# Patient Record
Sex: FEMALE | Race: WHITE | NOT HISPANIC OR LATINO | Employment: OTHER | URBAN - NONMETROPOLITAN AREA
[De-identification: names, ages, dates, MRNs, and addresses within clinical notes are randomized per-mention and may not be internally consistent; named-entity substitution may affect disease eponyms.]

---

## 2017-01-16 ENCOUNTER — HOSPITAL ENCOUNTER (EMERGENCY)
Facility: HOSPITAL | Age: 62
Discharge: HOME | End: 2017-01-16
Attending: EMERGENCY MEDICINE
Payer: COMMERCIAL

## 2017-01-16 ENCOUNTER — APPOINTMENT (EMERGENCY)
Dept: RADIOLOGY | Facility: HOSPITAL | Age: 62
End: 2017-01-16
Payer: COMMERCIAL

## 2017-01-16 VITALS
HEART RATE: 70 BPM | WEIGHT: 140 LBS | OXYGEN SATURATION: 96 % | BODY MASS INDEX: 24.03 KG/M2 | RESPIRATION RATE: 19 BRPM | DIASTOLIC BLOOD PRESSURE: 74 MMHG | SYSTOLIC BLOOD PRESSURE: 118 MMHG | TEMPERATURE: 97.6 F

## 2017-01-16 DIAGNOSIS — J20.9 ACUTE BRONCHITIS: ICD-10-CM

## 2017-01-16 DIAGNOSIS — J06.9 ACUTE URI: Primary | ICD-10-CM

## 2017-01-16 DIAGNOSIS — Z85.528 H/O RENAL CELL CANCER: ICD-10-CM

## 2017-01-16 DIAGNOSIS — E86.0 MILD DEHYDRATION: ICD-10-CM

## 2017-01-16 DIAGNOSIS — J44.1 COPD EXACERBATION (HCC): ICD-10-CM

## 2017-01-16 LAB
ALBUMIN SERPL BCP-MCNC: 2.8 G/DL (ref 3.5–5)
ALP SERPL-CCNC: 119 U/L (ref 46–116)
ALT SERPL W P-5'-P-CCNC: 17 U/L (ref 12–78)
ANION GAP SERPL CALCULATED.3IONS-SCNC: 9 MMOL/L (ref 4–13)
ARTERIAL PATENCY WRIST A: YES
AST SERPL W P-5'-P-CCNC: 28 U/L (ref 5–45)
BACTERIA UR QL AUTO: ABNORMAL /HPF
BASE EXCESS BLDA CALC-SCNC: -5.7 MMOL/L
BASOPHILS # BLD AUTO: 0.01 THOUSANDS/ΜL (ref 0–0.1)
BASOPHILS NFR BLD AUTO: 0 % (ref 0–1)
BILIRUB SERPL-MCNC: 0.2 MG/DL (ref 0.2–1)
BILIRUB UR QL STRIP: NEGATIVE
BUN SERPL-MCNC: 11 MG/DL (ref 5–25)
CALCIUM SERPL-MCNC: 8.3 MG/DL (ref 8.3–10.1)
CHLORIDE SERPL-SCNC: 100 MMOL/L (ref 100–108)
CLARITY UR: CLEAR
CO2 SERPL-SCNC: 24 MMOL/L (ref 21–32)
COLOR UR: YELLOW
CREAT SERPL-MCNC: 0.81 MG/DL (ref 0.6–1.3)
EOSINOPHIL # BLD AUTO: 0.04 THOUSAND/ΜL (ref 0–0.61)
EOSINOPHIL NFR BLD AUTO: 1 % (ref 0–6)
ERYTHROCYTE [DISTWIDTH] IN BLOOD BY AUTOMATED COUNT: 13.2 % (ref 11.6–15.1)
FLUAV AG SPEC QL IA: NEGATIVE
FLUAV AG SPEC QL: ABNORMAL
FLUBV AG SPEC QL IA: NEGATIVE
FLUBV AG SPEC QL: ABNORMAL
GAS + CO PNL BLDA: 2.3 % (ref 0–1.5)
GFR SERPL CREATININE-BSD FRML MDRD: >60 ML/MIN/1.73SQ M
GLUCOSE SERPL-MCNC: 91 MG/DL (ref 65–140)
GLUCOSE UR STRIP-MCNC: NEGATIVE MG/DL
HCO3 BLDA-SCNC: 20.1 MMOL/L (ref 22–28)
HCT VFR BLD AUTO: 36 % (ref 34.8–46.1)
HGB BLD-MCNC: 12 G/DL (ref 11.5–15.4)
HGB UR QL STRIP.AUTO: ABNORMAL
HOLD SPECIMEN: NORMAL
KETONES UR STRIP-MCNC: NEGATIVE MG/DL
LEUKOCYTE ESTERASE UR QL STRIP: NEGATIVE
LYMPHOCYTES # BLD AUTO: 0.94 THOUSANDS/ΜL (ref 0.6–4.47)
LYMPHOCYTES NFR BLD AUTO: 31 % (ref 14–44)
MCH RBC QN AUTO: 35.4 PG (ref 26.8–34.3)
MCHC RBC AUTO-ENTMCNC: 33.3 G/DL (ref 31.4–37.4)
MCV RBC AUTO: 106 FL (ref 82–98)
MONOCYTES # BLD AUTO: 0.34 THOUSAND/ΜL (ref 0.17–1.22)
MONOCYTES NFR BLD AUTO: 11 % (ref 4–12)
NASAL CANNULA: ABNORMAL
NEUTROPHILS # BLD AUTO: 1.75 THOUSANDS/ΜL (ref 1.85–7.62)
NEUTS SEG NFR BLD AUTO: 57 % (ref 43–75)
NITRITE UR QL STRIP: NEGATIVE
NON-SQ EPI CELLS URNS QL MICRO: ABNORMAL /HPF
O2 CT BLDA-SCNC: 16.9 ML/DL (ref 16–23)
OXYHGB MFR BLDA: 93.4 % (ref 94–97)
PCO2 BLDA: 40.6 MM HG (ref 36–44)
PH BLDA: 7.31 [PH] (ref 7.35–7.45)
PH UR STRIP.AUTO: 7 [PH] (ref 4.5–8)
PLATELET # BLD AUTO: 200 THOUSANDS/UL (ref 149–390)
PMV BLD AUTO: 8.3 FL (ref 8.9–12.7)
PO2 BLDA: 81.5 MM HG (ref 75–129)
POTASSIUM SERPL-SCNC: 4.4 MMOL/L (ref 3.5–5.3)
PROT SERPL-MCNC: 6.7 G/DL (ref 6.4–8.2)
PROT UR STRIP-MCNC: NEGATIVE MG/DL
RBC # BLD AUTO: 3.39 MILLION/UL (ref 3.81–5.12)
RBC #/AREA URNS AUTO: ABNORMAL /HPF
RSV B RNA SPEC QL NAA+PROBE: DETECTED
SODIUM SERPL-SCNC: 133 MMOL/L (ref 136–145)
SP GR UR STRIP.AUTO: 1.02 (ref 1–1.03)
SPECIMEN SOURCE: ABNORMAL
UROBILINOGEN UR QL STRIP.AUTO: 0.2 E.U./DL
WBC # BLD AUTO: 3.08 THOUSAND/UL (ref 4.31–10.16)
WBC #/AREA URNS AUTO: ABNORMAL /HPF

## 2017-01-16 PROCEDURE — 87798 DETECT AGENT NOS DNA AMP: CPT | Performed by: EMERGENCY MEDICINE

## 2017-01-16 PROCEDURE — 87400 INFLUENZA A/B EACH AG IA: CPT | Performed by: EMERGENCY MEDICINE

## 2017-01-16 PROCEDURE — 96361 HYDRATE IV INFUSION ADD-ON: CPT

## 2017-01-16 PROCEDURE — 36415 COLL VENOUS BLD VENIPUNCTURE: CPT | Performed by: EMERGENCY MEDICINE

## 2017-01-16 PROCEDURE — 85025 COMPLETE CBC W/AUTO DIFF WBC: CPT | Performed by: EMERGENCY MEDICINE

## 2017-01-16 PROCEDURE — 87086 URINE CULTURE/COLONY COUNT: CPT | Performed by: EMERGENCY MEDICINE

## 2017-01-16 PROCEDURE — 81001 URINALYSIS AUTO W/SCOPE: CPT | Performed by: EMERGENCY MEDICINE

## 2017-01-16 PROCEDURE — 80053 COMPREHEN METABOLIC PANEL: CPT | Performed by: EMERGENCY MEDICINE

## 2017-01-16 PROCEDURE — 87040 BLOOD CULTURE FOR BACTERIA: CPT | Performed by: EMERGENCY MEDICINE

## 2017-01-16 PROCEDURE — 82805 BLOOD GASES W/O2 SATURATION: CPT | Performed by: EMERGENCY MEDICINE

## 2017-01-16 PROCEDURE — 71020 HB CHEST X-RAY 2VW FRONTAL&LATL: CPT

## 2017-01-16 PROCEDURE — 36600 WITHDRAWAL OF ARTERIAL BLOOD: CPT

## 2017-01-16 PROCEDURE — 82375 ASSAY CARBOXYHB QUANT: CPT | Performed by: EMERGENCY MEDICINE

## 2017-01-16 PROCEDURE — 99283 EMERGENCY DEPT VISIT LOW MDM: CPT

## 2017-01-16 PROCEDURE — 94640 AIRWAY INHALATION TREATMENT: CPT

## 2017-01-16 PROCEDURE — 96374 THER/PROPH/DIAG INJ IV PUSH: CPT

## 2017-01-16 RX ORDER — GUAIFENESIN, PSEUDOEPHEDRINE HYDROCHLORIDE 600; 60 MG/1; MG/1
1 TABLET, EXTENDED RELEASE ORAL EVERY 12 HOURS
Qty: 20 TABLET | Refills: 0 | Status: ON HOLD | OUTPATIENT
Start: 2017-01-16 | End: 2018-08-07 | Stop reason: ALTCHOICE

## 2017-01-16 RX ORDER — PREDNISONE 20 MG/1
TABLET ORAL
Qty: 20 TABLET | Refills: 0 | Status: ON HOLD | OUTPATIENT
Start: 2017-01-16 | End: 2018-08-07 | Stop reason: ALTCHOICE

## 2017-01-16 RX ORDER — AZITHROMYCIN 250 MG/1
TABLET, FILM COATED ORAL
Qty: 10 TABLET | Refills: 0 | Status: ON HOLD | OUTPATIENT
Start: 2017-01-16 | End: 2018-08-07 | Stop reason: ALTCHOICE

## 2017-01-16 RX ORDER — SODIUM CHLORIDE 9 MG/ML
125 INJECTION, SOLUTION INTRAVENOUS CONTINUOUS
Status: DISCONTINUED | OUTPATIENT
Start: 2017-01-16 | End: 2017-01-16 | Stop reason: HOSPADM

## 2017-01-16 RX ORDER — ALBUTEROL SULFATE 90 UG/1
2 AEROSOL, METERED RESPIRATORY (INHALATION) EVERY 4 HOURS PRN
Qty: 1 INHALER | Refills: 0 | Status: SHIPPED | OUTPATIENT
Start: 2017-01-16 | End: 2017-02-15

## 2017-01-16 RX ORDER — AZITHROMYCIN 250 MG/1
500 TABLET, FILM COATED ORAL ONCE
Status: COMPLETED | OUTPATIENT
Start: 2017-01-16 | End: 2017-01-16

## 2017-01-16 RX ORDER — METHYLPREDNISOLONE SODIUM SUCCINATE 125 MG/2ML
125 INJECTION, POWDER, LYOPHILIZED, FOR SOLUTION INTRAMUSCULAR; INTRAVENOUS ONCE
Status: COMPLETED | OUTPATIENT
Start: 2017-01-16 | End: 2017-01-16

## 2017-01-16 RX ORDER — ALBUTEROL SULFATE 2.5 MG/3ML
2.5 SOLUTION RESPIRATORY (INHALATION) ONCE
Status: COMPLETED | OUTPATIENT
Start: 2017-01-16 | End: 2017-01-16

## 2017-01-16 RX ORDER — IPRATROPIUM BROMIDE AND ALBUTEROL SULFATE 2.5; .5 MG/3ML; MG/3ML
3 SOLUTION RESPIRATORY (INHALATION)
Status: DISCONTINUED | OUTPATIENT
Start: 2017-01-16 | End: 2017-01-16 | Stop reason: HOSPADM

## 2017-01-16 RX ADMIN — ALBUTEROL SULFATE 2.5 MG: 2.5 SOLUTION RESPIRATORY (INHALATION) at 12:06

## 2017-01-16 RX ADMIN — AZITHROMYCIN 500 MG: 250 TABLET, FILM COATED ORAL at 12:49

## 2017-01-16 RX ADMIN — METHYLPREDNISOLONE SODIUM SUCCINATE 125 MG: 125 INJECTION, POWDER, FOR SOLUTION INTRAMUSCULAR; INTRAVENOUS at 12:50

## 2017-01-16 RX ADMIN — SODIUM CHLORIDE 1000 ML: 0.9 INJECTION, SOLUTION INTRAVENOUS at 10:26

## 2017-01-16 RX ADMIN — IPRATROPIUM BROMIDE AND ALBUTEROL SULFATE 3 ML: .5; 3 SOLUTION RESPIRATORY (INHALATION) at 10:08

## 2017-01-16 RX ADMIN — SODIUM CHLORIDE 125 ML/HR: 0.9 INJECTION, SOLUTION INTRAVENOUS at 12:09

## 2017-01-17 LAB — BACTERIA UR CULT: NORMAL

## 2017-01-18 ENCOUNTER — ALLSCRIPTS OFFICE VISIT (OUTPATIENT)
Dept: FAMILY MEDICINE CLINIC | Facility: CLINIC | Age: 62
End: 2017-01-18
Payer: COMMERCIAL

## 2017-01-18 PROCEDURE — T1015 CLINIC SERVICE: HCPCS | Performed by: PHYSICIAN ASSISTANT

## 2017-01-21 LAB
BACTERIA BLD CULT: NORMAL
BACTERIA BLD CULT: NORMAL

## 2017-03-08 ENCOUNTER — GENERIC CONVERSION - ENCOUNTER (OUTPATIENT)
Dept: OTHER | Facility: OTHER | Age: 62
End: 2017-03-08

## 2017-04-20 ENCOUNTER — ALLSCRIPTS OFFICE VISIT (OUTPATIENT)
Dept: FAMILY MEDICINE CLINIC | Facility: CLINIC | Age: 62
End: 2017-04-20
Payer: COMMERCIAL

## 2017-04-20 ENCOUNTER — TRANSCRIBE ORDERS (OUTPATIENT)
Dept: ADMINISTRATIVE | Facility: HOSPITAL | Age: 62
End: 2017-04-20

## 2017-04-20 DIAGNOSIS — J44.9 OBSTRUCTIVE CHRONIC BRONCHITIS WITHOUT EXACERBATION (HCC): Primary | ICD-10-CM

## 2017-04-20 PROCEDURE — T1015 CLINIC SERVICE: HCPCS | Performed by: FAMILY MEDICINE

## 2017-06-05 DIAGNOSIS — R79.89 OTHER SPECIFIED ABNORMAL FINDINGS OF BLOOD CHEMISTRY: ICD-10-CM

## 2017-06-05 DIAGNOSIS — I10 ESSENTIAL (PRIMARY) HYPERTENSION: ICD-10-CM

## 2017-08-02 ENCOUNTER — ALLSCRIPTS OFFICE VISIT (OUTPATIENT)
Dept: FAMILY MEDICINE CLINIC | Facility: CLINIC | Age: 62
End: 2017-08-02
Payer: COMMERCIAL

## 2017-08-02 PROCEDURE — T1015 CLINIC SERVICE: HCPCS | Performed by: FAMILY MEDICINE

## 2017-08-29 ENCOUNTER — TRANSCRIBE ORDERS (OUTPATIENT)
Dept: LAB | Facility: MEDICAL CENTER | Age: 62
End: 2017-08-29

## 2017-08-29 ENCOUNTER — APPOINTMENT (OUTPATIENT)
Dept: LAB | Facility: MEDICAL CENTER | Age: 62
End: 2017-08-29
Payer: COMMERCIAL

## 2017-08-29 DIAGNOSIS — R79.89 OTHER SPECIFIED ABNORMAL FINDINGS OF BLOOD CHEMISTRY: ICD-10-CM

## 2017-08-29 DIAGNOSIS — F11.20 OPIOID TYPE DEPENDENCE, CONTINUOUS (HCC): Primary | ICD-10-CM

## 2017-08-29 DIAGNOSIS — I10 ESSENTIAL (PRIMARY) HYPERTENSION: ICD-10-CM

## 2017-08-29 LAB
ALBUMIN SERPL BCP-MCNC: 3.5 G/DL (ref 3.5–5)
ALP SERPL-CCNC: 117 U/L (ref 46–116)
ALT SERPL W P-5'-P-CCNC: 14 U/L (ref 12–78)
ANION GAP SERPL CALCULATED.3IONS-SCNC: 5 MMOL/L (ref 4–13)
AST SERPL W P-5'-P-CCNC: 17 U/L (ref 5–45)
BASOPHILS # BLD AUTO: 0.04 THOUSANDS/ΜL (ref 0–0.1)
BASOPHILS NFR BLD AUTO: 1 % (ref 0–1)
BILIRUB SERPL-MCNC: 0.27 MG/DL (ref 0.2–1)
BILIRUB UR QL STRIP: NEGATIVE
BUN SERPL-MCNC: 17 MG/DL (ref 5–25)
CALCIUM SERPL-MCNC: 9 MG/DL (ref 8.3–10.1)
CHLORIDE SERPL-SCNC: 110 MMOL/L (ref 100–108)
CHOLEST SERPL-MCNC: 213 MG/DL (ref 50–200)
CLARITY UR: CLEAR
CO2 SERPL-SCNC: 25 MMOL/L (ref 21–32)
COLOR UR: YELLOW
CREAT SERPL-MCNC: 0.9 MG/DL (ref 0.6–1.3)
EOSINOPHIL # BLD AUTO: 0.19 THOUSAND/ΜL (ref 0–0.61)
EOSINOPHIL NFR BLD AUTO: 3 % (ref 0–6)
ERYTHROCYTE [DISTWIDTH] IN BLOOD BY AUTOMATED COUNT: 14.5 % (ref 11.6–15.1)
EST. AVERAGE GLUCOSE BLD GHB EST-MCNC: 105 MG/DL
GFR SERPL CREATININE-BSD FRML MDRD: 69 ML/MIN/1.73SQ M
GLUCOSE P FAST SERPL-MCNC: 89 MG/DL (ref 65–99)
GLUCOSE UR STRIP-MCNC: NEGATIVE MG/DL
HBA1C MFR BLD: 5.3 % (ref 4.2–6.3)
HCT VFR BLD AUTO: 38.9 % (ref 34.8–46.1)
HDLC SERPL-MCNC: 54 MG/DL (ref 40–60)
HGB BLD-MCNC: 12.7 G/DL (ref 11.5–15.4)
HGB UR QL STRIP.AUTO: NEGATIVE
KETONES UR STRIP-MCNC: NEGATIVE MG/DL
LDLC SERPL CALC-MCNC: 135 MG/DL (ref 0–100)
LEUKOCYTE ESTERASE UR QL STRIP: NEGATIVE
LYMPHOCYTES # BLD AUTO: 1.76 THOUSANDS/ΜL (ref 0.6–4.47)
LYMPHOCYTES NFR BLD AUTO: 30 % (ref 14–44)
MCH RBC QN AUTO: 35.1 PG (ref 26.8–34.3)
MCHC RBC AUTO-ENTMCNC: 32.6 G/DL (ref 31.4–37.4)
MCV RBC AUTO: 108 FL (ref 82–98)
MONOCYTES # BLD AUTO: 0.42 THOUSAND/ΜL (ref 0.17–1.22)
MONOCYTES NFR BLD AUTO: 7 % (ref 4–12)
NEUTROPHILS # BLD AUTO: 3.35 THOUSANDS/ΜL (ref 1.85–7.62)
NEUTS SEG NFR BLD AUTO: 59 % (ref 43–75)
NITRITE UR QL STRIP: NEGATIVE
NRBC BLD AUTO-RTO: 0 /100 WBCS
PH UR STRIP.AUTO: 7 [PH] (ref 4.5–8)
PLATELET # BLD AUTO: 263 THOUSANDS/UL (ref 149–390)
PMV BLD AUTO: 9.7 FL (ref 8.9–12.7)
POTASSIUM SERPL-SCNC: 4.8 MMOL/L (ref 3.5–5.3)
PROT SERPL-MCNC: 7.2 G/DL (ref 6.4–8.2)
PROT UR STRIP-MCNC: NEGATIVE MG/DL
RBC # BLD AUTO: 3.62 MILLION/UL (ref 3.81–5.12)
SODIUM SERPL-SCNC: 140 MMOL/L (ref 136–145)
SP GR UR STRIP.AUTO: 1.01 (ref 1–1.03)
T3 SERPL-MCNC: 0.6 NG/ML (ref 0.6–1.8)
T4 FREE SERPL-MCNC: 0.76 NG/DL (ref 0.76–1.46)
TRIGL SERPL-MCNC: 118 MG/DL
TSH SERPL DL<=0.05 MIU/L-ACNC: 0.36 UIU/ML (ref 0.36–3.74)
UROBILINOGEN UR QL STRIP.AUTO: 0.2 E.U./DL
WBC # BLD AUTO: 5.78 THOUSAND/UL (ref 4.31–10.16)

## 2017-08-29 PROCEDURE — 81003 URINALYSIS AUTO W/O SCOPE: CPT

## 2017-08-29 PROCEDURE — 84439 ASSAY OF FREE THYROXINE: CPT

## 2017-08-29 PROCEDURE — 83036 HEMOGLOBIN GLYCOSYLATED A1C: CPT

## 2017-08-29 PROCEDURE — 80053 COMPREHEN METABOLIC PANEL: CPT

## 2017-08-29 PROCEDURE — 36415 COLL VENOUS BLD VENIPUNCTURE: CPT

## 2017-08-29 PROCEDURE — 85025 COMPLETE CBC W/AUTO DIFF WBC: CPT

## 2017-08-29 PROCEDURE — 84443 ASSAY THYROID STIM HORMONE: CPT

## 2017-08-29 PROCEDURE — 80061 LIPID PANEL: CPT

## 2017-08-29 PROCEDURE — 84480 ASSAY TRIIODOTHYRONINE (T3): CPT

## 2017-08-29 PROCEDURE — 80307 DRUG TEST PRSMV CHEM ANLYZR: CPT

## 2017-08-31 ENCOUNTER — APPOINTMENT (OUTPATIENT)
Dept: LAB | Facility: MEDICAL CENTER | Age: 62
End: 2017-08-31
Payer: COMMERCIAL

## 2017-08-31 ENCOUNTER — ALLSCRIPTS OFFICE VISIT (OUTPATIENT)
Dept: FAMILY MEDICINE CLINIC | Facility: CLINIC | Age: 62
End: 2017-08-31
Payer: COMMERCIAL

## 2017-08-31 ENCOUNTER — TRANSCRIBE ORDERS (OUTPATIENT)
Dept: LAB | Facility: MEDICAL CENTER | Age: 62
End: 2017-08-31

## 2017-08-31 DIAGNOSIS — R71.8 OTHER ABNORMALITY OF RED BLOOD CELLS: ICD-10-CM

## 2017-08-31 DIAGNOSIS — R26.9 ABNORMALITY OF GAIT AND MOBILITY: ICD-10-CM

## 2017-08-31 LAB
FOLATE SERPL-MCNC: 3.9 NG/ML (ref 3.1–17.5)
VIT B12 SERPL-MCNC: 239 PG/ML (ref 100–900)

## 2017-08-31 PROCEDURE — 36415 COLL VENOUS BLD VENIPUNCTURE: CPT

## 2017-08-31 PROCEDURE — 82607 VITAMIN B-12: CPT

## 2017-08-31 PROCEDURE — T1015 CLINIC SERVICE: HCPCS | Performed by: FAMILY MEDICINE

## 2017-08-31 PROCEDURE — 82746 ASSAY OF FOLIC ACID SERUM: CPT

## 2017-09-04 LAB
AMPHETAMINES UR QL SCN: NEGATIVE NG/ML
BARBITURATES UR QL SCN: NEGATIVE NG/ML
BENZODIAZ UR QL SCN: NEGATIVE
BZE UR QL SCN: NEGATIVE NG/ML
CANNABINOIDS UR QL SCN: NEGATIVE NG/ML
METHADONE UR QL SCN: NEGATIVE NG/ML
OPIATES UR QL: NEGATIVE
PCP UR QL: NEGATIVE NG/ML
PROPOXYPH UR QL: NEGATIVE NG/ML

## 2017-09-25 ENCOUNTER — GENERIC CONVERSION - ENCOUNTER (OUTPATIENT)
Dept: OTHER | Facility: OTHER | Age: 62
End: 2017-09-25

## 2017-09-25 ENCOUNTER — APPOINTMENT (OUTPATIENT)
Dept: FAMILY MEDICINE CLINIC | Facility: CLINIC | Age: 62
End: 2017-09-25
Payer: COMMERCIAL

## 2017-09-25 PROCEDURE — T1015 CLINIC SERVICE: HCPCS | Performed by: FAMILY MEDICINE

## 2017-09-28 ENCOUNTER — APPOINTMENT (OUTPATIENT)
Dept: LAB | Facility: MEDICAL CENTER | Age: 62
End: 2017-09-28
Payer: COMMERCIAL

## 2017-09-28 ENCOUNTER — TRANSCRIBE ORDERS (OUTPATIENT)
Dept: ADMINISTRATIVE | Facility: HOSPITAL | Age: 62
End: 2017-09-28

## 2017-09-28 DIAGNOSIS — F11.20 OPIOID TYPE DEPENDENCE, CONTINUOUS (HCC): Primary | ICD-10-CM

## 2017-09-28 LAB
ANION GAP SERPL CALCULATED.3IONS-SCNC: 11 MMOL/L (ref 4–13)
BUN SERPL-MCNC: 9 MG/DL (ref 5–25)
CALCIUM SERPL-MCNC: 8.4 MG/DL (ref 8.3–10.1)
CHLORIDE SERPL-SCNC: 106 MMOL/L (ref 100–108)
CO2 SERPL-SCNC: 19 MMOL/L (ref 21–32)
CREAT SERPL-MCNC: 0.97 MG/DL (ref 0.6–1.3)
GFR SERPL CREATININE-BSD FRML MDRD: 63 ML/MIN/1.73SQ M
GLUCOSE SERPL-MCNC: 88 MG/DL (ref 65–140)
POTASSIUM SERPL-SCNC: 4.3 MMOL/L (ref 3.5–5.3)
SODIUM SERPL-SCNC: 136 MMOL/L (ref 136–145)

## 2017-09-28 PROCEDURE — 36415 COLL VENOUS BLD VENIPUNCTURE: CPT

## 2017-09-28 PROCEDURE — 80048 BASIC METABOLIC PNL TOTAL CA: CPT

## 2017-09-28 PROCEDURE — 80307 DRUG TEST PRSMV CHEM ANLYZR: CPT

## 2017-10-03 LAB
AMPHETAMINES UR QL SCN: NEGATIVE NG/ML
BARBITURATES UR QL SCN: NEGATIVE NG/ML
BENZODIAZ UR QL SCN: NEGATIVE
BZE UR QL SCN: NEGATIVE NG/ML
CANNABINOIDS UR QL SCN: NEGATIVE NG/ML
METHADONE UR QL SCN: NEGATIVE NG/ML
OPIATES UR QL: NEGATIVE NG/ML
PCP UR QL: NEGATIVE NG/ML
PROPOXYPH UR QL: NEGATIVE NG/ML

## 2017-10-23 DIAGNOSIS — Z12.31 ENCOUNTER FOR SCREENING MAMMOGRAM FOR MALIGNANT NEOPLASM OF BREAST: ICD-10-CM

## 2017-11-03 ENCOUNTER — TRANSCRIBE ORDERS (OUTPATIENT)
Dept: ADMINISTRATIVE | Facility: HOSPITAL | Age: 62
End: 2017-11-03

## 2017-11-03 DIAGNOSIS — Z12.31 ENCOUNTER FOR SCREENING MAMMOGRAM FOR MALIGNANT NEOPLASM OF BREAST: Primary | ICD-10-CM

## 2017-11-04 NOTE — PROGRESS NOTES
Assessment  1  Dental disorder (525 9) (K08 9)    Plan   Dental disorder    · Follow Up if Not Better Evaluation and Treatment  Follow-up  Status: Complete  Done:  18TCQ0520    Cephalexin 500 MG Oral Capsule; TAKE 1 CAPSULE 4 TIMES DAILY UNTIL GONE;   Therapy: 02Aug2017 to (Evaluate:09Aug2017)  Requested for: 02Aug2017; Last Rx:02Aug2017; Status: ACTIVE Ordered  Rx By: Yady Vee; Dispense: 7 Days ; #:28 Capsule; Refill: 0;   For: Dental disorder; ARTURO = N; Verified Transmission to [address] [facility]; Last Updated By: [employer]; 8/31/2017 9:04:09 AM     Discussion/Summary    Foolow up as previously requested  Krflex QID until gone  Keep appt with dentist 8/10/17  The treatment plan was reviewed with the patient/guardian  The patient/guardian understands and agrees with the treatment plan      Chief Complaint  Pt here with right side tooth pain and face swelling  History of Present Illness  HPI: 65 yo female presents for above  She reports she broke tooth off 2 weeks ago and now with facial and jaw pain and swelling  No fever or chills  Review of Systems    Constitutional: No fever, no chills, feels well, no tiredness, no recent weight gain or loss  ENT: as noted in HPI  Cardiovascular: no complaints of slow or fast heart rate, no chest pain, no palpitations, no leg claudication or lower extremity edema  Respiratory: no complaints of shortness of breath, no wheezing, no dyspnea on exertion, no orthopnea or PND  Gastrointestinal: no complaints of abdominal pain, no constipation, no nausea or diarrhea, no vomiting, no bloody stools  Genitourinary: no complaints of dysuria, no incontinence, no pelvic pain, no dysmenorrhea, no vaginal discharge or abnormal vaginal bleeding  Active Problems   1  Abnormal gait (781 2) (R26 9)   2  Abnormal liver function test (790 6) (R79 89)   3  Allergic rhinitis (477 9) (J30 9)   4  Anxiety (300 00) (F41 9)   5   Asthmatic bronchitis, mild persistent, with status asthmaticus   6  Back pain (724 5) (M54 9)   7  Benign hypertensive heart disease (402 10) (I11 9)   8  Chronic low back pain (724 2,338 29) (M54 5,G89 29)   9  Chronic obstructive pulmonary disease (496) (J44 9)   10  Dental disorder (525 9) (K08 9)   11  Difficulty sleeping (780 50) (G47 9)   12  Encounter for colorectal cancer screening (V76 51) (Z12 11,Z12 12)   13  Encounter for drug screening (V72 85) (Z02 83)   14  Esophageal reflux (530 81) (K21 9)   15  Facial injury (959 09) (S09 93XA)   16  Fainting spell (780 2) (R55)   17  Flu vaccine need (V04 81) (Z23)   18  Headache (784 0) (R51)   19  Herpes zoster (053 9) (B02 9)   20  Hypertension (401 9) (I10)   21  Nail fungal infection (110 1) (B35 1)   22  Nausea with vomiting (787 01) (R11 2)   23  Neck pain (723 1) (M54 2)   24  Palpitations (785 1) (R00 2)   25  Posterior dislocation of hip, closed (835 01) (S73 016A)   26  Right hip pain (719 45) (M25 551)   27  Shoulder pain, right (719 41) (M25 511)   28  Striking against other object with subsequent fall (E888 1) (I20 81HR)   29  Throat pain (784 1) (R07 0)   30  Viral gastroenteritis (008 8) (A08 4)   31  Visit for screening mammogram (V76 12) (Z12 31)    Bipolar 1 disorder, depressed (296 50) (F31 9)       Insomnia (780 52) (G47 00)          Past Medical History  1  History of Ankle pain, unspecified laterality   2  History of Chronic sinusitis (473 9) (J32 9)   3  History of Conjunctivitis (372 30) (H10 9)   4  History of Cough (786 2) (R05)   5  History of Denial (799 29) (R45 89)   6  History of pharyngitis (V12 69) (Z87 09)   7  History of urinary frequency (V13 09) (Z87 898)   8  History of Tachycardia (785 0) (R00 0)   9  History of Vaginitis (616 10) (N76 0)   10  History of Viral infection (079 99) (B34 9)  Active Problems And Past Medical History Reviewed: The active problems and past medical history were reviewed and updated today  Family History  Mother    1  Family history of Hypertension (V17 49)  Father    2  Family history of Colon Cancer (V16 0)  Family History    3  Family history of alcoholism (V17 0) (Z81 1)   4  Family history of anemia (V18 2) (Z83 2)   5  Family history of cerebrovascular accident (V17 1) (Z82 3)   6  Family history of depression (V17 0) (Z81 8)   7  Family history of diabetes mellitus (V18 0) (Z83 3)   8  Family history of hypertension (V17 49) (Z82 49)   9  Family history of malignant neoplasm (V16 9) (Z80 9)   10  Family history of High cholesterol  Family History Reviewed: The family history was reviewed and updated today  Social History   · Denied: History of Alcohol   · Caffeine Use   · Current every day smoker (305 1) (F17 200)   · Denied: History of Drug Use   · Nondependent tobacco use disorder (305 1) (F17 200)   · 1 pack per day  The social history was reviewed and updated today  The social history was reviewed and is unchanged  Surgical History  1  History of Cholecystectomy   2  History of Kidney Surgery Laparoscopic Radical Nephrectomy   3  History of Proctopexy For Prolapse, Abdominal Approach   4  History of Repair Of Bladder Reconstruction  Surgical History Reviewed: The surgical history was reviewed and updated today  Current Meds   1  Fluticasone Propionate 50 MCG/ACT Nasal Suspension; use 1 to 2 sprays in each nostril   daily; Therapy: 00Rlp5950 to (Last Rx:20Apr2017)  Requested for: 20Apr2017 Ordered   2  Ketotifen Fumarate 0 025 % Ophthalmic Solution; INSTILL 1 DROP IN THE AFFECTED   EYE(S) EVERY 12 HOURS AS NEEDED; Therapy: 80LZB0166 to (Last Rx:46Zns9940)  Requested for: 63DAB2461 Ordered   3  Latuda 120 MG Oral Tablet; Take 1 tablet daily  Requested for: 53AWF4227; Last   Rx:58Orw3060 Ordered   4  Metoprolol Succinate  MG Oral Tablet Extended Release 24 Hour; take 2 tablets   by mouth daily;    Therapy: 57LME2037 to (Evaluate:94Ziy8545)  Requested for: 30BOM0594; Last   Rx:23Cwe7524 Ordered   5  Mirtazapine 30 MG Oral Tablet Disintegrating; DISSOLVE ONE TABLET ON TONGUE   AND SWALLOW WITH OR WITHOUT WATER ONCE DAILY AT BEDTIME; Therapy: 13Knq8546 to (Evaluate:46Vst2948)  Requested for: 90Rht2897; Last   Rx:01Chc0226 Ordered   6  Pantoprazole Sodium 40 MG Oral Tablet Delayed Release; take 1 tablet twice daily 30   minutes before breakfast and dinner; Therapy: 41Ytm3709 to (Last Rx:48Yis2823)  Requested for: 12Tcb9891 Ordered   7  QUEtiapine Fumarate 300 MG Oral Tablet; TAKE (1) TABLET BY MOUTH DAILY; Therapy: 64XDA5950 to (Last Rx:87Jmr6696)  Requested for: 14JKR6928 Ordered   8  Topiramate 100 MG Oral Tablet; TAKE 1 TABLET DAILY; Therapy: 78LCW5335 to (Evaluate:16Bfj0814)  Requested for: 20Apr2017; Last   Rx:22Hpn4910 Ordered   9  Ventolin  (90 Base) MCG/ACT Inhalation Aerosol Solution; INHALE 1 TO 2   PUFFS EVERY 4 TO 6 HOURS AS NEEDED; Therapy: 19WXR3787 to (Last Rx:09Atd0056)  Requested for: 46Ibt1552 Ordered    The medication list was reviewed and updated today  Allergies  1  Cardura TABS   2  CeleBREX CAPS   3  Skelaxin TABS    Vitals   Recorded: 02Aug2017 01:23PM   Temperature 98 F   Heart Rate 75   Respiration 17   Systolic 248   Diastolic 74   Height 5 ft 1 in   Weight 149 lb 2 oz   BMI Calculated 28 18   BSA Calculated 1 67   O2 Saturation 94     Physical Exam    Constitutional   General appearance: No acute distress, well appearing and well nourished  Eyes   Conjunctiva and lids: No swelling, erythema or discharge  Pupils and irises: Equal, round and reactive to light  Ears, Nose, Mouth, and Throat   External inspection of ears and nose: Normal     Oropharynx: Abnormal  -- redness and swelling noted over R upper gum line  Pulmonary   Respiratory effort: No increased work of breathing or signs of respiratory distress  Auscultation of lungs: Clear to auscultation  Cardiovascular   Palpation of heart: Normal PMI, no thrills      Auscultation of heart: Normal rate and rhythm, normal S1 and S2, without murmurs  Examination of extremities for edema and/or varicosities: Normal     Lymphatic   Palpation of lymph nodes in neck: No lymphadenopathy  Musculoskeletal   Inspection/palpation of joints, bones, and muscles: Abnormal  -- facial swelling noted  Skin   Skin and subcutaneous tissue: Normal without rashes or lesions  Psychiatric   Orientation to person, place, and time: Normal     Mood and affect: Normal          Future Appointments    Date/Time Provider Specialty Site   11/14/2017 07:30 AM William Fenton, [address] [place] [address]     Signatures   Electronically signed by : RIDGE Betancourt;  Aug  2 2017  1:33PM EST                       (Author)    Electronically signed by : Fiorella Armando MD; Nov  3 2017  7:25PM EST                       (Author)

## 2017-11-22 ENCOUNTER — HOSPITAL ENCOUNTER (OUTPATIENT)
Dept: MAMMOGRAPHY | Facility: HOSPITAL | Age: 62
Discharge: HOME | End: 2017-11-22
Payer: COMMERCIAL

## 2017-11-22 DIAGNOSIS — Z12.31 ENCOUNTER FOR SCREENING MAMMOGRAM FOR MALIGNANT NEOPLASM OF BREAST: ICD-10-CM

## 2017-11-22 PROCEDURE — G0202 SCR MAMMO BI INCL CAD: HCPCS

## 2017-11-22 PROCEDURE — 77063 BREAST TOMOSYNTHESIS BI: CPT

## 2017-12-11 ENCOUNTER — ALLSCRIPTS OFFICE VISIT (OUTPATIENT)
Dept: FAMILY MEDICINE CLINIC | Facility: CLINIC | Age: 62
End: 2017-12-11
Payer: COMMERCIAL

## 2017-12-11 PROCEDURE — T1015 CLINIC SERVICE: HCPCS | Performed by: FAMILY MEDICINE

## 2017-12-20 NOTE — PROGRESS NOTES
Assessment  1  Bipolar 1 disorder, depressed (296 50) (F31 9)    Plan  Bipolar 1 disorder, depressed    · DULoxetine HCl - 30 MG Oral Capsule Delayed Release Particles (Cymbalta);take 1 capsule by mouth once daily   · Latuda 120 MG Oral Tablet; Take 1 tablet daily   · Topiramate 100 MG Oral Tablet (Topamax); TAKE 1 TABLET DAILY  PMH: Repair Of Bladder Reconstruction    · Pantoprazole Sodium 40 MG Oral Tablet Delayed Release; TAKE ONE TABLETEVERY DAY    Discussion/Summary    -I have urged her to see psychiatry-She should discuss the Topamax with neurology-I will have her return monthly until she sees psychiatry  The patient was counseled regarding  Chief Complaint  Pt is here today for a med refill  Pt has no complaints today  History of Present Illness  The patient presents for follow-up of Bipolar disorder, GERD, Tobacco dependence  She continues to see neurology who is prescribing Xanax, Soma, Vicodin, and Neurontin  She wants us to continue to prescribe her Latuda, Topamax, and Cymbalta which she is taking for Bipolar disorder, however I told her last visit she needs to see physiatry as her medications interact and I do not feel comfortable continuing to prescribe these medications  She states she has an intake, but has yet to see the psychiatrist       Review of Systems   Constitutional: No fever, no chills, feels well, no tiredness, no recent weight gain or weight loss  Eyes: No complaints of eye pain, no red eyes, no eyesight problems, no discharge, no dry eyes, no itching of eyes  ENT: no complaints of earache, no loss of hearing, no nose bleeds, no nasal discharge, no sore throat, no hoarseness  Cardiovascular: No complaints of slow heart rate, no fast heart rate, no chest pain, no palpitations, no leg claudication, no lower extremity edema  Respiratory: No complaints of shortness of breath, no wheezing, no cough, no SOB on exertion, no orthopnea, no PND    Gastrointestinal: No complaints of abdominal pain, no constipation, no nausea or vomiting, no diarrhea, no bloody stools  Genitourinary: No complaints of dysuria, no incontinence, no pelvic pain, no dysmenorrhea, no vaginal discharge or bleeding  Musculoskeletal: No complaints of arthralgias, no myalgias, no joint swelling or stiffness, no limb pain or swelling  Integumentary: No complaints of skin rash or lesions, no itching, no skin wounds, no breast pain or lump  Neurological: No complaints of headache, no confusion, no convulsions, no numbness, no dizziness or fainting, no tingling, no limb weakness, no difficulty walking  Psychiatric: Not suicidal, no sleep disturbance, no anxiety or depression, no change in personality, no emotional problems  Endocrine: No complaints of proptosis, no hot flashes, no muscle weakness, no deepening of the voice, no feelings of weakness  Hematologic/Lymphatic: No complaints of swollen glands, no swollen glands in the neck, does not bleed easily, does not bruise easily  ROS reviewed  Active Problems  1  Abnormal gait (781 2) (R26 9)   2  Abnormal liver function test (790 6) (R79 89)   3  Allergic rhinitis (477 9) (J30 9)   4  Anxiety (300 00) (F41 9)   5  Asthmatic bronchitis, mild persistent, with status asthmaticus   6  Back pain (724 5) (M54 9)   7  Benign hypertensive heart disease (402 10) (I11 9)   8  Bipolar 1 disorder, depressed (296 50) (F31 9)   9  Chronic low back pain (724 2,338 29) (M54 5,G89 29)   10  Chronic obstructive pulmonary disease (496) (J44 9)   11  Dental disorder (525 9) (K08 9)   12  Depression, unspecified depression type (311) (F32 9)   13  Difficulty sleeping (780 50) (G47 9)   14  Elevated MCV (790 09) (R71 8)   15  Encounter for colorectal cancer screening (V76 51) (Z12 11,Z12 12)   16  Encounter for drug screening (V72 85) (Z02 83)   17  Esophageal reflux (530 81) (K21 9)   18  Facial injury (959 09) (S09 93XA)   19  Fainting spell (780 2) (R55)   20   Flu vaccine need (V04 81) (Z23)   21  Headache (784 0) (R51)   22  Herpes zoster (053 9) (B02 9)   23  Hypertension (401 9) (I10)   24  Insomnia (780 52) (G47 00)   25  Nail fungal infection (110 1) (B35 1)   26  Nausea with vomiting (787 01) (R11 2)   27  Neck pain (723 1) (M54 2)   28  Palpitations (785 1) (R00 2)   29  Posterior dislocation of hip, closed (835 01) (S73 016A)   30  Right hip pain (719 45) (M25 551)   31  Shoulder pain, right (719 41) (M25 511)   32  Striking against other object with subsequent fall (E888 1) (W18 09XA)   33  Throat pain (784 1) (R07 0)   34  Viral gastroenteritis (008 8) (A08 4)   35  Visit for screening mammogram (V76 12) (Z12 31)    Past Medical History  1  History of Ankle pain, unspecified laterality   2  History of Chronic sinusitis (473 9) (J32 9)   3  History of Conjunctivitis (372 30) (H10 9)   4  History of Cough (786 2) (R05)   5  History of Denial (799 29) (R45 89)   6  History of pharyngitis (V12 69) (Z87 09)   7  History of urinary frequency (V13 09) (Z87 898)   8  History of Tachycardia (785 0) (R00 0)   9  History of Vaginitis (616 10) (N76 0)   10  History of Viral infection (079 99) (B34 9)    The active problems and past medical history were reviewed and updated today  Surgical History  1  History of Cholecystectomy   2  History of Kidney Surgery Laparoscopic Radical Nephrectomy   3  History of Proctopexy For Prolapse, Abdominal Approach   4  History of Repair Of Bladder Reconstruction    The surgical history was reviewed and updated today  Family History  Mother    1  Family history of Hypertension (V17 49)  Father    2  Family history of Colon Cancer (V16 0)  Family History    3  Family history of alcoholism (V17 0) (Z81 1)   4  Family history of anemia (V18 2) (Z83 2)   5  Family history of cerebrovascular accident (V17 1) (Z82 3)   6  Family history of depression (V17 0) (Z81 8)   7  Family history of diabetes mellitus (V18 0) (Z83 3)   8   Family history of hypertension (V17 49) (Z82 49)   9  Family history of malignant neoplasm (V16 9) (Z80 9)   10  Family history of High cholesterol    The family history was reviewed and updated today  Social History   · Denied: History of Alcohol   · Caffeine Use   · Current every day smoker (305 1) (F17 200)   · Denied: History of Drug Use   · Nondependent tobacco use disorder (305 1) (F17 200)  The social history was reviewed and updated today  Current Meds   1  DULoxetine HCl - 30 MG Oral Capsule Delayed Release Particles; take 1 capsule by mouth once daily; Therapy: 75BJD0698 to (Evaluate:19Jan2018)  Requested for: 20Nov2017; Last Rx:20Nov2017; Status: ACTIVE - Renewal Denied Ordered   2  Ketotifen Fumarate 0 025 % Ophthalmic Solution; USE 1 DROP IN AFFECTED EYE(S) EVERY 12 HOURS AS NEEDED; Therapy: 81BZO7560 to (Last Rx:28Wso7294)  Requested for: 46Pzc1189 Ordered   3  Latuda 120 MG Oral Tablet; Take 1 tablet daily  Requested for: 26WPK7053; Last Rx:35Ijs8131 Ordered   4  Melatonin 5 MG Oral Capsule; TAKE AS DIRECTED; Therapy: 64His4737 to (Evaluate:30Oct2017)  Requested for: 60Tld5336; Last Rx:03Yzi9684 Ordered   5  Metoprolol Succinate  MG Oral Tablet Extended Release 24 Hour; take 2 tablets by mouth daily; Therapy: 75BVU3614 to (Evaluate:91Sjh2056)  Requested for: 57PUD1045; Last Rx:62Tdt5986 Ordered   6  Neurontin 800 MG Oral Tablet; TAKE 1 TABLET 4 TIMES DAILY; Therapy: 74NWQ6393 to Recorded   7  Pantoprazole Sodium 40 MG Oral Tablet Delayed Release; take 1 tablet twice daily 30 minutes before breakfast and dinner; Therapy: 86Fwh3069 to (Last Rx:33Nex5981)  Requested for: 46Oyv6724 Ordered   8  Soma 350 MG Oral Tablet; TAKE 1 TABLET 4 TIMES DAILY AS NEEDED; Therapy: 93WKH0185 to Recorded   9  Ventolin  (90 Base) MCG/ACT Inhalation Aerosol Solution; INHALE 1 TO 2 PUFFS EVERY 4 TO 6 HOURS AS NEEDED; Therapy: 02MMR7731 to (Last Rx:15Mnc1743)  Requested for: 66RYK0657 Ordered   10   Vicodin 5-300 MG Oral Tablet; Therapy: 92GSI0970 to Recorded   11  Xanax 0 25 MG Oral Tablet; Take 1 tablet daily; Therapy: 24CIV2993 to (Evaluate:09Oct2017) Recorded    The medication list was reviewed and updated today  Allergies  1  Cardura TABS   2  CeleBREX CAPS   3  Skelaxin TABS    Vitals  Vital Signs    Recorded: 89Eyt8638 08:59AM   Temperature 97 4 F   Heart Rate 69   Respiration 20   Systolic 356   Diastolic 70   Height 5 ft 1 in   Weight 149 lb    BMI Calculated 28 15   BSA Calculated 1 67   O2 Saturation 97     Physical Exam   Constitutional  General appearance: Abnormal   appears older than stated age  Eyes  Conjunctiva and lids: No swelling, erythema or discharge  Ears, Nose, Mouth, and Throat  External inspection of ears and nose: Normal    Pulmonary  Auscultation of lungs: Clear to auscultation  Abdomen  Abdomen: Non-tender, no masses  Neurologic  Cranial nerves: Cranial nerves 2-12 intact  Psychiatric  Orientation to person, place, and time: Normal    Diabetic Foot Screen: Normal        Health Management  Encounter for colorectal cancer screening   COLONOSCOPY; every 5 years; Last 61ZDK8918; Next Due: 89WNV3594;  Active    Future Appointments    Date/Time Provider Specialty Site   01/12/2018 09:30 AM [facility] [address] [place] [address]     Signatures   Electronically signed by : Christina Nuñ[facility]; Dec 11 2017  9:19AM EST                       (Author)    Electronically signed by : Niesha Clark DO; Dec 19 2017 12:46PM EST                       (Review)

## 2018-01-12 ENCOUNTER — ALLSCRIPTS OFFICE VISIT (OUTPATIENT)
Dept: FAMILY MEDICINE CLINIC | Facility: CLINIC | Age: 63
End: 2018-01-12
Payer: COMMERCIAL

## 2018-01-12 ENCOUNTER — GENERIC CONVERSION - ENCOUNTER (OUTPATIENT)
Dept: OTHER | Facility: OTHER | Age: 63
End: 2018-01-12

## 2018-01-12 PROCEDURE — T1015 CLINIC SERVICE: HCPCS | Performed by: FAMILY MEDICINE

## 2018-01-13 VITALS
HEIGHT: 61 IN | WEIGHT: 155 LBS | OXYGEN SATURATION: 97 % | HEART RATE: 82 BPM | SYSTOLIC BLOOD PRESSURE: 130 MMHG | TEMPERATURE: 97.6 F | BODY MASS INDEX: 29.27 KG/M2 | DIASTOLIC BLOOD PRESSURE: 80 MMHG

## 2018-01-13 VITALS
HEART RATE: 76 BPM | WEIGHT: 144 LBS | OXYGEN SATURATION: 96 % | TEMPERATURE: 98.6 F | HEIGHT: 61 IN | DIASTOLIC BLOOD PRESSURE: 70 MMHG | SYSTOLIC BLOOD PRESSURE: 124 MMHG | BODY MASS INDEX: 27.19 KG/M2 | RESPIRATION RATE: 17 BRPM

## 2018-01-14 VITALS
BODY MASS INDEX: 28.15 KG/M2 | HEART RATE: 75 BPM | TEMPERATURE: 98 F | SYSTOLIC BLOOD PRESSURE: 132 MMHG | OXYGEN SATURATION: 94 % | HEIGHT: 61 IN | WEIGHT: 149.13 LBS | RESPIRATION RATE: 17 BRPM | DIASTOLIC BLOOD PRESSURE: 74 MMHG

## 2018-01-14 VITALS
SYSTOLIC BLOOD PRESSURE: 118 MMHG | OXYGEN SATURATION: 93 % | BODY MASS INDEX: 28.13 KG/M2 | HEIGHT: 61 IN | TEMPERATURE: 97.6 F | DIASTOLIC BLOOD PRESSURE: 74 MMHG | RESPIRATION RATE: 17 BRPM | WEIGHT: 149 LBS | HEART RATE: 80 BPM

## 2018-01-15 NOTE — RESULT NOTES
Verified Results  (1) DRUG ABUSE SCREEN, URINE ROUTINE 61Fwp8927 06:21PM Cedric Olsen     Test Name Result Flag Reference   AMPHETAMINE SCREEN URINE Negative ng/mL  Owwadl=0127   Amphetamine test includes Amphetamine and Methamphetamine  BARBITURATE SCREEN URINE Negative ng/mL  Hlucso=038   BENZODIAZEPINE SCREEN, URINE Positive A Ihmdbb=911   Nordiazepam               Negative                                  01    Oxazepam                  Positive        A                         01    Oxazepam GC/MS Conf       >1000              ng/mL Aincyr=276       01    OH-Alprazolam             Positive        A                         01    OH-Alprazolam GC/MS Conf  478                ng/mL Tiuahx=341       01   CANNABINOID SCREEN URINE Negative ng/mL  Cutoff=50   COCAINE(METAB  )SCREEN, URINE Negative ng/mL  Frrmie=905   METHADONE SCREEN, URINE Negative ng/mL  Gzkgzt=754   OPIATE SCREEN URINE Negative  Qklwfx=173   Opiate test includes Codeine and Morphine only     PHENCYCLIDINE (PCP), QUAL, UR Negative ng/mL  Cutoff=25   PROPOXYPHENE, SCREEN Negative ng/mL  Xxlqam=654   Performed at:  [address] [facility] [address]  [id number]  : Chano Lee MD, Phone:  [phone]

## 2018-01-18 NOTE — RESULT NOTES
Verified Results  (1) SPECIAL TEST 51Szy0560 06:21PM Rosie Mccullough     Test Name Result Flag Reference   TEST RESULT      SEE WRITTEN REPORT FROM [facility]

## 2018-01-22 VITALS
WEIGHT: 154 LBS | DIASTOLIC BLOOD PRESSURE: 82 MMHG | HEIGHT: 61 IN | TEMPERATURE: 98.6 F | BODY MASS INDEX: 29.07 KG/M2 | HEART RATE: 68 BPM | RESPIRATION RATE: 20 BRPM | OXYGEN SATURATION: 98 % | SYSTOLIC BLOOD PRESSURE: 130 MMHG

## 2018-01-22 VITALS
RESPIRATION RATE: 20 BRPM | HEIGHT: 61 IN | TEMPERATURE: 97.4 F | OXYGEN SATURATION: 97 % | SYSTOLIC BLOOD PRESSURE: 128 MMHG | WEIGHT: 149 LBS | DIASTOLIC BLOOD PRESSURE: 70 MMHG | BODY MASS INDEX: 28.13 KG/M2 | HEART RATE: 69 BPM

## 2018-01-24 VITALS
OXYGEN SATURATION: 97 % | SYSTOLIC BLOOD PRESSURE: 120 MMHG | HEART RATE: 67 BPM | RESPIRATION RATE: 20 BRPM | TEMPERATURE: 97.4 F | DIASTOLIC BLOOD PRESSURE: 90 MMHG | WEIGHT: 160 LBS | HEIGHT: 61 IN | BODY MASS INDEX: 30.21 KG/M2

## 2018-01-31 ENCOUNTER — TELEPHONE (OUTPATIENT)
Dept: FAMILY MEDICINE CLINIC | Facility: CLINIC | Age: 63
End: 2018-01-31

## 2018-02-07 DIAGNOSIS — G43.711 INTRACTABLE CHRONIC MIGRAINE WITHOUT AURA AND WITH STATUS MIGRAINOSUS: Primary | ICD-10-CM

## 2018-02-07 RX ORDER — DULOXETIN HYDROCHLORIDE 60 MG/1
1 CAPSULE, DELAYED RELEASE ORAL
Status: ON HOLD | COMMUNITY
Start: 2017-09-25 | End: 2019-05-14 | Stop reason: ALTCHOICE

## 2018-02-07 RX ORDER — RIFAMPIN 150 MG/1
CAPSULE ORAL
Status: ON HOLD | COMMUNITY
Start: 2013-09-13 | End: 2019-05-14 | Stop reason: ALTCHOICE

## 2018-02-07 RX ORDER — ERGOCALCIFEROL 1.25 MG/1
CAPSULE ORAL WEEKLY
COMMUNITY
Start: 2014-09-11

## 2018-02-07 RX ORDER — DOCUSATE SODIUM 100 MG/1
CAPSULE, LIQUID FILLED ORAL 2 TIMES DAILY PRN
COMMUNITY
Start: 2014-06-12

## 2018-02-07 RX ORDER — TOPIRAMATE 100 MG/1
1 TABLET, FILM COATED ORAL DAILY
COMMUNITY
Start: 2015-12-03 | End: 2018-02-07 | Stop reason: SDUPTHER

## 2018-02-07 RX ORDER — KETOTIFEN FUMARATE 0.35 MG/ML
SOLUTION/ DROPS OPHTHALMIC
COMMUNITY
Start: 2016-12-19

## 2018-02-07 RX ORDER — TOPIRAMATE 100 MG/1
100 TABLET, FILM COATED ORAL DAILY
Qty: 30 TABLET | Refills: 3 | Status: ON HOLD | OUTPATIENT
Start: 2018-02-07 | End: 2019-05-14 | Stop reason: ALTCHOICE

## 2018-02-12 DIAGNOSIS — F31.62 BIPOLAR DISORDER, CURRENT EPISODE MIXED, MODERATE (HCC): Primary | ICD-10-CM

## 2018-02-13 DIAGNOSIS — F31.62 BIPOLAR DISORDER, CURRENT EPISODE MIXED, MODERATE (HCC): ICD-10-CM

## 2018-02-16 DIAGNOSIS — F31.62 BIPOLAR DISORDER, CURRENT EPISODE MIXED, MODERATE (HCC): ICD-10-CM

## 2018-03-06 ENCOUNTER — APPOINTMENT (OUTPATIENT)
Dept: LAB | Facility: MEDICAL CENTER | Age: 63
End: 2018-03-06
Payer: COMMERCIAL

## 2018-03-06 ENCOUNTER — TRANSCRIBE ORDERS (OUTPATIENT)
Dept: LAB | Facility: MEDICAL CENTER | Age: 63
End: 2018-03-06

## 2018-03-06 DIAGNOSIS — Z79.899 ENCOUNTER FOR LONG-TERM (CURRENT) USE OF MEDICATIONS: ICD-10-CM

## 2018-03-06 DIAGNOSIS — F11.20 OPIOID TYPE DEPENDENCE, CONTINUOUS (HCC): Primary | ICD-10-CM

## 2018-03-06 LAB
ALBUMIN SERPL BCP-MCNC: 3.4 G/DL (ref 3.5–5)
ALP SERPL-CCNC: 92 U/L (ref 46–116)
ALT SERPL W P-5'-P-CCNC: 13 U/L (ref 12–78)
ANION GAP SERPL CALCULATED.3IONS-SCNC: 6 MMOL/L (ref 4–13)
AST SERPL W P-5'-P-CCNC: 21 U/L (ref 5–45)
BASOPHILS # BLD AUTO: 0.03 THOUSANDS/ΜL (ref 0–0.1)
BASOPHILS NFR BLD AUTO: 1 % (ref 0–1)
BILIRUB SERPL-MCNC: 0.31 MG/DL (ref 0.2–1)
BUN SERPL-MCNC: 15 MG/DL (ref 5–25)
CALCIUM SERPL-MCNC: 8.2 MG/DL (ref 8.3–10.1)
CHLORIDE SERPL-SCNC: 107 MMOL/L (ref 100–108)
CO2 SERPL-SCNC: 26 MMOL/L (ref 21–32)
CREAT SERPL-MCNC: 1.01 MG/DL (ref 0.6–1.3)
EOSINOPHIL # BLD AUTO: 0.15 THOUSAND/ΜL (ref 0–0.61)
EOSINOPHIL NFR BLD AUTO: 3 % (ref 0–6)
ERYTHROCYTE [DISTWIDTH] IN BLOOD BY AUTOMATED COUNT: 13.5 % (ref 11.6–15.1)
GFR SERPL CREATININE-BSD FRML MDRD: 60 ML/MIN/1.73SQ M
GLUCOSE P FAST SERPL-MCNC: 86 MG/DL (ref 65–99)
HCT VFR BLD AUTO: 35.4 % (ref 34.8–46.1)
HGB BLD-MCNC: 11.9 G/DL (ref 11.5–15.4)
LYMPHOCYTES # BLD AUTO: 1.76 THOUSANDS/ΜL (ref 0.6–4.47)
LYMPHOCYTES NFR BLD AUTO: 31 % (ref 14–44)
MCH RBC QN AUTO: 36.4 PG (ref 26.8–34.3)
MCHC RBC AUTO-ENTMCNC: 33.6 G/DL (ref 31.4–37.4)
MCV RBC AUTO: 108 FL (ref 82–98)
MONOCYTES # BLD AUTO: 0.46 THOUSAND/ΜL (ref 0.17–1.22)
MONOCYTES NFR BLD AUTO: 8 % (ref 4–12)
NEUTROPHILS # BLD AUTO: 3.31 THOUSANDS/ΜL (ref 1.85–7.62)
NEUTS SEG NFR BLD AUTO: 57 % (ref 43–75)
NRBC BLD AUTO-RTO: 0 /100 WBCS
PLATELET # BLD AUTO: 222 THOUSANDS/UL (ref 149–390)
PMV BLD AUTO: 9.5 FL (ref 8.9–12.7)
POTASSIUM SERPL-SCNC: 4 MMOL/L (ref 3.5–5.3)
PROT SERPL-MCNC: 7.1 G/DL (ref 6.4–8.2)
RBC # BLD AUTO: 3.27 MILLION/UL (ref 3.81–5.12)
SODIUM SERPL-SCNC: 139 MMOL/L (ref 136–145)
T4 FREE SERPL-MCNC: 0.62 NG/DL (ref 0.76–1.46)
TSH SERPL DL<=0.05 MIU/L-ACNC: 0.37 UIU/ML (ref 0.36–3.74)
WBC # BLD AUTO: 5.72 THOUSAND/UL (ref 4.31–10.16)

## 2018-03-06 PROCEDURE — 85025 COMPLETE CBC W/AUTO DIFF WBC: CPT

## 2018-03-06 PROCEDURE — 84443 ASSAY THYROID STIM HORMONE: CPT

## 2018-03-06 PROCEDURE — 80307 DRUG TEST PRSMV CHEM ANLYZR: CPT | Performed by: NURSE PRACTITIONER

## 2018-03-06 PROCEDURE — 36415 COLL VENOUS BLD VENIPUNCTURE: CPT

## 2018-03-06 PROCEDURE — 82652 VIT D 1 25-DIHYDROXY: CPT

## 2018-03-06 PROCEDURE — 84439 ASSAY OF FREE THYROXINE: CPT

## 2018-03-06 PROCEDURE — 80053 COMPREHEN METABOLIC PANEL: CPT

## 2018-03-08 LAB — 1,25(OH)2D3 SERPL-MCNC: 19 PG/ML (ref 19.9–79.3)

## 2018-03-09 LAB
AMPHETAMINES UR QL SCN: NEGATIVE NG/ML
BARBITURATES UR QL SCN: NEGATIVE NG/ML
BENZODIAZ UR QL SCN: POSITIVE
BZE UR QL SCN: NEGATIVE NG/ML
CANNABINOIDS UR QL SCN: NEGATIVE NG/ML
METHADONE UR QL SCN: NEGATIVE NG/ML
OPIATES UR QL: NEGATIVE
PCP UR QL: NEGATIVE NG/ML
PROPOXYPH UR QL: NEGATIVE NG/ML

## 2018-03-13 ENCOUNTER — TRANSCRIBE ORDERS (OUTPATIENT)
Dept: LAB | Facility: MEDICAL CENTER | Age: 63
End: 2018-03-13

## 2018-03-13 ENCOUNTER — APPOINTMENT (OUTPATIENT)
Dept: LAB | Facility: MEDICAL CENTER | Age: 63
End: 2018-03-13
Payer: COMMERCIAL

## 2018-03-13 DIAGNOSIS — M25.551 RIGHT HIP PAIN: ICD-10-CM

## 2018-03-13 DIAGNOSIS — E55.9 VITAMIN D DEFICIENCY: ICD-10-CM

## 2018-03-13 DIAGNOSIS — M25.551 RIGHT HIP PAIN: Primary | ICD-10-CM

## 2018-03-13 LAB
25(OH)D3 SERPL-MCNC: <4.2 NG/ML (ref 30–100)
CRP SERPL QL: <3 MG/L
ERYTHROCYTE [SEDIMENTATION RATE] IN BLOOD: 17 MM/HOUR (ref 0–20)

## 2018-03-13 PROCEDURE — 82306 VITAMIN D 25 HYDROXY: CPT

## 2018-03-13 PROCEDURE — 36415 COLL VENOUS BLD VENIPUNCTURE: CPT

## 2018-03-13 PROCEDURE — 85652 RBC SED RATE AUTOMATED: CPT

## 2018-03-13 PROCEDURE — 86140 C-REACTIVE PROTEIN: CPT

## 2018-04-23 DIAGNOSIS — J45.909 ASTHMA, UNSPECIFIED ASTHMA SEVERITY, UNSPECIFIED WHETHER COMPLICATED, UNSPECIFIED WHETHER PERSISTENT: Primary | ICD-10-CM

## 2018-04-23 RX ORDER — ALBUTEROL SULFATE 90 UG/1
2 AEROSOL, METERED RESPIRATORY (INHALATION) EVERY 6 HOURS PRN
Qty: 1 INHALER | Refills: 1 | Status: SHIPPED | OUTPATIENT
Start: 2018-04-23 | End: 2018-12-10 | Stop reason: SDUPTHER

## 2018-06-20 RX ORDER — KETOTIFEN FUMARATE 0.35 MG/ML
SOLUTION/ DROPS OPHTHALMIC
Refills: 0 | OUTPATIENT
Start: 2018-06-20

## 2018-07-18 ENCOUNTER — OFFICE VISIT (OUTPATIENT)
Dept: GASTROENTEROLOGY | Facility: HOSPITAL | Age: 63
End: 2018-07-18
Payer: COMMERCIAL

## 2018-07-18 VITALS
DIASTOLIC BLOOD PRESSURE: 78 MMHG | TEMPERATURE: 96.2 F | BODY MASS INDEX: 28.31 KG/M2 | HEART RATE: 69 BPM | WEIGHT: 165.8 LBS | HEIGHT: 64 IN | SYSTOLIC BLOOD PRESSURE: 117 MMHG

## 2018-07-18 DIAGNOSIS — Z12.11 COLON CANCER SCREENING: Primary | ICD-10-CM

## 2018-07-18 DIAGNOSIS — K21.9 GASTROESOPHAGEAL REFLUX DISEASE WITHOUT ESOPHAGITIS: ICD-10-CM

## 2018-07-18 PROCEDURE — 99244 OFF/OP CNSLTJ NEW/EST MOD 40: CPT | Performed by: INTERNAL MEDICINE

## 2018-07-18 NOTE — PROGRESS NOTES
[facility] - Outpatient Consultation  Chhaya Cole 58 y o  female MRN: [id number]  Encounter: [id number]          ASSESSMENT AND PLAN:      1  Colon cancer screening  -  Her last colonoscopy was more than 10 years ago  Per patient it was normal   She is due for surveillance colonoscopy  We reviewed risks and benefits of colonoscopy  Risks include but not limited to infection, bleeding, perforation, missed lesion  Bowel prep instructions given  2  Gastroesophageal reflux disease without esophagitis    Her reflux symptoms are well controlled with pantoprazole 40 mg daily  She had EGD more than 10 years ago  Since her symptoms are well controlled and she had normal EGD in the past no indication for repeat EGD at this time  Continue reflux precaution  ______________________________________________________________________    HPI:    58-year-old female here for colonoscopy consult  She had colonoscopy more than 10 years ago  Per patient she had normal colonoscopy  She reports mild abdominal discomfort otherwise no diarrhea, constipation, nausea or vomiting  Patient tells me that she had enlarged descending colon seen on x-ray done for her hip  I reviewed the x-ray report from [facility] through Care everywhere but there was no mention of in large descending colon  She had history of renal cell carcinoma  She has gastroesophageal reflux disease  Her symptoms are well controlled with pantoprazole 40 mg daily  She had EGD more than 10 years ago  REVIEW OF SYSTEMS:    CONSTITUTIONAL: Denies any fever, chills, rigors, and weight loss  HEENT: No earache or tinnitus  Denies hearing loss or visual disturbances  CARDIOVASCULAR: No chest pain or palpitations  RESPIRATORY: Denies any cough, hemoptysis, shortness of breath or dyspnea on exertion  GASTROINTESTINAL: As noted in the History of Present Illness  GENITOURINARY: No problems with urination  Denies any hematuria or dysuria  NEUROLOGIC: No dizziness or vertigo, denies headaches  MUSCULOSKELETAL: Denies any muscle or joint pain  SKIN: Denies skin rashes or itching  ENDOCRINE: Denies excessive thirst  Denies intolerance to heat or cold  PSYCHOSOCIAL: Denies depression or anxiety  Denies any recent memory loss  Historical Information   Past Medical History:   Diagnosis Date    Bipolar 1 disorder ([facility] )     Cancer ([facility] )     kidney    Cardiac disease     fast heart beat    Chronic pain     GERD (gastroesophageal reflux disease)     Hard of hearing     Hypertension     Renal cell carcinoma (HCC)     TIA (transient ischemic attack)      Past Surgical History:   Procedure Laterality Date    APPENDECTOMY      CHOLECYSTECTOMY      JOINT REPLACEMENT      hip    NEPHRECTOMY Right      Social History   History   Alcohol Use No     History   Drug Use No     History   Smoking Status    Former Smoker   Smokeless Tobacco    Never Used     History reviewed  No pertinent family history      Meds/Allergies       Current Outpatient Prescriptions:     albuterol (PROVENTIL HFA,VENTOLIN HFA) 90 mcg/act inhaler    ALPRAZolam (XANAX) 1 mg tablet    azithromycin (ZITHROMAX) 250 mg tablet    carisoprodol (SOMA) 350 mg tablet    docusate sodium (COLACE) 100 mg capsule    DULoxetine (CYMBALTA) 60 mg delayed release capsule    ergocalciferol (VITAMIN D2) 50,000 units    gabapentin (NEURONTIN) 800 mg tablet    HYDROcodone-acetaminophen (NORCO) 5-325 mg per tablet    ketotifen (ZADITOR) 0 025 % ophthalmic solution    Melatonin 5 MG CAPS    metoprolol succinate (TOPROL-XL) 100 mg 24 hr tablet    pantoprazole (PROTONIX) 40 mg tablet    predniSONE 20 mg tablet    pseudoephedrine-guaifenesin (MUCINEX D)  MG per tablet    QUEtiapine (SEROquel) 100 mg tablet    rifampin (RIFADIN) 150 mg capsule    topiramate (TOPAMAX) 100 mg tablet    Lurasidone HCl (LATUDA) 60 MG TABS    Na Sulfate-K Sulfate-Mg Sulf (SUPREP BOWEL PREP KIT) 17 5-3 13-1 6 GM/180ML SOLN    Allergies   Allergen Reactions    Celecoxib Rash and Other (See Comments)     CELEBREX    Doxazosin Rash and Hives    Metaxalone Rash and Hives           Objective     Blood pressure 117/78, pulse 69, temperature (!) 96 2 °F (35 7 °C), temperature source Tympanic, height 5' 4" (1 626 m), weight 75 2 kg (165 lb 12 8 oz)  Body mass index is 28 46 kg/m²  PHYSICAL EXAM:      General Appearance:   Alert, cooperative, no distress   HEENT:   Normocephalic, atraumatic, anicteric      Neck:  Supple, symmetrical, trachea midline   Lungs:   Clear to auscultation bilaterally; no rales, rhonchi or wheezing; respirations unlabored    Heart[de-identified]   Regular rate and rhythm; no murmur, rub, or gallop  Abdomen:   Soft, non-tender, non-distended; normal bowel sounds; no masses, no organomegaly    Genitalia:   Deferred    Rectal:   Deferred    Extremities:  No cyanosis, clubbing or edema    Pulses:  2+ and symmetric    Skin:  No jaundice, rashes, or lesions    Lymph nodes:  No palpable cervical lymphadenopathy        Lab Results:   No visits with results within 1 Day(s) from this visit  Latest known visit with results is:   Appointment on 03/13/2018   Component Date Value    Vit D, 25-Hydroxy 03/13/2018 <4 2*    CRP 03/13/2018 <3 0     Sed Rate 03/13/2018 17          Radiology Results:   No results found

## 2018-07-18 NOTE — LETTER
July 18, 2018     Sandra Kerr PA-C  [address] [place] [address]    Patient: Ade Paredes   YOB: 1955   Date of Visit: 7/18/2018       Dear Dr Kelsey Goodrich: Thank you for referring Trevin Hale to me for evaluation  Below are my notes for this consultation  If you have questions, please do not hesitate to call me  I look forward to following your patient along with you  Sincerely,        Miller Espinal MD        CC: No Recipients  Miller Espinal MD  7/18/2018  4:04 PM  Sign at close encounter  [facility] - Outpatient Consultation  Ade Paredes 58 y o  female MRN: [id number]  Encounter: [id number]          ASSESSMENT AND PLAN:      1  Colon cancer screening  -  Her last colonoscopy was more than 10 years ago  Per patient it was normal   She is due for surveillance colonoscopy  We reviewed risks and benefits of colonoscopy  Risks include but not limited to infection, bleeding, perforation, missed lesion  Bowel prep instructions given  2  Gastroesophageal reflux disease without esophagitis    Her reflux symptoms are well controlled with pantoprazole 40 mg daily  She had EGD more than 10 years ago  Since her symptoms are well controlled and she had normal EGD in the past no indication for repeat EGD at this time  Continue reflux precaution  ______________________________________________________________________    HPI:    17-year-old female here for colonoscopy consult  She had colonoscopy more than 10 years ago  Per patient she had normal colonoscopy  She reports mild abdominal discomfort otherwise no diarrhea, constipation, nausea or vomiting  Patient tells me that she had enlarged descending colon seen on x-ray done for her hip  I reviewed the x-ray report from [facility] through Care everywhere but there was no mention of in large descending colon  She had history of renal cell carcinoma        She has gastroesophageal reflux disease  Her symptoms are well controlled with pantoprazole 40 mg daily  She had EGD more than 10 years ago  REVIEW OF SYSTEMS:    CONSTITUTIONAL: Denies any fever, chills, rigors, and weight loss  HEENT: No earache or tinnitus  Denies hearing loss or visual disturbances  CARDIOVASCULAR: No chest pain or palpitations  RESPIRATORY: Denies any cough, hemoptysis, shortness of breath or dyspnea on exertion  GASTROINTESTINAL: As noted in the History of Present Illness  GENITOURINARY: No problems with urination  Denies any hematuria or dysuria  NEUROLOGIC: No dizziness or vertigo, denies headaches  MUSCULOSKELETAL: Denies any muscle or joint pain  SKIN: Denies skin rashes or itching  ENDOCRINE: Denies excessive thirst  Denies intolerance to heat or cold  PSYCHOSOCIAL: Denies depression or anxiety  Denies any recent memory loss  Historical Information   Past Medical History:   Diagnosis Date    Bipolar 1 disorder ([facility] )     Cancer ([facility] )     kidney    Cardiac disease     fast heart beat    Chronic pain     GERD (gastroesophageal reflux disease)     Hard of hearing     Hypertension     Renal cell carcinoma (HCC)     TIA (transient ischemic attack)      Past Surgical History:   Procedure Laterality Date    APPENDECTOMY      CHOLECYSTECTOMY      JOINT REPLACEMENT      hip    NEPHRECTOMY Right      Social History   History   Alcohol Use No     History   Drug Use No     History   Smoking Status    Former Smoker   Smokeless Tobacco    Never Used     History reviewed  No pertinent family history      Meds/Allergies       Current Outpatient Prescriptions:     albuterol (PROVENTIL HFA,VENTOLIN HFA) 90 mcg/act inhaler    ALPRAZolam (XANAX) 1 mg tablet    azithromycin (ZITHROMAX) 250 mg tablet    carisoprodol (SOMA) 350 mg tablet    docusate sodium (COLACE) 100 mg capsule    DULoxetine (CYMBALTA) 60 mg delayed release capsule    ergocalciferol (VITAMIN D2) 50,000 units   gabapentin (NEURONTIN) 800 mg tablet    HYDROcodone-acetaminophen (NORCO) 5-325 mg per tablet    ketotifen (ZADITOR) 0 025 % ophthalmic solution    Melatonin 5 MG CAPS    metoprolol succinate (TOPROL-XL) 100 mg 24 hr tablet    pantoprazole (PROTONIX) 40 mg tablet    predniSONE 20 mg tablet    pseudoephedrine-guaifenesin (MUCINEX D)  MG per tablet    QUEtiapine (SEROquel) 100 mg tablet    rifampin (RIFADIN) 150 mg capsule    topiramate (TOPAMAX) 100 mg tablet    Lurasidone HCl (LATUDA) 60 MG TABS    Na Sulfate-K Sulfate-Mg Sulf (SUPREP BOWEL PREP KIT) 17 5-3 13-1 6 GM/180ML SOLN    Allergies   Allergen Reactions    Celecoxib Rash and Other (See Comments)     CELEBREX    Doxazosin Rash and Hives    Metaxalone Rash and Hives           Objective     Blood pressure 117/78, pulse 69, temperature (!) 96 2 °F (35 7 °C), temperature source Tympanic, height 5' 4" (1 626 m), weight 75 2 kg (165 lb 12 8 oz)  Body mass index is 28 46 kg/m²  PHYSICAL EXAM:      General Appearance:   Alert, cooperative, no distress   HEENT:   Normocephalic, atraumatic, anicteric      Neck:  Supple, symmetrical, trachea midline   Lungs:   Clear to auscultation bilaterally; no rales, rhonchi or wheezing; respirations unlabored    Heart[de-identified]   Regular rate and rhythm; no murmur, rub, or gallop  Abdomen:   Soft, non-tender, non-distended; normal bowel sounds; no masses, no organomegaly    Genitalia:   Deferred    Rectal:   Deferred    Extremities:  No cyanosis, clubbing or edema    Pulses:  2+ and symmetric    Skin:  No jaundice, rashes, or lesions    Lymph nodes:  No palpable cervical lymphadenopathy        Lab Results:   No visits with results within 1 Day(s) from this visit  Latest known visit with results is:   Appointment on 03/13/2018   Component Date Value    Vit D, 25-Hydroxy 03/13/2018 <4 2*    CRP 03/13/2018 <3 0     Sed Rate 03/13/2018 17          Radiology Results:   No results found

## 2018-07-25 ENCOUNTER — TELEPHONE (OUTPATIENT)
Dept: GASTROENTEROLOGY | Facility: MEDICAL CENTER | Age: 63
End: 2018-07-25

## 2018-07-25 NOTE — TELEPHONE ENCOUNTER
Called and left message for pt to schedule her colon with Dr Roberta Boas at the [facility]  The pt was given Suprep instructions

## 2018-07-26 PROBLEM — Z12.11 COLON CANCER SCREENING: Status: ACTIVE | Noted: 2018-07-26

## 2018-07-26 NOTE — TELEPHONE ENCOUNTER
COLON scheduled with Dr Guillermo at [facility] on 8/7/2018  I gave pt verbal instructions/mailed   [employer]

## 2018-08-06 ENCOUNTER — ANESTHESIA EVENT (OUTPATIENT)
Dept: PERIOP | Facility: HOSPITAL | Age: 63
End: 2018-08-06
Payer: COMMERCIAL

## 2018-08-07 ENCOUNTER — ANESTHESIA (OUTPATIENT)
Dept: PERIOP | Facility: HOSPITAL | Age: 63
End: 2018-08-07
Payer: COMMERCIAL

## 2018-08-07 ENCOUNTER — HOSPITAL ENCOUNTER (OUTPATIENT)
Facility: HOSPITAL | Age: 63
Setting detail: OUTPATIENT SURGERY
Discharge: HOME | End: 2018-08-07
Attending: INTERNAL MEDICINE | Admitting: INTERNAL MEDICINE
Payer: COMMERCIAL

## 2018-08-07 VITALS
DIASTOLIC BLOOD PRESSURE: 70 MMHG | SYSTOLIC BLOOD PRESSURE: 123 MMHG | TEMPERATURE: 98.6 F | HEART RATE: 60 BPM | OXYGEN SATURATION: 96 % | HEIGHT: 64 IN | RESPIRATION RATE: 18 BRPM | BODY MASS INDEX: 28.17 KG/M2 | WEIGHT: 165 LBS

## 2018-08-07 PROCEDURE — 45378 DIAGNOSTIC COLONOSCOPY: CPT | Performed by: INTERNAL MEDICINE

## 2018-08-07 RX ORDER — PROPOFOL 10 MG/ML
INJECTION, EMULSION INTRAVENOUS AS NEEDED
Status: DISCONTINUED | OUTPATIENT
Start: 2018-08-07 | End: 2018-08-07 | Stop reason: SURG

## 2018-08-07 RX ORDER — LIDOCAINE HYDROCHLORIDE 10 MG/ML
INJECTION, SOLUTION INFILTRATION; PERINEURAL AS NEEDED
Status: DISCONTINUED | OUTPATIENT
Start: 2018-08-07 | End: 2018-08-07 | Stop reason: SURG

## 2018-08-07 RX ORDER — SODIUM CHLORIDE, SODIUM LACTATE, POTASSIUM CHLORIDE, CALCIUM CHLORIDE 600; 310; 30; 20 MG/100ML; MG/100ML; MG/100ML; MG/100ML
125 INJECTION, SOLUTION INTRAVENOUS CONTINUOUS
Status: DISCONTINUED | OUTPATIENT
Start: 2018-08-07 | End: 2018-08-07

## 2018-08-07 RX ADMIN — SODIUM CHLORIDE, SODIUM LACTATE, POTASSIUM CHLORIDE, AND CALCIUM CHLORIDE 125 ML/HR: .6; .31; .03; .02 INJECTION, SOLUTION INTRAVENOUS at 09:42

## 2018-08-07 RX ADMIN — PROPOFOL 50 MG: 10 INJECTION, EMULSION INTRAVENOUS at 11:41

## 2018-08-07 RX ADMIN — PROPOFOL 50 MG: 10 INJECTION, EMULSION INTRAVENOUS at 11:39

## 2018-08-07 RX ADMIN — PROPOFOL 50 MG: 10 INJECTION, EMULSION INTRAVENOUS at 11:44

## 2018-08-07 RX ADMIN — PROPOFOL 50 MG: 10 INJECTION, EMULSION INTRAVENOUS at 11:36

## 2018-08-07 RX ADMIN — PROPOFOL 50 MG: 10 INJECTION, EMULSION INTRAVENOUS at 11:34

## 2018-08-07 RX ADMIN — LIDOCAINE HYDROCHLORIDE 20 MG: 10 INJECTION, SOLUTION INFILTRATION; PERINEURAL at 11:32

## 2018-08-07 RX ADMIN — PROPOFOL 50 MG: 10 INJECTION, EMULSION INTRAVENOUS at 11:32

## 2018-08-07 NOTE — PROGRESS NOTES
Please note that patient is here for colonoscopy  No indication for EGD at this time  Consent was obtained for EGD and colonoscopy but patient is actually having just colonoscopy  This is an addendum to her consent

## 2018-08-07 NOTE — OP NOTE
OPERATIVE REPORT  PATIENT NAME: Lynsey Chappell    :  1955  MRN: [id number]  Pt Location: [facility] OR ROOM 03    SURGERY DATE: 2018    Surgeon(s) and Role:     * Shamika Toure MD - Primary    Colonoscopy Procedure Note    Procedure: Colonoscopy    Sedation: Monitored anesthesia care, check anesthesia records      ASA Class: 2    INDICATIONS:  Colon cancer screening    POST-OP DIAGNOSIS: See the impression below    Procedure Details     Prior colonoscopy: More than 10 years ago  Informed consent was obtained for the procedure, including sedation  Risks of perforation, hemorrhage, adverse drug reaction and aspiration were discussed  The patient was placed in the left lateral decubitus position  Based on the pre-procedure assessment, including review of the patient's medical history, medications, allergies, and review of systems, she had been deemed to be an appropriate candidate for conscious sedation; she was therefore sedated with the medications listed below  The patient was monitored continuously with telemetry, pulse oximetry, blood pressure monitoring, and direct observations  A rectal examination was performed  The colonoscope was inserted into the rectum and advanced under direct vision to the cecum, which was identified by the ileocecal valve and appendiceal orifice  The quality of the colonic preparation was good  A careful inspection was made as the colonoscope was withdrawn, including a retroflexed view of the rectum; findings and interventions are described below  Findings:  Mild diverticulosis in the sigmoid colon otherwise normal colonoscopy  Small internal hemorrhoids noted  Complications: None; patient tolerated the procedure well  Impression:    Mild diverticulosis in the sigmoid colon otherwise normal colonoscopy  Small internal hemorrhoids noted  Recommendations:  Repeat colonoscopy in 10 years or sooner if clinically indicated    High-fiber diet   If you have abdominal pain, bleeding or fever call my office at [phone]      SIGNATURE: Nicholas Zavala MD  DATE: August 7, 2018  TIME: 11:48 AM

## 2018-08-07 NOTE — ANESTHESIA POSTPROCEDURE EVALUATION
Post-Op Assessment Note      CV Status:  Stable    Mental Status:  Somnolent    Hydration Status:  Stable    PONV Controlled:  None    Airway Patency:  Patent    Post Op Vitals Reviewed: Yes          Staff: CRNA           BP      Temp   98 9   Pulse  78   Resp   16   SpO2   95%

## 2018-08-07 NOTE — ANESTHESIA PREPROCEDURE EVALUATION
Review of Systems/Medical History  Patient summary reviewed  Chart reviewed  No history of anesthetic complications     Cardiovascular  Hypertension ,    Pulmonary  Asthma ,        GI/Hepatic    GERD , Bowel prep       Single kidney, Genitourinary malignancy renal cancer,        Endo/Other  Negative endo/other ROS      GYN  Negative gynecology ROS          Hematology  Negative hematology ROS      Musculoskeletal  Negative musculoskeletal ROS Back pain ,        Neurology    TIA,    Psychology   Psychiatric history, Anxiety, Depression , bipolar disorder,   Chronic pain,            Physical Exam    Airway    Mallampati score: I  TM Distance: >3 FB  Neck ROM: full     Dental   No notable dental hx     Cardiovascular      Pulmonary      Other Findings        Anesthesia Plan  ASA Score- 2     Anesthesia Type- IV sedation with anesthesia with ASA Monitors  Additional Monitors:   Airway Plan:         Plan Factors-  Patient did not smoke on day of surgery  Induction-     Postoperative Plan-     Informed Consent- Anesthetic plan and risks discussed with patient  I personally reviewed this patient with the CRNA  Discussed and agreed on the Anesthesia Plan with the CRNA  Arlin Barker

## 2018-08-07 NOTE — H&P (VIEW-ONLY)
[facility] - Outpatient Consultation  Ravindra Cesar 58 y o  female MRN: [id number]  Encounter: [id number]          ASSESSMENT AND PLAN:      1  Colon cancer screening  -  Her last colonoscopy was more than 10 years ago  Per patient it was normal   She is due for surveillance colonoscopy  We reviewed risks and benefits of colonoscopy  Risks include but not limited to infection, bleeding, perforation, missed lesion  Bowel prep instructions given  2  Gastroesophageal reflux disease without esophagitis    Her reflux symptoms are well controlled with pantoprazole 40 mg daily  She had EGD more than 10 years ago  Since her symptoms are well controlled and she had normal EGD in the past no indication for repeat EGD at this time  Continue reflux precaution  ______________________________________________________________________    HPI:    58-year-old female here for colonoscopy consult  She had colonoscopy more than 10 years ago  Per patient she had normal colonoscopy  She reports mild abdominal discomfort otherwise no diarrhea, constipation, nausea or vomiting  Patient tells me that she had enlarged descending colon seen on x-ray done for her hip  I reviewed the x-ray report from [facility] through Care everywhere but there was no mention of in large descending colon  She had history of renal cell carcinoma  She has gastroesophageal reflux disease  Her symptoms are well controlled with pantoprazole 40 mg daily  She had EGD more than 10 years ago  REVIEW OF SYSTEMS:    CONSTITUTIONAL: Denies any fever, chills, rigors, and weight loss  HEENT: No earache or tinnitus  Denies hearing loss or visual disturbances  CARDIOVASCULAR: No chest pain or palpitations  RESPIRATORY: Denies any cough, hemoptysis, shortness of breath or dyspnea on exertion  GASTROINTESTINAL: As noted in the History of Present Illness  GENITOURINARY: No problems with urination  Denies any hematuria or dysuria  NEUROLOGIC: No dizziness or vertigo, denies headaches  MUSCULOSKELETAL: Denies any muscle or joint pain  SKIN: Denies skin rashes or itching  ENDOCRINE: Denies excessive thirst  Denies intolerance to heat or cold  PSYCHOSOCIAL: Denies depression or anxiety  Denies any recent memory loss  Historical Information   Past Medical History:   Diagnosis Date    [facility] disorder ([facility] )     Cancer ([facility] )     kidney    Cardiac disease     fast heart beat    Chronic pain     GERD (gastroesophageal reflux disease)     Hard of hearing     Hypertension     Renal cell carcinoma (HCC)     TIA (transient ischemic attack)      Past Surgical History:   Procedure Laterality Date    APPENDECTOMY      CHOLECYSTECTOMY      JOINT REPLACEMENT      hip    NEPHRECTOMY Right      Social History   History   Alcohol Use No     History   Drug Use No     History   Smoking Status    Former Smoker   Smokeless Tobacco    Never Used     History reviewed  No pertinent family history      Meds/Allergies       Current Outpatient Prescriptions:     albuterol (PROVENTIL HFA,VENTOLIN HFA) 90 mcg/act inhaler    ALPRAZolam (XANAX) 1 mg tablet    azithromycin (ZITHROMAX) 250 mg tablet    carisoprodol (SOMA) 350 mg tablet    docusate sodium (COLACE) 100 mg capsule    DULoxetine (CYMBALTA) 60 mg delayed release capsule    ergocalciferol (VITAMIN D2) 50,000 units    gabapentin (NEURONTIN) 800 mg tablet    HYDROcodone-acetaminophen (NORCO) 5-325 mg per tablet    ketotifen (ZADITOR) 0 025 % ophthalmic solution    Melatonin 5 MG CAPS    metoprolol succinate (TOPROL-XL) 100 mg 24 hr tablet    pantoprazole (PROTONIX) 40 mg tablet    predniSONE 20 mg tablet    pseudoephedrine-guaifenesin (MUCINEX D)  MG per tablet    QUEtiapine (SEROquel) 100 mg tablet    rifampin (RIFADIN) 150 mg capsule    topiramate (TOPAMAX) 100 mg tablet    Lurasidone HCl (LATUDA) 60 MG TABS    Na Sulfate-K Sulfate-Mg Sulf (SUPREP BOWEL PREP KIT) 17 5-3 13-1 6 GM/180ML SOLN    Allergies   Allergen Reactions    Celecoxib Rash and Other (See Comments)     CELEBREX    Doxazosin Rash and Hives    Metaxalone Rash and Hives           Objective     Blood pressure 117/78, pulse 69, temperature (!) 96 2 °F (35 7 °C), temperature source Tympanic, height 5' 4" (1 626 m), weight 75 2 kg (165 lb 12 8 oz)  Body mass index is 28 46 kg/m²  PHYSICAL EXAM:      General Appearance:   Alert, cooperative, no distress   HEENT:   Normocephalic, atraumatic, anicteric      Neck:  Supple, symmetrical, trachea midline   Lungs:   Clear to auscultation bilaterally; no rales, rhonchi or wheezing; respirations unlabored    Heart[de-identified]   Regular rate and rhythm; no murmur, rub, or gallop  Abdomen:   Soft, non-tender, non-distended; normal bowel sounds; no masses, no organomegaly    Genitalia:   Deferred    Rectal:   Deferred    Extremities:  No cyanosis, clubbing or edema    Pulses:  2+ and symmetric    Skin:  No jaundice, rashes, or lesions    Lymph nodes:  No palpable cervical lymphadenopathy        Lab Results:   No visits with results within 1 Day(s) from this visit  Latest known visit with results is:   Appointment on 03/13/2018   Component Date Value    Vit D, 25-Hydroxy 03/13/2018 <4 2*    CRP 03/13/2018 <3 0     Sed Rate 03/13/2018 17          Radiology Results:   No results found

## 2018-08-09 ENCOUNTER — OFFICE VISIT (OUTPATIENT)
Dept: FAMILY MEDICINE CLINIC | Facility: CLINIC | Age: 63
End: 2018-08-09
Payer: COMMERCIAL

## 2018-08-09 VITALS
TEMPERATURE: 99 F | BODY MASS INDEX: 28.51 KG/M2 | RESPIRATION RATE: 16 BRPM | DIASTOLIC BLOOD PRESSURE: 90 MMHG | OXYGEN SATURATION: 97 % | HEIGHT: 64 IN | WEIGHT: 167 LBS | SYSTOLIC BLOOD PRESSURE: 132 MMHG | HEART RATE: 67 BPM

## 2018-08-09 DIAGNOSIS — K04.7 DENTAL INFECTION: Primary | ICD-10-CM

## 2018-08-09 PROCEDURE — T1015 CLINIC SERVICE: HCPCS | Performed by: FAMILY MEDICINE

## 2018-08-09 RX ORDER — AMOXICILLIN AND CLAVULANATE POTASSIUM 875; 125 MG/1; MG/1
1 TABLET, FILM COATED ORAL EVERY 12 HOURS SCHEDULED
Qty: 20 TABLET | Refills: 0 | Status: SHIPPED | OUTPATIENT
Start: 2018-08-09 | End: 2018-08-19

## 2018-08-09 NOTE — PROGRESS NOTES
[address]       NAME: Chhaya Cole is a 58 y o  female  : 1955    MRN: [id number]  DATE: 2018  TIME: 2:17 PM    Assessment and Plan   Diagnoses and all orders for this visit:    Dental infection  -     amoxicillin-clavulanate (AUGMENTIN) 875-125 mg per tablet; Take 1 tablet by mouth every 12 (twelve) hours for 10 days        No problem-specific Assessment & Plan notes found for this encounter  Patient Instructions     Patient will be started on antibiotic to treat dental infection and has follow up at her dentist on 2018      Chief Complaint     Chief Complaint   Patient presents with    Dental Injury     Tooth has been chipped since last week  Pt has an upcoming dentist appt for next Wed  History of Present Illness       58year old female at office with chief complaint of left lower dental pain states she broke a cap last week  She had called her dentist at the time and they were not able to get her in until the , pain swelling started yesterday to area  Patient states she is prone to dental infections and has had them in the past        Dental Injury    This is a new problem  The current episode started in the past 7 days  The problem occurs constantly  The problem has been gradually worsening  The pain is at a severity of 7/10  The pain is moderate  Pertinent negatives include no difficulty swallowing, facial pain, fever, oral bleeding, sinus pressure or thermal sensitivity  She has tried NSAIDs for the symptoms  The treatment provided no relief  Review of Systems   Review of Systems   Constitutional: Negative  Negative for fever  HENT: Positive for dental problem  Negative for sinus pressure  Eyes: Negative  Respiratory: Negative  Cardiovascular: Negative  Gastrointestinal: Negative  Endocrine: Negative  Genitourinary: Negative  Musculoskeletal: Negative  Skin: Negative  Allergic/Immunologic: Negative  Neurological: Negative  Hematological: Negative  Psychiatric/Behavioral: Negative  All other systems reviewed and are negative  Current Medications       Current Outpatient Prescriptions:     albuterol (PROVENTIL HFA,VENTOLIN HFA) 90 mcg/act inhaler, Inhale 2 puffs every 6 (six) hours as needed for wheezing, Disp: 1 Inhaler, Rfl: 1    ALPRAZolam (XANAX) 1 mg tablet, Take 1 mg by mouth 4 (four) times a day , Disp: , Rfl:     carisoprodol (SOMA) 350 mg tablet, Take 350 mg by mouth as needed for muscle spasms  Pt takes 2-3 daily as needed, Disp: , Rfl:     docusate sodium (COLACE) 100 mg capsule, Take by mouth, Disp: , Rfl:     DULoxetine (CYMBALTA) 60 mg delayed release capsule, Take 1 capsule by mouth, Disp: , Rfl:     ergocalciferol (VITAMIN D2) 50,000 units, Take by mouth, Disp: , Rfl:     gabapentin (NEURONTIN) 800 mg tablet, Take 800 mg by mouth 4 (four) times a day as needed  , Disp: , Rfl:     HYDROcodone-acetaminophen (NORCO) 5-325 mg per tablet, Take 1 tablet by mouth as needed for moderate pain , Disp: , Rfl:     ketotifen (ZADITOR) 0 025 % ophthalmic solution, Apply to eye, Disp: , Rfl:     Lurasidone HCl (LATUDA) 60 MG TABS, Take 1 tablet (60 mg total) by mouth daily for 30 days, Disp: 30 tablet, Rfl: 0    Melatonin 5 MG CAPS, Take by mouth, Disp: , Rfl:     metoprolol succinate (TOPROL-XL) 100 mg 24 hr tablet, Take 100 mg by mouth , Disp: , Rfl:     pantoprazole (PROTONIX) 40 mg tablet, Take 40 mg by mouth 2 (two) times a day , Disp: , Rfl:     QUEtiapine (SEROquel) 100 mg tablet, Take 100 mg by mouth daily at bedtime  , Disp: , Rfl:     rifampin (RIFADIN) 150 mg capsule, Take by mouth, Disp: , Rfl:     topiramate (TOPAMAX) 100 mg tablet, Take 1 tablet (100 mg total) by mouth daily, Disp: 30 tablet, Rfl: 3    amoxicillin-clavulanate (AUGMENTIN) 875-125 mg per tablet, Take 1 tablet by mouth every 12 (twelve) hours for 10 days, Disp: 20 tablet, Rfl: 0    Current Allergies Allergies as of 08/09/2018 - Reviewed 08/09/2018   Allergen Reaction Noted    Celecoxib Rash and Other (See Comments) 07/26/2013    Doxazosin Rash and Hives 06/08/2013    Metaxalone Rash and Hives 06/08/2013            The following portions of the patient's history were reviewed and updated as appropriate: allergies, current medications, past family history, past medical history, past social history, past surgical history and problem list      Past Medical History:   Diagnosis Date    Bipolar 1 disorder ([facility] )     Cancer ([place] [address] )     kidney    Cardiac disease     fast heart beat    Chronic pain     GERD (gastroesophageal reflux disease)     Hard of hearing     Hypertension     Renal cell carcinoma ([facility] )     TIA (transient ischemic attack)        Past Surgical History:   Procedure Laterality Date    APPENDECTOMY      CHOLECYSTECTOMY      JOINT REPLACEMENT      hip    NEPHRECTOMY Right     RI COLONOSCOPY FLX DX W/COLLJ SPEC WHEN PFRMD N/A 8/7/2018    Procedure: COLONOSCOPY;  Surgeon: Jovita Mukherjee MD;  [facility];  Service: Gastroenterology       No family history on file  Medications have been verified  Objective   /90 (BP Location: Right arm, Patient Position: Sitting, Cuff Size: Standard)   Pulse 67   Temp 99 °F (37 2 °C) (Tympanic)   Resp 16   Ht 5' 4" (1 626 m)   Wt 75 8 kg (167 lb)   SpO2 97%   BMI 28 67 kg/m²        Physical Exam     Physical Exam   Constitutional: She is oriented to person, place, and time  Vital signs are normal  She appears well-developed  HENT:   Head: Normocephalic and atraumatic  Right Ear: Hearing and external ear normal    Left Ear: Hearing and external ear normal    Nose: Nose normal    Mouth/Throat: Oropharynx is clear and moist        Eyes: Conjunctivae and EOM are normal  Pupils are equal, round, and reactive to light  Lids are everted and swept, no foreign bodies found  Neck: Normal range of motion  Neck supple  Cardiovascular: Normal rate, regular rhythm, normal heart sounds and intact distal pulses  Pulmonary/Chest: Effort normal and breath sounds normal    Abdominal: Normal appearance  Musculoskeletal: Normal range of motion  Neurological: She is alert and oriented to person, place, and time  She has normal reflexes  Skin: Skin is warm, dry and intact  Psychiatric: She has a normal mood and affect  Her speech is normal and behavior is normal  Judgment and thought content normal    Nursing note and vitals reviewed

## 2018-08-20 DIAGNOSIS — K04.7 DENTAL INFECTION: Primary | ICD-10-CM

## 2018-08-20 RX ORDER — CLINDAMYCIN HYDROCHLORIDE 300 MG/1
300 CAPSULE ORAL 4 TIMES DAILY
Qty: 28 CAPSULE | Refills: 0 | Status: SHIPPED | OUTPATIENT
Start: 2018-08-20 | End: 2018-08-27

## 2018-08-20 NOTE — PROGRESS NOTES
Patient called stating dental infection has not improved since taking augmentin bid for 10 days will start her on clindamycin at this time QID and keep follow up with her dentist on 08/24/2018

## 2018-11-25 ENCOUNTER — HOSPITAL ENCOUNTER (EMERGENCY)
Facility: HOSPITAL | Age: 63
Discharge: HOME | End: 2018-11-25
Attending: EMERGENCY MEDICINE | Admitting: EMERGENCY MEDICINE
Payer: COMMERCIAL

## 2018-11-25 ENCOUNTER — APPOINTMENT (EMERGENCY)
Dept: RADIOLOGY | Facility: HOSPITAL | Age: 63
End: 2018-11-25
Payer: COMMERCIAL

## 2018-11-25 ENCOUNTER — APPOINTMENT (EMERGENCY)
Dept: CT IMAGING | Facility: HOSPITAL | Age: 63
End: 2018-11-25
Payer: COMMERCIAL

## 2018-11-25 VITALS
HEIGHT: 64 IN | DIASTOLIC BLOOD PRESSURE: 74 MMHG | BODY MASS INDEX: 30.64 KG/M2 | HEART RATE: 63 BPM | OXYGEN SATURATION: 98 % | SYSTOLIC BLOOD PRESSURE: 169 MMHG | TEMPERATURE: 97.9 F | RESPIRATION RATE: 18 BRPM | WEIGHT: 179.45 LBS

## 2018-11-25 DIAGNOSIS — M54.50 LOW BACK PAIN: ICD-10-CM
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
## 2018-11-25 PROCEDURE — 72131 CT LUMBAR SPINE W/O DYE: CPT

## 2018-11-25 PROCEDURE — 99284 EMERGENCY DEPT VISIT MOD MDM: CPT

## 2018-11-25 PROCEDURE — 73501 X-RAY EXAM HIP UNI 1 VIEW: CPT

## 2018-11-25 RX ORDER — HYDROCODONE BITARTRATE AND ACETAMINOPHEN 5; 325 MG/1; MG/1
1 TABLET ORAL EVERY 6 HOURS PRN
Qty: 12 TABLET | Refills: 0 | Status: SHIPPED | OUTPATIENT
Start: 2018-11-25 | End: 2018-12-05

## 2018-11-25 RX ORDER — HYDROCODONE BITARTRATE AND ACETAMINOPHEN 5; 325 MG/1; MG/1
1 TABLET ORAL ONCE
Status: COMPLETED | OUTPATIENT
Start: 2018-11-25 | End: 2018-11-25

## 2018-11-25 RX ADMIN — HYDROCODONE BITARTRATE AND ACETAMINOPHEN 1 TABLET: 5; 325 TABLET ORAL at 11:58

## 2018-11-25 NOTE — DISCHARGE INSTRUCTIONS
Acute Low Back Pain, Ambulatory Care   GENERAL INFORMATION:   Acute low back pain  is discomfort in your lower back area that lasts for less than 12 weeks  The word acute is used to describe pain that starts suddenly, worsens quickly, and lasts for a short time  Common symptoms include the following:   · Back stiffness or spasms    · Pain down the back or side of one leg    · Holding yourself in an unusual position or posture to decrease your back pain    · Not being able to find a sitting position that is comfortable    · Slow increase in your pain for 24 to 48 hours after you stress your back    · Tenderness on your lower back or severe pain when you move your back  Seek immediate care for the following symptoms:   · Severe pain    · Sudden stiffness and heaviness in both buttocks down to both legs    · Numbness or weakness in one leg, or pain in both legs    · Numbness in your genital area or across your lower back    · Unable to control your urine or bowel movements  Treatment for acute low back pain  may include any of the following:  · Medicines:      ¨ NSAIDs  help decrease swelling and pain or fever  This medicine is available with or without a doctor's order  NSAIDs can cause stomach bleeding or kidney problems in certain people  If you take blood thinner medicine, always ask your healthcare provider if NSAIDs are safe for you  Always read the medicine label and follow directions  ¨ Muscle relaxers  help decrease muscle spasms pain  ¨ Prescription pain medicine  may be given  Ask how to take this medicine safely  · Surgery  may be needed if your pain is severe and other treatments do not work  Surgery may be needed for conditions of the lumbar spine, such as herniated disc or spinal stenosis  Manage your symptoms:   · Sleep on a firm mattress  If you do not have a firm mattress, have someone move your mattress to the floor for a few days   A piece of plywood under your mattress can also help make it firmer  · Apply ice  on your lower back for 15 to 20 minutes every hour or as directed  Use an ice pack, or put crushed ice in a plastic bag  Cover it with a towel  Ice helps prevent tissue damage and decreases swelling and pain  You can alternate ice and heat  · Apply heat  on your lower back for 20 to 30 minutes every 2 hours for as many days as directed  Heat helps decrease pain and muscle spasms  · Go to physical therapy  A physical therapist teaches you exercises to help improve movement and strength, and to decrease pain  Prevent acute low back pain:   · Use proper body mechanics  ¨ Bend at the hips and knees when you  objects  Do not bend from the waist  Use your leg muscles as you lift the load  Do not use your back  Keep the object close to your chest as you lift it  Try not to twist or lift anything above your waist     ¨ Change your position often when you stand for long periods of time  Rest one foot on a small box or footrest, and then switch to the other foot often  ¨ Try not to sit for long periods of time  When you do, sit in a straight-backed chair with your feet flat on the floor  Never reach, pull, or push while you are sitting  · Exercise regularly  Warm up before you exercise  Do exercises that strengthen your back muscles  Ask about the best exercise plan for you  · Maintain a healthy weight  Ask your healthcare provider how much you should weigh  Ask him to help you create a weight loss plan if you are overweight  Follow up with your healthcare provider as directed:  Return for a follow-up visit if you still have pain after 1 to 3 weeks of treatment  You may need to visit an orthopedist if your back pain lasts more than 6 to 12 weeks  Write down your questions so you remember to ask them during your visits  CARE AGREEMENT:   You have the right to help plan your care  Learn about your health condition and how it may be treated   Discuss treatment options with your caregivers to decide what care you want to receive  You always have the right to refuse treatment  The above information is an  only  It is not intended as medical advice for individual conditions or treatments  Talk to your doctor, nurse or pharmacist before following any medical regimen to see if it is safe and effective for you  © 2014 [address] is for End User's use only and may not be sold, redistributed or otherwise used for commercial purposes  All illustrations and images included in CareNotes® are the copyrighted property of [employer] M [employer]  or [employer]

## 2018-11-25 NOTE — ED PROVIDER NOTES
History  Chief Complaint   Patient presents with    Fall     Patient was walking down the stairs, her leg gave out and she fell down 6 steps, complains of back pain, no thinners, no LOC     Patient presents to the emergency department today for evaluation of low back pain  She presents via private vehicle and provides her own history  She states around 2200 hours last evening her right hip gave out which is a chronic condition for her causing her to fall down a few stairs  She states she essentially went down stair by stare on her rear end  There is no history of tumbling  She denies striking her head or neck  She only complains of low right-sided back pain into the right hip  She denies any sensation loss the lower extremities  She denies head or neck pain  She denies chest pain or abdominal pain  No other extremity pain  She is alert oriented x4  She has self-treated with tramadol x2 as well as topical lidocaine patch  Prior to Admission Medications   Prescriptions Last Dose Informant Patient Reported? Taking? ALPRAZolam (XANAX) 1 mg tablet  Self Yes Yes   Sig: Take 1 mg by mouth 4 (four) times a day  DULoxetine (CYMBALTA) 60 mg delayed release capsule Not Taking at Unknown time Self Yes No   Sig: Take 1 capsule by mouth   HYDROcodone-acetaminophen (NORCO) 5-325 mg per tablet  Self Yes Yes   Sig: Take 1 tablet by mouth as needed for moderate pain  Lurasidone HCl (LATUDA) 60 MG TABS   No Yes   Sig: Take 1 tablet (60 mg total) by mouth daily for 30 days   Melatonin 5 MG CAPS Not Taking at Unknown time Self Yes No   Sig: Take by mouth   QUEtiapine (SEROquel) 100 mg tablet  Self Yes Yes   Sig: Take 100 mg by mouth daily at bedtime     albuterol (PROVENTIL HFA,VENTOLIN HFA) 90 mcg/act inhaler Not Taking at Unknown time Self No No   Sig: Inhale 2 puffs every 6 (six) hours as needed for wheezing   Patient not taking: Reported on 11/25/2018    carisoprodol (SOMA) 350 mg tablet  Self Yes Yes Sig: Take 350 mg by mouth as needed for muscle spasms  Pt takes 2-3 daily as needed   docusate sodium (COLACE) 100 mg capsule Not Taking at Unknown time Self Yes No   Sig: Take by mouth   ergocalciferol (VITAMIN D2) 50,000 units  Self Yes Yes   Sig: Take by mouth   gabapentin (NEURONTIN) 800 mg tablet  Self Yes Yes   Sig: Take 800 mg by mouth 4 (four) times a day as needed  ketotifen (ZADITOR) 0 025 % ophthalmic solution  Self Yes Yes   Sig: Apply to eye   metoprolol succinate (TOPROL-XL) 100 mg 24 hr tablet  Self Yes Yes   Sig: Take 100 mg by mouth    pantoprazole (PROTONIX) 40 mg tablet  Self Yes Yes   Sig: Take 40 mg by mouth 2 (two) times a day  rifampin (RIFADIN) 150 mg capsule Not Taking at Unknown time Self Yes No   Sig: Take by mouth   topiramate (TOPAMAX) 100 mg tablet Not Taking at Unknown time Self No No   Sig: Take 1 tablet (100 mg total) by mouth daily   Patient not taking: Reported on 11/25/2018       Facility-Administered Medications: None       Past Medical History:   Diagnosis Date    [facility] disorder [facility] )     Cancer ([facility] )     kidney    Cardiac disease     fast heart beat    Chronic pain     GERD (gastroesophageal reflux disease)     Hard of hearing     Hypertension     Renal cell carcinoma ([facility])     TIA (transient ischemic attack)        Past Surgical History:   Procedure Laterality Date    APPENDECTOMY      CHOLECYSTECTOMY      JOINT REPLACEMENT      hip    NEPHRECTOMY Right     DC COLONOSCOPY FLX DX W/COLLJ SPEC WHEN PFRMD N/A 8/7/2018    Procedure: COLONOSCOPY;  Surgeon: Sheela Dumont MD;  Location: [facility];  Service: Gastroenterology       History reviewed  No pertinent family history  I have reviewed and agree with the history as documented  Social History   Substance Use Topics    Smoking status: Former Smoker    Smokeless tobacco: Never Used    Alcohol use No        Review of Systems   Constitutional: Negative  HENT: Negative  Eyes: Negative  Respiratory: Negative  Cardiovascular: Negative  Gastrointestinal: Negative  Endocrine: Negative  Genitourinary: Negative  Musculoskeletal: Positive for back pain  Skin: Negative  Allergic/Immunologic: Negative  Neurological: Negative  Hematological: Negative  Psychiatric/Behavioral: Negative  All other systems reviewed and are negative  Physical Exam  Physical Exam   Constitutional: She is oriented to person, place, and time  She appears well-developed and well-nourished  No distress  HENT:   Head: Normocephalic  Eyes: Pupils are equal, round, and reactive to light  Cardiovascular: Normal rate  Pulmonary/Chest: Effort normal    Abdominal: Soft  Musculoskeletal: She exhibits tenderness  She exhibits no edema or deformity  Arms:  Neurological: She is oriented to person, place, and time  Skin: Skin is warm  Capillary refill takes less than 2 seconds  She is not diaphoretic  Psychiatric: She has a normal mood and affect  Vitals reviewed  Vital Signs  ED Triage Vitals [11/25/18 1132]   Temperature Pulse Respirations Blood Pressure SpO2   97 9 °F (36 6 °C) 74 18 (!) 189/88 99 %      Temp Source Heart Rate Source Patient Position - Orthostatic VS BP Location FiO2 (%)   Temporal Monitor Lying Left arm --      Pain Score       8           Vitals:    11/25/18 1132 11/25/18 1200   BP: (!) 189/88 169/74   Pulse: 74 68   Patient Position - Orthostatic VS: Lying Lying       Visual Acuity      ED Medications  Medications   HYDROcodone-acetaminophen (NORCO) 5-325 mg per tablet 1 tablet (1 tablet Oral Given 11/25/18 1158)       Diagnostic Studies  Results Reviewed     None                 CT spine lumbar without contrast   Final Result by Damián Cody MD (11/25 1217)      No acute disease  Progression of multilevel spondylosis and osteoarthritis since prior study 3 years earlier  No compressive disease is evident           Workstation performed: [id number] XR hip/pelv 1 vw RIGHT if performed   ED Interpretation by Raven Hartman PA-C (11/25 1200)   Prosthesis present, appears intact, no evidence of acute osseous abnormalities or dislocation                 Procedures  Procedures       Phone Contacts  ED Phone Contact    ED Course  ED Course as of Nov 25 1227   Sun Nov 25, 2018   1222 Impression       No acute disease   Progression of multilevel spondylosis and osteoarthritis since prior study 3 years earlier   No compressive disease is evident  MDM  CritCare Time    Disposition  Final diagnoses:   Fall, initial encounter   Low back pain     Time reflects when diagnosis was documented in both MDM as applicable and the Disposition within this note     Time User Action Codes Description Comment    11/25/2018 12:24 PM Lary Vasquez Add [O69  DRKC] Fall, initial encounter     11/25/2018 12:24 PM Davida COMBS Add [M54 5] Low back pain       ED Disposition     ED Disposition Condition Comment    Discharge  Jonel Crawley discharge to home/self care  Condition at discharge: Good        Follow-up Information     Follow up With Specialties Details Why [address], [address] [facility] Nurse Practitioner Schedule an appointment as soon as possible for a visit  [address] [place] [address] [address] [facility]  [phone]            Patient's Medications   Discharge Prescriptions    HYDROCODONE-ACETAMINOPHEN (NORCO) 5-325 MG PER TABLET    Take 1 tablet by mouth every 6 (six) hours as needed for pain for up to 10 days Max Daily Amount: 4 tablets       Start Date: 11/25/2018End Date: 12/5/2018       Order Dose: 1 tablet       Quantity: 12 tablet    Refills: 0     No discharge procedures on file      ED Provider  Electronically Signed by           Raven Hartman PA-C  11/25/18 7344

## 2018-12-07 DIAGNOSIS — J45.909 ASTHMA, UNSPECIFIED ASTHMA SEVERITY, UNSPECIFIED WHETHER COMPLICATED, UNSPECIFIED WHETHER PERSISTENT: ICD-10-CM

## 2018-12-10 DIAGNOSIS — J45.909 ASTHMA, UNSPECIFIED ASTHMA SEVERITY, UNSPECIFIED WHETHER COMPLICATED, UNSPECIFIED WHETHER PERSISTENT: ICD-10-CM

## 2018-12-10 RX ORDER — ALBUTEROL SULFATE 90 UG/1
2 AEROSOL, METERED RESPIRATORY (INHALATION) EVERY 6 HOURS PRN
Qty: 1 INHALER | Refills: 0 | Status: SHIPPED | OUTPATIENT
Start: 2018-12-10 | End: 2018-12-10 | Stop reason: SDUPTHER

## 2018-12-10 RX ORDER — ALBUTEROL SULFATE 90 UG/1
2 AEROSOL, METERED RESPIRATORY (INHALATION) EVERY 6 HOURS PRN
Qty: 1 INHALER | Refills: 0 | Status: SHIPPED | OUTPATIENT
Start: 2018-12-10 | End: 2020-10-15

## 2018-12-24 ENCOUNTER — APPOINTMENT (EMERGENCY)
Dept: RADIOLOGY | Facility: HOSPITAL | Age: 63
End: 2018-12-24
Payer: COMMERCIAL

## 2018-12-24 ENCOUNTER — HOSPITAL ENCOUNTER (EMERGENCY)
Facility: HOSPITAL | Age: 63
Discharge: HOME | End: 2018-12-24
Attending: FAMILY MEDICINE
Payer: COMMERCIAL

## 2018-12-24 VITALS
DIASTOLIC BLOOD PRESSURE: 122 MMHG | SYSTOLIC BLOOD PRESSURE: 171 MMHG | RESPIRATION RATE: 18 BRPM | HEART RATE: 70 BPM | HEIGHT: 64 IN | BODY MASS INDEX: 30.3 KG/M2 | WEIGHT: 177.47 LBS | TEMPERATURE: 97.4 F | OXYGEN SATURATION: 96 %

## 2018-12-24 DIAGNOSIS — M25.551 HIP PAIN, ACUTE, RIGHT: ICD-10-CM
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
## 2018-12-24 PROCEDURE — 99283 EMERGENCY DEPT VISIT LOW MDM: CPT

## 2018-12-24 PROCEDURE — 73521 X-RAY EXAM HIPS BI 2 VIEWS: CPT

## 2018-12-24 PROCEDURE — 96372 THER/PROPH/DIAG INJ SC/IM: CPT

## 2018-12-24 PROCEDURE — 72100 X-RAY EXAM L-S SPINE 2/3 VWS: CPT

## 2018-12-24 RX ORDER — KETOROLAC TROMETHAMINE 30 MG/ML
60 INJECTION, SOLUTION INTRAMUSCULAR; INTRAVENOUS ONCE
Status: COMPLETED | OUTPATIENT
Start: 2018-12-24 | End: 2018-12-24

## 2018-12-24 RX ORDER — DEXAMETHASONE SODIUM PHOSPHATE 10 MG/ML
10 INJECTION, SOLUTION INTRAMUSCULAR; INTRAVENOUS ONCE
Status: COMPLETED | OUTPATIENT
Start: 2018-12-24 | End: 2018-12-24

## 2018-12-24 RX ORDER — ACETAMINOPHEN 325 MG/1
650 TABLET ORAL ONCE
Status: COMPLETED | OUTPATIENT
Start: 2018-12-24 | End: 2018-12-24

## 2018-12-24 RX ORDER — ACETAMINOPHEN AND CODEINE PHOSPHATE 300; 30 MG/1; MG/1
1-2 TABLET ORAL EVERY 6 HOURS PRN
Qty: 7 TABLET | Refills: 0 | Status: SHIPPED | OUTPATIENT
Start: 2018-12-24 | End: 2019-01-03

## 2018-12-24 RX ADMIN — ACETAMINOPHEN 650 MG: 325 TABLET, FILM COATED ORAL at 07:51

## 2018-12-24 RX ADMIN — KETOROLAC TROMETHAMINE 60 MG: 30 INJECTION, SOLUTION INTRAMUSCULAR at 09:00

## 2018-12-24 RX ADMIN — DEXAMETHASONE SODIUM PHOSPHATE 10 MG: 10 INJECTION, SOLUTION INTRAMUSCULAR; INTRAVENOUS at 09:00

## 2018-12-24 NOTE — DISCHARGE INSTRUCTIONS
Hip Pain   WHAT YOU NEED TO KNOW:   Hip pain can be caused by a number of conditions, such as bursitis, arthritis, or muscle or tendon strain  X-rays do not show broken bones  You may have swelling in the fluid-filled sacs that protect your muscles and tendons  Hip pain can also be caused by a lower back problem  Hip pain may be caused by trauma, playing sports, or running  Your pain may start in your hip and go to your thigh, buttock, or groin  DISCHARGE INSTRUCTIONS:   Medicines:   · NSAIDs , such as ibuprofen, help decrease swelling, pain, and fever  This medicine is available with or without a doctor's order  NSAIDs can cause stomach bleeding or kidney problems in certain people  If you take blood thinner medicine, always ask your healthcare provider if NSAIDs are safe for you  Always read the medicine label and follow directions  · Take your medicine as directed  Contact your healthcare provider if you think your medicine is not helping or if you have side effects  Tell him of her if you are allergic to any medicine  Keep a list of the medicines, vitamins, and herbs you take  Include the amounts, and when and why you take them  Bring the list or the pill bottles to follow-up visits  Carry your medicine list with you in case of an emergency  Return to the emergency department if:   · Your pain gets worse  · You have numbness in your leg or toes  · You cannot put any weight on or move your hip  Contact your healthcare provider if:   · You have a fever  · Your pain does not decrease, even after treatment  · You have questions or concerns about your condition or care  Follow up with your healthcare provider as directed: You may need physical therapy, an injection, or more testing  You may need to see an orthopedic specialist  Write down your questions so you remember to ask them during your visits    Manage your hip pain:   · Rest  your injured hip so that it can heal  You may need to avoid putting any weight on your hip for at least 48 hours  Return to normal activities as directed  · Ice  the injury for 20 minutes every 4 hours, or as directed  Use an ice pack, or put crushed ice in a plastic bag  Cover it with a towel to protect your skin  Ice helps prevent tissue damage and decreases swelling and pain  · Elevate  your injured hip above the level of your heart as often as you can  This will help decrease swelling and pain  If possible, prop your hip and leg on pillows or blankets to keep the area elevated comfortably  · Maintain a healthy weight  Extra body weight can cause pressure and pain in your hip, knee, and ankle joints  Ask your healthcare provider how much you should weigh  Ask him to help you create a weight loss plan if you are overweight  · Use assistive devices as directed  You may need to use a cane or crutches  Assistive devices help decrease pain and pressure on your hip when you walk  Ask your healthcare provider for more information about assistive devices and how to use them correctly  © 2017 [address] [facility] Information is for End User's use only and may not be sold, redistributed or otherwise used for commercial purposes  All illustrations and images included in CareNotes® are the copyrighted property of [employer]  or [employer]  The above information is an  only  It is not intended as medical advice for individual conditions or treatments  Talk to your doctor, nurse or pharmacist before following any medical regimen to see if it is safe and effective for you

## 2019-05-09 ENCOUNTER — OFFICE VISIT (OUTPATIENT)
Dept: OBGYN CLINIC | Facility: CLINIC | Age: 64
End: 2019-05-09
Payer: COMMERCIAL

## 2019-05-09 VITALS
SYSTOLIC BLOOD PRESSURE: 108 MMHG | WEIGHT: 177 LBS | HEART RATE: 88 BPM | BODY MASS INDEX: 30.22 KG/M2 | HEIGHT: 64 IN | DIASTOLIC BLOOD PRESSURE: 74 MMHG

## 2019-05-09 DIAGNOSIS — S82.841A BIMALLEOLAR FRACTURE OF RIGHT ANKLE, CLOSED, INITIAL ENCOUNTER: ICD-10-CM

## 2019-05-09 DIAGNOSIS — M25.571 ACUTE RIGHT ANKLE PAIN: Primary | ICD-10-CM

## 2019-05-09 PROCEDURE — 99204 OFFICE O/P NEW MOD 45 MIN: CPT | Performed by: ORTHOPAEDIC SURGERY

## 2019-05-09 RX ORDER — CEFAZOLIN SODIUM 2 G/50ML
2000 SOLUTION INTRAVENOUS ONCE
Status: CANCELLED | OUTPATIENT
Start: 2019-05-14

## 2019-05-09 RX ORDER — HYDROCODONE BITARTRATE AND ACETAMINOPHEN 5; 325 MG/1; MG/1
TABLET ORAL
Qty: 40 TABLET | Refills: 0 | Status: SHIPPED | OUTPATIENT
Start: 2019-05-09 | End: 2020-09-14

## 2019-05-14 ENCOUNTER — HOSPITAL ENCOUNTER (OUTPATIENT)
Dept: RADIOLOGY | Facility: HOSPITAL | Age: 64
Setting detail: OUTPATIENT SURGERY
Discharge: HOME | End: 2019-05-14
Payer: COMMERCIAL

## 2019-05-14 ENCOUNTER — ANESTHESIA EVENT (OUTPATIENT)
Dept: PERIOP | Facility: HOSPITAL | Age: 64
End: 2019-05-14
Payer: COMMERCIAL

## 2019-05-14 ENCOUNTER — HOSPITAL ENCOUNTER (OUTPATIENT)
Facility: HOSPITAL | Age: 64
Setting detail: OUTPATIENT SURGERY
Discharge: HOME | End: 2019-05-15
Attending: ORTHOPAEDIC SURGERY | Admitting: ORTHOPAEDIC SURGERY
Payer: COMMERCIAL

## 2019-05-14 ENCOUNTER — ANESTHESIA (OUTPATIENT)
Dept: PERIOP | Facility: HOSPITAL | Age: 64
End: 2019-05-14
Payer: COMMERCIAL

## 2019-05-14 DIAGNOSIS — T14.8XXA FRACTURE OF BONE: ICD-10-CM

## 2019-05-14 DIAGNOSIS — S82.841A BIMALLEOLAR FRACTURE OF RIGHT ANKLE, CLOSED, INITIAL ENCOUNTER: Primary | ICD-10-CM

## 2019-05-14 DIAGNOSIS — T14.8XXA FRACTURE: ICD-10-CM

## 2019-05-14 PROBLEM — Z87.898 HISTORY OF TACHYCARDIA: Status: ACTIVE | Noted: 2019-05-14

## 2019-05-14 PROBLEM — I10 ESSENTIAL HYPERTENSION: Status: ACTIVE | Noted: 2019-05-14

## 2019-05-14 PROBLEM — R00.0 TACHYCARDIA: Status: ACTIVE | Noted: 2019-05-14

## 2019-05-14 PROBLEM — R00.0 TACHYCARDIA: Status: RESOLVED | Noted: 2019-05-14 | Resolved: 2019-05-14

## 2019-05-14 PROCEDURE — C1713 ANCHOR/SCREW BN/BN,TIS/BN: HCPCS | Performed by: ORTHOPAEDIC SURGERY

## 2019-05-14 PROCEDURE — 27814 TREATMENT OF ANKLE FRACTURE: CPT | Performed by: ORTHOPAEDIC SURGERY

## 2019-05-14 PROCEDURE — 99242 OFF/OP CONSLTJ NEW/EST SF 20: CPT | Performed by: NURSE PRACTITIONER

## 2019-05-14 PROCEDURE — 73610 X-RAY EXAM OF ANKLE: CPT

## 2019-05-14 PROCEDURE — 27814 TREATMENT OF ANKLE FRACTURE: CPT | Performed by: PHYSICIAN ASSISTANT

## 2019-05-14 DEVICE — 4.0MM CANNULATED SCREW SHORT THREAD/48MM: Type: IMPLANTABLE DEVICE | Site: ANKLE | Status: FUNCTIONAL

## 2019-05-14 DEVICE — 3.5MM CORTEX SCREW SELF-TAPPING 28MM: Type: IMPLANTABLE DEVICE | Site: ANKLE | Status: FUNCTIONAL

## 2019-05-14 DEVICE — 3.5MM CORTEX SCREW SELF-TAPPING 20MM: Type: IMPLANTABLE DEVICE | Site: ANKLE | Status: FUNCTIONAL

## 2019-05-14 DEVICE — 2.7MM LOCKING SCREW SLF-TPNG WITH T8 STARDRIVE RECESS 16MM: Type: IMPLANTABLE DEVICE | Site: ANKLE | Status: FUNCTIONAL

## 2019-05-14 DEVICE — 3.5MM CORTEX SCREW SELF-TAPPING 14MM: Type: IMPLANTABLE DEVICE | Site: ANKLE | Status: FUNCTIONAL

## 2019-05-14 DEVICE — 2.7MM LOCKING SCREW SLF-TPNG WITH T8 STARDRIVE RECESS 14MM: Type: IMPLANTABLE DEVICE | Site: ANKLE | Status: FUNCTIONAL

## 2019-05-14 DEVICE — 2.7MM/3.5MM LCP POSTLAT DISTAL FIBULA PLATE 5H/RIGHT/103MM
Type: IMPLANTABLE DEVICE | Site: ANKLE | Status: FUNCTIONAL
Brand: LCP

## 2019-05-14 RX ORDER — HYDROCODONE BITARTRATE AND ACETAMINOPHEN 5; 325 MG/1; MG/1
1 TABLET ORAL EVERY 4 HOURS PRN
Status: DISCONTINUED | OUTPATIENT
Start: 2019-05-14 | End: 2019-05-15 | Stop reason: HOSPADM

## 2019-05-14 RX ORDER — PROPOFOL 10 MG/ML
INJECTION, EMULSION INTRAVENOUS AS NEEDED
Status: DISCONTINUED | OUTPATIENT
Start: 2019-05-14 | End: 2019-05-14 | Stop reason: SURG

## 2019-05-14 RX ORDER — ALBUTEROL SULFATE 90 UG/1
2 AEROSOL, METERED RESPIRATORY (INHALATION) EVERY 6 HOURS PRN
Status: DISCONTINUED | OUTPATIENT
Start: 2019-05-14 | End: 2019-05-15 | Stop reason: HOSPADM

## 2019-05-14 RX ORDER — SODIUM CHLORIDE, SODIUM LACTATE, POTASSIUM CHLORIDE, CALCIUM CHLORIDE 600; 310; 30; 20 MG/100ML; MG/100ML; MG/100ML; MG/100ML
125 INJECTION, SOLUTION INTRAVENOUS CONTINUOUS
Status: DISCONTINUED | OUTPATIENT
Start: 2019-05-14 | End: 2019-05-14

## 2019-05-14 RX ORDER — FENTANYL CITRATE/PF 50 MCG/ML
25 SYRINGE (ML) INJECTION
Status: DISCONTINUED | OUTPATIENT
Start: 2019-05-14 | End: 2019-05-14 | Stop reason: HOSPADM

## 2019-05-14 RX ORDER — MIDAZOLAM HYDROCHLORIDE 1 MG/ML
INJECTION INTRAMUSCULAR; INTRAVENOUS AS NEEDED
Status: DISCONTINUED | OUTPATIENT
Start: 2019-05-14 | End: 2019-05-14 | Stop reason: SURG

## 2019-05-14 RX ORDER — ASPIRIN 325 MG
325 TABLET ORAL 2 TIMES DAILY
Status: DISCONTINUED | OUTPATIENT
Start: 2019-05-15 | End: 2019-05-15 | Stop reason: HOSPADM

## 2019-05-14 RX ORDER — CEFAZOLIN SODIUM 2 G/50ML
2000 SOLUTION INTRAVENOUS ONCE
Status: COMPLETED | OUTPATIENT
Start: 2019-05-14 | End: 2019-05-14

## 2019-05-14 RX ORDER — METOPROLOL SUCCINATE 50 MG/1
100 TABLET, EXTENDED RELEASE ORAL DAILY
Status: DISCONTINUED | OUTPATIENT
Start: 2019-05-15 | End: 2019-05-15 | Stop reason: HOSPADM

## 2019-05-14 RX ORDER — GINSENG 100 MG
CAPSULE ORAL AS NEEDED
Status: DISCONTINUED | OUTPATIENT
Start: 2019-05-14 | End: 2019-05-14 | Stop reason: HOSPADM

## 2019-05-14 RX ORDER — PANTOPRAZOLE SODIUM 40 MG/1
40 TABLET, DELAYED RELEASE ORAL 2 TIMES DAILY
Status: DISCONTINUED | OUTPATIENT
Start: 2019-05-14 | End: 2019-05-15 | Stop reason: HOSPADM

## 2019-05-14 RX ORDER — MAGNESIUM HYDROXIDE 1200 MG/15ML
LIQUID ORAL AS NEEDED
Status: DISCONTINUED | OUTPATIENT
Start: 2019-05-14 | End: 2019-05-14 | Stop reason: HOSPADM

## 2019-05-14 RX ORDER — LIDOCAINE HYDROCHLORIDE 10 MG/ML
INJECTION, SOLUTION INFILTRATION; PERINEURAL AS NEEDED
Status: DISCONTINUED | OUTPATIENT
Start: 2019-05-14 | End: 2019-05-14 | Stop reason: SURG

## 2019-05-14 RX ORDER — SODIUM CHLORIDE 9 MG/ML
125 INJECTION, SOLUTION INTRAVENOUS CONTINUOUS
Status: DISCONTINUED | OUTPATIENT
Start: 2019-05-14 | End: 2019-05-15 | Stop reason: HOSPADM

## 2019-05-14 RX ORDER — DEXAMETHASONE SODIUM PHOSPHATE 4 MG/ML
INJECTION, SOLUTION INTRA-ARTICULAR; INTRALESIONAL; INTRAMUSCULAR; INTRAVENOUS; SOFT TISSUE AS NEEDED
Status: DISCONTINUED | OUTPATIENT
Start: 2019-05-14 | End: 2019-05-14 | Stop reason: SURG

## 2019-05-14 RX ORDER — QUETIAPINE FUMARATE 100 MG/1
100 TABLET, FILM COATED ORAL
Status: DISCONTINUED | OUTPATIENT
Start: 2019-05-14 | End: 2019-05-15 | Stop reason: HOSPADM

## 2019-05-14 RX ORDER — DOCUSATE SODIUM 100 MG/1
100 CAPSULE, LIQUID FILLED ORAL DAILY PRN
Status: DISCONTINUED | OUTPATIENT
Start: 2019-05-14 | End: 2019-05-15 | Stop reason: HOSPADM

## 2019-05-14 RX ORDER — ONDANSETRON 2 MG/ML
INJECTION INTRAMUSCULAR; INTRAVENOUS AS NEEDED
Status: DISCONTINUED | OUTPATIENT
Start: 2019-05-14 | End: 2019-05-14 | Stop reason: SURG

## 2019-05-14 RX ORDER — FENTANYL CITRATE 50 UG/ML
INJECTION, SOLUTION INTRAMUSCULAR; INTRAVENOUS AS NEEDED
Status: DISCONTINUED | OUTPATIENT
Start: 2019-05-14 | End: 2019-05-14 | Stop reason: SURG

## 2019-05-14 RX ORDER — DIPHENHYDRAMINE HCL 25 MG
25 TABLET ORAL EVERY 6 HOURS PRN
Status: DISCONTINUED | OUTPATIENT
Start: 2019-05-14 | End: 2019-05-15 | Stop reason: HOSPADM

## 2019-05-14 RX ORDER — ACETAMINOPHEN 325 MG/1
650 TABLET ORAL EVERY 6 HOURS PRN
Status: DISCONTINUED | OUTPATIENT
Start: 2019-05-14 | End: 2019-05-15 | Stop reason: HOSPADM

## 2019-05-14 RX ORDER — ONDANSETRON 2 MG/ML
4 INJECTION INTRAMUSCULAR; INTRAVENOUS ONCE AS NEEDED
Status: DISCONTINUED | OUTPATIENT
Start: 2019-05-14 | End: 2019-05-14 | Stop reason: HOSPADM

## 2019-05-14 RX ORDER — EPHEDRINE SULFATE 50 MG/ML
INJECTION INTRAVENOUS AS NEEDED
Status: DISCONTINUED | OUTPATIENT
Start: 2019-05-14 | End: 2019-05-14 | Stop reason: SURG

## 2019-05-14 RX ORDER — CEFAZOLIN SODIUM 2 G/50ML
2000 SOLUTION INTRAVENOUS EVERY 8 HOURS
Status: DISCONTINUED | OUTPATIENT
Start: 2019-05-14 | End: 2019-05-15 | Stop reason: HOSPADM

## 2019-05-14 RX ADMIN — FENTANYL CITRATE 25 MCG: 50 INJECTION INTRAMUSCULAR; INTRAVENOUS at 15:17

## 2019-05-14 RX ADMIN — GABAPENTIN 800 MG: 100 CAPSULE ORAL at 21:11

## 2019-05-14 RX ADMIN — CEFAZOLIN SODIUM 2000 MG: 2 SOLUTION INTRAVENOUS at 15:15

## 2019-05-14 RX ADMIN — ONDANSETRON HYDROCHLORIDE 4 MG: 2 INJECTION, SOLUTION INTRAVENOUS at 16:10

## 2019-05-14 RX ADMIN — SODIUM CHLORIDE 125 ML/HR: 9 INJECTION, SOLUTION INTRAVENOUS at 17:56

## 2019-05-14 RX ADMIN — EPHEDRINE SULFATE 5 MG: 50 INJECTION, SOLUTION INTRAVENOUS at 15:34

## 2019-05-14 RX ADMIN — EPHEDRINE SULFATE 5 MG: 50 INJECTION, SOLUTION INTRAVENOUS at 15:31

## 2019-05-14 RX ADMIN — CEFAZOLIN SODIUM 2000 MG: 2 SOLUTION INTRAVENOUS at 23:09

## 2019-05-14 RX ADMIN — PROPOFOL 110 MG: 10 INJECTION, EMULSION INTRAVENOUS at 15:20

## 2019-05-14 RX ADMIN — DEXAMETHASONE SODIUM PHOSPHATE 4 MG: 4 INJECTION, SOLUTION INTRA-ARTICULAR; INTRALESIONAL; INTRAMUSCULAR; INTRAVENOUS; SOFT TISSUE at 15:30

## 2019-05-14 RX ADMIN — FENTANYL CITRATE 25 MCG: 50 INJECTION INTRAMUSCULAR; INTRAVENOUS at 15:02

## 2019-05-14 RX ADMIN — FENTANYL CITRATE 25 MCG: 50 INJECTION INTRAMUSCULAR; INTRAVENOUS at 16:54

## 2019-05-14 RX ADMIN — QUETIAPINE FUMARATE 100 MG: 100 TABLET ORAL at 21:11

## 2019-05-14 RX ADMIN — FENTANYL CITRATE 25 MCG: 50 INJECTION INTRAMUSCULAR; INTRAVENOUS at 17:06

## 2019-05-14 RX ADMIN — PANTOPRAZOLE SODIUM 40 MG: 40 TABLET, DELAYED RELEASE ORAL at 17:56

## 2019-05-14 RX ADMIN — MIDAZOLAM HYDROCHLORIDE 2 MG: 1 INJECTION, SOLUTION INTRAMUSCULAR; INTRAVENOUS at 15:02

## 2019-05-14 RX ADMIN — FENTANYL CITRATE 25 MCG: 50 INJECTION INTRAMUSCULAR; INTRAVENOUS at 17:15

## 2019-05-14 RX ADMIN — SODIUM CHLORIDE, POTASSIUM CHLORIDE, SODIUM LACTATE AND CALCIUM CHLORIDE 125 ML/HR: 600; 310; 30; 20 INJECTION, SOLUTION INTRAVENOUS at 12:59

## 2019-05-14 RX ADMIN — FENTANYL CITRATE 25 MCG: 50 INJECTION INTRAMUSCULAR; INTRAVENOUS at 15:37

## 2019-05-14 RX ADMIN — LIDOCAINE HYDROCHLORIDE 100 MG: 10 INJECTION, SOLUTION INFILTRATION; PERINEURAL at 15:20

## 2019-05-14 RX ADMIN — EPHEDRINE SULFATE 5 MG: 50 INJECTION, SOLUTION INTRAVENOUS at 16:23

## 2019-05-14 RX ADMIN — FENTANYL CITRATE 25 MCG: 50 INJECTION INTRAMUSCULAR; INTRAVENOUS at 15:32

## 2019-05-14 RX ADMIN — HYDROCODONE BITARTRATE AND ACETAMINOPHEN 1 TABLET: 5; 325 TABLET ORAL at 18:00

## 2019-05-15 ENCOUNTER — TELEPHONE (OUTPATIENT)
Dept: OBGYN CLINIC | Facility: HOSPITAL | Age: 64
End: 2019-05-15

## 2019-05-15 VITALS
RESPIRATION RATE: 18 BRPM | DIASTOLIC BLOOD PRESSURE: 60 MMHG | SYSTOLIC BLOOD PRESSURE: 122 MMHG | OXYGEN SATURATION: 92 % | HEART RATE: 102 BPM | HEIGHT: 64 IN | WEIGHT: 177 LBS | BODY MASS INDEX: 30.22 KG/M2 | TEMPERATURE: 98.4 F

## 2019-05-15 LAB
ANION GAP SERPL CALCULATED.3IONS-SCNC: 6 MMOL/L (ref 4–13)
BASOPHILS # BLD AUTO: 0 THOUSANDS/ΜL (ref 0–0.1)
BASOPHILS NFR BLD AUTO: 0 % (ref 0–2)
BUN SERPL-MCNC: 9 MG/DL (ref 7–25)
CALCIUM SERPL-MCNC: 8.3 MG/DL (ref 8.6–10.5)
CHLORIDE SERPL-SCNC: 110 MMOL/L (ref 98–107)
CO2 SERPL-SCNC: 23 MMOL/L (ref 21–31)
CREAT SERPL-MCNC: 0.65 MG/DL (ref 0.6–1.2)
EOSINOPHIL # BLD AUTO: 0 THOUSAND/ΜL (ref 0–0.61)
EOSINOPHIL NFR BLD AUTO: 1 % (ref 0–5)
ERYTHROCYTE [DISTWIDTH] IN BLOOD BY AUTOMATED COUNT: 14.1 % (ref 11.5–14.5)
GFR SERPL CREATININE-BSD FRML MDRD: 95 ML/MIN/1.73SQ M
GLUCOSE SERPL-MCNC: 88 MG/DL (ref 65–99)
HCT VFR BLD AUTO: 29.2 % (ref 42–47)
HGB BLD-MCNC: 10.1 G/DL (ref 12–16)
LYMPHOCYTES # BLD AUTO: 1.1 THOUSANDS/ΜL (ref 0.6–4.47)
LYMPHOCYTES NFR BLD AUTO: 18 % (ref 21–51)
MACROCYTES BLD QL AUTO: PRESENT
MCH RBC QN AUTO: 36.8 PG (ref 26–34)
MCHC RBC AUTO-ENTMCNC: 34.5 G/DL (ref 31–37)
MCV RBC AUTO: 107 FL (ref 81–99)
MONOCYTES # BLD AUTO: 0.6 THOUSAND/ΜL (ref 0.17–1.22)
MONOCYTES NFR BLD AUTO: 9 % (ref 2–12)
NEUTROPHILS # BLD AUTO: 4.4 THOUSANDS/ΜL (ref 1.4–6.5)
NEUTS SEG NFR BLD AUTO: 72 % (ref 42–75)
PLATELET # BLD AUTO: 307 THOUSANDS/UL (ref 149–390)
PLATELET BLD QL SMEAR: ADEQUATE
PMV BLD AUTO: 7.2 FL (ref 8.6–11.7)
POLYCHROMASIA BLD QL SMEAR: PRESENT
POTASSIUM SERPL-SCNC: 3.6 MMOL/L (ref 3.5–5.5)
RBC # BLD AUTO: 2.74 MILLION/UL (ref 3.9–5.2)
RBC MORPH BLD: PRESENT
SODIUM SERPL-SCNC: 139 MMOL/L (ref 134–143)
WBC # BLD AUTO: 6.2 THOUSAND/UL (ref 4.8–10.8)

## 2019-05-15 PROCEDURE — 97163 PT EVAL HIGH COMPLEX 45 MIN: CPT

## 2019-05-15 PROCEDURE — 97530 THERAPEUTIC ACTIVITIES: CPT

## 2019-05-15 PROCEDURE — 99024 POSTOP FOLLOW-UP VISIT: CPT | Performed by: ORTHOPAEDIC SURGERY

## 2019-05-15 PROCEDURE — G8978 MOBILITY CURRENT STATUS: HCPCS

## 2019-05-15 PROCEDURE — 99224 PR SBSQ OBSERVATION CARE/DAY 15 MINUTES: CPT | Performed by: HOSPITALIST

## 2019-05-15 PROCEDURE — 85025 COMPLETE CBC W/AUTO DIFF WBC: CPT | Performed by: PHYSICIAN ASSISTANT

## 2019-05-15 PROCEDURE — 80048 BASIC METABOLIC PNL TOTAL CA: CPT | Performed by: PHYSICIAN ASSISTANT

## 2019-05-15 PROCEDURE — G8979 MOBILITY GOAL STATUS: HCPCS

## 2019-05-15 RX ORDER — ALPRAZOLAM 0.5 MG/1
1 TABLET ORAL 3 TIMES DAILY PRN
Status: DISCONTINUED | OUTPATIENT
Start: 2019-05-15 | End: 2019-05-15 | Stop reason: HOSPADM

## 2019-05-15 RX ORDER — HYDROCODONE BITARTRATE AND ACETAMINOPHEN 5; 325 MG/1; MG/1
TABLET ORAL
Qty: 30 TABLET | Refills: 0 | Status: SHIPPED | OUTPATIENT
Start: 2019-05-15 | End: 2019-06-26 | Stop reason: SDUPTHER

## 2019-05-15 RX ADMIN — METOPROLOL SUCCINATE 100 MG: 50 TABLET, EXTENDED RELEASE ORAL at 08:14

## 2019-05-15 RX ADMIN — SODIUM CHLORIDE 125 ML/HR: 9 INJECTION, SOLUTION INTRAVENOUS at 02:27

## 2019-05-15 RX ADMIN — HYDROCODONE BITARTRATE AND ACETAMINOPHEN 1 TABLET: 5; 325 TABLET ORAL at 02:43

## 2019-05-15 RX ADMIN — ALPRAZOLAM 1 MG: 0.5 TABLET ORAL at 08:17

## 2019-05-15 RX ADMIN — LURASIDONE HYDROCHLORIDE 60 MG: 40 TABLET, FILM COATED ORAL at 10:09

## 2019-05-15 RX ADMIN — ASPIRIN 325 MG: 325 TABLET ORAL at 08:14

## 2019-05-15 RX ADMIN — HYDROCODONE BITARTRATE AND ACETAMINOPHEN 1 TABLET: 5; 325 TABLET ORAL at 10:36

## 2019-05-15 RX ADMIN — GABAPENTIN 800 MG: 100 CAPSULE ORAL at 08:14

## 2019-05-15 RX ADMIN — PANTOPRAZOLE SODIUM 40 MG: 40 TABLET, DELAYED RELEASE ORAL at 08:14

## 2019-05-15 RX ADMIN — CEFAZOLIN SODIUM 2000 MG: 2 SOLUTION INTRAVENOUS at 08:14

## 2019-05-15 RX ADMIN — HYDROCODONE BITARTRATE AND ACETAMINOPHEN 1 TABLET: 5; 325 TABLET ORAL at 06:19

## 2019-05-22 ENCOUNTER — APPOINTMENT (OUTPATIENT)
Dept: RADIOLOGY | Facility: CLINIC | Age: 64
End: 2019-05-22
Payer: COMMERCIAL

## 2019-05-22 ENCOUNTER — OFFICE VISIT (OUTPATIENT)
Dept: OBGYN CLINIC | Facility: CLINIC | Age: 64
End: 2019-05-22
Payer: COMMERCIAL

## 2019-05-22 VITALS
HEART RATE: 91 BPM | BODY MASS INDEX: 30.05 KG/M2 | HEIGHT: 64 IN | SYSTOLIC BLOOD PRESSURE: 121 MMHG | WEIGHT: 176 LBS | DIASTOLIC BLOOD PRESSURE: 80 MMHG

## 2019-05-22 DIAGNOSIS — S82.841D CLOSED BIMALLEOLAR FRACTURE OF RIGHT ANKLE WITH ROUTINE HEALING, SUBSEQUENT ENCOUNTER: Primary | ICD-10-CM

## 2019-05-22 DIAGNOSIS — S82.841D CLOSED BIMALLEOLAR FRACTURE OF RIGHT ANKLE WITH ROUTINE HEALING, SUBSEQUENT ENCOUNTER: ICD-10-CM

## 2019-05-22 PROCEDURE — 73610 X-RAY EXAM OF ANKLE: CPT

## 2019-05-22 PROCEDURE — 29405 APPL SHORT LEG CAST: CPT | Performed by: ORTHOPAEDIC SURGERY

## 2019-05-22 PROCEDURE — 99024 POSTOP FOLLOW-UP VISIT: CPT | Performed by: ORTHOPAEDIC SURGERY

## 2019-06-05 ENCOUNTER — OFFICE VISIT (OUTPATIENT)
Dept: OBGYN CLINIC | Facility: CLINIC | Age: 64
End: 2019-06-05

## 2019-06-05 ENCOUNTER — APPOINTMENT (OUTPATIENT)
Dept: RADIOLOGY | Facility: CLINIC | Age: 64
End: 2019-06-05
Payer: COMMERCIAL

## 2019-06-05 VITALS
DIASTOLIC BLOOD PRESSURE: 70 MMHG | WEIGHT: 161 LBS | SYSTOLIC BLOOD PRESSURE: 108 MMHG | HEART RATE: 70 BPM | BODY MASS INDEX: 27.49 KG/M2 | HEIGHT: 64 IN

## 2019-06-05 DIAGNOSIS — S82.841D CLOSED BIMALLEOLAR FRACTURE OF RIGHT ANKLE WITH ROUTINE HEALING, SUBSEQUENT ENCOUNTER: ICD-10-CM

## 2019-06-05 DIAGNOSIS — S82.841D CLOSED BIMALLEOLAR FRACTURE OF RIGHT ANKLE WITH ROUTINE HEALING, SUBSEQUENT ENCOUNTER: Primary | ICD-10-CM

## 2019-06-05 PROCEDURE — 73610 X-RAY EXAM OF ANKLE: CPT

## 2019-06-05 PROCEDURE — 99024 POSTOP FOLLOW-UP VISIT: CPT | Performed by: ORTHOPAEDIC SURGERY

## 2019-06-26 ENCOUNTER — APPOINTMENT (OUTPATIENT)
Dept: RADIOLOGY | Facility: CLINIC | Age: 64
End: 2019-06-26
Payer: COMMERCIAL

## 2019-06-26 ENCOUNTER — OFFICE VISIT (OUTPATIENT)
Dept: OBGYN CLINIC | Facility: CLINIC | Age: 64
End: 2019-06-26

## 2019-06-26 VITALS
HEART RATE: 85 BPM | BODY MASS INDEX: 25.95 KG/M2 | WEIGHT: 152 LBS | HEIGHT: 64 IN | DIASTOLIC BLOOD PRESSURE: 82 MMHG | SYSTOLIC BLOOD PRESSURE: 119 MMHG

## 2019-06-26 DIAGNOSIS — S82.841D CLOSED BIMALLEOLAR FRACTURE OF RIGHT ANKLE WITH ROUTINE HEALING, SUBSEQUENT ENCOUNTER: ICD-10-CM

## 2019-06-26 DIAGNOSIS — Z87.81 S/P ORIF (OPEN REDUCTION INTERNAL FIXATION) FRACTURE: ICD-10-CM

## 2019-06-26 DIAGNOSIS — Z98.890 S/P ORIF (OPEN REDUCTION INTERNAL FIXATION) FRACTURE: ICD-10-CM

## 2019-06-26 DIAGNOSIS — S82.841D CLOSED BIMALLEOLAR FRACTURE OF RIGHT ANKLE WITH ROUTINE HEALING, SUBSEQUENT ENCOUNTER: Primary | ICD-10-CM

## 2019-06-26 PROCEDURE — 99024 POSTOP FOLLOW-UP VISIT: CPT | Performed by: ORTHOPAEDIC SURGERY

## 2019-06-26 PROCEDURE — 73610 X-RAY EXAM OF ANKLE: CPT

## 2019-06-27 ENCOUNTER — TELEPHONE (OUTPATIENT)
Dept: OBGYN CLINIC | Facility: HOSPITAL | Age: 64
End: 2019-06-27

## 2019-06-28 ENCOUNTER — TELEPHONE (OUTPATIENT)
Dept: OBGYN CLINIC | Facility: HOSPITAL | Age: 64
End: 2019-06-28

## 2019-07-23 DIAGNOSIS — S82.841D CLOSED BIMALLEOLAR FRACTURE OF RIGHT ANKLE WITH ROUTINE HEALING, SUBSEQUENT ENCOUNTER: Primary | ICD-10-CM

## 2019-07-24 ENCOUNTER — OFFICE VISIT (OUTPATIENT)
Dept: OBGYN CLINIC | Facility: CLINIC | Age: 64
End: 2019-07-24

## 2019-07-24 ENCOUNTER — APPOINTMENT (OUTPATIENT)
Dept: RADIOLOGY | Facility: MEDICAL CENTER | Age: 64
End: 2019-07-24
Payer: COMMERCIAL

## 2019-07-24 VITALS
DIASTOLIC BLOOD PRESSURE: 78 MMHG | SYSTOLIC BLOOD PRESSURE: 112 MMHG | WEIGHT: 163 LBS | BODY MASS INDEX: 27.98 KG/M2 | HEART RATE: 78 BPM

## 2019-07-24 DIAGNOSIS — S82.841D CLOSED BIMALLEOLAR FRACTURE OF RIGHT ANKLE WITH ROUTINE HEALING, SUBSEQUENT ENCOUNTER: Primary | ICD-10-CM

## 2019-07-24 DIAGNOSIS — Z87.81 S/P ORIF (OPEN REDUCTION INTERNAL FIXATION) FRACTURE: ICD-10-CM

## 2019-07-24 DIAGNOSIS — S82.841D CLOSED BIMALLEOLAR FRACTURE OF RIGHT ANKLE WITH ROUTINE HEALING, SUBSEQUENT ENCOUNTER: ICD-10-CM

## 2019-07-24 DIAGNOSIS — Z98.890 S/P ORIF (OPEN REDUCTION INTERNAL FIXATION) FRACTURE: ICD-10-CM

## 2019-07-24 PROCEDURE — 73610 X-RAY EXAM OF ANKLE: CPT

## 2019-07-24 PROCEDURE — 99024 POSTOP FOLLOW-UP VISIT: CPT | Performed by: ORTHOPAEDIC SURGERY

## 2019-07-24 NOTE — PROGRESS NOTES
Patient Name:  Isaias Julian  MRN:  [id number]    Assessment     1  Closed bimalleolar fracture of right ankle with routine healing, subsequent encounter  Ambulatory referral to Physical Therapy    Ankle Air Cast    Ambulatory referral to Physical Therapy   2  S/P ORIF (open reduction internal fixation) fracture  Ambulatory referral to Physical Therapy    Ankle Air Cast    Ambulatory referral to Physical Therapy       Plan     X-rays performed today in clinic are reviewed with the patient  The patient may discontinue uses the walker when she feels stable  She was again stressed the importance of presenting to physical therapy for range of motion exercises  A new order for physical therapy was provided to the patient  She was instructed to contact the office if physical therapy does not contact her within the next week  The patient was informed that home physical therapy is not indicated and that she should attend outpatient physical therapy  The patient was provided a ankle air cast may transition out of the walker cast  Boot  The patient was strongly advised that she should be wearing this brace at all times to protect fracture fixation  Patient acknowledged understanding  We will see the patient back in 4 weeks for repeat x-rays  She and her  were Encouragedto contact the office should any questions or concern arise  Return in about 1 month (around 8/21/2019)  Will need repeat x-rays of the right ankle  Subjective   Isaias Julian returns for follow-up of   Right ankle fracture  The patient is 9 week(s) post op and returns for routine follow-up  Patient has been complaint with walker cast boot use  Denies fevers, chills, numbness/tingling  She states that she has not presented to physical therapy, stating that they called her within the week after her last appointment and told her they would call her back but they never did    Per the patient, she requested from PT that she have home physical therapy as she does not drive  Patient would like to transition out of the boot and begin ambulating without a walker if she is allowed  Objective     /78   Pulse 78   Wt 73 9 kg (163 lb)   BMI 27 98 kg/m²     Right ankle exam:    upon arrival room, walker cast was in place and in good position  This was removed without difficulty  There is no significant swelling of the ankle  Incisions are clean and dry and benign  No signs of infection including drainage of erythema  Active ankle dorsiflexion and plantar flexion is intact, however, internal and external rotational movements of the ankle are significantly limited  Motor and sensory exams are grossly intact, 2+ DP and PT pulses  Data Review     I have personally reviewed pertinent films in PACS, and my interpretation follows  XRs of the right ankle performed 7/24/19  Demonstrate bimalleolar fracture status post ORIF with routine healing  No interval changes in hardware alignment

## 2019-07-29 ENCOUNTER — TELEPHONE (OUTPATIENT)
Dept: OBGYN CLINIC | Facility: CLINIC | Age: 64
End: 2019-07-29

## 2019-07-29 NOTE — TELEPHONE ENCOUNTER
No Answer/Busy - Left message to VM and asked if she want the boot   If she does not want it will be discarded, if she does must be pick today, at the [place] office  Jayne HARTMANN

## 2019-08-01 DIAGNOSIS — S82.841D CLOSED BIMALLEOLAR FRACTURE OF RIGHT ANKLE WITH ROUTINE HEALING, SUBSEQUENT ENCOUNTER: Primary | ICD-10-CM

## 2019-08-29 ENCOUNTER — OFFICE VISIT (OUTPATIENT)
Dept: OBGYN CLINIC | Facility: CLINIC | Age: 64
End: 2019-08-29
Payer: COMMERCIAL

## 2019-08-29 ENCOUNTER — APPOINTMENT (OUTPATIENT)
Dept: RADIOLOGY | Facility: CLINIC | Age: 64
End: 2019-08-29
Payer: COMMERCIAL

## 2019-08-29 VITALS
SYSTOLIC BLOOD PRESSURE: 114 MMHG | DIASTOLIC BLOOD PRESSURE: 70 MMHG | WEIGHT: 163 LBS | HEART RATE: 67 BPM | HEIGHT: 64 IN | BODY MASS INDEX: 27.83 KG/M2

## 2019-08-29 DIAGNOSIS — S82.841D CLOSED BIMALLEOLAR FRACTURE OF RIGHT ANKLE WITH ROUTINE HEALING, SUBSEQUENT ENCOUNTER: ICD-10-CM

## 2019-08-29 DIAGNOSIS — S82.841D CLOSED BIMALLEOLAR FRACTURE OF RIGHT ANKLE WITH ROUTINE HEALING, SUBSEQUENT ENCOUNTER: Primary | ICD-10-CM

## 2019-08-29 PROCEDURE — 73610 X-RAY EXAM OF ANKLE: CPT

## 2019-08-29 PROCEDURE — 99213 OFFICE O/P EST LOW 20 MIN: CPT | Performed by: ORTHOPAEDIC SURGERY

## 2019-08-29 NOTE — PROGRESS NOTES
ASSESSMENT/PLAN:    Diagnoses and all orders for this visit:    Closed bimalleolar fracture of right ankle with routine healing, subsequent encounter  -     XR ankle 3+ vw right; Future    General Discussions:  I would recommend follow-up as needed  I have encouraged her to contact me if questions or concerns arise  She may increase activity as tolerated  Her brace is no longer necessary  _____________________________________________________  CHIEF COMPLAINT:  Chief Complaint   Patient presents with    Right Ankle - Post-op, Pain    Post-op     pt here for a post op visit and states having mild pain to area  SUBJECTIVE:  Eric Esparza is a 61y o  year old female who presents for follow up of her right ankle  She is now little more than 3 months status post surgical fixation  She is doing well  She denies pain with any routine daily activities  She does note a little tenderness over the lateral incision  She continues to use her ankle brace  PAST MEDICAL HISTORY:  Past Medical History:   Diagnosis Date    [facility]     Cancer [facility]     kidney    Cardiac disease     fast heart beat    Chronic pain     Fractures     GERD (gastroesophageal reflux disease)     Hard of hearing     Hypertension     [facility]al cell carcinoma [facility]     [facility]     2009 affected her speech, right sided weakness    TIA (transient ischemic attack)        PAST SURGICAL HISTORY:  Past Surgical History:   Procedure Laterality Date    APPENDECTOMY      CHOLECYSTECTOMY      JOINT REPLACEMENT Bilateral      bilateral knees    NEPHRECTOMY Right     LA COLONOSCOPY FLX DX W/COLLJ SPEC WHEN PFRMD N/A 8/7/2018    Procedure: COLONOSCOPY;  Surgeon: Kelsea Ignacio MD;  Location: [facility] MAIN [facility];  Service: Gastroenterology    LA OPEN TREATMENT BIMALLEOLAR ANKLE FRACTURE Right 5/14/2019    Procedure: ANKLE - Bimalleolar OPEN REDUCTION W/ INTERNAL FIXATION (ORIF); Surgeon: Tacho Smith;   Location: [address] [place] [address] MAIN OR;  Service: Orthopedics       FAMILY HISTORY:  History reviewed  No pertinent family history  SOCIAL HISTORY:  Social History     Tobacco Use    Smoking status: Former Smoker     Packs/day: 0 25    Smokeless tobacco: Never Used   Substance Use Topics    Alcohol use: Not Currently    Drug use: Not Currently       MEDICATIONS:    Current Outpatient Medications:     albuterol (PROVENTIL HFA,VENTOLIN HFA) 90 mcg/act inhaler, Inhale 2 puffs every 6 (six) hours as needed for wheezing, Disp: 1 Inhaler, Rfl: 0    ALPRAZolam (XANAX) 1 mg tablet, Take 1 mg by mouth 4 (four) times a day , Disp: , Rfl:     carisoprodol (SOMA) 350 mg tablet, Take 350 mg by mouth as needed for muscle spasms  Pt takes 2-3 daily as needed, Disp: , Rfl:     docusate sodium (COLACE) 100 mg capsule, Take by mouth, Disp: , Rfl:     ergocalciferol (VITAMIN D2) 50,000 units, Take by mouth, Disp: , Rfl:     gabapentin (NEURONTIN) 800 mg tablet, Take 800 mg by mouth 4 (four) times a day as needed  , Disp: , Rfl:     HYDROcodone-acetaminophen (NORCO) 5-325 mg per tablet, 1-2 tabs p o  Q 6 hours p r n  Pain, Disp: 40 tablet, Rfl: 0    ketotifen (ZADITOR) 0 025 % ophthalmic solution, Apply to eye, Disp: , Rfl:     Lurasidone HCl (LATUDA) 60 MG TABS, Take 60 mg by mouth, Disp: , Rfl:     Melatonin 5 MG CAPS, Take by mouth, Disp: , Rfl:     metoprolol succinate (TOPROL-XL) 100 mg 24 hr tablet, Take 100 mg by mouth , Disp: , Rfl:     pantoprazole (PROTONIX) 40 mg tablet, Take 40 mg by mouth 2 (two) times a day , Disp: , Rfl:     QUEtiapine (SEROquel) 100 mg tablet, Take 100 mg by mouth daily at bedtime  , Disp: , Rfl:     ALLERGIES:  Allergies   Allergen Reactions    Celecoxib Rash and Other (See Comments)     CELEBREX    Doxazosin Rash and Hives    Metaxalone Rash and Hives       REVIEW OF SYSTEMS:  Pertinent items are noted in HPI    A comprehensive review of systems was negative       _____________________________________________________  PHYSICAL EXAMINATION:  General: alert, oriented times 3 and appears comfortable  Cardiovascular:  Regular  Pulmonary: No wheezing or stridor  Skin:  No lacerations or abrasions  Well-healed surgical incisions right ankle  Neurovascular: Motor and sensory exams are grossly intact  Pulses are palpable  MUSCULOSKELETAL EXAMINATION:  The right ankle exam demonstrates mild tenderness over the distal portion of the lateral ankle incision  There is no tenderness more proximally  There is no tenderness medially  She has excellent ankle range of motion without complaints  There is no complaint with subtalar or forefoot motion  She has no complaints during eversion or inversion stress  The remainder of the lower extremity examination, bilaterally, is grossly benign  _____________________________________________________  STUDIES REVIEWED:  X-rays demonstrate stable fracture alignment with excellent progression of healing  The fracture line does still remain somewhat visible at the medial aspect of the medial malleolar fracture            Alexandra Yoo

## 2019-09-23 ENCOUNTER — TRANSCRIBE ORDERS (OUTPATIENT)
Dept: ADMINISTRATIVE | Facility: HOSPITAL | Age: 64
End: 2019-09-23

## 2019-09-23 ENCOUNTER — APPOINTMENT (OUTPATIENT)
Dept: LAB | Facility: MEDICAL CENTER | Age: 64
End: 2019-09-23
Payer: COMMERCIAL

## 2019-09-23 DIAGNOSIS — Z13.89 ENCOUNTER FOR ROUTINE SCREENING FOR MALFORMATION USING ULTRASONICS: ICD-10-CM

## 2019-09-23 DIAGNOSIS — Z13.89 ENCOUNTER FOR ROUTINE SCREENING FOR MALFORMATION USING ULTRASONICS: Primary | ICD-10-CM

## 2019-09-23 LAB
BUN SERPL-MCNC: 13 MG/DL (ref 5–25)
CREAT SERPL-MCNC: 1.15 MG/DL (ref 0.6–1.3)
GFR SERPL CREATININE-BSD FRML MDRD: 51 ML/MIN/1.73SQ M

## 2019-09-23 PROCEDURE — 82565 ASSAY OF CREATININE: CPT

## 2019-09-23 PROCEDURE — 84520 ASSAY OF UREA NITROGEN: CPT

## 2019-09-23 PROCEDURE — 36415 COLL VENOUS BLD VENIPUNCTURE: CPT

## 2020-09-03 ENCOUNTER — OFFICE VISIT (OUTPATIENT)
Dept: FAMILY MEDICINE CLINIC | Facility: CLINIC | Age: 65
End: 2020-09-03
Payer: COMMERCIAL

## 2020-09-03 VITALS
HEART RATE: 70 BPM | SYSTOLIC BLOOD PRESSURE: 144 MMHG | HEIGHT: 64 IN | TEMPERATURE: 98.7 F | WEIGHT: 183 LBS | DIASTOLIC BLOOD PRESSURE: 92 MMHG | OXYGEN SATURATION: 92 % | BODY MASS INDEX: 31.24 KG/M2

## 2020-09-03 DIAGNOSIS — M25.511 RIGHT SHOULDER PAIN, UNSPECIFIED CHRONICITY: ICD-10-CM

## 2020-09-03 DIAGNOSIS — R60.0 EDEMA OF BOTH LOWER EXTREMITIES: Primary | ICD-10-CM

## 2020-09-03 PROCEDURE — T1015 CLINIC SERVICE: HCPCS | Performed by: FAMILY MEDICINE

## 2020-09-03 RX ORDER — TRAMADOL HYDROCHLORIDE 50 MG/1
100 TABLET ORAL EVERY 6 HOURS PRN
COMMUNITY
End: 2020-10-16 | Stop reason: HOSPADM

## 2020-09-03 RX ORDER — FUROSEMIDE 20 MG/1
20 TABLET ORAL DAILY
Qty: 10 TABLET | Refills: 0 | Status: SHIPPED | OUTPATIENT
Start: 2020-09-03 | End: 2020-09-25 | Stop reason: SDUPTHER

## 2020-09-04 ENCOUNTER — TELEPHONE (OUTPATIENT)
Dept: FAMILY MEDICINE CLINIC | Facility: CLINIC | Age: 65
End: 2020-09-04

## 2020-09-04 ENCOUNTER — LAB (OUTPATIENT)
Dept: LAB | Facility: MEDICAL CENTER | Age: 65
End: 2020-09-04
Payer: COMMERCIAL

## 2020-09-04 ENCOUNTER — APPOINTMENT (OUTPATIENT)
Dept: RADIOLOGY | Facility: MEDICAL CENTER | Age: 65
End: 2020-09-04
Payer: COMMERCIAL

## 2020-09-04 DIAGNOSIS — R60.0 EDEMA OF BOTH LOWER EXTREMITIES: ICD-10-CM

## 2020-09-04 DIAGNOSIS — M25.511 RIGHT SHOULDER PAIN, UNSPECIFIED CHRONICITY: ICD-10-CM

## 2020-09-04 LAB
ALBUMIN SERPL BCP-MCNC: 3.3 G/DL (ref 3.5–5)
ALP SERPL-CCNC: 132 U/L (ref 46–116)
ALT SERPL W P-5'-P-CCNC: 11 U/L (ref 12–78)
ANION GAP SERPL CALCULATED.3IONS-SCNC: 3 MMOL/L (ref 4–13)
AST SERPL W P-5'-P-CCNC: 15 U/L (ref 5–45)
BILIRUB SERPL-MCNC: 0.33 MG/DL (ref 0.2–1)
BUN SERPL-MCNC: 11 MG/DL (ref 5–25)
CALCIUM SERPL-MCNC: 9.1 MG/DL (ref 8.3–10.1)
CHLORIDE SERPL-SCNC: 105 MMOL/L (ref 100–108)
CHOLEST SERPL-MCNC: 201 MG/DL (ref 50–200)
CO2 SERPL-SCNC: 28 MMOL/L (ref 21–32)
CREAT SERPL-MCNC: 0.97 MG/DL (ref 0.6–1.3)
GFR SERPL CREATININE-BSD FRML MDRD: 62 ML/MIN/1.73SQ M
GLUCOSE P FAST SERPL-MCNC: 114 MG/DL (ref 65–99)
HDLC SERPL-MCNC: 39 MG/DL
LDLC SERPL CALC-MCNC: 123 MG/DL (ref 0–100)
NONHDLC SERPL-MCNC: 162 MG/DL
NT-PROBNP SERPL-MCNC: 373 PG/ML
POTASSIUM SERPL-SCNC: 4.4 MMOL/L (ref 3.5–5.3)
PROT SERPL-MCNC: 7.4 G/DL (ref 6.4–8.2)
SODIUM SERPL-SCNC: 136 MMOL/L (ref 136–145)
TRIGL SERPL-MCNC: 196 MG/DL
TSH SERPL DL<=0.05 MIU/L-ACNC: 0.55 UIU/ML (ref 0.36–3.74)

## 2020-09-04 PROCEDURE — 83880 ASSAY OF NATRIURETIC PEPTIDE: CPT

## 2020-09-04 PROCEDURE — 36415 COLL VENOUS BLD VENIPUNCTURE: CPT

## 2020-09-04 PROCEDURE — 84443 ASSAY THYROID STIM HORMONE: CPT

## 2020-09-04 PROCEDURE — 80053 COMPREHEN METABOLIC PANEL: CPT

## 2020-09-04 PROCEDURE — 73030 X-RAY EXAM OF SHOULDER: CPT

## 2020-09-04 PROCEDURE — 80061 LIPID PANEL: CPT

## 2020-09-06 DIAGNOSIS — E78.5 HYPERLIPIDEMIA, UNSPECIFIED HYPERLIPIDEMIA TYPE: Primary | ICD-10-CM

## 2020-09-06 RX ORDER — ATORVASTATIN CALCIUM 20 MG/1
20 TABLET, FILM COATED ORAL DAILY
Qty: 90 TABLET | Refills: 1 | Status: SHIPPED | OUTPATIENT
Start: 2020-09-06

## 2020-09-06 NOTE — PROGRESS NOTES
Assessment/Plan:    Carito Will is a 59year old female with past medical history of GERD, hypertension, renal cell carcinoma status post left nephrectomy 8 years ago per patient, history of sinus tachycardia, bipolar disorder, right ankle bimalleolar fracture s/p ORIF in May 2019 presents today complaining of bilateral lower extremity edema and right shoulder pain  On physical exam b/l trace non pitting edema  BNP mildly elevated at 373  Renal function WNL  Advised low salt diet  /92 mmHg  Prescribed 3 days of Lasix 20 mg PO daily  Clinically patient does not appear volume overloaded  For her right shoulder pain, suspect rotator cuff impingement  Will obtain X-ray of right shoulder since there is history of fall/injury  Referred for physical therapy  F/u in 2 weeks  Case d/w Dr Isabel Gallagher  Diagnoses and all orders for this visit:    Edema of both lower extremities  -     NT-BNP PRO; Future  -     Comprehensive metabolic panel; Future  -     TSH, 3rd generation with Free T4 reflex; Future  -     Lipid panel; Future  -     furosemide (LASIX) 20 mg tablet; Take 1 tablet (20 mg total) by mouth daily    Right shoulder pain, unspecified chronicity  -     XR shoulder 2+ vw right; Future  -     Ambulatory referral to Physical Therapy; Future    Other orders  -     traMADol (ULTRAM) 50 mg tablet; Take 100 mg by mouth every 6 (six) hours as needed for moderate pain        Subjective:      Patient ID: Nusrat Luis is a 59 y o  female  HPI     Patient is a 59years old female with past medical history of GERD, hypertension, renal cell carcinoma status post left nephrectomy 8 years ago per patient, history of sinus tachycardia, bipolar disorder, right ankle bimalleolar fracture s/p ORIF in May 2019 presents today complaining of bilateral lower extremity edema for past couple weeks  Patient denies any chest pain, short of breath, orthopnea  Patient denies any history of heart failure    Patient has been taking her mother's thiazide for past 4 days that helped with some of her leg swelling  Patient was incarcerated from February to August 2020  Patient also reports 3 months duration of right shoulder pain  Patient had a fall 9 months ago that landed on her right shoulder  Patient states she is unable to lift up her shoulder  Denied any numbness or tingling of RUE  Pain localized at right shoulder without radiation  The following portions of the patient's history were reviewed and updated as appropriate: allergies, current medications, past family history, past medical history, past social history, past surgical history and problem list     Review of Systems   Constitutional: Negative for chills and fever  HENT: Negative for congestion, rhinorrhea and sore throat  Eyes: Negative for visual disturbance  Respiratory: Negative for cough, chest tightness, shortness of breath and wheezing  Cardiovascular: Positive for leg swelling  Negative for chest pain and palpitations  Gastrointestinal: Negative for abdominal pain, nausea and vomiting  Genitourinary: Negative for dysuria  Musculoskeletal: Positive for arthralgias (RIght shoulder pain)  Negative for back pain  Skin: Negative for color change  Neurological: Negative for dizziness, numbness and headaches  Hematological: Does not bruise/bleed easily  Psychiatric/Behavioral: Negative for confusion  Objective:      /92   Pulse 70   Temp 98 7 °F (37 1 °C) (Tympanic)   Ht 5' 4" (1 626 m)   Wt 83 kg (183 lb)   SpO2 92%   BMI 31 41 kg/m²          Physical Exam  Vitals signs and nursing note reviewed  Constitutional:       General: She is not in acute distress  Appearance: She is well-developed  HENT:      Head: Normocephalic  Mouth/Throat:      Pharynx: No oropharyngeal exudate  Eyes:      General: No scleral icterus  Right eye: No discharge  Left eye: No discharge        Conjunctiva/sclera: Conjunctivae normal    Neck:      Musculoskeletal: Normal range of motion  Cardiovascular:      Rate and Rhythm: Normal rate and regular rhythm  Heart sounds: Normal heart sounds  No murmur  Pulmonary:      Effort: Pulmonary effort is normal  No respiratory distress  Breath sounds: Normal breath sounds  No wheezing  Abdominal:      General: Bowel sounds are normal  There is no distension  Palpations: Abdomen is soft  Tenderness: There is no abdominal tenderness  Musculoskeletal:         General: Tenderness present  Comments: Trace nonpitting edema in b/l LE  Right shoulder exam: Tenderness elicited upon shoulder abduction at 60 degree  Positive Hawkin's sign  Positive Neer's sign  Positive Cross body abduction test    Lymphadenopathy:      Cervical: No cervical adenopathy  Skin:     Findings: No erythema  Neurological:      Mental Status: She is alert and oriented to person, place, and time     Psychiatric:         Behavior: Behavior normal

## 2020-09-07 NOTE — RESULT ENCOUNTER NOTE
Attempted to call patient to discuss lab results with no answered  ASCVD risk of 9 9%  Would suggest to initiate moderate intensity statin Lipitor 20 mg PO daily  Thanks

## 2020-09-14 ENCOUNTER — OFFICE VISIT (OUTPATIENT)
Dept: FAMILY MEDICINE CLINIC | Facility: CLINIC | Age: 65
End: 2020-09-14
Payer: COMMERCIAL

## 2020-09-14 VITALS
HEART RATE: 76 BPM | WEIGHT: 184 LBS | SYSTOLIC BLOOD PRESSURE: 140 MMHG | BODY MASS INDEX: 31.41 KG/M2 | OXYGEN SATURATION: 97 % | RESPIRATION RATE: 18 BRPM | HEIGHT: 64 IN | TEMPERATURE: 97.6 F | DIASTOLIC BLOOD PRESSURE: 94 MMHG

## 2020-09-14 DIAGNOSIS — I10 ESSENTIAL HYPERTENSION: Primary | ICD-10-CM

## 2020-09-14 PROCEDURE — 99213 OFFICE O/P EST LOW 20 MIN: CPT | Performed by: FAMILY MEDICINE

## 2020-09-14 PROCEDURE — T1015 CLINIC SERVICE: HCPCS | Performed by: FAMILY MEDICINE

## 2020-09-14 RX ORDER — AMLODIPINE BESYLATE 5 MG/1
5 TABLET ORAL DAILY
Qty: 30 TABLET | Refills: 1 | Status: SHIPPED | OUTPATIENT
Start: 2020-09-14 | End: 2020-10-15

## 2020-09-14 NOTE — PROGRESS NOTES
Assessment/Plan:     Diagnoses and all orders for this visit:    Essential hypertension  -     amLODIPine (NORVASC) 5 mg tablet; Take 1 tablet (5 mg total) by mouth daily        She will start the Lasix as directed at last appt  Start Amlodipine 5 mg daily  Start Lipitor as ordered  RTC 10 days for b/p check with MA  RTC 4 weeks with PCP          Subjective:        Patient ID: Ric Lopez is a 59 y o  female  Chief Complaint   Patient presents with    Follow-up     Patient is here for regular check up and blood work results  60 yo female presents for follow up  Recent labs reviewed with her  Continues with edema but did not start the lasix that was ordered for her  B/P continues to be elevated  The following portions of the patient's history were reviewed and updated as appropriate: allergies, current medications, past family history, past medical history, past social history, past surgical history and problem list     Patient Active Problem List   Diagnosis    [facility]    [facility]    GERD (gastroesophageal reflux disease)    Colon cancer screening    Acute right ankle pain    Closed bimalleolar fracture of right ankle    Essential hypertension    History of tachycardia    S/P ORIF (open reduction internal fixation) fracture       Current Outpatient Medications   Medication Sig Dispense Refill    albuterol (PROVENTIL HFA,VENTOLIN HFA) 90 mcg/act inhaler Inhale 2 puffs every 6 (six) hours as needed for wheezing 1 Inhaler 0    ALPRAZolam (XANAX) 1 mg tablet Take 1 mg by mouth 4 (four) times a day   amLODIPine (NORVASC) 5 mg tablet Take 1 tablet (5 mg total) by mouth daily 30 tablet 1    atorvastatin (LIPITOR) 20 mg tablet Take 1 tablet (20 mg total) by mouth daily 90 tablet 1    carisoprodol (SOMA) 350 mg tablet Take 350 mg by mouth as needed for muscle spasms   Pt takes 2-3 daily as needed      docusate sodium (COLACE) 100 mg capsule Take by mouth      ergocalciferol (VITAMIN D2) 50,000 units Take by mouth      furosemide (LASIX) 20 mg tablet Take 1 tablet (20 mg total) by mouth daily 10 tablet 0    gabapentin (NEURONTIN) 800 mg tablet Take 800 mg by mouth 4 (four) times a day as needed   ketotifen (ZADITOR) 0 025 % ophthalmic solution Apply to eye      Lurasidone HCl (LATUDA) 60 MG TABS Take 60 mg by mouth      Melatonin 5 MG CAPS Take by mouth      metoprolol succinate (TOPROL-XL) 100 mg 24 hr tablet Take 100 mg by mouth   pantoprazole (PROTONIX) 40 mg tablet Take 40 mg by mouth 2 (two) times a day   QUEtiapine (SEROquel) 100 mg tablet Take 100 mg by mouth daily at bedtime   traMADol (ULTRAM) 50 mg tablet Take 100 mg by mouth every 6 (six) hours as needed for moderate pain       No current facility-administered medications for this visit  Past Medical History:   Diagnosis Date    [facility] disorder ([facility] )     Cancer ([facility] )     kidney    Cardiac disease     fast heart beat    Chronic pain     Fractures     GERD (gastroesophageal reflux disease)     Hard of hearing     Hypertension     Renal cell carcinoma ([facility] )     Stroke ([facility] )     2009 affected her speech, right sided weakness    TIA (transient ischemic attack)         Past Surgical History:   Procedure Laterality Date    APPENDECTOMY      CHOLECYSTECTOMY      JOINT REPLACEMENT Bilateral      bilateral knees    NEPHRECTOMY Right     IN COLONOSCOPY FLX DX W/COLLJ SPEC WHEN PFRMD N/A 8/7/2018    Procedure: COLONOSCOPY;  Surgeon: Marisol Gomez MD;  Location: [facility] MA[facility];  Service: Gastroenterology    IN OPEN TREATMENT BIMALLEOLAR ANKLE FRACTURE Right 5/14/2019    Procedure: ANKLE - Bimalleolar OPEN REDUCTION W/ INTERNAL FIXATION (ORIF); Surgeon: Aniceto Carroll;   Location: [facility] MAIN OR;  Service: Orthopedics        Social History     Socioeconomic History    Marital status: Single     Spouse name: Not on file    Number of children: Not on file    Years of education: Not on file    Highest education level: Not on file   Occupational History    Not on file   Social Needs    Financial resource strain: Not on file    Food insecurity     Worry: Not on file     Inability: Not on file    Transportation needs     Medical: Not on file     Non-medical: Not on file   Tobacco Use    Smoking status: Former Smoker     Packs/day: 0 25    Smokeless tobacco: Never Used   Substance and Sexual Activity    Alcohol use: Not Currently    Drug use: Not Currently    Sexual activity: Not Currently   Lifestyle    Physical activity     Days per week: Not on file     Minutes per session: Not on file    Stress: Not on file   Relationships    Social connections     Talks on phone: Not on file     Gets together: Not on file     Attends [employer] service: Not on file     Active member of club or organization: Not on file     Attends meetings of clubs or organizations: Not on file     Relationship status: Not on file    Intimate partner violence     Fear of current or ex partner: Not on file     Emotionally abused: Not on file     Physically abused: Not on file     Forced sexual activity: Not on file   Other Topics Concern    Not on file   Social History Narrative    Not on file        Review of Systems   Constitutional: Negative  HENT: Negative  Eyes: Negative  Respiratory: Negative  Cardiovascular: Positive for leg swelling  Psychiatric/Behavioral: Negative  Objective:      /94   Pulse 76   Temp 97 6 °F (36 4 °C)   Resp 18   Ht 5' 4" (1 626 m)   Wt 83 5 kg (184 lb)   SpO2 97%   BMI 31 58 kg/m²          Physical Exam  Vitals signs and nursing note reviewed  Constitutional:       Appearance: Normal appearance  HENT:      Head: Normocephalic and atraumatic  Right Ear: External ear normal       Left Ear: External ear normal    Eyes:      Extraocular Movements: Extraocular movements intact        Conjunctiva/sclera: Conjunctivae normal  Neck:      Musculoskeletal: Normal range of motion and neck supple  Cardiovascular:      Rate and Rhythm: Normal rate and regular rhythm  Heart sounds: Normal heart sounds  Pulmonary:      Effort: Pulmonary effort is normal       Breath sounds: Normal breath sounds  Musculoskeletal: Normal range of motion  Comments: Trace non pitting edema in both lower extremities  Skin:     General: Skin is warm and dry  Neurological:      Mental Status: She is alert and oriented to person, place, and time     Psychiatric:         Mood and Affect: Mood normal          Behavior: Behavior normal

## 2020-09-25 DIAGNOSIS — R60.0 EDEMA OF BOTH LOWER EXTREMITIES: ICD-10-CM

## 2020-09-25 RX ORDER — FUROSEMIDE 20 MG/1
20 TABLET ORAL DAILY
Qty: 10 TABLET | Refills: 0 | Status: SHIPPED | OUTPATIENT
Start: 2020-09-25 | End: 2020-10-16 | Stop reason: HOSPADM

## 2020-10-15 ENCOUNTER — APPOINTMENT (EMERGENCY)
Dept: RADIOLOGY | Facility: HOSPITAL | Age: 65
End: 2020-10-15
Payer: COMMERCIAL

## 2020-10-15 ENCOUNTER — APPOINTMENT (OUTPATIENT)
Dept: MRI IMAGING | Facility: HOSPITAL | Age: 65
End: 2020-10-15
Payer: COMMERCIAL

## 2020-10-15 ENCOUNTER — TELEPHONE (OUTPATIENT)
Dept: FAMILY MEDICINE CLINIC | Facility: CLINIC | Age: 65
End: 2020-10-15

## 2020-10-15 ENCOUNTER — HOSPITAL ENCOUNTER (OUTPATIENT)
Facility: HOSPITAL | Age: 65
Setting detail: OBSERVATION
Discharge: HOME | End: 2020-10-16
Attending: EMERGENCY MEDICINE | Admitting: FAMILY MEDICINE
Payer: COMMERCIAL

## 2020-10-15 ENCOUNTER — APPOINTMENT (EMERGENCY)
Dept: CT IMAGING | Facility: HOSPITAL | Age: 65
End: 2020-10-15
Payer: COMMERCIAL

## 2020-10-15 DIAGNOSIS — R47.81 SLURRED SPEECH: Primary | ICD-10-CM

## 2020-10-15 DIAGNOSIS — F31.9 BIPOLAR 1 DISORDER (HCC): ICD-10-CM

## 2020-10-15 LAB
ALBUMIN SERPL BCP-MCNC: 2.9 G/DL (ref 3.5–5)
ALP SERPL-CCNC: 154 U/L (ref 46–116)
ALT SERPL W P-5'-P-CCNC: 27 U/L (ref 12–78)
AMPHETAMINES SERPL QL SCN: NEGATIVE
ANION GAP SERPL CALCULATED.3IONS-SCNC: 5 MMOL/L (ref 4–13)
APTT PPP: 30 SECONDS (ref 23–37)
AST SERPL W P-5'-P-CCNC: 52 U/L (ref 5–45)
BARBITURATES UR QL: NEGATIVE
BASOPHILS # BLD AUTO: 0.04 THOUSANDS/ΜL (ref 0–0.1)
BASOPHILS NFR BLD AUTO: 1 % (ref 0–1)
BENZODIAZ UR QL: POSITIVE
BILIRUB SERPL-MCNC: 0.3 MG/DL (ref 0.2–1)
BILIRUB UR QL STRIP: NEGATIVE
BUN SERPL-MCNC: 11 MG/DL (ref 5–25)
CALCIUM ALBUM COR SERPL-MCNC: 9.6 MG/DL (ref 8.3–10.1)
CALCIUM SERPL-MCNC: 8.7 MG/DL (ref 8.3–10.1)
CHLORIDE SERPL-SCNC: 104 MMOL/L (ref 100–108)
CLARITY UR: CLEAR
CO2 SERPL-SCNC: 29 MMOL/L (ref 21–32)
COCAINE UR QL: NEGATIVE
COLOR UR: YELLOW
CREAT SERPL-MCNC: 1.03 MG/DL (ref 0.6–1.3)
EOSINOPHIL # BLD AUTO: 0.13 THOUSAND/ΜL (ref 0–0.61)
EOSINOPHIL NFR BLD AUTO: 3 % (ref 0–6)
ERYTHROCYTE [DISTWIDTH] IN BLOOD BY AUTOMATED COUNT: 13.1 % (ref 11.6–15.1)
GFR SERPL CREATININE-BSD FRML MDRD: 58 ML/MIN/1.73SQ M
GLUCOSE SERPL-MCNC: 97 MG/DL (ref 65–140)
GLUCOSE UR STRIP-MCNC: NEGATIVE MG/DL
HCT VFR BLD AUTO: 34.9 % (ref 34.8–46.1)
HGB BLD-MCNC: 11.3 G/DL (ref 11.5–15.4)
HGB UR QL STRIP.AUTO: NEGATIVE
IMM GRANULOCYTES # BLD AUTO: 0.02 THOUSAND/UL (ref 0–0.2)
IMM GRANULOCYTES NFR BLD AUTO: 1 % (ref 0–2)
INR PPP: 1.06 (ref 0.84–1.19)
KETONES UR STRIP-MCNC: NEGATIVE MG/DL
LEUKOCYTE ESTERASE UR QL STRIP: NEGATIVE
LIPASE SERPL-CCNC: 42 U/L (ref 73–393)
LYMPHOCYTES # BLD AUTO: 1.17 THOUSANDS/ΜL (ref 0.6–4.47)
LYMPHOCYTES NFR BLD AUTO: 27 % (ref 14–44)
MAGNESIUM SERPL-MCNC: 2 MG/DL (ref 1.6–2.6)
MCH RBC QN AUTO: 33.4 PG (ref 26.8–34.3)
MCHC RBC AUTO-ENTMCNC: 32.4 G/DL (ref 31.4–37.4)
MCV RBC AUTO: 103 FL (ref 82–98)
METHADONE UR QL: NEGATIVE
MONOCYTES # BLD AUTO: 0.4 THOUSAND/ΜL (ref 0.17–1.22)
MONOCYTES NFR BLD AUTO: 9 % (ref 4–12)
NEUTROPHILS # BLD AUTO: 2.58 THOUSANDS/ΜL (ref 1.85–7.62)
NEUTS SEG NFR BLD AUTO: 59 % (ref 43–75)
NITRITE UR QL STRIP: NEGATIVE
NRBC BLD AUTO-RTO: 0 /100 WBCS
NT-PROBNP SERPL-MCNC: 367 PG/ML
OPIATES UR QL SCN: POSITIVE
OXYCODONE+OXYMORPHONE UR QL SCN: NEGATIVE
PCP UR QL: NEGATIVE
PH UR STRIP.AUTO: 7 [PH]
PLATELET # BLD AUTO: 234 THOUSANDS/UL (ref 149–390)
PLATELET # BLD AUTO: 247 THOUSANDS/UL (ref 149–390)
PMV BLD AUTO: 8.4 FL (ref 8.9–12.7)
PMV BLD AUTO: 8.4 FL (ref 8.9–12.7)
POTASSIUM SERPL-SCNC: 4 MMOL/L (ref 3.5–5.3)
PROT SERPL-MCNC: 6.7 G/DL (ref 6.4–8.2)
PROT UR STRIP-MCNC: NEGATIVE MG/DL
PROTHROMBIN TIME: 13.6 SECONDS (ref 11.6–14.5)
RBC # BLD AUTO: 3.38 MILLION/UL (ref 3.81–5.12)
SODIUM SERPL-SCNC: 138 MMOL/L (ref 136–145)
SP GR UR STRIP.AUTO: 1.01 (ref 1–1.03)
THC UR QL: NEGATIVE
TROPONIN I SERPL-MCNC: <0.02 NG/ML
UROBILINOGEN UR QL STRIP.AUTO: 0.2 E.U./DL
WBC # BLD AUTO: 4.34 THOUSAND/UL (ref 4.31–10.16)

## 2020-10-15 PROCEDURE — 70450 CT HEAD/BRAIN W/O DYE: CPT

## 2020-10-15 PROCEDURE — 81003 URINALYSIS AUTO W/O SCOPE: CPT | Performed by: EMERGENCY MEDICINE

## 2020-10-15 PROCEDURE — 84484 ASSAY OF TROPONIN QUANT: CPT | Performed by: EMERGENCY MEDICINE

## 2020-10-15 PROCEDURE — 71045 X-RAY EXAM CHEST 1 VIEW: CPT

## 2020-10-15 PROCEDURE — 85025 COMPLETE CBC W/AUTO DIFF WBC: CPT | Performed by: EMERGENCY MEDICINE

## 2020-10-15 PROCEDURE — G1004 CDSM NDSC: HCPCS

## 2020-10-15 PROCEDURE — 80053 COMPREHEN METABOLIC PANEL: CPT | Performed by: EMERGENCY MEDICINE

## 2020-10-15 PROCEDURE — 83735 ASSAY OF MAGNESIUM: CPT | Performed by: EMERGENCY MEDICINE

## 2020-10-15 PROCEDURE — 93005 ELECTROCARDIOGRAM TRACING: CPT

## 2020-10-15 PROCEDURE — 99285 EMERGENCY DEPT VISIT HI MDM: CPT | Performed by: EMERGENCY MEDICINE

## 2020-10-15 PROCEDURE — 85730 THROMBOPLASTIN TIME PARTIAL: CPT | Performed by: EMERGENCY MEDICINE

## 2020-10-15 PROCEDURE — 99285 EMERGENCY DEPT VISIT HI MDM: CPT

## 2020-10-15 PROCEDURE — 70551 MRI BRAIN STEM W/O DYE: CPT

## 2020-10-15 PROCEDURE — 83880 ASSAY OF NATRIURETIC PEPTIDE: CPT | Performed by: EMERGENCY MEDICINE

## 2020-10-15 PROCEDURE — 85049 AUTOMATED PLATELET COUNT: CPT | Performed by: STUDENT IN AN ORGANIZED HEALTH CARE EDUCATION/TRAINING PROGRAM

## 2020-10-15 PROCEDURE — 36415 COLL VENOUS BLD VENIPUNCTURE: CPT | Performed by: EMERGENCY MEDICINE

## 2020-10-15 PROCEDURE — 80307 DRUG TEST PRSMV CHEM ANLYZR: CPT | Performed by: EMERGENCY MEDICINE

## 2020-10-15 PROCEDURE — 85610 PROTHROMBIN TIME: CPT | Performed by: EMERGENCY MEDICINE

## 2020-10-15 PROCEDURE — 83690 ASSAY OF LIPASE: CPT | Performed by: EMERGENCY MEDICINE

## 2020-10-15 PROCEDURE — 99220 PR INITIAL OBSERVATION CARE/DAY 70 MINUTES: CPT | Performed by: FAMILY MEDICINE

## 2020-10-15 RX ORDER — SODIUM CHLORIDE 9 MG/ML
3 INJECTION INTRAVENOUS
Status: DISCONTINUED | OUTPATIENT
Start: 2020-10-15 | End: 2020-10-16 | Stop reason: HOSPADM

## 2020-10-15 RX ORDER — RISPERIDONE 2 MG/1
2 TABLET, FILM COATED ORAL DAILY
COMMUNITY
End: 2020-10-16 | Stop reason: HOSPADM

## 2020-10-15 RX ORDER — ACETAMINOPHEN 325 MG/1
650 TABLET ORAL EVERY 6 HOURS PRN
Status: DISCONTINUED | OUTPATIENT
Start: 2020-10-15 | End: 2020-10-16 | Stop reason: HOSPADM

## 2020-10-15 RX ORDER — HYDROCODONE BITARTRATE AND ACETAMINOPHEN 5; 325 MG/1; MG/1
1 TABLET ORAL EVERY 6 HOURS PRN
COMMUNITY
End: 2022-07-26

## 2020-10-15 RX ORDER — ASPIRIN 81 MG/1
81 TABLET, CHEWABLE ORAL DAILY
Status: DISCONTINUED | OUTPATIENT
Start: 2020-10-15 | End: 2020-10-16 | Stop reason: HOSPADM

## 2020-10-15 RX ORDER — LIDOCAINE 50 MG/G
1 PATCH TOPICAL DAILY
COMMUNITY

## 2020-10-15 RX ORDER — ATORVASTATIN CALCIUM 40 MG/1
40 TABLET, FILM COATED ORAL EVERY EVENING
Status: DISCONTINUED | OUTPATIENT
Start: 2020-10-15 | End: 2020-10-16 | Stop reason: HOSPADM

## 2020-10-15 RX ORDER — OLANZAPINE 20 MG/1
20 TABLET ORAL
COMMUNITY

## 2020-10-15 RX ORDER — AMLODIPINE BESYLATE 5 MG/1
5 TABLET ORAL DAILY
COMMUNITY

## 2020-10-15 RX ORDER — FUROSEMIDE 40 MG/1
20 TABLET ORAL DAILY
Status: CANCELLED | OUTPATIENT
Start: 2020-10-15

## 2020-10-15 RX ADMIN — SODIUM CHLORIDE 500 ML: 0.9 INJECTION, SOLUTION INTRAVENOUS at 11:22

## 2020-10-15 RX ADMIN — ATORVASTATIN CALCIUM 40 MG: 40 TABLET, FILM COATED ORAL at 18:10

## 2020-10-15 RX ADMIN — ASPIRIN 81 MG 81 MG: 81 TABLET ORAL at 15:56

## 2020-10-15 RX ADMIN — ENOXAPARIN SODIUM 40 MG: 40 INJECTION SUBCUTANEOUS at 15:56

## 2020-10-16 ENCOUNTER — APPOINTMENT (OUTPATIENT)
Dept: NON INVASIVE DIAGNOSTICS | Facility: HOSPITAL | Age: 65
End: 2020-10-16
Payer: COMMERCIAL

## 2020-10-16 VITALS
TEMPERATURE: 98.7 F | OXYGEN SATURATION: 94 % | RESPIRATION RATE: 18 BRPM | WEIGHT: 184.97 LBS | SYSTOLIC BLOOD PRESSURE: 129 MMHG | DIASTOLIC BLOOD PRESSURE: 76 MMHG | HEART RATE: 73 BPM | BODY MASS INDEX: 31.75 KG/M2

## 2020-10-16 LAB
ALBUMIN SERPL BCP-MCNC: 2.9 G/DL (ref 3.5–5)
ALP SERPL-CCNC: 151 U/L (ref 46–116)
ALT SERPL W P-5'-P-CCNC: 27 U/L (ref 12–78)
ANION GAP SERPL CALCULATED.3IONS-SCNC: 10 MMOL/L (ref 4–13)
AST SERPL W P-5'-P-CCNC: 36 U/L (ref 5–45)
ATRIAL RATE: 66 BPM
BASOPHILS # BLD AUTO: 0.05 THOUSANDS/ΜL (ref 0–0.1)
BASOPHILS NFR BLD AUTO: 1 % (ref 0–1)
BILIRUB SERPL-MCNC: 0.3 MG/DL (ref 0.2–1)
BUN SERPL-MCNC: 9 MG/DL (ref 5–25)
CALCIUM ALBUM COR SERPL-MCNC: 9.7 MG/DL (ref 8.3–10.1)
CALCIUM SERPL-MCNC: 8.8 MG/DL (ref 8.3–10.1)
CHLORIDE SERPL-SCNC: 105 MMOL/L (ref 100–108)
CHOLEST SERPL-MCNC: 136 MG/DL (ref 50–200)
CO2 SERPL-SCNC: 26 MMOL/L (ref 21–32)
CREAT SERPL-MCNC: 0.72 MG/DL (ref 0.6–1.3)
EOSINOPHIL # BLD AUTO: 0.08 THOUSAND/ΜL (ref 0–0.61)
EOSINOPHIL NFR BLD AUTO: 2 % (ref 0–6)
ERYTHROCYTE [DISTWIDTH] IN BLOOD BY AUTOMATED COUNT: 12.9 % (ref 11.6–15.1)
EST. AVERAGE GLUCOSE BLD GHB EST-MCNC: 105 MG/DL
GFR SERPL CREATININE-BSD FRML MDRD: 89 ML/MIN/1.73SQ M
GLUCOSE P FAST SERPL-MCNC: 91 MG/DL (ref 65–99)
GLUCOSE SERPL-MCNC: 91 MG/DL (ref 65–140)
HBA1C MFR BLD: 5.3 %
HCT VFR BLD AUTO: 36.1 % (ref 34.8–46.1)
HDLC SERPL-MCNC: 39 MG/DL
HGB BLD-MCNC: 11.7 G/DL (ref 11.5–15.4)
IMM GRANULOCYTES # BLD AUTO: 0.01 THOUSAND/UL (ref 0–0.2)
IMM GRANULOCYTES NFR BLD AUTO: 0 % (ref 0–2)
LDLC SERPL CALC-MCNC: 64 MG/DL (ref 0–100)
LYMPHOCYTES # BLD AUTO: 0.93 THOUSANDS/ΜL (ref 0.6–4.47)
LYMPHOCYTES NFR BLD AUTO: 25 % (ref 14–44)
MAGNESIUM SERPL-MCNC: 1.9 MG/DL (ref 1.6–2.6)
MCH RBC QN AUTO: 32.8 PG (ref 26.8–34.3)
MCHC RBC AUTO-ENTMCNC: 32.4 G/DL (ref 31.4–37.4)
MCV RBC AUTO: 101 FL (ref 82–98)
MONOCYTES # BLD AUTO: 0.29 THOUSAND/ΜL (ref 0.17–1.22)
MONOCYTES NFR BLD AUTO: 8 % (ref 4–12)
NEUTROPHILS # BLD AUTO: 2.35 THOUSANDS/ΜL (ref 1.85–7.62)
NEUTS SEG NFR BLD AUTO: 64 % (ref 43–75)
NRBC BLD AUTO-RTO: 0 /100 WBCS
P AXIS: 55 DEGREES
PHOSPHATE SERPL-MCNC: 3.2 MG/DL (ref 2.3–4.1)
PLATELET # BLD AUTO: 267 THOUSANDS/UL (ref 149–390)
PMV BLD AUTO: 8.5 FL (ref 8.9–12.7)
POTASSIUM SERPL-SCNC: 3.7 MMOL/L (ref 3.5–5.3)
PR INTERVAL: 188 MS
PROT SERPL-MCNC: 6.8 G/DL (ref 6.4–8.2)
QRS AXIS: 67 DEGREES
QRSD INTERVAL: 88 MS
QT INTERVAL: 416 MS
QTC INTERVAL: 436 MS
RBC # BLD AUTO: 3.57 MILLION/UL (ref 3.81–5.12)
SODIUM SERPL-SCNC: 141 MMOL/L (ref 136–145)
T WAVE AXIS: 52 DEGREES
TRIGL SERPL-MCNC: 164 MG/DL
VENTRICULAR RATE: 66 BPM
WBC # BLD AUTO: 3.71 THOUSAND/UL (ref 4.31–10.16)

## 2020-10-16 PROCEDURE — 83735 ASSAY OF MAGNESIUM: CPT | Performed by: STUDENT IN AN ORGANIZED HEALTH CARE EDUCATION/TRAINING PROGRAM

## 2020-10-16 PROCEDURE — 97163 PT EVAL HIGH COMPLEX 45 MIN: CPT

## 2020-10-16 PROCEDURE — 85025 COMPLETE CBC W/AUTO DIFF WBC: CPT | Performed by: STUDENT IN AN ORGANIZED HEALTH CARE EDUCATION/TRAINING PROGRAM

## 2020-10-16 PROCEDURE — 80053 COMPREHEN METABOLIC PANEL: CPT | Performed by: STUDENT IN AN ORGANIZED HEALTH CARE EDUCATION/TRAINING PROGRAM

## 2020-10-16 PROCEDURE — 84100 ASSAY OF PHOSPHORUS: CPT | Performed by: STUDENT IN AN ORGANIZED HEALTH CARE EDUCATION/TRAINING PROGRAM

## 2020-10-16 PROCEDURE — 93306 TTE W/DOPPLER COMPLETE: CPT

## 2020-10-16 PROCEDURE — 80061 LIPID PANEL: CPT | Performed by: STUDENT IN AN ORGANIZED HEALTH CARE EDUCATION/TRAINING PROGRAM

## 2020-10-16 PROCEDURE — 83036 HEMOGLOBIN GLYCOSYLATED A1C: CPT | Performed by: STUDENT IN AN ORGANIZED HEALTH CARE EDUCATION/TRAINING PROGRAM

## 2020-10-16 PROCEDURE — 93306 TTE W/DOPPLER COMPLETE: CPT | Performed by: INTERNAL MEDICINE

## 2020-10-16 PROCEDURE — 97167 OT EVAL HIGH COMPLEX 60 MIN: CPT

## 2020-10-16 PROCEDURE — 93010 ELECTROCARDIOGRAM REPORT: CPT | Performed by: INTERNAL MEDICINE

## 2020-10-16 PROCEDURE — 99217 PR OBSERVATION CARE DISCHARGE MANAGEMENT: CPT | Performed by: FAMILY MEDICINE

## 2020-10-16 RX ORDER — AMLODIPINE BESYLATE 5 MG/1
5 TABLET ORAL DAILY
Status: DISCONTINUED | OUTPATIENT
Start: 2020-10-16 | End: 2020-10-16 | Stop reason: HOSPADM

## 2020-10-16 RX ORDER — ASPIRIN 81 MG/1
81 TABLET, CHEWABLE ORAL DAILY
Qty: 30 TABLET | Refills: 0 | Status: SHIPPED | OUTPATIENT
Start: 2020-10-17

## 2020-10-16 RX ORDER — METOPROLOL SUCCINATE 100 MG/1
200 TABLET, EXTENDED RELEASE ORAL DAILY
Status: DISCONTINUED | OUTPATIENT
Start: 2020-10-16 | End: 2020-10-16 | Stop reason: HOSPADM

## 2020-10-16 RX ORDER — LANOLIN ALCOHOL/MO/W.PET/CERES
3 CREAM (GRAM) TOPICAL
Status: DISCONTINUED | OUTPATIENT
Start: 2020-10-16 | End: 2020-10-16 | Stop reason: HOSPADM

## 2020-10-16 RX ORDER — OLANZAPINE 10 MG/1
20 TABLET ORAL
Status: DISCONTINUED | OUTPATIENT
Start: 2020-10-16 | End: 2020-10-16 | Stop reason: HOSPADM

## 2020-10-16 RX ORDER — QUETIAPINE FUMARATE 100 MG/1
300 TABLET, FILM COATED ORAL
Status: DISCONTINUED | OUTPATIENT
Start: 2020-10-16 | End: 2020-10-16 | Stop reason: HOSPADM

## 2020-10-16 RX ORDER — PANTOPRAZOLE SODIUM 40 MG/1
40 TABLET, DELAYED RELEASE ORAL
Status: DISCONTINUED | OUTPATIENT
Start: 2020-10-16 | End: 2020-10-16 | Stop reason: HOSPADM

## 2020-10-16 RX ADMIN — AMLODIPINE BESYLATE 5 MG: 5 TABLET ORAL at 08:49

## 2020-10-16 RX ADMIN — ENOXAPARIN SODIUM 40 MG: 40 INJECTION SUBCUTANEOUS at 08:49

## 2020-10-16 RX ADMIN — ASPIRIN 81 MG 81 MG: 81 TABLET ORAL at 08:49

## 2020-10-16 RX ADMIN — METOPROLOL SUCCINATE 200 MG: 100 TABLET, EXTENDED RELEASE ORAL at 08:49

## 2020-10-26 ENCOUNTER — OFFICE VISIT (OUTPATIENT)
Dept: FAMILY MEDICINE CLINIC | Facility: CLINIC | Age: 65
End: 2020-10-26
Payer: COMMERCIAL

## 2020-10-26 VITALS
DIASTOLIC BLOOD PRESSURE: 76 MMHG | OXYGEN SATURATION: 97 % | HEART RATE: 70 BPM | TEMPERATURE: 96.3 F | WEIGHT: 186.8 LBS | BODY MASS INDEX: 32.06 KG/M2 | SYSTOLIC BLOOD PRESSURE: 126 MMHG | RESPIRATION RATE: 20 BRPM

## 2020-10-26 DIAGNOSIS — R06.02 SOB (SHORTNESS OF BREATH): ICD-10-CM

## 2020-10-26 DIAGNOSIS — J40 BRONCHITIS: Primary | ICD-10-CM

## 2020-10-26 PROCEDURE — 99213 OFFICE O/P EST LOW 20 MIN: CPT | Performed by: FAMILY MEDICINE

## 2020-10-26 PROCEDURE — T1015 CLINIC SERVICE: HCPCS | Performed by: FAMILY MEDICINE

## 2020-10-26 RX ORDER — ALBUTEROL SULFATE 90 UG/1
2 AEROSOL, METERED RESPIRATORY (INHALATION) EVERY 6 HOURS PRN
Qty: 1 INHALER | Refills: 1 | Status: SHIPPED | OUTPATIENT
Start: 2020-10-26 | End: 2020-12-08

## 2020-10-26 RX ORDER — FUROSEMIDE 20 MG/1
20 TABLET ORAL 2 TIMES DAILY
COMMUNITY
End: 2021-06-21 | Stop reason: SDUPTHER

## 2020-10-26 RX ORDER — AMOXICILLIN AND CLAVULANATE POTASSIUM 875; 125 MG/1; MG/1
1 TABLET, FILM COATED ORAL 2 TIMES DAILY
Qty: 14 TABLET | Refills: 0 | Status: SHIPPED | OUTPATIENT
Start: 2020-10-26 | End: 2020-11-02

## 2020-11-13 ENCOUNTER — DOCUMENTATION (OUTPATIENT)
Dept: FAMILY MEDICINE CLINIC | Facility: CLINIC | Age: 65
End: 2020-11-13

## 2020-11-27 ENCOUNTER — TELEPHONE (OUTPATIENT)
Dept: FAMILY MEDICINE CLINIC | Facility: CLINIC | Age: 65
End: 2020-11-27

## 2020-12-08 DIAGNOSIS — R06.02 SOB (SHORTNESS OF BREATH): ICD-10-CM

## 2020-12-08 RX ORDER — ALBUTEROL SULFATE 90 UG/1
AEROSOL, METERED RESPIRATORY (INHALATION)
Qty: 18 G | Refills: 0 | Status: SHIPPED | OUTPATIENT
Start: 2020-12-08

## 2021-03-03 DIAGNOSIS — R47.81 SLURRED SPEECH: Primary | ICD-10-CM

## 2021-05-29 ENCOUNTER — APPOINTMENT (EMERGENCY)
Dept: RADIOLOGY | Facility: HOSPITAL | Age: 66
DRG: 071 | End: 2021-05-29
Payer: MEDICARE

## 2021-05-29 ENCOUNTER — APPOINTMENT (EMERGENCY)
Dept: CT IMAGING | Facility: HOSPITAL | Age: 66
DRG: 071 | End: 2021-05-29
Payer: MEDICARE

## 2021-05-29 ENCOUNTER — HOSPITAL ENCOUNTER (INPATIENT)
Facility: HOSPITAL | Age: 66
LOS: 1 days | Discharge: LEFT BEFORE DISCHARGE | DRG: 071 | End: 2021-05-30
Attending: EMERGENCY MEDICINE | Admitting: FAMILY MEDICINE
Payer: MEDICARE

## 2021-05-29 DIAGNOSIS — R33.9 URINARY RETENTION: ICD-10-CM

## 2021-05-29 DIAGNOSIS — R41.82 ALTERED MENTAL STATUS: Primary | ICD-10-CM

## 2021-05-29 PROBLEM — T50.904A: Status: ACTIVE | Noted: 2021-05-29

## 2021-05-29 PROBLEM — R79.89 ELEVATED D-DIMER: Status: ACTIVE | Noted: 2021-05-29

## 2021-05-29 PROBLEM — G93.40 ACUTE ENCEPHALOPATHY: Status: ACTIVE | Noted: 2021-05-29

## 2021-05-29 LAB
ALBUMIN SERPL BCP-MCNC: 3.7 G/DL (ref 3.5–5)
ALP SERPL-CCNC: 101 U/L (ref 46–116)
ALT SERPL W P-5'-P-CCNC: 17 U/L (ref 12–78)
AMMONIA PLAS-SCNC: <10 UMOL/L (ref 11–35)
AMPHETAMINES SERPL QL SCN: NEGATIVE
ANION GAP SERPL CALCULATED.3IONS-SCNC: 4 MMOL/L (ref 4–13)
APAP SERPL-MCNC: <2 UG/ML (ref 10–20)
APTT PPP: 28 SECONDS (ref 23–37)
ARTERIAL PATENCY WRIST A: NO
AST SERPL W P-5'-P-CCNC: 38 U/L (ref 5–45)
BARBITURATES UR QL: NEGATIVE
BASE EX.OXY STD BLDV CALC-SCNC: 72.6 % (ref 60–80)
BASE EXCESS BLDA CALC-SCNC: -3.8 MMOL/L
BASE EXCESS BLDV CALC-SCNC: -0.3 MMOL/L
BASOPHILS # BLD AUTO: 0.06 THOUSANDS/ΜL (ref 0–0.1)
BASOPHILS NFR BLD AUTO: 1 % (ref 0–1)
BENZODIAZ UR QL: POSITIVE
BILIRUB SERPL-MCNC: 0.43 MG/DL (ref 0.2–1)
BILIRUB UR QL STRIP: NEGATIVE
BUN SERPL-MCNC: 25 MG/DL (ref 5–25)
CALCIUM SERPL-MCNC: 8.6 MG/DL (ref 8.3–10.1)
CHLORIDE SERPL-SCNC: 112 MMOL/L (ref 100–108)
CK MB SERPL-MCNC: 1 % (ref 0–2.5)
CK MB SERPL-MCNC: 4.7 NG/ML (ref 0–5)
CK SERPL-CCNC: 451 U/L (ref 26–192)
CLARITY UR: CLEAR
CO2 SERPL-SCNC: 27 MMOL/L (ref 21–32)
COCAINE UR QL: NEGATIVE
COLOR UR: YELLOW
CREAT SERPL-MCNC: 1.22 MG/DL (ref 0.6–1.3)
D DIMER PPP FEU-MCNC: 0.77 UG/ML FEU
EOSINOPHIL # BLD AUTO: 0.31 THOUSAND/ΜL (ref 0–0.61)
EOSINOPHIL NFR BLD AUTO: 7 % (ref 0–6)
ERYTHROCYTE [DISTWIDTH] IN BLOOD BY AUTOMATED COUNT: 13.3 % (ref 11.6–15.1)
ETHANOL SERPL-MCNC: <3 MG/DL (ref 0–3)
GFR SERPL CREATININE-BSD FRML MDRD: 47 ML/MIN/1.73SQ M
GLUCOSE SERPL-MCNC: 85 MG/DL (ref 65–140)
GLUCOSE SERPL-MCNC: 85 MG/DL (ref 65–140)
GLUCOSE SERPL-MCNC: 93 MG/DL (ref 65–140)
GLUCOSE UR STRIP-MCNC: NEGATIVE MG/DL
HCO3 BLDA-SCNC: 22.3 MMOL/L (ref 22–28)
HCO3 BLDV-SCNC: 25.9 MMOL/L (ref 24–30)
HCT VFR BLD AUTO: 37.5 % (ref 34.8–46.1)
HGB BLD-MCNC: 11.9 G/DL (ref 11.5–15.4)
HGB UR QL STRIP.AUTO: NEGATIVE
IMM GRANULOCYTES # BLD AUTO: 0.01 THOUSAND/UL (ref 0–0.2)
IMM GRANULOCYTES NFR BLD AUTO: 0 % (ref 0–2)
INR PPP: 0.98 (ref 0.84–1.19)
KETONES UR STRIP-MCNC: NEGATIVE MG/DL
LACTATE SERPL-SCNC: 0.4 MMOL/L (ref 0.5–2)
LEUKOCYTE ESTERASE UR QL STRIP: NEGATIVE
LIPASE SERPL-CCNC: 52 U/L (ref 73–393)
LYMPHOCYTES # BLD AUTO: 1.7 THOUSANDS/ΜL (ref 0.6–4.47)
LYMPHOCYTES NFR BLD AUTO: 37 % (ref 14–44)
MAGNESIUM SERPL-MCNC: 2.2 MG/DL (ref 1.6–2.6)
MCH RBC QN AUTO: 34.1 PG (ref 26.8–34.3)
MCHC RBC AUTO-ENTMCNC: 31.7 G/DL (ref 31.4–37.4)
MCV RBC AUTO: 107 FL (ref 82–98)
METHADONE UR QL: NEGATIVE
MONOCYTES # BLD AUTO: 0.33 THOUSAND/ΜL (ref 0.17–1.22)
MONOCYTES NFR BLD AUTO: 7 % (ref 4–12)
NEUTROPHILS # BLD AUTO: 2.14 THOUSANDS/ΜL (ref 1.85–7.62)
NEUTS SEG NFR BLD AUTO: 48 % (ref 43–75)
NITRITE UR QL STRIP: NEGATIVE
NRBC BLD AUTO-RTO: 0 /100 WBCS
NT-PROBNP SERPL-MCNC: 144 PG/ML
O2 CT BLDA-SCNC: 15.9 ML/DL (ref 16–23)
O2 CT BLDV-SCNC: 13.2 ML/DL
OPIATES UR QL SCN: POSITIVE
OXYCODONE+OXYMORPHONE UR QL SCN: NEGATIVE
OXYHGB MFR BLDA: 91.9 % (ref 94–97)
PCO2 BLDA: 44.8 MM HG (ref 36–44)
PCO2 BLDV: 48.9 MM HG (ref 42–50)
PCP UR QL: NEGATIVE
PH BLDA: 7.32 [PH] (ref 7.35–7.45)
PH BLDV: 7.34 [PH] (ref 7.3–7.4)
PH UR STRIP.AUTO: 6.5 [PH]
PLATELET # BLD AUTO: 189 THOUSANDS/UL (ref 149–390)
PMV BLD AUTO: 9.1 FL (ref 8.9–12.7)
PO2 BLDA: 75.3 MM HG (ref 75–129)
PO2 BLDV: 39.2 MM HG (ref 35–45)
POTASSIUM SERPL-SCNC: 5.2 MMOL/L (ref 3.5–5.3)
PROT SERPL-MCNC: 6.7 G/DL (ref 6.4–8.2)
PROT UR STRIP-MCNC: NEGATIVE MG/DL
PROTHROMBIN TIME: 12.8 SECONDS (ref 11.6–14.5)
RBC # BLD AUTO: 3.49 MILLION/UL (ref 3.81–5.12)
SALICYLATES SERPL-MCNC: 5 MG/DL (ref 3–20)
SARS-COV-2 RNA RESP QL NAA+PROBE: NEGATIVE
SODIUM SERPL-SCNC: 143 MMOL/L (ref 136–145)
SP GR UR STRIP.AUTO: 1.02 (ref 1–1.03)
SPECIMEN SOURCE: ABNORMAL
THC UR QL: NEGATIVE
TROPONIN I SERPL-MCNC: <0.02 NG/ML
TSH SERPL DL<=0.05 MIU/L-ACNC: 0.85 UIU/ML (ref 0.36–3.74)
UROBILINOGEN UR QL STRIP.AUTO: 0.2 E.U./DL
WBC # BLD AUTO: 4.55 THOUSAND/UL (ref 4.31–10.16)

## 2021-05-29 PROCEDURE — 80143 DRUG ASSAY ACETAMINOPHEN: CPT | Performed by: EMERGENCY MEDICINE

## 2021-05-29 PROCEDURE — 85025 COMPLETE CBC W/AUTO DIFF WBC: CPT | Performed by: EMERGENCY MEDICINE

## 2021-05-29 PROCEDURE — G1004 CDSM NDSC: HCPCS

## 2021-05-29 PROCEDURE — 82805 BLOOD GASES W/O2 SATURATION: CPT | Performed by: EMERGENCY MEDICINE

## 2021-05-29 PROCEDURE — 83735 ASSAY OF MAGNESIUM: CPT | Performed by: EMERGENCY MEDICINE

## 2021-05-29 PROCEDURE — 83880 ASSAY OF NATRIURETIC PEPTIDE: CPT | Performed by: EMERGENCY MEDICINE

## 2021-05-29 PROCEDURE — U0003 INFECTIOUS AGENT DETECTION BY NUCLEIC ACID (DNA OR RNA); SEVERE ACUTE RESPIRATORY SYNDROME CORONAVIRUS 2 (SARS-COV-2) (CORONAVIRUS DISEASE [COVID-19]), AMPLIFIED PROBE TECHNIQUE, MAKING USE OF HIGH THROUGHPUT TECHNOLOGIES AS DESCRIBED BY CMS-2020-01-R: HCPCS | Performed by: EMERGENCY MEDICINE

## 2021-05-29 PROCEDURE — 71275 CT ANGIOGRAPHY CHEST: CPT

## 2021-05-29 PROCEDURE — 82948 REAGENT STRIP/BLOOD GLUCOSE: CPT

## 2021-05-29 PROCEDURE — 85379 FIBRIN DEGRADATION QUANT: CPT | Performed by: EMERGENCY MEDICINE

## 2021-05-29 PROCEDURE — 85730 THROMBOPLASTIN TIME PARTIAL: CPT | Performed by: EMERGENCY MEDICINE

## 2021-05-29 PROCEDURE — 36600 WITHDRAWAL OF ARTERIAL BLOOD: CPT

## 2021-05-29 PROCEDURE — 71045 X-RAY EXAM CHEST 1 VIEW: CPT

## 2021-05-29 PROCEDURE — 36415 COLL VENOUS BLD VENIPUNCTURE: CPT | Performed by: EMERGENCY MEDICINE

## 2021-05-29 PROCEDURE — 72125 CT NECK SPINE W/O DYE: CPT

## 2021-05-29 PROCEDURE — 99223 1ST HOSP IP/OBS HIGH 75: CPT | Performed by: PHYSICIAN ASSISTANT

## 2021-05-29 PROCEDURE — U0005 INFEC AGEN DETEC AMPLI PROBE: HCPCS | Performed by: EMERGENCY MEDICINE

## 2021-05-29 PROCEDURE — 81003 URINALYSIS AUTO W/O SCOPE: CPT | Performed by: EMERGENCY MEDICINE

## 2021-05-29 PROCEDURE — 84443 ASSAY THYROID STIM HORMONE: CPT | Performed by: EMERGENCY MEDICINE

## 2021-05-29 PROCEDURE — 85610 PROTHROMBIN TIME: CPT | Performed by: EMERGENCY MEDICINE

## 2021-05-29 PROCEDURE — 82077 ASSAY SPEC XCP UR&BREATH IA: CPT | Performed by: EMERGENCY MEDICINE

## 2021-05-29 PROCEDURE — 80307 DRUG TEST PRSMV CHEM ANLYZR: CPT | Performed by: EMERGENCY MEDICINE

## 2021-05-29 PROCEDURE — 82553 CREATINE MB FRACTION: CPT | Performed by: EMERGENCY MEDICINE

## 2021-05-29 PROCEDURE — 83690 ASSAY OF LIPASE: CPT | Performed by: EMERGENCY MEDICINE

## 2021-05-29 PROCEDURE — 99285 EMERGENCY DEPT VISIT HI MDM: CPT | Performed by: EMERGENCY MEDICINE

## 2021-05-29 PROCEDURE — 96372 THER/PROPH/DIAG INJ SC/IM: CPT

## 2021-05-29 PROCEDURE — 82550 ASSAY OF CK (CPK): CPT | Performed by: EMERGENCY MEDICINE

## 2021-05-29 PROCEDURE — 80053 COMPREHEN METABOLIC PANEL: CPT | Performed by: EMERGENCY MEDICINE

## 2021-05-29 PROCEDURE — 80179 DRUG ASSAY SALICYLATE: CPT | Performed by: EMERGENCY MEDICINE

## 2021-05-29 PROCEDURE — 96374 THER/PROPH/DIAG INJ IV PUSH: CPT

## 2021-05-29 PROCEDURE — 74177 CT ABD & PELVIS W/CONTRAST: CPT

## 2021-05-29 PROCEDURE — 84484 ASSAY OF TROPONIN QUANT: CPT | Performed by: EMERGENCY MEDICINE

## 2021-05-29 PROCEDURE — 82140 ASSAY OF AMMONIA: CPT | Performed by: EMERGENCY MEDICINE

## 2021-05-29 PROCEDURE — 90715 TDAP VACCINE 7 YRS/> IM: CPT | Performed by: EMERGENCY MEDICINE

## 2021-05-29 PROCEDURE — 83605 ASSAY OF LACTIC ACID: CPT | Performed by: EMERGENCY MEDICINE

## 2021-05-29 PROCEDURE — 90471 IMMUNIZATION ADMIN: CPT

## 2021-05-29 PROCEDURE — 70450 CT HEAD/BRAIN W/O DYE: CPT

## 2021-05-29 PROCEDURE — 93005 ELECTROCARDIOGRAM TRACING: CPT

## 2021-05-29 PROCEDURE — 99285 EMERGENCY DEPT VISIT HI MDM: CPT

## 2021-05-29 RX ORDER — ALBUTEROL SULFATE 90 UG/1
1 AEROSOL, METERED RESPIRATORY (INHALATION) EVERY 4 HOURS PRN
Status: DISCONTINUED | OUTPATIENT
Start: 2021-05-29 | End: 2021-05-30 | Stop reason: HOSPADM

## 2021-05-29 RX ORDER — SODIUM CHLORIDE 9 MG/ML
75 INJECTION, SOLUTION INTRAVENOUS CONTINUOUS
Status: DISCONTINUED | OUTPATIENT
Start: 2021-05-29 | End: 2021-05-30

## 2021-05-29 RX ORDER — HALOPERIDOL 5 MG/ML
10 INJECTION INTRAMUSCULAR ONCE
Status: COMPLETED | OUTPATIENT
Start: 2021-05-29 | End: 2021-05-29

## 2021-05-29 RX ORDER — ONDANSETRON 2 MG/ML
4 INJECTION INTRAMUSCULAR; INTRAVENOUS EVERY 6 HOURS PRN
Status: DISCONTINUED | OUTPATIENT
Start: 2021-05-29 | End: 2021-05-30 | Stop reason: HOSPADM

## 2021-05-29 RX ORDER — ASPIRIN 81 MG/1
81 TABLET, CHEWABLE ORAL DAILY
Status: DISCONTINUED | OUTPATIENT
Start: 2021-05-30 | End: 2021-05-30 | Stop reason: HOSPADM

## 2021-05-29 RX ORDER — METOPROLOL SUCCINATE 100 MG/1
200 TABLET, EXTENDED RELEASE ORAL DAILY
Status: DISCONTINUED | OUTPATIENT
Start: 2021-05-30 | End: 2021-05-30 | Stop reason: HOSPADM

## 2021-05-29 RX ORDER — FUROSEMIDE 20 MG/1
20 TABLET ORAL 2 TIMES DAILY
Status: DISCONTINUED | OUTPATIENT
Start: 2021-05-30 | End: 2021-05-30

## 2021-05-29 RX ORDER — AMLODIPINE BESYLATE 5 MG/1
5 TABLET ORAL DAILY
Status: DISCONTINUED | OUTPATIENT
Start: 2021-05-30 | End: 2021-05-30 | Stop reason: HOSPADM

## 2021-05-29 RX ORDER — NALOXONE HYDROCHLORIDE 1 MG/ML
1 INJECTION PARENTERAL ONCE
Status: COMPLETED | OUTPATIENT
Start: 2021-05-29 | End: 2021-05-29

## 2021-05-29 RX ORDER — ATORVASTATIN CALCIUM 10 MG/1
20 TABLET, FILM COATED ORAL DAILY
Status: DISCONTINUED | OUTPATIENT
Start: 2021-05-30 | End: 2021-05-30 | Stop reason: HOSPADM

## 2021-05-29 RX ORDER — ONDANSETRON 2 MG/ML
4 INJECTION INTRAMUSCULAR; INTRAVENOUS ONCE
Status: COMPLETED | OUTPATIENT
Start: 2021-05-29 | End: 2021-05-29

## 2021-05-29 RX ORDER — DOCUSATE SODIUM 100 MG/1
100 CAPSULE, LIQUID FILLED ORAL DAILY
Status: DISCONTINUED | OUTPATIENT
Start: 2021-05-30 | End: 2021-05-30 | Stop reason: HOSPADM

## 2021-05-29 RX ADMIN — SODIUM CHLORIDE 75 ML/HR: 0.9 INJECTION, SOLUTION INTRAVENOUS at 22:57

## 2021-05-29 RX ADMIN — ONDANSETRON 4 MG: 2 INJECTION INTRAMUSCULAR; INTRAVENOUS at 18:18

## 2021-05-29 RX ADMIN — IOHEXOL 100 ML: 350 INJECTION, SOLUTION INTRAVENOUS at 18:41

## 2021-05-29 RX ADMIN — TETANUS TOXOID, REDUCED DIPHTHERIA TOXOID AND ACELLULAR PERTUSSIS VACCINE, ADSORBED 0.5 ML: 5; 2.5; 8; 8; 2.5 SUSPENSION INTRAMUSCULAR at 19:47

## 2021-05-29 RX ADMIN — HALOPERIDOL LACTATE 10 MG: 5 INJECTION, SOLUTION INTRAMUSCULAR at 18:15

## 2021-05-29 NOTE — TRAUMA DOCUMENTATION
Pt moaning and rolling around in bed   Per provider give pt im haldol and wait 15 mins before going to ctscan

## 2021-05-29 NOTE — ED PROVIDER NOTES
Emergency Department Trauma Note  Adrien Batres 72 y o  female MRN: [id number]  Unit/Bed#: / Encounter: [id number]      Trauma Alert: Trauma Acuity: Trauma Evaluation  Model of Arrival: Mode of Arrival: ALS via Trauma Squad Name and Number: [id number]  Trauma Team: Current Providers  Attending Provider: Cleave Nageotte, MD  Attending Provider: Elise Hammond MD  Registered Nurse: Nain Espino RN  Registered Nurse: Nic Varela RN  Registered Nurse: Rich Pedersen RN  Registered Nurse: Avila Mendez RN  Respiratory Therapist: RT Chito  Patient Care Assistant: Suyapa Alicia  Consultants: None      History of Present Illness     Chief Complaint:   Chief Complaint   Patient presents with    Altered Mental Status     found by amy Ocampo     HPI:  Adrien Batres is a 72 y o  female who presents with see below  Mechanism:Details of Incident: found unresponsive on ground Injury Date: 05/29/21 Injury Time: 1700 Injury Occurence Location - [address] [place] [address] Way: [address] [place] [address] Drive    77-year-old female according to bystanders was walking her dogs and then she was found lying unresponsive on the roads further history is not obtainable EMS obtained an Accu-Chek of 87 for blood pressure was 136/74 heart rate 70 she is placed on supplemental oxygen with snoring respirations 97% on 4 L  There is no obvious signs of trauma to their exam   His no history of any incontinence peripheral IV was established she was given Narcan 0 5 mg IV with no significant change  Patient is otherwise unable to provide a history she is making some purposeful movement    Review of records indicates past history  bipolar 1 disorder chronic pain TIA 2009 with difficulties with speech and right-sided weakness hypertension renal cell carcinoma past surgical history includes right nephrectomy appendectomy cholecystectomy last tetanus was 2015        Review of Systems   Unable to perform ROS: Patient unresponsive Historical Information     Immunizations:   Immunization History   Administered Date(s) Administered    DTaP 08/07/2015    Influenza Quadrivalent Preservative Free 3 years and older IM 10/20/2014, 10/22/2015    Pneumococcal Polysaccharide PPV23 08/07/2015    Tdap 08/07/2015, 05/29/2021       Past Medical History:   Diagnosis Date    Bipolar 1 disorder ([facility] )     Cancer ([facility] )     kidney    Cardiac disease     fast heart beat    Chronic pain     Fractures     GERD (gastroesophageal reflux disease)     Hard of hearing     Hypertension     Renal cell carcinoma ([facility] )     Stroke ([facility] )     2009 affected her speech, right sided weakness    TIA (transient ischemic attack)      History reviewed  No pertinent family history  Past Surgical History:   Procedure Laterality Date    APPENDECTOMY      CHOLECYSTECTOMY      JOINT REPLACEMENT Bilateral      bilateral knees    NEPHRECTOMY Right     [place] COLONOSCOPY FLX DX W/COLLJ SPEC WHEN PFRMD N/A 8/7/2018    Procedure: COLONOSCOPY;  Surgeon: Trevor Tello MD;  Location: [facility];  Service: Gastroenterology    IA OPEN TREATMENT BIMALLEOLAR ANKLE FRACTURE Right 5/14/2019    Procedure: ANKLE - Bimalleolar OPEN REDUCTION W/ INTERNAL FIXATION (ORIF); Surgeon: Roger Bentley; Location: [address] [place] [address] MAIN OR;  Service: Orthopedics     Social History     Tobacco Use    Smoking status: Former Smoker     Packs/day: 0 25     Years: 7 00     Pack years: 1 75     Types: Cigarettes    Smokeless tobacco: Never Used    Tobacco comment: pt refused   Substance Use Topics    Alcohol use: Not Currently     Frequency: Never     Binge frequency: Never    Drug use: Not Currently     E-Cigarette/Vaping    E-Cigarette Use Never User      E-Cigarette/Vaping Substances       Family History: non-contributory    Meds/Allergies   Prior to Admission Medications   Prescriptions Last Dose Informant Patient Reported? Taking?    HYDROcodone-acetaminophen (NORCO) 5-325 mg per tablet   Yes No Sig: Take 1 tablet by mouth every 6 (six) hours as needed for pain   Melatonin 5 MG CAPS  Self Yes No   Sig: Take by mouth   OLANZapine (ZyPREXA) 20 MG tablet   Yes No   Sig: Take 20 mg by mouth daily at bedtime   QUEtiapine (SEROquel) 100 mg tablet  Self Yes No   Sig: Take 300 mg by mouth daily at bedtime    albuterol (PROVENTIL HFA,VENTOLIN HFA) 90 mcg/act inhaler   No No   Sig: INHALE 2 PUFFS BY MOUTH EVERY 6 HOURS AS NEEDED OR WHEEZING   amLODIPine (NORVASC) 5 mg tablet   Yes No   Sig: Take 5 mg by mouth daily   aspirin 81 mg chewable tablet   No No   Sig: Chew 1 tablet (81 mg total) daily   atorvastatin (LIPITOR) 20 mg tablet   No No   Sig: Take 1 tablet (20 mg total) by mouth daily   docusate sodium (COLACE) 100 mg capsule  Self Yes No   Sig: Take by mouth   ergocalciferol (VITAMIN D2) 50,000 units  Self Yes No   Sig: Take by mouth once a week    furosemide (LASIX) 20 mg tablet   Yes No   Sig: Take 20 mg by mouth 2 (two) times a day   gabapentin (NEURONTIN) 800 mg tablet  Self Yes No   Sig: Take 800 mg by mouth 4 (four) times a day    ketotifen (ZADITOR) 0 025 % ophthalmic solution  Self Yes No   Sig: Apply to eye   lidocaine (LIDODERM) 5 %   Yes No   Sig: Apply 1 patch topically daily Remove & Discard patch within 12 hours or as directed by MD   metoprolol succinate (TOPROL-XL) 100 mg 24 hr tablet  Self Yes No   Sig: Take 200 mg by mouth    pantoprazole (PROTONIX) 40 mg tablet  Self Yes No   Sig: Take 40 mg by mouth daily       Facility-Administered Medications: None       Allergies   Allergen Reactions    Celecoxib Rash and Other (See Comments)     CELEBREX    Doxazosin Rash and Hives    Metaxalone Rash and Hives       PHYSICAL EXAM    PE limited by: n/a    Objective   Vitals:   First set: Temperature: (!) 97 2 °F (36 2 °C) (05/29/21 1758)  Pulse: 87 (05/29/21 1758)  Respirations: 18 (05/29/21 1758)  Blood Pressure: 160/94 (05/29/21 1758)  SpO2: 98 % (05/29/21 1758)    Primary Survey:   (A) Airway: Sonorous respirations  (B) Breathing: BS equal bilaterraly  (C) Circulation: Pulses:   normal  (D) Disabliity:  Moves extremties symmetrically GCS 9  (E) Expose:  Completed    Secondary Survey: (Click on Physical Exam tab above)  Physical Exam  Vitals signs and nursing note reviewed  Constitutional:       Appearance: She is not ill-appearing or diaphoretic  Comments: Somnolent with sonorous respirations   HENT:      Head: Normocephalic and atraumatic  Right Ear: Tympanic membrane normal       Left Ear: Tympanic membrane normal       Nose: Nose normal  No congestion or rhinorrhea  Mouth/Throat:      Mouth: Mucous membranes are moist       Pharynx: No oropharyngeal exudate  Eyes:      General:         Right eye: No discharge  Left eye: No discharge  Extraocular Movements: Extraocular movements intact  Pupils: Pupils are equal, round, and reactive to light  Comments: 3 mm equal there is no nystagmus noted   Neck:      Comments: No midline or paraspinous tenderness  Cardiovascular:      Rate and Rhythm: Normal rate and regular rhythm  Pulses: Normal pulses  Pulmonary:      Effort: Pulmonary effort is normal  No respiratory distress  Breath sounds: Normal breath sounds  No stridor  No wheezing or rales  Chest:      Chest wall: No tenderness  Abdominal:      General: Bowel sounds are normal  There is no distension  Palpations: There is no mass  Tenderness: There is no abdominal tenderness  There is no right CVA tenderness, left CVA tenderness or guarding  Comments: Back no midline T or L-spine tenderness no evidence of ecchymosis or bruising   Genitourinary:     Rectum: Guaiac result negative  Comments: Rectal normal tone brown stool on the glove heme-negative controls intact  Musculoskeletal: Normal range of motion  Right lower leg: No edema  Left lower leg: No edema  Skin:     General: Skin is warm and dry        Capillary Refill: Capillary refill takes less than 2 seconds  Findings: No rash  Comments: Superficial excoriations none secondarily infected   Neurological:      General: No focal deficit present  Cranial Nerves: No cranial nerve deficit  Sensory: No sensory deficit  Motor: No weakness  Coordination: Coordination normal       Deep Tendon Reflexes: Reflexes normal       Comments: GCS 9 (E2 V2 M5) Somnolent but patient is moving all extremities symmetrically localizes pain   Psychiatric:      Comments: uhable to assess         Cervical spine cleared by clinical criteria? No (imaging required)      Invasive Devices     Peripheral Intravenous Line            Peripheral IV 05/29/21 Left Forearm less than 1 day    Peripheral IV 05/29/21 Right Hand less than 1 day                Lab Results:   Results Reviewed     Procedure Component Value Units Date/Time    Fingerstick Glucose (POCT) [[id number]]  (Normal) Collected: 05/29/21 2231    Lab Status: Final result Updated: 05/29/21 2232     POC Glucose 93 mg/dl     Rapid drug screen, urine [[id number]]  (Abnormal) Collected: 05/29/21 2028    Lab Status: Final result Specimen: Urine, Catheter Updated: 05/29/21 2136     Amph/Meth UR Negative     Barbiturate Ur Negative     Benzodiazepine Urine Positive     Cocaine Urine Negative     Methadone Urine Negative     Opiate Urine Positive     PCP Ur Negative     THC Urine Negative     Oxycodone Urine Negative    Narrative:      Presumptive report  If requested, specimen will be sent to reference lab for confirmation  FOR MEDICAL PURPOSES ONLY  IF CONFIRMATION NEEDED PLEASE CONTACT THE LAB WITHIN 5 DAYS      Drug Screen Cutoff Levels:  AMPHETAMINE/METHAMPHETAMINES  1000 ng/mL  BARBITURATES     200 ng/mL  BENZODIAZEPINES     200 ng/mL  COCAINE      300 ng/mL  METHADONE      300 ng/mL  OPIATES      300 ng/mL  PHENCYCLIDINE     25 ng/mL  THC       50 ng/mL  OXYCODONE      100 ng/mL    UA w Reflex to Microscopic w Reflex to Culture [[id number]] Collected: 05/29/21 2028    Lab Status: Final result Specimen: Urine, Straight Cath Updated: 05/29/21 2034     Color, UA Yellow     Clarity, UA Clear     Specific [place], UA 1 020     pH, UA 6 5     Leukocytes, UA Negative     Nitrite, UA Negative     Protein, UA Negative mg/dl      Glucose, UA Negative mg/dl      Ketones, UA Negative mg/dl      Urobilinogen, UA 0 2 E U /dl      Bilirubin, UA Negative     Blood, UA Negative    Blood gas, arterial [[id number]]  (Abnormal) Collected: 05/29/21 1936    Lab Status: Final result Specimen: Blood, Arterial from Brachial, Left Updated: 05/29/21 1946     pH, Arterial 7 315     pCO2, Arterial 44 8 mm Hg      pO2, Arterial 75 3 mm Hg      HCO3, Arterial 22 3 mmol/L      Base Excess, Arterial -3 8 mmol/L      O2 Content, Arterial 15 9 mL/dL      O2 HGB,Arterial  91 9 %      SOURCE Brachial, Left     VIC TEST No    Novel Coronavirus (Covid-19),PCR [facility] - 2 Hour Stat [[id number]]  (Normal) Collected: 05/29/21 1815    Lab Status: Final result Specimen: Nares from Nose Updated: 05/29/21 1927     SARS-CoV-2 Negative    Narrative: The specimen collection materials, transport medium, and/or testing methodology utilized in the production of these test results have been proven to be reliable in a limited validation with an abbreviated program under the Emergency Utilization Authorization provided by the FDA  Testing reported as "Presumptive positive" will be confirmed with secondary testing to ensure result accuracy  Clinical caution and judgement should be used with the interpretation of these results with consideration of the clinical impression and other laboratory testing  Testing reported as "Positive" or "Negative" has been proven to be accurate according to standard laboratory validation requirements  All testing is performed with control materials showing appropriate reactivity at standard intervals        CKMB [[id number]]  (Normal) Collected: 05/29/21 1815    Lab Status: Final result Specimen: Blood from Arm, Right Updated: 05/29/21 1912     CK-MB Index 1 0 %      CK-MB 4 7 ng/mL     CK Total with Reflex CKMB [[id number]]  (Abnormal) Collected: 05/29/21 1815    Lab Status: Final result Specimen: Blood from Arm, Right Updated: 05/29/21 1907     Total  U/L     Magnesium [[id number]]  (Normal) Collected: 05/29/21 1815    Lab Status: Final result Specimen: Blood from Arm, Right Updated: 05/29/21 1906     Magnesium 2 2 mg/dL     Lipase [[id number]]  (Abnormal) Collected: 05/29/21 1815    Lab Status: Final result Specimen: Blood from Arm, Right Updated: 05/29/21 1906     Lipase 52 u/L     NT-BNP PRO [[id number]]  (Abnormal) Collected: 05/29/21 1815    Lab Status: Final result Specimen: Blood from Arm, Right Updated: 05/29/21 1906     NT-proBNP 144 0 pg/mL     TSH, 3rd generation with Free T4 reflex [[id number]]  (Normal) Collected: 05/29/21 1815    Lab Status: Final result Specimen: Blood from Arm, Right Updated: 05/29/21 1906     TSH 3RD GENERATON 0 848 uIU/mL     Narrative:      Patients undergoing fluorescein dye angiography may retain small amounts of fluorescein in the body for 48-72 hours post procedure  Samples containing fluorescein can produce falsely depressed TSH values  If the patient had this procedure,a specimen should be resubmitted post fluorescein clearance  Salicylate level [[id number]]  (Normal) Collected: 05/29/21 1815    Lab Status: Final result Specimen: Blood from Arm, Right Updated: 93/47/62 3516     Salicylate Lvl 5 0 mg/dL     Acetaminophen level-If concentration is detectable, please discuss with medical  on call   [[id number]]  (Abnormal) Collected: 05/29/21 1815    Lab Status: Final result Specimen: Blood from Arm, Right Updated: 05/29/21 1904     Acetaminophen Level <2 ug/mL     Fingerstick Glucose (POCT) [[id number]]  (Normal) Collected: 05/29/21 1801    Lab Status: Final result Updated: 05/29/21 1851     POC Glucose 85 mg/dl     D-Dimer [[id number]]  (Abnormal) Collected: 05/29/21 1815    Lab Status: Final result Specimen: Blood from Arm, Right Updated: 05/29/21 1846     D-Dimer, Quant 0 77 ug/ml FEU     Lactic acid [[id number]]  (Abnormal) Collected: 05/29/21 1815    Lab Status: Final result Specimen: Blood from Arm, Right Updated: 05/29/21 1846     LACTIC ACID 0 4 mmol/L     Narrative:      Result may be elevated if tourniquet was used during collection      Comprehensive metabolic panel [[id number]]  (Abnormal) Collected: 05/29/21 1815    Lab Status: Final result Specimen: Blood from Arm, Right Updated: 05/29/21 1845     Sodium 143 mmol/L      Potassium 5 2 mmol/L      Chloride 112 mmol/L      CO2 27 mmol/L      ANION GAP 4 mmol/L      BUN 25 mg/dL      Creatinine 1 22 mg/dL      Glucose 85 mg/dL      Calcium 8 6 mg/dL      AST 38 U/L      ALT 17 U/L      Alkaline Phosphatase 101 U/L      Total Protein 6 7 g/dL      Albumin 3 7 g/dL      Total Bilirubin 0 43 mg/dL      eGFR 47 ml/min/1 73sq m     Narrative:      [place] guidelines for Chronic Kidney Disease (CKD):     Stage 1 with normal or high GFR (GFR > 90 mL/min/1 73 square meters)    Stage 2 Mild CKD (GFR = 60-89 mL/min/1 73 square meters)    Stage 3A Moderate CKD (GFR = 45-59 mL/min/1 73 square meters)    Stage 3B Moderate CKD (GFR = 30-44 mL/min/1 73 square meters)    Stage 4 Severe CKD (GFR = 15-29 mL/min/1 73 square meters)    Stage 5 End Stage CKD (GFR <15 mL/min/1 73 square meters)  Note: GFR calculation is accurate only with a steady state creatinine    Troponin I [[id number]]  (Normal) Collected: 05/29/21 1815    Lab Status: Final result Specimen: Blood from Arm, Right Updated: 05/29/21 1845     Troponin I <0 02 ng/mL     Ethanol [[id number]]  (Normal) Collected: 05/29/21 1815    Lab Status: Final result Specimen: Blood from Arm, Right Updated: 05/29/21 1842     Ethanol Lvl <3 mg/dL     Ammonia [[id number]]  (Abnormal) Collected: 05/29/21 1815    Lab Status: Final result Specimen: Blood from Arm, Right Updated: 05/29/21 1840     Ammonia <10 umol/L     Protime-INR [[id number]]  (Normal) Collected: 05/29/21 1815    Lab Status: Final result Specimen: Blood from Arm, Right Updated: 05/29/21 1837     Protime 12 8 seconds      INR 0 98    APTT [[id number]]  (Normal) Collected: 05/29/21 1815    Lab Status: Final result Specimen: Blood from Arm, Right Updated: 05/29/21 1837     PTT 28 seconds     Blood gas, venous [[id number]] Collected: 05/29/21 1815    Lab Status: Final result Specimen: Blood from Arm, Right Updated: 05/29/21 1829     pH, Jorge 7 342     pCO2, Jorge 48 9 mm Hg      pO2, Jorge 39 2 mm Hg      HCO3, Jorge 25 9 mmol/L      Base Excess, Jorge -0 3 mmol/L      O2 Content, Jorge 13 2 ml/dL      O2 HGB, VENOUS 72 6 %     CBC and differential [[id number]]  (Abnormal) Collected: 05/29/21 1815    Lab Status: Final result Specimen: Blood from Arm, Right Updated: 05/29/21 1825     WBC 4 55 Thousand/uL      RBC 3 49 Million/uL      Hemoglobin 11 9 g/dL      Hematocrit 37 5 %       fL      MCH 34 1 pg      MCHC 31 7 g/dL      RDW 13 3 %      MPV 9 1 fL      Platelets 373 Thousands/uL      nRBC 0 /100 WBCs      Neutrophils Relative 48 %      Immat GRANS % 0 %      Lymphocytes Relative 37 %      Monocytes Relative 7 %      Eosinophils Relative 7 %      Basophils Relative 1 %      Neutrophils Absolute 2 14 Thousands/µL      Immature Grans Absolute 0 01 Thousand/uL      Lymphocytes Absolute 1 70 Thousands/µL      Monocytes Absolute 0 33 Thousand/µL      Eosinophils Absolute 0 31 Thousand/µL      Basophils Absolute 0 06 Thousands/µL                  Imaging Studies:   Direct to CT: Yes  CT cervical spine without contrast   Final Result by Gagan Edmond MD (05/29 1850)      No cervical spine fracture or traumatic malalignment                     Workstation performed: [id number]         CT head without contrast   Final Result by Papa Elkins, MD (05/29 1846)      No acute intracranial abnormality  Workstation performed: [id number]         PE Study with CT Abdomen and Pelvis with contrast   Final Result by Laly Sheets MD (05/29 1903)      1  No findings of acute traumatic injury in the chest, abdomen or pelvis  2   No pulmonary embolism  Workstation performed: [id number]         XR chest 1 view portable   ED Interpretation by Anthony Chan MD (05/29 1901)   Read by me; Radiologist to provide formal interpretation  No pneumothorax no acute process      Final Result by Damir Tobar MD (05/29 1925)      No acute cardiopulmonary disease  Workstation performed: [id number]               Procedures  ECG 12 Lead Documentation Only    Date/Time: 5/29/2021 6:05 PM  Performed by: Anthony Chan MD  Authorized by: Anthony Chan MD     Indications / Diagnosis:  Cp  ECG reviewed by me, the ED Provider: yes    Patient location:  ED  Previous ECG:     Previous ECG:  Compared to current    Comparison ECG info:  10/15/2020 10:35    Similarity:  No change  Rate:     ECG rate:  86    ECG rate assessment: normal    Rhythm:     Rhythm: sinus rhythm    QRS:     QRS axis:  Normal  Comments:      twi v1, v2 seen prev no acute ischemic changes [id number]             ED Course  ED Course as of May 30 0201   Sat May 29, 2021   1810 Read by me; Radiologist to provide formal interpretation   PCXR no ptx no acute process      1937 On recheck sonorous respirations but attempts to answer questions has spontaneous symmetric movements of all extremities including crossing her legs and arms attempted to ask her dog's name but having difficulty understanding speech review of records indicates she had a prior admission in October for history of slurred speech and facial numbness which was attributed to polypharmacy      2027 RN attempted to call contacts      2031 Straight cath significant for greater than 900ml urine 2110 Patient stirs easily with sternal rub and mumbles will contact [facility] for hospitalization      2126 Case reviewed with Chris Marc PAC recommends full admit to step down Dr Ta Montoya              [facility]  Number of Diagnoses or Management Options  Altered mental status:   Urinary retention:   Diagnosis management comments: Mdm:  17-year-old with altered mental status trauma eval was called upon arrival   Patient on exam has no evidence of trauma will proceed with CT of the head and neck CTA of the chest to evaluate for possibility of pulmonary embolism and an abdomen pelvis exam   Will evaluate for acute coronary syndrome rhabdomyolysis electrolyte disturbance acid-base disturbance and re-evaluate  Patient's PA  was reviewed after her urine tox demonstrated positive benzos and narcotics and opiates on 5/25/2021 she had Ambien 10 mg #30 30 day supply;  alprazolam 1 mg #120 30  day supply; Norco 5/325 #120  30 day supply;  on 05/17 tramadol 50 mg #180  30 day supply; 5/6/ carispordolol 350mg #120 30d           Disposition  [facility] Transfer: No  Final diagnoses: Altered mental status   Urinary retention     Time reflects when diagnosis was documented in both MDM as applicable and the Disposition within this note     Time User Action Codes Description Comment    5/29/2021  8:41 PM Eleanor Garcia Add [R41 82] Altered mental status     5/29/2021  8:41 PM Eleanor Garcia Add [R33 9] Urinary retention       ED Disposition     ED Disposition Condition Date/Time Comment    Admit Stable Sat May 29, 2021  9:29 PM Case was discussed with RIDGE Reagan and the patient's admission status was agreed to be Admission Status: inpatient status to the service of Dr Ta Montoya           Follow-up Information    None       Current Discharge Medication List      CONTINUE these medications which have NOT CHANGED    Details   albuterol (PROVENTIL HFA,VENTOLIN HFA) 90 mcg/act inhaler INHALE 2 PUFFS BY MOUTH EVERY 6 HOURS AS NEEDED OR WHEEZING  Qty: 18 g, Refills: 0    Associated Diagnoses: SOB (shortness of breath)      amLODIPine (NORVASC) 5 mg tablet Take 5 mg by mouth daily      aspirin 81 mg chewable tablet Chew 1 tablet (81 mg total) daily  Qty: 30 tablet, Refills: 0    Associated Diagnoses: Slurred speech      atorvastatin (LIPITOR) 20 mg tablet Take 1 tablet (20 mg total) by mouth daily  Qty: 90 tablet, Refills: 1    Associated Diagnoses: Hyperlipidemia, unspecified hyperlipidemia type      docusate sodium (COLACE) 100 mg capsule Take by mouth      ergocalciferol (VITAMIN D2) 50,000 units Take by mouth once a week       furosemide (LASIX) 20 mg tablet Take 20 mg by mouth 2 (two) times a day      gabapentin (NEURONTIN) 800 mg tablet Take 800 mg by mouth 4 (four) times a day       HYDROcodone-acetaminophen (NORCO) 5-325 mg per tablet Take 1 tablet by mouth every 6 (six) hours as needed for pain      ketotifen (ZADITOR) 0 025 % ophthalmic solution Apply to eye      lidocaine (LIDODERM) 5 % Apply 1 patch topically daily Remove & Discard patch within 12 hours or as directed by MD      Melatonin 5 MG CAPS Take by mouth      metoprolol succinate (TOPROL-XL) 100 mg 24 hr tablet Take 200 mg by mouth       OLANZapine (ZyPREXA) 20 MG tablet Take 20 mg by mouth daily at bedtime      pantoprazole (PROTONIX) 40 mg tablet Take 40 mg by mouth daily       QUEtiapine (SEROquel) 100 mg tablet Take 300 mg by mouth daily at bedtime            No discharge procedures on file      PDMP Review       Value Time User    PDMP Reviewed  Yes 5/29/2021 10:43 PM Christen Miranda PA-C          ED Provider  Electronically Signed by         Pratima Tomlinson MD  05/30/21 3124

## 2021-05-30 VITALS
SYSTOLIC BLOOD PRESSURE: 103 MMHG | HEART RATE: 63 BPM | BODY MASS INDEX: 34.1 KG/M2 | RESPIRATION RATE: 14 BRPM | HEIGHT: 64 IN | WEIGHT: 199.74 LBS | TEMPERATURE: 97.3 F | OXYGEN SATURATION: 93 % | DIASTOLIC BLOOD PRESSURE: 51 MMHG

## 2021-05-30 PROBLEM — T68.XXXA HYPOTHERMIA: Status: ACTIVE | Noted: 2021-05-30

## 2021-05-30 LAB
ALBUMIN SERPL BCP-MCNC: 3 G/DL (ref 3.5–5)
ALP SERPL-CCNC: 91 U/L (ref 46–116)
ALT SERPL W P-5'-P-CCNC: 15 U/L (ref 12–78)
ANION GAP SERPL CALCULATED.3IONS-SCNC: 10 MMOL/L (ref 4–13)
AST SERPL W P-5'-P-CCNC: 28 U/L (ref 5–45)
BILIRUB SERPL-MCNC: 0.34 MG/DL (ref 0.2–1)
BUN SERPL-MCNC: 19 MG/DL (ref 5–25)
CALCIUM ALBUM COR SERPL-MCNC: 9 MG/DL (ref 8.3–10.1)
CALCIUM SERPL-MCNC: 8.2 MG/DL (ref 8.3–10.1)
CHLORIDE SERPL-SCNC: 112 MMOL/L (ref 100–108)
CK MB SERPL-MCNC: 4.2 NG/ML (ref 0–5)
CK MB SERPL-MCNC: <1 % (ref 0–2.5)
CK SERPL-CCNC: 442 U/L (ref 26–192)
CO2 SERPL-SCNC: 24 MMOL/L (ref 21–32)
CREAT SERPL-MCNC: 0.96 MG/DL (ref 0.6–1.3)
ERYTHROCYTE [DISTWIDTH] IN BLOOD BY AUTOMATED COUNT: 13.2 % (ref 11.6–15.1)
GFR SERPL CREATININE-BSD FRML MDRD: 62 ML/MIN/1.73SQ M
GLUCOSE SERPL-MCNC: 89 MG/DL (ref 65–140)
HCT VFR BLD AUTO: 36.7 % (ref 34.8–46.1)
HGB BLD-MCNC: 11.7 G/DL (ref 11.5–15.4)
MAGNESIUM SERPL-MCNC: 2 MG/DL (ref 1.6–2.6)
MCH RBC QN AUTO: 34.3 PG (ref 26.8–34.3)
MCHC RBC AUTO-ENTMCNC: 31.9 G/DL (ref 31.4–37.4)
MCV RBC AUTO: 108 FL (ref 82–98)
PHOSPHATE SERPL-MCNC: 3 MG/DL (ref 2.3–4.1)
PLATELET # BLD AUTO: 184 THOUSANDS/UL (ref 149–390)
PMV BLD AUTO: 9 FL (ref 8.9–12.7)
POTASSIUM SERPL-SCNC: 4.2 MMOL/L (ref 3.5–5.3)
PROT SERPL-MCNC: 6 G/DL (ref 6.4–8.2)
RBC # BLD AUTO: 3.41 MILLION/UL (ref 3.81–5.12)
SODIUM SERPL-SCNC: 146 MMOL/L (ref 136–145)
WBC # BLD AUTO: 4.2 THOUSAND/UL (ref 4.31–10.16)

## 2021-05-30 PROCEDURE — 99239 HOSP IP/OBS DSCHRG MGMT >30: CPT | Performed by: INTERNAL MEDICINE

## 2021-05-30 PROCEDURE — 85027 COMPLETE CBC AUTOMATED: CPT | Performed by: PHYSICIAN ASSISTANT

## 2021-05-30 PROCEDURE — 82550 ASSAY OF CK (CPK): CPT | Performed by: INTERNAL MEDICINE

## 2021-05-30 PROCEDURE — 82553 CREATINE MB FRACTION: CPT | Performed by: INTERNAL MEDICINE

## 2021-05-30 PROCEDURE — 80053 COMPREHEN METABOLIC PANEL: CPT | Performed by: PHYSICIAN ASSISTANT

## 2021-05-30 PROCEDURE — 84100 ASSAY OF PHOSPHORUS: CPT | Performed by: PHYSICIAN ASSISTANT

## 2021-05-30 PROCEDURE — 83735 ASSAY OF MAGNESIUM: CPT | Performed by: PHYSICIAN ASSISTANT

## 2021-05-30 RX ORDER — PANTOPRAZOLE SODIUM 40 MG/1
40 TABLET, DELAYED RELEASE ORAL
Status: DISCONTINUED | OUTPATIENT
Start: 2021-05-30 | End: 2021-05-30 | Stop reason: HOSPADM

## 2021-05-30 NOTE — NURSING NOTE
Pt currently AOx4, able to follow commands, able to move all 4 extremities  Pt's mother Kamini Briones ([phone]) contacted per patient's request      Per the ED nurse, patient's dog was returned to pt's house and left there by the police with food and water  Pt's mother aware and will be taking the dog in with her per our phone call

## 2021-05-30 NOTE — ASSESSMENT & PLAN NOTE
Possible drug overdose   As per EMS patient was found on the streets with snoring respirations  Review of PDMP reveals new Alprazolam, Zolpidem and Norco scripts  filled on 5/25/21  She had bottle of prescription Norco on her person   She received narcan 0 5 mg IV from EMS  UDS is positive for benzodiazepines and Opiates  Blood alcohol levels within normal limits  Admit to stepdown  Cardiopulmonary  monitoring   NPO for now pending speech eval  IV fluids   Continue Hypothermia management  Consider Toxicology consult  Consider Psych consult  Monitor vital signs closely  Monitor I/O daily weights  Am labs  Supportive care

## 2021-05-30 NOTE — PLAN OF CARE
Problem: PAIN - ADULT  Goal: Verbalizes/displays adequate comfort level or baseline comfort level  Description: Interventions:  - Encourage patient to monitor pain and request assistance  - Assess pain using appropriate pain scale  - Administer analgesics based on type and severity of pain and evaluate response  - Implement non-pharmacological measures as appropriate and evaluate response  - Consider cultural and social influences on pain and pain management  - Notify physician/advanced practitioner if interventions unsuccessful or patient reports new pain  Outcome: Adequate for Discharge     Problem: INFECTION - ADULT  Goal: Absence or prevention of progression during hospitalization  Description: INTERVENTIONS:  - Assess and monitor for signs and symptoms of infection  - Monitor lab/diagnostic results  - Monitor all insertion sites, i e  indwelling lines, tubes, and drains  - Monitor endotracheal if appropriate and nasal secretions for changes in amount and color  - [place] appropriate cooling/warming therapies per order  - Administer medications as ordered  - Instruct and encourage patient and family to use good hand hygiene technique  - Identify and instruct in appropriate isolation precautions for identified infection/condition  Outcome: Adequate for Discharge     Problem: DISCHARGE PLANNING  Goal: Discharge to home or other facility with appropriate resources  Description: INTERVENTIONS:  - Identify barriers to discharge w/patient and caregiver  - Arrange for needed discharge resources and transportation as appropriate  - Identify discharge learning needs (meds, wound care, etc )  - Arrange for interpretive services to assist at discharge as needed  - Refer to Case Management Department for coordinating discharge planning if the patient needs post-hospital services based on physician/advanced practitioner order or complex needs related to functional status, cognitive ability, or social support system  Outcome: Adequate for Discharge     Problem: NEUROSENSORY - ADULT  Goal: Achieves stable or improved neurological status  Description: INTERVENTIONS  - Monitor and report changes in neurological status  - Monitor vital signs such as temperature, blood pressure, glucose, and any other labs ordered   - Initiate measures to prevent increased intracranial pressure  - Monitor for seizure activity and implement precautions if appropriate      Outcome: Adequate for Discharge  Goal: Achieves maximal functionality and self care  Description: INTERVENTIONS  - Monitor swallowing and airway patency with patient fatigue and changes in neurological status  - Encourage and assist patient to increase activity and self care  - Encourage visually impaired, hearing impaired and aphasic patients to use assistive/communication devices  Outcome: Adequate for Discharge     Problem: Knowledge Deficit  Goal: Patient/family/caregiver demonstrates understanding of disease process, treatment plan, medications, and discharge instructions  Description: Complete learning assessment and assess knowledge base    Interventions:  - Provide teaching at level of understanding  - Provide teaching via preferred learning methods  Outcome: Adequate for Discharge     Problem: CARDIOVASCULAR - ADULT  Goal: Maintains optimal cardiac output and hemodynamic stability  Description: INTERVENTIONS:  - Monitor I/O, vital signs and rhythm  - Monitor for S/S and trends of decreased cardiac output  - Administer and titrate ordered vasoactive medications to optimize hemodynamic stability  - Assess quality of pulses, skin color and temperature  - Assess for signs of decreased coronary artery perfusion  - Instruct patient to report change in severity of symptoms  Outcome: Adequate for Discharge     Problem: RESPIRATORY - ADULT  Goal: Achieves optimal ventilation and oxygenation  Description: INTERVENTIONS:  - Assess for changes in respiratory status  - Assess for changes in mentation and behavior  - Position to facilitate oxygenation and minimize respiratory effort  - Oxygen administered by appropriate delivery if ordered  - Initiate smoking cessation education as indicated  - Encourage broncho-pulmonary hygiene including cough, deep breathe, Incentive Spirometry  - Assess the need for suctioning and aspirate as needed  - Assess and instruct to report SOB or any respiratory difficulty  - Respiratory Therapy support as indicated  Outcome: Adequate for Discharge     Problem: GASTROINTESTINAL - ADULT  Goal: Minimal or absence of nausea and/or vomiting  Description: INTERVENTIONS:  - Administer IV fluids if ordered to ensure adequate hydration  - Maintain NPO status until nausea and vomiting are resolved  - Nasogastric tube if ordered  - Administer ordered antiemetic medications as needed  - Provide nonpharmacologic comfort measures as appropriate  - Advance diet as tolerated, if ordered  - Consider nutrition services referral to assist patient with adequate nutrition and appropriate food choices  Outcome: Adequate for Discharge     Problem: GENITOURINARY - ADULT  Goal: Maintains or returns to baseline urinary function  Description: INTERVENTIONS:  - Assess urinary function  - Encourage oral fluids to ensure adequate hydration if ordered  - Administer IV fluids as ordered to ensure adequate hydration  - Administer ordered medications as needed  - Offer frequent toileting  - Follow urinary retention protocol if ordered  Outcome: Adequate for Discharge  Goal: Absence of urinary retention  Description: INTERVENTIONS:  - Assess patients ability to void and empty bladder  - Monitor I/O  - Bladder scan as needed  - Discuss with physician/AP medications to alleviate retention as needed  - Discuss catheterization for long term situations as appropriate  Outcome: Adequate for Discharge     Problem: METABOLIC, FLUID AND ELECTROLYTES - ADULT  Goal: Electrolytes maintained within normal limits  Description: INTERVENTIONS:  - Monitor labs and assess patient for signs and symptoms of electrolyte imbalances  - Administer electrolyte replacement as ordered  - Monitor response to electrolyte replacements, including repeat lab results as appropriate  - Instruct patient on fluid and nutrition as appropriate  Outcome: Adequate for Discharge     Problem: Nutrition/Hydration-ADULT  Goal: Nutrient/Hydration intake appropriate for improving, restoring or maintaining nutritional needs  Description: Monitor and assess patient's nutrition/hydration status for malnutrition  Collaborate with interdisciplinary team and initiate plan and interventions as ordered  Monitor patient's weight and dietary intake as ordered or per policy  Utilize nutrition screening tool and intervene as necessary  Determine patient's food preferences and provide high-protein, high-caloric foods as appropriate       INTERVENTIONS:  - Monitor oral intake, urinary output, labs, and treatment plans  - Assess nutrition and hydration status and recommend course of action  - Evaluate amount of meals eaten  - Assist patient with eating if necessary   - Allow adequate time for meals  - Recommend/ encourage appropriate diets, oral nutritional supplements, and vitamin/mineral supplements  - Order, calculate, and assess calorie counts as needed  - Recommend, monitor, and adjust tube feedings and TPN/PPN based on assessed needs  - Assess need for intravenous fluids  - Provide specific nutrition/hydration education as appropriate  - Include patient/family/caregiver in decisions related to nutrition  Outcome: Adequate for Discharge     Problem: SKIN/TISSUE INTEGRITY - ADULT  Goal: Skin integrity remains intact  Description: INTERVENTIONS  - Identify patients at risk for skin breakdown  - Assess and monitor skin integrity  - Assess and monitor nutrition and hydration status  - Monitor labs (i e  albumin)  - Assess for incontinence   - Turn and reposition patient  - Assist with mobility/ambulation  - Relieve pressure over bony prominences  - Avoid friction and shearing  - Provide appropriate hygiene as needed including keeping skin clean and dry  - Evaluate need for skin moisturizer/barrier cream  - Collaborate with interdisciplinary team (i e  Nutrition, Rehabilitation, etc )   - Patient/family teaching  Outcome: Adequate for Discharge     Problem: Potential for Falls  Goal: Patient will remain free of falls  Description: INTERVENTIONS:  - Assess patient frequently for physical needs  -  Identify cognitive and physical deficits and behaviors that affect risk of falls    -  [place] fall precautions as indicated by assessment   - Educate patient/family on patient safety including physical limitations  - Instruct patient to call for assistance with activity based on assessment  - Modify environment to reduce risk of injury  - Consider OT/PT consult to assist with strengthening/mobility  Outcome: Adequate for Discharge

## 2021-05-30 NOTE — DISCHARGE SUMMARY
[facility] [address]  Discharge- Nino Hanley 1955, 72 y o  female MRN: [id number]  Unit/Bed#:  Encounter: [id number]  Primary Care Provider: Corrine Angela PA-C   Date and time admitted to hospital: 5/29/2021  5:55 PM    * Acute encephalopathy  Assessment & Plan  Patient presented to the ED via EMS for evaluation after being found down on the street  Etiology remains unclear  Despite positive UDS for opiates (which the patient has on her medication list) as well as benzodiazepines, she denies medication overdose either intentionally or unintentionally  Lab work essentially unremarkable - CPK minimally elevated and down trending, and CXR, CT of the chest/abdomen/pelvis, CT of the head, CT C-spine all negative  COVID-19, ammonia, Tylenol levels, and salicylate levels all unremarkable  Urinalysis without evidence of infection  No clear etiology for patient's symptoms found  Discussed with patient, and offered further evaluation that would happen tomorrow due to unavailability of further testing over the weekend  Testing to include ECHO and repeat MRI - however, patient reports that she has had workup in the past that was largely negative, 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 speech 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 (confirmed via discussion with mother), and is expressing wishes to leave the hospital   She appears to be competent, and at baseline mental status, again confirmed via discussion with her mother  Patient has signed out against medical advice  Hypothermia  Assessment & Plan  Likely result of cold exposure after being found down outside  Patient is normal thermic at time of discharge      Discharging Physician / Practitioner: Slime Almonte MD  PCP: Corrine Angela PA-C  Admission Date:   Admission Orders (From admission, onward)     Ordered        05/29/21 2132  Inpatient Admission  Once                   Discharge Date: 05/30/21    Resolved Problems  Date Reviewed: 5/30/2021    None Consultations During Hospital Stay:  · None    Procedures Performed:   · None    Significant Findings / Test Results:   Xr Chest 1 View Portable    Result Date: 5/29/2021  Impression: No acute cardiopulmonary disease  Workstation performed: [id number]     Ct Head Without Contrast    Result Date: 5/29/2021  Impression: No acute intracranial abnormality  Workstation performed: [id number]     Ct Cervical Spine Without Contrast    Result Date: 5/29/2021  Impression: No cervical spine fracture or traumatic malalignment  Workstation performed: [id number]     Pe Study With Ct Abdomen And Pelvis With Contrast    Result Date: 5/29/2021  Impression: 1  No findings of acute traumatic injury in the chest, abdomen or pelvis  2   No pulmonary embolism  Workstation performed: [id number]     Incidental Findings:   · As above     Test Results Pending at Discharge (will require follow up): · None     Outpatient Tests Requested:  · None    Complications:  None    Reason for Admission: Altered mental status  HPI from original H&P:     Maryanne Harkins is a 72 y o  female who presents to the ER via EMS for evaluation after being found laying on the street  Apparently patient went walking her dog  EMS reports that patient was found to have snoring respirations  She was placed on Oxygen and given narcan 0 5 mg IV  Upon arrival to the ER she was still confused able to make purposeful movement  Labs completed in the ER with results as shown below  CT head, CT Cervical spine, CTA chest with CT abdomen and pelvis completed with results as shown below  She received Zofran 4 mg IV BOOSTRIX 0 5 ml IM and Haldol 10 mg IM in the ER  She continue to be confused in the ER       At bedside in room 209 patient is awake and alert  She does present with slurred speech  She offers no specific complaint  She remembers taking her dog for a walk but can't remember falling or how she came to the hospital  Patient    I was able to speak to patient's mother Jan Monday) regarding patient current status  He mother informed me that she las spoke to her daughter about 5 pm today and she was in her normal state of health  She confirmed that she did start taking new medication after a PCP visit last week  She also states that patient had slurred speech in the past but can't recall her having slurred speech when they were out earlier  Review of patient's chart reveals that she did have admission for slurred speech in October of 2020  Patient is being admitted on inpatient status stepdown level care for further work up and management of Acute Encephalopathy, possible drug overdose, elevated D-dimer, Hypothermia  Please see above list of diagnoses and related plan for additional information  Condition at Discharge: stable     Discharge Day Visit / Exam:     * Please refer to separate progress note for these details *    Discussion with Family: Yes    Discharge instructions/Information to patient and family:   See after visit summary for information provided to patient and family  Provisions for Follow-Up Care:  See after visit summary for information related to follow-up care and any pertinent home health orders  Disposition:     Home    For Discharges to [facility] SNF:   · Not Applicable to this Patient - Not Applicable to this Patient    Planned Readmission: No     Discharge Statement:  I spent 35 minutes discharging the patient  This time was spent on the day of discharge, and included review of her prior and current medical history  I had direct contact with the patient on the day of discharge  Greater than 50% of the total time was spent examining patient, answering all patient questions, arranging and discussing plan of care with patient as well as directly providing post-discharge instructions  Additional time then spent on discharge activities      Discharge Medications:  See after visit summary for reconciled discharge medications provided to patient and family        ** Please Note: This note has been constructed using a voice recognition system **

## 2021-05-30 NOTE — H&P
[facility]  H&P- Nino Hanley 1955, 72 y o  female MRN: [id number]  Unit/Bed#:  Encounter: [id number]  Primary Care Provider: Corrine Angela PA-C   Date and time admitted to hospital: 5/29/2021  5:55 PM    * Overdose of drug, undetermined intent, initial encounter  Assessment & Plan  Possible drug overdose   As per EMS patient was found on the streets with snoring respirations  Review of PDMP reveals new Alprazolam, Zolpidem and Norco scripts  filled on 5/25/21  She had bottle of prescription Norco on her person   She received narcan 0 5 mg IV from EMS  UDS is positive for benzodiazepines and Opiates  Blood alcohol levels within normal limits  Admit to stepdown  Cardiopulmonary  monitoring   NPO for now pending speech eval  IV fluids   Continue Hypothermia management  Consider Toxicology consult  Consider Psych consult  Monitor vital signs closely  Monitor I/O daily weights  Am labs  Supportive care          Hypothermia  Assessment & Plan  Unclear etiology  Probable cold exposure   She was found lying on street by EMS  Temp at 95 6 rectally upon arrival to the room 209  Will apply valeria hugger  Continue cardiopulmonary monitoring  Monitor vital signs closely  Monitor mental status   Supportive care      Acute encephalopathy  Assessment & Plan  Presented to the ER via EMS for evaluation after being found on the street  Unclear etiology  concern for drug overdose, hypothermia  No evidence of acute infection  Tested for COVID 19 with negative results  UA negative for acute UTI  No seizure activities   Blood alcohol level within normal limits   CT head shows-No acute intracranial abnormality     Blood glucose level at 85  Neuro checks  IV fluids  Monitor vital signs closely  Continue management of Hypothermia  OT, PT, speech eval  Consider Neurology consult  Am labs  Supportive care      Elevated d-dimer  Assessment & Plan  D-dimer level at 0 77  No hx of DVT/PE  CTA chest  negative for acute pulmonary embolism  Will check VAS lower limb venous duplex study  VTE prophylaxis Lovenox 40 mg SQ daily  Monitor closely    Essential hypertension  Assessment & Plan  Blood pressure elevated while in the ER  Continue PTA medication  Monitor blood pressure closely    Bipolar 1 disorder ([address] )  Assessment & Plan  She is currently prescribed Seroquel, Zyprexia,  Review of PDMP reveals new Alprazolam, Zolpidem and Norco scripts  filled on 5/25/21  Will hold current medication   Consider inpatient psych consult pending current hospital course  Monitor closely    GERD (gastroesophageal reflux disease)  Assessment & Plan  Continue protonix    VTE Prophylaxis: Enoxaparin (Lovenox)  / sequential compression device   Code Status: Level 1- Full Code  POLST: POLST form is not discussed and not completed at this time  Discussion with family: None    Anticipated Length of Stay:  Patient will be admitted on an Inpatient basis with an anticipated length of stay of  > 2 midnights  Justification for Hospital Stay: Altered mental status Possible Drug over dose elevated D-dimer    Total Time for Visit, including Counseling / Coordination of Care: 30 minutes  Greater than 50% of this total time spent on direct patient counseling and coordination of care  Chief Complaint:   Altered mental status    History of Present Illness:    Zoila Hoskins is a 72 y o  female who presents to the ER via EMS for evaluation after being found laying on the street  Apparently patient went walking her dog  EMS reports that patient was found to have snoring respirations  She was placed on Oxygen and given narcan 0 5 mg IV  Upon arrival to the ER she was still confused able to make purposeful movement  Labs completed in the ER with results as shown below  CT head, CT Cervical spine, CTA chest with CT abdomen and pelvis completed with results as shown below  She received Zofran 4 mg IV BOOSTRIX 0 5 ml IM and Haldol 10 mg IM in the ER   She continue to be confused in the ER  At bedside in room 209 patient is awake and alert  She does present with slurred speech  She offers no specific complaint  She remembers taking her dog for a walk but can't remember falling or how she came to the hospital  Patient   I was able to speak to patient's mother Debra So) regarding patient current status  He mother informed me that she las spoke to her daughter about 5 pm today and she was in her normal state of health  She confirmed that she did start taking new medication after a PCP visit last week  She also states that patient had slurred speech in the past but can't recall her having slurred speech when they were out earlier  Review of patient's chart reveals that she did have admission for slurred speech in October of 2020  Patient is being admitted on inpatient status stepdown level care for further work up and management of Acute Encephalopathy, possible drug overdose, elevated D-dimer, Hypothermia  Review of Systems:    Review of Systems   Constitutional: Negative for appetite change, chills, fatigue and fever  HENT: Positive for hearing loss  Eyes: Negative for photophobia and visual disturbance  Respiratory: Negative for cough, chest tightness, shortness of breath and wheezing  Cardiovascular: Negative for chest pain, palpitations and leg swelling  Gastrointestinal: Negative for abdominal pain, diarrhea, nausea and vomiting  Genitourinary: Negative for difficulty urinating, dysuria, flank pain, frequency and hematuria  Musculoskeletal: Positive for gait problem  Negative for arthralgias, back pain and joint swelling  Skin: Negative for color change, pallor and rash  Neurological: Negative for dizziness, syncope, weakness and headaches  Psychiatric/Behavioral: Positive for confusion  Negative for agitation  The patient is nervous/anxious          Past Medical and Surgical History:     Past Medical History:   Diagnosis Date    Bipolar 1 disorder ([facility] )     Cancer ([facility] )     kidney    Cardiac disease     fast heart beat    Chronic pain     Fractures     GERD (gastroesophageal reflux disease)     Hard of hearing     Hypertension     [facility] cell carcinoma ([facility] )     Stroke ([facility] )     2009 affected her speech, right sided weakness    TIA (transient ischemic attack)        Past Surgical History:   Procedure Laterality Date    APPENDECTOMY      CHOLECYSTECTOMY      JOINT REPLACEMENT Bilateral      bilateral knees    NEPHRECTOMY Right     MD COLONOSCOPY FLX DX W/COLLJ SPEC WHEN PFRMD N/A 8/7/2018    Procedure: COLONOSCOPY;  Surgeon: Justin Torres MD;  Location: [facility] MAIN OR;  Service: Gastroenterology    MD OPEN TREATMENT BIMALLEOLAR ANKLE FRACTURE Right 5/14/2019    Procedure: ANKLE - Bimalleolar OPEN REDUCTION W/ INTERNAL FIXATION (ORIF); Surgeon: Rylee Phoenix; Location: [address] [place] [address] MAIN OR;  Service: Orthopedics       Meds/Allergies:    Prior to Admission medications    Medication Sig Start Date End Date Taking?  Authorizing Provider   albuterol (PROVENTIL HFA,VENTOLIN HFA) 90 mcg/act inhaler INHALE 2 PUFFS BY MOUTH EVERY 6 HOURS AS NEEDED OR WHEEZING 12/8/20   Pebbles Estrada PA-C   amLODIPine (NORVASC) 5 mg tablet Take 5 mg by mouth daily    Historical Provider, MD   aspirin 81 mg chewable tablet Chew 1 tablet (81 mg total) daily 10/17/20   Michael Johnson MD   atorvastatin (LIPITOR) 20 mg tablet Take 1 tablet (20 mg total) by mouth daily 9/6/20   Michael Johnson MD   docusate sodium (COLACE) 100 mg capsule Take by mouth 6/12/14   Historical Provider, MD   ergocalciferol (VITAMIN D2) 50,000 units Take by mouth once a week  9/11/14   Historical Provider, MD   furosemide (LASIX) 20 mg tablet Take 20 mg by mouth 2 (two) times a day    Historical Provider, MD   gabapentin (NEURONTIN) 800 mg tablet Take 800 mg by mouth 4 (four) times a day     Historical Provider, MD   HYDROcodone-acetaminophen (NORCO) 5-325 mg per tablet Take 1 tablet by mouth every 6 (six) hours as needed for pain    Historical Provider, MD   ketotifen (ZADITOR) 0 025 % ophthalmic solution Apply to eye 12/19/16   Historical Provider, MD   lidocaine (LIDODERM) 5 % Apply 1 patch topically daily Remove & Discard patch within 12 hours or as directed by MD    Historical Provider, MD   Melatonin 5 MG CAPS Take by mouth 8/31/17   Historical Provider, MD   metoprolol succinate (TOPROL-XL) 100 mg 24 hr tablet Take 200 mg by mouth     Historical Provider, MD   OLANZapine (ZyPREXA) 20 MG tablet Take 20 mg by mouth daily at bedtime    Historical Provider, MD   pantoprazole (PROTONIX) 40 mg tablet Take 40 mg by mouth daily     Historical Provider, MD   QUEtiapine (SEROquel) 100 mg tablet Take 300 mg by mouth daily at bedtime     Historical Provider, MD     I have reviewed home medications using allscripts  Allergies: Allergies   Allergen Reactions    Celecoxib Rash and Other (See Comments)     CELEBREX    Doxazosin Rash and Hives    Metaxalone Rash and Hives       Social History:     Marital Status: Single   Occupation: retired  Patient Pre-hospital Living Situation: Lives alone  Patient Pre-hospital Level of Mobility: Active  Patient Pre-hospital Diet Restrictions: None reported  Substance Use History:   Social History     Substance and Sexual Activity   Alcohol Use Not Currently    Frequency: Never    Binge frequency: Never     Social History     Tobacco Use   Smoking Status Former Smoker    Packs/day: 0 25    Years: 7 00    Pack years: 1 75    Types: Cigarettes   Smokeless Tobacco Never Used   Tobacco Comment    pt refused     Social History     Substance and Sexual Activity   Drug Use Not Currently       Family History:    History reviewed  No pertinent family history      Physical Exam:     Vitals:   Blood Pressure: 166/78 (05/29/21 2345)  Pulse: 56 (05/29/21 2345)  Temperature: (!) 96 8 °F (36 °C) (05/30/21 0234)  Temp Source: Tympanic (05/30/21 0234)  Respirations: (!) 11 (05/29/21 2345)  Height: 5' 4" (162 6 cm) (05/29/21 2235)  Weight - Scale: 87 6 kg (193 lb 2 oz) (05/29/21 2235)  SpO2: 100 % (05/29/21 2345)    Physical Exam  Vitals signs reviewed  Constitutional:       General: She is not in acute distress  Appearance: She is ill-appearing  She is not toxic-appearing or diaphoretic  HENT:      Head: Normocephalic and atraumatic  Nose: No congestion or rhinorrhea  Mouth/Throat:      Mouth: Mucous membranes are dry  Pharynx: Oropharynx is clear  No oropharyngeal exudate or posterior oropharyngeal erythema  Eyes:      General:         Right eye: No discharge  Left eye: No discharge  Extraocular Movements: Extraocular movements intact  Pupils: Pupils are equal, round, and reactive to light  Neck:      Musculoskeletal: Normal range of motion and neck supple  No neck rigidity or muscular tenderness  Cardiovascular:      Rate and Rhythm: Regular rhythm  Bradycardia present  Pulses: Normal pulses  Pulmonary:      Effort: No respiratory distress  Breath sounds: No wheezing, rhonchi or rales  Chest:      Chest wall: No tenderness  Abdominal:      General: There is distension  Palpations: Abdomen is soft  Tenderness: There is no abdominal tenderness  Musculoskeletal: Normal range of motion  Right lower leg: No edema  Left lower leg: No edema  Skin:     Capillary Refill: Capillary refill takes less than 2 seconds  Coloration: Skin is not pale  Findings: Bruising present  No erythema, lesion or rash  Neurological:      Mental Status: She is oriented to person, place, and time  Comments: Slurred speech   Psychiatric:         Mood and Affect: Mood normal          Additional Data:     Lab Results: I have personally reviewed pertinent reports        Results from last 7 days   Lab Units 05/29/21  1815   WBC Thousand/uL 4 55   HEMOGLOBIN g/dL 11 9   HEMATOCRIT % 37 5   PLATELETS Thousands/uL 189 NEUTROS PCT % 48   LYMPHS PCT % 37   MONOS PCT % 7   EOS PCT % 7*     Results from last 7 days   Lab Units 05/29/21  1815   SODIUM mmol/L 143   POTASSIUM mmol/L 5 2   CHLORIDE mmol/L 112*   CO2 mmol/L 27   BUN mg/dL 25   CREATININE mg/dL 1 22   ANION GAP mmol/L 4   CALCIUM mg/dL 8 6   ALBUMIN g/dL 3 7   TOTAL BILIRUBIN mg/dL 0 43   ALK PHOS U/L 101   ALT U/L 17   AST U/L 38   GLUCOSE RANDOM mg/dL 85     Results from last 7 days   Lab Units 05/29/21  1815   INR  0 98     Results from last 7 days   Lab Units 05/29/21  2231 05/29/21  1801   POC GLUCOSE mg/dl 93 85         Results from last 7 days   Lab Units 05/29/21  1815   LACTIC ACID mmol/L 0 4*       Imaging: I have personally reviewed pertinent reports  CT cervical spine without contrast   Final Result by Jeana Tesfaye MD (05/29 1850)      No cervical spine fracture or traumatic malalignment  Workstation performed: [id number]         CT head without contrast   Final Result by Jeana Tesfaye MD (05/29 1846)      No acute intracranial abnormality  Workstation performed: [id number]         PE Study with CT Abdomen and Pelvis with contrast   Final Result by Jeana Tesfaye MD (05/29 1903)      1  No findings of acute traumatic injury in the chest, abdomen or pelvis  2   No pulmonary embolism  Workstation performed: [id number]         XR chest 1 view portable   ED Interpretation by Salvatore Lujan MD (05/29 1901)   Read by me; Radiologist to provide formal interpretation  No pneumothorax no acute process      Final Result by Kunal De La O MD (05/29 1925)      No acute cardiopulmonary disease  Workstation performed: [id number]             EKG, Pathology, and Other Studies Reviewed on Admission:   · EKG: Normal Sinus Rhythm at rate of 86 bpm    Allscripts / Epic Records Reviewed: Yes     ** Please Note: This note has been constructed using a voice recognition system   **

## 2021-05-30 NOTE — NURSING NOTE
Pt has not voided in 6hrs  RN to bladder scan per Guido Campbell PA-C  Bladder scan read >791ml  Patient attempted to void on a bedpan and on the commode but was not successful  RN explained to patient her indications for a straight cath, but patient is currently refusing to be straight cathed  Pt said not to rush her as it is still usually too early for her to void when she is at home  Pt requested for RN to let her sleep for now   Guido Campbell PA-C notified

## 2021-05-30 NOTE — ASSESSMENT & PLAN NOTE
Patient presented to the ED via EMS for evaluation after being found down on the street  Etiology remains unclear  Despite positive UDS for opiates (which the patient has on her medication list) as well as benzodiazepines, she denies medication overdose either intentionally or unintentionally  Lab work essentially unremarkable - CPK minimally elevated and down trending, and CXR, CT of the chest/abdomen/pelvis, CT of the head, CT C-spine all negative  COVID-19, ammonia, Tylenol levels, and salicylate levels all unremarkable  Urinalysis without evidence of infection  No clear etiology for patient's symptoms found  Discussed with patient, and offered further evaluation that would happen tomorrow due to unavailability of further testing over the weekend  Testing to include ECHO and repeat MRI - however, patient reports that she has had workup in the past that was largely negative, 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 speech 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 (confirmed via discussion with mother), and is expressing wishes to leave the hospital   She appears to be competent, and at baseline mental status, again confirmed via discussion with her mother  Patient has signed out against medical advice

## 2021-05-30 NOTE — ASSESSMENT & PLAN NOTE
Likely result of cold exposure after being found down outside  Patient is normal thermic at time of discharge

## 2021-05-30 NOTE — PROGRESS NOTES
Pt signed [place] paperwork, Dr Dianelys Wolfe aware, no discharge instructions to be given, taken to lobby via wc accompanied by this RN and patients mother - who is her ride, pt belongings in hand

## 2021-05-30 NOTE — ASSESSMENT & PLAN NOTE
Unclear etiology   Probable cold exposure   She was found lying on street by EMS  Temp at 95 6 rectally upon arrival to the room 209  Will apply valeria hugger  Continue cardiopulmonary monitoring  Monitor vital signs closely  Monitor mental status   Supportive care

## 2021-05-30 NOTE — ASSESSMENT & PLAN NOTE
Presented to the ER via EMS for evaluation after being found on the street  Unclear etiology  concern for drug overdose, hypothermia  No evidence of acute infection  Tested for COVID 19 with negative results  UA negative for acute UTI  No seizure activities   Blood alcohol level within normal limits   CT head shows-No acute intracranial abnormality     Blood glucose level at 85  Neuro checks  IV fluids  Monitor vital signs closely  Continue management of Hypothermia  OT, PT, speech eval  Consider Neurology consult  Am labs  Supportive care

## 2021-05-30 NOTE — ASSESSMENT & PLAN NOTE
She is currently prescribed Seroquel, Zyprexia,  Review of PDMP reveals new Alprazolam, Zolpidem and Norco scripts  filled on 5/25/21  Will hold current medication   Consider inpatient psych consult pending current hospital course  Monitor closely

## 2021-05-30 NOTE — ASSESSMENT & PLAN NOTE
D-dimer level at 0 77  No hx of DVT/PE  CTA chest  negative for acute pulmonary embolism  Will check VAS lower limb venous duplex study  VTE prophylaxis Lovenox 40 mg SQ daily  Monitor closely

## 2021-06-01 LAB
ATRIAL RATE: 86 BPM
P AXIS: 68 DEGREES
PR INTERVAL: 176 MS
QRS AXIS: 74 DEGREES
QRSD INTERVAL: 90 MS
QT INTERVAL: 380 MS
QTC INTERVAL: 454 MS
T WAVE AXIS: 65 DEGREES
VENTRICULAR RATE: 86 BPM

## 2021-06-01 PROCEDURE — 93010 ELECTROCARDIOGRAM REPORT: CPT | Performed by: INTERNAL MEDICINE

## 2021-06-02 ENCOUNTER — TRANSCRIBE ORDERS (OUTPATIENT)
Dept: ADMINISTRATIVE | Facility: HOSPITAL | Age: 66
End: 2021-06-02

## 2021-06-02 DIAGNOSIS — I67.1 CEREBRAL ANEURYSM, NONRUPTURED: Primary | ICD-10-CM

## 2021-06-04 NOTE — UTILIZATION REVIEW
Inpatient Admission Authorization Request   NOTIFICATION OF INPATIENT ADMISSION/INPATIENT AUTHORIZATION REQUEST   SERVICING FACILITY:   [address] [place] [address]  [address]  Tax ID:  [id number]  NPI: [id number]  Place of Service: Inpatient [address]  [address]  Place of Service Code: 24     ATTENDING PROVIDER:  Attending Name and NPI#: Laney [facility] [[id number]]  Address: [address]  Phone: [phone]     UTILIZATION REVIEW CONTACT:  Savanah Hernandez Utilization   Network Utilization Review Department  Phone: [phone]  Fax [phone]  Email: [email or web address]     PHYSICIAN ADVISORY SERVICES:  FOR [id number] REVIEW - MEDICAL NECESSITY DENIAL  Phone: [phone]  Fax: [phone]  Email: [email or web address]     TYPE OF REQUEST:  Inpatient Status     ADMISSION INFORMATION:  ADMISSION DATE/TIME: 5/29/21 2132  PATIENT DIAGNOSIS CODE/DESCRIPTION:  Urinary retention [R33 9]  Altered mental status [R41 82]  DISCHARGE DATE/TIME: 5/30/2021  9:25 AM  DISCHARGE DISPOSITION (IF DISCHARGED): Left against medical advice or discontinued care     IMPORTANT INFORMATION:  Please contact the Savanah Hernandez directly with any questions or concerns regarding this request  Department voicemails are confidential     Send requests for admission clinical reviews, concurrent reviews, approvals, and administrative denials due to lack of clinical to fax [phone]

## 2021-06-04 NOTE — UTILIZATION REVIEW
Initial Clinical Review    Admission: Date/Time/Statement:   Admission Orders (From admission, onward)     Ordered        05/29/21 2132  Inpatient Admission  Once                   Orders Placed This Encounter   Procedures    Inpatient Admission     Standing Status:   Standing     Number of Occurrences:   1     Order Specific Question:   Level of Care     Answer:   Level 1 Stepdown [13]     Order Specific Question:   Estimated length of stay     Answer:   More than 2 Midnights     Order Specific Question:   Certification     Answer:   I certify that inpatient services are medically necessary for this patient for a duration of greater than two midnights  See H&P and MD Progress Notes for additional information about the patient's course of treatment  ED Arrival Information     Expected Arrival Acuity Means of Arrival Escorted By Service Admission Type    - 5/29/2021 17:55 [facility]        Chief Complaint   Patient presents with    Altered Mental Status     found by amy Arellano       Initial Presentation:   72 y o  female who presents to the ER via EMS for evaluation after being found laying on the street  Apparently patient went walking her dog  EMS reports that patient was found to have snoring respirations  She was placed on Oxygen and given narcan 0 5 mg IV  Upon arrival to the ER she was still confused able to make purposeful movement  Hx bipolar 1 disorder chronic pain TIA 2009 with difficulties with speech and right-sided weakness hypertension renal cell carcinoma past surgical history includes right nephrectomy appendectomy cholecystectomy last tetanus was 2015  Admission work-up showing elevated CK, BNP, d-dimer, lactic acid, +UDS  Admitted to inpatient status for Acute Encephalopathy, possible drug overdose, elevated D-dimer, Hypothermia      5/30/21:  Patient has signed out against medical advice      ED Triage Vitals   Temperature Pulse Respirations Blood Pressure SpO2   05/29/21 1758 05/29/21 1758 05/29/21 1758 05/29/21 1758 05/29/21 1758   (!) 97 2 °F (36 2 °C) 87 18 160/94 98 %      Temp Source Heart Rate Source Patient Position - Orthostatic VS BP Location FiO2 (%)   05/29/21 1758 05/29/21 1758 05/29/21 1758 05/30/21 0718 --   Temporal Monitor Lying Right arm       Pain Score       05/29/21 2245       No Pain          Wt Readings from Last 1 Encounters:   05/30/21 90 6 kg (199 lb 11 8 oz)     Additional Vital Signs:   05/30/21 0100  --  57  14  106/72  85  98 %  --  --  --  --   05/30/21 0057  95 8 °F (35 4 °C)Abnormal   --  --  --  --  --  --  --  --  --   05/30/21 0045  --  56  11Abnormal   96/55  71  96 %  --  --  --  --   05/30/21 0015  --  55  11Abnormal   108/58  78  97 %  --  --  --  --   05/29/21 2345  --  56  11Abnormal   166/78  112  100 %  --  --  --  --   05/29/21 2330  95 6 °F (35 3 °C)Abnormal   56  12  157/74  106  99 %  --  --  --  --   05/29/21 2300  --  --  --  --  --  95 %  28  2 L/min  Nasal cannula  --   05/29/21 2200  95 5 °F (35 3 °C)Abnormal   56  13  165/81  --  97 %  --  --  --  Lying   05/29/21 2130  --  54Abnormal   22  149/76  --  93 %  --  --  --  Lying   05/29/21 21:02:59  --  58  18  171/72Abnormal   --  96 %  --  --  --  Lying   05/29/21 2030  --  51Abnormal   20  118/70  --  96 %  --  --  --  Lying   05/29/21 2000  --  57  17  148/71  --  94 %  --  --  --  Lying   05/29/21 1930  --  58  24Abnormal   142/68  --  94 %  --  --  --  Lying   05/29/21 1900  --  73  14  152/69  --  97 %  --  --  --  Lying   05/29/21 1845  --  73  12  166/80  --  97 %  --  --  --  Sitting   05/29/21 1830  --  70  14  131/86  --  95 %  --  --  --  Lying     Pertinent Labs/Diagnostic Test Results:   Results from last 7 days   Lab Units 05/29/21 1815   SARS-COV-2  Negative     Results from last 7 days   Lab Units 05/30/21  0549 05/29/21 1815   WBC Thousand/uL 4 20* 4 55 HEMOGLOBIN g/dL 11 7 11 9   HEMATOCRIT % 36 7 37 5   PLATELETS Thousands/uL 184 189   NEUTROS ABS Thousands/µL  --  2 14     Results from last 7 days   Lab Units 05/30/21  0549 05/29/21  1815   SODIUM mmol/L 146* 143   POTASSIUM mmol/L 4 2 5 2   CHLORIDE mmol/L 112* 112*   CO2 mmol/L 24 27   ANION GAP mmol/L 10 4   BUN mg/dL 19 25   CREATININE mg/dL 0 96 1 22   EGFR ml/min/1 73sq m 62 47   CALCIUM mg/dL 8 2* 8 6   MAGNESIUM mg/dL 2 0 2 2   PHOSPHORUS mg/dL 3 0  --      Results from last 7 days   Lab Units 05/30/21  0549 05/29/21  1815   AST U/L 28 38   ALT U/L 15 17   ALK PHOS U/L 91 101   TOTAL PROTEIN g/dL 6 0* 6 7   ALBUMIN g/dL 3 0* 3 7   TOTAL BILIRUBIN mg/dL 0 34 0 43   AMMONIA umol/L  --  <10*     Results from last 7 days   Lab Units 05/29/21  2231 05/29/21  1801   POC GLUCOSE mg/dl 93 85     Results from last 7 days   Lab Units 05/30/21  0549 05/29/21  1815   GLUCOSE RANDOM mg/dL 89 85     Results from last 7 days   Lab Units 05/29/21  1936   PH ART  7 315*   PCO2 ART mm Hg 44 8*   PO2 ART mm Hg 75 3   HCO3 ART mmol/L 22 3   BASE EXC ART mmol/L -3 8   O2 CONTENT ART mL/dL 15 9*   O2 HGB, ARTERIAL % 91 9*   ABG SOURCE  Brachial, Left     Results from last 7 days   Lab Units 05/29/21  1815   PH LONG  7 342   PCO2 LONG mm Hg 48 9   PO2 LONG mm Hg 39 2   HCO3 LONG mmol/L 25 9   BASE EXC LONG mmol/L -0 3   O2 CONTENT LONG ml/dL 13 2   O2 HGB, VENOUS % 72 6     Results from last 7 days   Lab Units 05/30/21  0549 05/29/21  1815   CK TOTAL U/L 442* 451*   CK MB INDEX % <1 0 1 0   CK MB ng/mL 4 2 4 7     Results from last 7 days   Lab Units 05/29/21  1815   TROPONIN I ng/mL <0 02     Results from last 7 days   Lab Units 05/29/21  1815   D-DIMER QUANTITATIVE ug/ml FEU 0 77*     Results from last 7 days   Lab Units 05/29/21 1815   PROTIME seconds 12 8   INR  0 98   PTT seconds 28     Results from last 7 days   Lab Units 05/29/21 1815   TSH 3RD GENERATON uIU/mL 0 848     Results from last 7 days   Lab Units 05/29/21 1815 LACTIC ACID mmol/L 0 4*     Results from last 7 days   Lab Units 05/29/21  1815   NT-PRO BNP pg/mL 144 0*     Results from last 7 days   Lab Units 05/29/21 1815   LIPASE u/L 52*     Results from last 7 days   Lab Units 05/29/21 2028   CLARITY UA  Clear   COLOR UA  Yellow   SPEC GRAV UA  1 020   PH UA  6 5   GLUCOSE UA mg/dl Negative   KETONES UA mg/dl Negative   BLOOD UA  Negative   PROTEIN UA mg/dl Negative   NITRITE UA  Negative   BILIRUBIN UA  Negative   UROBILINOGEN UA E U /dl 0 2   LEUKOCYTES UA  Negative     Results from last 7 days   Lab Units 05/29/21 2028   AMPH/METH  Negative   BARBITURATE UR  Negative   BENZODIAZEPINE UR  Positive*   COCAINE UR  Negative   METHADONE URINE  Negative   OPIATE UR  Positive*   PCP UR  Negative   THC UR  Negative     Results from last 7 days   Lab Units 05/29/21 1815   ETHANOL LVL mg/dL <3   ACETAMINOPHEN LVL ug/mL <2*   SALICYLATE LVL mg/dL 5 0       CT cervical spine without contrast   Final Result (05/29 1850)       No cervical spine fracture or traumatic malalignment        CT head without contrast   Final Result (05/29 1846)       No acute intracranial abnormality        PE Study with CT Abdomen and Pelvis with contrast   Final Result (05/29 1903)       1  No findings of acute traumatic injury in the chest, abdomen or pelvis        2  No pulmonary embolism        XR chest 1 view portable   ED Interpretation (05/29 1901)   Read by me; Radiologist to provide formal interpretation  No pneumothorax no acute process       Final Result (05/29 1925)       No acute cardiopulmonary disease       Ekg=  Rhythm: sinus rhythm    QRS:     QRS axis:  Normal  Comments: twi v1, v2 seen prev no acute ischemic changes [id number]    ED Treatment:   Medication Administration from 05/29/2021 1754 to 05/29/2021 2232       Date/Time Order Dose Route Action     05/29/2021 1807 naloxone (FOR EMS ONLY) [facility]) 2 MG/2ML injection 2 mg 0 mg Does not apply Given to EMS     05/29/2021 1818 ondansetron (ZOFRAN) injection 4 mg 4 mg Intravenous Given     05/29/2021 1947 tetanus-diphtheria-acellular pertussis (BOOSTRIX) IM injection 0 5 mL 0 5 mL Intramuscular Given     05/29/2021 1815 haloperidol lactate (HALDOL) injection 10 mg 10 mg Intramuscular Given     05/29/2021 1841 iohexol (OMNIPAQUE) 350 MG/ML injection (SINGLE-DOSE) 100 mL 100 mL Intravenous Given        Past Medical History:   Diagnosis Date    Bipolar 1 disorder ([facility] )     Cancer ([place] [address] )     kidney    Cardiac disease     fast heart beat    Chronic pain     Fractures     GERD (gastroesophageal reflux disease)     Hard of hearing     Hypertension     Renal cell carcinoma ([facility] )     Stroke ([place] [address] )     2009 affected her speech, right sided weakness    TIA (transient ischemic attack)      Admitting Diagnosis: Urinary retention [R33 9]  Altered mental status [R41 82]  Age/Sex: 72 y o  female  Admission Orders:  Pt/ot/st eval & tx  scd  Neuro checks q4h  Forced warm air blanket    Scheduled Medications:  norvasc daily  Asa daily  lipitor daily  Colace daily  Lovenox daily  Lasix bid  Metoprolol daily  protonix daily    Continuous IV Infusions:  sodium chloride 0 9 % infusion   Rate: 75 mL/hr Dose: 75 mL/hr  Freq: Continuous Route: IV  Indications of Use: IV Hydration  Last Dose: Stopped (05/30/21 0938)  Start: 05/29/21 2300 End: 05/30/21 0803    Network Utilization Review Department  ATTENTION: Please call with any questions or concerns to [phone] and carefully listen to the prompts so that you are directed to the right person  All voicemails are confidential   Dorita Israel all requests for admission clinical reviews, approved or denied determinations and any other requests to dedicated fax number below belonging to the campus where the patient is receiving treatment   List of dedicated fax numbers for the Facilities:  [address] [place] [address] DENIALS (Administrative/Medical Necessity) [phone]   [address] [place] [address] (Maternity/NICU/Pediatrics) 261 [facility],[address] [facility] [address] [place] [address] [address] [place] [address] Chanelle Woodall 14[address] [facility] [phone]

## 2021-06-05 NOTE — UTILIZATION REVIEW
Inpatient Admission Authorization Request   NOTIFICATION OF INPATIENT ADMISSION/INPATIENT AUTHORIZATION REQUEST   SERVICING FACILITY:   [address] [place] [address]  [address], [place], [address]  Tax ID:  [id number]  NPI: [id number]  Place of Service: Inpatient [address] [facility]  60[address]  Place of Service Code: 24     ATTENDING PROVIDER:  Attending Name and NPI#: Marylen Setter, [place] [[id number]]  Address: [address]  Phone: [phone]     UTILIZATION REVIEW CONTACT:  [facility] Utilization   Network Utilization Review Department  Phone: [phone]  Fax [phone]  Email: [email or web address]     PHYSICIAN ADVISORY SERVICES:  FOR [email or web address] REVIEW - MEDICAL NECESSITY DENIAL  Phone: [phone]  Fax: [phone]  Email: [email or web address]     TYPE OF REQUEST:  Inpatient Status     ADMISSION INFORMATION:  ADMISSION DATE/TIME: 5/29/21 2132  PATIENT DIAGNOSIS CODE/DESCRIPTION:  Urinary retention [R33 9]  Altered mental status [R41 82]  DISCHARGE DATE/TIME: 5/30/2021  9:25 AM  DISCHARGE DISPOSITION (IF DISCHARGED): Left against medical advice or discontinued care     IMPORTANT INFORMATION:  Please contact the Anna Turner directly with any questions or concerns regarding this request  Department voicemails are confidential     Send requests for admission clinical reviews, concurrent reviews, approvals, and administrative denials due to lack of clinical to fax [phone]       Initial Clinical Review   Admission: Date/Time/Statement:       Admission Orders (From admission, onward)               Ordered        05/29/21 2132  Inpatient Admission Once                       Orders Placed This Encounter   Procedures    Inpatient Admission     Standing Status:  Standing     Number of Occurrences:  1     Order Specific Question:  Level of Care     Answer:  Level 1 Stepdown [13]     Order Specific Question: Estimated length of stay     Answer:  More than 2 Midnights     Order Specific Question:  Certification     Answer:  I certify that inpatient services are medically necessary for this patient for a duration of greater than two midnights  See H&P and MD Progress Notes for additional information about the patient's course of treatment  ED Arrival Information       Expected Arrival Acuity Means of Arrival Escorted By Service Admission Type    - 5/29/2021 17:55 Emergent Ambulance [facility]               Chief Complaint   Patient presents with    Altered Mental Status     found by amy Walls     Initial Presentation:   72 y o  female who presents to the ER via EMS for evaluation after being found laying on the street  Apparently patient went walking her dog  EMS reports that patient was found to have snoring respirations  She was placed on Oxygen and given narcan 0 5 mg IV  Upon arrival to the ER she was still confused able to make purposeful movement  Hx bipolar 1 disorder chronic pain TIA 2009 with difficulties with speech and right-sided weakness hypertension renal cell carcinoma past surgical history includes right nephrectomy appendectomy cholecystectomy last tetanus was 2015  Admission work-up showing elevated CK, BNP, d-dimer, lactic acid, +UDS  Admitted to inpatient status for Acute Encephalopathy, possible drug overdose, elevated D-dimer, Hypothermia  5/30/21:   Patient has signed out against medical advice            ED Triage Vitals   Temperature Pulse Respirations Blood Pressure SpO2   05/29/21 1758 05/29/21 1758 05/29/21 1758 05/29/21 1758 05/29/21 1758   (!) 97 2 °F (36 2 °C) 87 18 160/94 98 %      Temp Source Heart Rate Source Patient Position - Orthostatic VS BP Location FiO2 (%)   05/29/21 1758 05/29/21 1758 05/29/21 1758 05/30/21 0718 --   Temporal Monitor Lying Right arm       Pain Score 05/29/21 2245       No Pain             Wt Readings from Last 1 Encounters:   05/30/21 90 6 kg (199 lb 11 8 oz)     Additional Vital Signs:   05/30/21 0100  --  57  14  106/72  85  98 %  --  --  --  --   05/30/21 0057  95 8 °F (35 4 °C)Abnormal   --  --  --  --  --  --  --  --  --   05/30/21 0045  --  56  11Abnormal   96/55  71  96 %  --  --  --  --   05/30/21 0015  --  55  11Abnormal   108/58  78  97 %  --  --  --  --   05/29/21 2345  --  56  11Abnormal   166/78  112  100 %  --  --  --  --   05/29/21 2330  95 6 °F (35 3 °C)Abnormal   56  12  157/74  106  99 %  --  --  --  --   05/29/21 2300  --  --  --  --  --  95 %  28  2 L/min  Nasal cannula  --   05/29/21 2200  95 5 °F (35 3 °C)Abnormal   56  13  165/81  --  97 %  --  --  --  Lying   05/29/21 2130  --  54Abnormal   22  149/76  --  93 %  --  --  --  Lying   05/29/21 21:02:59  --  58  18  171/72Abnormal   --  96 %  --  --  --  Lying   05/29/21 2030  --  51Abnormal   20  118/70  --  96 %  --  --  --  Lying   05/29/21 2000  --  57  17  148/71  --  94 %  --  --  --  Lying   05/29/21 1930  --  58  24Abnormal   142/68  --  94 %  --  --  --  Lying   05/29/21 1900  --  73  14  152/69  --  97 %  --  --  --  Lying   05/29/21 1845  --  73  12  166/80  --  97 %  --  --  --  Sitting   05/29/21 1830  --  70  14  131/86  --  95 %  --  --  --  Lying   Pertinent Labs/Diagnostic Test Results:        Results from last 7 days   Lab Units 05/29/21   1815   SARS-COV-2  Negative           Results from last 7 days   Lab Units 05/30/21   0549 05/29/21   1815   WBC Thousand/uL 4 20* 4 55   HEMOGLOBIN g/dL 11 7 11 9   HEMATOCRIT % 36 7 37 5   PLATELETS Thousands/uL 184 189   NEUTROS ABS Thousands/µL --  2 14           Results from last 7 days   Lab Units 05/30/21   0549 05/29/21   1815   SODIUM mmol/L 146* 143   POTASSIUM mmol/L 4 2 5 2   CHLORIDE mmol/L 112* 112*   CO2 mmol/L 24 27   ANION GAP mmol/L 10 4   BUN mg/dL 19 25   CREATININE mg/dL 0 96 1 22   EGFR ml/min/1 73sq m 62 47 CALCIUM mg/dL 8 2* 8 6   MAGNESIUM mg/dL 2 0 2 2   PHOSPHORUS mg/dL 3 0 --            Results from last 7 days   Lab Units 05/30/21   0549 05/29/21   1815   AST U/L 28 38   ALT U/L 15 17   ALK PHOS U/L 91 101   TOTAL PROTEIN g/dL 6 0* 6 7   ALBUMIN g/dL 3 0* 3 7   TOTAL BILIRUBIN mg/dL 0 34 0 43   AMMONIA umol/L --  <10*           Results from last 7 days   Lab Units 05/29/21   2231 05/29/21   1801   POC GLUCOSE mg/dl 93 85           Results from last 7 days   Lab Units 05/30/21   0549 05/29/21   1815   GLUCOSE RANDOM mg/dL 89 85          Results from last 7 days   Lab Units 05/29/21   1936   PH ART  7 315*   PCO2 ART mm Hg 44 8*   PO2 ART mm Hg 75 3   HCO3 ART mmol/L 22 3   BASE EXC ART mmol/L -3 8   O2 CONTENT ART mL/dL 15 9*   O2 HGB, ARTERIAL % 91 9*   ABG SOURCE  Brachial, Left          Results from last 7 days   Lab Units 05/29/21   1815   PH LONG  7 342   PCO2 LONG mm Hg 48 9   PO2 LONG mm Hg 39 2   HCO3 LONG mmol/L 25 9   BASE EXC LONG mmol/L -0 3   O2 CONTENT LONG ml/dL 13 2   O2 HGB, VENOUS % 72 6           Results from last 7 days   Lab Units 05/30/21   0549 05/29/21   1815   CK TOTAL U/L 442* 451*   CK MB INDEX % <1 0 1 0   CK MB ng/mL 4 2 4 7          Results from last 7 days   Lab Units 05/29/21   1815   TROPONIN I ng/mL <0 02          Results from last 7 days   Lab Units 05/29/21   1815   D-DIMER QUANTITATIVE ug/ml FEU 0 77*          Results from last 7 days   Lab Units 05/29/21   1815   PROTIME seconds 12 8   INR  0 98   PTT seconds 28          Results from last 7 days   Lab Units 05/29/21   1815   TSH 3RD GENERATON uIU/mL 0 848          Results from last 7 days   Lab Units 05/29/21   1815   LACTIC ACID mmol/L 0 4*          Results from last 7 days   Lab Units 05/29/21   1815   NT-PRO BNP pg/mL 144 0*          Results from last 7 days   Lab Units 05/29/21   1815   LIPASE u/L 52*          Results from last 7 days   Lab Units 05/29/21 2028   CLARITY UA  Clear   COLOR UA  Yellow   SPEC GRAV UA  1 020   PH UA  6 5   GLUCOSE UA mg/dl Negative   KETONES UA mg/dl Negative   BLOOD UA  Negative   PROTEIN UA mg/dl Negative   NITRITE UA  Negative   BILIRUBIN UA  Negative   UROBILINOGEN UA E U /dl 0 2   LEUKOCYTES UA  Negative          Results from last 7 days   Lab Units 05/29/21 2028   AMPH/METH  Negative   BARBITURATE UR  Negative   BENZODIAZEPINE UR  Positive*   COCAINE UR  Negative   METHADONE URINE  Negative   OPIATE UR  Positive*   PCP UR  Negative   THC UR  Negative          Results from last 7 days   Lab Units 05/29/21 1815   ETHANOL LVL mg/dL <3   ACETAMINOPHEN LVL ug/mL <2*   SALICYLATE LVL mg/dL 5 0     CT cervical spine without contrast   Final Result (05/29 1850)      No cervical spine fracture or traumatic malalignment  CT head without contrast   Final Result (05/29 1846)      No acute intracranial abnormality  PE Study with CT Abdomen and Pelvis with contrast   Final Result (05/29 1903)      1  No findings of acute traumatic injury in the chest, abdomen or pelvis  2  No pulmonary embolism  XR chest 1 view portable   ED Interpretation (05/29 1901)   Read by me; Radiologist to provide formal interpretation  No pneumothorax no acute process      Final Result (05/29 1925)      No acute cardiopulmonary disease     Ekg=   Rhythm: sinus rhythm   QRS:   QRS axis: Normal   Comments: twi v1, v2 seen prev no acute ischemic changes [id number]   ED Treatment:             Medication Administration from 05/29/2021 1754 to 05/29/2021 2232        Date/Time Order Dose Route Action     05/29/2021 1807 naloxone (FOR EMS ONLY) [facility]) 2 MG/2ML injection 2 mg 0 mg Does not apply Given to EMS     05/29/2021 1818 ondansetron (ZOFRAN) injection 4 mg 4 mg Intravenous Given     05/29/2021 1947 tetanus-diphtheria-acellular pertussis (BOOSTRIX) IM injection 0 5 mL 0 5 mL Intramuscular Given     05/29/2021 1815 haloperidol lactate (HALDOL) injection 10 mg 10 mg Intramuscular Given     05/29/2021 1841 iohexol (OMNIPAQUE) 350 MG/ML injection (SINGLE-DOSE) 100 mL 100 mL Intravenous Given          Medical History                                                                                                   Admitting Diagnosis: Urinary retention [R33 9]   Altered mental status [R41 82]   Age/Sex: 72 y o  female   Admission Orders:   Pt/ot/st eval & tx   scd   Neuro checks q4h   Forced warm air blanket   Scheduled Medications:   norvasc daily   Asa daily   lipitor daily   Colace daily   Lovenox daily   Lasix bid   Metoprolol daily   protonix daily   Continuous IV Infusions:   sodium chloride 0 9 % infusion   Rate: 75 mL/hr Dose: 75 mL/hr  Freq: Continuous Route: IV  Indications of Use: IV Hydration  Last Dose: Stopped (05/30/21 0938)  Start: 05/29/21 2300 End: 05/30/21 0803   Network Utilization Review Department   ATTENTION: Please call with any questions or concerns to [phone] and carefully listen to the prompts so that you are directed to the right person  All voicemails are confidential    Rasheeda Skipper all requests for admission clinical reviews, approved or denied determinations and any other requests to dedicated fax number below belonging to the campus where the patient is receiving treatment   List of dedicated fax numbers for the Facilities:   [address] DENIALS (Administrative/Medical Necessity) [phone]   [address] (Maternity/NICU/Pediatrics) [phone] [address] Dereje Martinez [address] [place] [address] Chanelle Woodall 1481 [phone]   [place] [address] [place] [id number] [phone]

## 2021-06-08 NOTE — UTILIZATION REVIEW
Lezlie Baumgarten, MD   Physician   Hospitalist   Discharge Summary       Signed   Date of Service:  5/30/2021  9:25 AM               Signed             Show:Clear all  [x]Manual[x]Template[x]Copied    Added by:  [x]Marv Heath MD    []Cecilia for details  [address]  Discharge- Wily Castro 1955, 72 y o  female MRN: [id number]  Unit/Bed#:  Encounter: [id number]  Primary Care Provider: Canelo Becerra PA-C   Date and time admitted to hospital: 5/29/2021  5:55 PM     * Acute encephalopathy  Assessment & Plan  Patient presented to the ED via EMS for evaluation after being found down on the street  Etiology remains unclear  Despite positive UDS for opiates (which the patient has on her medication list) as well as benzodiazepines, she denies medication overdose either intentionally or unintentionally  Lab work essentially unremarkable - CPK minimally elevated and down trending, and CXR, CT of the chest/abdomen/pelvis, CT of the head, CT C-spine all negative  COVID-19, ammonia, Tylenol levels, and salicylate levels all unremarkable  Urinalysis without evidence of infection  No clear etiology for patient's symptoms found        Discussed with patient, and offered further evaluation that would happen tomorrow due to unavailability of further testing over the weekend  Testing to include ECHO and repeat MRI - however, patient reports that she has had workup in the past that was largely negative, 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 speech 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 (confirmed via discussion with mother), and is expressing wishes to leave the hospital   She appears to be competent, and at baseline mental status, again confirmed via discussion with her mother  Patient has signed out against medical advice      Hypothermia  Assessment & Plan  Likely result of cold exposure after being found down outside    Patient is normal thermic at time of discharge      Discharging Physician / Practitioner: Kayleen Hernadez William Yuen MD  PCP: Em Ribeiro PA-C  Admission Date:       Admission Orders (From admission, onward)              Ordered          05/29/21 2132   Inpatient Admission  Once                       Discharge Date: 05/30/21          Resolved Problems  Date Reviewed: 5/30/2021     None             Consultations During Hospital Stay:  · None     Procedures Performed:   · None     Significant Findings / Test Results:   Xr Chest 1 View Portable     Result Date: 5/29/2021  Impression: No acute cardiopulmonary disease  Workstation performed: [id number]      Ct Head Without Contrast     Result Date: 5/29/2021  Impression: No acute intracranial abnormality  Workstation performed: [id number]      Ct Cervical Spine Without Contrast     Result Date: 5/29/2021  Impression: No cervical spine fracture or traumatic malalignment  Workstation performed: [id number]      Pe Study With Ct Abdomen And Pelvis With Contrast     Result Date: 5/29/2021  Impression: 1  No findings of acute traumatic injury in the chest, abdomen or pelvis  2   No pulmonary embolism  Workstation performed: [id number]      Incidental Findings:   · As above      Test Results Pending at Discharge (will require follow up): · None     Outpatient Tests Requested:  · None     Complications:  None     Reason for Admission: Altered mental status      HPI from original H&P:      Binh Davis a 72 y  o  female who presents to the ER via EMS for evaluation after being found laying on the street  Apparently patient went walking her dog  EMS reports that patient was found to have snoring respirations  She was placed on Oxygen and given narcan 0 5 mg IV  Upon arrival to the ER she was still confused able to make purposeful movement  Labs completed in the ER with results as shown below  CT head, CT Cervical spine, CTA chest with CT abdomen and pelvis completed with results as shown below   She received Zofran 4 mg IV BOOSTRIX 0 5 ml IM and Haldol 10 mg IM in the ER  She continue to be confused in the ER       At bedside in room 209 patient is awake and alert  She does present with slurred speech  She offers no specific complaint  She remembers taking her dog for a walk but can't remember falling or how she came to the hospital  Patient   I was able to speak to patient's mother Jean Burgos) regarding patient current status  He mother informed me that she las spoke to her daughter about 5 pm today and she was in her normal state of health  She confirmed that she did start taking new medication after a PCP visit last week  She also states that patient had slurred speech in the past but can't recall her having slurred speech when they were out earlier  Review of patient's chart reveals that she did have admission for slurred speech in October of 2020  Patient is being admitted on inpatient status stepdown level care for further work up and management of Acute Encephalopathy, possible drug overdose, elevated D-dimer, Hypothermia       Please see above list of diagnoses and related plan for additional information       Condition at Discharge: stable      Discharge Day Visit / Exam:      * Please refer to separate progress note for these details *     Discussion with Family: Yes     Discharge instructions/Information to patient and family:   See after visit summary for information provided to patient and family        Provisions for Follow-Up Care:  See after visit summary for information related to follow-up care and any pertinent home health orders        Disposition:      Home     For Discharges to [facility]:   · Not Applicable to this Patient - Not Applicable to this Patient     Planned Readmission: No     Discharge Statement:  I spent 35 minutes discharging the patient  This time was spent on the day of discharge, and included review of her prior and current medical history  I had direct contact with the patient on the day of discharge   Greater than 50% of the total time was spent examining patient, answering all patient questions, arranging and discussing plan of care with patient as well as directly providing post-discharge instructions    Additional time then spent on discharge activities      Discharge Medications:  See after visit summary for reconciled discharge medications provided to patient and family        ** Please Note: This note has been constructed using a voice recognition system **

## 2021-06-11 ENCOUNTER — APPOINTMENT (EMERGENCY)
Dept: CT IMAGING | Facility: HOSPITAL | Age: 66
End: 2021-06-11
Payer: MEDICARE

## 2021-06-11 ENCOUNTER — TELEPHONE (OUTPATIENT)
Dept: FAMILY MEDICINE CLINIC | Facility: CLINIC | Age: 66
End: 2021-06-11

## 2021-06-11 ENCOUNTER — HOSPITAL ENCOUNTER (EMERGENCY)
Facility: HOSPITAL | Age: 66
Discharge: HOME | End: 2021-06-11
Attending: EMERGENCY MEDICINE | Admitting: EMERGENCY MEDICINE
Payer: MEDICARE

## 2021-06-11 VITALS
HEIGHT: 64 IN | DIASTOLIC BLOOD PRESSURE: 81 MMHG | WEIGHT: 199.74 LBS | BODY MASS INDEX: 34.1 KG/M2 | OXYGEN SATURATION: 99 % | TEMPERATURE: 97.9 F | HEART RATE: 62 BPM | RESPIRATION RATE: 18 BRPM | SYSTOLIC BLOOD PRESSURE: 172 MMHG

## 2021-06-11 DIAGNOSIS — N12 PYELONEPHRITIS OF LEFT KIDNEY: ICD-10-CM

## 2021-06-11 DIAGNOSIS — R31.9 HEMATURIA: ICD-10-CM

## 2021-06-11 DIAGNOSIS — R10.9 ACUTE LEFT FLANK PAIN: ICD-10-CM

## 2021-06-11 DIAGNOSIS — N39.0 URINARY TRACT INFECTION: Primary | ICD-10-CM

## 2021-06-11 LAB
ANION GAP SERPL CALCULATED.3IONS-SCNC: 11 MMOL/L (ref 4–13)
BACTERIA UR QL AUTO: ABNORMAL /HPF
BASOPHILS # BLD AUTO: 0.06 THOUSANDS/ΜL (ref 0–0.1)
BASOPHILS NFR BLD AUTO: 1 % (ref 0–1)
BILIRUB UR QL STRIP: ABNORMAL
BUN SERPL-MCNC: 28 MG/DL (ref 5–25)
CALCIUM SERPL-MCNC: 8.6 MG/DL (ref 8.3–10.1)
CHLORIDE SERPL-SCNC: 113 MMOL/L (ref 100–108)
CLARITY UR: ABNORMAL
CO2 SERPL-SCNC: 22 MMOL/L (ref 21–32)
COLOR UR: ABNORMAL
CREAT SERPL-MCNC: 1.25 MG/DL (ref 0.6–1.3)
EOSINOPHIL # BLD AUTO: 0.23 THOUSAND/ΜL (ref 0–0.61)
EOSINOPHIL NFR BLD AUTO: 4 % (ref 0–6)
ERYTHROCYTE [DISTWIDTH] IN BLOOD BY AUTOMATED COUNT: 13.7 % (ref 11.6–15.1)
GFR SERPL CREATININE-BSD FRML MDRD: 45 ML/MIN/1.73SQ M
GLUCOSE SERPL-MCNC: 92 MG/DL (ref 65–140)
GLUCOSE UR STRIP-MCNC: NEGATIVE MG/DL
HCT VFR BLD AUTO: 36.1 % (ref 34.8–46.1)
HGB BLD-MCNC: 11.6 G/DL (ref 11.5–15.4)
HGB UR QL STRIP.AUTO: ABNORMAL
IMM GRANULOCYTES # BLD AUTO: 0.06 THOUSAND/UL (ref 0–0.2)
IMM GRANULOCYTES NFR BLD AUTO: 1 % (ref 0–2)
KETONES UR STRIP-MCNC: ABNORMAL MG/DL
LEUKOCYTE ESTERASE UR QL STRIP: ABNORMAL
LYMPHOCYTES # BLD AUTO: 1.37 THOUSANDS/ΜL (ref 0.6–4.47)
LYMPHOCYTES NFR BLD AUTO: 21 % (ref 14–44)
MCH RBC QN AUTO: 35.2 PG (ref 26.8–34.3)
MCHC RBC AUTO-ENTMCNC: 32.1 G/DL (ref 31.4–37.4)
MCV RBC AUTO: 109 FL (ref 82–98)
MONOCYTES # BLD AUTO: 0.36 THOUSAND/ΜL (ref 0.17–1.22)
MONOCYTES NFR BLD AUTO: 6 % (ref 4–12)
NEUTROPHILS # BLD AUTO: 4.31 THOUSANDS/ΜL (ref 1.85–7.62)
NEUTS SEG NFR BLD AUTO: 67 % (ref 43–75)
NITRITE UR QL STRIP: NEGATIVE
NON-SQ EPI CELLS URNS QL MICRO: ABNORMAL /HPF
NRBC BLD AUTO-RTO: 0 /100 WBCS
PH UR STRIP.AUTO: 6.5 [PH]
PLATELET # BLD AUTO: 276 THOUSANDS/UL (ref 149–390)
PMV BLD AUTO: 9.4 FL (ref 8.9–12.7)
POTASSIUM SERPL-SCNC: 4.9 MMOL/L (ref 3.5–5.3)
PROT UR STRIP-MCNC: ABNORMAL MG/DL
RBC # BLD AUTO: 3.3 MILLION/UL (ref 3.81–5.12)
RBC #/AREA URNS AUTO: ABNORMAL /HPF
SODIUM SERPL-SCNC: 146 MMOL/L (ref 136–145)
SP GR UR STRIP.AUTO: 1.02 (ref 1–1.03)
UROBILINOGEN UR QL STRIP.AUTO: 1 E.U./DL
WBC # BLD AUTO: 6.39 THOUSAND/UL (ref 4.31–10.16)
WBC #/AREA URNS AUTO: ABNORMAL /HPF

## 2021-06-11 PROCEDURE — 99284 EMERGENCY DEPT VISIT MOD MDM: CPT | Performed by: EMERGENCY MEDICINE

## 2021-06-11 PROCEDURE — 85025 COMPLETE CBC W/AUTO DIFF WBC: CPT | Performed by: EMERGENCY MEDICINE

## 2021-06-11 PROCEDURE — 99284 EMERGENCY DEPT VISIT MOD MDM: CPT

## 2021-06-11 PROCEDURE — 96360 HYDRATION IV INFUSION INIT: CPT

## 2021-06-11 PROCEDURE — 36415 COLL VENOUS BLD VENIPUNCTURE: CPT | Performed by: EMERGENCY MEDICINE

## 2021-06-11 PROCEDURE — 81001 URINALYSIS AUTO W/SCOPE: CPT

## 2021-06-11 PROCEDURE — 80048 BASIC METABOLIC PNL TOTAL CA: CPT | Performed by: EMERGENCY MEDICINE

## 2021-06-11 PROCEDURE — 74176 CT ABD & PELVIS W/O CONTRAST: CPT

## 2021-06-11 PROCEDURE — 87086 URINE CULTURE/COLONY COUNT: CPT

## 2021-06-11 RX ORDER — CEFTRIAXONE 1 G/50ML
1000 INJECTION, SOLUTION INTRAVENOUS ONCE
Status: DISCONTINUED | OUTPATIENT
Start: 2021-06-11 | End: 2021-06-11

## 2021-06-11 RX ORDER — CEPHALEXIN 500 MG/1
500 CAPSULE ORAL EVERY 6 HOURS SCHEDULED
Qty: 28 CAPSULE | Refills: 0 | Status: SHIPPED | OUTPATIENT
Start: 2021-06-11 | End: 2021-06-18

## 2021-06-11 RX ADMIN — SODIUM CHLORIDE 1000 ML: 0.9 INJECTION, SOLUTION INTRAVENOUS at 12:40

## 2021-06-11 NOTE — DISCHARGE INSTRUCTIONS
The urinalysis was consistent with a urinary tract infection  Due to the left flank tenderness that you had, you could have pyelonephritis which is an infection that involves the kidney  The blood in your urine is likely from the urinary tract infection  CT did not show any evidence of kidney stones  Take the antibiotic as prescribed for 7 days  Take Tylenol as needed for pain  Follow-up with your PCP within 1-2 weeks for a recheck  Return to the ER with fever, shaking chills, difficulty breathing, chest pain, worsening flank or abdominal pain, or any other concerning symptoms

## 2021-06-11 NOTE — ED PROVIDER NOTES
History  Chief Complaint   Patient presents with    Blood in Urine     blood in urine x2 days; patient states its painful to urinate at times; also thinks it may be related to "hemorrhoids"; no other complaints          42-year-old female past medical history of CVA with residual dysarthria, hypertension, hyperlipidemia, right renal cell carcinoma status post resection, GERD, and bipolar disorder who is presenting for evaluation of hematuria  Patient states that this has been ongoing for the past 2 days  No blood clots in the urine  No urinary retention  She reports intermittent dysuria during this time  The patient denies any frequency  She has never had hematuria in the past   Patient denies any fever, chills, URI symptoms, cough, chest pain, shortness of breath, nausea, or vomiting  She does have some mild suprapubic abdominal pain  No flank pain  The patient is status post nephrectomy on the right side  No other complaints noted on review of systems  Prior to Admission Medications   Prescriptions Last Dose Informant Patient Reported? Taking?    HYDROcodone-acetaminophen (NORCO) 5-325 mg per tablet   Yes No   Sig: Take 1 tablet by mouth every 6 (six) hours as needed for pain   Melatonin 5 MG CAPS  Self Yes No   Sig: Take by mouth   OLANZapine (ZyPREXA) 20 MG tablet   Yes No   Sig: Take 20 mg by mouth daily at bedtime   QUEtiapine (SEROquel) 100 mg tablet  Self Yes No   Sig: Take 300 mg by mouth daily at bedtime    albuterol (PROVENTIL HFA,VENTOLIN HFA) 90 mcg/act inhaler   No No   Sig: INHALE 2 PUFFS BY MOUTH EVERY 6 HOURS AS NEEDED OR WHEEZING   amLODIPine (NORVASC) 5 mg tablet   Yes No   Sig: Take 5 mg by mouth daily   aspirin 81 mg chewable tablet   No No   Sig: Chew 1 tablet (81 mg total) daily   atorvastatin (LIPITOR) 20 mg tablet   No No   Sig: Take 1 tablet (20 mg total) by mouth daily   docusate sodium (COLACE) 100 mg capsule  Self Yes No   Sig: Take by mouth   ergocalciferol (VITAMIN D2) 50,000 units  Self Yes No   Sig: Take by mouth once a week    furosemide (LASIX) 20 mg tablet   Yes No   Sig: Take 20 mg by mouth 2 (two) times a day   gabapentin (NEURONTIN) 800 mg tablet  Self Yes No   Sig: Take 800 mg by mouth 4 (four) times a day    ketotifen (ZADITOR) 0 025 % ophthalmic solution  Self Yes No   Sig: Apply to eye   lidocaine (LIDODERM) 5 %   Yes No   Sig: Apply 1 patch topically daily Remove & Discard patch within 12 hours or as directed by MD   metoprolol succinate (TOPROL-XL) 100 mg 24 hr tablet  Self Yes No   Sig: Take 200 mg by mouth    pantoprazole (PROTONIX) 40 mg tablet  Self Yes No   Sig: Take 40 mg by mouth daily       Facility-Administered Medications: None       Past Medical History:   Diagnosis Date    Bipolar 1 disorder (HCC)     Cancer ([facility] )     kidney    Cardiac disease     fast heart beat    Chronic pain     Fractures     GERD (gastroesophageal reflux disease)     Hard of hearing     Hypertension     Renal cell carcinoma ([facility] )     [facility]     2009 affected her speech, right sided weakness    TIA (transient ischemic attack)        Past Surgical History:   Procedure Laterality Date    APPENDECTOMY      CHOLECYSTECTOMY      JOINT REPLACEMENT Bilateral      bilateral knees    NEPHRECTOMY Right     OK COLONOSCOPY FLX DX W/COLLJ SPEC WHEN PFRMD N/A 8/7/2018    Procedure: COLONOSCOPY;  Surgeon: Carolyne Olvera MD;  Location: [facility] MAIN OR;  Service: Gastroenterology    OK OPEN TREATMENT BIMALLEOLAR ANKLE FRACTURE Right 5/14/2019    Procedure: ANKLE - Bimalleolar OPEN REDUCTION W/ INTERNAL FIXATION (ORIF); Surgeon: Ashtyn Manley; Location: [address] [place] [address] MAIN OR;  Service: Orthopedics       History reviewed  No pertinent family history  I have reviewed and agree with the history as documented      E-Cigarette/Vaping    E-Cigarette Use Never User      E-Cigarette/Vaping Substances     Social History     Tobacco Use    Smoking status: Former Smoker     Packs/day: 0 25 Years: 7 00     Pack years: 1 75     Types: Cigarettes    Smokeless tobacco: Never Used    Tobacco comment: pt refused   Substance Use Topics    Alcohol use: Not Currently     Frequency: Never     Binge frequency: Never    Drug use: Not Currently       Review of Systems   Constitutional: Negative for diaphoresis, fever and unexpected weight change  HENT: Negative for congestion, rhinorrhea and sore throat  Eyes: Negative for pain, discharge and visual disturbance  Respiratory: Negative for cough, shortness of breath and wheezing  Cardiovascular: Negative for chest pain, palpitations and leg swelling  Gastrointestinal: Positive for abdominal pain (suprapubic)  Negative for blood in stool, constipation, diarrhea, nausea and vomiting  Genitourinary: Positive for dysuria and hematuria  Negative for flank pain  Musculoskeletal: Negative for arthralgias and joint swelling  Skin: Negative for rash and wound  Allergic/Immunologic: Negative for environmental allergies and food allergies  Neurological: Negative for dizziness, seizures, weakness and numbness  Hematological: Negative for adenopathy  Psychiatric/Behavioral: Negative for confusion and hallucinations  Physical Exam  Physical Exam  Vitals signs and nursing note reviewed  Constitutional:       General: She is not in acute distress  Appearance: She is well-developed  HENT:      Head: Normocephalic and atraumatic  Right Ear: External ear normal       Left Ear: External ear normal    Eyes:      Conjunctiva/sclera: Conjunctivae normal       Pupils: Pupils are equal, round, and reactive to light  Cardiovascular:      Rate and Rhythm: Normal rate and regular rhythm  Heart sounds: Normal heart sounds  No murmur  Pulmonary:      Effort: Pulmonary effort is normal  No respiratory distress  Breath sounds: Normal breath sounds  No wheezing or rales     Abdominal:      General: Bowel sounds are normal  There is no distension  Palpations: Abdomen is soft  Tenderness: There is abdominal tenderness  There is left CVA tenderness  There is no guarding  Comments: Mild left CVA tenderness  Mild suprapubic abdominal tenderness without guarding or peritoneal signs  Musculoskeletal: Normal range of motion  General: No deformity  Skin:     General: Skin is warm and dry  Capillary Refill: Capillary refill takes less than 2 seconds  Neurological:      Mental Status: She is alert and oriented to person, place, and time  Comments: No gross motor deficits noted  Cranial nerves II-XII are intact  Chronic dysarthria from prior CVA, unchanged per patient's mother at bedside     Psychiatric:         Mood and Affect: Mood normal          Behavior: Behavior normal          Vital Signs  ED Triage Vitals [06/11/21 1044]   Temperature Pulse Respirations Blood Pressure SpO2   97 9 °F (36 6 °C) 67 16 159/80 98 %      Temp Source Heart Rate Source Patient Position - Orthostatic VS BP Location FiO2 (%)   Temporal Monitor Sitting Right arm --      Pain Score       No Pain           Vitals:    06/11/21 1230 06/11/21 1245 06/11/21 1300 06/11/21 1315   BP: 158/70 (!) 175/88 (!) 172/81    Pulse: 56 58 61 62   Patient Position - Orthostatic VS:  Lying           Visual Acuity      ED Medications  Medications   sodium chloride 0 9 % bolus 1,000 mL (0 mL Intravenous Stopped 6/11/21 1409)       Diagnostic Studies  Results Reviewed     Procedure Component Value Units Date/Time    Basic metabolic panel [[id number]]  (Abnormal) Collected: 06/11/21 1209    Lab Status: Final result Specimen: Blood from Arm, Left Updated: 06/11/21 1224     Sodium 146 mmol/L      Potassium 4 9 mmol/L      Chloride 113 mmol/L      CO2 22 mmol/L      ANION GAP 11 mmol/L      BUN 28 mg/dL      Creatinine 1 25 mg/dL      Glucose 92 mg/dL      Calcium 8 6 mg/dL      eGFR 45 ml/min/1 73sq m     Narrative:      [place] guidelines for Chronic Kidney Disease (CKD):     Stage 1 with normal or high GFR (GFR > 90 mL/min/1 73 square meters)    Stage 2 Mild CKD (GFR = 60-89 mL/min/1 73 square meters)    Stage 3A Moderate CKD (GFR = 45-59 mL/min/1 73 square meters)    Stage 3B Moderate CKD (GFR = 30-44 mL/min/1 73 square meters)    Stage 4 Severe CKD (GFR = 15-29 mL/min/1 73 square meters)    Stage 5 End Stage CKD (GFR <15 mL/min/1 73 square meters)  Note: GFR calculation is accurate only with a steady state creatinine    CBC and differential [[id number]]  (Abnormal) Collected: 06/11/21 1144    Lab Status: Final result Specimen: Blood from Arm, Left Updated: 06/11/21 1151     WBC 6 39 Thousand/uL      RBC 3 30 Million/uL      Hemoglobin 11 6 g/dL      Hematocrit 36 1 %       fL      MCH 35 2 pg      MCHC 32 1 g/dL      RDW 13 7 %      MPV 9 4 fL      Platelets 436 Thousands/uL      nRBC 0 /100 WBCs      Neutrophils Relative 67 %      Immat GRANS % 1 %      Lymphocytes Relative 21 %      Monocytes Relative 6 %      Eosinophils Relative 4 %      Basophils Relative 1 %      Neutrophils Absolute 4 31 Thousands/µL      Immature Grans Absolute 0 06 Thousand/uL      Lymphocytes Absolute 1 37 Thousands/µL      Monocytes Absolute 0 36 Thousand/µL      Eosinophils Absolute 0 23 Thousand/µL      Basophils Absolute 0 06 Thousands/µL     Urine Microscopic [[id number]]  (Abnormal) Collected: 06/11/21 1053    Lab Status: Final result Specimen: Urine, Clean Catch Updated: 06/11/21 1114     RBC, UA 30-50 /hpf      WBC, UA 10-20 /hpf      Epithelial Cells Occasional /hpf      Bacteria, UA Moderate /hpf     Urine culture [[id number]] Collected: 06/11/21 1053    Lab Status:  In process Specimen: Urine, Clean Catch Updated: 06/11/21 1114    UA w Reflex to Microscopic w Reflex to Culture [[id number]]  (Abnormal) Collected: 06/11/21 1053    Lab Status: Final result Specimen: Urine, Clean Catch Updated: 06/11/21 1100     Color, UA Red     Clarity, UA Cloudy Specific Gravity, UA 1 025     pH, UA 6 5     Leukocytes, UA Large     Nitrite, UA Negative     Protein,  (2+) mg/dl      Glucose, UA Negative mg/dl      Ketones, UA Trace mg/dl      Urobilinogen, UA 1 0 E U /dl      Bilirubin, UA Interference- unable to analyze     Blood, UA Large                 CT renal stone study abdomen pelvis wo contrast   Final Result by Jeanne Cerrato MD (06/11 1302)      No hydronephrosis or ureteral calculus visualized though the most distal portion of the ureter and bladder are not visualized due to extensive beam hardening artifact from hip arthroplasties  Workstation performed: [id number]                    Procedures  Procedures         ED Course  ED Course as of Jun 11 1601   Fri Jun 11, 2021   1116 RBC, UA(!): 30-50   1116 WBC, UA(!): 10-20   1116 Bacteria, UA(!): Moderate   1151 Unchanged from baseline  Hemoglobin: 11 6   1225 Potassium: 4 9   1225 BUN(!): 28   1225 Will give IV fluid  Creatinine appears to be approximately 1 at baseline in reviewing labs from the past few years  Creatinine: 1 25   1407 The patient's IV stopped working  I ordered IV Rocephin for her but the patient declined having another IV placed  The patient did not meet SIRS criteria and had mild pyelonephritis, so the Rocephin certainly would not be mandatory  The patient stated that she would prefer to leave and  her antibiotic prescription at the pharmacy  SBIRT 20yo+      Most Recent Value   SBIRT (24 yo +)   In order to provide better care to our patients, we are screening all of our patients for alcohol and drug use  Would it be okay to ask you these screening questions?   No Filed at: 06/11/2021 1047                    MDM  Number of Diagnoses or Management Options  Acute left flank pain: new and requires workup  Hematuria: new and requires workup  Pyelonephritis of left kidney: new and requires workup  Urinary tract infection: new and requires workup  Diagnosis management comments:     As above, the patient presented for evaluation of hematuria  Differential diagnosis included but was not limited to hemorrhagic cystitis, pyelonephritis, and ureterolithiasis  Urinalysis was consistent with UTI with hemorrhage, with moderate bacteria, pyuria, and hematuria  Was concerned for left pyelonephritis given mild left CVA tenderness  However, the patient was hemodynamically stable with normal vital signs and did not meet SIRS criteria  To exclude ureterolithiasis, ordered CT renal protocol  No hydronephrosis was seen involving the left kidney  The right kidney was absent  Unfortunately, due to artifact from bilateral hip arthroplasties, visualization of the lower ureter and bladder was compromise  However, the absence of hydronephrosis ruled out an obstructing calculus and the clinical presentation was more consistent with urinary tract infection rather than ureterolithiasis  I ordered IV Rocephin but the patient's IV stopped working and she declined another IV placement  She stated that she would prefer to  the antibiotics from the pharmacy  The patient was prescribed Keflex for a total of 7 days  She was advised to follow up with her PCP within 1-2 weeks for a recheck  Strict return precautions were provided  Patient verbalized understanding         Amount and/or Complexity of Data Reviewed  Clinical lab tests: ordered and reviewed  Tests in the radiology section of CPT®: ordered and reviewed  Decide to obtain previous medical records or to obtain history from someone other than the patient: yes  Obtain history from someone other than the patient: yes  Review and summarize past medical records: yes  Independent visualization of images, tracings, or specimens: yes    Risk of Complications, Morbidity, and/or Mortality  Presenting problems: moderate  Diagnostic procedures: minimal  Management options: minimal    Patient Progress  Patient progress: improved      Disposition  Final diagnoses:   Urinary tract infection   Hematuria   Acute left flank pain   Pyelonephritis of left kidney     Time reflects when diagnosis was documented in both MDM as applicable and the Disposition within this note     Time User Action Codes Description Comment    6/11/2021  1:54 PM Tere Aurora Add [N39 0] Urinary tract infection     6/11/2021  1:54 PM Tere Aurora Add [R31 9] Hematuria     6/11/2021  1:54 PM Tere Aurora Add [R10 9] Acute left flank pain     6/11/2021  1:56 PM Tere Aurora Add [N12] Pyelonephritis of left kidney       ED Disposition     ED Disposition Condition Date/Time Comment    Discharge Good Fri Jun 11, 2021  1:54 PM Mary Ellen Jarvis discharge to home/self care  Follow-up Information     Follow up With Specialties Details Why Contact Info Additional Information    Jessy Lakhani PA-C Family Medicine, Physician Assistant Call today Please follow-up with your PCP in 1-2 weeks for a recheck  [address] [facility] Emergency Department Emergency Medicine Go to  If symptoms worsen   [place] [phone]  [facility] Emergency Department, [address], [address]          Discharge Medication List as of 6/11/2021  1:58 PM      START taking these medications    Details   cephalexin (KEFLEX) 500 mg capsule Take 1 capsule (500 mg total) by mouth every 6 (six) hours for 7 days, Starting Fri 6/11/2021, Until Fri 6/18/2021, Normal         CONTINUE these medications which have NOT CHANGED    Details   albuterol (PROVENTIL HFA,VENTOLIN HFA) 90 mcg/act inhaler INHALE 2 PUFFS BY MOUTH EVERY 6 HOURS AS NEEDED OR WHEEZING, Normal      amLODIPine (NORVASC) 5 mg tablet Take 5 mg by mouth daily, Historical Med      aspirin 81 mg chewable tablet Chew 1 tablet (81 mg total) daily, Starting Sat 10/17/2020, Normal      atorvastatin (LIPITOR) 20 mg tablet Take 1 tablet (20 mg total) by mouth daily, Starting Sun 9/6/2020, Normal      docusate sodium (COLACE) 100 mg capsule Take by mouth, Starting u 6/12/2014, Historical Med      ergocalciferol (VITAMIN D2) 50,000 units Take by mouth once a week , Starting Thu 9/11/2014, Historical Med      furosemide (LASIX) 20 mg tablet Take 20 mg by mouth 2 (two) times a day, Historical Med      gabapentin (NEURONTIN) 800 mg tablet Take 800 mg by mouth 4 (four) times a day , Historical Med      HYDROcodone-acetaminophen (NORCO) 5-325 mg per tablet Take 1 tablet by mouth every 6 (six) hours as needed for pain, Historical Med      ketotifen (ZADITOR) 0 025 % ophthalmic solution Apply to eye, Starting Mon 12/19/2016, Historical Med      lidocaine (LIDODERM) 5 % Apply 1 patch topically daily Remove & Discard patch within 12 hours or as directed by MD, Historical Med      Melatonin 5 MG CAPS Take by mouth, Starting u 8/31/2017, Historical Med      metoprolol succinate (TOPROL-XL) 100 mg 24 hr tablet Take 200 mg by mouth , Historical Med      OLANZapine (ZyPREXA) 20 MG tablet Take 20 mg by mouth daily at bedtime, Historical Med      pantoprazole (PROTONIX) 40 mg tablet Take 40 mg by mouth daily , Historical Med      QUEtiapine (SEROquel) 100 mg tablet Take 300 mg by mouth daily at bedtime , Historical Med           No discharge procedures on file      PDMP Review       Value Time User    PDMP Reviewed  Yes 5/29/2021 10:43 PM Keyla Barker PA-C          ED Provider  Electronically Signed by           Nasim Brownlee MD  06/11/21 0395

## 2021-06-11 NOTE — TELEPHONE ENCOUNTER
Left patient a voicemail due for annual physical exam and is an ER follow up per [facility] (Mary)  Thank you!

## 2021-06-13 LAB — BACTERIA UR CULT: NORMAL

## 2021-06-16 ENCOUNTER — HOSPITAL ENCOUNTER (EMERGENCY)
Facility: HOSPITAL | Age: 66
Discharge: HOME | End: 2021-06-16
Attending: EMERGENCY MEDICINE | Admitting: EMERGENCY MEDICINE
Payer: MEDICARE

## 2021-06-16 ENCOUNTER — OFFICE VISIT (OUTPATIENT)
Dept: FAMILY MEDICINE CLINIC | Facility: CLINIC | Age: 66
End: 2021-06-16
Payer: MEDICARE

## 2021-06-16 ENCOUNTER — APPOINTMENT (EMERGENCY)
Dept: ULTRASOUND IMAGING | Facility: HOSPITAL | Age: 66
End: 2021-06-16
Payer: MEDICARE

## 2021-06-16 VITALS
DIASTOLIC BLOOD PRESSURE: 82 MMHG | SYSTOLIC BLOOD PRESSURE: 152 MMHG | HEART RATE: 87 BPM | WEIGHT: 193.56 LBS | HEIGHT: 64 IN | OXYGEN SATURATION: 97 % | RESPIRATION RATE: 18 BRPM | BODY MASS INDEX: 33.05 KG/M2 | TEMPERATURE: 97.8 F

## 2021-06-16 VITALS
SYSTOLIC BLOOD PRESSURE: 128 MMHG | WEIGHT: 193.6 LBS | TEMPERATURE: 97.3 F | BODY MASS INDEX: 33.23 KG/M2 | DIASTOLIC BLOOD PRESSURE: 80 MMHG | HEART RATE: 72 BPM | RESPIRATION RATE: 16 BRPM | OXYGEN SATURATION: 95 %

## 2021-06-16 DIAGNOSIS — Z12.31 ENCOUNTER FOR SCREENING MAMMOGRAM FOR MALIGNANT NEOPLASM OF BREAST: ICD-10-CM

## 2021-06-16 DIAGNOSIS — R31.9 URINARY TRACT INFECTION WITH HEMATURIA, SITE UNSPECIFIED: Primary | ICD-10-CM

## 2021-06-16 DIAGNOSIS — N39.0 URINARY TRACT INFECTION WITH HEMATURIA, SITE UNSPECIFIED: Primary | ICD-10-CM

## 2021-06-16 DIAGNOSIS — N39.0 UTI (URINARY TRACT INFECTION): Primary | ICD-10-CM

## 2021-06-16 LAB
ALBUMIN SERPL BCP-MCNC: 3.4 G/DL (ref 3.5–5)
ALP SERPL-CCNC: 104 U/L (ref 46–116)
ALT SERPL W P-5'-P-CCNC: 17 U/L (ref 12–78)
ANION GAP SERPL CALCULATED.3IONS-SCNC: 10 MMOL/L (ref 4–13)
AST SERPL W P-5'-P-CCNC: 30 U/L (ref 5–45)
BACTERIA UR QL AUTO: ABNORMAL /HPF
BASOPHILS # BLD AUTO: 0.07 THOUSANDS/ΜL (ref 0–0.1)
BASOPHILS NFR BLD AUTO: 1 % (ref 0–1)
BILIRUB SERPL-MCNC: 0.58 MG/DL (ref 0.2–1)
BILIRUB UR QL STRIP: NEGATIVE
BUN SERPL-MCNC: 22 MG/DL (ref 5–25)
CALCIUM ALBUM COR SERPL-MCNC: 9.6 MG/DL (ref 8.3–10.1)
CALCIUM SERPL-MCNC: 9.1 MG/DL (ref 8.3–10.1)
CHLORIDE SERPL-SCNC: 109 MMOL/L (ref 100–108)
CLARITY UR: ABNORMAL
CO2 SERPL-SCNC: 25 MMOL/L (ref 21–32)
COLOR UR: YELLOW
CREAT SERPL-MCNC: 1.11 MG/DL (ref 0.6–1.3)
EOSINOPHIL # BLD AUTO: 0.29 THOUSAND/ΜL (ref 0–0.61)
EOSINOPHIL NFR BLD AUTO: 5 % (ref 0–6)
ERYTHROCYTE [DISTWIDTH] IN BLOOD BY AUTOMATED COUNT: 13.4 % (ref 11.6–15.1)
GFR SERPL CREATININE-BSD FRML MDRD: 52 ML/MIN/1.73SQ M
GLUCOSE SERPL-MCNC: 93 MG/DL (ref 65–140)
GLUCOSE UR STRIP-MCNC: NEGATIVE MG/DL
HCT VFR BLD AUTO: 36.6 % (ref 34.8–46.1)
HGB BLD-MCNC: 11.9 G/DL (ref 11.5–15.4)
HGB UR QL STRIP.AUTO: ABNORMAL
IMM GRANULOCYTES # BLD AUTO: 0.05 THOUSAND/UL (ref 0–0.2)
IMM GRANULOCYTES NFR BLD AUTO: 1 % (ref 0–2)
KETONES UR STRIP-MCNC: NEGATIVE MG/DL
LACTATE SERPL-SCNC: 0.8 MMOL/L (ref 0.5–2)
LEUKOCYTE ESTERASE UR QL STRIP: ABNORMAL
LIPASE SERPL-CCNC: 97 U/L (ref 73–393)
LYMPHOCYTES # BLD AUTO: 1.5 THOUSANDS/ΜL (ref 0.6–4.47)
LYMPHOCYTES NFR BLD AUTO: 25 % (ref 14–44)
MCH RBC QN AUTO: 35.3 PG (ref 26.8–34.3)
MCHC RBC AUTO-ENTMCNC: 32.5 G/DL (ref 31.4–37.4)
MCV RBC AUTO: 109 FL (ref 82–98)
MONOCYTES # BLD AUTO: 0.44 THOUSAND/ΜL (ref 0.17–1.22)
MONOCYTES NFR BLD AUTO: 7 % (ref 4–12)
NEUTROPHILS # BLD AUTO: 3.71 THOUSANDS/ΜL (ref 1.85–7.62)
NEUTS SEG NFR BLD AUTO: 61 % (ref 43–75)
NITRITE UR QL STRIP: NEGATIVE
NON-SQ EPI CELLS URNS QL MICRO: ABNORMAL /HPF
NRBC BLD AUTO-RTO: 0 /100 WBCS
PH UR STRIP.AUTO: 6 [PH]
PLATELET # BLD AUTO: 266 THOUSANDS/UL (ref 149–390)
PMV BLD AUTO: 8.7 FL (ref 8.9–12.7)
POTASSIUM SERPL-SCNC: 5.1 MMOL/L (ref 3.5–5.3)
PROT SERPL-MCNC: 7.1 G/DL (ref 6.4–8.2)
PROT UR STRIP-MCNC: NEGATIVE MG/DL
RBC # BLD AUTO: 3.37 MILLION/UL (ref 3.81–5.12)
RBC #/AREA URNS AUTO: ABNORMAL /HPF
SL AMB  POCT GLUCOSE, UA: ABNORMAL
SL AMB LEUKOCYTE ESTERASE,UA: ABNORMAL
SL AMB POCT BILIRUBIN,UA: ABNORMAL
SL AMB POCT BLOOD,UA: ABNORMAL
SL AMB POCT CLARITY,UA: ABNORMAL
SL AMB POCT COLOR,UA: ABNORMAL
SL AMB POCT KETONES,UA: ABNORMAL
SL AMB POCT NITRITE,UA: ABNORMAL
SL AMB POCT PH,UA: 5
SL AMB POCT SPECIFIC GRAVITY,UA: 1.01
SL AMB POCT URINE PROTEIN: ABNORMAL
SL AMB POCT UROBILINOGEN: 0.2
SODIUM SERPL-SCNC: 144 MMOL/L (ref 136–145)
SP GR UR STRIP.AUTO: 1.02 (ref 1–1.03)
TROPONIN I SERPL-MCNC: <0.02 NG/ML
UROBILINOGEN UR QL STRIP.AUTO: 0.2 E.U./DL
WBC # BLD AUTO: 6.06 THOUSAND/UL (ref 4.31–10.16)
WBC #/AREA URNS AUTO: ABNORMAL /HPF

## 2021-06-16 PROCEDURE — 99285 EMERGENCY DEPT VISIT HI MDM: CPT | Performed by: PHYSICIAN ASSISTANT

## 2021-06-16 PROCEDURE — 87086 URINE CULTURE/COLONY COUNT: CPT | Performed by: PHYSICIAN ASSISTANT

## 2021-06-16 PROCEDURE — 83690 ASSAY OF LIPASE: CPT | Performed by: PHYSICIAN ASSISTANT

## 2021-06-16 PROCEDURE — 81001 URINALYSIS AUTO W/SCOPE: CPT | Performed by: PHYSICIAN ASSISTANT

## 2021-06-16 PROCEDURE — 99284 EMERGENCY DEPT VISIT MOD MDM: CPT

## 2021-06-16 PROCEDURE — 93005 ELECTROCARDIOGRAM TRACING: CPT

## 2021-06-16 PROCEDURE — 83605 ASSAY OF LACTIC ACID: CPT | Performed by: PHYSICIAN ASSISTANT

## 2021-06-16 PROCEDURE — 99214 OFFICE O/P EST MOD 30 MIN: CPT | Performed by: STUDENT IN AN ORGANIZED HEALTH CARE EDUCATION/TRAINING PROGRAM

## 2021-06-16 PROCEDURE — 87040 BLOOD CULTURE FOR BACTERIA: CPT | Performed by: PHYSICIAN ASSISTANT

## 2021-06-16 PROCEDURE — 85025 COMPLETE CBC W/AUTO DIFF WBC: CPT | Performed by: PHYSICIAN ASSISTANT

## 2021-06-16 PROCEDURE — 76770 US EXAM ABDO BACK WALL COMP: CPT

## 2021-06-16 PROCEDURE — 84484 ASSAY OF TROPONIN QUANT: CPT | Performed by: PHYSICIAN ASSISTANT

## 2021-06-16 PROCEDURE — 36415 COLL VENOUS BLD VENIPUNCTURE: CPT | Performed by: PHYSICIAN ASSISTANT

## 2021-06-16 PROCEDURE — 80053 COMPREHEN METABOLIC PANEL: CPT | Performed by: PHYSICIAN ASSISTANT

## 2021-06-16 RX ORDER — CEPHALEXIN 500 MG/1
500 CAPSULE ORAL EVERY 6 HOURS SCHEDULED
Qty: 12 CAPSULE | Refills: 0 | Status: SHIPPED | OUTPATIENT
Start: 2021-06-16 | End: 2021-06-19

## 2021-06-16 NOTE — ED PROVIDER NOTES
History  Chief Complaint   Patient presents with    Possible UTI     pt reports family dr sent here to er for ultrasound of her kidney  Pt reports she has an uti and only 1 kidney  Patient presents to the emergency department today for evaluation of urinary tract infection  This is a very polite 51-year-old female who is hard of hearing  She has a history of renal cell carcinoma and 6 years ago had her right kidney removed  She was seen here within the last few days was placed on Keflex after a workup and CT  Patient states her symptoms are rapidly resolving  She does have some urinary frequency and minimal burning with urination she does report some bilateral mid back pain  She saw primary care today who recommended her sent here  Patient is under the assumption that she is receiving a renal ultrasound to being discharged home  Again she states she is doing well and does not wish to be admitted  Prior to Admission Medications   Prescriptions Last Dose Informant Patient Reported? Taking?    HYDROcodone-acetaminophen (NORCO) 5-325 mg per tablet   Yes No   Sig: Take 1 tablet by mouth every 6 (six) hours as needed for pain   Melatonin 5 MG CAPS  Self Yes No   Sig: Take by mouth   OLANZapine (ZyPREXA) 20 MG tablet   Yes No   Sig: Take 20 mg by mouth daily at bedtime   QUEtiapine (SEROquel) 100 mg tablet  Self Yes No   Sig: Take 300 mg by mouth daily at bedtime    albuterol (PROVENTIL HFA,VENTOLIN HFA) 90 mcg/act inhaler   No No   Sig: INHALE 2 PUFFS BY MOUTH EVERY 6 HOURS AS NEEDED OR WHEEZING   amLODIPine (NORVASC) 5 mg tablet   Yes No   Sig: Take 5 mg by mouth daily   aspirin 81 mg chewable tablet   No No   Sig: Chew 1 tablet (81 mg total) daily   Patient not taking: Reported on 6/16/2021   atorvastatin (LIPITOR) 20 mg tablet   No No   Sig: Take 1 tablet (20 mg total) by mouth daily   cephalexin (KEFLEX) 500 mg capsule   No No   Sig: Take 1 capsule (500 mg total) by mouth every 6 (six) hours for 7 days   docusate sodium (COLACE) 100 mg capsule  Self Yes No   Sig: Take by mouth   ergocalciferol (VITAMIN D2) 50,000 units  Self Yes No   Sig: Take by mouth once a week    furosemide (LASIX) 20 mg tablet   Yes No   Sig: Take 20 mg by mouth 2 (two) times a day   gabapentin (NEURONTIN) 800 mg tablet  Self Yes No   Sig: Take 800 mg by mouth 4 (four) times a day    ketotifen (ZADITOR) 0 025 % ophthalmic solution  Self Yes No   Sig: Apply to eye   lidocaine (LIDODERM) 5 %   Yes No   Sig: Apply 1 patch topically daily Remove & Discard patch within 12 hours or as directed by MD   metoprolol succinate (TOPROL-XL) 100 mg 24 hr tablet  Self Yes No   Sig: Take 200 mg by mouth    pantoprazole (PROTONIX) 40 mg tablet  Self Yes No   Sig: Take 40 mg by mouth daily       Facility-Administered Medications: None       Past Medical History:   Diagnosis Date    Bipolar 1 disorder (HCC)     [facility]     kidney    Cardiac disease     fast heart beat    Chronic pain     Fractures     GERD (gastroesophageal reflux disease)     Hard of hearing     Hypertension     [facility] )     Stroke ([facility] )     2009 affected her speech, right sided weakness    TIA (transient ischemic attack)        Past Surgical History:   Procedure Laterality Date    APPENDECTOMY      CHOLECYSTECTOMY      JOINT REPLACEMENT Bilateral      bilateral knees    NEPHRECTOMY Right     AL COLONOSCOPY FLX DX W/COLLJ SPEC WHEN PFRMD N/A 8/7/2018    Procedure: COLONOSCOPY;  Surgeon: Shayla To MD;  Location: [facility] MAIN OR;  Service: Gastroenterology    AL OPEN TREATMENT BIMALLEOLAR ANKLE FRACTURE Right 5/14/2019    Procedure: ANKLE - Bimalleolar OPEN REDUCTION W/ INTERNAL FIXATION (ORIF); Surgeon: Chin Naqvi; Location: [address] [place] [address] MAIN OR;  Service: Orthopedics       History reviewed  No pertinent family history  I have reviewed and agree with the history as documented      E-Cigarette/Vaping    E-Cigarette Use Never User      E-Cigarette/Vaping Substances     Social History     Tobacco Use    Smoking status: Former Smoker     Packs/day: 0 25     Years: 7 00     Pack years: 1 75     Types: Cigarettes    Smokeless tobacco: Never Used    Tobacco comment: pt refused   Vaping Use    Vaping Use: Never used   Substance Use Topics    Alcohol use: Not Currently    Drug use: Not Currently       Review of Systems   Constitutional: Negative for chills and fever  HENT: Negative for ear pain and sore throat  Eyes: Negative for pain and visual disturbance  Respiratory: Negative for cough and shortness of breath  Cardiovascular: Negative for chest pain and palpitations  Gastrointestinal: Negative for abdominal pain and vomiting  Genitourinary: Positive for dysuria, flank pain and frequency  Negative for hematuria  Musculoskeletal: Negative for arthralgias and back pain  Skin: Negative for color change and rash  Neurological: Negative for seizures and syncope  All other systems reviewed and are negative  Physical Exam  Physical Exam  Vitals and nursing note reviewed  Constitutional:       General: She is not in acute distress  Appearance: She is well-developed  She is not ill-appearing, toxic-appearing or diaphoretic  HENT:      Head: Normocephalic and atraumatic  Eyes:      Conjunctiva/sclera: Conjunctivae normal    Cardiovascular:      Rate and Rhythm: Normal rate and regular rhythm  Heart sounds: No murmur heard  Pulmonary:      Effort: Pulmonary effort is normal  No respiratory distress  Breath sounds: Normal breath sounds  Abdominal:      Palpations: Abdomen is soft  Tenderness: There is no abdominal tenderness  Musculoskeletal:         General: Normal range of motion  Cervical back: Neck supple  Skin:     General: Skin is warm and dry  Neurological:      General: No focal deficit present  Mental Status: She is alert and oriented to person, place, and time     Psychiatric:         Mood and Affect: Mood normal          Vital Signs  ED Triage Vitals [06/16/21 0957]   Temperature Pulse Respirations Blood Pressure SpO2   97 8 °F (36 6 °C) 75 16 159/80 97 %      Temp Source Heart Rate Source Patient Position - Orthostatic VS BP Location FiO2 (%)   Tympanic Monitor Sitting Right arm --      Pain Score       7           Vitals:    06/16/21 0957   BP: 159/80   Pulse: 75   Patient Position - Orthostatic VS: Sitting         Visual Acuity      ED Medications  Medications - No data to display    Diagnostic Studies  Results Reviewed     Procedure Component Value Units Date/Time    UA w Reflex to Microscopic w Reflex to Culture [[id number]] Collected: 06/16/21 1115    Lab Status: No result Specimen: Urine, Clean Catch     Lactic acid [[id number]]  (Normal) Collected: 06/16/21 1040    Lab Status: Final result Specimen: Blood from Arm, Left Updated: 06/16/21 1106     LACTIC ACID 0 8 mmol/L     Narrative:      Result may be elevated if tourniquet was used during collection      Troponin I [[id number]]  (Normal) Collected: 06/16/21 1040    Lab Status: Final result Specimen: Blood from Arm, Left Updated: 06/16/21 1105     Troponin I <0 02 ng/mL     Comprehensive metabolic panel [[id number]]  (Abnormal) Collected: 06/16/21 1040    Lab Status: Final result Specimen: Blood from Arm, Left Updated: 06/16/21 1101     Sodium 144 mmol/L      Potassium 5 1 mmol/L      Chloride 109 mmol/L      CO2 25 mmol/L      ANION GAP 10 mmol/L      BUN 22 mg/dL      Creatinine 1 11 mg/dL      Glucose 93 mg/dL      Calcium 9 1 mg/dL      Corrected Calcium 9 6 mg/dL      AST 30 U/L      ALT 17 U/L      Alkaline Phosphatase 104 U/L      Total Protein 7 1 g/dL      Albumin 3 4 g/dL      Total Bilirubin 0 58 mg/dL      eGFR 52 ml/min/1 73sq m     Narrative:      [place] guidelines for Chronic Kidney Disease (CKD):     Stage 1 with normal or high GFR (GFR > 90 mL/min/1 73 square meters)    Stage 2 Mild CKD (GFR = 60-89 mL/min/1 73 square meters)    Stage 3A Moderate CKD (GFR = 45-59 mL/min/1 73 square meters)    Stage 3B Moderate CKD (GFR = 30-44 mL/min/1 73 square meters)    Stage 4 Severe CKD (GFR = 15-29 mL/min/1 73 square meters)    Stage 5 End Stage CKD (GFR <15 mL/min/1 73 square meters)  Note: GFR calculation is accurate only with a steady state creatinine    Lipase [[id number]]  (Normal) Collected: 06/16/21 1040    Lab Status: Final result Specimen: Blood from Arm, Left Updated: 06/16/21 1056     Lipase 97 u/L     CBC and differential [[id number]]  (Abnormal) Collected: 06/16/21 1040    Lab Status: Final result Specimen: Blood from Arm, Left Updated: 06/16/21 1047     WBC 6 06 Thousand/uL      RBC 3 37 Million/uL      Hemoglobin 11 9 g/dL      Hematocrit 36 6 %       fL      MCH 35 3 pg      MCHC 32 5 g/dL      RDW 13 4 %      MPV 8 7 fL      Platelets 293 Thousands/uL      nRBC 0 /100 WBCs      Neutrophils Relative 61 %      Immat GRANS % 1 %      Lymphocytes Relative 25 %      Monocytes Relative 7 %      Eosinophils Relative 5 %      Basophils Relative 1 %      Neutrophils Absolute 3 71 Thousands/µL      Immature Grans Absolute 0 05 Thousand/uL      Lymphocytes Absolute 1 50 Thousands/µL      Monocytes Absolute 0 44 Thousand/µL      Eosinophils Absolute 0 29 Thousand/µL      Basophils Absolute 0 07 Thousands/µL     Blood culture #1 [[id number]] Collected: 06/16/21 1040    Lab Status: In process Specimen: Blood from Arm, Left Updated: 06/16/21 1044    Blood culture #2 [[id number]] Collected: 06/16/21 1040    Lab Status: In process Specimen: Blood from Arm, Right Updated: 06/16/21 1044                 US kidney and bladder   Final Result by Wen Mccracken MD (06/16 1121)      Unremarkable left kidney with left jet identified           Workstation performed: [id number]                    Procedures  ECG 12 Lead Documentation Only    Date/Time: 6/16/2021 10:59 AM  Performed by: Yris Soares PA-C  Authorized by: Carlitos Boyd PA-C     Indications / Diagnosis:  Back pain  ECG reviewed by me, the ED Provider: yes    Patient location:  ED  Interpretation:     Interpretation: normal    Rate:     ECG rate:  64    ECG rate assessment: normal    Rhythm:     Rhythm: sinus rhythm    Ectopy:     Ectopy: none    QRS:     QRS axis:  Normal    QRS intervals:  Normal  Conduction:     Conduction: normal    ST segments:     ST segments:  Normal  T waves:     T waves: normal               ED Course  ED Course as of Jun 16 1129   Wed Jun 16, 2021   1014 Blood Pressure: 159/80   1014 Temperature: 97 8 °F (36 6 °C)   1014 Pulse: 75   1014 Respirations: 16   1014 SpO2: 97 %   1048 WBC: 6 06   1048 Hemoglobin: 11 9   1048 Platelet Count: 018   1122 LACTIC ACID: 0 8   1122 Troponin I: <0 02   1122 Lipase: 97   1122 Anion Gap: 10   1122 FINDINGS:     KIDNEYS:     Left kidney:  10 7 x 6 8 x 6 4 cm  Post right nephrectomy     Normal echogenicity and contour  No suspicious masses detected  No hydronephrosis  No shadowing calculi  No perinephric fluid collections      BLADDER:   Normally distended  No focal thickening or mass lesions  Left ureteral jet identified      IMPRESSION:     Unremarkable left kidney with left jet identified  1123 Upon review of prior urine culture from 5 days ago it grew approximately 80,000 colony count of mixed contaminants  18 At this point we have 70-year-old female who is 6 years status post right nephrectomy secondary to renal cell carcinoma  Urinary tract symptoms that are improving on antibiotics with normal renal ultrasound normal white blood cell count negative lactic acid negative lipase and troponin  She is not tachycardic nor febrile  Not hypotensive  She is stable appearing at bedside  She does not wish to be admitted  1124 Patient should have 2 more days left of antibiotics                  HEART Risk Score      Most Recent Value   Heart Score Risk Calculator   History 0 Filed at: 06/16/2021 1059   ECG  0 Filed at: 06/16/2021 1059   Age  2 Filed at: 06/16/2021 1059   Risk Factors  1 Filed at: 06/16/2021 1059   Troponin  0 Filed at: 06/16/2021 1059   HEART Score  3 Filed at: 06/16/2021 1059                      SBIRT 20yo+      Most Recent Value   SBIRT (23 yo +)   In order to provide better care to our patients, we are screening all of our patients for alcohol and drug use  Would it be okay to ask you these screening questions? Yes Filed at: 06/16/2021 1113   Initial Alcohol Screen: US AUDIT-C    1  How often do you have a drink containing alcohol?  0 Filed at: 06/16/2021 1113   2  How many drinks containing alcohol do you have on a typical day you are drinking? 0 Filed at: 06/16/2021 1113   3a  Male UNDER 65: How often do you have five or more drinks on one occasion? 0 Filed at: 06/16/2021 1113   3b  FEMALE Any Age, or MALE 65+: How often do you have 4 or more drinks on one occassion? 0 Filed at: 06/16/2021 1113   Audit-C Score  0 Filed at: 06/16/2021 1113   SHAYY: How many times in the past year have you    Used an illegal drug or used a prescription medication for non-medical reasons? Never Filed at: 06/16/2021 1113                    MDM    Disposition  Final diagnoses:   UTI (urinary tract infection)     Time reflects when diagnosis was documented in both MDM as applicable and the Disposition within this note     Time User Action Codes Description Comment    6/16/2021 11:28 AM Amelie COMBS Add [N39 0] UTI (urinary tract infection)       ED Disposition     ED Disposition Condition Date/Time Comment    Discharge Stable Wed Jun 16, 2021 11:28 AM Hansa Olivo discharge to home/self care              Follow-up Information     Follow up With Specialties Details Why Contact Info [address], [facility] Family Medicine Schedule an appointment as soon as possible for a visit  As needed [address] [place], [place] [address] [phone] [facility] Emergency Department Emergency Medicine Go to  Return to the emergency department if you note that your symptoms are worsening  If you know worsening back pain abdominal pain urinary symptoms or fevers please return  [phone]  [facility] Emergency Department, [address], [address]          Patient's Medications   Discharge Prescriptions    CEPHALEXIN (KEFLEX) 500 MG CAPSULE    Take 1 capsule (500 mg total) by mouth every 6 (six) hours for 3 days       Start Date: 6/16/2021 End Date: 6/19/2021       Order Dose: 500 mg       Quantity: 12 capsule    Refills: 0     No discharge procedures on file      PDMP Review       Value Time User    PDMP Reviewed  Yes 5/29/2021 10:43 PM Oxana Lira PA-C          ED Provider  Electronically Signed by           Yris Soares PA-C  06/16/21 1129

## 2021-06-16 NOTE — PROGRESS NOTES
Assessment/Plan/Follow up information       Diagnosis ICD-10-CM Associated Orders   1  Urinary tract infection with hematuria, site unspecified  N39 0     R31 9    2  Encounter for screening mammogram for malignant neoplasm of breast  Z12 31        p o  CT urinalysis in the office showed large blood and positive leukocytes  Given the fact that she only has 1 kidney as she is status post nephrectomy and CT performed 1 week ago was inconclusive I am concerned that there is a brewing infection pyelo or even concerns for renal cell mass requiring further workup likely IV antibiotics  Additionally concerned that she has an LUCAS given the fact that she has limited renal reserve  I called and spoke to the ED Dee CLINTON  With patient report and my differential/ plan for treatment he is in agreement and is expecting the patient  Additionally I did send a message to the on-call nephrolgist Dr Elise Nettles,   We discussed the case and she is in agreement that the patient should  be sent to the emergency department will likely require inpatient admission and IV antibiotics    Recent lab work, imagining, and imaging reports reviewed on today's visit with patient, appropriate follow up was initiated if needed  Patient was counseled/educated regarding their diagnosis, and the associated plan  They agreed with the plan, all questions and concerns were answered/addressed  Advised to contact me or the office with any concerns or questions  In the event of an emergency, and unable to contact a provider they are to go to the emergency room  Additionally a CT scan was done in the emergency department to rule out pyelo / kidney stone however patient has bilateral hip prostheses and thus there was too much artifact to fully assess the kidneys      Subjective    HPI: this is a 72-year-old female presents to the office for ED follow-up    Patient was seen in the ER in 6/11 for symptoms consistent with UTI she was worked up in diagnosed UTI based on positive UA and was discharged home a course of Keflex  States she reports that she still having symptoms consisting of dysuria of hesitancy dypuria,  She believes she still has an untreated UTI  Of note she does have a previous history of nephrectomy  Urinalysis performed approximately 5 days ago in the ED was positive for leukocytes and nitrites however urine culture revealed less than 60,000 CFUs of E coli with no resulted sensitivities  Also endorses that she continues to have hematuria      Review of Systems   Constitutional: Negative for activity change, appetite change, chills, fatigue and fever  HENT: Negative for congestion, dental problem, drooling, ear discharge, ear pain, facial swelling, postnasal drip, rhinorrhea and sinus pain  Eyes: Negative for photophobia, pain, discharge and itching  Respiratory: Negative for apnea, cough, chest tightness and shortness of breath  Cardiovascular: Negative for chest pain and leg swelling  Gastrointestinal: Negative for abdominal distention, abdominal pain, anal bleeding, constipation, diarrhea and nausea  Endocrine: Negative for cold intolerance, heat intolerance and polydipsia  Genitourinary: Positive for dyspareunia, dysuria, frequency and urgency  Negative for difficulty urinating  Musculoskeletal: Negative for arthralgias, gait problem, joint swelling and myalgias  Skin: Negative for color change and pallor  Allergic/Immunologic: Negative for immunocompromised state  Neurological: Negative for dizziness, seizures, facial asymmetry, weakness, light-headedness, numbness and headaches  Psychiatric/Behavioral: Negative for agitation, behavioral problems, confusion, decreased concentration and dysphoric mood              Objective    Vitals:    06/16/21 0858   BP: 128/80   Pulse: 72   Resp: 16   Temp: (!) 97 3 °F (36 3 °C)   SpO2: 95%         Physical Exam  Constitutional:       General: She is not in acute distress  Appearance: She is well-developed  HENT:      Head: Normocephalic and atraumatic  Eyes:      Conjunctiva/sclera: Conjunctivae normal       Pupils: Pupils are equal, round, and reactive to light  Cardiovascular:      Rate and Rhythm: Normal rate and regular rhythm  Heart sounds: Normal heart sounds  No murmur heard  No friction rub  Pulmonary:      Effort: Pulmonary effort is normal       Breath sounds: Normal breath sounds  Abdominal:      General: Bowel sounds are normal       Palpations: Abdomen is soft  Musculoskeletal:         General: Normal range of motion  Cervical back: Normal range of motion and neck supple  Skin:     General: Skin is warm  Capillary Refill: Capillary refill takes less than 2 seconds  Neurological:      Mental Status: She is alert and oriented to person, place, and time  Motor: No abnormal muscle tone  Coordination: Coordination normal    Psychiatric:         Behavior: Behavior normal          Thought Content: Thought content normal             Portions of the record may have been created with voice recognition software  Occasional wrong word or "sound a like" substitutions may have occurred due to the inherent limitations of voice recognition software  Read the chart carefully and recognize, using context, where substitutions have occurred  Contact me with any questions         Sabrina Castellano MD 06/16/21

## 2021-06-17 LAB
ATRIAL RATE: 64 BPM
BACTERIA UR CULT: NORMAL
P AXIS: 58 DEGREES
PR INTERVAL: 180 MS
QRS AXIS: 69 DEGREES
QRSD INTERVAL: 82 MS
QT INTERVAL: 394 MS
QTC INTERVAL: 406 MS
T WAVE AXIS: 47 DEGREES
VENTRICULAR RATE: 64 BPM

## 2021-06-17 PROCEDURE — 93010 ELECTROCARDIOGRAM REPORT: CPT | Performed by: INTERNAL MEDICINE

## 2021-06-21 DIAGNOSIS — R60.9 EDEMA, UNSPECIFIED TYPE: Primary | ICD-10-CM

## 2021-06-21 LAB
BACTERIA BLD CULT: NORMAL
BACTERIA BLD CULT: NORMAL

## 2021-06-21 RX ORDER — FUROSEMIDE 20 MG/1
40 TABLET ORAL DAILY
Qty: 60 TABLET | Refills: 0 | Status: SHIPPED | OUTPATIENT
Start: 2021-06-21 | End: 2021-07-06 | Stop reason: ALTCHOICE

## 2021-06-24 ENCOUNTER — APPOINTMENT (INPATIENT)
Dept: RADIOLOGY | Facility: HOSPITAL | Age: 66
DRG: 071 | End: 2021-06-24
Payer: MEDICARE

## 2021-06-24 ENCOUNTER — APPOINTMENT (EMERGENCY)
Dept: RADIOLOGY | Facility: HOSPITAL | Age: 66
DRG: 071 | End: 2021-06-24
Payer: MEDICARE

## 2021-06-24 ENCOUNTER — HOSPITAL ENCOUNTER (INPATIENT)
Facility: HOSPITAL | Age: 66
LOS: 4 days | Discharge: HOME | DRG: 071 | End: 2021-06-28
Attending: EMERGENCY MEDICINE | Admitting: ANESTHESIOLOGY
Payer: MEDICARE

## 2021-06-24 DIAGNOSIS — F31.9 BIPOLAR DISEASE, CHRONIC (HCC): ICD-10-CM

## 2021-06-24 DIAGNOSIS — G93.40 ACUTE ENCEPHALOPATHY: ICD-10-CM

## 2021-06-24 DIAGNOSIS — R41.82 ALTERED MENTAL STATUS, UNSPECIFIED ALTERED MENTAL STATUS TYPE: Primary | ICD-10-CM

## 2021-06-24 LAB
ABO GROUP BLD: NORMAL
ABO GROUP BLD: NORMAL
ALBUMIN SERPL BCP-MCNC: 3.1 G/DL (ref 3.5–5)
ALP SERPL-CCNC: 103 U/L (ref 46–116)
ALT SERPL W P-5'-P-CCNC: 20 U/L (ref 12–78)
AMMONIA PLAS-SCNC: 19 UMOL/L (ref 11–35)
ANION GAP SERPL CALCULATED.3IONS-SCNC: 7 MMOL/L (ref 4–13)
APAP SERPL-MCNC: 5 UG/ML (ref 10–20)
APTT PPP: 28 SECONDS (ref 23–37)
ARTERIAL PATENCY WRIST A: YES
AST SERPL W P-5'-P-CCNC: 95 U/L (ref 5–45)
BACTERIA UR QL AUTO: ABNORMAL /HPF
BASE EXCESS BLDA CALC-SCNC: -3 MMOL/L (ref -2–3)
BASE EXCESS BLDA CALC-SCNC: -3.6 MMOL/L
BASOPHILS # BLD AUTO: 0.02 THOUSANDS/ΜL (ref 0–0.1)
BASOPHILS NFR BLD AUTO: 0 % (ref 0–1)
BILIRUB DIRECT SERPL-MCNC: 0.24 MG/DL (ref 0–0.2)
BILIRUB SERPL-MCNC: 0.74 MG/DL (ref 0.2–1)
BILIRUB UR QL STRIP: NEGATIVE
BLD GP AB SCN SERPL QL: NEGATIVE
BUN SERPL-MCNC: 32 MG/DL (ref 5–25)
CALCIUM SERPL-MCNC: 7.4 MG/DL (ref 8.3–10.1)
CHLORIDE SERPL-SCNC: 105 MMOL/L (ref 100–108)
CK MB SERPL-MCNC: 2 % (ref 0–2.5)
CK MB SERPL-MCNC: 71.2 NG/ML (ref 0–5)
CK SERPL-CCNC: 3514 U/L (ref 26–192)
CLARITY UR: CLEAR
CO2 SERPL-SCNC: 25 MMOL/L (ref 21–32)
COLOR UR: YELLOW
CORTIS SERPL-MCNC: 18.6 UG/DL
CREAT SERPL-MCNC: 1.66 MG/DL (ref 0.6–1.3)
EOSINOPHIL # BLD AUTO: 0.11 THOUSAND/ΜL (ref 0–0.61)
EOSINOPHIL NFR BLD AUTO: 1 % (ref 0–6)
ERYTHROCYTE [DISTWIDTH] IN BLOOD BY AUTOMATED COUNT: 13.7 % (ref 11.6–15.1)
ETHANOL SERPL-MCNC: <3 MG/DL (ref 0–3)
GFR SERPL CREATININE-BSD FRML MDRD: 32 ML/MIN/1.73SQ M
GLUCOSE SERPL-MCNC: 103 MG/DL (ref 65–140)
GLUCOSE SERPL-MCNC: 112 MG/DL (ref 65–140)
GLUCOSE SERPL-MCNC: 93 MG/DL (ref 65–140)
GLUCOSE UR STRIP-MCNC: NEGATIVE MG/DL
HCO3 BLDA-SCNC: 21.5 MMOL/L (ref 22–28)
HCO3 BLDA-SCNC: 24 MMOL/L (ref 24–30)
HCT VFR BLD AUTO: 38.4 % (ref 34.8–46.1)
HCT VFR BLD CALC: 37 % (ref 34.8–46.1)
HGB BLD-MCNC: 12.4 G/DL (ref 11.5–15.4)
HGB BLDA-MCNC: 12.6 G/DL (ref 11.5–15.4)
HGB UR QL STRIP.AUTO: ABNORMAL
HOROWITZ INDEX BLDA+IHG-RTO: 50 MM[HG]
HYALINE CASTS #/AREA URNS LPF: ABNORMAL /LPF
IMM GRANULOCYTES # BLD AUTO: 0.02 THOUSAND/UL (ref 0–0.2)
IMM GRANULOCYTES NFR BLD AUTO: 0 % (ref 0–2)
INR PPP: 1.02 (ref 0.84–1.19)
KETONES UR STRIP-MCNC: NEGATIVE MG/DL
LACTATE SERPL-SCNC: 0.8 MMOL/L (ref 0.5–2)
LEUKOCYTE ESTERASE UR QL STRIP: ABNORMAL
LYMPHOCYTES # BLD AUTO: 0.72 THOUSANDS/ΜL (ref 0.6–4.47)
LYMPHOCYTES NFR BLD AUTO: 9 % (ref 14–44)
MCH RBC QN AUTO: 35.2 PG (ref 26.8–34.3)
MCHC RBC AUTO-ENTMCNC: 32.3 G/DL (ref 31.4–37.4)
MCV RBC AUTO: 109 FL (ref 82–98)
MONOCYTES # BLD AUTO: 0.39 THOUSAND/ΜL (ref 0.17–1.22)
MONOCYTES NFR BLD AUTO: 5 % (ref 4–12)
NEUTROPHILS # BLD AUTO: 6.44 THOUSANDS/ΜL (ref 1.85–7.62)
NEUTS SEG NFR BLD AUTO: 85 % (ref 43–75)
NITRITE UR QL STRIP: NEGATIVE
NON-SQ EPI CELLS URNS QL MICRO: ABNORMAL /HPF
NRBC BLD AUTO-RTO: 0 /100 WBCS
O2 CT BLDA-SCNC: 17.6 ML/DL (ref 16–23)
OXYHGB MFR BLDA: 94.3 % (ref 94–97)
PCO2 BLD: 26 MMOL/L (ref 21–32)
PCO2 BLD: 51.6 MM HG (ref 42–50)
PCO2 BLDA: 39.3 MM HG (ref 36–44)
PEEP RESPIRATORY: 6 CM[H2O]
PH BLD: 7.28 [PH] (ref 7.3–7.4)
PH BLDA: 7.36 [PH] (ref 7.35–7.45)
PH UR STRIP.AUTO: 6 [PH]
PLATELET # BLD AUTO: 173 THOUSANDS/UL (ref 149–390)
PLATELET # BLD AUTO: 187 THOUSANDS/UL (ref 149–390)
PMV BLD AUTO: 9.2 FL (ref 8.9–12.7)
PMV BLD AUTO: 9.3 FL (ref 8.9–12.7)
PO2 BLD: 34 MM HG (ref 35–45)
PO2 BLDA: 79.9 MM HG (ref 75–129)
POTASSIUM BLD-SCNC: 4.5 MMOL/L (ref 3.5–5.3)
POTASSIUM SERPL-SCNC: 4.2 MMOL/L (ref 3.5–5.3)
PROT SERPL-MCNC: 6.1 G/DL (ref 6.4–8.2)
PROT UR STRIP-MCNC: NEGATIVE MG/DL
PROTHROMBIN TIME: 13.4 SECONDS (ref 11.6–14.5)
RBC # BLD AUTO: 3.52 MILLION/UL (ref 3.81–5.12)
RBC #/AREA URNS AUTO: ABNORMAL /HPF
RH BLD: POSITIVE
RH BLD: POSITIVE
SALICYLATES SERPL-MCNC: <3 MG/DL (ref 3–20)
SAO2 % BLD FROM PO2: 56 % (ref 60–85)
SARS-COV-2 RNA RESP QL NAA+PROBE: NEGATIVE
SODIUM BLD-SCNC: 141 MMOL/L (ref 136–145)
SODIUM SERPL-SCNC: 137 MMOL/L (ref 136–145)
SP GR UR STRIP.AUTO: 1.02 (ref 1–1.03)
SPECIMEN EXPIRATION DATE: NORMAL
SPECIMEN SOURCE: ABNORMAL
SPECIMEN SOURCE: ABNORMAL
TROPONIN I SERPL-MCNC: <0.02 NG/ML
TROPONIN I SERPL-MCNC: <0.02 NG/ML
TSH SERPL DL<=0.05 MIU/L-ACNC: 0.84 UIU/ML (ref 0.36–3.74)
UROBILINOGEN UR QL STRIP.AUTO: 0.2 E.U./DL
VENT AC: 16
VENT- AC: AC
VT SETTING VENT: 450 ML
WBC # BLD AUTO: 7.7 THOUSAND/UL (ref 4.31–10.16)
WBC #/AREA URNS AUTO: ABNORMAL /HPF

## 2021-06-24 PROCEDURE — 82550 ASSAY OF CK (CPK): CPT | Performed by: STUDENT IN AN ORGANIZED HEALTH CARE EDUCATION/TRAINING PROGRAM

## 2021-06-24 PROCEDURE — 82803 BLOOD GASES ANY COMBINATION: CPT

## 2021-06-24 PROCEDURE — U0005 INFEC AGEN DETEC AMPLI PROBE: HCPCS | Performed by: STUDENT IN AN ORGANIZED HEALTH CARE EDUCATION/TRAINING PROGRAM

## 2021-06-24 PROCEDURE — 80076 HEPATIC FUNCTION PANEL: CPT | Performed by: STUDENT IN AN ORGANIZED HEALTH CARE EDUCATION/TRAINING PROGRAM

## 2021-06-24 PROCEDURE — U0003 INFECTIOUS AGENT DETECTION BY NUCLEIC ACID (DNA OR RNA); SEVERE ACUTE RESPIRATORY SYNDROME CORONAVIRUS 2 (SARS-COV-2) (CORONAVIRUS DISEASE [COVID-19]), AMPLIFIED PROBE TECHNIQUE, MAKING USE OF HIGH THROUGHPUT TECHNOLOGIES AS DESCRIBED BY CMS-2020-01-R: HCPCS | Performed by: STUDENT IN AN ORGANIZED HEALTH CARE EDUCATION/TRAINING PROGRAM

## 2021-06-24 PROCEDURE — 84484 ASSAY OF TROPONIN QUANT: CPT | Performed by: FAMILY MEDICINE

## 2021-06-24 PROCEDURE — 85730 THROMBOPLASTIN TIME PARTIAL: CPT | Performed by: SURGERY

## 2021-06-24 PROCEDURE — 99223 1ST HOSP IP/OBS HIGH 75: CPT | Performed by: PSYCHIATRY & NEUROLOGY

## 2021-06-24 PROCEDURE — 99291 CRITICAL CARE FIRST HOUR: CPT

## 2021-06-24 PROCEDURE — 82948 REAGENT STRIP/BLOOD GLUCOSE: CPT

## 2021-06-24 PROCEDURE — 99291 CRITICAL CARE FIRST HOUR: CPT | Performed by: ANESTHESIOLOGY

## 2021-06-24 PROCEDURE — 82077 ASSAY SPEC XCP UR&BREATH IA: CPT | Performed by: SURGERY

## 2021-06-24 PROCEDURE — 86901 BLOOD TYPING SEROLOGIC RH(D): CPT | Performed by: SURGERY

## 2021-06-24 PROCEDURE — 83605 ASSAY OF LACTIC ACID: CPT | Performed by: SURGERY

## 2021-06-24 PROCEDURE — G0390 TRAUMA RESPONS W/HOSP CRITI: HCPCS

## 2021-06-24 PROCEDURE — 70450 CT HEAD/BRAIN W/O DYE: CPT

## 2021-06-24 PROCEDURE — 84443 ASSAY THYROID STIM HORMONE: CPT | Performed by: STUDENT IN AN ORGANIZED HEALTH CARE EDUCATION/TRAINING PROGRAM

## 2021-06-24 PROCEDURE — 96376 TX/PRO/DX INJ SAME DRUG ADON: CPT

## 2021-06-24 PROCEDURE — 76705 ECHO EXAM OF ABDOMEN: CPT | Performed by: SURGERY

## 2021-06-24 PROCEDURE — 85049 AUTOMATED PLATELET COUNT: CPT | Performed by: EMERGENCY MEDICINE

## 2021-06-24 PROCEDURE — 85610 PROTHROMBIN TIME: CPT | Performed by: SURGERY

## 2021-06-24 PROCEDURE — 94760 N-INVAS EAR/PLS OXIMETRY 1: CPT

## 2021-06-24 PROCEDURE — 93005 ELECTROCARDIOGRAM TRACING: CPT

## 2021-06-24 PROCEDURE — 82553 CREATINE MB FRACTION: CPT | Performed by: STUDENT IN AN ORGANIZED HEALTH CARE EDUCATION/TRAINING PROGRAM

## 2021-06-24 PROCEDURE — NC001 PR NO CHARGE: Performed by: EMERGENCY MEDICINE

## 2021-06-24 PROCEDURE — 80143 DRUG ASSAY ACETAMINOPHEN: CPT | Performed by: SURGERY

## 2021-06-24 PROCEDURE — 81001 URINALYSIS AUTO W/SCOPE: CPT | Performed by: EMERGENCY MEDICINE

## 2021-06-24 PROCEDURE — 94002 VENT MGMT INPAT INIT DAY: CPT

## 2021-06-24 PROCEDURE — 36415 COLL VENOUS BLD VENIPUNCTURE: CPT | Performed by: SURGERY

## 2021-06-24 PROCEDURE — 74177 CT ABD & PELVIS W/CONTRAST: CPT

## 2021-06-24 PROCEDURE — 85014 HEMATOCRIT: CPT

## 2021-06-24 PROCEDURE — 84132 ASSAY OF SERUM POTASSIUM: CPT

## 2021-06-24 PROCEDURE — 84484 ASSAY OF TROPONIN QUANT: CPT | Performed by: SURGERY

## 2021-06-24 PROCEDURE — 99291 CRITICAL CARE FIRST HOUR: CPT | Performed by: SURGERY

## 2021-06-24 PROCEDURE — 86900 BLOOD TYPING SEROLOGIC ABO: CPT | Performed by: SURGERY

## 2021-06-24 PROCEDURE — 86850 RBC ANTIBODY SCREEN: CPT | Performed by: SURGERY

## 2021-06-24 PROCEDURE — 82533 TOTAL CORTISOL: CPT | Performed by: STUDENT IN AN ORGANIZED HEALTH CARE EDUCATION/TRAINING PROGRAM

## 2021-06-24 PROCEDURE — 93308 TTE F-UP OR LMTD: CPT | Performed by: SURGERY

## 2021-06-24 PROCEDURE — 84295 ASSAY OF SERUM SODIUM: CPT

## 2021-06-24 PROCEDURE — 80179 DRUG ASSAY SALICYLATE: CPT | Performed by: SURGERY

## 2021-06-24 PROCEDURE — 80307 DRUG TEST PRSMV CHEM ANLYZR: CPT | Performed by: EMERGENCY MEDICINE

## 2021-06-24 PROCEDURE — 06HY33Z INSERTION OF INFUSION DEVICE INTO LOWER VEIN, PERCUTANEOUS APPROACH: ICD-10-PCS | Performed by: SURGERY

## 2021-06-24 PROCEDURE — 82140 ASSAY OF AMMONIA: CPT | Performed by: FAMILY MEDICINE

## 2021-06-24 PROCEDURE — 87086 URINE CULTURE/COLONY COUNT: CPT | Performed by: EMERGENCY MEDICINE

## 2021-06-24 PROCEDURE — 36556 INSERT NON-TUNNEL CV CATH: CPT | Performed by: SURGERY

## 2021-06-24 PROCEDURE — 72125 CT NECK SPINE W/O DYE: CPT

## 2021-06-24 PROCEDURE — 71260 CT THORAX DX C+: CPT

## 2021-06-24 PROCEDURE — 82947 ASSAY GLUCOSE BLOOD QUANT: CPT

## 2021-06-24 PROCEDURE — 5A1935Z RESPIRATORY VENTILATION, LESS THAN 24 CONSECUTIVE HOURS: ICD-10-PCS | Performed by: HOSPITALIST

## 2021-06-24 PROCEDURE — 71045 X-RAY EXAM CHEST 1 VIEW: CPT

## 2021-06-24 PROCEDURE — 85025 COMPLETE CBC W/AUTO DIFF WBC: CPT | Performed by: SURGERY

## 2021-06-24 PROCEDURE — 80048 BASIC METABOLIC PNL TOTAL CA: CPT | Performed by: SURGERY

## 2021-06-24 PROCEDURE — 96365 THER/PROPH/DIAG IV INF INIT: CPT

## 2021-06-24 PROCEDURE — 82805 BLOOD GASES W/O2 SATURATION: CPT | Performed by: FAMILY MEDICINE

## 2021-06-24 RX ORDER — HEPARIN SODIUM 5000 [USP'U]/ML
5000 INJECTION, SOLUTION INTRAVENOUS; SUBCUTANEOUS EVERY 8 HOURS SCHEDULED
Status: DISCONTINUED | OUTPATIENT
Start: 2021-06-24 | End: 2021-06-28 | Stop reason: HOSPADM

## 2021-06-24 RX ORDER — RISPERIDONE 2 MG/1
2 TABLET, FILM COATED ORAL DAILY
COMMUNITY
End: 2021-06-28 | Stop reason: HOSPADM

## 2021-06-24 RX ORDER — SODIUM CHLORIDE, SODIUM GLUCONATE, SODIUM ACETATE, POTASSIUM CHLORIDE, MAGNESIUM CHLORIDE, SODIUM PHOSPHATE, DIBASIC, AND POTASSIUM PHOSPHATE .53; .5; .37; .037; .03; .012; .00082 G/100ML; G/100ML; G/100ML; G/100ML; G/100ML; G/100ML; G/100ML
500 INJECTION, SOLUTION INTRAVENOUS ONCE
Status: COMPLETED | OUTPATIENT
Start: 2021-06-24 | End: 2021-06-24

## 2021-06-24 RX ORDER — SODIUM CHLORIDE, SODIUM GLUCONATE, SODIUM ACETATE, POTASSIUM CHLORIDE, MAGNESIUM CHLORIDE, SODIUM PHOSPHATE, DIBASIC, AND POTASSIUM PHOSPHATE .53; .5; .37; .037; .03; .012; .00082 G/100ML; G/100ML; G/100ML; G/100ML; G/100ML; G/100ML; G/100ML
100 INJECTION, SOLUTION INTRAVENOUS CONTINUOUS
Status: DISCONTINUED | OUTPATIENT
Start: 2021-06-24 | End: 2021-06-25

## 2021-06-24 RX ORDER — TRAMADOL HYDROCHLORIDE 50 MG/1
50 TABLET ORAL 4 TIMES DAILY
COMMUNITY
End: 2021-06-28 | Stop reason: HOSPADM

## 2021-06-24 RX ORDER — ALPRAZOLAM 1 MG/1
TABLET ORAL 4 TIMES DAILY
COMMUNITY

## 2021-06-24 RX ORDER — PANTOPRAZOLE SODIUM 40 MG/1
40 TABLET, DELAYED RELEASE ORAL DAILY
COMMUNITY

## 2021-06-24 RX ORDER — OLANZAPINE 20 MG/1
20 TABLET ORAL 2 TIMES DAILY
COMMUNITY
End: 2021-06-28 | Stop reason: HOSPADM

## 2021-06-24 RX ORDER — ZOLPIDEM TARTRATE 6.25 MG/1
10 TABLET, FILM COATED, EXTENDED RELEASE ORAL
COMMUNITY
End: 2021-06-28 | Stop reason: HOSPADM

## 2021-06-24 RX ORDER — ATORVASTATIN CALCIUM 10 MG/1
10 TABLET, FILM COATED ORAL EVERY EVENING
COMMUNITY

## 2021-06-24 RX ORDER — QUETIAPINE FUMARATE 300 MG/1
300 TABLET, FILM COATED ORAL
Status: ON HOLD | COMMUNITY
End: 2021-06-28 | Stop reason: SDUPTHER

## 2021-06-24 RX ORDER — HYDROCODONE BITARTRATE AND ACETAMINOPHEN 5; 325 MG/1; MG/1
1 TABLET ORAL EVERY 6 HOURS PRN
COMMUNITY
End: 2022-07-29

## 2021-06-24 RX ORDER — GABAPENTIN 800 MG/1
800 TABLET ORAL 4 TIMES DAILY
Status: ON HOLD | COMMUNITY
End: 2021-06-28 | Stop reason: SDUPTHER

## 2021-06-24 RX ORDER — PROPOFOL 10 MG/ML
INJECTION, EMULSION INTRAVENOUS CODE/TRAUMA/SEDATION MEDICATION
Status: COMPLETED | OUTPATIENT
Start: 2021-06-24 | End: 2021-06-24

## 2021-06-24 RX ORDER — CHLORHEXIDINE GLUCONATE 0.12 MG/ML
15 RINSE ORAL EVERY 12 HOURS SCHEDULED
Status: DISCONTINUED | OUTPATIENT
Start: 2021-06-24 | End: 2021-06-24

## 2021-06-24 RX ORDER — CARISOPRODOL 350 MG/1
350 TABLET ORAL 4 TIMES DAILY
COMMUNITY
End: 2021-06-28 | Stop reason: HOSPADM

## 2021-06-24 RX ORDER — PROPOFOL 10 MG/ML
INJECTION, EMULSION INTRAVENOUS
Status: COMPLETED | OUTPATIENT
Start: 2021-06-24 | End: 2021-06-24

## 2021-06-24 RX ADMIN — SODIUM CHLORIDE, SODIUM GLUCONATE, SODIUM ACETATE, POTASSIUM CHLORIDE, MAGNESIUM CHLORIDE, SODIUM PHOSPHATE, DIBASIC, AND POTASSIUM PHOSPHATE 100 ML/HR: .53; .5; .37; .037; .03; .012; .00082 INJECTION, SOLUTION INTRAVENOUS at 23:54

## 2021-06-24 RX ADMIN — PROPOFOL 6 MCG/KG/MIN: 10 INJECTION, EMULSION INTRAVENOUS at 13:19

## 2021-06-24 RX ADMIN — SODIUM CHLORIDE, SODIUM GLUCONATE, SODIUM ACETATE, POTASSIUM CHLORIDE, MAGNESIUM CHLORIDE, SODIUM PHOSPHATE, DIBASIC, AND POTASSIUM PHOSPHATE 100 ML/HR: .53; .5; .37; .037; .03; .012; .00082 INJECTION, SOLUTION INTRAVENOUS at 17:34

## 2021-06-24 RX ADMIN — SODIUM CHLORIDE, SODIUM GLUCONATE, SODIUM ACETATE, POTASSIUM CHLORIDE, MAGNESIUM CHLORIDE, SODIUM PHOSPHATE, DIBASIC, AND POTASSIUM PHOSPHATE 500 ML: .53; .5; .37; .037; .03; .012; .00082 INJECTION, SOLUTION INTRAVENOUS at 17:04

## 2021-06-24 RX ADMIN — IOHEXOL 100 ML: 350 INJECTION, SOLUTION INTRAVENOUS at 13:33

## 2021-06-24 RX ADMIN — PROPOFOL 20 MG: 10 INJECTION, EMULSION INTRAVENOUS at 13:03

## 2021-06-24 RX ADMIN — HEPARIN SODIUM 5000 UNITS: 5000 INJECTION INTRAVENOUS; SUBCUTANEOUS at 23:00

## 2021-06-24 NOTE — RESPIRATORY THERAPY NOTE
Pt was extubated to 4L NC and with a Sat of 98%  Pt breath sounds are clear bilateral and no stridor noted  Nurse bedside and pt talking

## 2021-06-24 NOTE — ED PROVIDER NOTES
Emergency Department Airway Evaluation and Management Form    History  Obtained from: EMS      Chief complaint  None-intubated    HPI  Found down, recent hospitalization for UTI  Patient intubated on scene by EMS  Narcan administration without improvement  Noted to be hypothermic  No past medical history on file  No past surgical history on file  No family history on file  Social History   Substance Use Topics    Smoking status: Not on file    Smokeless tobacco: Not on file    Alcohol use Not on file     I have reviewed and agree with the history as documented  Review of Systems  Unable to provide as intubated  Physical Exam  There were no vitals taken for this visit  Physical Exam  Intubated, lightly sedated, purposeful movements towards endotracheal tube, ls cta bilat, pulses intact        ED Medications  Medications - No data to display    Intubation  Intubated pre-hospital, 7 5 endotracheal tube, end-tidal CO2 30 mmHg    Notes      CritCare Time      ED Provider  Electronically Signed by       Alisa Hodges DO  06/24/21 1363

## 2021-06-24 NOTE — ASSESSMENT & PLAN NOTE
Pt found down at home by family LKW 4 hours prior, bradycardia and hypothermia noted on presentation and intubated in field for persistent unresponsiveness  Initial exam demonstrated right upper extremity poorly responsive to noxious stim, and mental status was stuporous  Otherwise, nonfocal exam  Reportedly has hx of stroke with dysarthria, and RCC s/p nephrectomy  Previous admission in late May after being found down in the street presenting with confusion and found to have UTI  Patient's family denies recent illness  On repeat exam 30 mins later, pt started following commands throughout      Workup:  · 6/24/21 CT head: unremarkable  · Initial UA:  WBCs 10-20, leukocytes, blood  · Total CK 3514, CK-MB 71 2    Impression: altered mental status likely secondary to metabolic etiology versus seizure vs cardiac etiology vs less likely stroke    Plan:  · MRI brain when medically able/appropriate  · Would hold off on video EEG at this time as patient is now following commands but retain low threshold to hook up if mental status declines  · CTH if pt mental status declines by >2 GCS in an hour or significant change in mental status  · Metabolic work up and treatment as per primary team  · Defer CTA at this time as pt has solitary kidney and LUCAS

## 2021-06-24 NOTE — ASSESSMENT & PLAN NOTE
- Patient found down, unknown amount of time  -Trauma alert level A, arrival by air   Intubated  - Injuries as listed

## 2021-06-24 NOTE — CASE MANAGEMENT
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  Pt reports being fully independent for ADL/IADLs PTA  Pt owns no DME  Pt's had 3 falls in the last 6 months  Pt enjoys reading and spending time with animals  Pt doesn't have a living will  Pt's [facility]  Pt has a hx of Bi-Polar but no IP Psych  Pt has no hx of substance abuse, or IP rehab  Pt has used [facility] in the past  CM will appreciate therapy recs when appropriate  CM will follow     CM reviewed d/c planning process including the following: identifying help at home, patient preference for d/c planning needs, Discharge Lounge, [facility] Meds to Bed program, availability of treatment team to discuss questions or concerns patient and/or family may have regarding understanding medications and recognizing signs and symptoms once discharged  CM also encouraged patient to follow up with all recommended appointments after discharge  Patient advised of importance for patient and family to participate in managing patients medical well being

## 2021-06-24 NOTE — CONSULTS
NEUROLOGY RESIDENCY CONSULT NOTE     Name: Iza Vaz   Age & Sex: 72 y o  female   MRN: [id number]  Unit/Bed#: ICU 06   Encounter: [id number]  Length of Stay: 0    ASSESSMENT & PLAN     Altered mental status  Assessment & Plan  Pt found down at home by family LKW 4 hours prior, bradycardia and hypothermia noted on presentation and intubated in field for persistent unresponsiveness  Initial exam demonstrated right upper extremity poorly responsive to noxious stim, and mental status was stuporous  Otherwise, nonfocal exam  Reportedly has hx of stroke with dysarthria, and RCC s/p nephrectomy  Previous admission in late May after being found down in the street presenting with confusion and found to have UTI  Patient's family denies recent illness  On repeat exam 30 mins later, pt started following commands throughout  Workup:  6/24/21 CT head: unremarkable  Initial UA:  WBCs 10-20, leukocytes, blood  Total CK 3514, CK-MB 71 2    Impression: altered mental status likely secondary to metabolic etiology versus seizure vs cardiac etiology vs less likely stroke    Plan:  MRI brain when medically able/appropriate  Would hold off on video EEG at this time as patient is now following commands but retain low threshold to hook up if mental status declines  CTH if pt mental status declines by >2 GCS in an hour or significant change in mental status  Metabolic work up and treatment as per primary team  Defer CTA at this time as pt has solitary kidney and LUCAS      Recommendations for outpatient neurological follow up have yet to be determined  SUBJECTIVE     Reason for Consult / Principal Problem: unresponsive  Hx and PE limited by: pt is unresponsive    Iza Vaz is a 72 y o  female who presents as a trauma A alert after being found down at home by family members for unknown period of time  EMS found patient to be altered, hypothermic with noted decorticate posturing and bradycardia    She was intubated in the field and moving all extremities on arrival to [facility], [place]  She was recently admitted to [facility] at the end of May after she was found down in the street and found to have UTI with altered mental status  She ultimately left that hospitalization AMA prior to imaging such as MRI, reportedly however she was scheduled to have MRI brain this upcoming Tuesday  On presentation to [facility] today, her mental status is stuporous and she is intubated and sedated minimally with propofol at 14  All cranial nerves are intact and she withdraws to noxious stim in all extremities except the right upper, however does grimace to pain in the proximal right upper  Reflexes are 2+ throughout and she has asymmetric tone in the uppers with the right more hypertonic than the left  Per family history obtained over the phone the patient was last seen normal at 0800 hours this morning by her daughter who calls her every day at the same time  At that time she had no complaints and her daughter states she sounded and acted normally  Her daughter then attempted to call her back again at 1000 hours and did not receive a response on the phone  She attempted to call several more times and when her mother did not  she went to her house and that was when she was located on the floor  She has a past medical history pertinent for renal cell carcinoma s/p nephrectomy as well as reportedly a stroke in 2009 with residual dysarthria and right upper extremity weakness  Per family, she follows with [facility] although family member could not tell us the name of the physician, and there are no records available in our system from previous neurology visits  Per her daughter, the patient has recently had some episodes of slurred speech which resolved with no other neurological symptoms  She does have a history of recurrent UTI and in fact was being treated for 1 recently with Keflex      Although patient was initially seen by me as per above exam, I reassessed her at bedside half an hour later at which time her exam had substantially improved and she was following all commands, only deficit noted was persistent right upper extremity weakness which on questioning she nods is pre-existing without any changes  She was still intubated on my 2nd exam as such speech could not be assessed  At this point, presentation does not appear neurological in nature more likely metabolic etiology  Video EEG is unnecessary at this time as patients mental status has dramatically improved, however would retain low threshold if mental status changes  Inpatient consult to Neurology     Date/Time 6/24/2021 4:28 PM     Performed by  Aleksandr East MD     Authorized by Catracho Pedroza MD              Historical Information   History reviewed  No pertinent past medical history  History reviewed  No pertinent surgical history  Social History   Social History     Substance and Sexual Activity   Alcohol Use None     Social History     Substance and Sexual Activity   Drug Use Not on file     E-Cigarette/Vaping     E-Cigarette/Vaping Substances     Social History     Tobacco Use   Smoking Status Not on file     Family History:   Family History   Family history unknown: Yes     Meds/Allergies   all current active meds have been reviewed, current meds:   Current Facility-Administered Medications   Medication Dose Route Frequency    chlorhexidine (PERIDEX) 0 12 % oral rinse 15 mL  15 mL Mouth/Throat Q12H [address]    enoxaparin (LOVENOX) subcutaneous injection 40 mg  40 mg Subcutaneous Daily    multi-electrolyte (ISOLYTE-S PH 7 4) bolus 500 mL  500 mL Intravenous Once    multi-electrolyte (PLASMALYTE-A/ISOLYTE-S PH 7 4) IV solution  100 mL/hr Intravenous Continuous    and PTA meds:   None     No Known Allergies    Review of previous medical records was completed         Review of Systems   Unable to perform ROS: Acuity of condition OBJECTIVE     Patient ID: Humberto Vidal is a 72 y o  female  Vitals:   Vitals:    06/24/21 1500 06/24/21 1555 06/24/21 1600 06/24/21 1700   BP: 112/67  114/55 109/65   BP Location:   Left arm    Pulse: 56  70 70   Resp: 16  18 19   Temp: (!) 94 6 °F (34 8 °C)  (!) 95 9 °F (35 5 °C) (!) 97 °F (36 1 °C)   TempSrc:   Probe Esophageal   SpO2: 100% 98% 99% 100%   Weight:   86 5 kg (190 lb 11 2 oz)    Height:   5' 4" (1 626 m)       Body mass index is 32 73 kg/m²  Intake/Output Summary (Last 24 hours) at 6/24/2021 1738  Last data filed at 6/24/2021 1704  Gross per 24 hour   Intake 500 ml   Output 5200 ml   Net -4700 ml       Physical Exam  Vitals and nursing note reviewed  Constitutional:       General: She is in acute distress  Appearance: She is well-developed  Interventions: She is sedated, intubated and restrained  HENT:      Head: Normocephalic and atraumatic  Eyes:      Extraocular Movements: EOM normal       Pupils: Pupils are equal, round, and reactive to light  Cardiovascular:      Rate and Rhythm: Normal rate and regular rhythm  Pulmonary:      Effort: She is intubated  Neurological:      Deep Tendon Reflexes:      Reflex Scores:       Tricep reflexes are 2+ on the right side and 2+ on the left side  Bicep reflexes are 2+ on the right side and 2+ on the left side  Brachioradialis reflexes are 2+ on the right side and 2+ on the left side  Patellar reflexes are 2+ on the right side and 2+ on the left side  Neurologic Exam     Mental Status   Level of consciousness: arousable by verbal stimuli  Examined while intubated     Cranial Nerves     CN III, IV, VI   Pupils are equal, round, and reactive to light  Extraocular motions are normal    Right pupil: Size: 2 mm  Shape: regular  Reactivity: brisk  Left pupil: Size: 2 mm  Shape: regular  Reactivity: brisk  Vestibulo-ocular reflex: present (not brisk)    CN V   Facial sensation intact       CN VII   Facial expression full, symmetric  CN VIII   Hearing: impaired  Cough intact     Motor Exam   Muscle bulk: normal  Overall muscle tone: normal  Right arm tone: increased  Left arm tone: normal  Right leg tone: normal  Left leg tone: normal  Sponatenously moving LUE and b/l LE's, less movement in the RUE     Sensory Exam   Light touch normal      Gait, Coordination, and Reflexes     Reflexes   Right brachioradialis: 2+  Left brachioradialis: 2+  Right biceps: 2+  Left biceps: 2+  Right triceps: 2+  Left triceps: 2+  Right patellar: 2+  Left patellar: 2+  Right plantar: normal  Left plantar: normal  Right ankle clonus: absent  Left pendular knee jerk: absent       LABORATORY DATA     Labs:  I have personally reviewed pertinent films in PACS  Results from last 7 days   Lab Units 06/24/21  1624 06/24/21  1315 06/24/21  1313   WBC Thousand/uL  --   --  7 70   HEMOGLOBIN g/dL  --   --  12 4   I STAT HEMOGLOBIN g/dl  --  12 6  --    HEMATOCRIT %  --   --  38 4   HEMATOCRIT, ISTAT %  --  37  --    PLATELETS Thousands/uL 187  --  173   NEUTROS PCT %  --   --  85*   MONOS PCT %  --   --  5      Results from last 7 days   Lab Units 06/24/21  1412 06/24/21  1315   POTASSIUM mmol/L 4 2  --    CHLORIDE mmol/L 105  --    CO2 mmol/L 25  --    CO2, I-STAT mmol/L  --  26   BUN mg/dL 32*  --    CREATININE mg/dL 1 66*  --    CALCIUM mg/dL 7 4*  --    ALK PHOS U/L 103  --    ALT U/L 20  --    AST U/L 95*  --    GLUCOSE, ISTAT mg/dl  --  112              Results from last 7 days   Lab Units 06/24/21  1313   INR  1 02   PTT seconds 28     Results from last 7 days   Lab Units 06/24/21  1313   LACTIC ACID mmol/L 0 8     Results from last 7 days   Lab Units 06/24/21  1624 06/24/21  1313   TROPONIN I ng/mL <0 02 <0 02       IMAGING & DIAGNOSTIC TESTING     Radiology Results: I have personally reviewed pertinent films in PACS  TRAUMA - CT head wo contrast   Final Result by Maricruz Fuentes MD (06/24 1413)   Addendum 1 of 1 by Maricruz Fuentes MD (06/24 1413)   ADDENDUM:      Comparison was made with a CT of the head from May 29, 2021  Final      No acute intracranial abnormality  I personally discussed this study with Marina Padron on 6/24/2021 at 2:10 PM       Workstation performed: [id number]         TRAUMA - CT spine cervical wo contrast   Final Result by Marianna Palmer MD (06/24 1413)       No cervical spine fracture or traumatic malalignment  Workstation performed: [id number]         TRAUMA - CT chest abdomen pelvis w contrast   Final Result by Marianna Palmer MD (06/24 1414)   Addendum 1 of 1 by Marianna Palmer MD (06/24 1414)   ADDENDUM:      Comparison is made with a CTA of the chest, CT abdomen and pelvis from May    29, 2021  Final      1  Subsegmental atelectasis in the bases of both lower lobes and right middle lobe  2   Distended bladder  3   Otherwise, no evidence of acute abnormality in the chest, abdomen or pelvis  I personally discussed this study with Marina Padron on 6/24/2021 at 2:10 PM                Workstation performed: [id number]         XR trauma multiple   Final Result by Marivel Weston MD (06/24 1638)      Right lower lobe atelectasis versus scarring  Endotracheal tube at the origin of the right mainstem bronchus  Workstation performed: [id number]         XR chest 1 view   Final Result by Marivel Weston MD (06/24 1638)      Right lower lobe atelectasis versus scarring  Endotracheal tube at the origin of the right mainstem bronchus                 Workstation performed: [id number]         XR chest portable    (Results Pending)       Other Diagnostic Testing: I have personally reviewed pertinent films in PACS    ACTIVE MEDICATIONS     Current Facility-Administered Medications   Medication Dose Route Frequency    chlorhexidine (PERIDEX) 0 12 % oral rinse 15 mL  15 mL Mouth/Throat Q12H [address]    enoxaparin (LOVENOX) subcutaneous injection 40 mg  40 mg Subcutaneous Daily    multi-electrolyte (ISOLYTE-S PH 7 4) bolus 500 mL  500 mL Intravenous Once    multi-electrolyte (PLASMALYTE-A/ISOLYTE-S PH 7 4) IV solution  100 mL/hr Intravenous Continuous       Prior to Admission medications    Not on File       CODE STATUS & ADVANCED DIRECTIVES     Code Status: Level 1 - Full Code  Advance Directive and Living Will:      Power of :    POLST:        ==  Daniel Necessary, MD [facility], PGY-2

## 2021-06-24 NOTE — PROCEDURES
Procedure  Central Line Insertion    Date/Time: 6/24/2021 5:17 PM  Performed by: Dexter Curtis DO  Authorized by: Dexter Curtis DO     Patient location:  ED  Consent:     Consent obtained:  Emergent situation  Universal protocol:     Patient identity confirmed:  Arm band  Pre-procedure details:     Skin preparation:  2% chlorhexidine  Indications:     Central line indications: no peripheral vascular access    Procedure details:     Location:  Left femoral    Vessel type: vein      Laterality:  Left    Approach: percutaneous technique used      Patient position:  Flat    Catheter type:  Triple lumen    Landmarks identified: yes      Ultrasound guidance: no      Number of attempts:  1    Successful placement: yes    Post-procedure details:     Post-procedure:  Dressing applied and line sutured    Assessment:  Blood return through all ports    Patient tolerance of procedure: Tolerated well, no immediate complications  Comments:       Will need femoral CVC line removed once adequate alternative access obtained

## 2021-06-24 NOTE — RESPIRATORY THERAPY NOTE
RT Ventilator Management Note  Elda Koch 72 y o  female MRN: [id number]  Unit/Bed#: ICU 06 Encounter: [id number]      Daily Screen       6/24/2021  1356 6/24/2021  1700          Patient safety screen outcome[de-identified]  Failed  Passed      Not Ready for Weaning due to[de-identified]  Underline problem not resolved  --      Spont breathing trial outcome[de-identified]  --  Passed              Physical Exam:   Assessment Type: Assess only  General Appearance: Sedated  Respiratory Pattern: Assisted  Chest Assessment: Chest expansion symmetrical  Bilateral Breath Sounds: Diminished  R Breath Sounds: Clear  L Breath Sounds: Clear  Suction: Oral, ET Tube  O2 Device: Vent      Resp Comments: (P) Pt found on CPAP/PS  Unkown time of vent changes  Pt continues on PSV and stable  Will monitor

## 2021-06-24 NOTE — RESPIRATORY THERAPY NOTE
RT Protocol Note  Maggie Kwok 72 y o  female MRN: [id number]  Unit/Bed#: ED 19 Encounter: [id number]    Assessment    Active Problems:    Altered mental status      Home Pulmonary Medications:  na       History reviewed  No pertinent past medical history  Social History     Socioeconomic History    Marital status: Single     Spouse name: None    Number of children: None    Years of education: None    Highest education level: None   Occupational History    None   Tobacco Use    Smoking status: None   Substance and Sexual Activity    Alcohol use: None    Drug use: None    Sexual activity: None   Other Topics Concern    None   Social History Narrative    None     Social Determinants of Health     Financial Resource Strain:     Difficulty of Paying Living Expenses:    Food Insecurity:     Worried About Running Out of Food in the Last Year:     Ran Out of Food in the Last Year:    Transportation Needs:     Lack of Transportation (Medical):  Lack of Transportation (Non-Medical):    Physical Activity:     Days of Exercise per Week:     Minutes of Exercise per Session:    Stress:     Feeling of Stress :    Social Connections:     Frequency of Communication with Friends and Family:     Frequency of Social Gatherings with Friends and Family:     Attends Taoism Services:     Active Member of Clubs or Organizations:     Attends Club or Organization Meetings:     Marital Status:    Intimate Partner Violence:     Fear of Current or Ex-Partner:     Emotionally Abused:     Physically Abused:     Sexually Abused:        Subjective         Objective    Physical Exam:   Assessment Type: Assess only  General Appearance: Sedated  Respiratory Pattern: Assisted  Chest Assessment: Chest expansion symmetrical  Bilateral Breath Sounds: Diminished    Vitals:  Blood pressure 140/63, pulse (!) 54, temperature (!) 93 6 °F (34 2 °C), temperature source Tympanic, weight 101 kg (222 lb 7 1 oz), SpO2 100 %  Imaging and other studies: I have personally reviewed pertinent reports  Plan    Respiratory Plan: (P) Vent/NIV/HFNC        Resp Comments: (P) pt came in as level 1 trauma already intubated, pt transported to ct scan on transport vent and then to room LF83 without complication  Pt on [address] tolerating well with clear/diminshed bilateral bs, satting 100% on 60%

## 2021-06-24 NOTE — H&P
H&P Exam - [address]  72 y o  female MRN: [id number]  Unit/Bed#: ED 19 Encounter: [id number]      -------------------------------------------------------------------------------------------------------------  Chief Complaint: Acute Encephalopathy    History of Present Illness   HX and PE limited by: Intubated and sedated  Avril Aj is a 72 y o  female with PMHx of bipolar disease  Renal cell carcinoma status post right nephrectomy, and CVA with residual right-sided weakness who presented to [facility] ED as a Level A Trauma alert via helicopter after she was found down by family members  Unknown period of time for which the patient was found down  By time of EMS arrival, patient was not alter, hypothermic, bradycardic, and in decorticate positioning  Patient was intubated via EMS in the field  Patient was evaluated by the trauma team and moving all extremities on arrival   Trauma workup largely unremarkable including labs and imaging and patient was administered Narcan without any improvement  Patient unaccompanied by family at time of admission  Therefore limited information available  However per chart review, patient was admitted 5/29 - 5/30 after presenting with similar presentation  At that time, patient was found down on the street  Patient was confused and hypothermic  Labs completed were largely unremarkable and imaging was unrevealing  No clear etiology of patient's symptoms were found and patient signed out AMA prior to further testing including echo and MRI being completed  Most recently, patient presented to the ED on 06/19/2021 for evaluation of urinary tract infection while on antibiotics  Patient's symptoms had been improving and renal ultrasound and blood work were normal   Patient was discharged on 2 additional days of Keflex  Patient's mother was contacted who stated that she last spoke to the patient at 8:00 AM this morning    The mother then went to go check on the patient after she attempted to call our her later in the afternoon but was unable to contact her  Patient offered no complaints or mother this morning or yesterday evening  History obtained from chart review  -------------------------------------------------------------------------------------------------------------  Assessment and Plan:    Neuro:    Diagnosis:  Acute encephalopathy  o Unclear etiology of patient's encephalopathy  CT head with no acute intracranial abnormality  CT C-spine no fracture or traumatic malalignment  CT chest/abdomen demonstrating subsegmental atelectasis and bibasilar bases no distended bladder  Labs also largely unremarkable with negative troponin, lactic acid, normal electrolytes, ammonia, and TSH  Differentials include: drug-induced, cardiac etiology and unlikely metabolic, stroke, or stroke etiology    o Plan:   - Neuro checks every hour  - Neurology consulted  - MRI brain with medically stable  - No indication for EEG at this time  - Follow-up UDS and cortisol levels  - EKG and echo ordered   Diagnosis:  History CVA  o History of CVA in 2009 with right-sided deficits per chart review  - Plan:    Lipitor on hold in setting of rhabdomyolysis   Diagnosis:  Bipolar disorder  o Home medication regimen includes: Seroquel 300 mg qHS and Zyprexa 20 mg qHS  o Plan:    Hold off on home medications  · Diagnosis:  Agitation/sedation  · Plan:  · Propofol gtt  · CAM ICU monitoring    CV:    Diagnosis:  Hypertension  o Home medication regimen includes:  Amlodipine 5 mg  - Plan    Continue to monitor vitals      Pulm:   Diagnosis:  Acute hypoxic respiratory failure  o Plan:   - Continue mechanical ventilation:  AC/VC 16/450/60/6      GI:    Diagnosis: No active issues      :    Diagnosis:  Acute kidney injury  o Creatinine 1 66 on admission  o Plan:   - Isolyte @ 100 cc/hr  - Trend BUN/Cr  - Monitor intake/output  - Avoid nephrotoxic agents   Diagnosis: Rhabdomyolysis  o Patient found down at home for unknown period of time  Initial CK 3514  o Plan:   - Isolyte @ 100 cc/hr  - Trend BUN/Cr  - Monitor intake/output  - Avoid nephrotoxic agents   Diagnosis:  Recurrent UTIs  o Patient status post treatment with Keflex for recurrent UTI  o UA with small amount of leukocytes and blood and urine microscopy with 10-20 wbc's and hyaline casts  Patient with normal lactic acid and WBC of 7 70    - Plan:    Follow-up urine cultures   Monitor WBCs trend fever curve   Diagnosis:  History of renal cell carcinoma status post nephrectomy  o Plan:    Maintain Russell   Monitor intake/output   Trend BUN/Cr   Outpatient follow up       F/E/N:    Fluids:  Isolyte @ 100 cc/hr   Electrolytes:  Monitor and replete to maintain Mg>2, Phos>3, and K>4   Nutrition: NPO       Heme/Onc:    Diagnosis:  No active issues   DVT prophylaxis: Heparin SC      Endo:    Diagnosis:  Hypothermia  o Unclear etiology of hypothermia at this time  o Plan:    - Hypothermia protocol  - Follow-up AM cortisol levels  - Apply valeria zepeda  - Closely monitor patient's vital signs      ID:    Diagnosis:  Recurrent UTIs  o Plan:   - Monitor input and output  - Trend WBC and monitor fever curve      MSK/Skin:    Diagnosis:  No active issues   Frequent repositioning and skin examination   PT/OT to evaluate and treat when patient is able      Disposition: Admit to Critical Care   Code Status: Level 1 - Full Code  --------------------------------------------------------------------------------------------------------------  Review of Systems   Unable to perform ROS: Intubated       Review of systems was unable to be performed secondary to Patient being intubated    Physical Exam  Constitutional:       General: She is not in acute distress  Appearance: She is obese  She is ill-appearing  She is not diaphoretic  Interventions: She is sedated, intubated and restrained  Cervical collar in place  HENT:      Head: Normocephalic and atraumatic  Nose: Nose normal  No congestion  Mouth/Throat:      Mouth: Mucous membranes are moist    Eyes:      General: No scleral icterus  Conjunctiva/sclera: Conjunctivae normal       Pupils: Pupils are equal, round, and reactive to light  Comments: Pupils equal, round, and reactive to light but slow to react  Cardiovascular:      Rate and Rhythm: Normal rate and regular rhythm  Pulses: Normal pulses  Heart sounds: Normal heart sounds  No murmur heard  Pulmonary:      Effort: No respiratory distress  She is intubated  Breath sounds: No wheezing or rhonchi  Comments: Mechanical breath sounds  Abdominal:      General: Abdomen is flat  Bowel sounds are normal       Palpations: Abdomen is soft  Musculoskeletal:      Cervical back: Normal range of motion  Right lower leg: No edema  Left lower leg: No edema  Skin:     General: Skin is warm  Capillary Refill: Capillary refill takes less than 2 seconds  Neurological:      Mental Status: She is unresponsive  GCS: GCS eye subscore is 1  GCS verbal subscore is 1  GCS motor subscore is 2  Motor: Weakness present        --------------------------------------------------------------------------------------------------------------  Vitals:   Vitals:    06/24/21 1400 06/24/21 1408 06/24/21 1415 06/24/21 1430   BP: 170/87 116/64 151/72    Pulse: (!) 54 56 60 56   Resp: 18  18    Temp:    (!) 94 6 °F (34 8 °C)   TempSrc:    Probe   SpO2: 100% 100% 100% 100%   Weight:         Temp  Min: 93 6 °F (34 2 °C)  Max: 94 6 °F (34 8 °C)        There is no height or weight on file to calculate BMI    N/A    Laboratory and Diagnostics:  Results from last 7 days   Lab Units 06/24/21  1315 06/24/21  1313   WBC Thousand/uL  --  7 70   HEMOGLOBIN g/dL  --  12 4   I STAT HEMOGLOBIN g/dl 12 6  --    HEMATOCRIT %  --  38 4   HEMATOCRIT, ISTAT % 37  --    PLATELETS Thousands/uL  --  173 NEUTROS PCT %  --  85*   MONOS PCT %  --  5     Results from last 7 days   Lab Units 06/24/21  1412 06/24/21  1315   SODIUM mmol/L 137  --    POTASSIUM mmol/L 4 2  --    CHLORIDE mmol/L 105  --    CO2 mmol/L 25  --    CO2, I-STAT mmol/L  --  26   ANION GAP mmol/L 7  --    BUN mg/dL 32*  --    CREATININE mg/dL 1 66*  --    CALCIUM mg/dL 7 4*  --    GLUCOSE RANDOM mg/dL 103  --           Results from last 7 days   Lab Units 06/24/21  1313   INR  1 02   PTT seconds 28      Results from last 7 days   Lab Units 06/24/21  1313   TROPONIN I ng/mL <0 02     Results from last 7 days   Lab Units 06/24/21  1313   LACTIC ACID mmol/L 0 8         Micro: pending  EKG: NSR on telemetry with HR of 80s  Imaging: I have personally reviewed pertinent reports  Historical Information   History reviewed  No pertinent past medical history  History reviewed  No pertinent surgical history    Social History   Social History     Substance and Sexual Activity   Alcohol Use None     Social History     Substance and Sexual Activity   Drug Use Not on file     Social History     Tobacco Use   Smoking Status Not on file     Exercise History: none applicable  Family History:   Family History   Family history unknown: Yes     I have reviewed this patient's family history and commented on sigificant items within the HPI      Medications:  Current Facility-Administered Medications   Medication Dose Route Frequency    chlorhexidine (PERIDEX) 0 12 % oral rinse 15 mL  15 mL Mouth/Throat Q12H [address]    enoxaparin (LOVENOX) subcutaneous injection 40 mg  40 mg Subcutaneous Daily     Home medications:  None     Allergies:  Not on File  ------------------------------------------------------------------------------------------------------------  Advance Directive and Living Will:      Power of :    POLST:    ------------------------------------------------------------------------------------------------------------  Anticipated Length of Stay is > 2 midnights    Care Time Delivered:   No Critical Care time spent       [facility], DO        Portions of the record may have been created with voice recognition software  Occasional wrong word or "sound a like" substitutions may have occurred due to the inherent limitations of voice recognition software    Read the chart carefully and recognize, using context, where substitutions have occurred

## 2021-06-24 NOTE — CASE MANAGEMENT
Cm responded to trauma alert  Pt was brought to the ED via [facility] ([place] [facility]) s/p being found down unresponsive by her family  Pt was intubated in the field  Pt was given GCS 5T  Pt's family aware and en route

## 2021-06-24 NOTE — H&P
[address] [facility]  H&P- Lou Berkowitz 1955, 72 y o  female MRN: [id number]  Unit/Bed#: TR 02 Encounter: [id number]  Primary Care Provider: YASMANI Faye   Date and time admitted to hospital: 6/24/2021 12:51 PM    Altered mental status  Assessment & Plan  - Patient found down, unknown amount of time  -Trauma alert level A, arrival by air  Intubated  - Injuries as listed    H&P Exam - Trauma   Lou Berkowitz 72 y o  female MRN: [id number]  Unit/Bed#: TR 02 Encounter: [id number]    Assessment/Plan   Trauma Alert: Level A  Model of Arrival: Helicopter  Trauma Team: Attending Rehana Mendoza, Residents Keith Jade and SULLY [address] [place] [address]  Consultants: none    Trauma Active Problems:   - Found down at home by family members  - Intubated in the field      Trauma Plan: pending CT images    Chief Complaint: Found down    History of Present Illness   HPI:  Lou Berkowitz is a 72 y o  female who presents after bring found down for unknown amount of time at home by family members  EMS found patient with altered mental status, hypothermia, noted decorticate positioning, and bradycardia  EMS intubated patient  Moving all extremities on arrival to trauma bay  Patient  recently admitted to the hospital for AMS, hypothermia found down on the street and treated for UTI  Linked chart states patient used to be on ASA 81, but currently not taking  Mechanism:Fall    Review of Systems   Unable to perform ROS: Intubated       12-point, complete review of systems was reviewed and negative except as stated above  Historical Information   History is unobtainable from the patient due to intubated  Efforts to obtain history included the following sources: obtained from other records    History reviewed  No pertinent past medical history  History reviewed  No pertinent surgical history    Social History   Social History     Substance and Sexual Activity   Alcohol Use None     Social History     Substance and Sexual Activity   Drug Use Not on file     Social History     Tobacco Use   Smoking Status Not on file     E-Cigarette/Vaping     E-Cigarette/Vaping Substances       There is no immunization history on file for this patient  Last Tetanus: 5/2021  Family History: Non-contributory  Unable to obtain/limited by none      Meds/Allergies   all current active meds have been reviewed    Not on File      PHYSICAL EXAM    PE limited by: intubation    Objective   Vitals:   First set: Temperature: (!) 93 6 °F (34 2 °C) (06/24/21 1256)  Pulse: 71 (06/24/21 1256)  Blood Pressure: 150/90 (06/24/21 1256)    Primary Survey:   (A) Airway: Intubated  (B) Breathing: Clear bilaterally  (C) Circulation: Pulses:   normal  (D) Disabliity:  GCS Total:  5T  (E) Expose:  Completed    Secondary Survey: (Click on Physical Exam tab above)  Physical Exam  Vitals reviewed  Constitutional:       Interventions: She is intubated  HENT:      Head: Normocephalic and atraumatic  Right Ear: External ear normal       Left Ear: External ear normal       Nose: Nose normal       Mouth/Throat:      Mouth: Mucous membranes are moist       Pharynx: Oropharynx is clear  Eyes:      Pupils:      Right eye: Pupil is not reactive  Left eye: Pupil is not reactive  Cardiovascular:      Rate and Rhythm: Normal rate  Pulses: Normal pulses  Heart sounds: Normal heart sounds  Pulmonary:      Effort: Pulmonary effort is normal  She is intubated  Breath sounds: Normal breath sounds  Abdominal:      General: Abdomen is flat  Palpations: Abdomen is soft  Musculoskeletal:         General: No deformity or signs of injury  Normal range of motion  Skin:     Findings: Bruising present        Comments: Left chest wall, bilateral thighs         Invasive Devices     None                 Lab Results: Results: I have personally reviewed pertinent reports   , BMP/CMP: No results found for: SODIUM, K, CL, CO2, ANIONGAP, BUN, CREATININE, GLUCOSE, CALCIUM, AST, ALT, ALKPHOS, PROT, BILITOT, EGFR, CBC: No results found for: WBC, HGB, HCT, MCV, PLT, ADJUSTEDWBC, MCH, MCHC, RDW, MPV, NRBC and Coagulation: No results found for: PT, INR, APTT  Imaging/EKG Studies: Results: I have personally reviewed pertinent reports    , FAST: negative  Other Studies:   XR trauma multiple    (Results Pending)   XR chest 1 view    (Results Pending)         Code Status: No Order  Advance Directive and Living Will:      Power of :    POLST:

## 2021-06-25 ENCOUNTER — APPOINTMENT (INPATIENT)
Dept: NON INVASIVE DIAGNOSTICS | Facility: HOSPITAL | Age: 66
DRG: 071 | End: 2021-06-25
Payer: MEDICARE

## 2021-06-25 PROBLEM — F31.9 BIPOLAR DISEASE, CHRONIC (HCC): Status: ACTIVE | Noted: 2021-06-25

## 2021-06-25 PROBLEM — M54.2 NECK PAIN: Status: ACTIVE | Noted: 2021-06-25

## 2021-06-25 PROBLEM — J96.01 ACUTE HYPOXEMIC RESPIRATORY FAILURE (HCC): Status: ACTIVE | Noted: 2021-06-25

## 2021-06-25 PROBLEM — N17.9 AKI (ACUTE KIDNEY INJURY) (HCC): Status: ACTIVE | Noted: 2021-06-25

## 2021-06-25 PROBLEM — I87.8 POOR VENOUS ACCESS: Status: ACTIVE | Noted: 2021-06-25

## 2021-06-25 PROBLEM — I10 HYPERTENSION: Status: ACTIVE | Noted: 2021-06-25

## 2021-06-25 PROBLEM — M62.82 RHABDOMYOLYSIS: Status: ACTIVE | Noted: 2021-06-25

## 2021-06-25 PROBLEM — Z86.73 HISTORY OF CVA (CEREBROVASCULAR ACCIDENT): Status: ACTIVE | Noted: 2021-06-25

## 2021-06-25 LAB
ANION GAP SERPL CALCULATED.3IONS-SCNC: 5 MMOL/L (ref 4–13)
ATRIAL RATE: 87 BPM
BACTERIA UR CULT: NORMAL
BASOPHILS # BLD AUTO: 0.04 THOUSANDS/ΜL (ref 0–0.1)
BASOPHILS NFR BLD AUTO: 1 % (ref 0–1)
BUN SERPL-MCNC: 19 MG/DL (ref 5–25)
CA-I BLD-SCNC: 0.98 MMOL/L (ref 1.12–1.32)
CALCIUM SERPL-MCNC: 7.5 MG/DL (ref 8.3–10.1)
CHLORIDE SERPL-SCNC: 108 MMOL/L (ref 100–108)
CO2 SERPL-SCNC: 29 MMOL/L (ref 21–32)
CORTIS AM PEAK SERPL-MCNC: 27.3 UG/DL (ref 4.2–22.4)
CREAT SERPL-MCNC: 0.97 MG/DL (ref 0.6–1.3)
EOSINOPHIL # BLD AUTO: 0.1 THOUSAND/ΜL (ref 0–0.61)
EOSINOPHIL NFR BLD AUTO: 2 % (ref 0–6)
ERYTHROCYTE [DISTWIDTH] IN BLOOD BY AUTOMATED COUNT: 13.6 % (ref 11.6–15.1)
GFR SERPL CREATININE-BSD FRML MDRD: 61 ML/MIN/1.73SQ M
GLUCOSE SERPL-MCNC: 100 MG/DL (ref 65–140)
HCT VFR BLD AUTO: 34 % (ref 34.8–46.1)
HGB BLD-MCNC: 11.1 G/DL (ref 11.5–15.4)
IMM GRANULOCYTES # BLD AUTO: 0.02 THOUSAND/UL (ref 0–0.2)
IMM GRANULOCYTES NFR BLD AUTO: 0 % (ref 0–2)
LYMPHOCYTES # BLD AUTO: 0.69 THOUSANDS/ΜL (ref 0.6–4.47)
LYMPHOCYTES NFR BLD AUTO: 11 % (ref 14–44)
MAGNESIUM SERPL-MCNC: 2.3 MG/DL (ref 1.6–2.6)
MCH RBC QN AUTO: 35 PG (ref 26.8–34.3)
MCHC RBC AUTO-ENTMCNC: 32.6 G/DL (ref 31.4–37.4)
MCV RBC AUTO: 107 FL (ref 82–98)
MONOCYTES # BLD AUTO: 0.42 THOUSAND/ΜL (ref 0.17–1.22)
MONOCYTES NFR BLD AUTO: 7 % (ref 4–12)
NEUTROPHILS # BLD AUTO: 5.02 THOUSANDS/ΜL (ref 1.85–7.62)
NEUTS SEG NFR BLD AUTO: 79 % (ref 43–75)
NRBC BLD AUTO-RTO: 0 /100 WBCS
P AXIS: 71 DEGREES
PHOSPHATE SERPL-MCNC: 2.4 MG/DL (ref 2.3–4.1)
PLATELET # BLD AUTO: 179 THOUSANDS/UL (ref 149–390)
PMV BLD AUTO: 9.5 FL (ref 8.9–12.7)
POTASSIUM SERPL-SCNC: 3.5 MMOL/L (ref 3.5–5.3)
PR INTERVAL: 171 MS
QRS AXIS: 74 DEGREES
QRSD INTERVAL: 83 MS
QT INTERVAL: 342 MS
QTC INTERVAL: 412 MS
RBC # BLD AUTO: 3.17 MILLION/UL (ref 3.81–5.12)
SODIUM SERPL-SCNC: 142 MMOL/L (ref 136–145)
T WAVE AXIS: 63 DEGREES
VENTRICULAR RATE: 87 BPM
WBC # BLD AUTO: 6.29 THOUSAND/UL (ref 4.31–10.16)

## 2021-06-25 PROCEDURE — 84100 ASSAY OF PHOSPHORUS: CPT | Performed by: STUDENT IN AN ORGANIZED HEALTH CARE EDUCATION/TRAINING PROGRAM

## 2021-06-25 PROCEDURE — 99233 SBSQ HOSP IP/OBS HIGH 50: CPT | Performed by: PSYCHIATRY & NEUROLOGY

## 2021-06-25 PROCEDURE — 93306 TTE W/DOPPLER COMPLETE: CPT

## 2021-06-25 PROCEDURE — 85025 COMPLETE CBC W/AUTO DIFF WBC: CPT | Performed by: STUDENT IN AN ORGANIZED HEALTH CARE EDUCATION/TRAINING PROGRAM

## 2021-06-25 PROCEDURE — 97166 OT EVAL MOD COMPLEX 45 MIN: CPT

## 2021-06-25 PROCEDURE — 99223 1ST HOSP IP/OBS HIGH 75: CPT | Performed by: INTERNAL MEDICINE

## 2021-06-25 PROCEDURE — 99222 1ST HOSP IP/OBS MODERATE 55: CPT | Performed by: PSYCHIATRY & NEUROLOGY

## 2021-06-25 PROCEDURE — 80048 BASIC METABOLIC PNL TOTAL CA: CPT | Performed by: STUDENT IN AN ORGANIZED HEALTH CARE EDUCATION/TRAINING PROGRAM

## 2021-06-25 PROCEDURE — 82533 TOTAL CORTISOL: CPT | Performed by: STUDENT IN AN ORGANIZED HEALTH CARE EDUCATION/TRAINING PROGRAM

## 2021-06-25 PROCEDURE — 93306 TTE W/DOPPLER COMPLETE: CPT | Performed by: INTERNAL MEDICINE

## 2021-06-25 PROCEDURE — 93010 ELECTROCARDIOGRAM REPORT: CPT | Performed by: INTERNAL MEDICINE

## 2021-06-25 PROCEDURE — 83735 ASSAY OF MAGNESIUM: CPT | Performed by: STUDENT IN AN ORGANIZED HEALTH CARE EDUCATION/TRAINING PROGRAM

## 2021-06-25 PROCEDURE — 97162 PT EVAL MOD COMPLEX 30 MIN: CPT

## 2021-06-25 PROCEDURE — 99233 SBSQ HOSP IP/OBS HIGH 50: CPT | Performed by: SURGERY

## 2021-06-25 PROCEDURE — 99233 SBSQ HOSP IP/OBS HIGH 50: CPT | Performed by: ANESTHESIOLOGY

## 2021-06-25 PROCEDURE — 80171 DRUG SCREEN QUANT GABAPENTIN: CPT | Performed by: ANESTHESIOLOGY

## 2021-06-25 PROCEDURE — 82330 ASSAY OF CALCIUM: CPT | Performed by: PHYSICIAN ASSISTANT

## 2021-06-25 PROCEDURE — NC001 PR NO CHARGE: Performed by: ANESTHESIOLOGY

## 2021-06-25 RX ORDER — SODIUM CHLORIDE, SODIUM GLUCONATE, SODIUM ACETATE, POTASSIUM CHLORIDE, MAGNESIUM CHLORIDE, SODIUM PHOSPHATE, DIBASIC, AND POTASSIUM PHOSPHATE .53; .5; .37; .037; .03; .012; .00082 G/100ML; G/100ML; G/100ML; G/100ML; G/100ML; G/100ML; G/100ML
1000 INJECTION, SOLUTION INTRAVENOUS ONCE
Status: COMPLETED | OUTPATIENT
Start: 2021-06-25 | End: 2021-06-25

## 2021-06-25 RX ORDER — ALPRAZOLAM 0.5 MG/1
1 TABLET ORAL 4 TIMES DAILY
Status: DISCONTINUED | OUTPATIENT
Start: 2021-06-25 | End: 2021-06-28 | Stop reason: HOSPADM

## 2021-06-25 RX ORDER — POTASSIUM CHLORIDE 20 MEQ/1
40 TABLET, EXTENDED RELEASE ORAL ONCE
Status: COMPLETED | OUTPATIENT
Start: 2021-06-25 | End: 2021-06-25

## 2021-06-25 RX ORDER — HYDROCODONE BITARTRATE AND ACETAMINOPHEN 5; 325 MG/1; MG/1
0.5 TABLET ORAL EVERY 6 HOURS PRN
Status: DISCONTINUED | OUTPATIENT
Start: 2021-06-25 | End: 2021-06-28 | Stop reason: HOSPADM

## 2021-06-25 RX ADMIN — ALPRAZOLAM 1 MG: 0.5 TABLET ORAL at 21:59

## 2021-06-25 RX ADMIN — HEPARIN SODIUM 5000 UNITS: 5000 INJECTION INTRAVENOUS; SUBCUTANEOUS at 14:40

## 2021-06-25 RX ADMIN — ALPRAZOLAM 1 MG: 0.5 TABLET ORAL at 11:29

## 2021-06-25 RX ADMIN — HEPARIN SODIUM 5000 UNITS: 5000 INJECTION INTRAVENOUS; SUBCUTANEOUS at 05:25

## 2021-06-25 RX ADMIN — POTASSIUM CHLORIDE 40 MEQ: 1500 TABLET, EXTENDED RELEASE ORAL at 08:10

## 2021-06-25 RX ADMIN — ALPRAZOLAM 1 MG: 0.5 TABLET ORAL at 16:27

## 2021-06-25 RX ADMIN — HEPARIN SODIUM 5000 UNITS: 5000 INJECTION INTRAVENOUS; SUBCUTANEOUS at 22:00

## 2021-06-25 RX ADMIN — SODIUM CHLORIDE, SODIUM GLUCONATE, SODIUM ACETATE, POTASSIUM CHLORIDE, MAGNESIUM CHLORIDE, SODIUM PHOSPHATE, DIBASIC, AND POTASSIUM PHOSPHATE 100 ML/HR: .53; .5; .37; .037; .03; .012; .00082 INJECTION, SOLUTION INTRAVENOUS at 04:37

## 2021-06-25 RX ADMIN — SODIUM CHLORIDE, SODIUM GLUCONATE, SODIUM ACETATE, POTASSIUM CHLORIDE, MAGNESIUM CHLORIDE, SODIUM PHOSPHATE, DIBASIC, AND POTASSIUM PHOSPHATE 1000 ML: .53; .5; .37; .037; .03; .012; .00082 INJECTION, SOLUTION INTRAVENOUS at 03:45

## 2021-06-25 RX ADMIN — HYDROCODONE BITARTRATE AND ACETAMINOPHEN 0.5 TABLET: 5; 325 TABLET ORAL at 11:29

## 2021-06-25 NOTE — UTILIZATION REVIEW
Initial Clinical Review    Admission: Date/Time/Statement:   Admission Orders (From admission, onward)     Ordered        06/24/21 1443  Inpatient Admission  Once                   Orders Placed This Encounter   Procedures    Inpatient Admission     Standing Status:   Standing     Number of Occurrences:   1     Order Specific Question:   Level of Care     Answer:   Critical Care [15]     Order Specific Question:   Estimated length of stay     Answer:   More than 2 Midnights     Order Specific Question:   Certification     Answer:   I certify that inpatient services are medically necessary for this patient for a duration of greater than two midnights  See H&P and MD Progress Notes for additional information about the patient's course of treatment  ED Arrival Information     Expected Arrival Acuity    - 6/24/2021 12:51 Immediate         Means of arrival Escorted by Service Admission type    Helicopter [place] [address] complaint    Trauma        Chief Complaint   Patient presents with    Trauma       Initial Presentation: 72 y o  female with PMHx of bipolar disease  Renal cell carcinoma status post right nephrectomy, and CVA with residual right-sided weakness who presented to [facility] ED as a Level A Trauma alert via helicopter after she was found down by family members  Unknown period of time for which the patient was found down  By time of EMS arrival, patient was  alter, hypothermic, bradycardic, and in decorticate positioning  Patient was  intubated via EMS in the field  On arrival,  Patient was evaluated by the trauma team  GST 5T and moving all extremities on arrival   Trauma workup largely unremarkable including labs and imaging and patient was administered Narcan without any improvement  She had a recent admission 5/29-5/30 with a similar presentation, she was found down, confused and hypothermic   No clear etiology and she left AMA, prior to ECHO & MRI being completed  She was again seen in the Ed on 6/19 for evaluation of a UTI  and discharged on Keflex  Today her mother was unable to contact her was unable to and found her down  She is admitted inpatient to the critical care unit  With acute encephalopathy of unknown etiology,Rhabdomylosis , Hx of recurrent UTIs, and LUCAS,  she was admitted to the ICU, CK 3500, Cr 1 66, electrolytes unremarkable  Neuro checks q 1, EKG- ECHO ordered  In the ICU  she was extubated to 4 L NC O2 sat 98%  around 8 pm 6/24  Neurology - 6/24 - 73 yo f presented with unresponsiveness, hypothermic,  bradycardic , improving after rewarming  She remains intubated  But following commands  Plan to obtain further history when extubated,  evaluate for endocrinopathies  Plan ; MRI brain, hold on video EEG, as she is following commands, but low threshold if mental status declines  Metabolic work up as per primary team  Defer CTA due to her solitary kidney and LUCAS  Possibly alteredmental status due to drug induced vrs  metabolic etiology vrs  cardiac etiology and  less likely stroke or seizure  Date: 6/25/2021   Day 2:    Pt was extubated to nasal Cannula O2, she remains on 2 L NC this am sat 97%  She has episodes of hypotension while sleeping overnight  Which resolved upon awakening  MAPS in the 50's  1 liter fluids bolused and IVF;s running @ 100ml/hr  On exam she is AAO x 3 but confused regarding the events leading up to her admission  Plan transfer to medical floor on 6/25 - she is hemodynamically stable Sat on Room air stable      Geriatric Consult - 3/00 -  Acute metabolic encephalopathy, radiology negavir- Cardiac wale on admit now NSR, Echo ef 65% , no acute,  Likely multifactorial, polypharmacy, rhabdo, acute hypoxic resp failure requiring intubation  Marked improvement today  Limit combination of hydocodone and tramadol  Psych meds as per Behavioral Health  continue supportive car    Encourage OP follow up with PCP, for medication review and long - term goals  Behavioral health - 6/25 - Altered mental status - bipolar disease,  Continue med management, Rexultim Seroquel, Xaqnax, d/c Zyprexa and risperidone  Check with her mother who provides her medications at her homedaily       ED Triage Vitals   Temperature Pulse Respirations Blood Pressure SpO2   06/24/21 1256 06/24/21 1256 06/24/21 1315 06/24/21 1256 06/24/21 1256   (!) 93 6 °F (34 2 °C) 71 18 150/90 100 %      Temp Source Heart Rate Source Patient Position - Orthostatic VS BP Location FiO2 (%)   06/24/21 1256 06/24/21 1256 -- 06/24/21 1600 --   Tympanic Monitor  Left arm       Pain Score       --                 Wt Readings from Last 1 Encounters:   06/24/21 86 5 kg (190 lb 11 2 oz)     Additional Vital Signs:  Date/Time  Temp  Pulse  Resp  BP  MAP (mmHg)  SpO2  Calculated FIO2 (%) - Nasal Cannula  Nasal Cannula O2 Flow Rate (L/min)  O2 Device   06/25/21 0700  --  82  15  119/52  66  97 %  --  --  --   06/25/21 0600  98 4 °F (36 9 °C)  84  16  105/49Abnormal   63  95 %  --  --  --   06/25/21 0530  --  84  14  101/61  72  96 %  --  --  --   06/25/21 0500  --  90  13  91/51  66  97 %  --  --  --   06/25/21 0430  --  84  18  93/51  62  96 %  --  --  --   06/25/21 0400  --  82  14  93/55  62  95 %  --  --  --   06/25/21 0230  --  86  14  87/58Abnormal   67  98 %  --  --  --   06/25/21 0200  --  86  14  94/62  71  99 %  --  --  --   06/25/21 0100  --  86  18  97/60  69  100 %  --  --  --   06/25/21 0004  98 3 °F (36 8 °C)  --  --  --  --  --  --  --  --   06/25/21 0000  --  84  25Abnormal   90/56  68  100 %  --  --  --   06/24/21 2330  --  82  15  102/57  75  98 %  --  --  --   06/24/21 2300  --  80  13  96/50  60  99 %  --  --  --   06/24/21 2100  --  78  13  94/58  70  100 %  --  --  --   06/24/21 2000  98 2 °F (36 8 °C)  80  18  121/59  86  99 %  --  --  --   06/1955  --  --  --  --  --  --  36  4 L/min  Nasal cannula   06/24/21 1800  97 5 °F (36 4 °C) 78  20  131/73  104  100 %  --  --  --   06/24/21 1700  97 °F (36 1 °C)Abnormal   70  19  109/65  79  100 %  --  --  --   06/24/21 1600  95 9 °F (35 5 °C)Abnormal   70  18  114/55  67  99 %  --  --  --   06/24/21 1555  --  --  --  --  --  98 %  --  --  --   06/24/21 1500  94 6 °F (34 8 °C)Abnormal   56  16  112/67  85  100 %  --  --  --   06/24/21 1430  94 6 °F (34 8 °C)Abnormal   56  --  --  --  100 %  --  --  --   06/24/21 1415  --  60  18  151/72  --  100 %  --  --  --   06/24/21 1408  --  56  --  116/64  --  100 %  --  --  --   06/24/21 1400  --  54Abnormal   18  170/87  --  100 %  --  --  --   06/24/21 1356  --  --  --  --  --  100 %  --  --  --   06/24/21 1345  --  56  18  155/88  --  100 %  --  --  --   06/24/21 1330  --  64  18  145/76  --  100 %  --  --  --   06/24/21 1315  --  52Abnormal   18  121/71  --  100 %  --  --  --   06/24/21 13:11:14  --  54Abnormal   --  140/63  --  90 %  --  --  --             Pertinent Labs/Diagnostic Test Results:   Results from last 7 days   Lab Units 06/24/21  1618   SARS-COV-2  Negative     Results from last 7 days   Lab Units 06/25/21  0521 06/24/21  1624 06/24/21  1315 06/24/21  1313   WBC Thousand/uL 6 29  --   --  7 70   HEMOGLOBIN g/dL 11 1*  --   --  12 4   I STAT HEMOGLOBIN g/dl  --   --  12 6  --    HEMATOCRIT % 34 0*  --   --  38 4   HEMATOCRIT, ISTAT %  --   --  37  --    PLATELETS Thousands/uL 179 187  --  173   NEUTROS ABS Thousands/µL 5 02  --   --  6 44         Results from last 7 days   Lab Units 06/25/21  0521 06/24/21  1412 06/24/21  1315   SODIUM mmol/L 142 137  --    POTASSIUM mmol/L 3 5 4 2  --    CHLORIDE mmol/L 108 105  --    CO2 mmol/L 29 25  --    CO2, I-STAT mmol/L  --   --  26   ANION GAP mmol/L 5 7  --    BUN mg/dL 19 32*  --    CREATININE mg/dL 0 97 1 66*  --    EGFR ml/min/1 73sq m 61 32  --    CALCIUM mg/dL 7 5* 7 4*  --    CALCIUM, IONIZED mmol/L 0 98*  --   --    MAGNESIUM mg/dL 2 3  --   --    PHOSPHORUS mg/dL 2 4  --   --      Results from last 7 days   Lab Units 06/24/21  1624 06/24/21  1412   AST U/L  --  95*   ALT U/L  --  20   ALK PHOS U/L  --  103   TOTAL PROTEIN g/dL  --  6 1*   ALBUMIN g/dL  --  3 1*   TOTAL BILIRUBIN mg/dL  --  0 74   BILIRUBIN DIRECT mg/dL  --  0 24*   AMMONIA umol/L 19  --      Results from last 7 days   Lab Units 06/24/21  1453   POC GLUCOSE mg/dl 93     Results from last 7 days   Lab Units 06/25/21  0521 06/24/21  1412   GLUCOSE RANDOM mg/dL 100 103     Results from last 7 days   Lab Units 06/24/21  1605   PH ART  7 356   PCO2 ART mm Hg 39 3   PO2 ART mm Hg 79 9   HCO3 ART mmol/L 21 5*   BASE EXC ART mmol/L -3 6   O2 CONTENT ART mL/dL 17 6   O2 HGB, ARTERIAL % 94 3   ABG SOURCE  Radial, Right         Results from last 7 days   Lab Units 06/24/21  1315   PH, LONG I-STAT  7 276*   PCO2, LONG ISTAT mm HG 51 6*   PO2, LONG ISTAT mm HG 34 0*   HCO3, LONG ISTAT mmol/L 24 0   I STAT BASE EXC mmol/L -3*   I STAT O2 SAT % 56*     Results from last 7 days   Lab Units 06/24/21  1412   CK TOTAL U/L 3,514*   CK MB INDEX % 2 0   CK MB ng/mL 71 2*     Results from last 7 days   Lab Units 06/24/21  1624 06/24/21  1313   TROPONIN I ng/mL <0 02 <0 02         Results from last 7 days   Lab Units 06/24/21  1313   PROTIME seconds 13 4   INR  1 02   PTT seconds 28     Results from last 7 days   Lab Units 06/24/21  1412   TSH 3RD GENERATON uIU/mL 0 844         Results from last 7 days   Lab Units 06/24/21  1313   LACTIC ACID mmol/L 0 8       Results from last 7 days   Lab Units 06/24/21  1612   CLARITY UA  Clear   COLOR UA  Yellow   SPEC GRAV UA  1 017   PH UA  6 0   GLUCOSE UA mg/dl Negative   KETONES UA mg/dl Negative   BLOOD UA  Small*   PROTEIN UA mg/dl Negative   NITRITE UA  Negative   BILIRUBIN UA  Negative   UROBILINOGEN UA E U /dl 0 2   LEUKOCYTES UA  Small*   WBC UA /hpf 10-20*   RBC UA /hpf None Seen   BACTERIA UA /hpf None Seen   EPITHELIAL CELLS WET PREP /hpf None Seen       Results from last 7 days   Lab Units 06/24/21  1414 06/24/21  1313   ETHANOL LVL mg/dL  --  <3   ACETAMINOPHEN LVL ug/mL 5*  --    SALICYLATE LVL mg/dL <3*  --      EKG - NSR  HR 80's     CT Head - 6/24 -  No acute  CT Cervical Spine - 6/24 -  No acute   CT CAP - 6/24 - 1   Subsegmental atelectasis in the bases of both lower lobes and right middle lobe  2   Distended bladder  3   Otherwise, no evidence of acute abnormality in the chest, abdomen or pelvis  CXR 6/24 - no acute      ED Treatment:   Medication Administration from 06/24/2021 1239 to 06/24/2021 1530       Date/Time Order Dose Route Action Comments     06/24/2021 1303 propofol (DIPRIVAN) 200 MG/20ML bolus injection 20 mg Intravenous Given Left should IO     06/24/2021 1333 iohexol (OMNIPAQUE) 350 MG/ML injection (MULTI-DOSE) 100 mL 100 mL Intravenous Given      06/24/2021 1530 propofol (DIPRIVAN) 1000 mg in 100 mL infusion (premix) 0 mcg/kg/min  Stopped Prior to ICU as per daytime ICU nurseLauryn     06/24/2021 1319 propofol (DIPRIVAN) 1000 mg in 100 mL infusion (premix) 6 mcg/kg/min Intravenous New Bag         History reviewed  No pertinent past medical history  Present on Admission:   Acute encephalopathy      Admitting Diagnosis: Altered mental status, unspecified altered mental status type [R41 82]  Other injury of unspecified body region, initial encounter [T14  8XXA]  Age/Sex: 72 y o  female  Admission Orders:  Scheduled Medications:  heparin (porcine), 5,000 Units, Subcutaneous, Q8H [address]  potassium chloride, 40 mEq, Oral, Once 6/25 x 1   Isolyte 1 L Bolus - 6/24 x 1 - 6/25 x 1    Continuous IV Infusions:  multi-electrolyte, 100 mL/hr, Intravenous, Continuous      PRN Meds:     Nursing Orders -  VS - neuro checks q1 - daily weights - SCD's to le's -  Fall precautions - turn q2 - up with assistance -  Diet NPO - PT/PT eval     Network Utilization Review Department  ATTENTION: Please call with any questions or concerns to [phone] and carefully listen to the prompts so that you are directed to the right person  All voicemails are confidential   Prasad Verma all requests for admission clinical reviews, approved or denied determinations and any other requests to dedicated fax number below belonging to the campus where the patient is receiving treatment   List of dedicated fax numbers for the Facilities:  [address] DENIALS (Administrative/Medical Necessity) [phone]   [address] (Maternity/NICU/Pediatrics) [phone] [address] [place] [address] [facility] [id number] [place] [address] [address] [facility] [place] [address] [phone]

## 2021-06-25 NOTE — ASSESSMENT & PLAN NOTE
History of hypertension  Patient hypotensive/normotensive during admission  Home medication regimen includes amlodipine 5 mg daily  Will continue to hold at this time      Plan:  · Can consider restarting amlodipine 5 mg daily   · Continue to monitor with routine vital signs

## 2021-06-25 NOTE — PHYSICAL THERAPY NOTE
Physical Therapy Evaluation note     Patient Name: Nydia Beltran    Today's Date: 6/25/2021     Problem List  Principal Problem:    Acute encephalopathy  Active Problems:    Bipolar disease, chronic ([facility] [address] )    Acute hypoxemic respiratory failure (HCC)    Rhabdomyolysis    LUCAS (acute kidney injury) ([facility] [address] )    Neck pain    Poor venous access       Past Medical History  Past Medical History:   Diagnosis Date    Bipolar 1 disorder [facility])         Past Surgical History  Past Surgical History:   Procedure Laterality Date    NEPHRECTOMY Right       06/25/21 1023   PT Last Visit   PT Visit Date 06/25/21   Note Type   Note type Evaluation   Pain Assessment   Pain Assessment Tool FLACC   Pain Rating: FLACC (Rest) - Face 0   Pain Rating: FLACC (Rest) - Legs 0   Pain Rating: FLACC (Rest) - Activity 0   Pain Rating: FLACC (Rest) - Cry 0   Pain Rating: FLACC (Rest) - Consolability 0   Score: FLACC (Rest) 0   Pain Rating: FLACC (Activity) - Face 0   Pain Rating: FLACC (Activity) - Legs 0   Pain Rating: FLACC (Activity) - Activity 0   Pain Rating: FLACC (Activity) - Cry 0   Pain Rating: FLACC (Activity) - Consolability 0   Score: FLACC (Activity) 0   Home Living   Type of Home House   Home Layout Two level   Additional Comments Pt lives alone in a [facility] with 0 HECTOR and 13 steps to the 2nd floor  Pt was I for ADL's and mobility prior  Pt does not own any DME  Pt's mother stops in once a day typically  PT does not drive  Pt reports 3 falls in the past 6 months  Prior Function   Level of [place] Independent with ADLs and functional mobility   Lives With Alone   Receives Help From Family   Vocational On disability   Restrictions/Precautions   Weight Bearing Precautions Per Order No   Other Precautions Hard of hearing; Fall Risk;Telemetry;Multiple lines;Cognitive  (deaf)   General   Family/Caregiver Present No   Cognition   Overall Cognitive Status WFL   Arousal/Participation Alert RLE Assessment   RLE Assessment WNL   LLE Assessment   LLE Assessment WNL   Coordination   Sensation WFL   Light Touch   RLE Light Touch Impaired   RLE Light Touch Comments slight numbness    LLE Light Touch Impaired   LLE Light Touch Comments slight numbness   Bed Mobility   Supine to Sit 6  Modified independent   Additional items HOB elevated   Additional Comments sitting EOB S level   Transfers   Sit to Stand 7  Independent   Stand to Sit 7  Independent   Ambulation/Elevation   Gait pattern Excessively slow   Gait Assistance 5  Supervision   Assistive Device None   Distance [id number], [id number]   Stair Management Assistance 5  Supervision   Stair Management Technique One rail R   Number of Stairs 7   Balance   Static Sitting Good   Dynamic Sitting Fair +   Static Standing Fair   Dynamic Standing Fair   Ambulatory Fair   Endurance Deficit   Endurance Deficit Yes   Activity Tolerance   Activity Tolerance Patient limited by fatigue   Medical Staff Made Aware OT   Nurse Made Aware nurse approved therapy session   Assessment   Prognosis Good   Problem List Decreased mobility; Decreased cognition   Assessment Pt is a 71 yo female admitted to [place] [address] on 6/24/2021 s/p being found down  Pt was intubated on 6/24  Pt was extubated on 6/24 to 4L of O2  Dx: poor venous access, neck pain, LUCAS, rhabdomyolysis, acute hypoxemic respiratory failure, bipolar, acute encephalopathy  Two patient identifiers were used to confirm  Pt lives in a [facility]  Pt has 0 HECTOR and a full flight to the 2nd floor  Pt was I for ADL's and mobility prior  Pt did not use any DME  Pt's mother visit daily  Pt does not drive  Pt's impairments include reduced mobility, risk of falling  These impairments limit the ability of the patient to perform mobility without increased assistance, return to PLOF and participate in everyday life activities  Recommend discharge to home with no PT needs with continued support from family   At the end of the session the patient was left in seated position with call bell and phone within reach  The patient's AM-PAC Basic Mobility Inpatient Short Form Raw Score is 21, Standardized Score is 45 55  A standardized score greater than 42 9 suggests the patient may benefit from discharge to home  Please also refer to the recommendation of the Physical Therapist for safe discharge planning  PT will be D/C from PT at this time due to being near her baseline for mobility      Barriers to Discharge Decreased caregiver support   Recommendation   PT Discharge Recommendation No rehabilitation needs   PT - OK to Discharge Yes   Additional Comments when medically stable    AM-PAC Basic Mobility Inpatient   Turning in Bed Without Bedrails 4   Lying on Back to Sitting on Edge of Flat Bed 4   Moving Bed to Chair 3   Standing Up From Chair 4   Walk in Room 3   Climb 3-5 Stairs 3   Basic Mobility Inpatient Raw Score 21   Basic Mobility Standardized Score 45 55   Ronnie Interiano, PT, DPT

## 2021-06-25 NOTE — ASSESSMENT & PLAN NOTE
Patient intubated on arrival secondary to altered mental status and inability to protect airway  Patient's mental status rapidly improved and patient was shortly after extubated to nasal cannula oxygen      Plan:  · Monitor and maintain SpO2 greater than 92%  · Continue on nasal cannula and wean as patient tolerates  · Out of bed and ambulate  · Encouraged incentive spirometry

## 2021-06-25 NOTE — CONSULTS
Consultation - [address] 72 y o  female MRN: [id number]  Unit/Bed#: ICU 06 Encounter: [id number]       Chief Complaint: "I want my psych meds and are they the right dose"     History of Present Illness   Physician Requesting Consult: Gene Marcos MD  Reason for Consult / Principal Problem:  Altered mental status, unspecified altered mental status type, bipolar disease-chronic    Holger Washington is a 72 y o  female with past medical history of bipolar disorder, hypertension, hyperlipidemia who presented as a Level A trauma on 6/24 after being found down in her own home for an unknown amount of time by her mother  Patient was brought in by EMS in which the patient was found to have altered mental status, decorticate posturing, bradycardia, and hypothermia  She was intubated prior to arrival to the trauma bay  Upon arrival to the trauma bay the patient was GCS 5T but rapidly improved to moving all extremities with purpose  She was not found to have any traumatic injuries and was admitted to the medical ICU for continued evaluation  She has been extubated and sitting comfortably in her chair  She reports that she does not have any recollection of losing her consciousness  She endorses that she has gained 30 lbs in the last 9 months  She endorses anhedonia and anxiety but denies any changes in appetite, energy, or sleep  She wants to receive her psychiatric medications but is not sure if they are "the right dose" and wants to go home to her puppy  She states that all her medication had been prescribed by a neurologist, she used to see a psychiatrist but the psychiatrist never prescribed medication, she had not going to [facility] in the last 6 months  She also states that her mother is the 1 that taking medication to house every day because 15 years ago she took extra medications    Patient had been taking multiple psychotropic medication at the same time, she had been risperidone, olanzapine, Seroquel and Rexulti  She states that she told the doctor that the [address] commercial expressed all her symptoms and he prescribed this medication without discontinue any other medication  Dr Collin Bartlett contacted the pharmacy and content all of this medications and dosage  Patient mood is euthymic, denies any suicidal ideation plan or intent patient does not have any psychotic symptoms  Psychiatric Review Of Systems:  sleep: no  appetite changes: no  weight changes: yes  energy/anergy: no  interest/pleasure/anhedonia: yes, anhedonia  somatic symptoms: no  anxiety/panic: yes  trinity: no  guilty/hopeless: no  self injurious behavior/risky behavior: no    Historical Information   Past Psychiatric History:   She denies any psych admission  Therapy, Out Patient [facility]  Currently in treatment with Dr Gisselle Awad a neurologist  Past Suicide attempts: 15 years ago with overdose   Past Violent behavior: none   Past Psychiatric medication trial: Ambien, Depakote , risperidone, Rexulti, Zyprexa, Seroquel, gabapentin    Substance Abuse History:  He denies any drug or alcohol history  I have assessed this patient for substance use within the past 12 months   History of IP/OP rehabilitation program:  None  Smoking history: half a pack for 10 years   Family Psychiatric History:   Father has alzheimer's    Social History  Education: college graduate  Learning Disabilities: none  Marital history:   Living arrangement, social support: lives at home with her dog  Occupational History: unemployed  Functioning Relationships: good support system    Other Pertinent History: None    Traumatic History:   Abuse: none  Other Traumatic Events: none    Past Medical History:   Diagnosis Date    Bipolar 1 disorder ([facility] [address] )        Medical Review Of Systems:  Review of Systems - Negative except neck pain, difficulty hearing, anxious mood all other systems reviewed were negative    Meds/Allergies   current meds:   Current Facility-Administered Medications   Medication Dose Route Frequency    ALPRAZolam (XANAX) tablet 1 mg  1 mg Oral 4x Daily    heparin (porcine) subcutaneous injection 5,000 Units  5,000 Units Subcutaneous Q8H [address]    HYDROcodone-acetaminophen (NORCO) 5-325 mg per tablet 0 5 tablet  0 5 tablet Oral Q6H PRN     No Known Allergies    Objective   Vital signs in last 24 hours:  Temp:  [94 6 °F (34 8 °C)-98 8 °F (37 1 °C)] 98 8 °F (37 1 °C)  HR:  [] 102  Resp:  [13-49] 24  BP: ()/(49-87) 102/71      Intake/Output Summary (Last 24 hours) at 6/25/2021 1349  Last data filed at 6/25/2021 1301  Gross per 24 hour   Intake 3980 ml   Output 8176 ml   Net -4196 ml       Mental Status Evaluation:  Appearance:  age appropriate   Behavior:  normal   Speech:  normal pitch and normal volume   Mood:  anxious   Affect:  mood-congruent   Language: naming objects and repeating phrases   Thought Process:  goal directed   Associations: intact associations   Thought Content:  normal   Perceptual Disturbances: None   Risk Potential: Suicidal Ideations none, Homicidal Ideations none and Potential for Aggression No   Sensorium:  person, place, time/date and situation   Memory:  recent and remote memory grossly intact   Cognition:  recent and remote memory grossly intact   Consciousness:  alert and awake    Attention: attention span and concentration were age appropriate   Intellect: within normal limits   Fund of Knowledge: awareness of current events: Fair, past history: Fair and vocabulary: Fair   Insight:  fair   Judgment: fair   Muscle Strength and Tone: Within normal limits   Gait/Station: Unable to assess patient is in bed   Motor Activity: no abnormal movements     Lab Results:    I have personally reviewed all pertinent laboratory/tests results    Labs in last 72 hours:   Recent Labs     06/24/21  1313 06/24/21  1412 06/24/21  1624 06/25/21  0521   WBC  --   --   --  6 29   RBC  --   --   --  3 17*   HGB  --   --   --  11 1* HCT  --   --   --  34 0*   PLT   < >  --  187 179   RDW  --   --   --  13 6   NEUTROABS  --   --   --  5 02   SODIUM   < > 137  --  142   K   < > 4 2  --  3 5   CL   < > 105  --  108   CO2   < > 25  --  29   BUN   < > 32*  --  19   CREATININE   < > 1 66*  --  0 97   GLUC   < > 103  --  100   CALCIUM   < > 7 4*  --  7 5*   AST  --  95*  --   --    ALT  --  20  --   --    ALKPHOS  --  103  --   --    TP  --  6 1*  --   --    ALB  --  3 1*  --   --    TBILI  --  0 74  --   --    AMMONIA  --   --  19  --    [id number]  --  0 844  --   --     < > = values in this interval not displayed  COVID19:   Lab Results   Component Value Date    SARSCOV2 Negative 2021     Recent Imaging Studies: Procedure: Echo complete with contrast if indicated    Result Date: 2021  Narrative: [facility] [place] [address] [facility] [phone] Transthoracic Echocardiogram 2D, M-mode, Doppler, and Color Doppler Study date:  2021 Patient: Sumanth Llanos MR number: [id number] Account number: [de-identified] : 1955 Age: 72 years Gender: Female Status: Inpatient Location: Bedside Height: 64 in Weight: 190 lb BP: 119/ 52 mmHg Indications: Syncope Diagnoses: R55  - Syncope and collapse Sonographer:  Tana Aguilar RDCS Referring Physician:  Daniel Young MD Group:  [facility] Interpreting Physician:  Thien Yousif MD SUMMARY LEFT VENTRICLE: Systolic function was vigorous  Ejection fraction was estimated to be 65 %  There were no regional wall motion abnormalities  AORTIC VALVE: There was mild regurgitation  TRICUSPID VALVE: There was trace regurgitation  PULMONIC VALVE: There was trace regurgitation  HISTORY: PRIOR HISTORY: Acute encephalopathy PROCEDURE: The procedure was performed at the bedside  This was a routine study  The transthoracic approach was used  The study included complete 2D imaging, M-mode, complete spectral Doppler, and color Doppler   The heart rate was 82 bpm, at the start of the study  Images were obtained from the parasternal, apical, subcostal, and suprasternal notch acoustic windows  Image quality was adequate  LEFT VENTRICLE: Size was normal  Systolic function was vigorous  Ejection fraction was estimated to be 65 %  There were no regional wall motion abnormalities  Wall thickness was normal  No evidence of apical thrombus  DOPPLER: Left ventricular diastolic function parameters were normal  RIGHT VENTRICLE: The size was normal  Systolic function was normal  Wall thickness was normal  LEFT ATRIUM: Size was normal  RIGHT ATRIUM: Size was normal  MITRAL VALVE: Valve structure was normal  There was normal leaflet separation  DOPPLER: The transmitral velocity was within the normal range  There was no evidence for stenosis  There was no significant regurgitation  AORTIC VALVE: The valve was trileaflet  Leaflets exhibited mildly increased thickness and normal cuspal separation  DOPPLER: Transaortic velocity was within the normal range  There was no evidence for stenosis  There was mild regurgitation  TRICUSPID VALVE: The valve structure was normal  There was normal leaflet separation  DOPPLER: The transtricuspid velocity was within the normal range  There was no evidence for stenosis  There was trace regurgitation  PULMONIC VALVE: Leaflets exhibited normal thickness, no calcification, and normal cuspal separation  DOPPLER: The transpulmonic velocity was within the normal range  There was trace regurgitation  PERICARDIUM: There was no pericardial effusion  The pericardium was normal in appearance  AORTA: The root exhibited normal size  SYSTEMIC VEINS: IVC: The inferior vena cava was normal in size   SYSTEM MEASUREMENT TABLES 2D %FS: 37 72 % Ao Diam: 2 58 cm EDV(Teich): 70 54 ml EF(Teich): 68 37 % ESV(Teich): 22 31 ml IVSd: 0 95 cm LA Area: 15 46 cm2 LA Diam: 3 52 cm LVEDV MOD A4C: 66 65 ml LVEF MOD A4C: 68 5 % LVESV MOD A4C: 21 ml LVIDd: 4 01 cm LVIDs: 2 5 cm LVLd A4C: 7 71 cm LVLs A4C: 6 17 cm LVPWd: 0 79 cm RA Area: 14 11 cm2 RVIDd: 3 46 cm SV MOD A4C: 45 66 ml SV(Teich): 48 23 ml MM TAPSE: 1 82 cm PW E' Sept: 0 07 m/s E/E' Sept: 10 41 MV A Guilherme: 1 04 m/s MV Dec [place]: 4 85 m/s2 MV DecT: 146 36 ms MV E Guilherme: 0 71 m/s MV E/A Ratio: 0 68 MV PHT: 42 44 ms MVA By PHT: 5 18 cm2 [employer]ters[employer] Accredited Echocardiography Laboratory Prepared and electronically signed by Loli Hassan MD Signed 25-Jun-2021 09:10:17     Procedure: XR chest portable    Result Date: 6/25/2021  Narrative: CHEST INDICATION:   ETT confirmation  COMPARISON:  CT 6/24/2021 EXAM PERFORMED/VIEWS:  XR CHEST PORTABLE FINDINGS:  There is an endotracheal tube present positioned just at the ostium of the right mainstem bronchus; catheter should be retracted by 2-3 cm  The upper esophageal temperature probe present  Intraosseous needle identified in the left humeral head  Cardiomediastinal silhouette appears unremarkable  Atelectatic change in right greater than left lung bases is present  Osseous structures appear within normal limits for patient age  Impression: 1  Right greater than left basilar subsegmental atelectasis with otherwise clear lungs  2   Endotracheal tube positioned somewhat low and should be retracted 2-3 cm  3   Esophageal temperature probe and left humeral head intraosseous needle noted  The study was marked in [facility] for immediate notification  Workstation performed: [id number]     Procedure: TRAUMA - CT head wo contrast    Addendum Date: 6/24/2021 Addendum:   ADDENDUM: Comparison was made with a CT of the head from May 29, 2021  Result Date: 6/24/2021  Narrative: CT BRAIN - WITHOUT CONTRAST INDICATION:   TRAUMA  COMPARISON:  None  TECHNIQUE:  CT examination of the brain was performed  In addition to axial images, sagittal and coronal 2D reformatted images were created and submitted for interpretation   Radiation dose length product (DLP) for this visit:  899 39 mGy-cm   This examination, like all CT scans performed in the [facility], was performed utilizing techniques to minimize radiation dose exposure, including the use of iterative  reconstruction and automated exposure control  IMAGE QUALITY:  Diagnostic  FINDINGS: PARENCHYMA:  No intracranial mass, mass effect or midline shift  No CT signs of acute infarction  No acute parenchymal hemorrhage  VENTRICLES AND EXTRA-AXIAL SPACES:  Ventricles, sulci and cisterns normal for the patient's age  No extra-axial hemorrhage  VISUALIZED ORBITS AND PARANASAL SINUSES:  Unremarkable  CALVARIUM AND EXTRACRANIAL SOFT TISSUES:  Normal      Impression: No acute intracranial abnormality  I personally discussed this study with Tiera Wasserman on 6/24/2021 at 2:10 PM  Workstation performed: [id number]     Procedure: TRAUMA - CT spine cervical wo contrast    Result Date: 6/24/2021  Narrative: CT CERVICAL SPINE - WITHOUT CONTRAST INDICATION:   TRAUMA  COMPARISON:  May 29, 2021  TECHNIQUE:  CT examination of the cervical spine was performed without intravenous contrast   Contiguous axial images were obtained  Sagittal and coronal reconstructions were performed  Radiation dose length product (DLP) for this visit:  481 mGy-cm   This examination, like all CT scans performed in the [facility], was performed utilizing techniques to minimize radiation dose exposure, including the use of iterative reconstruction and automated exposure control  IMAGE QUALITY:  Diagnostic  FINDINGS: ALIGNMENT:  Normal  VERTEBRAE:  No fracture  No bone destruction or osteoblastic lesion  DISC SPACES:  Degenerative disc disease in the mid cervical spine  FACET JOINTS:  Multilevel degenerative facet arthropathy  FORAMINA:   Foraminal stenosis at several levels  SPINAL CANAL:  Spinal canal normal in caliber  No evidence of hematoma   PREVERTEBRAL AND PARASPINAL SOFT TISSUES:  Normal  OTHER SOFT TISSUES OF THE NECK: Unremarkable  INCLUDED SKULL BASE: Normal  IMAGED PORTIONS OF THE BRAIN: Normal  THORACIC INLET:  Endotracheal tube present  Otherwise unremarkable  Lung apices clear  Impression:  No cervical spine fracture or traumatic malalignment  Workstation performed: [id number]     Procedure: XR chest 1 view    Result Date: 6/24/2021  Narrative: TRAUMA SERIES INDICATION:  trauma  COMPARISON:  None VIEWS:  XR TRAUMA MULTIPLE, XR CHEST 1 VIEW  FINDINGS: CHEST: Supine frontal view of the chest is obtained  Cardiomediastinal silhouette is within normal limits accounting for technique and patient positioning  Endotracheal tube tip is at the origin of the right mainstem bronchus  Right lower lobe atelectasis versus scarring  No layering pleural effusions detected  No pneumothorax is seen on this supine film  Upright images are more sensitive to detect anterior pneumothoraces if relevant  No displaced fractures  Impression: Right lower lobe atelectasis versus scarring  Endotracheal tube at the origin of the right mainstem bronchus  Workstation performed: [id number]     Procedure: TRAUMA - CT chest abdomen pelvis w contrast    Addendum Date: 6/24/2021 Addendum:   ADDENDUM: Comparison is made with a CTA of the chest, CT abdomen and pelvis from May 29, 2021  Result Date: 6/24/2021  Narrative: CT CHEST, ABDOMEN AND PELVIS WITH IV CONTRAST INDICATION:   TRAUMA  COMPARISON:  None  TECHNIQUE: CT examination of the chest, abdomen and pelvis was performed  Axial, sagittal, and coronal 2D reformatted images were created from the source data and submitted for interpretation  Radiation dose length product (DLP) for this visit:  1523 84 mGy-cm   This examination, like all CT scans performed in the [facility], was performed utilizing techniques to minimize radiation dose exposure, including the use of iterative reconstruction and automated exposure control   IV Contrast:  100 mL of iohexol (OMNIPAQUE) Enteric Contrast: Enteric contrast was not administered  FINDINGS: CHEST LUNGS:  Subsegmental atelectasis and possible fibrosis in the dependent portions of both lower lobes and the right middle lobe  Lungs otherwise clear  PLEURA:  Unremarkable  No pleural effusion or pneumothorax  HEART/GREAT VESSELS:  Unremarkable for patient's age  MEDIASTINUM AND EDUARDO: No lymphadenopathy or mass  Esophagus unremarkable  No mediastinal hematoma or pneumomediastinum  Tip of endotracheal tube at origin of right mainstem bronchus  Trachea and mainstem bronchi otherwise unremarkable  CHEST WALL AND LOWER NECK:   Unremarkable  ABDOMEN LIVER/BILIARY TREE:  Liver unremarkable  Mild dilatation of intrahepatic and extrahepatic bile ducts with CBD maximal diameter of 9 mm, possibly physiologic in this postcholecystectomy patient  GALLBLADDER:  Postcholecystectomy  SPLEEN:  Unremarkable  PANCREAS:  Unremarkable  ADRENAL GLANDS:  Unremarkable  KIDNEYS/URETERS:  Status post right nephrectomy  Left kidney unremarkable  STOMACH AND BOWEL:  Stomach and small intestine unremarkable  Colorectal anastomosis present  Moderate amount of feces throughout the colon and rectum  APPENDIX:  No findings to suggest appendicitis  ABDOMINOPELVIC CAVITY: No lymphadenopathy or mass  No ascites or discrete fluid collection  No extraluminal gas  VESSELS:  Unremarkable for patient's age  PELVIS REPRODUCTIVE ORGANS:  Post hysterectomy  URINARY BLADDER:  Distended bladder, approximately 2 5 L of volume  ABDOMINAL WALL/INGUINAL REGIONS:  Unremarkable  OSSEOUS STRUCTURES:  Bilateral hip arthroplasties  No evidence of acute fracture or destructive lesion  Apparent intraosseous needle in the left humerus  Small amount of air in the left shoulder joint  No acute fracture, dislocation or destructive lesion  Impression: 1  Subsegmental atelectasis in the bases of both lower lobes and right middle lobe  2   Distended bladder   3   Otherwise, no evidence of acute abnormality in the chest, abdomen or pelvis  I personally discussed this study with Tony Rubio on 6/24/2021 at 2:10 PM  Workstation performed: [id number]     Procedure: XR trauma multiple    Result Date: 6/24/2021  Narrative: TRAUMA SERIES INDICATION:  trauma  COMPARISON:  None VIEWS:  XR TRAUMA MULTIPLE, XR CHEST 1 VIEW  FINDINGS: CHEST: Supine frontal view of the chest is obtained  Cardiomediastinal silhouette is within normal limits accounting for technique and patient positioning  Endotracheal tube tip is at the origin of the right mainstem bronchus  Right lower lobe atelectasis versus scarring  No layering pleural effusions detected  No pneumothorax is seen on this supine film  Upright images are more sensitive to detect anterior pneumothoraces if relevant  No displaced fractures  Impression: Right lower lobe atelectasis versus scarring  Endotracheal tube at the origin of the right mainstem bronchus  Workstation performed: [id number]     Procedure: US bedside procedure    Result Date: 6/24/2021  Narrative: 3 0 129 [id number]  2 [id number]  0 [id number]  [id number]    Code Status: )Level 1 - Full Code    Assessment/Plan     Assessment:  Yayo Cardoso is a 72 y o  female with the medical history of bipolar disorder, hypertension, hyperlipidemia, presented to the hospital as a limited trauma on 06/24 after she was found down in the floor by the mother  Patient had been in multiple psychotropic medication prescribed by a neurologist and had this episodes in the past   Patient was evaluated for medication management  She does not follow with Psychiatry  She states that she did not take any extra medication because her mother is the 1 that been the medication to house every day  She denies any suicidal thoughts plans or intent, she is not psychotic    She does agree with treatment plan of discontinue risperidone and Zyprexa  Diagnosis:  Bipolar disorder unspecified type  Plan:   Continue medical management  Continue Rexulti 2 mg p o  Daily  Continue Seroquel 200 mg p o  HS patient states that she does not take this medication she does not sleep  Continue on Xanax 1 mg p o  3 times a day days when it is possible to restart her medication to prevent any withdrawal from this medication  Will discontinue the Zyprexa and risperidone  Patient's mother should be informed because patient's mother is the 1 that bring the medication to the house every day  Recommend to the patient that also taking discharge summary to her neurologist with the recommendation  Discussed with primary team  No other intervention at this moment  I will sign off  Risks, benefits and possible side effects of Medications:   Risks, benefits, and possible side effects of medications explained to patient and patient verbalizes understanding             Anahy Dawkins MD

## 2021-06-25 NOTE — PROGRESS NOTES
NEUROLOGY RESIDENCY PROGRESS NOTE     Name: Mildred Shah   Age & Sex: 72 y o  female   MRN: [id number]  Unit/Bed#: [facility] 5-12   Encounter: [id number]    ASSESSMENT & PLAN     * Acute encephalopathy  Assessment & Plan  Pt found down at home by family LKW 4 hours prior, bradycardia and hypothermia noted on presentation and intubated in field for persistent unresponsiveness  Initial exam demonstrated right upper extremity poorly responsive to noxious stim, and mental status was stuporous  Otherwise, nonfocal exam  Reportedly has hx of stroke with dysarthria, and RCC s/p nephrectomy  Previous admission in late May after being found down in the street presenting with confusion and found to have UTI  Patient's family denies recent illness  On repeat exam 30 mins later, pt started following commands throughout  Ultimately extubated evening of 6/24  Now at baseline mental status and physical exam which improved dramatically follo  Discussed with epilepsy team, there is no epilepsy syndrome that would cause her presentation   She is chronically on numerous psychoactive medications including risperidone, tramadol, xanax, seroquel, zyprexa, zolpidem, etc     Workup:  6/24/21 CT head: unremarkable  Initial UA:  WBCs 10-20, leukocytes, blood  Total CK 3514, CK-MB 71 2    Impression: altered mental status possibly secondary to drug induced, metabolic etiology, cardiac etiology vs less likely stroke or seizure    Plan:  No need for MRI brain at this time, pt is back to baseline, symptoms cannot be explained by structural neurological defect  Would hold off on video EEG at this time as patient is now following commands but retain low threshold to hook up if mental status declines  Recommend consultation with psychiatry as pt is chronically on risperidone which can in rare cases per case report on literature review cause hypothermia/hypotension/bradycardia  CTH if pt mental status declines by >2 GCS in an hour or significant change in mental status  Metabolic work up and treatment as per primary team  No further inpatient neurology recommendations, will sign off, please call with questions or concerns  Outpatient neurology follow up not needed [facility] will not need outpatient follow up with Neurology  She will not require outpatient neurological testing  SUBJECTIVE     Patient was seen and examined  No acute events overnight  Pt extubated 8PM last evening and mental status now back to baseline  On discussion at bedside this morning, she is unclear on the events of yesterday morning but she does recall feeling fine when she woke up in the morning and had not been feeling ill or off in any way leading to the events of yesterday  She reports this is the 3rd time such an event has occurred, the 2nd time was in May this year as reported above  The 1st time was reportedly 4-6 years ago at which time she was seen in [place] and suffered a broken hip as a result her presentation  All metabolic findings have come back unremarkable, at this point given her extensive medications as per PDMP review most likely secondary to polypharmacy versus overdose (unintentional)  Discussed with critical care that there is a case report of risperidone causing hypotension and hypothermia in very rare cases  She has been seen previously for this same constellation of symptoms of hypothermia, hypotension, bradycardia  As such, would recommend discussion with Psychiatry regarding continued use of risperidone in this case  Otherwise no utility for any further neurologic workup  Will sign off  Review of Systems   Constitutional: Negative for activity change and fever  Eyes: Negative for visual disturbance  Respiratory: Negative for shortness of breath  Cardiovascular: Negative for chest pain  Gastrointestinal: Negative for nausea     Genitourinary: Negative for dysuria  Neurological: Positive for speech difficulty (baseline)  Negative for dizziness, tremors, syncope, facial asymmetry, weakness, light-headedness, numbness and headaches  All other systems reviewed and are negative  OBJECTIVE     Patient ID: Alban Bianchi is a 72 y o  female  Vitals:    21 1400 21 1441 21 1527 21 1528   BP: 126/65 148/87     BP Location:  Right arm     Pulse: 92 73     Resp: 22 18     Temp:  98 7 °F (37 1 °C)     TempSrc:  Oral     SpO2: 91% 100%     Weight:  87 4 kg (192 lb 10 9 oz) 86 5 kg (190 lb 11 2 oz)    Height:  5' 4" (1 626 m) 5' 4" (1 626 m) 5' 4" (1 626 m)      Temperature:   Temp (24hrs), Av 4 °F (36 9 °C), Min:97 5 °F (36 4 °C), Max:98 8 °F (37 1 °C)    Temperature: 98 7 °F (37 1 °C)    Neurologic Exam     Mental Status   Oriented to person, place, and time  Follows 3 step commands  Attention: normal  Concentration: normal    Speech: speech is normal   Level of consciousness: alert  Knowledge: good  Able to perform simple calculations  Able to name object  Able to read  Able to repeat  Normal comprehension  Cranial Nerves     CN II   Visual fields full to confrontation  CN III, IV, VI   Pupils are equal, round, and reactive to light  Extraocular motions are normal      CN V   Facial sensation intact  CN VII   Facial expression full, symmetric  CN VIII   CN VIII normal      CN IX, X   CN IX normal    CN X normal      CN XI   CN XI normal      CN XII   CN XII normal      Motor Exam   Muscle bulk: normal  Overall muscle tone: normal  Right arm tone: increased    Strength   Strength 5/5 throughout  Sensory Exam   Light touch normal    Pinprick normal           LABORATORY DATA     Labs:  I have personally reviewed pertinent films in PACS  Results from last 7 days   Lab Units 21  0521 21  1624 21  1315 21  1313   WBC Thousand/uL 6 29  --   --  7 70   HEMOGLOBIN g/dL 11 1*  --   --  12 4   I STAT HEMOGLOBIN g/dl  --   --  12 6  --    HEMATOCRIT % 34 0*  --   --  38 4   HEMATOCRIT, ISTAT %  --   --  37  --    PLATELETS Thousands/uL 179 187  --  173   NEUTROS PCT % 79*  --   --  85*   MONOS PCT % 7  --   --  5      Results from last 7 days   Lab Units 06/25/21  0521 06/24/21  1412 06/24/21  1315   POTASSIUM mmol/L 3 5 4 2  --    CHLORIDE mmol/L 108 105  --    CO2 mmol/L 29 25  --    CO2, I-STAT mmol/L  --   --  26   BUN mg/dL 19 32*  --    CREATININE mg/dL 0 97 1 66*  --    CALCIUM mg/dL 7 5* 7 4*  --    ALK PHOS U/L  --  103  --    ALT U/L  --  20  --    AST U/L  --  95*  --    GLUCOSE, ISTAT mg/dl  --   --  112     Results from last 7 days   Lab Units 06/25/21  0521   MAGNESIUM mg/dL 2 3     Results from last 7 days   Lab Units 06/25/21  0521   PHOSPHORUS mg/dL 2 4      Results from last 7 days   Lab Units 06/24/21  1313   INR  1 02   PTT seconds 28     Results from last 7 days   Lab Units 06/24/21  1313   LACTIC ACID mmol/L 0 8     Results from last 7 days   Lab Units 06/24/21  1624 06/24/21  1313   TROPONIN I ng/mL <0 02 <0 02       IMAGING & DIAGNOSTIC TESTING     Radiology Results: I have personally reviewed pertinent films in PACS      Other Diagnostic Testing: I have personally reviewed pertinent films in PACS    ACTIVE MEDICATIONS     Current Facility-Administered Medications   Medication Dose Route Frequency    ALPRAZolam (XANAX) tablet 1 mg  1 mg Oral 4x Daily    heparin (porcine) subcutaneous injection 5,000 Units  5,000 Units Subcutaneous Q8H [address]    HYDROcodone-acetaminophen (NORCO) 5-325 mg per tablet 0 5 tablet  0 5 tablet Oral Q6H PRN       Prior to Admission medications    Medication Sig Start Date End Date Taking?  Authorizing Provider   ALPRAZolam Birtha Love) 1 mg tablet Take by mouth 4 (four) times a day   Yes Historical Provider, MD   atorvastatin (LIPITOR) 10 mg tablet Take 10 mg by mouth daily   Yes Historical Provider, MD   Brexpiprazole (Rexulti) 2 MG tablet Take 2 mg by mouth daily Yes Historical Provider, MD   carisoprodol (SOMA) 350 mg tablet Take 350 mg by mouth 4 (four) times a day   Yes Historical Provider, MD   gabapentin (NEURONTIN) 800 mg tablet Take 800 mg by mouth 4 (four) times a day   Yes Historical Provider, MD   HYDROcodone-acetaminophen (NORCO) 5-325 mg per tablet Take 1 tablet by mouth every 6 (six) hours as needed for pain   Yes Historical Provider, MD   OLANZapine (ZyPREXA) 20 MG tablet Take 20 mg by mouth 2 (two) times a day   Yes Historical Provider, MD   pantoprazole (PROTONIX) 40 mg tablet Take 40 mg by mouth daily   Yes Historical Provider, MD   QUEtiapine (SEROquel) 300 mg tablet Take 300 mg by mouth daily at bedtime   Yes Historical Provider, MD   risperiDONE (RisperDAL) 2 mg tablet Take 2 mg by mouth daily   Yes Historical Provider, MD   traMADol (ULTRAM) 50 mg tablet Take 50 mg by mouth 4 (four) times a day   Yes Historical Provider, MD   zolpidem (AMBIEN CR) 6 25 MG CR tablet Take 10 mg by mouth daily at bedtime as needed for sleep   Yes Historical Provider, MD       ==  Kavya Becker, MD [facility], PGY-2

## 2021-06-25 NOTE — ASSESSMENT & PLAN NOTE
Patient presents with a creatinine 0 6 and CK level 3514 after being found down on the floor by family  Patient received IV fluid resuscitation with improvement in creatinine      Plan:  · Statin currently on hold  · Continue to trend BUN/creatinine  · Monitoring intake/output  · Encourage increased PO hydration  · Avoid nephrotoxin agents

## 2021-06-25 NOTE — CONSULTS
Consultation - Geriatric Medicine   Nydia Rom 72 y o  female MRN: [id number]  Unit/Bed#: [facility] 702-01 Encounter: [id number]      Assessment/Plan     Acute metabolic encephalopathy  -found down, unresponsive, hypothermic and bradycardic requiring intubation in field in patient who is fully oriented and ambulatory at baseline  -[facility] 6/24/21 revealed no acute intracranial abnormality  -CT chest abdomen pelvis 6/24/21 with no acute abnormality reported  -no acute traumatic injuries identified on admission imaging  -Cardiac workup:troponin <0 02, bradycardic in 50s on initial presentation, EKG NSR, Echo revealed EF 65% with no regional wall motion abnormalities  -TSH 0 844, no hypoglycemia  -endocrinopathies workup in process by Neurology  -likely multifactorial including polypharmacy, rhabdomyolysis, and acute hypoxic respiratory failure requiring intubation  -markedly improved awake and alert, oriented, answers questions appropriately  -patient reports normal state of health prior to the incident, denies medication overdose intentionally or accidentally as her mother manages her medications for her   -Psychiatry following for medication recommendations, appreciate recommendations   -renally dose all medications, per creatinine clearance on admission total daily gabapentin dose should be no more than 600 mg in 2-3 divided doses (currently maintained on 800 mg Q6H as o/p)  -limit combination of hydrocodone and tramadol, consider pain management as outpatient for alternative treatment modalities  -psychiatric medication dose adjustments as outlined by Psychiatry below  -continue supportive cares, reorient/redirect frequently as appropriate  -strongly encourage close outpatient follow-up with PCP/primary prescriber for ongoing medication review and long-term goal attempts to taper to lowest effective dose of medications     Acute hypoxic respiratory failure  -intubated in field by EMS  -highly suspicious of respiratory failure secondary to medications/polypharmacy  -extubated 6/24/21 currently saturating well on room air  -continue aggressive pulmonary toilet    Rhabdomyolysis  -patient found down unknown down time  -CK markedly elevated  -continue hydration and encourage fluid intake  -monitor renal function to ensure clearance, consider low threshold to consult Nephrology given patient's compromised renal state due to hx renal cell carcinoma s/p right nephrectomy    LUCAS on CKD-III  -creatinine elevated at 1 66 on admission down to 0 97  -baseline creatinine approximately 1-1 2  -renally dose all medications and avoid nephrotoxic agents  -avoid extremes of BP    Bipolar disorder  -Psychiatry following for medication recommendations, appreciate input  -per Psychiatry continue Rexulti, reduce dose of Seroquel to 200 mg HS, reduce Xanax to 1 mg TID, discontinue Zyprexa and risperidone  -continue monitor closely and recommend outpatient follow-up with PCP/Psychiatry for ongoing management    Impaired Hearing  -Encourage use of hearing aids at all appropriate times  -encourage providers and caregivers to speak slowly and clearly directly to patient  -minimize background noise to encourage patient engagement  -hearing aids were not present at time of evaluation so patient was provided with hearing amplifier device with notable benefit, she has encourage use device throughout duration of hospitalization and intends to obtain personal device for home use information regarding device and working be obtained placed in patient's discharge instructions  -encourage use of teach back method to ensure clear communication    Cognitive screening  -patient is fully oriented answers questions appropriately  -no prior cognitive testing on record for review  -CTH without contrast 6/24/21 without acute intracranial abnormality, mild chronic micro path changes noted  -TSH WNL   -Neuropsychiatry following for formal capacity evaluation    Deconditioning/debility/frailty  -clinical frailty scale stage 5, mildly frail  -multifactorial including age, bipolar disorder, recurrent encephalopathic episodes of multiple chronic medical co-morbidities  -continue to encourage well-balanced nutrition  -continue optimization chronic medical conditions     Home medication review  Personally confirmed with [facility] [phone]:    Metoprolol succinate  mg daily  Risperidone 2 mg daily  Carisoprodol 350 mg C1Y-SJBK checked  Zolpidem 10 mg HS-PDMP checked  Albuterol 2 puffs Q6H PRN  Gabapentin 800 mg Q6H  Olanzapine 20 mg daily  Atorvastatin 10 mg daily  Furosemide 40 mg daily  Hydrocodone-APAP 5-325 mg Q6H PRN-PDMP checked  Alprazolam 1 mg QID-PDMP checked  Seroquel 300 mg HS  Rexulti 2 mg daily  Pantoprazole 40 mg daily  Tramadol 50 mg 2 tabs Q6H PRN-PDMP checked    Pharmacy also has Latuda on record but never picked up due to insurance coverage  Care coordination:  Rounded with Dr Juancarlos Berkowitz    History of Present Illness   Physician Requesting Consult: Jewel Mcginnis MD  Reason for Consult / Principal Problem:  Acute metabolic encephalopathy  Hx and PE limited by: N/A  Additional history obtained from: Chart review and patient interview    HPI: Pranav Callaway is a 72y o  year old female with hypertension, GERD, s/p right nephrectomy, overdose of drugs undetermined intent, vitamin-D deficiency, bipolar 1 disorder, insomnia, depression, mild persistent asthmatic bronchitis, COPD, impaired hearing and recent UTI who is admitted to the trauma service after being found down by family for unknown amount of time  She was found to be hypothermic, bradycardic, and unresponsive, she was intubated in the field by EMS and was extubated last night  She is being seen in consultation by Geriatric for acute metabolic encephalopathy    She seen examined bedside where she sitting resting comfortably watching television, she does not recall the events leading to admission and states that she was in her usual state of health aside from recent UTI otherwise denies recent illness or injuries  She is only aware of the details as told to her by family and care team  She reports 1 similar episode in the recent past and explains that extensive workup was unrevealing  Today aside from mild body aches she reports feeling well overall  Prior to admission she was residing home alone with very close support from family mostly her mother, she ambulates independently without any assist device and denies history recurrent falls, she wears hearing aids but does not feel that they are very helpful and does hearing amplifier was used throughout the encounter, with notable benefit  She does not use any dentures or glasses and feels well overall  She does not drive and is dependent on her mother for transportation as well as management of her medications, she also accompanies her to all physician appointments  She reports independence with all other ADLs and IADLs  Inpatient consult to Gerontology  Consult performed by: Adiel Gonzalez DO  Consult ordered by: Josefa Salazar DO        Review of Systems   Constitutional: Negative for appetite change, chills and fever  HENT: Positive for hearing loss (Uses hearing aids)  Negative for dental problem and trouble swallowing  Eyes: Negative for visual disturbance  Respiratory: Negative for cough, chest tightness and shortness of breath  Cardiovascular: Negative for chest pain, palpitations and leg swelling  Gastrointestinal: Negative for abdominal pain, nausea and vomiting  Endocrine: Negative  Genitourinary: Negative  Musculoskeletal: Negative for gait problem  Generalized body aches/myalgias   Skin: Negative  Allergic/Immunologic: Negative  Neurological: Negative for dizziness, tremors, weakness, light-headedness, numbness and headaches  Hematological: Does not bruise/bleed easily  Psychiatric/Behavioral: Positive for sleep disturbance ( unable to sleep without her home medications)  Negative for confusion, decreased concentration and dysphoric mood  All other systems reviewed and are negative  Historical Information   Past Medical History:   Diagnosis Date    Bipolar 1 disorder ([address] )      Past Surgical History:   Procedure Laterality Date    NEPHRECTOMY Right      Social History   Social History     Substance and Sexual Activity   Alcohol Use Not Currently     Social History     Substance and Sexual Activity   Drug Use Not Currently     Social History     Tobacco Use   Smoking Status Former Smoker    Types: Cigarettes   Smokeless Tobacco Never Used     Family History:   Family History   Problem Relation Age of Onset    Colon cancer Other     Alcohol abuse Other     Hypertension Other      Meds/Allergies   all current active meds have been reviewed    No Known Allergies    Objective     Intake/Output Summary (Last 24 hours) at 6/25/2021 1524  Last data filed at 6/25/2021 1301  Gross per 24 hour   Intake 3980 ml   Output 3876 ml   Net 104 ml     Invasive Devices     Peripheral Intravenous Line            Peripheral IV 06/24/21 Right Antecubital <1 day              Physical Exam  Vitals and nursing note reviewed  Constitutional:       Appearance: Normal appearance  She is obese  Comments: Elderly female sitting in chair side bed resting comfortably, no acute distress   HENT:      Head: Normocephalic and atraumatic  Nose: Nose normal       Mouth/Throat:      Mouth: Mucous membranes are moist       Comments: Dentition intact  Eyes:      General: No scleral icterus  Right eye: No discharge  Left eye: No discharge  Conjunctiva/sclera: Conjunctivae normal       Comments: Pupils equal and round   Neck:      Comments: Phonation normal  Cardiovascular:      Rate and Rhythm: Normal rate  Pulses: Normal pulses  Heart sounds: No murmur heard  Pulmonary:      Effort: Pulmonary effort is normal  No respiratory distress  Breath sounds: Normal breath sounds  No wheezing  Abdominal:      Comments: Obese, soft, nontender   Musculoskeletal:      Cervical back: Neck supple  Right lower leg: No edema  Left lower leg: No edema  Skin:     General: Skin is warm and dry  Neurological:      Mental Status: She is alert  Comments: Awake and alert, oriented, answers question appropriately   Psychiatric:      Comments: Pleasant and cooperative       Lab Results:   I have personally reviewed pertinent lab results including the following:  -sodium 142, potassium 2 5, chloride 108, CO2 29, BUN 19, creatinine 0 7 glucose 100 calcium 8 5, GFR 61, magnesium 2 3, phosphorus 2 4  -ammonia 19  -hemoglobin 11 1, hematocrit 34, WBC 6 9, platelets 409  -TSH 3 954  -Troponin <0 02    I have personally reviewed the following imaging study reports in PACS:  CT head without contrast 6/24/21:  No acute intracranial abnormality  CT C-spine without contrast 6/24/20:  No cervical spine fracture or traumatic malalignment  CT chest abdomen pelvis with contrast 6/24/21:  Subsegmental atelectasis base of both lower lobes and right middle lobe, distended bladder, no evidence acute abnormality chest abdomen pelvis    Therapies:   PT:  Following  OT:  Following    VTE Prophylaxis: Heparin    Code Status: Level 1 - Full Code  Advance Directive and Living Will:      Power of :    POLST:      Family and Social Support:   Freedom of Choice: Yes  CM Handoff Comments: 6/24/21- 30 DAY READMIT  Fall, intubated, altered mental status   transport tbd    Goals of Care: Go home ASAP

## 2021-06-25 NOTE — ASSESSMENT & PLAN NOTE
· Patient reports some mild tenderness to palpation to the right of midline on her posterior neck  · There is a small area of swelling without erythema or sign of infection  · Pt has full ROM of her neck without pain  · Recommend local ice and multimodal analgesia if pain continues

## 2021-06-25 NOTE — ASSESSMENT & PLAN NOTE
· Femoral central line placed in the trauma bay 2/2 limited venous access  · Spoke to primary team that the line was not placed under sterility 2/2 urgency and that the line should be removed today

## 2021-06-25 NOTE — ASSESSMENT & PLAN NOTE
· Likely secondary to post-obstructive process as pt came in with distended bladder   Likely also secondary to rhabdo  · Will order urinary retention protocol  · Encourage PO intake  · Avoid nephrotoxins  · Monitor UOP  · Trend BUN/Cr

## 2021-06-25 NOTE — OCCUPATIONAL THERAPY NOTE
Occupational Therapy Evaluation     Patient Name: Carlyle Krishna  Today's Date: 6/25/2021  Problem List  Principal Problem:    Acute encephalopathy  Active Problems:    Bipolar disease, chronic ([facility] [address] )    Acute hypoxemic respiratory failure (HCC)    Rhabdomyolysis    LUCAS (acute kidney injury) ([facility] [address] )    Poor venous access    History of CVA (cerebrovascular accident)    Past Medical History  Past Medical History:   Diagnosis Date    Bipolar 1 disorder [facility])      Past Surgical History  Past Surgical History:   Procedure Laterality Date    NEPHRECTOMY Right              06/25/21 1025   OT Last Visit   OT Visit Date 06/25/21   Note Type   Note type Evaluation   Restrictions/Precautions   Weight Bearing Precautions Per Order No   Other Precautions Cognitive; Bed Alarm; Chair Alarm;Multiple lines;Telemetry; Fall Risk;Pain;Hard of hearing   Pain Assessment   Pain Assessment Tool 0-10   Pain Score 7   Pain Location/Orientation Location: Back; Location: Neck   Pain Onset/Description Onset: Ongoing; Descriptor: Aching;Descriptor: Discomfort   Patient's Stated Pain Goal No pain   Hospital Pain Intervention(s) Repositioned; Ambulation/increased activity; Emotional support   Home Living   Type of [address] [place] [address] Two level  (0 HECTOR; 13 steps inside)   Bathroom Shower/Tub Walk-in shower   Bathroom Toilet Standard   Bathroom Equipment Other (Comment)  (denies any)   Home Equipment Other (Comment)  (denies any)   Additional Comments Pt reports living in 2 SH, 0 HECTOR, 13 steps inside   Bed on 2nd floor, full bath on 1st floor   Prior Function   Level of [place] Independent with ADLs and functional mobility   Lives With Alone   Receives Help From Family   ADL Assistance Independent   IADLs Needs assistance   Falls in the last 6 months 1 to 4  (3)   Vocational On disability   Comments Pt reports being I w/ ADLS; has assist for IADLS (reports mother comes over daily for approx 30 mins to assist pt w/ med management, mother also provides transportation and takes pt to get groceries), transfers and functional mobility PTA   Lifestyle   Autonomy I ADLS, assist for IADLS, (-) , transfers and functional mobility PTA   Reciprocal Relationships Pt lives alone; pt's mother comes over daily for 30 mins to assist w/ med management   Service to Others Pt is on SSD; previously a med tech   Intrinsic Gratification Enjoys reading and spending time w/ her animals (has a wong retriever puppy)   Psychosocial   Psychosocial (WDL) X   ADL   Eating Assistance 7  Independent   Grooming Assistance 7  Independent   UB Bathing Assistance 5  Supervision/Setup   LB Bathing Assistance 5  Supervision/Setup   UB Dressing Assistance 5  Supervision/Setup   LB Dressing Assistance 5  Supervision/Setup   Toileting Assistance  5  Supervision/Setup   Functional Assistance 5  Supervision/Setup   Bed Mobility   Supine to Sit 6  Modified independent   Additional items Verbal cues   Sit to Supine Unable to assess   Additional Comments Sat EOB w/ S, up OOB in chair @ end of session   Transfers   Sit to Stand 7  Independent   Stand to Sit 7  Independent   Additional Comments no DME used   Functional Mobility   Functional Mobility 5  Supervision   Additional Comments Pt ambulated short household distance w/ S, no DME used   Balance   Static Sitting Good   Dynamic Sitting Fair +   Static Standing Fair   Dynamic Standing Fair   Ambulatory Fair   Activity Tolerance   Activity Tolerance Patient tolerated treatment well;Patient limited by fatigue   Medical Staff Made Aware PT   Nurse Made Aware yes   RUE Assessment   RUE Assessment WFL   LUE Assessment   LUE Assessment WFL   Hand Function   Gross Motor Coordination Functional   Fine Motor Coordination Functional   Sensation   Light Touch No apparent deficits   Vision-Basic Assessment   Current Vision No visual deficits   Cognition   Overall Cognitive Status Impaired   Arousal/Participation Responsive; Cooperative   Attention Attends with cues to redirect   Orientation Level Oriented X4   Memory Decreased recall of precautions;Decreased recall of recent events   Following Commands Follows one step commands without difficulty   Comments Pt is cooperative; presents w/ slurred speech; has decreased safety awareness and insight into deficits  Recommend formal cog assessment (ACLS) to determine appropriate level of S required upon D/C   Assessment   Limitation Decreased Safe judgement during ADL;Decreased cognition;Decreased high-level ADLs   Prognosis Fair   Assessment Pt is a 73 y/o female seen for OT eval s/p adm to [facility] after being found down for an unknown amount of time w/ altered mental status, bradycardic and decorticate positioning  Pt is dx'd w/ acute encephalopathy  Pt  has a past medical history of Bipolar 1 disorder ([facility] [address] )  Pt with active OT orders and up with assistance  orders  Pt lives with alone in 2 SH, 0 HECTOR, 13 steps inside, bed 2nd floor, bath 1st floor  Pt was I w/  ADLS and mother assists w/ IADLS, does not drive, & required no use of DME PTA  Pt is currently demonstrating the following occupational deficits: I UB ADLS, S LB ADLS, I w/ bed mobility/transfers and S functional mobilty w/ no DME  Pt is not currently demonstrating any significant deficits in occupational performance however is limited by impaired cognition, decreased safety/insight/judgement/decision making and an unsupportive home environment  Recommend formal ACLS assessment to determine appropriate level of S required upon D/C  Based on the aforementioned OT evaluation, functional performance deficits, and assessments, pt has been identified as a moderate complexity evaluation  Recommend home with family support pending formal cognitive assessment to determine appropriate level of supervision needed upon D/C  Pt will benefit from additional follow up OT treatment for administration of ACLS   Will follow pt for 1 follow up tx session:   Goals   Patient Goals to get home to her puppy   STG Time Frame 3-5   Short Term Goal #1 Pt will be attentive 100% of the time during ongoing formal cognitive assessment (ACLS) to assist w/ safe D/C planning and increase safety for participation in functional activities   Plan   Treatment Interventions Cognitive reorientation   Goal Expiration Date 06/30/21   OT Frequency Other (comment)  (1 follow up tx session)   Recommendation   OT Discharge Recommendation No rehabilitation needs  (home w/ increased S pending cog eval)   OT - OK to Discharge   (pending cog assessment)   Additional Comments  The patient's raw score on the AM-PAC Daily Activity inpatient short form is 24, standardized score is 57 54, greater than 39 4  Patients at this level are likely to benefit from discharge to home  Please refer to the recommendation of the Occupational Therapist for safe discharge planning     AM-PAC Daily Activity Inpatient   Lower Body Dressing 4   Bathing 4   Toileting 4   Upper Body Dressing 4   Grooming 4   Eating 4   Daily Activity Raw Score 24   Daily Activity Standardized Score (Calc for Raw Score >=11) 57 54   AM-PAC Applied Cognition Inpatient   Following a Speech/Presentation 2   Understanding Ordinary Conversation 3   Taking Medications 2   Remembering Where Things Are Placed or Put Away 3   Remembering List of 4-5 Errands 3   Taking Care of Complicated Tasks 2   Applied Cognition Raw Score 15   Applied Cognition Standardized Score 33 54        Khurram Ramirez MS, OTR/L

## 2021-06-25 NOTE — NURSING NOTE
Patient was extubated at approximately 8 pm  I/O removed from left shoulder, new IV placed in right AC  Patient's mother was called and updated, medication list was given over the phone  Patient lives alone and has a dog and cat which is being taken care of by her mother  Patient resides in [facility]

## 2021-06-25 NOTE — ASSESSMENT & PLAN NOTE
Patient with a history of CVA in 2009 with residual right-sided weakness      Plan:  · Lipitor on hold in setting of rhabdo

## 2021-06-25 NOTE — ASSESSMENT & PLAN NOTE
· Pt found down for unknown amount of time  · CK elevated and LUCAS  · Continue to trend CK level  · Encourage PO fluid intake

## 2021-06-25 NOTE — UTILIZATION REVIEW
Inpatient Admission Authorization Request   NOTIFICATION OF INPATIENT ADMISSION/INPATIENT AUTHORIZATION REQUEST   SERVICING FACILITY:   [facility]  Address: [address] [place] [address], [address] [place] [address]  Tax ID: [id number]  NPI: [id number]  Place of Service: Inpatient [address] [facility] Code: 24     ATTENDING PROVIDER:  Attending Name and NPI#: Kristina Otero Md [[id number]]  Address: [address] [place] [address], [address] [place], [place] [id number]  Phone: [phone]     UTILIZATION REVIEW CONTACT:  [facility] Utilization   Network Utilization Review Department  Phone: [phone]  Fax: [phone]  Email: [email or web address]     PHYSICIAN ADVISORY SERVICES:  FOR [id number] REVIEW - MEDICAL NECESSITY DENIAL  Phone: [phone]  Fax: [phone]  Email: [email or web address]     TYPE OF REQUEST:  Inpatient Status     ADMISSION INFORMATION:  ADMISSION DATE/TIME: 6/24/21  1:51 PM  PATIENT DIAGNOSIS CODE/DESCRIPTION:  Altered mental status, unspecified altered mental status type [R41 82]  Other injury of unspecified body region, initial encounter [T14  8XXA]  DISCHARGE DATE/TIME: No discharge date for patient encounter  DISCHARGE DISPOSITION (IF DISCHARGED): Final discharge disposition not confirmed     IMPORTANT INFORMATION:  Please contact the [facility] directly with any questions or concerns regarding this request  Department voicemails are confidential     Send requests for admission clinical reviews, concurrent reviews, approvals, and administrative denials due to lack of clinical to fax [phone]

## 2021-06-25 NOTE — ASSESSMENT & PLAN NOTE
Patient has a history of bipolar  Patient follows with [facility] for psychiatry but medications are prescribed by her neurologist   Medication reconciliation and PDMP confirms prescription of multiple medications including:  Xanax, Rexulti, Zypreza, Seroquel, Risperdal, and Ambien       Plan:  · Xanax 1mg QID  · Hold all additional psych medications  · Psychiatry consulted and appreciate recommendations

## 2021-06-25 NOTE — PROGRESS NOTES
[address] [facility]  Progress Note - Elda Koch 1955, 72 y o  female MRN: [id number]  Unit/Bed#: ICU 06 Encounter: [id number]  Primary Care Provider: YASMANI Good   Date and time admitted to hospital: 6/24/2021 12:51 PM    Poor venous access  Assessment & Plan  · Femoral central line placed in the trauma bay 2/2 limited venous access  · Spoke to primary team that the line was not placed under sterility 2/2 urgency and that the line should be removed today    Neck pain  Assessment & Plan  · Patient reports some mild tenderness to palpation to the right of midline on her posterior neck  · There is a small area of swelling without erythema or sign of infection  · Pt has full ROM of her neck without pain  · Recommend local ice and multimodal analgesia if pain continues    LUCAS (acute kidney injury) ([facility] [address] )  Assessment & Plan  · Likely secondary to post-obstructive process as pt came in with distended bladder   Likely also secondary to rhabdo  · Will order urinary retention protocol  · Encourage PO intake  · Avoid nephrotoxins  · Monitor UOP  · Trend BUN/Cr    Rhabdomyolysis  Assessment & Plan  · Pt found down for unknown amount of time  · CK elevated and LUCAS  · Continue to trend CK level  · Encourage PO fluid intake    Acute hypoxemic respiratory failure (HCC)  Assessment & Plan  · Pt intubated by EMS  · Extubated 6/24, currently on 2 L NC  · Encourage IS  · Maintain spO2 >92%    Bipolar disease, chronic ([facility] [address] )  Assessment & Plan  · Hold home bipolar medications  · Follow-up Gabapentin level    * Acute encephalopathy  Assessment & Plan  · Patient found down, unknown amount of time  · Level A trauma, arrival by air  · EMS found patient to be hypothermic, bradycardic, and with decorticate posturing  · Patient intubated by EMS prior to arrival to the hospital  · Patient following commands and moving all extremities in the trauma bay  · Mental status rapidly improved and she was extubated 6/24  · Work-up thus far has been negative  · Follow-up gabapentin level and UDS  · Of note, pt recently admitted for AMS and treated fir UTI, though UA was clean on admission  · Hold home Bipolar medications        TERTIARY TRAUMA SURVEY NOTE    Prophylaxis: Heparin    Disposition: Trauma will sign off  Please contact with questions or concerns    Code status:  Level 1 - Full Code    Consultants:   Neurology  Psychiatry  Geriatrics    Is the patient 72 years or older?: YES:    1  Before the illness or injury that brought you to the Emergency, did you need someone to help you on a regular basis? 1=Yes   2  Since the illness or injury that brought you to the Emergency, have you needed more help than usual to take care of yourself? 1=Yes   3  Have you been hospitalized for one or more nights during the past 6 months (excluding a stay in the Emergency Department)? 1=Yes   4  In general, do you see well? 0=Yes   5  In general, do you have serious problems with your memory? 0=No   6  Do you take more than three different medications everyday? 1=Yes   TOTAL   4     Did you order a geriatric consult if the score was 2 or greater?: yes          SUBJECTIVE:     Transfer from: N/A  Outside Films Received: not applicable  Tertiary Exam Due on: 6/25/2021    Mechanism of Injury:Fall    Details related to Injury: +LOC:  yes    Chief Complaint: "I don't know what happened, I guess I fell "    HPI/Last 24 hour events:   Lynette Palacio is a 72year old female with PMHx significant for Bipolar disorder, HTN, HLD who presented as a Level A Trauma on 6/24 after being found down for an unknown amount of time by family  EMS arrived and found the patient with AMS, decorticate posturing, bradycardia, and hypothermia  She was intubated prior to arrival to the trauma bay  Upon arrival to the trauma bay the patient was GCS 5T but rapidly improved to moving all extremities with purpose   She was not found to have any traumatic injuries and was admitted to the medical ICU for continued evaluation  She has since been extubated to nasal cannula and is AO x3  Work-up for AMS has since been negative though gabapentin level and UDS still pending  There is concern for polypharmacy given patient's many medications and geriatrics is now consulted  Neurology and Psychiatry are also consulted and will evaluate patient now that she is extubated  She is currently denying all pain and states that she does not know what happened yesterday  She states that the last thing she remembers is shopping with her mom but cannot remember when that was  Active medications:           Current Facility-Administered Medications:     ALPRAZolam (XANAX) tablet 1 mg, 1 mg, Oral, 4x Daily    heparin (porcine) subcutaneous injection 5,000 Units, 5,000 Units, Subcutaneous, Q8H [address], 5,000 Units at 06/25/21 0525    HYDROcodone-acetaminophen (NORCO) 5-325 mg per tablet 0 5 tablet, 0 5 tablet, Oral, Q6H PRN      OBJECTIVE:     Vitals:   Vitals:    06/25/21 1100   BP: 125/62   Pulse: 92   Resp: (!) 49   Temp:    SpO2: 96%       Physical Exam  Vitals reviewed  Constitutional:       General: She is not in acute distress  Appearance: Normal appearance  She is not ill-appearing, toxic-appearing or diaphoretic  HENT:      Head: Normocephalic and atraumatic  Nose: Nose normal       Mouth/Throat:      Mouth: Mucous membranes are dry  Eyes:      Extraocular Movements: Extraocular movements intact  Pupils: Pupils are equal, round, and reactive to light  Neck:     Cardiovascular:      Rate and Rhythm: Normal rate and regular rhythm  Pulses: Normal pulses  Heart sounds: Normal heart sounds  Pulmonary:      Effort: Pulmonary effort is normal       Breath sounds: Normal breath sounds  No wheezing, rhonchi or rales  Abdominal:      General: Abdomen is flat  There is no distension  Palpations: Abdomen is soft  Tenderness:  There is no abdominal tenderness  There is no guarding or rebound  Musculoskeletal:      Right lower leg: No edema  Left lower leg: No edema  Skin:     General: Skin is warm and dry  Capillary Refill: Capillary refill takes less than 2 seconds  Comments: No area of bruising or erythema   Neurological:      General: No focal deficit present  Mental Status: She is alert and oriented to person, place, and time  Cranial Nerves: Cranial nerves are intact  No cranial nerve deficit  Sensory: Sensation is intact  No sensory deficit  Motor: Motor function is intact  Coordination: Coordination is intact  Psychiatric:         Attention and Perception: She is inattentive  Mood and Affect: Affect is blunt  I/O:   I/O       06/23 0701 - 06/24 0700 06/24 0701 - 06/25 0700 06/25 0701 - 06/26 0700    P  O   100 250    I V  (mL/kg)  2790 (32 3) 310 (3 6)    NG/GT  30     Total Intake(mL/kg)  2920 (33 8) 560 (6 5)    Urine (mL/kg/hr)  7626 350 (1)    Emesis/NG output  200     Total Output  7826 350    Net  -4906 +210                 Invasive Devices: Invasive Devices     Central Venous Catheter Line            CVC Central Lines 06/24/21 Triple Left Femoral <1 day          Peripheral Intravenous Line            Peripheral IV 06/24/21 Right Antecubital <1 day                  Imaging:   XR chest portable    Result Date: 6/25/2021  Impression: 1  Right greater than left basilar subsegmental atelectasis with otherwise clear lungs  2   Endotracheal tube positioned somewhat low and should be retracted 2-3 cm  3   Esophageal temperature probe and left humeral head intraosseous needle noted  The study was marked in [facility] for immediate notification  Workstation performed: [id number]     TRAUMA - CT head wo contrast    Addendum Date: 6/24/2021    ADDENDUM: Comparison was made with a CT of the head from May 29, 2021  Result Date: 6/24/2021  Impression: No acute intracranial abnormality   I personally discussed this study with Margiejerilyn Castro on 6/24/2021 at 2:10 PM  Workstation performed: [id number]     TRAUMA - CT spine cervical wo contrast    Result Date: 6/24/2021  Impression:  No cervical spine fracture or traumatic malalignment  Workstation performed: [id number]     XR chest 1 view    Result Date: 6/24/2021  Impression: Right lower lobe atelectasis versus scarring  Endotracheal tube at the origin of the right mainstem bronchus  Workstation performed: [id number]     TRAUMA - CT chest abdomen pelvis w contrast    Addendum Date: 6/24/2021    ADDENDUM: Comparison is made with a CTA of the chest, CT abdomen and pelvis from May 29, 2021  Result Date: 6/24/2021  Impression: 1  Subsegmental atelectasis in the bases of both lower lobes and right middle lobe  2   Distended bladder  3   Otherwise, no evidence of acute abnormality in the chest, abdomen or pelvis  I personally discussed this study with Margie Castro on 6/24/2021 at 2:10 PM  Workstation performed: [id number]     XR trauma multiple    Result Date: 6/24/2021  Impression: Right lower lobe atelectasis versus scarring  Endotracheal tube at the origin of the right mainstem bronchus  Workstation performed: [id number]       Labs:   Results Reviewed     Procedure Component Value Units Date/Time    Cortisol Level, AM Specimen [[id number]]  (Abnormal) Collected: 06/25/21 0831    Lab Status: Final result Specimen: Arm, Right Updated: 06/25/21 1017     Cortisol - AM 27 3 ug/dL     Narrative:      Reference ranges established for specimens drawn between 7 and 9 am  Results may be inaccurate if timing is not correct      NOVEL CORONAVIRUS (COVID-19), PCR [facility] [[id number]]  (Normal) Collected: 06/24/21 1618    Lab Status: Final result Specimen: Nares from Nose Updated: 06/24/21 1815     SARS-CoV-2 Negative    Narrative:           Cortisol [[id number]]  (Normal) Collected: 06/24/21 1412    Lab Status: Final result Specimen: Blood from Central Venous Line Updated: 06/24/21 1728     Cortisol, Random 18 6 ug/dL     Troponin I [[id number]]  (Normal) Collected: 06/24/21 1624    Lab Status: Final result Specimen: Blood from Central Venous Line Updated: 06/24/21 1706     Troponin I <0 02 ng/mL     Ammonia [[id number]]  (Normal) Collected: 06/24/21 1624    Lab Status: Final result Specimen: Blood from Central Venous Line Updated: 06/24/21 1702     Ammonia 19 umol/L     UA w Reflex to Microscopic w Reflex to Culture [[id number]]  (Abnormal) Collected: 06/24/21 1612    Lab Status: Final result Specimen: Urine, Indwelling Russell Catheter Updated: 06/24/21 1649     Color, UA Yellow     Clarity, UA Clear     Specific Gravity, UA 1 017     pH, UA 6 0     Leukocytes, UA Small     Nitrite, UA Negative     Protein, UA Negative mg/dl      Glucose, UA Negative mg/dl      Ketones, UA Negative mg/dl      Urobilinogen, UA 0 2 E U /dl      Bilirubin, UA Negative     Blood, UA Small    Platelet count [[id number]]  (Normal) Collected: 06/24/21 1624    Lab Status: Final result Specimen: Blood from Central Venous Line Updated: 06/24/21 1646     Platelets 369 Thousands/uL      MPV 9 2 fL     Toxicology screen, urine [[id number]] Collected: 06/24/21 1612    Lab Status:  In process Specimen: Urine, Catheter Updated: 06/24/21 1636    Blood gas, arterial [[id number]]  (Abnormal) Collected: 06/24/21 1605    Lab Status: Final result Specimen: Blood, Arterial from Radial, Right Updated: 06/24/21 1616     pH, Arterial 7 356     pCO2, Arterial 39 3 mm Hg      pO2, Arterial 79 9 mm Hg      HCO3, Arterial 21 5 mmol/L      Base Excess, Arterial -3 6 mmol/L      O2 Content, Arterial 17 6 mL/dL      O2 HGB,Arterial  94 3 %      SOURCE Radial, Right     VIC TEST Yes     Vent Type- AC AC     AC Rate 16     Tidal Volume 450 ml      Inspired Air (FIO2) 50     PEEP 6    CKMB [[id number]]  (Abnormal) Collected: 06/24/21 1412    Lab Status: Final result Specimen: Blood from Central Venous Line Updated: 06/24/21 1605     CK-MB Index 2 0 %      CK-MB 71 2 ng/mL     TSH, 3rd generation with Free T4 reflex [[id number]]  (Normal) Collected: 06/24/21 1412    Lab Status: Final result Specimen: Blood from Central Venous Line Updated: 06/24/21 1546     TSH 3RD GENERATON 0 844 uIU/mL     Narrative:      Patients undergoing fluorescein dye angiography may retain small amounts of fluorescein in the body for 48-72 hours post procedure  Samples containing fluorescein can produce falsely depressed TSH values  If the patient had this procedure,a specimen should be resubmitted post fluorescein clearance        CK (with reflex to MB) [[id number]]  (Abnormal) Collected: 06/24/21 1412    Lab Status: Final result Specimen: Blood from Central Venous Line Updated: 06/24/21 1545     Total CK 3,514 U/L     Hepatic function panel [[id number]]  (Abnormal) Collected: 06/24/21 1412    Lab Status: Final result Specimen: Blood from Central Venous Line Updated: 06/24/21 1525     Total Bilirubin 0 74 mg/dL      Bilirubin, Direct 0 24 mg/dL      Alkaline Phosphatase 103 U/L      AST 95 U/L      ALT 20 U/L      Total Protein 6 1 g/dL      Albumin 3 1 g/dL     Fingerstick Glucose (POCT) [[id number]]  (Normal) Collected: 06/24/21 1453    Lab Status: Final result Updated: 06/24/21 1453     POC Glucose 93 mg/dl     Basic metabolic panel [[id number]]  (Abnormal) Collected: 06/24/21 1412    Lab Status: Final result Specimen: Blood from Central Venous Line Updated: 06/24/21 1448     Sodium 137 mmol/L      Potassium 4 2 mmol/L      Chloride 105 mmol/L      CO2 25 mmol/L      ANION GAP 7 mmol/L      BUN 32 mg/dL      Creatinine 1 66 mg/dL      Glucose 103 mg/dL      Calcium 7 4 mg/dL      eGFR 32 ml/min/1 73sq m     Narrative:      [place] guidelines for Chronic Kidney Disease (CKD):     Stage 1 with normal or high GFR (GFR > 90 mL/min/1 73 square meters)    Stage 2 Mild CKD (GFR = 60-89 mL/min/1 73 square meters)    Stage 3A Moderate CKD (GFR = 45-59 mL/min/1 73 square meters)    Stage 3B Moderate CKD (GFR = 30-44 mL/min/1 73 square meters)    Stage 4 Severe CKD (GFR = 15-29 mL/min/1 73 square meters)    Stage 5 End Stage CKD (GFR <15 mL/min/1 73 square meters)  Note: GFR calculation is accurate only with a steady state creatinine    Salicylate level [[id number]]  (Abnormal) Collected: 06/24/21 1412    Lab Status: Final result Specimen: Blood from Central Venous Line Updated: 77/24/81 9649     Salicylate Lvl <3 mg/dL     Acetaminophen level-If concentration is detectable, please discuss with medical  on call  [[id number]]  (Abnormal) Collected: 06/24/21 1412    Lab Status: Final result Specimen: Blood from Central Venous Line Updated: 06/24/21 1448     Acetaminophen Level 5 ug/mL     Lactic acid, plasma [[id number]]  (Normal) Collected: 06/24/21 1313    Lab Status: Final result Specimen: Blood from Line Updated: 06/24/21 1346     LACTIC ACID 0 8 mmol/L     Narrative:      Result may be elevated if tourniquet was used during collection      Ethanol [[id number]]  (Normal) Collected: 06/24/21 1313    Lab Status: Final result Specimen: Blood from Line Updated: 06/24/21 1345     Ethanol Lvl <3 mg/dL     Troponin I [[id number]]  (Normal) Collected: 06/24/21 1313    Lab Status: Final result Specimen: Blood from Line Updated: 06/24/21 1344     Troponin I <0 02 ng/mL     Protime-INR [[id number]]  (Normal) Collected: 06/24/21 1313    Lab Status: Final result Specimen: Blood from Line Updated: 06/24/21 1338     Protime 13 4 seconds      INR 1 02    APTT [[id number]]  (Normal) Collected: 06/24/21 1313    Lab Status: Final result Specimen: Blood from Line Updated: 06/24/21 1338     PTT 28 seconds     CBC and differential [[id number]]  (Abnormal) Collected: 06/24/21 1313    Lab Status: Final result Specimen: Blood from Line Updated: 06/24/21 1322     WBC 7 70 Thousand/uL      RBC 3 52 Million/uL      Hemoglobin 12 4 g/dL      Hematocrit 38 4 %       fL      MCH 35 2 pg      MCHC 32 3 g/dL      RDW 13 7 %      MPV 9 3 fL      Platelets 376 Thousands/uL      nRBC 0 /100 WBCs      Neutrophils Relative 85 %      Immat GRANS % 0 %      Lymphocytes Relative 9 %      Monocytes Relative 5 %      Eosinophils Relative 1 %      Basophils Relative 0 %      Neutrophils Absolute 6 44 Thousands/µL      Immature Grans Absolute 0 02 Thousand/uL      Lymphocytes Absolute 0 72 Thousands/µL      Monocytes Absolute 0 39 Thousand/µL      Eosinophils Absolute 0 11 Thousand/µL      Basophils Absolute 0 02 Thousands/µL     POCT Blood Gas (CG8+) [[id number]]  (Abnormal) Collected: 06/24/21 1315    Lab Status: Final result Specimen: Venous Updated: 06/24/21 1320     ph, Jorge ISTAT 7 276     pCO2, Jorge i-STAT 51 6 mm HG      pO2, Jorge i-STAT 34 0 mm HG      BE, i-STAT -3 mmol/L      HCO3, Jorge i-STAT 24 0 mmol/L      CO2, i-STAT 26 mmol/L      O2 Sat, i-STAT 56 %      SODIUM, I-STAT 141 mmol/l      Potassium, i-STAT 4 5 mmol/L      Hct, i-STAT 37 %      Hgb, i-STAT 12 6 g/dl      Glucose, i-STAT 112 mg/dl      Specimen Type VENOUS

## 2021-06-25 NOTE — ASSESSMENT & PLAN NOTE
Patient had a femoral placed in the trauma [place] secondary to limited venous access  Femoral placed under sterility secondary to urgency      Plan:  · Femoral line removed

## 2021-06-25 NOTE — ASSESSMENT & PLAN NOTE
Pt found down at home by family LKW 4 hours prior, bradycardia and hypothermia noted on presentation and intubated in field for persistent unresponsiveness  Initial exam demonstrated right upper extremity poorly responsive to noxious stim, and mental status was stuporous  Otherwise, nonfocal exam  Reportedly has hx of stroke with dysarthria, and RCC s/p nephrectomy  Previous admission in late May after being found down in the street presenting with confusion and found to have UTI  Patient's family denies recent illness  On repeat exam 30 mins later, pt started following commands throughout  Ultimately extubated evening of 6/24  Now at baseline mental status and physical exam which improved dramatically follo  Discussed with epilepsy team, there is no epilepsy syndrome that would cause her presentation  She is chronically on numerous psychoactive medications including risperidone, tramadol, xanax, seroquel, zyprexa, zolpidem, etc     Workup:  · 6/24/21 CT head: unremarkable  · Initial UA:  WBCs 10-20, leukocytes, blood  · Total CK 3514, CK-MB 71 2    Impression: altered mental status possibly secondary to drug induced, metabolic etiology, cardiac etiology vs less likely stroke or seizure    Plan:  · No need for MRI brain at this time, pt is back to baseline, symptoms cannot be explained by structural neurological defect  · Would hold off on video EEG at this time as patient is now following commands but retain low threshold to hook up if mental status declines  · Recommend consultation with psychiatry as pt is chronically on risperidone which can in rare cases per case report on literature review cause hypothermia/hypotension/bradycardia  · CTH if pt mental status declines by >2 GCS in an hour or significant change in mental status  · Metabolic work up and treatment as per primary team  · No further inpatient neurology recommendations, will sign off, please call with questions or concerns    · Outpatient neurology follow up not needed [facility]

## 2021-06-25 NOTE — PROGRESS NOTES
CRITICAL CARE TRANSFER NOTE     Name: Magnus Villavicencio   Age & Sex: 72 y o  female   MRN: [id number]  Unit/Bed#: ICU 06   Encounter: [id number]  Hospital Stay Days: 1    Reason for ICU Admission: Acute Encephalopathy w/ Intubation  Code Status: Level 1 - Full Code  Condition: stable  Disposition: Patient requires Med/Surg    Accepting Physician: Theresa Borja MD  I discussed the case with Dr Theresa Borja of [facility] at 10:00  I reviewed the patient's ICU course, results of diagnostic tests, consults, and pending tests/consults  I answered all questions  ASSESSMENT/PLAN     Principal Problem:    Acute encephalopathy  Active Problems:    Bipolar disease, chronic (HCC)    Acute hypoxemic respiratory failure (HCC)    LUCAS (acute kidney injury) (HCC)    Rhabdomyolysis    Poor venous access    History of CVA (cerebrovascular accident)    * Acute encephalopathy  Assessment & Plan  Patient presented initially as a Level A trauma via helicopter and intubated after being found on the ground by family members for unknown period of time  Patient is alert, hypothermic, and bradycardic and decorticate positioning  Patient intubated for airway protection and was requiring propofol gtt on arrival secondary to agitation  Patient unresponsive [place]   Trauma workup including both labs and imaging unremarkable  Patient evaluated by Neurology who believes no additional neurological workup is necessary patient's symptoms likely secondary to overdose vs polypharmacy  Patient's home medications on hold with the exception of Xanax and Norco until evaluated by Psychiatry and geriatric to his previous    Plan:  Follow-up gabapentin level and UDS  Home medications on hold except for:   Xanax 1mg QID  Norco 0 5 mg q6 hours PRN  Given concern for polypharmacy:  Psychiatry consultation placed  Neuropsych consultation placed    Bipolar disease, chronic [facility])  Assessment & Plan  Patient has a history of bipolar    Patient follows with [facility] for psychiatry but medications are prescribed by her neurologist   Medication reconciliation and PDMP confirms prescription of multiple medications including:  Xanax, Rexulti, Zypreza, Seroquel, Risperdal, and Ambien  Plan:  Xanax 1mg QID  Hold all additional psych medications  Psychiatry consulted and appreciate recommendations    Acute hypoxemic respiratory failure [facility])  Assessment & Plan  Patient intubated on arrival secondary to altered mental status and inability to protect airway  Patient's mental status rapidly improved and patient was shortly after extubated to nasal cannula oxygen  Plan:  Monitor and maintain SpO2 greater than 92%  Continue on nasal cannula and wean as patient tolerates  Out of bed and ambulate  Encouraged incentive spirometry    LUCAS (acute kidney injury) [facility])  Assessment & Plan  Patient presenting with a creatinine of 1 66 likely secondary to rhabdomyolysis in the setting of elevated CK  Patient resuscitated with intravenous fluids with improvement in creatinine to baseline of 0 97  Plan:  Continue to trend BUN/creatinine  Monitoring intake/output  Encourage increased PO hydration  Avoid nephrotoxin agents    Rhabdomyolysis  Assessment & Plan  Patient presents with a creatinine 0 6 and CK level 3514 after being found down on the floor by family  Patient received IV fluid resuscitation with improvement in creatinine  Plan:  Statin currently on hold  Continue to trend BUN/creatinine  Monitoring intake/output  Encourage increased PO hydration  Avoid nephrotoxin agents    History of CVA (cerebrovascular accident)  Assessment & Plan  Patient with a history of CVA in 2009 with residual right-sided weakness  Plan:  Lipitor on hold in setting of rhabdo    Hypertension  Assessment & Plan  History of hypertension  Patient hypotensive/normotensive during admission  Home medication regimen includes amlodipine 5 mg daily    Will continue to hold at this time     Plan:  Can consider restarting amlodipine 5 mg daily   Continue to monitor with routine vital signs    Poor venous access  Assessment & Plan  Patient had a femoral placed in the trauma [place] secondary to limited venous access  Femoral placed under sterility secondary to urgency  Plan:  Femoral line removed       VTE Pharmacologic Prophylaxis: Heparin  VTE Mechanical Prophylaxis: sequential compression device    HOSPITAL COURSE     Ms Jigna Aparicio is a 72year old male with past medical history of bipolar disease, CVA with residual right-sided weakness and dysarthria, recurrent UTIs, and renal cell carcinoma status post right nephrectomy who presented to [facility] ED on 06/24/21 as a Level A trauma alert via helicopter after she was found down by family members for unknown period of time  The time of EMS arrival, patient was not alert, hypothermic, and bradycardic in decorticate positioning  Patient was intubated from the field via EMS  Patient was originally evaluated by the trauma team and was moving all extremities on arrival   Patient was administered Narcan without improvement and was started on propofol gtt due to agitation  Trauma workup was completed and largely unremarkable with negative CT head and C-spine and CT chest/abdomen/pelvis with subsegmental atelectasis in the bases the lower lobes and right middle lobe field and a distended bladder  Patient was noted to have LUCAS with a creatinine of 1 66 likely secondary to rhabdomyolysis given CK of 3514  Labs otherwise unremarkable including electrolytes, troponin, ammonia, cortisol levels, coma panel, and TSH  Urinalysis demonstrating WBCs, leukocytes, and blood  UDS and urine cultures pending  Patient was admitted to the neuro ICU for more intensive monitoring while intubated  Shortly following admission, patient was weaned off propofol gtt    Patient was awake and following commands appropriately therefore the decision was made to extubate and transition to nasal cannula  While in ICU both pre and post extubation, patient was seen and examined with the neurology service who believed that patient's encephalopathy was likely secondary to overdose versus polypharmacy  Per Neurology, no further neurologic workup or imaging necessary at this point  Per medication reconciliation and confirmation with PDMP, patient is at extremely high risk of significant medication interaction from polypharmacy  Attempted to contact prescribing provider Rufino Ocampo MD) given concern however his private practice office is only open Monday through Wednesday and thus unable to contact  Patient restarted on Xanax 1 mg QID along with Norco 0 5 mg PRN q6 hours and all additional medications meds pending Psychiatry and Geriatrics evaluation and recommendations  On 06/25/2021, patient was hemodynamically stable and saturating appropriately on room air  Patient was therefore deemed stable for transfer to the medical floors  The patient was transferred to [facility] under [facility]  OBJECTIVE     Vitals:    06/25/21 1000 06/25/21 1100 06/25/21 1200 06/25/21 1300   BP: 131/66 125/62 106/66 102/71   Pulse: 86 92 90 102   Resp: 18 (!) 49 20 (!) 24   Temp:   98 8 °F (37 1 °C)    TempSrc:   Oral    SpO2: 93% 96% 95% 94%   Weight:       Height:         I/O last 24 hours: In: 3980 [P O :850; I V :3100; NG/GT:30]  Out: 5810 [GXYZO:8198; Emesis/NG output:200]    LABORATORY DATA     Labs: I have personally reviewed pertinent reports        Results from last 7 days   Lab Units 06/25/21  0521 06/24/21  1624 06/24/21  1315 06/24/21  1313   WBC Thousand/uL 6 29  --   --  7 70   HEMOGLOBIN g/dL 11 1*  --   --  12 4   I STAT HEMOGLOBIN g/dl  --   --  12 6  --    HEMATOCRIT % 34 0*  --   --  38 4   HEMATOCRIT, ISTAT %  --   --  37  --    PLATELETS Thousands/uL 179 187  --  173   NEUTROS PCT % 79*  --   --  85*   MONOS PCT % 7  --   --  5      Results from last 7 days   Lab Units 06/25/21  0521 06/24/21  1412 06/24/21  1315   POTASSIUM mmol/L 3 5 4 2  --    CHLORIDE mmol/L 108 105  --    CO2 mmol/L 29 25  --    CO2, I-STAT mmol/L  --   --  26   BUN mg/dL 19 32*  --    CREATININE mg/dL 0 97 1 66*  --    CALCIUM mg/dL 7 5* 7 4*  --    ALK PHOS U/L  --  103  --    ALT U/L  --  20  --    AST U/L  --  95*  --    GLUCOSE, ISTAT mg/dl  --   --  112     Results from last 7 days   Lab Units 06/25/21  0521   MAGNESIUM mg/dL 2 3     Results from last 7 days   Lab Units 06/25/21  0521   PHOSPHORUS mg/dL 2 4      Results from last 7 days   Lab Units 06/24/21  1313   INR  1 02   PTT seconds 28     Results from last 7 days   Lab Units 06/24/21  1313   LACTIC ACID mmol/L 0 8     Results from last 7 days   Lab Units 06/24/21  1624 06/24/21  1313   TROPONIN I ng/mL <0 02 <0 02     Micro:  Lab Results   Component Value Date    URINECX No Growth <1000 cfu/mL 06/24/2021     IMAGING & DIAGNOSTIC TESTING     Imaging: I have personally reviewed pertinent reports  XR chest portable    Result Date: 6/25/2021  Impression: 1  Right greater than left basilar subsegmental atelectasis with otherwise clear lungs  2   Endotracheal tube positioned somewhat low and should be retracted 2-3 cm  3   Esophageal temperature probe and left humeral head intraosseous needle noted  The study was marked in [facility] for immediate notification  Workstation performed: [id number]     TRAUMA - CT head wo contrast    Addendum Date: 6/24/2021    ADDENDUM: Comparison was made with a CT of the head from May 29, 2021  Result Date: 6/24/2021  Impression: No acute intracranial abnormality  I personally discussed this study with Mary Alvarez on 6/24/2021 at 2:10 PM  Workstation performed: [id number]     TRAUMA - CT spine cervical wo contrast    Result Date: 6/24/2021  Impression:  No cervical spine fracture or traumatic malalignment   Workstation performed: [id number]     XR chest 1 view    Result Date: 6/24/2021  Impression: Right lower lobe atelectasis versus scarring  Endotracheal tube at the origin of the right mainstem bronchus  Workstation performed: [id number]     TRAUMA - CT chest abdomen pelvis w contrast    Addendum Date: 6/24/2021    ADDENDUM: Comparison is made with a CTA of the chest, CT abdomen and pelvis from May 29, 2021  Result Date: 6/24/2021  Impression: 1  Subsegmental atelectasis in the bases of both lower lobes and right middle lobe  2   Distended bladder  3   Otherwise, no evidence of acute abnormality in the chest, abdomen or pelvis  I personally discussed this study with Wei Hare on 6/24/2021 at 2:10 PM  Workstation performed: [id number]     XR trauma multiple    Result Date: 6/24/2021  Impression: Right lower lobe atelectasis versus scarring  Endotracheal tube at the origin of the right mainstem bronchus  Workstation performed: [id number]     EKG, Pathology, and Other Studies: I have personally reviewed pertinent reports       ALLERGIES   No Known Allergies    MEDICATIONS     Current Facility-Administered Medications   Medication Dose Route Frequency Provider Last Rate    ALPRAZolam  1 mg Oral 4x Daily Aury Shupp, DO      heparin (porcine)  5,000 Units Subcutaneous Q8H [address] Aury Shupp, DO      HYDROcodone-acetaminophen  0 5 tablet Oral Q6H PRN Aury Shupp, DO          HYDROcodone-acetaminophen, 0 5 tablet, Q6H PRN      ==  Aury Shupp , PGY-1  [facility]

## 2021-06-25 NOTE — PROGRESS NOTES
Daily Progress Note - Critical Care   Reynold Carter 72 y o  female MRN: [id number]  Unit/Bed#: ICU 06 Encounter: [id number]        ----------------------------------------------------------------------------------------  HPI/24hr events:  14-year-old female with past medical history bipolar disease, renal cell carcinoma status post nephrectomy and CVA who presented to [facility] as the level a trauma alert via helicopter after she was found down by family members  By time of EMS arrival, patient was not alert, hypothermia, and bradycardia and decorticate positioning  Patient was intubated in the field via EMS  Trauma workup has been negative  Patient has had previous admission with similar presenting symptoms  Imaging and workup negative  Symptoms likely secondary to have overdose vs polypharmacy  24hr events:  Overnight patient extubated to nasal cannula oxygen  Patient also had episodes of hypotension while sleeping which resolved upon awakening  MAPs in the 50s during these episodes  Patient given a 1 L bolus and then continued on maintenance fluids  ---------------------------------------------------------------------------------------  SUBJECTIVE  Patient seen examined at bedside this morning  Patient lying in bed comfortably in no acute distress or visible discomfort  Saturating appropriately on 2 L nasal cannula  Patient AAOx3 but confused regarding events leading up to admission  Asking what happened to her  However currently, patient is asymptomatic and with no complaints  Review of Systems   Constitutional: Negative for chills, fatigue and fever  HENT: Negative for tinnitus and trouble swallowing  Eyes: Negative for visual disturbance  Respiratory: Negative for cough, shortness of breath and wheezing  Cardiovascular: Negative for chest pain, palpitations and leg swelling  Gastrointestinal: Negative for nausea and vomiting     Endocrine: Negative for polydipsia, polyphagia and polyuria  Genitourinary: Negative for frequency  Skin: Negative for color change and rash  Neurological: Negative for dizziness, weakness, light-headedness and numbness  Psychiatric/Behavioral: Positive for confusion  Negative for agitation  Review of systems was reviewed and negative unless stated above in HPI/24-hour events   ---------------------------------------------------------------------------------------  Assessment and Plan:  Neuro:   · Diagnosis:  Acute encephalopathy  ? Unclear etiology of patient's encephalopathy  CT head with no acute intracranial abnormality  CT C-spine no fracture or traumatic malalignment  CT chest/abdomen demonstrating subsegmental atelectasis and bibasilar bases no distended bladder  Labs also largely unremarkable with negative troponin, lactic acid, normal electrolytes, ammonia, and TSH  Differentials include: drug-induced, cardiac etiology and unlikely metabolic, stroke, or stroke etiology  ? Plan:   § Neuro checks every hour  § Neurology consulted  § MRI brain with medically stable  § No indication for EEG at this time  § Follow-up UDS  § Follow-up gabapentin level  § EKG and echo ordered  · Diagnosis:  History CVA  ? History of CVA in 2009 with right-sided deficits per chart review  § Plan:   § Lipitor on hold in setting of rhabdomyolysis  · Diagnosis:  Bipolar disorder  ? Home medication regimen includes: Seroquel 300 mg qHS and Zyprexa 20 mg qHS  ? Plan:   § Hold off on home medications  · Diagnosis:  Agitation/sedation  § Plan:  § Propofol gtt  § CAM ICU monitoring     CV:   · Diagnosis:  Hypertension  ? Home medication regimen includes:  Amlodipine 5 mg  § Plan       § Continue to monitor vitals        Pulm:  · Diagnosis:  Acute hypoxic respiratory failure  · Intubated in the field extubated on 06/24/2021  ?  Plan:   § Monitor and maintain SpO2 greater than 88%  § Wean O2 as patient tolerates  § Continue to encourage incentive spirometry        GI: · Diagnosis: No active issues        :   · Diagnosis:  Acute kidney injury  ? Creatinine 1 66 on admission  ? Plan:   § Isolyte @ 100 cc/hr  § Trend BUN/Cr  § Monitor intake/output  § Avoid nephrotoxic agents  · Diagnosis:  Rhabdomyolysis  ? Patient found down at home for unknown period of time  Initial CK 3514  ? Plan:   § Isolyte @ 100 cc/hr  § Trend BUN/Cr  § Monitor intake/output  § Avoid nephrotoxic agents  · Diagnosis:  Recurrent UTIs  ? Patient status post treatment with Keflex for recurrent UTI  ? UA with small amount of leukocytes and blood and urine microscopy with 10-20 wbc's and hyaline casts  Patient with normal lactic acid and WBC of 7 70  § Plan:   § Follow-up urine cultures  § Monitor WBCs trend fever curve  · Diagnosis:  History of renal cell carcinoma status post nephrectomy  ? Plan:   § Maintain Russell  § Monitor intake/output  § Trend BUN/Cr  § Outpatient follow up         F/E/N:   · Fluids:  Isolyte @ 100 cc/hr  · Electrolytes:  Monitor and replete to maintain Mg>2, Phos>3, and K>4  · Nutrition: NPO         Heme/Onc:   · Diagnosis:  No active issues  · DVT prophylaxis: Heparin SC        Endo:   · Diagnosis:  Hypothermia (resolved)  ? Unclear etiology of hypothermia at this time  ? Plan:    § Hypothermia protocol  § Follow-up AM cortisol levels  § Apply valeria kamara   § Closely monitor patient's vital signs        ID:   · Diagnosis:  Recurrent UTIs  ?  Plan:   § Monitor input and output  § Trend WBC and monitor fever curve        MSK/Skin:   · Diagnosis:  No active issues  · Frequent repositioning and skin examination  · PT/OT to evaluate and treat when patient is able      Invasive Devices     Central Venous Catheter Line            CVC Central Lines 06/24/21 Triple Left Femoral <1 day          Peripheral Intravenous Line            Peripheral IV 06/24/21 Right Antecubital <1 day          Drain            Urethral Catheter Temperature probe 18 Fr  <1 day            ·   · Disposition: Transfer to Med-Surg   Code Status: Level 1 - Full Code  ---------------------------------------------------------------------------------------  ICU CORE MEASURES    Prophylaxis   VTE Pharmacologic Prophylaxis: Heparin  VTE Mechanical Prophylaxis: sequential compression device  Stress Ulcer Prophylaxis: Prophylaxis Not Indicated     ABCDE Protocol (if indicated)  Plan to perform spontaneous awakening trial today? Not applicable  Plan to perform spontaneous breathing trial today? Not applicable  Obvious barriers to extubation? Not applicable  CAM-ICU: Negative    Invasive Devices Review  Invasive Devices     Central Venous Catheter Line            CVC Central Lines 21 Triple Left Femoral <1 day          Peripheral Intravenous Line            Peripheral IV 21 Right Antecubital <1 day          Drain            Urethral Catheter Temperature probe 18 Fr  <1 day              Can any invasive devices be discontinued today?  Not applicable  ---------------------------------------------------------------------------------------  OBJECTIVE    Vitals     Temp:  [93 6 °F (34 2 °C)-98 3 °F (36 8 °C)] 98 3 °F (36 8 °C)  HR:  [52-86] 86  Resp:  [] 14  BP: ()/(55-90) 94/62  Vitals:    21 0000 21 0004 21 0100 21 0200   BP: 90/56  97/60 94/62   BP Location:       Pulse: 84  86 86   Resp: ()    Temp:  98 3 °F (36 8 °C)     TempSrc:  Oral     SpO2: 100%  100% 99%   Weight:       Height:               Temp (24hrs), Av 2 °F (35 7 °C), Min:93 6 °F (34 2 °C), Max:98 3 °F (36 8 °C)    Current Temperature: 98 3 °F (36 8 °C)  Temp (24hrs), Av 2 °F (35 7 °C), Min:93 6 °F (34 2 °C), Max:98 3 °F (36 8 °C)  Current: Temperature: 98 3 °F (36 8 °C)  Patient Vitals for the past 24 hrs:   BP Temp Temp src Pulse Resp SpO2 Height Weight   21 0200 94/62 -- -- 86 14 99 % -- --   21 0100 97/60 -- -- 86 18 100 % -- --   21 0004 -- 98 3 °F (36 8 °C) Oral -- -- -- -- --   21 0000 90/56 -- -- 84 (!) 25 100 % -- --   21 2100 94/58 -- -- 78 13 100 % -- --   21 2000 121/59 98 2 °F (36 8 °C) Esophageal 80 18 99 % -- --   21 1800 131/73 97 5 °F (36 4 °C) -- 78 20 100 % -- --   21 1700 109/65 (!) 97 °F (36 1 °C) Esophageal 70 19 100 % -- --   21 1600 114/55 (!) 95 9 °F (35 5 °C) Probe 70 18 99 % 5' 4" (1 626 m) 86 5 kg (190 lb 11 2 oz)   21 1555 -- -- -- -- -- 98 % -- --   21 1500 112/67 (!) 94 6 °F (34 8 °C) -- 56 16 100 % -- --   21 1430 -- (!) 94 6 °F (34 8 °C) Probe 56 -- 100 % -- --   21 1415 151/72 -- -- 60 18 100 % -- --   21 1408 116/64 -- -- 56 -- 100 % -- --   21 1400 170/87 -- -- (!) 54 18 100 % -- --   21 1356 -- -- -- -- -- 100 % -- --   21 1345 155/88 -- -- 56 18 100 % -- --   21 1330 145/76 -- -- 64 18 100 % -- --   21 1315 121/71 -- -- (!) 52 18 100 % -- --   21 1311 140/63 -- -- (!) 54 -- 90 % -- --   21 1304 153/70 -- -- 58 -- -- -- --   21 1256 150/90 (!) 93 6 °F (34 2 °C) Tympanic 71 -- 100 % -- 101 kg (222 lb 7 1 oz)    Body mass index is 32 73 kg/m²  Temperature:   Tmax: Temp (24hrs), Av 2 °F (35 7 °C), Min:93 6 °F (34 2 °C), Max:98 3 °F (36 8 °C)    Current Temperature: 98 3 °F (36 8 °C)      Respiratory:  SpO2: SpO2: 99 %  Nasal Cannula O2 Flow Rate (L/min): 4 L/min    Invasive/non-invasive ventilation settings   Respiratory    Lab Data (Last 4 hours)    None         O2/Vent Data (Last 4 hours)    None              Lab Results   Component Value Date    PHART 7 356 2021    BMF2HJR 39 3 2021    PO2ART 79 9 2021    PXR3AHW 21 5 (L) 2021    BEART -3 6 2021    SOURCE Radial, Right 2021       Physical Exam  Constitutional:       General: She is not in acute distress  Appearance: Normal appearance  She is obese  She is not ill-appearing or diaphoretic  HENT:      Head: Normocephalic and atraumatic  Nose: Nose normal  No congestion  Mouth/Throat:      Mouth: Mucous membranes are moist       Pharynx: Oropharynx is clear  Eyes:      General: No scleral icterus  Extraocular Movements: Extraocular movements intact  Conjunctiva/sclera: Conjunctivae normal       Pupils: Pupils are equal, round, and reactive to light  Cardiovascular:      Rate and Rhythm: Normal rate and regular rhythm  Pulses: Normal pulses  Heart sounds: Normal heart sounds  No murmur heard  Pulmonary:      Effort: Pulmonary effort is normal  No respiratory distress  Breath sounds: Normal breath sounds  No wheezing  Comments: Saturating appropriately on 2 L N/C  No increased work of breathing or accessory muscle use  Abdominal:      General: Abdomen is flat  Bowel sounds are normal  There is no distension  Palpations: Abdomen is soft  Tenderness: There is no abdominal tenderness  Musculoskeletal:      Cervical back: Normal range of motion  No rigidity  Right lower leg: No edema  Left lower leg: No edema  Skin:     General: Skin is warm and dry  Capillary Refill: Capillary refill takes less than 2 seconds  Coloration: Skin is not pale  Findings: No bruising  Neurological:      Mental Status: She is alert and oriented to person, place, and time  Motor: Weakness (4/5 muscle strength in right upper and lower extremities) present        Comments: Mild slurred speech noted   Psychiatric:         Mood and Affect: Mood normal          Behavior: Behavior normal          Laboratory and Diagnostics:  Results from last 7 days   Lab Units 06/25/21  0521 06/24/21  1624 06/24/21  1315 06/24/21  1313   WBC Thousand/uL 6 29  --   --  7 70   HEMOGLOBIN g/dL 11 1*  --   --  12 4   I STAT HEMOGLOBIN g/dl  --   --  12 6  --    HEMATOCRIT % 34 0*  --   --  38 4   HEMATOCRIT, ISTAT %  --   --  37  --    PLATELETS Thousands/uL 179 187  --  173   NEUTROS PCT % 79*  --   --  85*   MONOS PCT % 7  --   --  5     Results from last 7 days   Lab Units 06/24/21  1412 06/24/21  1315   SODIUM mmol/L 137  --    POTASSIUM mmol/L 4 2  --    CHLORIDE mmol/L 105  --    CO2 mmol/L 25  --    CO2, I-STAT mmol/L  --  26   ANION GAP mmol/L 7  --    BUN mg/dL 32*  --    CREATININE mg/dL 1 66*  --    CALCIUM mg/dL 7 4*  --    GLUCOSE RANDOM mg/dL 103  --    ALT U/L 20  --    AST U/L 95*  --    ALK PHOS U/L 103  --    ALBUMIN g/dL 3 1*  --    TOTAL BILIRUBIN mg/dL 0 74  --           Results from last 7 days   Lab Units 06/24/21  1313   INR  1 02   PTT seconds 28      Results from last 7 days   Lab Units 06/24/21  1624 06/24/21  1313   TROPONIN I ng/mL <0 02 <0 02     Results from last 7 days   Lab Units 06/24/21  1313   LACTIC ACID mmol/L 0 8     ABG:  Results from last 7 days   Lab Units 06/24/21  1605   PH ART  7 356   PCO2 ART mm Hg 39 3   PO2 ART mm Hg 79 9   HCO3 ART mmol/L 21 5*   BASE EXC ART mmol/L -3 6   ABG SOURCE  Radial, Right     VBG:  Results from last 7 days   Lab Units 06/24/21  1605   ABG SOURCE  Radial, Right         No results found for: [facility]     Micro Urine cultures pending  EKG: Telemetry demonstrating NSR with HR 80s  Imaging:I have personally reviewed pertinent reports  Intake and Output  I/O       06/23 0701 - 06/24 0700 06/24 0701 - 06/25 0700    I V  (mL/kg)  2538 3 (29 3)    NG/GT  30    Total Intake(mL/kg)  2568 3 (29 7)    Urine (mL/kg/hr)  7475    Emesis/NG output  200    Total Output  7675    Net  -5106 7              UOP: 204 4 ml/hr     Height and Weights   Height: 5' 4" (162 6 cm)  IBW (Ideal Body Weight): 54 7 kg  Body mass index is 32 73 kg/m²    Weight (last 2 days)     Date/Time   Weight    06/24/21 1600   86 5 (190 7)    06/24/21 12:56:02   101 (222 44)                Nutrition       Diet Orders   (From admission, onward)             Start     Ordered    06/24/21 1352  Diet NPO  Diet effective now     Question Answer Comment   Diet Type NPO    RD to adjust diet per protocol? No        06/24/21 1353                  Active Medications  Scheduled Meds:  Current Facility-Administered Medications   Medication Dose Route Frequency Provider Last Rate    heparin (porcine)  5,000 Units Subcutaneous Q8H [address] Aury Shupp, DO      multi-electrolyte  100 mL/hr Intravenous Continuous Aury Shupp,  mL/hr (06/25/21 0437)     Continuous Infusions:  multi-electrolyte, 100 mL/hr, Last Rate: 100 mL/hr (06/25/21 0437)      PRN Meds:        Allergies   No Known Allergies  ---------------------------------------------------------------------------------------  Advance Directive and Living Will:      Power of :    POLST:    ---------------------------------------------------------------------------------------  Care Time Delivered:   No Critical Care time spent     [employer], DO      Portions of the record may have been created with voice recognition software  Occasional wrong word or "sound a like" substitutions may have occurred due to the inherent limitations of voice recognition software    Read the chart carefully and recognize, using context, where substitutions have occurred

## 2021-06-25 NOTE — ASSESSMENT & PLAN NOTE
Patient presented initially as a Level A trauma via helicopter and intubated after being found on the ground by family members for unknown period of time  Patient is alert, hypothermic, and bradycardic and decorticate positioning  Patient intubated for airway protection and was requiring propofol gtt on arrival secondary to agitation  Patient unresponsive [place]   Trauma workup including both labs and imaging unremarkable  Patient evaluated by Neurology who believes no additional neurological workup is necessary patient's symptoms likely secondary to overdose vs polypharmacy    Patient's home medications on hold with the exception of Xanax and Norco until evaluated by Psychiatry and geriatric to his previous    Plan:  · Follow-up gabapentin level and UDS  · Home medications on hold except for:   · Xanax 1mg QID  · Norco 0 5 mg q6 hours PRN  · Given concern for polypharmacy:  · Psychiatry consultation placed  · Neuropsych consultation placed

## 2021-06-25 NOTE — ASSESSMENT & PLAN NOTE
· Patient found down, unknown amount of time  · Level A trauma, arrival by air  · EMS found patient to be hypothermic, bradycardic, and with decorticate posturing  · Patient intubated by EMS prior to arrival to the hospital  · Patient following commands and moving all extremities in the trauma bay  · Mental status rapidly improved and she was extubated 6/24  · Work-up thus far has been negative  · Follow-up gabapentin level and UDS  · Of note, pt recently admitted for AMS and treated fir UTI, though UA was clean on admission  · Hold home Bipolar medications

## 2021-06-25 NOTE — ASSESSMENT & PLAN NOTE
Patient presenting with a creatinine of 1 66 likely secondary to rhabdomyolysis in the setting of elevated CK  Patient resuscitated with intravenous fluids with improvement in creatinine to baseline of 0 97      Plan:  · Continue to trend BUN/creatinine  · Monitoring intake/output  · Encourage increased PO hydration  · Avoid nephrotoxin agents

## 2021-06-25 NOTE — PLAN OF CARE
Problem: OCCUPATIONAL THERAPY ADULT  Goal: Performs self-care activities at highest level of function for planned discharge setting  See evaluation for individualized goals  Description: Treatment Interventions: Cognitive reorientation          See flowsheet documentation for full assessment, interventions and recommendations  Note: Limitation: Decreased Safe judgement during ADL, Decreased cognition, Decreased high-level ADLs  Prognosis: Fair  Assessment: Pt is a 71 y/o female seen for OT eval s/p adm to B after being found down for an unknown amount of time w/ altered mental status, bradycardic and decorticate positioning  Pt is dx'd w/ acute encephalopathy  Pt  has a past medical history of Bipolar 1 disorder ([facility] [address] )  Pt with active OT orders and up with assistance  orders  Pt lives with alone in 2 SH, 0 HECTOR, 13 steps inside, bed 2nd floor, bath 1st floor  Pt was I w/  ADLS and mother assists w/ IADLS, does not drive, & required no use of DME PTA  Pt is currently demonstrating the following occupational deficits: I UB ADLS, S LB ADLS, I w/ bed mobility/transfers and S functional mobilty w/ no DME  Pt is not currently demonstrating any significant deficits in occupational performance however is limited by impaired cognition, decreased safety/insight/judgement/decision making and an unsupportive home environment  Recommend formal ACLS assessment to determine appropriate level of S required upon D/C  Based on the aforementioned OT evaluation, functional performance deficits, and assessments, pt has been identified as a moderate complexity evaluation  Recommend home with family support pending formal cognitive assessment to determine appropriate level of supervision needed upon D/C  Pt will benefit from additional follow up OT treatment for administration of ACLS   Will follow pt for 1 follow up tx session:     OT Discharge Recommendation: No rehabilitation needs (home w/ increased S pending cog eval)  OT - OK to Discharge:  (pending cog assessment)     Ulises Justice MS, OTR/L

## 2021-06-26 PROBLEM — J96.01 ACUTE HYPOXEMIC RESPIRATORY FAILURE (HCC): Status: RESOLVED | Noted: 2021-06-25 | Resolved: 2021-06-26

## 2021-06-26 PROBLEM — D64.9 ANEMIA: Status: ACTIVE | Noted: 2021-06-26

## 2021-06-26 LAB
ANION GAP SERPL CALCULATED.3IONS-SCNC: 8 MMOL/L (ref 4–13)
BASOPHILS # BLD AUTO: 0.04 THOUSANDS/ΜL (ref 0–0.1)
BASOPHILS NFR BLD AUTO: 1 % (ref 0–1)
BUN SERPL-MCNC: 11 MG/DL (ref 5–25)
CALCIUM SERPL-MCNC: 8.5 MG/DL (ref 8.3–10.1)
CHLORIDE SERPL-SCNC: 112 MMOL/L (ref 100–108)
CO2 SERPL-SCNC: 22 MMOL/L (ref 21–32)
CREAT SERPL-MCNC: 0.67 MG/DL (ref 0.6–1.3)
EOSINOPHIL # BLD AUTO: 0.1 THOUSAND/ΜL (ref 0–0.61)
EOSINOPHIL NFR BLD AUTO: 2 % (ref 0–6)
ERYTHROCYTE [DISTWIDTH] IN BLOOD BY AUTOMATED COUNT: 13.8 % (ref 11.6–15.1)
GFR SERPL CREATININE-BSD FRML MDRD: 93 ML/MIN/1.73SQ M
GLUCOSE SERPL-MCNC: 105 MG/DL (ref 65–140)
HCT VFR BLD AUTO: 32.1 % (ref 34.8–46.1)
HGB BLD-MCNC: 10.6 G/DL (ref 11.5–15.4)
IMM GRANULOCYTES # BLD AUTO: 0.02 THOUSAND/UL (ref 0–0.2)
IMM GRANULOCYTES NFR BLD AUTO: 0 % (ref 0–2)
LYMPHOCYTES # BLD AUTO: 0.83 THOUSANDS/ΜL (ref 0.6–4.47)
LYMPHOCYTES NFR BLD AUTO: 17 % (ref 14–44)
MCH RBC QN AUTO: 35.1 PG (ref 26.8–34.3)
MCHC RBC AUTO-ENTMCNC: 33 G/DL (ref 31.4–37.4)
MCV RBC AUTO: 106 FL (ref 82–98)
MONOCYTES # BLD AUTO: 0.44 THOUSAND/ΜL (ref 0.17–1.22)
MONOCYTES NFR BLD AUTO: 9 % (ref 4–12)
NEUTROPHILS # BLD AUTO: 3.42 THOUSANDS/ΜL (ref 1.85–7.62)
NEUTS SEG NFR BLD AUTO: 71 % (ref 43–75)
NRBC BLD AUTO-RTO: 0 /100 WBCS
PLATELET # BLD AUTO: 179 THOUSANDS/UL (ref 149–390)
PMV BLD AUTO: 10.1 FL (ref 8.9–12.7)
POTASSIUM SERPL-SCNC: 3.4 MMOL/L (ref 3.5–5.3)
RBC # BLD AUTO: 3.02 MILLION/UL (ref 3.81–5.12)
SODIUM SERPL-SCNC: 142 MMOL/L (ref 136–145)
WBC # BLD AUTO: 4.85 THOUSAND/UL (ref 4.31–10.16)

## 2021-06-26 PROCEDURE — 85025 COMPLETE CBC W/AUTO DIFF WBC: CPT | Performed by: STUDENT IN AN ORGANIZED HEALTH CARE EDUCATION/TRAINING PROGRAM

## 2021-06-26 PROCEDURE — 80048 BASIC METABOLIC PNL TOTAL CA: CPT | Performed by: STUDENT IN AN ORGANIZED HEALTH CARE EDUCATION/TRAINING PROGRAM

## 2021-06-26 PROCEDURE — 99232 SBSQ HOSP IP/OBS MODERATE 35: CPT | Performed by: PHYSICIAN ASSISTANT

## 2021-06-26 RX ORDER — POTASSIUM CHLORIDE 20 MEQ/1
40 TABLET, EXTENDED RELEASE ORAL ONCE
Status: COMPLETED | OUTPATIENT
Start: 2021-06-26 | End: 2021-06-26

## 2021-06-26 RX ORDER — QUETIAPINE FUMARATE 100 MG/1
200 TABLET, FILM COATED ORAL
Status: DISCONTINUED | OUTPATIENT
Start: 2021-06-26 | End: 2021-06-28 | Stop reason: HOSPADM

## 2021-06-26 RX ORDER — SODIUM CHLORIDE 9 MG/ML
100 INJECTION, SOLUTION INTRAVENOUS CONTINUOUS
Status: DISCONTINUED | OUTPATIENT
Start: 2021-06-26 | End: 2021-06-28 | Stop reason: HOSPADM

## 2021-06-26 RX ORDER — PANTOPRAZOLE SODIUM 40 MG/1
40 TABLET, DELAYED RELEASE ORAL
Status: DISCONTINUED | OUTPATIENT
Start: 2021-06-26 | End: 2021-06-28 | Stop reason: HOSPADM

## 2021-06-26 RX ADMIN — HEPARIN SODIUM 5000 UNITS: 5000 INJECTION INTRAVENOUS; SUBCUTANEOUS at 13:16

## 2021-06-26 RX ADMIN — POTASSIUM CHLORIDE 40 MEQ: 1500 TABLET, EXTENDED RELEASE ORAL at 11:06

## 2021-06-26 RX ADMIN — PANTOPRAZOLE SODIUM 40 MG: 40 TABLET, DELAYED RELEASE ORAL at 11:07

## 2021-06-26 RX ADMIN — SODIUM CHLORIDE 75 ML/HR: 0.9 INJECTION, SOLUTION INTRAVENOUS at 11:07

## 2021-06-26 RX ADMIN — ALPRAZOLAM 1 MG: 0.5 TABLET ORAL at 08:15

## 2021-06-26 RX ADMIN — QUETIAPINE FUMARATE 200 MG: 100 TABLET ORAL at 21:41

## 2021-06-26 RX ADMIN — ALPRAZOLAM 1 MG: 0.5 TABLET ORAL at 21:41

## 2021-06-26 RX ADMIN — SODIUM CHLORIDE 75 ML/HR: 0.9 INJECTION, SOLUTION INTRAVENOUS at 21:42

## 2021-06-26 RX ADMIN — HEPARIN SODIUM 5000 UNITS: 5000 INJECTION INTRAVENOUS; SUBCUTANEOUS at 06:00

## 2021-06-26 RX ADMIN — ALPRAZOLAM 1 MG: 0.5 TABLET ORAL at 11:06

## 2021-06-26 RX ADMIN — HEPARIN SODIUM 5000 UNITS: 5000 INJECTION INTRAVENOUS; SUBCUTANEOUS at 21:41

## 2021-06-26 RX ADMIN — ALPRAZOLAM 1 MG: 0.5 TABLET ORAL at 16:43

## 2021-06-26 NOTE — CONSULTS
Consultation - Neuropsychology/Psychology Department  Samaria Torres 72 y o  female MRN: [id number]  Unit/Bed#: [facility] 702-01 Encounter: [id number]        Reason for Consultation:  Samaria Torres is a 72y o  year old female who was referred for a Neuropsychological exam to assess cognitive functioning and comment on capacity to make informed medical decisions  History of Present Illness  Found down by family members    Physician Requesting Consult: López Jackson MD    PROBLEM LIST:  Patient Active Problem List   Diagnosis    Acute encephalopathy    Bipolar disease, chronic ([facility] )    Acute hypoxemic respiratory failure ([facility] )    Rhabdomyolysis    LUCAS (acute kidney injury) ([facility] )    Neck pain    Poor venous access    Hypertension    History of CVA (cerebrovascular accident)         Historical Information   Past Medical History:   Diagnosis Date    Bipolar 1 disorder ([place] [address] )      Past Surgical History:   Procedure Laterality Date    NEPHRECTOMY Right      Social History   Social History     Substance and Sexual Activity   Alcohol Use Not Currently     Social History     Substance and Sexual Activity   Drug Use Not Currently     Social History     Tobacco Use   Smoking Status Former Smoker    Types: Cigarettes   Smokeless Tobacco Never Used     Family History:   Family History   Problem Relation Age of Onset    Colon cancer Other     Alcohol abuse Other     Hypertension Other        Meds/Allergies   current meds:   Current Facility-Administered Medications   Medication Dose Route Frequency    ALPRAZolam (XANAX) tablet 1 mg  1 mg Oral 4x Daily    heparin (porcine) subcutaneous injection 5,000 Units  5,000 Units Subcutaneous Q8H [address]    HYDROcodone-acetaminophen (NORCO) 5-325 mg per tablet 0 5 tablet  0 5 tablet Oral Q6H PRN       No Known Allergies      Family and Social Support:   Freedom of Choice: Yes  CM Handoff Comments: 6/25: DC TBD  acute encephalopathy, fall, intubated, altered mental status  transport tbd      Behavioral Observations: Alert, oriented x 3, cooperative; pleasant affect; admitted to anxiety, depression and history of Bipolar Disorder; appeared aware of medical history; no overt evidence of psychotic process  Cognitive Examination    General Cognitive Functioning MMSE = Average 27/28; General Fund of Information = Average    Attention/Concentration Auditory Selective Attention = Average; Auditory Vigilance = Average; Information Processing Speed = Average    Frontal Systems/Executive Functioning Mental Flexibility/Cognitive Control = Average; Working Memory = Low Average Abstract Reasoning = Low Average/Borderline;  Generative Ability = Low Average, Commonsense Reasoning and Judgement = Low Average    Language Functioning Confrontation naming = Average, Phonemic Fluency = Low Average; Semantic Retrieval = Impaired; Comprehension of Complex Ideational Material = Average;  Praxis = Within Normal Limits; Repetition = Within Normal Limits; Basic Reading = Within Normal Limits; Following Commands = Within Normal Limits    Memory Functioning Narrative Recall - Short Delay = Borderline; Long Delay Narrative Recall = Low Average/Borderline;  Visual Recognition = Average    Visuo-Spatial Abilities Not Assessed    Functional Knowledge  Health & Safety Knowledge = Average;     Summary/Impression:  Results of Neuropsychological revealed cognitive deficits in semantic retrieval with all other areas as WNL's  On a measure assessing awareness of personal health status and ability to evaluate health problems, handle medical emergencies and take safety precautions, patient performed WNL's  At this time, patient appears to have capacity to make informed medical decisions

## 2021-06-26 NOTE — ASSESSMENT & PLAN NOTE
· Suspect hemodilution in the setting of aggressive IVF on admission   · No sign of active bleeding   · Monitor

## 2021-06-26 NOTE — ASSESSMENT & PLAN NOTE
· Patient found down at home for unknown period of time, initial CK 3514  · Continue with IV fluids  · Repeat CK level with morning labs

## 2021-06-26 NOTE — ASSESSMENT & PLAN NOTE
· POA with creatinine of 1 66 likely multifactorial in patient with history of renal cell carcinoma status post nephrectomy  · UA without evidence of infection, follow-up urine culture  · Resolved with IV fluids, continue for now  · Trend CK and BMP

## 2021-06-26 NOTE — ASSESSMENT & PLAN NOTE
· Intubated upon EMS arrival for airway protection, extubated on 6/25  · CXR 6/25: Right greater than left basilar subsegmental atelectasis with otherwise clear lungs     · Resolved

## 2021-06-26 NOTE — ASSESSMENT & PLAN NOTE
· Patient and family with distant  Intubated in field for airway protection  Presented level a trauma found to be hypothermic, bradycardic and posturing upon presentation  Initially admitted to ICU and transferred to med surg on 06/25  · CT head with no acute intracranial abnormality  CT C-spine no fracture or traumatic malalignment  CT chest/abdomen demonstrating subsegmental atelectasis and bibasilar bases no distended bladder  Labs also largely unremarkable with negative troponin, lactic acid, normal electrolytes, ammonia, and TSH    · Etiology unclear, possibly secondary to polypharmacy  · Urine toxicology screen pending, gabapentin level pending  · A m  cortisol elevated at 27 3 - discussed with Endocrine, no need for further workup   · Appreciate psychiatry consult and recommendations  · Discontinued Zyprexa and risperidone  · Continue Rexulti, Seroquel, Xanax  · Neurology following, appreciate recommendations  · No need for further neurologic workup   · Appreciate geriatrics consult and recommendations  · Patient evaluated by neuropsychiatry and deemed competent to make medical decisions  · PT/OT evaluations appreciated, recommend d/c home with no rehab needs

## 2021-06-26 NOTE — ASSESSMENT & PLAN NOTE
· Patient found down at home for unknown period of time, initial CK 3514  · Repeat CK level today improved to 1373  · Per RN, IV discontinued overnight as patient pulled out IV  · Resume IVF today and repeat CK with morning labs

## 2021-06-26 NOTE — ASSESSMENT & PLAN NOTE
· Residual right-sided deficits  · Neurology following  · Continue aspirin, statin on hold setting of rhabdo  · Follow-up MRI brain if 1 ordered

## 2021-06-26 NOTE — ASSESSMENT & PLAN NOTE
· Residual right-sided deficits  · Neurology following  · Continue aspirin, statin on hold setting of rhabdo

## 2021-06-26 NOTE — PROGRESS NOTES
[facility] [facility]  Progress Note - Shawnee Roberto 1955, 72 y o  female MRN: [id number]  [facility] Encounter: [id number]  Primary Care Provider: YASMANI Navarro   Date and time admitted to hospital: 6/24/2021 12:51 PM    * Acute encephalopathy  Assessment & Plan  · Patient and family with distant  Intubated in field for airway protection  Presented level a trauma found to be hypothermic, bradycardic and posturing upon presentation  Initially admitted to ICU and transferred to med surg on 06/25  · CT head with no acute intracranial abnormality  CT C-spine no fracture or traumatic malalignment  CT chest/abdomen demonstrating subsegmental atelectasis and bibasilar bases no distended bladder  Labs also largely unremarkable with negative troponin, lactic acid, normal electrolytes, ammonia, and TSH  · Etiology unclear, possibly secondary to polypharmacy  · Urine toxicology screen pending, gabapentin level pending  · A m  cortisol elevated at 27 3  · Appreciate psychiatry consult and recommendations  · Discontinued Zyprexa and risperidone  · Continue Rexulti, Seroquel, Xanax  · Neurology following, appreciate recommendations  · Considering MRI brain and video EEG for further evaluation  · Appreciate geriatrics consult and recommendations  · Patient evaluated by neuropsychiatry and deemed competent to make medical decisions  · PT/OT evaluations appreciated, recommend d/c home with no rehab needs     Rhabdomyolysis  Assessment & Plan  · Patient found down at home for unknown period of time, initial CK 3514  · Continue with IV fluids  · Repeat CK level with morning labs    Acute hypoxemic respiratory failure (HCC)-resolved as of 6/26/2021  Assessment & Plan  · Intubated upon EMS arrival for airway protection, extubated on 6/25  · CXR 6/25: Right greater than left basilar subsegmental atelectasis with otherwise clear lungs     · Resolved    Anemia  Assessment & Plan  · Suspect hemodilution in the setting of aggressive IVF on admission   · No sign of active bleeding   · Monitor     LUCAS (acute kidney injury) ([facility] [address] )  Assessment & Plan  · POA with creatinine of 1 66 likely multifactorial in patient with history of renal cell carcinoma status post nephrectomy  · UA without evidence of infection, follow-up urine culture  · Resolved with IV fluids, continue for now  · Trend CK and BMP    History of CVA (cerebrovascular accident)  Assessment & Plan  · Residual right-sided deficits  · Neurology following  · Continue aspirin, statin on hold setting of rhabdo  · Follow-up MRI brain if 1 ordered    Bipolar disease, chronic ([facility] [address] )  Assessment & Plan  · Appreciate psychiatry consult and recommendations  · Medication changes as noted above    VTE Pharmacologic Prophylaxis: VTE Score: 4 Moderate Risk (Score 3-4) - Pharmacological DVT Prophylaxis Ordered: heparin  Patient Centered Rounds: I performed bedside rounds with nursing staff today  Discussions with Specialists or Other Care Team Provider: primary RN    Education and Discussions with Family / Patient: Patient declined call to   Patient states she has already called and updated her mother  Time Spent for Care: 15 minutes  More than 50% of total time spent on counseling and coordination of care as described above  Current Length of Stay: 2 day(s)  Current Patient Status: Inpatient   Certification Statement: The patient will continue to require additional inpatient hospital stay due to ongoing workup and monitoring  Discharge Plan: Anticipate discharge in 24-48 hrs to home  Code Status: Level 1 - Full Code    Subjective:   Patient complaining of GERD due to not having Protonix this morning  Otherwise offers no complaints  States she will be ready to go home tomorrow       Objective:     Vitals:   Temp (24hrs), Av 6 °F (37 °C), Min:98 2 °F (36 8 °C), Max:98 8 °F (37 1 °C)    Temp:  [98 2 °F (36 8 °C)-98 8 °F (37 1 °C)] 98 2 °F (36 8 °C)  HR:  [] 112  Resp:  [18-49] 18  BP: (102-162)/(62-87) 157/87  SpO2:  [91 %-100 %] 92 %  Body mass index is 32 73 kg/m²  Input and Output Summary (last 24 hours): Intake/Output Summary (Last 24 hours) at 6/26/2021 1042  Last data filed at 6/26/2021 0100  Gross per 24 hour   Intake 980 ml   Output 250 ml   Net 730 ml       Physical Exam:   Physical Exam  Vitals and nursing note reviewed  Constitutional:       General: She is not in acute distress  Cardiovascular:      Rate and Rhythm: Normal rate and regular rhythm  Pulmonary:      Effort: Pulmonary effort is normal  No respiratory distress  Breath sounds: No wheezing or rales  Abdominal:      General: Abdomen is flat  There is no distension  Palpations: Abdomen is soft  Tenderness: There is no abdominal tenderness  Neurological:      General: No focal deficit present  Mental Status: She is alert  Mental status is at baseline            Additional Data:     Labs:  Results from last 7 days   Lab Units 06/26/21  0518   WBC Thousand/uL 4 85   HEMOGLOBIN g/dL 10 6*   HEMATOCRIT % 32 1*   PLATELETS Thousands/uL 179   NEUTROS PCT % 71   LYMPHS PCT % 17   MONOS PCT % 9   EOS PCT % 2     Results from last 7 days   Lab Units 06/26/21  0518 06/24/21  1412   SODIUM mmol/L 142 137   POTASSIUM mmol/L 3 4* 4 2   CHLORIDE mmol/L 112* 105   CO2 mmol/L 22 25   BUN mg/dL 11 32*   CREATININE mg/dL 0 67 1 66*   ANION GAP mmol/L 8 7   CALCIUM mg/dL 8 5 7 4*   ALBUMIN g/dL  --  3 1*   TOTAL BILIRUBIN mg/dL  --  0 74   ALK PHOS U/L  --  103   ALT U/L  --  20   AST U/L  --  95*   GLUCOSE RANDOM mg/dL 105 103     Results from last 7 days   Lab Units 06/24/21  1313   INR  1 02     Results from last 7 days   Lab Units 06/24/21  1453   POC GLUCOSE mg/dl 93         Results from last 7 days   Lab Units 06/24/21  1313   LACTIC ACID mmol/L 0 8       Lines/Drains:  Invasive Devices     Peripheral Intravenous Line Peripheral IV 06/24/21 Right Antecubital 1 day                      Imaging: No pertinent imaging reviewed  Recent Cultures (last 7 days):   Results from last 7 days   Lab Units 06/24/21  1612   URINE CULTURE  No Growth <1000 cfu/mL       Last 24 Hours Medication List:   Current Facility-Administered Medications   Medication Dose Route Frequency Provider Last Rate    ALPRAZolam  1 mg Oral 4x Daily Aury Shupp, DO      heparin (porcine)  5,000 Units Subcutaneous Q8H [facility] [place] HOME Aury Shupp, DO      HYDROcodone-acetaminophen  0 5 tablet Oral Q6H PRN Aury Dislap, DO      pantoprazole  40 mg Oral Early Morning Rajni E Held, PA-C      potassium chloride  40 mEq Oral Once Rajni E Held, PA-C      QUEtiapine  200 mg Oral HS Rajni E Held, PA-C      sodium chloride  75 mL/hr Intravenous Continuous Rajni E Held, PA-C          Today, Patient Was Seen By: Israel Park PA-C    **Please Note: This note may have been constructed using a voice recognition system  **

## 2021-06-26 NOTE — ASSESSMENT & PLAN NOTE
· Intubated upon EMS arrival for airway protection, extubated on 6/25  · CXR 6/25: Right greater than left basilar subsegmental atelectasis with otherwise clear lungs     · Currently  ·

## 2021-06-26 NOTE — PLAN OF CARE
Problem: MOBILITY - ADULT  Goal: Maintain or return to baseline ADL function  Description: INTERVENTIONS:  -  Assess patient's ability to carry out ADLs; assess patient's baseline for ADL function and identify physical deficits which impact ability to perform ADLs (bathing, care of mouth/teeth, toileting, grooming, dressing, etc )  - Assess/evaluate cause of self-care deficits   - Assess range of motion  - Assess patient's mobility; develop plan if impaired  - Assess patient's need for assistive devices and provide as appropriate  - Encourage maximum independence but intervene and supervise when necessary  - Involve family in performance of ADLs  - Assess for home care needs following discharge   - Consider OT consult to assist with ADL evaluation and planning for discharge  - Provide patient education as appropriate  Outcome: Progressing  Goal: Maintains/Returns to pre admission functional level  Description: INTERVENTIONS:  - Perform BMAT or MOVE assessment daily    - Set and communicate daily mobility goal to care team and patient/family/caregiver     - Collaborate with rehabilitation services on mobility goals if consulted  - Out of bed for toileting  - Record patient progress and toleration of activity level   Outcome: Progressing     Problem: Potential for Falls  Goal: Patient will remain free of falls  Description: INTERVENTIONS:  - Educate patient/family on patient safety including physical limitations  - Instruct patient to call for assistance with activity   - Consult OT/PT to assist with strengthening/mobility   - Keep Call bell within reach  - Keep bed low and locked with side rails adjusted as appropriate  - Keep care items and personal belongings within reach  - Initiate and maintain comfort rounds  - Make Fall Risk Sign visible to staff  - Offer Toileting every 2 Hours, in advance of need  - Initiate/Maintain be dchairalarm  - Apply yellow socks and bracelet for high fall risk patients  - Consider moving patient to room near nurses station  Outcome: Progressing     Problem: Prexisting or High Potential for Compromised Skin Integrity  Goal: Skin integrity is maintained or improved  Description: INTERVENTIONS:  - Identify patients at risk for skin breakdown  - Assess and monitor skin integrity  - Assess and monitor nutrition and hydration status  - Monitor labs   - Assess for incontinence   - Turn and reposition patient  - Assist with mobility/ambulation  - Relieve pressure over bony prominences  - Avoid friction and shearing  - Provide appropriate hygiene as needed including keeping skin clean and dry  - Evaluate need for skin moisturizer/barrier cream  - Collaborate with interdisciplinary team   - Patient/family teaching  - Consider wound care consult   Outcome: Progressing     Problem: NEUROSENSORY - ADULT  Goal: Achieves stable or improved neurological status  Description: INTERVENTIONS  - Monitor and report changes in neurological status  - Monitor vital signs such as temperature, blood pressure, glucose, and any other labs ordered   - Initiate measures to prevent increased intracranial pressure  - Monitor for seizure activity and implement precautions if appropriate      Outcome: Progressing  Goal: Remains free of injury related to seizures activity  Description: INTERVENTIONS  - Maintain airway, patient safety  and administer oxygen as ordered  - Monitor patient for seizure activity, document and report duration and description of seizure to physician/advanced practitioner  - If seizure occurs,  ensure patient safety during seizure  - Reorient patient post seizure  - Seizure pads on all 4 side rails  - Instruct patient/family to notify RN of any seizure activity including if an aura is experienced  - Instruct patient/family to call for assistance with activity based on nursing assessment  - Administer anti-seizure medications if ordered    Outcome: Progressing  Goal: Achieves maximal functionality and self care  Description: INTERVENTIONS  - Monitor swallowing and airway patency with patient fatigue and changes in neurological status  - Encourage and assist patient to increase activity and self care  - Encourage visually impaired, hearing impaired and aphasic patients to use assistive/communication devices  Outcome: Progressing     Problem: RESPIRATORY - ADULT  Goal: Achieves optimal ventilation and oxygenation  Description: INTERVENTIONS:  - Assess for changes in respiratory status  - Assess for changes in mentation and behavior  - Position to facilitate oxygenation and minimize respiratory effort  - Oxygen administered by appropriate delivery if ordered  - Initiate smoking cessation education as indicated  - Encourage broncho-pulmonary hygiene including cough, deep breathe, Incentive Spirometry  - Assess the need for suctioning and aspirate as needed  - Assess and instruct to report SOB or any respiratory difficulty  - Respiratory Therapy support as indicated  Outcome: Progressing     Problem: Nutrition/Hydration-ADULT  Goal: Nutrient/Hydration intake appropriate for improving, restoring or maintaining nutritional needs  Description: Monitor and assess patient's nutrition/hydration status for malnutrition  Collaborate with interdisciplinary team and initiate plan and interventions as ordered  Monitor patient's weight and dietary intake as ordered or per policy  Utilize nutrition screening tool and intervene as necessary  Determine patient's food preferences and provide high-protein, high-caloric foods as appropriate       INTERVENTIONS:  - Monitor oral intake, urinary output, labs, and treatment plans  - Assess nutrition and hydration status and recommend course of action  - Evaluate amount of meals eaten  - Assist patient with eating if necessary   - Allow adequate time for meals  - Recommend/ encourage appropriate diets, oral nutritional supplements, and vitamin/mineral supplements  - Order, calculate, and assess calorie counts as needed  - Recommend, monitor, and adjust tube feedings and TPN/PPN based on assessed needs  - Assess need for intravenous fluids  - Provide specific nutrition/hydration education as appropriate  - Include patient/family/caregiver in decisions related to nutrition  Outcome: Progressing

## 2021-06-26 NOTE — ASSESSMENT & PLAN NOTE
· Patient and family with distant  Intubated in field for airway protection  Presented level a trauma found to be hypothermic, bradycardic and posturing upon presentation  Initially admitted to ICU and transferred to med surg on 06/25  · CT head with no acute intracranial abnormality  CT C-spine no fracture or traumatic malalignment  CT chest/abdomen demonstrating subsegmental atelectasis and bibasilar bases no distended bladder  Labs also largely unremarkable with negative troponin, lactic acid, normal electrolytes, ammonia, and TSH    · Etiology unclear, possibly secondary to polypharmacy  · Urine toxicology screen pending, gabapentin level pending  · A m  cortisol elevated at 27 3  · Appreciate psychiatry consult and recommendations  · Discontinued Zyprexa and risperidone  · Continue Rexulti, Seroquel, Xanax  · Neurology following, appreciate recommendations  · Considering MRI brain and video EEG for further evaluation  · Appreciate geriatrics consult and recommendations  · Patient evaluated by neuropsychiatry and deemed competent to make medical decisions  · PT/OT evaluations appreciated, recommend d/c home with no rehab needs

## 2021-06-27 PROBLEM — N17.9 AKI (ACUTE KIDNEY INJURY) (HCC): Status: RESOLVED | Noted: 2021-06-25 | Resolved: 2021-06-27

## 2021-06-27 LAB
ANION GAP SERPL CALCULATED.3IONS-SCNC: 5 MMOL/L (ref 4–13)
BASOPHILS # BLD AUTO: 0.06 THOUSANDS/ΜL (ref 0–0.1)
BASOPHILS NFR BLD AUTO: 1 % (ref 0–1)
BUN SERPL-MCNC: 9 MG/DL (ref 5–25)
CALCIUM SERPL-MCNC: 8.6 MG/DL (ref 8.3–10.1)
CHLORIDE SERPL-SCNC: 114 MMOL/L (ref 100–108)
CK MB SERPL-MCNC: 2.3 NG/ML (ref 0–5)
CK MB SERPL-MCNC: <1 % (ref 0–2.5)
CK SERPL-CCNC: 1373 U/L (ref 26–192)
CO2 SERPL-SCNC: 22 MMOL/L (ref 21–32)
CREAT SERPL-MCNC: 0.62 MG/DL (ref 0.6–1.3)
EOSINOPHIL # BLD AUTO: 0.18 THOUSAND/ΜL (ref 0–0.61)
EOSINOPHIL NFR BLD AUTO: 4 % (ref 0–6)
ERYTHROCYTE [DISTWIDTH] IN BLOOD BY AUTOMATED COUNT: 13.7 % (ref 11.6–15.1)
GFR SERPL CREATININE-BSD FRML MDRD: 95 ML/MIN/1.73SQ M
GLUCOSE SERPL-MCNC: 102 MG/DL (ref 65–140)
HCT VFR BLD AUTO: 31.8 % (ref 34.8–46.1)
HGB BLD-MCNC: 10.3 G/DL (ref 11.5–15.4)
IMM GRANULOCYTES # BLD AUTO: 0.04 THOUSAND/UL (ref 0–0.2)
IMM GRANULOCYTES NFR BLD AUTO: 1 % (ref 0–2)
LYMPHOCYTES # BLD AUTO: 0.98 THOUSANDS/ΜL (ref 0.6–4.47)
LYMPHOCYTES NFR BLD AUTO: 20 % (ref 14–44)
MCH RBC QN AUTO: 34.8 PG (ref 26.8–34.3)
MCHC RBC AUTO-ENTMCNC: 32.4 G/DL (ref 31.4–37.4)
MCV RBC AUTO: 107 FL (ref 82–98)
MONOCYTES # BLD AUTO: 0.46 THOUSAND/ΜL (ref 0.17–1.22)
MONOCYTES NFR BLD AUTO: 9 % (ref 4–12)
NEUTROPHILS # BLD AUTO: 3.19 THOUSANDS/ΜL (ref 1.85–7.62)
NEUTS SEG NFR BLD AUTO: 65 % (ref 43–75)
NRBC BLD AUTO-RTO: 0 /100 WBCS
PLATELET # BLD AUTO: 173 THOUSANDS/UL (ref 149–390)
PMV BLD AUTO: 9.5 FL (ref 8.9–12.7)
POTASSIUM SERPL-SCNC: 3.8 MMOL/L (ref 3.5–5.3)
RBC # BLD AUTO: 2.96 MILLION/UL (ref 3.81–5.12)
SODIUM SERPL-SCNC: 141 MMOL/L (ref 136–145)
WBC # BLD AUTO: 4.91 THOUSAND/UL (ref 4.31–10.16)

## 2021-06-27 PROCEDURE — 82553 CREATINE MB FRACTION: CPT | Performed by: PHYSICIAN ASSISTANT

## 2021-06-27 PROCEDURE — 82550 ASSAY OF CK (CPK): CPT | Performed by: PHYSICIAN ASSISTANT

## 2021-06-27 PROCEDURE — 80048 BASIC METABOLIC PNL TOTAL CA: CPT | Performed by: PHYSICIAN ASSISTANT

## 2021-06-27 PROCEDURE — 85025 COMPLETE CBC W/AUTO DIFF WBC: CPT | Performed by: PHYSICIAN ASSISTANT

## 2021-06-27 PROCEDURE — 99232 SBSQ HOSP IP/OBS MODERATE 35: CPT | Performed by: PHYSICIAN ASSISTANT

## 2021-06-27 RX ORDER — ONDANSETRON 2 MG/ML
4 INJECTION INTRAMUSCULAR; INTRAVENOUS ONCE
Status: COMPLETED | OUTPATIENT
Start: 2021-06-27 | End: 2021-06-27

## 2021-06-27 RX ADMIN — ALPRAZOLAM 1 MG: 0.5 TABLET ORAL at 08:44

## 2021-06-27 RX ADMIN — HEPARIN SODIUM 5000 UNITS: 5000 INJECTION INTRAVENOUS; SUBCUTANEOUS at 05:39

## 2021-06-27 RX ADMIN — QUETIAPINE FUMARATE 200 MG: 100 TABLET ORAL at 21:26

## 2021-06-27 RX ADMIN — PANTOPRAZOLE SODIUM 40 MG: 40 TABLET, DELAYED RELEASE ORAL at 05:39

## 2021-06-27 RX ADMIN — ALPRAZOLAM 1 MG: 0.5 TABLET ORAL at 16:52

## 2021-06-27 RX ADMIN — HEPARIN SODIUM 5000 UNITS: 5000 INJECTION INTRAVENOUS; SUBCUTANEOUS at 13:14

## 2021-06-27 RX ADMIN — ONDANSETRON 4 MG: 2 INJECTION INTRAMUSCULAR; INTRAVENOUS at 17:19

## 2021-06-27 RX ADMIN — ALPRAZOLAM 1 MG: 0.5 TABLET ORAL at 21:26

## 2021-06-27 RX ADMIN — HEPARIN SODIUM 5000 UNITS: 5000 INJECTION INTRAVENOUS; SUBCUTANEOUS at 21:26

## 2021-06-27 RX ADMIN — SODIUM CHLORIDE 100 ML/HR: 0.9 INJECTION, SOLUTION INTRAVENOUS at 12:14

## 2021-06-27 RX ADMIN — ALPRAZOLAM 1 MG: 0.5 TABLET ORAL at 11:06

## 2021-06-27 NOTE — PLAN OF CARE
Problem: MOBILITY - ADULT  Goal: Maintain or return to baseline ADL function  Description: INTERVENTIONS:  -  Assess patient's ability to carry out ADLs; assess patient's baseline for ADL function and identify physical deficits which impact ability to perform ADLs (bathing, care of mouth/teeth, toileting, grooming, dressing, etc )  - Assess/evaluate cause of self-care deficits   - Assess range of motion  - Assess patient's mobility; develop plan if impaired  - Assess patient's need for assistive devices and provide as appropriate  - Encourage maximum independence but intervene and supervise when necessary  - Involve family in performance of ADLs  - Assess for home care needs following discharge   - Consider OT consult to assist with ADL evaluation and planning for discharge  - Provide patient education as appropriate  6/27/2021 0748 by Stephen Callaway RN  Outcome: Progressing  6/27/2021 0748 by Stephen Callaway RN  Outcome: Progressing  Goal: Maintains/Returns to pre admission functional level  Description: INTERVENTIONS:  - Perform BMAT or MOVE assessment daily    - Set and communicate daily mobility goal to care team and patient/family/caregiver     - Collaborate with rehabilitation services on mobility goals if consulted  - Ambulate patient 3 times a day  - Out of bed for toileting  - Record patient progress and toleration of activity level   6/27/2021 0748 by Stephen Callaway RN  Outcome: Progressing  6/27/2021 0748 by Stephen Callaway RN  Outcome: Progressing     Problem: Potential for Falls  Goal: Patient will remain free of falls  Description: INTERVENTIONS:  - Educate patient/family on patient safety including physical limitations  - Instruct patient to call for assistance with activity   - Consult OT/PT to assist with strengthening/mobility   - Keep Call bell within reach  - Keep bed low and locked with side rails adjusted as appropriate  - Keep care items and personal belongings within reach  - Initiate and maintain comfort rounds  - Make Fall Risk Sign visible to staff  - Offer Toileting every 2 Hours, in advance of need  - Initiate/Maintain bed chair alarm  - Apply yellow socks and bracelet for high fall risk patients  - Consider moving patient to room near nurses station  6/27/2021 0748 by Mihaela Brewer RN  Outcome: Progressing  6/27/2021 0748 by Mihaela Brewer RN  Outcome: Progressing     Problem: Prexisting or High Potential for Compromised Skin Integrity  Goal: Skin integrity is maintained or improved  Description: INTERVENTIONS:  - Identify patients at risk for skin breakdown  - Assess and monitor skin integrity  - Assess and monitor nutrition and hydration status  - Monitor labs   - Assess for incontinence   - Turn and reposition patient  - Assist with mobility/ambulation  - Relieve pressure over bony prominences  - Avoid friction and shearing  - Provide appropriate hygiene as needed including keeping skin clean and dry  - Evaluate need for skin moisturizer/barrier cream  - Collaborate with interdisciplinary team   - Patient/family teaching  - Consider wound care consult   6/27/2021 0748 by Mihaela Brewer RN  Outcome: Progressing  6/27/2021 0748 by Mihaela Brewer RN  Outcome: Progressing     Problem: NEUROSENSORY - ADULT  Goal: Achieves stable or improved neurological status  Description: INTERVENTIONS  - Monitor and report changes in neurological status  - Monitor vital signs such as temperature, blood pressure, glucose, and any other labs ordered   - Initiate measures to prevent increased intracranial pressure  - Monitor for seizure activity and implement precautions if appropriate      6/27/2021 0748 by Mihaela Brewer RN  Outcome: Progressing  6/27/2021 0748 by Mihaela Brewer RN  Outcome: Progressing  Goal: Remains free of injury related to seizures activity  Description: INTERVENTIONS  - Maintain airway, patient safety  and administer oxygen as ordered  - Monitor patient for seizure activity, document and report duration and description of seizure to physician/advanced practitioner  - If seizure occurs,  ensure patient safety during seizure  - Reorient patient post seizure  - Seizure pads on all 4 side rails  - Instruct patient/family to notify RN of any seizure activity including if an aura is experienced  - Instruct patient/family to call for assistance with activity based on nursing assessment  - Administer anti-seizure medications if ordered    6/27/2021 0748 by Manpreet Weaver RN  Outcome: Progressing  6/27/2021 0748 by Manpreet Weaver RN  Outcome: Progressing  Goal: Achieves maximal functionality and self care  Description: INTERVENTIONS  - Monitor swallowing and airway patency with patient fatigue and changes in neurological status  - Encourage and assist patient to increase activity and self care     - Encourage visually impaired, hearing impaired and aphasic patients to use assistive/communication devices  6/27/2021 0748 by Manpreet Weaver RN  Outcome: Progressing  6/27/2021 0748 by Manpreet Weaver RN  Outcome: Progressing     Problem: RESPIRATORY - ADULT  Goal: Achieves optimal ventilation and oxygenation  Description: INTERVENTIONS:  - Assess for changes in respiratory status  - Assess for changes in mentation and behavior  - Position to facilitate oxygenation and minimize respiratory effort  - Oxygen administered by appropriate delivery if ordered  - Initiate smoking cessation education as indicated  - Encourage broncho-pulmonary hygiene including cough, deep breathe, Incentive Spirometry  - Assess the need for suctioning and aspirate as needed  - Assess and instruct to report SOB or any respiratory difficulty  - Respiratory Therapy support as indicated  6/27/2021 0748 by Manpreet Weaver RN  Outcome: Progressing  6/27/2021 0748 by Manpreet Weaver RN  Outcome: Progressing     Problem: Nutrition/Hydration-ADULT  Goal: Nutrient/Hydration intake appropriate for improving, restoring or maintaining nutritional needs  Description: Monitor and assess patient's nutrition/hydration status for malnutrition  Collaborate with interdisciplinary team and initiate plan and interventions as ordered  Monitor patient's weight and dietary intake as ordered or per policy  Utilize nutrition screening tool and intervene as necessary  Determine patient's food preferences and provide high-protein, high-caloric foods as appropriate       INTERVENTIONS:  - Monitor oral intake, urinary output, labs, and treatment plans  - Assess nutrition and hydration status and recommend course of action  - Evaluate amount of meals eaten  - Assist patient with eating if necessary   - Allow adequate time for meals  - Recommend/ encourage appropriate diets, oral nutritional supplements, and vitamin/mineral supplements  - Order, calculate, and assess calorie counts as needed  - Recommend, monitor, and adjust tube feedings and TPN/PPN based on assessed needs  - Assess need for intravenous fluids  - Provide specific nutrition/hydration education as appropriate  - Include patient/family/caregiver in decisions related to nutrition  Outcome: Progressing

## 2021-06-27 NOTE — PROGRESS NOTES
[address] [facility]  Progress Note - Nabila Mayco 1955, 72 y o  female MRN: [id number]  [facility] Encounter: [id number]  Primary Care Provider: YASMANI Mendoza   Date and time admitted to hospital: 6/24/2021 12:51 PM    * Acute encephalopathy  Assessment & Plan  · Patient and family with distant  Intubated in field for airway protection  Presented level a trauma found to be hypothermic, bradycardic and posturing upon presentation  Initially admitted to ICU and transferred to med surg on 06/25  · CT head with no acute intracranial abnormality  CT C-spine no fracture or traumatic malalignment  CT chest/abdomen demonstrating subsegmental atelectasis and bibasilar bases no distended bladder  Labs also largely unremarkable with negative troponin, lactic acid, normal electrolytes, ammonia, and TSH    · Etiology unclear, possibly secondary to polypharmacy  · Urine toxicology screen pending, gabapentin level pending  · A m  cortisol elevated at 27 3 - discussed with Endocrine, no need for further workup   · Appreciate psychiatry consult and recommendations  · Discontinued Zyprexa and risperidone  · Continue Rexulti, Seroquel, Xanax  · Neurology following, appreciate recommendations  · No need for further neurologic workup   · Appreciate geriatrics consult and recommendations  · Patient evaluated by neuropsychiatry and deemed competent to make medical decisions  · PT/OT evaluations appreciated, recommend d/c home with no rehab needs     Rhabdomyolysis  Assessment & Plan  · Patient found down at home for unknown period of time, initial CK 3514  · Repeat CK level today improved to 1373  · Per RN, IV discontinued overnight as patient pulled out IV  · Resume IVF today and repeat CK with morning labs     Acute hypoxemic respiratory failure (HCC)-resolved as of 6/26/2021  Assessment & Plan  · Intubated upon EMS arrival for airway protection, extubated on 6/25  · CXR : Right greater than left basilar subsegmental atelectasis with otherwise clear lungs  · Resolved    Anemia  Assessment & Plan  · Suspect hemodilution in the setting of aggressive IVF on admission   · No sign of active bleeding   · Monitor     LUCAS (acute kidney injury) (HCC)-resolved as of 2021  Assessment & Plan  · POA with creatinine of 1 66 likely multifactorial in patient with history of renal cell carcinoma status post nephrectomy  · UA without evidence of infection, follow-up urine culture  · Resolved with IV fluids, continue for now  · Trend CK and BMP    History of CVA (cerebrovascular accident)  Assessment & Plan  · Residual right-sided deficits  · Neurology following  · Continue aspirin, statin on hold setting of rhabdo    Bipolar disease, chronic ([facility] [address] )  Assessment & Plan  · Appreciate psychiatry consult and recommendations  · Medication changes as noted above      VTE Pharmacologic Prophylaxis: VTE Score: 4 Moderate Risk (Score 3-4) - Pharmacological DVT Prophylaxis Ordered: heparin  Patient Centered Rounds: I performed bedside rounds with nursing staff today  Discussions with Specialists or Other Care Team Provider: primary RN    Education and Discussions with Family / Patient: Updated  (mother) via phone  Time Spent for Care: 15 minutes  More than 50% of total time spent on counseling and coordination of care as described above  Current Length of Stay: 3 day(s)  Current Patient Status: Inpatient   Certification Statement: The patient will continue to require additional inpatient hospital stay due to IV fluids for rhabdo  Discharge Plan: Anticipate discharge tomorrow to home  Code Status: Level 1 - Full Code    Subjective:   Patient offers no complaints       Objective:     Vitals:   Temp (24hrs), Av 8 °F (36 6 °C), Min:97 4 °F (36 3 °C), Max:98 °F (36 7 °C)    Temp:  [97 4 °F (36 3 °C)-98 °F (36 7 °C)] 97 4 °F (36 3 °C)  HR:  [82-97] 97  Resp:  [17-18] 18  BP: (159-171)/(80-90) 159/85  SpO2:  [96 %-98 %] 96 %  Body mass index is 32 73 kg/m²  Input and Output Summary (last 24 hours): Intake/Output Summary (Last 24 hours) at 6/27/2021 1316  Last data filed at 6/27/2021 1214  Gross per 24 hour   Intake 2003 42 ml   Output 900 ml   Net 1103 42 ml       Physical Exam:   Physical Exam  Vitals and nursing note reviewed  Constitutional:       General: She is not in acute distress  Cardiovascular:      Rate and Rhythm: Normal rate and regular rhythm  Pulses: Normal pulses  Heart sounds: No murmur heard  Pulmonary:      Effort: Pulmonary effort is normal  No respiratory distress  Breath sounds: No wheezing or rales  Abdominal:      General: Abdomen is flat  There is no distension  Palpations: Abdomen is soft  Musculoskeletal:      Right lower leg: No edema  Left lower leg: No edema  Skin:     General: Skin is warm and dry  Coloration: Skin is not pale  Findings: No erythema  Neurological:      General: No focal deficit present  Mental Status: She is alert  Mental status is at baseline            Additional Data:     Labs:  Results from last 7 days   Lab Units 06/27/21  0510   WBC Thousand/uL 4 91   HEMOGLOBIN g/dL 10 3*   HEMATOCRIT % 31 8*   PLATELETS Thousands/uL 173   NEUTROS PCT % 65   LYMPHS PCT % 20   MONOS PCT % 9   EOS PCT % 4     Results from last 7 days   Lab Units 06/27/21  0510 06/24/21  1412   SODIUM mmol/L 141 137   POTASSIUM mmol/L 3 8 4 2   CHLORIDE mmol/L 114* 105   CO2 mmol/L 22 25   BUN mg/dL 9 32*   CREATININE mg/dL 0 62 1 66*   ANION GAP mmol/L 5 7   CALCIUM mg/dL 8 6 7 4*   ALBUMIN g/dL  --  3 1*   TOTAL BILIRUBIN mg/dL  --  0 74   ALK PHOS U/L  --  103   ALT U/L  --  20   AST U/L  --  95*   GLUCOSE RANDOM mg/dL 102 103     Results from last 7 days   Lab Units 06/24/21  1313   INR  1 02     Results from last 7 days   Lab Units 06/24/21  1453   POC GLUCOSE mg/dl 93         Results from last 7 days Lab Units 06/24/21  1313   LACTIC ACID mmol/L 0 8       Lines/Drains:  Invasive Devices     Peripheral Intravenous Line            Peripheral IV 06/27/21 Left Wrist <1 day                      Imaging: No pertinent imaging reviewed  Recent Cultures (last 7 days):   Results from last 7 days   Lab Units 06/24/21  1612   URINE CULTURE  No Growth <1000 cfu/mL       Last 24 Hours Medication List:   Current Facility-Administered Medications   Medication Dose Route Frequency Provider Last Rate    ALPRAZolam  1 mg Oral 4x Daily Aury Shupp, DO      heparin (porcine)  5,000 Units Subcutaneous Q8H [address] Aury Shupp, DO      HYDROcodone-acetaminophen  0 5 tablet Oral Q6H PRN Aury Shupp, DO      pantoprazole  40 mg Oral Early Morning Rajni E Held, PA-C      QUEtiapine  200 mg Oral HS Rajni E Held, PA-C      sodium chloride  100 mL/hr Intravenous Continuous Rajni E Held, PA-C 100 mL/hr (06/27/21 1214)        Today, Patient Was Seen By: Laura Grayson PA-C    **Please Note: This note may have been constructed using a voice recognition system  **

## 2021-06-28 VITALS
WEIGHT: 190.48 LBS | HEIGHT: 64 IN | BODY MASS INDEX: 32.52 KG/M2 | DIASTOLIC BLOOD PRESSURE: 80 MMHG | HEART RATE: 70 BPM | TEMPERATURE: 97.5 F | RESPIRATION RATE: 18 BRPM | SYSTOLIC BLOOD PRESSURE: 155 MMHG | OXYGEN SATURATION: 98 %

## 2021-06-28 LAB
CK MB SERPL-MCNC: 1.2 NG/ML (ref 0–5)
CK MB SERPL-MCNC: <1 % (ref 0–2.5)
CK SERPL-CCNC: 596 U/L (ref 26–192)
ERYTHROCYTE [DISTWIDTH] IN BLOOD BY AUTOMATED COUNT: 13.4 % (ref 11.6–15.1)
GABAPENTIN SERPLBLD-MCNC: 24.2 UG/ML (ref 4–16)
HCT VFR BLD AUTO: 30 % (ref 34.8–46.1)
HGB BLD-MCNC: 10 G/DL (ref 11.5–15.4)
MCH RBC QN AUTO: 35.8 PG (ref 26.8–34.3)
MCHC RBC AUTO-ENTMCNC: 33.3 G/DL (ref 31.4–37.4)
MCV RBC AUTO: 108 FL (ref 82–98)
PLATELET # BLD AUTO: 190 THOUSANDS/UL (ref 149–390)
PMV BLD AUTO: 9.7 FL (ref 8.9–12.7)
RBC # BLD AUTO: 2.79 MILLION/UL (ref 3.81–5.12)
WBC # BLD AUTO: 4.96 THOUSAND/UL (ref 4.31–10.16)

## 2021-06-28 PROCEDURE — 82550 ASSAY OF CK (CPK): CPT | Performed by: PHYSICIAN ASSISTANT

## 2021-06-28 PROCEDURE — 99239 HOSP IP/OBS DSCHRG MGMT >30: CPT | Performed by: PHYSICIAN ASSISTANT

## 2021-06-28 PROCEDURE — 85027 COMPLETE CBC AUTOMATED: CPT | Performed by: PHYSICIAN ASSISTANT

## 2021-06-28 PROCEDURE — 82553 CREATINE MB FRACTION: CPT | Performed by: PHYSICIAN ASSISTANT

## 2021-06-28 RX ORDER — GABAPENTIN 800 MG/1
400 TABLET ORAL 4 TIMES DAILY
Refills: 0
Start: 2021-06-28 | End: 2021-07-06 | Stop reason: DRUGHIGH

## 2021-06-28 RX ORDER — ONDANSETRON 2 MG/ML
4 INJECTION INTRAMUSCULAR; INTRAVENOUS EVERY 4 HOURS PRN
Status: DISCONTINUED | OUTPATIENT
Start: 2021-06-28 | End: 2021-06-28 | Stop reason: HOSPADM

## 2021-06-28 RX ORDER — QUETIAPINE FUMARATE 200 MG/1
200 TABLET, FILM COATED ORAL
Qty: 30 TABLET | Refills: 0 | Status: SHIPPED | OUTPATIENT
Start: 2021-06-28

## 2021-06-28 RX ADMIN — HEPARIN SODIUM 5000 UNITS: 5000 INJECTION INTRAVENOUS; SUBCUTANEOUS at 05:17

## 2021-06-28 RX ADMIN — ALPRAZOLAM 1 MG: 0.5 TABLET ORAL at 11:29

## 2021-06-28 RX ADMIN — ONDANSETRON 4 MG: 2 INJECTION INTRAMUSCULAR; INTRAVENOUS at 08:33

## 2021-06-28 RX ADMIN — SODIUM CHLORIDE 100 ML/HR: 0.9 INJECTION, SOLUTION INTRAVENOUS at 01:01

## 2021-06-28 RX ADMIN — ALPRAZOLAM 1 MG: 0.5 TABLET ORAL at 08:22

## 2021-06-28 RX ADMIN — PANTOPRAZOLE SODIUM 40 MG: 40 TABLET, DELAYED RELEASE ORAL at 05:17

## 2021-06-28 NOTE — ASSESSMENT & PLAN NOTE
· Patient found down by family  Intubated in field for airway protection  Presented level a trauma found to be hypothermic, bradycardic and posturing upon presentation  Initially admitted to ICU and transferred to med surg on 06/25  · CT head with no acute intracranial abnormality  CT C-spine no fracture or traumatic malalignment  CT chest/abdomen demonstrating subsegmental atelectasis and bibasilar bases no distended bladder  Labs also largely unremarkable with negative troponin, lactic acid, normal electrolytes, ammonia, and TSH    · Etiology unclear, possibly secondary to polypharmacy  · Urine toxicology screen pending, gabapentin level pending  · A m  cortisol elevated at 27 3 - discussed with Endocrine, no need for further workup   · Appreciate psychiatry consult and recommendations  · Discontinued Zyprexa and risperidone  · Continue Rexulti, Seroquel, Xanax  · Neurology following, appreciate recommendations  · No need for further neurologic workup   · Appreciate geriatrics consult and recommendations  · Patient evaluated by neuropsychiatry and deemed competent to make medical decisions  · PT/OT evaluations appreciated, recommend d/c home with no rehab needs

## 2021-06-28 NOTE — DISCHARGE SUMMARY
[address] [facility]  Discharge- Ravindra Hoskins 1955, 72 y o  female MRN: [id number]  Unit/Bed#: [facility] 702-01 Encounter: [id number]  Primary Care Provider: Rockie Klinefelter, CRNP   Date and time admitted to hospital: 6/24/2021 12:51 PM    * Acute encephalopathy  Assessment & Plan  · Patient found down by family  Intubated in field for airway protection  Presented level a trauma found to be hypothermic, bradycardic and posturing upon presentation  Initially admitted to ICU and transferred to med surg on 06/25  · CT head with no acute intracranial abnormality  CT C-spine no fracture or traumatic malalignment  CT chest/abdomen demonstrating subsegmental atelectasis and bibasilar bases no distended bladder  Labs also largely unremarkable with negative troponin, lactic acid, normal electrolytes, ammonia, and TSH    · Etiology unclear, possibly secondary to polypharmacy  · Urine toxicology screen pending, gabapentin level pending  · A m  cortisol elevated at 27 3 - discussed with Endocrine, no need for further workup   · Appreciate psychiatry consult and recommendations  · Discontinued Zyprexa and risperidone  · Continue Rexulti, Seroquel, Xanax  · Neurology following, appreciate recommendations  · No need for further neurologic workup   · Appreciate geriatrics consult and recommendations  · Patient evaluated by neuropsychiatry and deemed competent to make medical decisions  · Will restart gabapentin, but decrease dose to 400mg 4 times a day (home dose was 800mg 4 times a day)  · PT/OT evaluations appreciated, recommend d/c home with no rehab needs     Rhabdomyolysis  Assessment & Plan  · Patient found down at home for unknown period of time, initial CK 3514  · Repeat CK level improved to 1373, now 596 with IVF     History of CVA (cerebrovascular accident)  Assessment & Plan  · Residual right-sided deficits  · Neurology following  · Continue aspirin, statin on hold setting of rhabdo will restart at discharge    Bipolar disease, chronic [facility])  Assessment & Plan  · Appreciate psychiatry consult and recommendations  · Medication changes as noted above    Anemia  Assessment & Plan  · Suspect hemodilution in the setting of aggressive IVF on admission   · No sign of active bleeding         Medical Problems     Resolved Problems  Date Reviewed: 6/28/2021        Resolved    Acute hypoxemic respiratory failure ([facility] [address] ) 6/26/2021     Resolved by  Erin Ahn PA-C    LUCAS (acute kidney injury) ([facility] ) 6/27/2021     Resolved by  Erin Ahn PA-C              Discharging Physician / Practitioner: Tushar Wade PA-C  PCP: YASMANI Long  Admission Date:   Admission Orders (From admission, onward)     Ordered        06/24/21 1443  Inpatient Admission  Once                   Discharge Date: 06/28/21    Consultations During Hospital Stay:  · Dr Fermin Babb  · Dr George Morris  · Dr Queenie Oppenheim  · Dr Blanca Pastor    Procedures Performed:     CXR - 1  Right greater than left basilar subsegmental atelectasis with otherwise clear lungs  2   Endotracheal tube positioned somewhat low and should be retracted 2-3 cm   3   Esophageal temperature probe and left humeral head intraosseous needle noted    CT chest - 1  Subsegmental atelectasis in the bases of both lower lobes and right middle lobe  2   Distended bladder  3   Otherwise, no evidence of acute abnormality in the chest, abdomen or pelvis  CT cervical spine - No cervical spine fracture or traumatic malalignment  CT head - No acute intracranial abnormality    CXR - Right lower lobe atelectasis versus scarring  Endotracheal tube at the origin of the right mainstem bronchus    Echocardiogram - LEFT VENTRICLE:  Systolic function was vigorous  Ejection fraction was estimated to be 65 %    There were no regional wall motion abnormalities      AORTIC VALVE:  There was mild regurgitation      TRICUSPID VALVE:  There was trace regurgitation      PULMONIC VALVE:  There was trace regurgitation        Significant Findings / Test Results:   · See above    Incidental Findings:   · none     Test Results Pending at Discharge (will require follow up):   · Gabapentin level  · Toxicology screen     Outpatient Tests Requested:  · none    Complications:  none    Reason for Admission: found unresponsive    Hospital Course:   Iza Vaz is a 72 y o  female patient who originally presented to the hospital on 6/24/2021 due to being found down with AMS  She was hypothermic and posturing  She required intubation in the field  Patient was admitted as a level A trauma and then transferred to ICU  Patient had a similar episode where she was found down pinpoint thermic without a clear cause about a month ago  Workup was essentially unremarkable  Cause was felt to possibly be secondary to polypharmacy  Patient was seen in consultation by Neurology, Behavioral Health, and Geriatrics  Her Zyprexa and risperidone were discontinued  Patient's mental status improved over the course of her stay and she will be able to be discharged home with no needs  Patient was also found to have acute traumatic rhabdomyolysis  This improved with IV fluids  She was also found have LUCAS which resolved with IV fluids  Please see above list of diagnoses and related plan for additional information  Condition at Discharge: good    Discharge Day Visit / Exam:   Subjective:  Patient complains of some nausea this morning  Patient states she slept well last night and feels much better today  Vitals: Blood Pressure: 155/80 (06/28/21 0716)  Pulse: 70 (06/28/21 0716)  Temperature: 97 5 °F (36 4 °C) (06/28/21 0716)  Temp Source: Oral (06/25/21 1441)  Respirations: 18 (06/28/21 0716)  Height: 5' 4" (162 6 cm) (06/25/21 1528)  Weight - Scale: 86 4 kg (190 lb 7 6 oz) (06/28/21 0600)  SpO2: 98 % (06/28/21 0716)  Exam:   Physical Exam  Constitutional:       Appearance: Normal appearance     Cardiovascular:      Rate and Rhythm: Normal rate and regular rhythm  Heart sounds: No murmur heard  Pulmonary:      Effort: Pulmonary effort is normal       Breath sounds: Normal breath sounds  Abdominal:      General: Bowel sounds are normal  There is no distension  Palpations: Abdomen is soft  Tenderness: There is no abdominal tenderness  Skin:     General: Skin is warm and dry  Neurological:      General: No focal deficit present  Mental Status: She is alert and oriented to person, place, and time  Psychiatric:         Mood and Affect: Mood normal          Discussion with Family: Updated  (mother) via phone  Discharge instructions/Information to patient and family:   See after visit summary for information provided to patient and family  Provisions for Follow-Up Care:  See after visit summary for information related to follow-up care and any pertinent home health orders  Disposition:   Home    Planned Readmission: none     Discharge Statement:  I spent 45 minutes discharging the patient  This time was spent on the day of discharge  I had direct contact with the patient on the day of discharge  Greater than 50% of the total time was spent examining patient, answering all patient questions, arranging and discussing plan of care with patient as well as directly providing post-discharge instructions  Additional time then spent on discharge activities  Discharge Medications:  See after visit summary for reconciled discharge medications provided to patient and/or family        **Please Note: This note may have been constructed using a voice recognition system**

## 2021-06-28 NOTE — PLAN OF CARE
Problem: MOBILITY - ADULT  Goal: Maintain or return to baseline ADL function  Description: INTERVENTIONS:  -  Assess patient's ability to carry out ADLs; assess patient's baseline for ADL function and identify physical deficits which impact ability to perform ADLs (bathing, care of mouth/teeth, toileting, grooming, dressing, etc )  - Assess/evaluate cause of self-care deficits   - Assess range of motion  - Assess patient's mobility; develop plan if impaired  - Assess patient's need for assistive devices and provide as appropriate  - Encourage maximum independence but intervene and supervise when necessary  - Involve family in performance of ADLs  - Assess for home care needs following discharge   - Consider OT consult to assist with ADL evaluation and planning for discharge  - Provide patient education as appropriate  Outcome: Progressing  Goal: Maintains/Returns to pre admission functional level  Description: INTERVENTIONS:  - Perform BMAT or MOVE assessment daily    - Set and communicate daily mobility goal to care team and patient/family/caregiver     - Collaborate with rehabilitation services on mobility goals if consulted    - Out of bed for toileting  - Record patient progress and toleration of activity level   Outcome: Progressing     Problem: Potential for Falls  Goal: Patient will remain free of falls  Description: INTERVENTIONS:  - Educate patient/family on patient safety including physical limitations  - Instruct patient to call for assistance with activity   - Consult OT/PT to assist with strengthening/mobility   - Keep Call bell within reach  - Keep bed low and locked with side rails adjusted as appropriate  - Keep care items and personal belongings within reach  - Initiate and maintain comfort rounds  - Make Fall Risk Sign visible to staff    - Apply yellow socks and bracelet for high fall risk patients  - Consider moving patient to room near nurses station  Outcome: Progressing     Problem: Prexisting or High Potential for Compromised Skin Integrity  Goal: Skin integrity is maintained or improved  Description: INTERVENTIONS:  - Identify patients at risk for skin breakdown  - Assess and monitor skin integrity  - Assess and monitor nutrition and hydration status  - Monitor labs   - Assess for incontinence   - Turn and reposition patient  - Assist with mobility/ambulation  - Relieve pressure over bony prominences  - Avoid friction and shearing  - Provide appropriate hygiene as needed including keeping skin clean and dry  - Evaluate need for skin moisturizer/barrier cream  - Collaborate with interdisciplinary team   - Patient/family teaching  - Consider wound care consult   Outcome: Progressing     Problem: NEUROSENSORY - ADULT  Goal: Achieves stable or improved neurological status  Description: INTERVENTIONS  - Monitor and report changes in neurological status  - Monitor vital signs such as temperature, blood pressure, glucose, and any other labs ordered   - Initiate measures to prevent increased intracranial pressure  - Monitor for seizure activity and implement precautions if appropriate      Outcome: Progressing  Goal: Remains free of injury related to seizures activity  Description: INTERVENTIONS  - Maintain airway, patient safety  and administer oxygen as ordered  - Monitor patient for seizure activity, document and report duration and description of seizure to physician/advanced practitioner  - If seizure occurs,  ensure patient safety during seizure  - Reorient patient post seizure  - Seizure pads on all 4 side rails  - Instruct patient/family to notify RN of any seizure activity including if an aura is experienced  - Instruct patient/family to call for assistance with activity based on nursing assessment  - Administer anti-seizure medications if ordered    Outcome: Progressing  Goal: Achieves maximal functionality and self care  Description: INTERVENTIONS  - Monitor swallowing and airway patency with patient fatigue and changes in neurological status  - Encourage and assist patient to increase activity and self care  - Encourage visually impaired, hearing impaired and aphasic patients to use assistive/communication devices  Outcome: Progressing     Problem: RESPIRATORY - ADULT  Goal: Achieves optimal ventilation and oxygenation  Description: INTERVENTIONS:  - Assess for changes in respiratory status  - Assess for changes in mentation and behavior  - Position to facilitate oxygenation and minimize respiratory effort  - Oxygen administered by appropriate delivery if ordered  - Initiate smoking cessation education as indicated  - Encourage broncho-pulmonary hygiene including cough, deep breathe, Incentive Spirometry  - Assess the need for suctioning and aspirate as needed  - Assess and instruct to report SOB or any respiratory difficulty  - Respiratory Therapy support as indicated  Outcome: Progressing     Problem: Nutrition/Hydration-ADULT  Goal: Nutrient/Hydration intake appropriate for improving, restoring or maintaining nutritional needs  Description: Monitor and assess patient's nutrition/hydration status for malnutrition  Collaborate with interdisciplinary team and initiate plan and interventions as ordered  Monitor patient's weight and dietary intake as ordered or per policy  Utilize nutrition screening tool and intervene as necessary  Determine patient's food preferences and provide high-protein, high-caloric foods as appropriate       INTERVENTIONS:  - Monitor oral intake, urinary output, labs, and treatment plans  - Assess nutrition and hydration status and recommend course of action  - Evaluate amount of meals eaten  - Assist patient with eating if necessary   - Allow adequate time for meals  - Recommend/ encourage appropriate diets, oral nutritional supplements, and vitamin/mineral supplements  - Order, calculate, and assess calorie counts as needed  - Recommend, monitor, and adjust tube feedings and TPN/PPN based on assessed needs  - Assess need for intravenous fluids  - Provide specific nutrition/hydration education as appropriate  - Include patient/family/caregiver in decisions related to nutrition  Outcome: Progressing

## 2021-06-28 NOTE — CASE MANAGEMENT
Cm informed that pt is medically stable for discharge to home  Pt reported that her mother karen; be transporting her home today  CM reviewed with pt no therapy needs at this time, pt is in agreement  IMM doc reviewed with pt

## 2021-06-28 NOTE — ASSESSMENT & PLAN NOTE
· Patient found down at home for unknown period of time, initial CK 3514  · Repeat CK level improved to 1373, now 596 with IVF

## 2021-06-28 NOTE — RESTORATIVE TECHNICIAN NOTE
Restorative Technician Note      Patient Name: Evi Barnes     Note Type: Mobility  Patient Position Upon Consult: Supine  Activity Performed: Ambulated;Stood;Dangled  Assistive Device: Other (Comment) (None)  Education Provided: Yes (Educated/encouraged pt to ambulate with assistance 3-4 x's/day )  Patient Position at End of Consult: Supine; All needs within reach;Bed/Chair alarm activated    Awa AVILA, Restorative Technician, [employer]

## 2021-06-28 NOTE — ASSESSMENT & PLAN NOTE
· Residual right-sided deficits  · Neurology following  · Continue aspirin, statin on hold setting of rhabdo will restart at discharge

## 2021-06-29 NOTE — UTILIZATION REVIEW
Notification of Discharge   This is a Notification of Discharge from our facility [address]  Please be advised that this patient has been discharge from our facility  Below you will find the admission and discharge date and time including the patients disposition  UTILIZATION REVIEW CONTACT:  Amy Kenney  Utilization   Network Utilization Review Department  Phone: [phone] x carefully listen to the prompts  All voicemails are confidential   Email: [email or web address]     PHYSICIAN ADVISORY SERVICES:  FOR KJDV-DE-LELR REVIEW - MEDICAL NECESSITY DENIAL  Phone: [phone]  Fax: [phone]  Email: [email or web address]     PRESENTATION DATE: 6/24/2021 12:51 PM  OBERVATION ADMISSION DATE:   INPATIENT ADMISSION DATE: 6/24/21  1:51 PM   DISCHARGE DATE: 6/28/2021  2:20 PM  DISPOSITION: Home/Self Care Home/Self Care      IMPORTANT INFORMATION:  Send all requests for admission clinical reviews, approved or denied determinations and any other requests to dedicated fax number below belonging to the campus where the patient is receiving treatment   List of dedicated fax numbers:  [address] DENIALS (Administrative/Medical Necessity) [phone]   [address] (Maternity/NICU/Pediatrics) [phone]   [facility] [phone]   [address] [phone]   [address] [place] [address] [phone]   [facility] [address] [facility] [phone]   [facility]  [phone]   [address] [facility] [address] [facility]  [address] [facility] 1000 [address] [facility] [phone]

## 2021-07-01 LAB
AMPHETAMINES UR QL SCN: NEGATIVE NG/ML
BARBITURATES UR QL SCN: NEGATIVE NG/ML
BENZODIAZ UR QL: POSITIVE
BZE UR QL: NEGATIVE NG/ML
CANNABINOIDS UR QL SCN: NEGATIVE NG/ML
METHADONE UR QL SCN: NEGATIVE NG/ML
OPIATES UR QL: NEGATIVE
PCP UR QL: NEGATIVE NG/ML
PROPOXYPH UR QL SCN: NEGATIVE NG/ML

## 2021-07-06 ENCOUNTER — DOCUMENTATION (OUTPATIENT)
Dept: FAMILY MEDICINE CLINIC | Facility: CLINIC | Age: 66
End: 2021-07-06

## 2021-07-06 ENCOUNTER — OFFICE VISIT (OUTPATIENT)
Dept: FAMILY MEDICINE CLINIC | Facility: CLINIC | Age: 66
End: 2021-07-06
Payer: MEDICARE

## 2021-07-06 VITALS
BODY MASS INDEX: 32.58 KG/M2 | TEMPERATURE: 98 F | SYSTOLIC BLOOD PRESSURE: 140 MMHG | OXYGEN SATURATION: 99 % | DIASTOLIC BLOOD PRESSURE: 84 MMHG | HEART RATE: 98 BPM | WEIGHT: 189.8 LBS

## 2021-07-06 DIAGNOSIS — R60.9 EDEMA, UNSPECIFIED TYPE: ICD-10-CM

## 2021-07-06 DIAGNOSIS — Z76.89 ENCOUNTER FOR SUPPORT AND COORDINATION OF TRANSITION OF CARE: Primary | ICD-10-CM

## 2021-07-06 DIAGNOSIS — F31.9 BIPOLAR DISEASE, CHRONIC (HCC): ICD-10-CM

## 2021-07-06 DIAGNOSIS — C64.9 RENAL CELL CARCINOMA (HCC): ICD-10-CM

## 2021-07-06 PROBLEM — J44.9 CHRONIC OBSTRUCTIVE PULMONARY DISEASE (HCC): Status: ACTIVE | Noted: 2021-07-06

## 2021-07-06 PROCEDURE — 99213 OFFICE O/P EST LOW 20 MIN: CPT | Performed by: FAMILY MEDICINE

## 2021-07-06 RX ORDER — FUROSEMIDE 20 MG/1
40 TABLET ORAL DAILY
Qty: 60 TABLET | Refills: 0 | Status: CANCELLED | OUTPATIENT
Start: 2021-07-06 | End: 2021-08-05

## 2021-07-06 RX ORDER — GABAPENTIN 800 MG/1
400 TABLET ORAL 4 TIMES DAILY
Qty: 60 TABLET | Refills: 0 | Status: SHIPPED | OUTPATIENT
Start: 2021-07-06

## 2021-07-06 NOTE — PROGRESS NOTES
TCM Call (since 6/6/2021)     None      TCM Call (since 6/6/2021)     None          Assessment/Plan:    No problem-specific Assessment & Plan notes found for this encounter  Masood Gomez 72 y o  female came to office for follow up after ER visit 2wks back for Acute encphalopathy  Patient was found down by family at that time  Intubated in field  Pt is hypothermic, bradycardiac and posturing on presentation  CT head and C-spine show no abnormalities  Etiology unclear, possibly secondary to polypharmacy  Discontinued ZYprexa and risperidone  Gabapentin dose adjusted to 400mg 4times a day  On today's visit, reviewed medications with patient and her mother who is taking care of her medications at home  Advised to continue to take Gabapentin 400mg qid  Patients Blood pressure is high-normal, discontinued Lasix to monitor her blood pressure  In next visit, change it to Lisinopril  On physical exam, found sores on Lt lateral chest and Lt inner thigh, healing, pt is using Neosporin ointment  Advised to wear loose fitting cloths and keep the lesions covered  F/up in 2weeks    Case d/w Dr Eleanor Majano       Diagnoses and all orders for this visit:    Encounter for support and coordination of transition of care                      -Gabapentin (NEUROTININ) 800 mg tablet; Take 0 5 tablets (400 mg total) by mouth 4 (four) times a day  -Discontinued Lasix 20 mg tablet  Subjective:      Patient ID: Isaias Julian is a 72 y o  female  HPI  Masood Gomez 66yo female came to office for follow up after ER visit 2wks back for Acute encphalopathy  Complains of weakness, trouble sleeping, change in wt and shortness of breath while using stairs  Pt gained 30lb in last 9months  She quit smoking 2wks back during inpatient stay  Review of Systems   Constitutional: Positive for unexpected weight change  HENT: Negative  Eyes: Negative  Respiratory: Positive for shortness of breath   Negative for apnea, cough, choking, chest tightness, wheezing and stridor  Cardiovascular: Negative  Gastrointestinal: Negative  Endocrine: Negative  Genitourinary: Negative  Skin: Negative  Neurological: Negative  Psychiatric/Behavioral: Positive for sleep disturbance  Negative for agitation, behavioral problems, confusion, decreased concentration, dysphoric mood, hallucinations, self-injury and suicidal ideas  The patient is not nervous/anxious and is not hyperactive  Objective:    Vitals:    /84   Pulse 98   Temp 98 °F (36 7 °C)   Wt 86 1 kg (189 lb 12 8 oz)   SpO2 99%   BMI 32 58 kg/m²          Physical Exam  Constitutional:       Appearance: Normal appearance  She is obese  HENT:      Head: Normocephalic and atraumatic  Eyes:      Extraocular Movements: Extraocular movements intact  Conjunctiva/sclera: Conjunctivae normal       Pupils: Pupils are equal, round, and reactive to light  Pulmonary:      Effort: Pulmonary effort is normal  No respiratory distress  Breath sounds: Normal breath sounds  No stridor  No wheezing, rhonchi or rales  Chest:      Chest wall: No tenderness  Musculoskeletal:      Cervical back: Normal range of motion and neck supple  Skin:     General: Skin is warm and dry  Neurological:      General: No focal deficit present  Mental Status: She is alert and oriented to person, place, and time     Psychiatric:         Mood and Affect: Mood normal

## 2021-07-06 NOTE — PROGRESS NOTES
RECEIVED A CALL FROM AN [facility] REPRESENTATIVE TO SEE IF PATIENT KEPT HER APPT TODAY  DISCUSSED THE FACT THAT I HAD CALLED THIS INSURANCE EARLIER TODAY AND FOUND OUT THAT WE ARE NOT IN NETWORK WITH [facility]  PATIENT HAS DR KAY FERRELL LISTED AS HER PCP ON HER CARD  I THEN CALLED PATIENTS MOTHER KUSH AND EXPLAINED TO HER THAT WE ARE NOT IN THE [facility] NETWORK  SHE WILL TRY AND FIND A DOCTOR FOR ROBERT UNTIL SHE CAN SWITCH HER INSURANCE SO SHE CAN CONTINUE RECEIVING CARE IN THE [address] [place] [address]

## 2021-07-07 NOTE — UTILIZATION REVIEW
Medication List at Discharge       ALPRAZolam 1 mg Oral 4 times daily     Atorvastatin Calcium 10 mg Oral Daily     Brexpiprazole 2 mg Oral Daily     Gabapentin 400 mg Oral 4 times daily     HYDROcodone-Acetaminophen 5-325 mg 1 tablet Oral Every 6 hours PRN     Pantoprazole Sodium 40 mg Oral Daily     QUEtiapine Fumarate 200 mg Oral Daily at bedtime    Ysabel Candelaria PA-C   Physician Assistant   Hospitalist   Discharge Summary       Attested Addendum   Date of Service:  6/28/2021  9:44 AM               Attestation signed by Monique Long MD at 6/28/2021 7:25 PM   Patient seen & managed by advanced practitioner, please refer to documentation below           Show:Clear all  [x]Manual[x]Template[x]Copied    Added by:  [x]Bridget Kathleen    []Cecilia for details  [address] [facility]  Discharge- [facility] 1955, 72 y o  female MRN: [id number]  Unit/Bed#: [facility]PHP 702-01 Encounter: [id number]  Primary Care Provider: YASMANI Beavers   Date and time admitted to hospital: 6/24/2021 12:51 PM     * Acute encephalopathy  Assessment & Plan  · Patient found down by family  Intubated in field for airway protection  Presented level a trauma found to be hypothermic, bradycardic and posturing upon presentation  Initially admitted to ICU and transferred to med surg on 06/25  · CT head with no acute intracranial abnormality   CT C-spine no fracture or traumatic malalignment   CT chest/abdomen demonstrating subsegmental atelectasis and bibasilar bases no distended bladder   Labs also largely unremarkable with negative troponin, lactic acid, normal electrolytes, ammonia, and TSH  · Etiology unclear, possibly secondary to polypharmacy  · Urine toxicology screen pending, gabapentin level pending  · A m  cortisol elevated at 27 3 - discussed with Endocrine, no need for further workup   · Appreciate psychiatry consult and recommendations  ?  Discontinued Zyprexa and risperidone  ? Continue Rexulti, Seroquel, Xanax  · Neurology following, appreciate recommendations  ? No need for further neurologic workup   · Appreciate geriatrics consult and recommendations  · Patient evaluated by neuropsychiatry and deemed competent to make medical decisions  · Will restart gabapentin, but decrease dose to 400mg 4 times a day (home dose was 800mg 4 times a day)  · PT/OT evaluations appreciated, recommend d/c home with no rehab needs      Rhabdomyolysis  Assessment & Plan  · Patient found down at home for unknown period of time, initial CK 3514  · Repeat CK level improved to 1373, now 596 with IVF      History of CVA (cerebrovascular accident)  Assessment & Plan  · Residual right-sided deficits  · Neurology following  · Continue aspirin, statin on hold setting of rhabdo will restart at discharge     Bipolar disease, chronic ([facility] )  Assessment & Plan  · Appreciate psychiatry consult and recommendations  · Medication changes as noted above     Anemia  Assessment & Plan  · Suspect hemodilution in the setting of aggressive IVF on admission   · No sign of active bleeding                Medical Problems               Resolved Problems  Date Reviewed: 6/28/2021                 Resolved      Acute hypoxemic respiratory failure ([facility] ) 6/26/2021        Resolved by  Thania Robertson PA-C      LUCAS (acute kidney injury) ([facility] ) 6/27/2021        Resolved by  Thania Robertson PA-C                  Discharging Physician / Practitioner: lEvia Hay PA-C  PCP: YASMANI Madrigal  Admission Date:       Admission Orders (From admission, onward)              Ordered          06/24/21 1443   Inpatient Admission  Once                       Discharge Date: 06/28/21     Consultations During Hospital Stay:  · Dr Mars Carrington  · Dr John Wheatley  · Dr Jcarlos Zhong  · Dr Loretta Hernandez     Procedures Performed:      CXR - 1   Right greater than left basilar subsegmental atelectasis with otherwise clear lungs   2   Endotracheal tube positioned somewhat low and should be retracted 2-3 cm  3   Esophageal temperature probe and left humeral head intraosseous needle noted     CT chest - 1   Subsegmental atelectasis in the bases of both lower lobes and right middle lobe  2   Distended bladder  3   Otherwise, no evidence of acute abnormality in the chest, abdomen or pelvis      CT cervical spine - No cervical spine fracture or traumatic malalignment      CT head - No acute intracranial abnormality     CXR - Right lower lobe atelectasis versus scarring  Endotracheal tube at the origin of the right mainstem bronchus     Echocardiogram - LEFT VENTRICLE:  Systolic function was vigorous  Ejection fraction was estimated to be 65 %  There were no regional wall motion abnormalities      AORTIC VALVE:  There was mild regurgitation      TRICUSPID VALVE:  There was trace regurgitation      PULMONIC VALVE:  There was trace regurgitation           Significant Findings / Test Results:   · See above     Incidental Findings:   · none      Test Results Pending at Discharge (will require follow up):   · Gabapentin level  · Toxicology screen     Outpatient Tests Requested:  · none     Complications:  none     Reason for Admission: found unresponsive     Hospital Course:   Janna Caro is a 72 y o  female patient who originally presented to the hospital on 6/24/2021 due to being found down with AMS  She was hypothermic and posturing  She required intubation in the field  Patient was admitted as a level A trauma and then transferred to ICU  Patient had a similar episode where she was found down pinpoint thermic without a clear cause about a month ago  Workup was essentially unremarkable  Cause was felt to possibly be secondary to polypharmacy  Patient was seen in consultation by Neurology, Behavioral Health, and Geriatrics  Her Zyprexa and risperidone were discontinued    Patient's mental status improved over the course of her stay and she will be able to be discharged home with no needs  Patient was also found to have acute traumatic rhabdomyolysis  This improved with IV fluids  She was also found have LUCAS which resolved with IV fluids          Please see above list of diagnoses and related plan for additional information       Condition at Discharge: good     Discharge Day Visit / Exam:   Subjective:  Patient complains of some nausea this morning  Patient states she slept well last night and feels much better today  Vitals: Blood Pressure: 155/80 (06/28/21 0716)  Pulse: 70 (06/28/21 0716)  Temperature: 97 5 °F (36 4 °C) (06/28/21 0716)  Temp Source: Oral (06/25/21 1441)  Respirations: 18 (06/28/21 0716)  Height: 5' 4" (162 6 cm) (06/25/21 1528)  Weight - Scale: 86 4 kg (190 lb 7 6 oz) (06/28/21 0600)  SpO2: 98 % (06/28/21 0716)  Exam:   Physical Exam  Constitutional:       Appearance: Normal appearance  Cardiovascular:      Rate and Rhythm: Normal rate and regular rhythm  Heart sounds: No murmur heard  Pulmonary:      Effort: Pulmonary effort is normal       Breath sounds: Normal breath sounds  Abdominal:      General: Bowel sounds are normal  There is no distension  Palpations: Abdomen is soft  Tenderness: There is no abdominal tenderness  Skin:     General: Skin is warm and dry  Neurological:      General: No focal deficit present  Mental Status: She is alert and oriented to person, place, and time  Psychiatric:         Mood and Affect: Mood normal             Discussion with Family: Updated  (mother) via phone      Discharge instructions/Information to patient and family:   See after visit summary for information provided to patient and family        Provisions for Follow-Up Care:  See after visit summary for information related to follow-up care and any pertinent home health orders  Disposition:   Home     Planned Readmission: none     Discharge Statement:  I spent 45 minutes discharging the patient   This time was spent on the day of discharge  I had direct contact with the patient on the day of discharge  Greater than 50% of the total time was spent examining patient, answering all patient questions, arranging and discussing plan of care with patient as well as directly providing post-discharge instructions    Additional time then spent on discharge activities      Discharge Medications:  See after visit summary for reconciled discharge medications provided to patient and/or family        **Please Note: This note may have been constructed using a voice recognition system**         Cosigned by: Teja Ambrose MD at 6/28/2021  7:25 PM   Revision History

## 2021-08-18 ENCOUNTER — APPOINTMENT (OUTPATIENT)
Dept: LAB | Facility: MEDICAL CENTER | Age: 66
End: 2021-08-18
Payer: MEDICARE

## 2021-08-18 DIAGNOSIS — I10 ESSENTIAL HYPERTENSION: ICD-10-CM

## 2021-08-18 DIAGNOSIS — E78.00 PURE HYPERCHOLESTEROLEMIA: ICD-10-CM

## 2021-08-18 DIAGNOSIS — E55.9 VITAMIN D DEFICIENCY: ICD-10-CM

## 2021-08-18 LAB
25(OH)D3 SERPL-MCNC: 17.4 NG/ML (ref 30–100)
ALBUMIN SERPL BCP-MCNC: 3.4 G/DL (ref 3.5–5)
ALP SERPL-CCNC: 94 U/L (ref 46–116)
ALT SERPL W P-5'-P-CCNC: 9 U/L (ref 12–78)
ANION GAP SERPL CALCULATED.3IONS-SCNC: 3 MMOL/L (ref 4–13)
AST SERPL W P-5'-P-CCNC: 14 U/L (ref 5–45)
BASOPHILS # BLD AUTO: 0.07 THOUSANDS/ΜL (ref 0–0.1)
BASOPHILS NFR BLD AUTO: 1 % (ref 0–1)
BILIRUB SERPL-MCNC: 0.27 MG/DL (ref 0.2–1)
BUN SERPL-MCNC: 19 MG/DL (ref 5–25)
CALCIUM ALBUM COR SERPL-MCNC: 9.4 MG/DL (ref 8.3–10.1)
CALCIUM SERPL-MCNC: 8.9 MG/DL (ref 8.3–10.1)
CHLORIDE SERPL-SCNC: 111 MMOL/L (ref 100–108)
CHOLEST SERPL-MCNC: 140 MG/DL (ref 50–200)
CO2 SERPL-SCNC: 25 MMOL/L (ref 21–32)
CREAT SERPL-MCNC: 1.24 MG/DL (ref 0.6–1.3)
EOSINOPHIL # BLD AUTO: 0.3 THOUSAND/ΜL (ref 0–0.61)
EOSINOPHIL NFR BLD AUTO: 4 % (ref 0–6)
ERYTHROCYTE [DISTWIDTH] IN BLOOD BY AUTOMATED COUNT: 12.7 % (ref 11.6–15.1)
GFR SERPL CREATININE-BSD FRML MDRD: 46 ML/MIN/1.73SQ M
GLUCOSE P FAST SERPL-MCNC: 87 MG/DL (ref 65–99)
HCT VFR BLD AUTO: 37.4 % (ref 34.8–46.1)
HDLC SERPL-MCNC: 38 MG/DL
HGB BLD-MCNC: 11.8 G/DL (ref 11.5–15.4)
IMM GRANULOCYTES # BLD AUTO: 0.03 THOUSAND/UL (ref 0–0.2)
IMM GRANULOCYTES NFR BLD AUTO: 0 % (ref 0–2)
LDLC SERPL CALC-MCNC: 76 MG/DL (ref 0–100)
LYMPHOCYTES # BLD AUTO: 1.94 THOUSANDS/ΜL (ref 0.6–4.47)
LYMPHOCYTES NFR BLD AUTO: 27 % (ref 14–44)
MCH RBC QN AUTO: 34.4 PG (ref 26.8–34.3)
MCHC RBC AUTO-ENTMCNC: 31.6 G/DL (ref 31.4–37.4)
MCV RBC AUTO: 109 FL (ref 82–98)
MONOCYTES # BLD AUTO: 0.34 THOUSAND/ΜL (ref 0.17–1.22)
MONOCYTES NFR BLD AUTO: 5 % (ref 4–12)
NEUTROPHILS # BLD AUTO: 4.4 THOUSANDS/ΜL (ref 1.85–7.62)
NEUTS SEG NFR BLD AUTO: 63 % (ref 43–75)
NONHDLC SERPL-MCNC: 102 MG/DL
NRBC BLD AUTO-RTO: 0 /100 WBCS
PLATELET # BLD AUTO: 339 THOUSANDS/UL (ref 149–390)
PMV BLD AUTO: 9.4 FL (ref 8.9–12.7)
POTASSIUM SERPL-SCNC: 4.7 MMOL/L (ref 3.5–5.3)
PROT SERPL-MCNC: 7.6 G/DL (ref 6.4–8.2)
RBC # BLD AUTO: 3.43 MILLION/UL (ref 3.81–5.12)
SODIUM SERPL-SCNC: 139 MMOL/L (ref 136–145)
TRIGL SERPL-MCNC: 128 MG/DL
WBC # BLD AUTO: 7.08 THOUSAND/UL (ref 4.31–10.16)

## 2021-08-18 PROCEDURE — 82306 VITAMIN D 25 HYDROXY: CPT

## 2021-08-18 PROCEDURE — 36415 COLL VENOUS BLD VENIPUNCTURE: CPT

## 2021-08-18 PROCEDURE — 85025 COMPLETE CBC W/AUTO DIFF WBC: CPT

## 2021-08-18 PROCEDURE — 80061 LIPID PANEL: CPT

## 2021-08-18 PROCEDURE — 80053 COMPREHEN METABOLIC PANEL: CPT

## 2021-10-06 ENCOUNTER — APPOINTMENT (OUTPATIENT)
Dept: LAB | Facility: MEDICAL CENTER | Age: 66
End: 2021-10-06
Payer: MEDICARE

## 2021-10-14 ENCOUNTER — HOSPITAL ENCOUNTER (EMERGENCY)
Facility: HOSPITAL | Age: 66
Discharge: HOME | End: 2021-10-14
Attending: EMERGENCY MEDICINE | Admitting: EMERGENCY MEDICINE
Payer: MEDICARE

## 2021-10-14 ENCOUNTER — APPOINTMENT (EMERGENCY)
Dept: RADIOLOGY | Facility: HOSPITAL | Age: 66
End: 2021-10-14
Payer: MEDICARE

## 2021-10-14 VITALS
WEIGHT: 189.82 LBS | BODY MASS INDEX: 32.41 KG/M2 | RESPIRATION RATE: 18 BRPM | OXYGEN SATURATION: 99 % | SYSTOLIC BLOOD PRESSURE: 142 MMHG | TEMPERATURE: 98 F | HEART RATE: 78 BPM | DIASTOLIC BLOOD PRESSURE: 82 MMHG | HEIGHT: 64 IN

## 2021-10-14 DIAGNOSIS — S52.509A DISTAL RADIUS FRACTURE: Primary | ICD-10-CM
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
## 2021-10-14 PROCEDURE — 99283 EMERGENCY DEPT VISIT LOW MDM: CPT

## 2021-10-14 PROCEDURE — 73130 X-RAY EXAM OF HAND: CPT

## 2021-10-14 PROCEDURE — 29125 APPL SHORT ARM SPLINT STATIC: CPT | Performed by: EMERGENCY MEDICINE

## 2021-10-14 PROCEDURE — 73110 X-RAY EXAM OF WRIST: CPT

## 2021-10-14 PROCEDURE — 99285 EMERGENCY DEPT VISIT HI MDM: CPT | Performed by: EMERGENCY MEDICINE

## 2021-10-14 RX ORDER — HYDROCODONE BITARTRATE AND ACETAMINOPHEN 5; 325 MG/1; MG/1
1 TABLET ORAL ONCE
Status: COMPLETED | OUTPATIENT
Start: 2021-10-14 | End: 2021-10-14

## 2021-10-14 RX ORDER — ACETAMINOPHEN 325 MG/1
650 TABLET ORAL ONCE
Status: COMPLETED | OUTPATIENT
Start: 2021-10-14 | End: 2021-10-14

## 2021-10-14 RX ADMIN — HYDROCODONE BITARTRATE AND ACETAMINOPHEN 1 TABLET: 5; 325 TABLET ORAL at 18:50

## 2021-10-14 RX ADMIN — ACETAMINOPHEN 650 MG: 325 TABLET, FILM COATED ORAL at 18:03

## 2021-10-15 ENCOUNTER — OFFICE VISIT (OUTPATIENT)
Dept: OBGYN CLINIC | Facility: CLINIC | Age: 66
End: 2021-10-15
Payer: MEDICARE

## 2021-10-15 VITALS
HEIGHT: 64 IN | BODY MASS INDEX: 32.27 KG/M2 | HEART RATE: 72 BPM | WEIGHT: 189 LBS | DIASTOLIC BLOOD PRESSURE: 76 MMHG | SYSTOLIC BLOOD PRESSURE: 112 MMHG

## 2021-10-15 DIAGNOSIS — S52.509A DISTAL RADIUS FRACTURE: ICD-10-CM

## 2021-10-15 DIAGNOSIS — S52.502A CLOSED FRACTURE DISTAL RADIUS AND ULNA, LEFT, INITIAL ENCOUNTER: Primary | ICD-10-CM

## 2021-10-15 DIAGNOSIS — S52.602A CLOSED FRACTURE DISTAL RADIUS AND ULNA, LEFT, INITIAL ENCOUNTER: Primary | ICD-10-CM

## 2021-10-15 PROCEDURE — 99213 OFFICE O/P EST LOW 20 MIN: CPT | Performed by: ORTHOPAEDIC SURGERY

## 2021-10-21 ENCOUNTER — HOSPITAL ENCOUNTER (OUTPATIENT)
Dept: CT IMAGING | Facility: HOSPITAL | Age: 66
Discharge: HOME | End: 2021-10-21
Payer: MEDICARE

## 2021-10-21 DIAGNOSIS — S52.602A CLOSED FRACTURE DISTAL RADIUS AND ULNA, LEFT, INITIAL ENCOUNTER: ICD-10-CM

## 2021-10-21 DIAGNOSIS — S52.502A CLOSED FRACTURE DISTAL RADIUS AND ULNA, LEFT, INITIAL ENCOUNTER: ICD-10-CM

## 2021-10-21 PROCEDURE — 73200 CT UPPER EXTREMITY W/O DYE: CPT

## 2021-10-29 ENCOUNTER — APPOINTMENT (OUTPATIENT)
Dept: RADIOLOGY | Facility: MEDICAL CENTER | Age: 66
End: 2021-10-29
Payer: MEDICARE

## 2021-10-29 ENCOUNTER — OFFICE VISIT (OUTPATIENT)
Dept: OBGYN CLINIC | Facility: CLINIC | Age: 66
End: 2021-10-29
Payer: MEDICARE

## 2021-10-29 DIAGNOSIS — S52.602D CLOSED FRACTURE OF DISTAL END OF LEFT ULNA WITH ROUTINE HEALING, UNSPECIFIED FRACTURE MORPHOLOGY, SUBSEQUENT ENCOUNTER: ICD-10-CM

## 2021-10-29 DIAGNOSIS — S52.602D CLOSED FRACTURE OF DISTAL END OF LEFT ULNA WITH ROUTINE HEALING, UNSPECIFIED FRACTURE MORPHOLOGY, SUBSEQUENT ENCOUNTER: Primary | ICD-10-CM

## 2021-10-29 PROCEDURE — 99213 OFFICE O/P EST LOW 20 MIN: CPT | Performed by: ORTHOPAEDIC SURGERY

## 2021-10-29 PROCEDURE — 73110 X-RAY EXAM OF WRIST: CPT

## 2021-11-12 ENCOUNTER — APPOINTMENT (OUTPATIENT)
Dept: LAB | Facility: MEDICAL CENTER | Age: 66
End: 2021-11-12
Payer: MEDICARE

## 2021-11-24 ENCOUNTER — APPOINTMENT (OUTPATIENT)
Dept: LAB | Facility: HOSPITAL | Age: 66
End: 2021-11-24
Payer: MEDICARE

## 2021-11-30 ENCOUNTER — APPOINTMENT (OUTPATIENT)
Dept: RADIOLOGY | Facility: MEDICAL CENTER | Age: 66
End: 2021-11-30
Payer: MEDICARE

## 2021-11-30 ENCOUNTER — TELEPHONE (OUTPATIENT)
Dept: OBGYN CLINIC | Facility: HOSPITAL | Age: 66
End: 2021-11-30

## 2021-11-30 ENCOUNTER — OFFICE VISIT (OUTPATIENT)
Dept: OBGYN CLINIC | Facility: CLINIC | Age: 66
End: 2021-11-30
Payer: MEDICARE

## 2021-11-30 VITALS
HEIGHT: 64 IN | BODY MASS INDEX: 32.1 KG/M2 | DIASTOLIC BLOOD PRESSURE: 74 MMHG | HEART RATE: 79 BPM | SYSTOLIC BLOOD PRESSURE: 112 MMHG | WEIGHT: 188 LBS

## 2021-11-30 DIAGNOSIS — S52.602D CLOSED FRACTURE OF DISTAL END OF LEFT ULNA WITH ROUTINE HEALING, UNSPECIFIED FRACTURE MORPHOLOGY, SUBSEQUENT ENCOUNTER: ICD-10-CM

## 2021-11-30 DIAGNOSIS — M25.511 RIGHT SHOULDER PAIN, UNSPECIFIED CHRONICITY: ICD-10-CM

## 2021-11-30 DIAGNOSIS — S42.254A CLOSED NONDISPLACED FRACTURE OF GREATER TUBEROSITY OF RIGHT HUMERUS, INITIAL ENCOUNTER: ICD-10-CM

## 2021-11-30 DIAGNOSIS — S42.141A CLOSED FRACTURE OF RIM OF GLENOID FOSSA OF RIGHT SCAPULA, INITIAL ENCOUNTER: ICD-10-CM

## 2021-11-30 DIAGNOSIS — S52.532D CLOSED COLLES' FRACTURE OF LEFT RADIUS WITH ROUTINE HEALING, SUBSEQUENT ENCOUNTER: Primary | ICD-10-CM

## 2021-11-30 PROCEDURE — 73030 X-RAY EXAM OF SHOULDER: CPT

## 2021-11-30 PROCEDURE — 99214 OFFICE O/P EST MOD 30 MIN: CPT | Performed by: ORTHOPAEDIC SURGERY

## 2021-11-30 PROCEDURE — 23570 CLTX SCAPULAR FX W/O MNPJ: CPT | Performed by: ORTHOPAEDIC SURGERY

## 2021-11-30 PROCEDURE — 23620 CLTX GR HMRL TBRS FX WO MNPJ: CPT | Performed by: ORTHOPAEDIC SURGERY

## 2021-11-30 PROCEDURE — 73110 X-RAY EXAM OF WRIST: CPT

## 2021-11-30 RX ORDER — TRAMADOL HYDROCHLORIDE 50 MG/1
TABLET ORAL
COMMUNITY
Start: 2021-11-22 | End: 2022-07-26

## 2021-11-30 RX ORDER — AMOXICILLIN AND CLAVULANATE POTASSIUM 875; 125 MG/1; MG/1
TABLET, FILM COATED ORAL
COMMUNITY
Start: 2021-09-29

## 2021-11-30 RX ORDER — CARISOPRODOL 350 MG/1
TABLET ORAL
COMMUNITY
Start: 2021-11-09

## 2021-11-30 RX ORDER — ZOLPIDEM TARTRATE 10 MG/1
TABLET ORAL
COMMUNITY
Start: 2021-11-22 | End: 2022-07-26

## 2021-11-30 RX ORDER — HALOPERIDOL 5 MG/ML
INJECTION INTRAMUSCULAR
COMMUNITY
End: 2022-07-26

## 2021-12-01 ENCOUNTER — TELEPHONE (OUTPATIENT)
Dept: OBGYN CLINIC | Facility: HOSPITAL | Age: 66
End: 2021-12-01

## 2021-12-01 DIAGNOSIS — S52.532D CLOSED COLLES' FRACTURE OF LEFT RADIUS WITH ROUTINE HEALING, SUBSEQUENT ENCOUNTER: Primary | ICD-10-CM

## 2021-12-01 DIAGNOSIS — S42.254A CLOSED NONDISPLACED FRACTURE OF GREATER TUBEROSITY OF RIGHT HUMERUS, INITIAL ENCOUNTER: ICD-10-CM

## 2021-12-06 ENCOUNTER — TELEPHONE (OUTPATIENT)
Dept: OBGYN CLINIC | Facility: HOSPITAL | Age: 66
End: 2021-12-06

## 2021-12-08 ENCOUNTER — TELEPHONE (OUTPATIENT)
Dept: OBGYN CLINIC | Facility: HOSPITAL | Age: 66
End: 2021-12-08

## 2021-12-10 ENCOUNTER — TELEPHONE (OUTPATIENT)
Dept: OBGYN CLINIC | Facility: HOSPITAL | Age: 66
End: 2021-12-10

## 2021-12-10 DIAGNOSIS — S42.254A CLOSED NONDISPLACED FRACTURE OF GREATER TUBEROSITY OF RIGHT HUMERUS, INITIAL ENCOUNTER: ICD-10-CM

## 2021-12-10 DIAGNOSIS — S52.532D CLOSED COLLES' FRACTURE OF LEFT RADIUS WITH ROUTINE HEALING, SUBSEQUENT ENCOUNTER: Primary | ICD-10-CM

## 2021-12-13 ENCOUNTER — TELEPHONE (OUTPATIENT)
Dept: OBGYN CLINIC | Facility: HOSPITAL | Age: 66
End: 2021-12-13

## 2021-12-14 ENCOUNTER — TELEPHONE (OUTPATIENT)
Dept: OBGYN CLINIC | Facility: HOSPITAL | Age: 66
End: 2021-12-14

## 2021-12-22 ENCOUNTER — TELEPHONE (OUTPATIENT)
Dept: OBGYN CLINIC | Facility: MEDICAL CENTER | Age: 66
End: 2021-12-22

## 2022-01-04 ENCOUNTER — APPOINTMENT (OUTPATIENT)
Dept: RADIOLOGY | Facility: MEDICAL CENTER | Age: 67
End: 2022-01-04
Payer: MEDICARE

## 2022-01-04 ENCOUNTER — OFFICE VISIT (OUTPATIENT)
Dept: OBGYN CLINIC | Facility: CLINIC | Age: 67
End: 2022-01-04

## 2022-01-04 ENCOUNTER — IMMUNIZATIONS (OUTPATIENT)
Dept: FAMILY MEDICINE CLINIC | Facility: HOSPITAL | Age: 67
End: 2022-01-04

## 2022-01-04 VITALS
HEART RATE: 74 BPM | BODY MASS INDEX: 32.44 KG/M2 | WEIGHT: 190 LBS | SYSTOLIC BLOOD PRESSURE: 131 MMHG | HEIGHT: 64 IN | DIASTOLIC BLOOD PRESSURE: 83 MMHG

## 2022-01-04 DIAGNOSIS — S42.254A CLOSED NONDISPLACED FRACTURE OF GREATER TUBEROSITY OF RIGHT HUMERUS, INITIAL ENCOUNTER: ICD-10-CM

## 2022-01-04 DIAGNOSIS — Z23 ENCOUNTER FOR IMMUNIZATION: Primary | ICD-10-CM

## 2022-01-04 DIAGNOSIS — S52.532D CLOSED COLLES' FRACTURE OF LEFT RADIUS WITH ROUTINE HEALING, SUBSEQUENT ENCOUNTER: Primary | ICD-10-CM

## 2022-01-04 DIAGNOSIS — S52.532D CLOSED COLLES' FRACTURE OF LEFT RADIUS WITH ROUTINE HEALING, SUBSEQUENT ENCOUNTER: ICD-10-CM

## 2022-01-04 PROCEDURE — 73030 X-RAY EXAM OF SHOULDER: CPT

## 2022-01-04 PROCEDURE — 0064A COVID-19 MODERNA VACC 0.25 ML BOOSTER: CPT

## 2022-01-04 PROCEDURE — 99024 POSTOP FOLLOW-UP VISIT: CPT | Performed by: ORTHOPAEDIC SURGERY

## 2022-01-04 PROCEDURE — 73110 X-RAY EXAM OF WRIST: CPT

## 2022-01-04 PROCEDURE — 91306 COVID-19 MODERNA VACC 0.25 ML BOOSTER: CPT

## 2022-01-04 NOTE — PROGRESS NOTES
77 y o female presents for follow-up of left distal radius fracture from 10/15/2021 (11 weeks) and right nondisplaced greater tuberosity fracture and posterior glenoid rim fracture sustained 11/25/2021 (5 1/2 weeks)  She presents for x-ray and follow-up  Her multiple attempts to get VNA for this patient but this was unsuccessful  Review of Systems  Review of systems negative unless otherwise specified in HPI    Past Medical History  Past Medical History:   Diagnosis Date    Bipolar 1 disorder ([facility] )     Cancer ([facility] )     kidney    Cardiac disease     fast heart beat    Chronic pain     Fractures     GERD (gastroesophageal reflux disease)     Hard of hearing     Hypertension     [facility]     Stroke ([facility] )     2009 affected her speech, right sided weakness    TIA (transient ischemic attack)      Past Surgical History  Past Surgical History:   Procedure Laterality Date    APPENDECTOMY      CHOLECYSTECTOMY      JOINT REPLACEMENT Bilateral      bilateral knees    NEPHRECTOMY Right     CT COLONOSCOPY FLX DX W/COLLJ SPEC WHEN PFRMD N/A 8/7/2018    Procedure: COLONOSCOPY;  Surgeon: Saleem Poole MD;  Location: [facility] MAIN OR;  Service: Gastroenterology    CT OPEN TREATMENT BIMALLEOLAR ANKLE FRACTURE Right 5/14/2019    Procedure: ANKLE - Bimalleolar OPEN REDUCTION W/ INTERNAL FIXATION (ORIF); Surgeon: Tru Claire;   Location: [facility] MAIN OR;  Service: Orthopedics     Current Medications  Current Outpatient Medications on File Prior to Visit   Medication Sig Dispense Refill    albuterol (PROVENTIL HFA,VENTOLIN HFA) 90 mcg/act inhaler INHALE 2 PUFFS BY MOUTH EVERY 6 HOURS AS NEEDED OR WHEEZING 18 g 0    ALPRAZolam (XANAX) 1 mg tablet Take by mouth 4 (four) times a day      amLODIPine (NORVASC) 5 mg tablet Take 5 mg by mouth daily        amoxicillin-clavulanate (AUGMENTIN) 875-125 mg per tablet       aspirin 81 mg chewable tablet Chew 1 tablet (81 mg total) daily 30 tablet 0    atorvastatin (LIPITOR) 10 mg tablet Take 10 mg by mouth daily      atorvastatin (LIPITOR) 20 mg tablet Take 1 tablet (20 mg total) by mouth daily 90 tablet 1    Brexpiprazole (Rexulti) 2 MG tablet Take 2 mg by mouth daily      carisoprodol (SOMA) 350 mg tablet       docusate sodium (COLACE) 100 mg capsule Take by mouth      ergocalciferol (VITAMIN D2) 50,000 units Take by mouth once a week       gabapentin (NEURONTIN) 800 mg tablet Take 0 5 tablets (400 mg total) by mouth 4 (four) times a day 60 tablet 0    haloperidol lactate (Haldol) 5 mg/mL       ketotifen (ZADITOR) 0 025 % ophthalmic solution Apply to eye        lidocaine (LIDODERM) 5 % Apply 1 patch topically daily Remove & Discard patch within 12 hours or as directed by MD        Melatonin 5 MG CAPS Take by mouth       metoprolol succinate (TOPROL-XL) 100 mg 24 hr tablet Take 200 mg by mouth       OLANZapine (ZyPREXA) 20 MG tablet Take 20 mg by mouth daily at bedtime       pantoprazole (PROTONIX) 40 mg tablet Take 40 mg by mouth daily       QUEtiapine (SEROquel) 100 mg tablet Take 300 mg by mouth daily at bedtime       QUEtiapine (SEROquel) 200 mg tablet Take 1 tablet (200 mg total) by mouth daily at bedtime 30 tablet 0    traMADol (ULTRAM) 50 mg tablet       zolpidem (AMBIEN) 10 mg tablet       HYDROcodone-acetaminophen (NORCO) 5-325 mg per tablet Take 1 tablet by mouth every 6 (six) hours as needed for pain   (Patient not taking: Reported on 1/4/2022 )      HYDROcodone-acetaminophen (NORCO) 5-325 mg per tablet Take 1 tablet by mouth every 6 (six) hours as needed for pain   (Patient not taking: Reported on 1/4/2022 )      pantoprazole (PROTONIX) 40 mg tablet Take 40 mg by mouth daily Take before breakfast, early in the morning  No current facility-administered medications on file prior to visit       Recent Labs [facility])  0   Lab Value Date/Time    HCT 37 4 08/18/2021 1226    HCT 34 0 (L) 10/15/2015 0738    HGB 11 8 08/18/2021 1226    HGB 11 3 (L) 10/15/2015 0738    WBC 7 08 08/18/2021 1226    WBC 6 52 10/15/2015 0738    INR 1 02 06/24/2021 1313    INR 0 99 07/08/2015 0945    ESR 17 03/13/2018 1329    CRP <3 0 03/13/2018 1329    GLUCOSE 112 06/24/2021 1315    GLUCOSE 88 10/15/2015 0738    HGBA1C 5 3 10/16/2020 0438     Physical exam  Body mass index is 32 61 kg/m²  · General: Awake, Alert, Oriented  · Eyes: Pupils equal, round and reactive to light  · Heart: regular rate and rhythm  · Lungs: No audible wheezing  · Abdomen: soft  left wrist:  Nontender palpation  No gross deformity  Full flexion extension with radial ulnar deviation without pain  Full pronation and supination without pain  Equal  strength  Radial pulse 4/4  Right shoulder:  Continues with tenderness posterior lateral in the greater tuberosity region with less discomfort near the posterior glenoid  Mild discomfort and pain with active flexion abduction  Grossly neurovascular intact  Radial pulse 4/4  Procedure  None today    Imaging  I personally reviewed x-rays left wrist and right shoulder taken in the office today which note good callus formation the distal radius with slight impaction and still ununited ulnar styloid  Right shoulder notes maintain position alignment of the greater tuberosity fracture and posterior glenoid fracture  Minimal callus  1  Closed Colles' fracture of left radius with routine healing, subsequent encounter    2  Closed nondisplaced fracture of greater tuberosity of right humerus, initial encounter      Assessment:  left wrist healing distal radius fracture and ulnar styloid are clinically healed  Healing right avoid and humeral greater tuberosity fractures with slight callus on x-ray but good motion with mild discomfort  Plan: We will transition the patient out of the wrist brace and shoulder immobilizer to work on her range of motion  Minimal 5 lb lifting with the right arm    She was shown wall finger walk exercises and door way rotation exercises for the shoulder  Return in 6 weeks for x-rays of the right shoulder  This note was created with voice recognition software

## 2022-01-04 NOTE — PATIENT INSTRUCTIONS
We will transition the patient out of the wrist brace and shoulder immobilizer to work on her range of motion  Minimal 5 lb lifting with the right arm  She was shown wall finger walk exercises and door way rotation exercises for the shoulder  Return in 6 weeks for x-rays of the right shoulder

## 2022-01-07 ENCOUNTER — TELEPHONE (OUTPATIENT)
Dept: OBGYN CLINIC | Facility: HOSPITAL | Age: 67
End: 2022-01-07

## 2022-01-31 ENCOUNTER — TELEPHONE (OUTPATIENT)
Dept: OBGYN CLINIC | Facility: HOSPITAL | Age: 67
End: 2022-01-31

## 2022-02-03 ENCOUNTER — APPOINTMENT (OUTPATIENT)
Dept: RADIOLOGY | Facility: MEDICAL CENTER | Age: 67
End: 2022-02-03
Payer: MEDICARE

## 2022-02-03 DIAGNOSIS — M75.01 ADHESIVE BURSITIS OF RIGHT SHOULDER: ICD-10-CM

## 2022-02-03 PROCEDURE — 73030 X-RAY EXAM OF SHOULDER: CPT

## 2022-04-11 ENCOUNTER — TRANSCRIBE ORDERS (OUTPATIENT)
Dept: URGENT CARE | Facility: MEDICAL CENTER | Age: 67
End: 2022-04-11

## 2022-04-11 ENCOUNTER — APPOINTMENT (OUTPATIENT)
Dept: RADIOLOGY | Facility: MEDICAL CENTER | Age: 67
End: 2022-04-11
Payer: MEDICARE

## 2022-04-11 DIAGNOSIS — M25.562 ACUTE PAIN OF LEFT KNEE: ICD-10-CM

## 2022-04-11 DIAGNOSIS — M25.562 ACUTE PAIN OF LEFT KNEE: Primary | ICD-10-CM

## 2022-04-11 PROCEDURE — 73564 X-RAY EXAM KNEE 4 OR MORE: CPT

## 2022-04-28 ENCOUNTER — CONSULT (OUTPATIENT)
Dept: GASTROENTEROLOGY | Facility: CLINIC | Age: 67
End: 2022-04-28
Payer: MEDICARE

## 2022-04-28 VITALS
HEART RATE: 88 BPM | HEIGHT: 64 IN | DIASTOLIC BLOOD PRESSURE: 74 MMHG | TEMPERATURE: 98.2 F | BODY MASS INDEX: 31.24 KG/M2 | SYSTOLIC BLOOD PRESSURE: 110 MMHG | WEIGHT: 183 LBS

## 2022-04-28 DIAGNOSIS — K21.9 GASTROESOPHAGEAL REFLUX DISEASE, UNSPECIFIED WHETHER ESOPHAGITIS PRESENT: ICD-10-CM

## 2022-04-28 DIAGNOSIS — R19.5 POSITIVE FECAL OCCULT BLOOD TEST: ICD-10-CM

## 2022-04-28 DIAGNOSIS — R11.0 NAUSEA: ICD-10-CM

## 2022-04-28 DIAGNOSIS — R10.9 ABDOMINAL CRAMPING: ICD-10-CM

## 2022-04-28 DIAGNOSIS — D64.9 ANEMIA, UNSPECIFIED TYPE: Primary | ICD-10-CM

## 2022-04-28 DIAGNOSIS — R19.8 ALTERNATING CONSTIPATION AND DIARRHEA: ICD-10-CM

## 2022-04-28 DIAGNOSIS — K92.1 BLACK STOOLS: ICD-10-CM

## 2022-04-28 PROCEDURE — 99203 OFFICE O/P NEW LOW 30 MIN: CPT | Performed by: NURSE PRACTITIONER

## 2022-04-28 RX ORDER — FAMOTIDINE 20 MG/1
20 TABLET, FILM COATED ORAL DAILY
Qty: 30 TABLET | Refills: 4 | Status: SHIPPED | OUTPATIENT
Start: 2022-04-28

## 2022-04-28 NOTE — PROGRESS NOTES
[facility] - Outpatient Consultation  Randall Alston 77 y o  female MRN: [id number]  Encounter: [id number]          ASSESSMENT AND PLAN:      1  Gastroesophageal reflux disease, unspecified whether esophagitis present  2  Nausea    Patient has a long history of GERD in which she takes pantoprazole b i d  She does report however worsening of symptoms despite taking this medication  She does not recall the date of her last EGD but that it was many years ago  She does also report frequent nausea but does states she takes a lot of medications and believes this contributes to her nausea  Due to patient's worsening of symptoms despite taking medication as well as long-term history, would recommend EGD to evaluate for etiology such as but not limited to esophagitis, EOE, gastritis, H pylori or peptic ulcer disease as well as to evaluate for Daniels's esophagus  Process, risks and benefits discussed with patient, she is agreeable  Will also start patient on a low-dose Pepcid daily  - famotidine (PEPCID) 20 mg tablet; Take 1 tablet (20 mg total) by mouth daily  Dispense: 30 tablet; Refill: 4  - EGD; Future    3  Positive fecal occult blood test  4  Anemia, unspecified type  5  Black stools    Patient recently had a positive fecal occult blood test   She also has a history of anemia  She reports black stools over the past couple months however does report using Pepto-Bismol for diarrhea  Despite patient's Pepto-Bismol use, would recommend colonoscopy given patient's positive fecal occult and anemia  Patient's last colonoscopy was in 2018 in which mild diverticulosis and small internal hemorrhoids were seen  Process, risks and benefits discussed with patient, she is agreeable  - Colonoscopy; Future    6  Alternating constipation and diarrhea  7   Abdominal cramping    Patient reports that she has had alternating constipation and diarrhea for many years but does state that it has been getting worse recently  She also reports occasional abdominal cramping when she has significant constipation or diarrhea  She reports taking Pepto-Bismol for the diarrhea and Colace for the constipation with some relief  Patient reports that her PCP has advised using Imodium versus Pepto-Bismol for diarrhea  Discussed the use of a high-fiber diet to help regulate her bowel movements  Encouraged patient to try to get up to 25 g of fiber daily  Information on high-fiber diet added to AVS and discussed  Also discussed the use of fiber supplements as needed  Patient verbalizes understanding       - high-fiber diet, 25 g with use of supplements as needed    Will see patient back after procedures  ______________________________________________________________________    HPI:  Maggie Kwok is a 80-year-old female that presents today for consult due to a positive fecal occult and anemia  Her last colonoscopy was in 2018 revealed mild diverticulosis in the sigmoid colon and small internal hemorrhoids but otherwise normal   She was recommended for repeat in 10 years  Patient does report having black stools for the last couple months  She does report a long history of alternating constipation and diarrhea but states that it is been getting worse  She denies any bloody stools  She does report however taking Pepto-Bismol for diarrhea  She reports that her PCP has instructed her to take Imodium instead  She does report taking Colace for constipation  She reports occasional abdominal cramping when she is having significant constipation or diarrhea  Patient has a long history of GERD in which she takes pantoprazole 40 mg b i d  She does report recently having increased heartburn despite taking the medication  She reports using Tums with some relief  She is unsure of her last EGD  At her last visit with our office in 2018 it was noted that her last EGD was more than 10 years ago    Patient reports frequent nausea but states that she does take a lot of medication and believes this might be the culprit  She denies any frequent vomiting or dysphagia  REVIEW OF SYSTEMS:    Review of Systems   Constitutional: Positive for unexpected weight change (Reports a weight gain of 40 lb over the past year)  HENT: Negative for trouble swallowing  Gastrointestinal: Positive for constipation, diarrhea and nausea  Negative for abdominal distention, anal bleeding, blood in stool, rectal pain and vomiting  Abdominal pain:  occasional cramping with constipation and diarrhea  All other systems reviewed and are negative  Historical Information   Past Medical History:   Diagnosis Date    Bipolar 1 disorder ([facility] )     Cancer ([place] [address] )     kidney    Cardiac disease     fast heart beat    Chronic pain     Fractures     GERD (gastroesophageal reflux disease)     Hard of hearing     Hypertension     Renal cell carcinoma ([place] [address] )     Stroke ([place] [address] )     2009 affected her speech, right sided weakness    TIA (transient ischemic attack)      Past Surgical History:   Procedure Laterality Date    APPENDECTOMY      CHOLECYSTECTOMY      JOINT REPLACEMENT Bilateral      bilateral knees    NEPHRECTOMY Right     GA COLONOSCOPY FLX DX W/COLLJ SPEC WHEN PFRMD N/A 8/7/2018    Procedure: COLONOSCOPY;  Surgeon: Charmaine Gonzalez MD;  Location: [facility];  Service: Gastroenterology    GA OPEN TREATMENT BIMALLEOLAR ANKLE FRACTURE Right 5/14/2019    Procedure: ANKLE - Bimalleolar OPEN REDUCTION W/ INTERNAL FIXATION (ORIF); Surgeon: Bailee Wren;   Location: [facility] MAIN OR;  Service: Orthopedics     Social History   Social History     Substance and Sexual Activity   Alcohol Use Not Currently     Social History     Substance and Sexual Activity   Drug Use Not Currently     Social History     Tobacco Use   Smoking Status Current Every Day Smoker    Packs/day: 0 25    Years: 7 00    Pack years: 1 75    Types: Cigarettes   Smokeless Tobacco Never Used   Tobacco Comment    pt refused     Family History   Problem Relation Age of Onset    Colon cancer Other     Alcohol abuse Other     Hypertension Other        Meds/Allergies       Current Outpatient Medications:     albuterol (PROVENTIL HFA,VENTOLIN HFA) 90 mcg/act inhaler    ALPRAZolam (XANAX) 1 mg tablet    amLODIPine (NORVASC) 5 mg tablet    amoxicillin-clavulanate (AUGMENTIN) 875-125 mg per tablet    aspirin 81 mg chewable tablet    atorvastatin (LIPITOR) 10 mg tablet    atorvastatin (LIPITOR) 20 mg tablet    Brexpiprazole (Rexulti) 2 MG tablet    carisoprodol (SOMA) 350 mg tablet    docusate sodium (COLACE) 100 mg capsule    ergocalciferol (VITAMIN D2) 50,000 units    gabapentin (NEURONTIN) 800 mg tablet    haloperidol lactate (Haldol) 5 mg/mL    HYDROcodone-acetaminophen (NORCO) 5-325 mg per tablet    HYDROcodone-acetaminophen (NORCO) 5-325 mg per tablet    ketotifen (ZADITOR) 0 025 % ophthalmic solution    lidocaine (LIDODERM) 5 %    Melatonin 5 MG CAPS    metoprolol succinate (TOPROL-XL) 100 mg 24 hr tablet    OLANZapine (ZyPREXA) 20 MG tablet    pantoprazole (PROTONIX) 40 mg tablet    QUEtiapine (SEROquel) 100 mg tablet    QUEtiapine (SEROquel) 200 mg tablet    traMADol (ULTRAM) 50 mg tablet    zolpidem (AMBIEN) 10 mg tablet    famotidine (PEPCID) 20 mg tablet    pantoprazole (PROTONIX) 40 mg tablet    Allergies   Allergen Reactions    Celecoxib Rash and Other (See Comments)     CELEBREX    Doxazosin Rash and Hives    Metaxalone Rash and Hives           Objective     Blood pressure 110/74, pulse 88, temperature 98 2 °F (36 8 °C), height 5' 4" (1 626 m), weight 83 kg (183 lb)  Body mass index is 31 41 kg/m²  PHYSICAL EXAM:      General Appearance:   Alert, cooperative, no distress   HEENT:   Normocephalic, atraumatic, anicteric       Neck:  Symmetrical, trachea midline   Lungs:   Clear to auscultation bilaterally; no rales, rhonchi or wheezing; respirations unlabored    Heart[de-identified]   Regular rate and rhythm; no murmur, rub, or gallop  Abdomen:   Soft, non-tender, non-distended; normal bowel sounds; no masses, no organomegaly    Genitalia:   Deferred    Rectal:   Deferred    Skin:  No jaundice, rashes, or lesions             Lab Results:   No visits with results within 1 Day(s) from this visit  Latest known visit with results is:   Transcribe Orders on 11/24/2021   Component Date Value    Amph/Meth UR 11/24/2021 Negative     Barbiturate Ur 11/24/2021 Negative     Benzodiazepine Urine 11/24/2021 Positive*    Cocaine Urine 11/24/2021 Negative     Methadone Urine 11/24/2021 Negative     Opiate Urine 11/24/2021 Positive*    PCP Ur 11/24/2021 Negative     THC Urine 11/24/2021 Negative     Oxycodone Urine 11/24/2021 Negative          Radiology Results:   XR knee 4+ vw left injury    Result Date: 4/13/2022  Narrative: LEFT KNEE INDICATION:   M25 562: Pain in left knee  COMPARISON:  12/18/2012  VIEWS:  XR KNEE 4+ VW LEFT INJURY FINDINGS: There is no acute fracture or dislocation  There is no joint effusion  Mild osteoarthritis with narrowing of the medial tibiofemoral joint and small osteophytes seen  Mild osteophytosis is also seen involving the lateral compartment No lytic or blastic osseous lesion  Soft tissues are unremarkable  Impression: 1  Mild osteoarthritic changes primarily involving the medial compartment   Workstation performed: [id number]

## 2022-04-28 NOTE — PATIENT INSTRUCTIONS
High Fiber Diet   WHAT YOU NEED TO KNOW:   A high-fiber diet includes foods that have a high amount of fiber  Fiber is the part of fruits, vegetables, and grains that is not broken down by your body  Fiber keeps your bowel movements regular  Fiber can also help lower your cholesterol level, control blood sugar in people with diabetes, and relieve constipation  Fiber can also help you control your weight because it helps you feel full faster  Most adults should eat 25 to 35 grams of fiber each day  Talk to your dietitian or healthcare provider about the amount of fiber you need  DISCHARGE INSTRUCTIONS:   Good sources of fiber:       · Foods with at least 4 grams of fiber per serving:      ? ? to ½ cup of high-fiber cereal (check the nutrition label on the box)    ? ½ cup of blackberries or raspberries    ? 4 dried prunes    ? 1 cooked artichoke    ? ½ cup of cooked legumes, such as lentils, or red, kidney, and tirpathi beans    · Foods with 1 to 3 grams of fiber per serving:      ? 1 slice of whole-wheat, pumpernickel, or rye bread    ? ½ cup of cooked brown rice    ? 4 whole-wheat crackers    ? 1 cup of oatmeal    ? ½ cup of cereal with 1 to 3 grams of fiber per serving (check the nutrition label on the box)    ? 1 small piece of fruit, such as an apple, banana, pear, kiwi, or orange    ? 3 dates    ? ½ cup of canned apricots, fruit cocktail, peaches, or pears    ? ½ cup of raw or cooked vegetables, such as carrots, cauliflower, cabbage, spinach, squash, or corn    Ways that you can increase fiber in your diet:   · Choose brown or wild rice instead of white rice  · Use whole wheat flour in recipes instead of white or all-purpose flour  · Add beans and peas to casseroles or soups  · Choose fresh fruit and vegetables with peels or skins on instead of juices  Other diet guidelines to follow:   · Add fiber to your diet slowly    You may have abdominal discomfort, bloating, and gas if you add fiber to your diet too quickly  · Drink plenty of liquids as you add fiber to your diet  You may have nausea or develop constipation if you do not drink enough water  Ask how much liquid to drink each day and which liquids are best for you  © Copyright [address] 2022 Information is for End User's use only and may not be sold, redistributed or otherwise used for commercial purposes  All illustrations and images included in CareNotes® are the copyrighted property of [employer]  or [address] [place], [place] [address]   The above information is an  only  It is not intended as medical advice for individual conditions or treatments  Talk to your doctor, nurse or pharmacist before following any medical regimen to see if it is safe and effective for you  Pt [facility] for 7/20 for colon and egd, miralax and ducolax prep given, follow up made

## 2022-05-07 ENCOUNTER — APPOINTMENT (EMERGENCY)
Dept: RADIOLOGY | Facility: HOSPITAL | Age: 67
End: 2022-05-07
Payer: MEDICARE

## 2022-05-07 ENCOUNTER — HOSPITAL ENCOUNTER (EMERGENCY)
Facility: HOSPITAL | Age: 67
Discharge: HOME | End: 2022-05-07
Attending: EMERGENCY MEDICINE | Admitting: EMERGENCY MEDICINE
Payer: MEDICARE

## 2022-05-07 VITALS
HEART RATE: 78 BPM | SYSTOLIC BLOOD PRESSURE: 124 MMHG | RESPIRATION RATE: 16 BRPM | DIASTOLIC BLOOD PRESSURE: 80 MMHG | TEMPERATURE: 97.7 F | OXYGEN SATURATION: 95 %

## 2022-05-07 DIAGNOSIS — S80.12XA CONTUSION OF LEFT LOWER LEG, INITIAL ENCOUNTER: Primary | ICD-10-CM

## 2022-05-07 PROBLEM — R71.8 ELEVATED MCV: Status: ACTIVE | Noted: 2017-08-31

## 2022-05-07 PROBLEM — E78.00 PURE HYPERCHOLESTEROLEMIA: Status: ACTIVE | Noted: 2021-08-30

## 2022-05-07 PROCEDURE — 73590 X-RAY EXAM OF LOWER LEG: CPT

## 2022-05-07 PROCEDURE — 99282 EMERGENCY DEPT VISIT SF MDM: CPT | Performed by: PHYSICIAN ASSISTANT

## 2022-05-07 PROCEDURE — 99283 EMERGENCY DEPT VISIT LOW MDM: CPT

## 2022-05-07 RX ORDER — LANOLIN ALCOHOL/MO/W.PET/CERES
1 CREAM (GRAM) TOPICAL DAILY
COMMUNITY
Start: 2022-04-26

## 2022-05-07 RX ORDER — IBUPROFEN 800 MG/1
1 TABLET ORAL 3 TIMES DAILY PRN
COMMUNITY
Start: 2022-03-21

## 2022-05-07 NOTE — DISCHARGE INSTRUCTIONS
Rest, ice, compression, elevation  Continue use of ACE wrap as needed  Continue home pain medications as prescribed  Follow up with orthopedics or return to ER as needed

## 2022-05-07 NOTE — ED PROVIDER NOTES
History  Chief Complaint   Patient presents with    Leg Pain     pt states she fell last week and had an XR of left knee  pt now c/o left chin pain     77year old female presents ambulatory from home for evaluation left shin pain  Pt notes she tripped and fell over her lawnmower  She states she was evaluated for knee pain but pain is below her knee in her mid left shin  She denies chin pain as noted in triage note  She reports pain aggravated by touch and walking  No reported alleviating factors  Denies hitting head  No LOC  Denies aspirin or blood thinners  No other injuries reported  She reports she has pain in her medial left knee but states she has arthritis  She has been walking on the leg since injury  Denies numbness, tingling, swelling, weakness  She has otherwise been in her usual state of health  Denies fever, chills, cough, congestion or recent illness  Denies chest pain, SOB, N/V/D, abdominal pain  She has been taking her prescribed pain medication without relief  She is concerned that she "broke her shin"  History provided by:  Patient   used: No    Leg Pain  Location:  Leg  Time since incident:  1 week  Injury: yes    Mechanism of injury: fall    Leg location:  L lower leg  Pain details:     Quality:  Aching    Radiates to:  Does not radiate  Chronicity:  New  Dislocation: no    Worsened by: Activity and bearing weight  Ineffective treatments:  Rest  Associated symptoms: back pain (chronic)    Associated symptoms: no decreased ROM, no fatigue, no fever, no neck pain, no numbness, no swelling and no tingling    Risk factors: known bone disorder    Risk factors: no concern for non-accidental trauma and no recent illness        Prior to Admission Medications   Prescriptions Last Dose Informant Patient Reported? Taking?    ALPRAZolam (XANAX) 1 mg tablet  Mother Yes No   Sig: Take by mouth 4 (four) times a day   Brexpiprazole (Rexulti) 2 MG tablet  Mother Yes No Sig: Take 2 mg by mouth daily   HYDROcodone-acetaminophen (NORCO) 5-325 mg per tablet   Yes No   Sig: Take 1 tablet by mouth every 6 (six) hours as needed for pain     HYDROcodone-acetaminophen (NORCO) 5-325 mg per tablet  Mother Yes No   Sig: Take 1 tablet by mouth every 6 (six) hours as needed for pain     Melatonin 5 MG CAPS  Self Yes No   Sig: Take by mouth    OLANZapine (ZyPREXA) 20 MG tablet   Yes No   Sig: Take 20 mg by mouth daily at bedtime    QUEtiapine (SEROquel) 100 mg tablet  Self Yes No   Sig: Take 300 mg by mouth daily at bedtime    QUEtiapine (SEROquel) 200 mg tablet   No No   Sig: Take 1 tablet (200 mg total) by mouth daily at bedtime   albuterol (PROVENTIL HFA,VENTOLIN HFA) 90 mcg/act inhaler   No No   Sig: INHALE 2 PUFFS BY MOUTH EVERY 6 HOURS AS NEEDED OR WHEEZING   amLODIPine (NORVASC) 5 mg tablet   Yes No   Sig: Take 5 mg by mouth daily     amoxicillin-clavulanate (AUGMENTIN) 875-125 mg per tablet   Yes No   aspirin 81 mg chewable tablet   No No   Sig: Chew 1 tablet (81 mg total) daily   atorvastatin (LIPITOR) 10 mg tablet  Mother Yes No   Sig: Take 10 mg by mouth daily   atorvastatin (LIPITOR) 20 mg tablet   No No   Sig: Take 1 tablet (20 mg total) by mouth daily   carisoprodol (SOMA) 350 mg tablet   Yes No   docusate sodium (COLACE) 100 mg capsule  Self Yes No   Sig: Take by mouth   ergocalciferol (VITAMIN D2) 50,000 units  Self Yes No   Sig: Take by mouth once a week    famotidine (PEPCID) 20 mg tablet   No No   Sig: Take 1 tablet (20 mg total) by mouth daily   gabapentin (NEURONTIN) 800 mg tablet   No No   Sig: Take 0 5 tablets (400 mg total) by mouth 4 (four) times a day   haloperidol lactate (Haldol) 5 mg/mL   Yes No   ibuprofen (MOTRIN) 800 mg tablet   Yes No   Sig: Take 1 tablet by mouth 3 (three) times a day as needed   ketotifen (ZADITOR) 0 025 % ophthalmic solution  Self Yes No   Sig: Apply to eye     lidocaine (LIDODERM) 5 %   Yes No   Sig: Apply 1 patch topically daily Remove & Discard patch within 12 hours or as directed by MD     metoprolol succinate (TOPROL-XL) 100 mg 24 hr tablet  Self Yes No   Sig: Take 200 mg by mouth    pantoprazole (PROTONIX) 40 mg tablet  Self Yes No   Sig: Take 40 mg by mouth daily Take before breakfast, early in the morning  pantoprazole (PROTONIX) 40 mg tablet  Mother Yes No   Sig: Take 40 mg by mouth daily    traMADol (ULTRAM) 50 mg tablet   Yes No   vitamin B-12 (VITAMIN B-12) 1,000 mcg tablet   Yes No   Sig: Take 1 tablet by mouth daily   zolpidem (AMBIEN) 10 mg tablet   Yes No      Facility-Administered Medications: None       Past Medical History:   Diagnosis Date    Bipolar 1 disorder (HCC)     [facility] ([facility] )     kidney    Cardiac disease     fast heart beat    Chronic pain     Fractures     GERD (gastroesophageal reflux disease)     Hard of hearing     Hypertension     [facility]     Stroke ([facility] )     2009 affected her speech, right sided weakness    TIA (transient ischemic attack)        Past Surgical History:   Procedure Laterality Date    APPENDECTOMY      CHOLECYSTECTOMY      JOINT REPLACEMENT Bilateral      bilateral knees    NEPHRECTOMY Right     RI COLONOSCOPY FLX DX W/COLLJ SPEC WHEN PFRMD N/A 8/7/2018    Procedure: COLONOSCOPY;  Surgeon: Sahil Carrasquillo MD;  Location: [facility] MAIN OR;  Service: Gastroenterology    RI OPEN TREATMENT BIMALLEOLAR ANKLE FRACTURE Right 5/14/2019    Procedure: ANKLE - Bimalleolar OPEN REDUCTION W/ INTERNAL FIXATION (ORIF); Surgeon: Diomedes Remy; Location: [address] [place] [address] MAIN OR;  Service: Orthopedics       Family History   Problem Relation Age of Onset    Colon cancer Other     Alcohol abuse Other     Hypertension Other      I have reviewed and agree with the history as documented      E-Cigarette/Vaping    E-Cigarette Use Never User      E-Cigarette/Vaping Substances     Social History     Tobacco Use    Smoking status: Current Every Day Smoker     Packs/day: 0 50     Years: 7 00     Pack years: 3 50     Types: Cigarettes    Smokeless tobacco: Never Used    Tobacco comment: pt refused   Vaping Use    Vaping Use: Never used   Substance Use Topics    Alcohol use: Not Currently    Drug use: Not Currently       Review of Systems   Constitutional: Negative  Negative for chills, fatigue and fever  HENT: Negative  Negative for congestion, rhinorrhea and sore throat  Eyes: Negative  Respiratory: Negative  Negative for cough, shortness of breath and wheezing  Cardiovascular: Negative  Negative for chest pain, palpitations and leg swelling  Gastrointestinal: Negative  Negative for abdominal pain, constipation, diarrhea, nausea and vomiting  Genitourinary: Negative  Negative for flank pain  Musculoskeletal: Positive for arthralgias and back pain (chronic)  Negative for myalgias and neck pain  Skin: Negative  Negative for rash  Neurological: Negative  Negative for dizziness, numbness and headaches  Psychiatric/Behavioral: Negative  All other systems reviewed and are negative  Physical Exam  Physical Exam  Vitals and nursing note reviewed  Constitutional:       General: She is not in acute distress  Appearance: She is well-developed  She is not toxic-appearing  HENT:      Head: Normocephalic and atraumatic  Right Ear: Tympanic membrane, ear canal and external ear normal  Decreased hearing noted  No hemotympanum  Left Ear: Tympanic membrane, ear canal and external ear normal  Decreased hearing noted  No hemotympanum  Nose: Nose normal       Mouth/Throat:      Mouth: Mucous membranes are moist       Tongue: Tongue does not deviate from midline  Pharynx: Oropharynx is clear  Uvula midline  Eyes:      General: Lids are normal  No scleral icterus  Conjunctiva/sclera: Conjunctivae normal       Pupils: Pupils are equal, round, and reactive to light  Neck:      Trachea: Trachea and phonation normal  No tracheal deviation     Cardiovascular:      Rate and Rhythm: Normal rate and regular rhythm  Pulses: Normal pulses  Radial pulses are 2+ on the right side and 2+ on the left side  Dorsalis pedis pulses are 2+ on the right side and 2+ on the left side  Posterior tibial pulses are 2+ on the right side and 2+ on the left side  Heart sounds: Normal heart sounds, S1 normal and S2 normal  No murmur heard  Pulmonary:      Effort: Pulmonary effort is normal  No tachypnea or respiratory distress  Breath sounds: Normal breath sounds  No wheezing, rhonchi or rales  Abdominal:      General: Bowel sounds are normal  There is no distension  Palpations: Abdomen is soft  Tenderness: There is no abdominal tenderness  There is no guarding  Musculoskeletal:      Cervical back: Normal range of motion and neck supple  No spinous process tenderness  Left knee: No swelling, deformity, effusion, ecchymosis or crepitus  Normal range of motion  Tenderness present over the medial joint line  Normal alignment and normal patellar mobility  Normal pulse  Right lower leg: No swelling  No edema  Left lower leg: Tenderness present  No swelling  No edema  Left ankle: Normal  Normal pulse  Left foot: Normal range of motion and normal capillary refill  No swelling, deformity or bony tenderness  Normal pulse  Legs:    Skin:     General: Skin is warm and dry  Capillary Refill: Capillary refill takes less than 2 seconds  Findings: Abrasion (small abrasions noted on b/l hands w/o evidence of infection) and bruising present  No erythema or rash  Neurological:      General: No focal deficit present  Mental Status: She is alert and oriented to person, place, and time  GCS: GCS eye subscore is 4  GCS verbal subscore is 5  GCS motor subscore is 6  Cranial Nerves: No cranial nerve deficit  Sensory: Sensation is intact  No sensory deficit  Motor: Motor function is intact   No weakness or abnormal muscle tone  Comments: Pt ambulated into department unassisted  Psychiatric:         Mood and Affect: Mood normal          Behavior: Behavior normal          Vital Signs  ED Triage Vitals [05/07/22 1402]   Temperature Pulse Respirations Blood Pressure SpO2   97 7 °F (36 5 °C) 80 16 156/88 98 %      Temp Source Heart Rate Source Patient Position - Orthostatic VS BP Location FiO2 (%)   Temporal Monitor Sitting Right arm --      Pain Score       --           Vitals:    05/07/22 1402 05/07/22 1500   BP: 156/88 124/80   Pulse: 80 78   Patient Position - Orthostatic VS: Sitting Sitting         Visual Acuity      ED Medications  Medications - No data to display    Diagnostic Studies  Results Reviewed     None                 XR tibia fibula 2 views LEFT    (Results Pending)              Procedures  Procedures         ED Course  ED Course as of 05/07/22 1546   Sat May 07, 2022   1431 Reviewed prior records  Last Tdap 5/29/21  Had xray left knee performed on 4/11/22 which showed osteoarthritis changes medially otherwise no acute fracture  1448 XR tibia fibula 2 views LEFT  Independently viewed and interpreted by me - no fracture or acute osseous findings; pending official read  1452 Pt updated on results  Pt given ACE wrap  Will discharge with symptomatic management and outpatient orthopedics follow up  She already has Rx for chronic opioids  ACE wrap to use as needed  Pt neurovascularly intact post application  Can continue to bear weight as tolerated  There is small contusion on exam - pain is anteriorly  Suspect contusion secondary to fall  Findings not c/w DVT  Compartments are soft and exam not c/w compartment syndrome  Reviewed RICE therapy  Continue home medications for pain  Strict return precautions outlined  Advised outpatient follow up with orthopedics or return to ER for change in condition as outlined   Pt verbalized understanding and had no further questions  MDM  Number of Diagnoses or Management Options  Contusion of left lower leg, initial encounter: new and requires workup     Amount and/or Complexity of Data Reviewed  Clinical lab tests: reviewed  Tests in the radiology section of CPT®: ordered and reviewed  Decide to obtain previous medical records or to obtain history from someone other than the patient: yes  Obtain history from someone other than the patient: yes  Review and summarize past medical records: yes  Independent visualization of images, tracings, or specimens: yes    Patient Progress  Patient progress: improved      Disposition  Final diagnoses:   Contusion of left lower leg, initial encounter     Time reflects when diagnosis was documented in both MDM as applicable and the Disposition within this note     Time User Action Codes Description Comment    5/7/2022  2:56 PM Vivek Later Add [S80 12XA] Contusion of left lower leg, initial encounter       ED Disposition     ED Disposition Condition Date/Time Comment    Discharge Stable Sat May 7, 2022  2:56 PM Jocelynn Hinds discharge to home/self care              Follow-up Information     Follow up With Specialties Details Why Contact Info Additional [address] [facility] Specialists [facility] Orthopedic Surgery Schedule an appointment as soon as possible for a visit   [address] [facility] [address]  [facility] [address] Specialists Alfonso Murillo [address], [facility], [address] [facility], [address]    [facility] Emergency Department Emergency Medicine  As needed [address] [phone]  [address] [facility] Emergency Department, [place] [address], [place], [address] [facility], [address]          Discharge Medication List as of 5/7/2022  2:58 PM      CONTINUE these medications which have NOT CHANGED    Details   albuterol (PROVENTIL HFA,VENTOLIN HFA) 90 mcg/act inhaler INHALE 2 PUFFS BY MOUTH EVERY 6 HOURS AS NEEDED OR WHEEZING, Normal      ALPRAZolam (XANAX) 1 mg tablet Take by mouth 4 (four) times a day, Historical Med      amLODIPine (NORVASC) 5 mg tablet Take 5 mg by mouth daily  , Historical Med      amoxicillin-clavulanate (AUGMENTIN) 875-125 mg per tablet Starting Wed 9/29/2021, Historical Med      aspirin 81 mg chewable tablet Chew 1 tablet (81 mg total) daily, Starting Sat 10/17/2020, Normal      !! atorvastatin (LIPITOR) 10 mg tablet Take 10 mg by mouth daily, Historical Med      !! atorvastatin (LIPITOR) 20 mg tablet Take 1 tablet (20 mg total) by mouth daily, Starting Sun 9/6/2020, Normal      Brexpiprazole (Rexulti) 2 MG tablet Take 2 mg by mouth daily, Historical Med      carisoprodol (SOMA) 350 mg tablet Starting Tue 11/9/2021, Historical Med      docusate sodium (COLACE) 100 mg capsule Take by mouth, Starting Thu 6/12/2014, Historical Med      ergocalciferol (VITAMIN D2) 50,000 units Take by mouth once a week , Starting Thu 9/11/2014, Historical Med      famotidine (PEPCID) 20 mg tablet Take 1 tablet (20 mg total) by mouth daily, Starting Thu 4/28/2022, Normal      gabapentin (NEURONTIN) 800 mg tablet Take 0 5 tablets (400 mg total) by mouth 4 (four) times a day, Starting Tue 7/6/2021, Normal      haloperidol lactate (Haldol) 5 mg/mL Historical Med      !! HYDROcodone-acetaminophen (NORCO) 5-325 mg per tablet Take 1 tablet by mouth every 6 (six) hours as needed for pain  , Historical Med      !! HYDROcodone-acetaminophen (NORCO) 5-325 mg per tablet Take 1 tablet by mouth every 6 (six) hours as needed for pain  , Historical Med      ibuprofen (MOTRIN) 800 mg tablet Take 1 tablet by mouth 3 (three) times a day as needed, Starting Mon 3/21/2022, Historical Med      ketotifen (ZADITOR) 0 025 % ophthalmic solution Apply to eye  , Starting Mon 12/19/2016, Historical Med      lidocaine (LIDODERM) 5 % Apply 1 patch topically daily Remove & Discard patch within 12 hours or as directed by MD  , Historical Med      Melatonin 5 MG CAPS Take by mouth , Starting Thu 8/31/2017, Historical Med      metoprolol succinate (TOPROL-XL) 100 mg 24 hr tablet Take 200 mg by mouth , Historical Med      OLANZapine (ZyPREXA) 20 MG tablet Take 20 mg by mouth daily at bedtime , Historical Med      pantoprazole (PROTONIX) 40 mg tablet Take 40 mg by mouth daily , Historical Med      !! QUEtiapine (SEROquel) 100 mg tablet Take 300 mg by mouth daily at bedtime , Historical Med      !! QUEtiapine (SEROquel) 200 mg tablet Take 1 tablet (200 mg total) by mouth daily at bedtime, Starting Mon 6/28/2021, Normal      traMADol (ULTRAM) 50 mg tablet Starting Mon 11/22/2021, Historical Med      vitamin B-12 (VITAMIN B-12) 1,000 mcg tablet Take 1 tablet by mouth daily, Starting Tue 4/26/2022, Historical Med      zolpidem (AMBIEN) 10 mg tablet Starting Mon 11/22/2021, Historical Med       !! - Potential duplicate medications found  Please discuss with provider  No discharge procedures on file      PDMP Review       Value Time User    PDMP Reviewed  Yes 10/14/2021  6:38 PM Gaurav Yap MD          ED Provider  Electronically Signed by           Sacha Moss PA-C  05/07/22 9666

## 2022-05-09 ENCOUNTER — APPOINTMENT (EMERGENCY)
Dept: RADIOLOGY | Facility: HOSPITAL | Age: 67
End: 2022-05-09
Payer: MEDICARE

## 2022-05-09 ENCOUNTER — HOSPITAL ENCOUNTER (EMERGENCY)
Facility: HOSPITAL | Age: 67
Discharge: HOME | End: 2022-05-09
Attending: EMERGENCY MEDICINE | Admitting: EMERGENCY MEDICINE
Payer: MEDICARE

## 2022-05-09 ENCOUNTER — APPOINTMENT (EMERGENCY)
Dept: CT IMAGING | Facility: HOSPITAL | Age: 67
End: 2022-05-09
Payer: MEDICARE

## 2022-05-09 VITALS
OXYGEN SATURATION: 99 % | WEIGHT: 172 LBS | DIASTOLIC BLOOD PRESSURE: 92 MMHG | TEMPERATURE: 97.8 F | HEART RATE: 80 BPM | RESPIRATION RATE: 18 BRPM | BODY MASS INDEX: 29.52 KG/M2 | SYSTOLIC BLOOD PRESSURE: 137 MMHG

## 2022-05-09 DIAGNOSIS — S82.409A FIBULA FRACTURE: Primary | ICD-10-CM

## 2022-05-09 DIAGNOSIS — S82.209A TIBIA FRACTURE: ICD-10-CM

## 2022-05-09 PROCEDURE — 73590 X-RAY EXAM OF LOWER LEG: CPT

## 2022-05-09 PROCEDURE — 29505 APPLICATION LONG LEG SPLINT: CPT | Performed by: PHYSICIAN ASSISTANT

## 2022-05-09 PROCEDURE — 99284 EMERGENCY DEPT VISIT MOD MDM: CPT

## 2022-05-09 PROCEDURE — 73564 X-RAY EXAM KNEE 4 OR MORE: CPT

## 2022-05-09 PROCEDURE — 73610 X-RAY EXAM OF ANKLE: CPT

## 2022-05-09 PROCEDURE — G1004 CDSM NDSC: HCPCS

## 2022-05-09 PROCEDURE — 73700 CT LOWER EXTREMITY W/O DYE: CPT

## 2022-05-09 PROCEDURE — 99284 EMERGENCY DEPT VISIT MOD MDM: CPT | Performed by: PHYSICIAN ASSISTANT

## 2022-05-09 RX ORDER — HYDROCODONE BITARTRATE AND ACETAMINOPHEN 5; 325 MG/1; MG/1
1 TABLET ORAL EVERY 6 HOURS PRN
Qty: 12 TABLET | Refills: 0 | Status: SHIPPED | OUTPATIENT
Start: 2022-05-09 | End: 2022-05-19

## 2022-05-09 NOTE — ED NOTES
Patient returned from [address] [place] [address] Sep, [address] [facility]  05/09/22 5324

## 2022-05-09 NOTE — ED NOTES
Patient transported to CT via rony Molina Sep, [address] [facility]  05/09/22 4693

## 2022-05-09 NOTE — ED PROVIDER NOTES
History  Chief Complaint   Patient presents with    Leg Injury     Patient was walking dog today and he pulled and patient fell landing on left knee  c/o Left knee pain  Patient presents to the emergency department today for evaluation left lower leg pain  This is a 71-year-old female who presents via private vehicle she states she was walking her dog on a leash this morning around 0730 hours when dog pulled her causing her to fall to the ground  She felt a pop in the lower left leg  Her tenderness essentially from the left tib-fib to the left ankle  Denies hip pain  No range of motion deficits of the hip  Denies striking her head neck or back  Prior to Admission Medications   Prescriptions Last Dose Informant Patient Reported? Taking?    ALPRAZolam (XANAX) 1 mg tablet  Mother Yes No   Sig: Take by mouth 4 (four) times a day   Brexpiprazole (Rexulti) 2 MG tablet  Mother Yes No   Sig: Take 2 mg by mouth daily   HYDROcodone-acetaminophen (NORCO) 5-325 mg per tablet   Yes No   Sig: Take 1 tablet by mouth every 6 (six) hours as needed for pain     HYDROcodone-acetaminophen (NORCO) 5-325 mg per tablet  Mother Yes No   Sig: Take 1 tablet by mouth every 6 (six) hours as needed for pain     Melatonin 5 MG CAPS  Self Yes No   Sig: Take by mouth    OLANZapine (ZyPREXA) 20 MG tablet   Yes No   Sig: Take 20 mg by mouth daily at bedtime    QUEtiapine (SEROquel) 100 mg tablet  Self Yes No   Sig: Take 300 mg by mouth daily at bedtime    QUEtiapine (SEROquel) 200 mg tablet   No No   Sig: Take 1 tablet (200 mg total) by mouth daily at bedtime   albuterol (PROVENTIL HFA,VENTOLIN HFA) 90 mcg/act inhaler   No No   Sig: INHALE 2 PUFFS BY MOUTH EVERY 6 HOURS AS NEEDED OR WHEEZING   amLODIPine (NORVASC) 5 mg tablet   Yes No   Sig: Take 5 mg by mouth daily     amoxicillin-clavulanate (AUGMENTIN) 875-125 mg per tablet   Yes No   aspirin 81 mg chewable tablet   No No   Sig: Chew 1 tablet (81 mg total) daily   atorvastatin (LIPITOR) 10 mg tablet  Mother Yes No   Sig: Take 10 mg by mouth daily   atorvastatin (LIPITOR) 20 mg tablet   No No   Sig: Take 1 tablet (20 mg total) by mouth daily   carisoprodol (SOMA) 350 mg tablet   Yes No   docusate sodium (COLACE) 100 mg capsule  Self Yes No   Sig: Take by mouth   ergocalciferol (VITAMIN D2) 50,000 units  Self Yes No   Sig: Take by mouth once a week    famotidine (PEPCID) 20 mg tablet   No No   Sig: Take 1 tablet (20 mg total) by mouth daily   gabapentin (NEURONTIN) 800 mg tablet   No No   Sig: Take 0 5 tablets (400 mg total) by mouth 4 (four) times a day   haloperidol lactate (Haldol) 5 mg/mL   Yes No   ibuprofen (MOTRIN) 800 mg tablet   Yes No   Sig: Take 1 tablet by mouth 3 (three) times a day as needed   ketotifen (ZADITOR) 0 025 % ophthalmic solution  Self Yes No   Sig: Apply to eye     lidocaine (LIDODERM) 5 %   Yes No   Sig: Apply 1 patch topically daily Remove & Discard patch within 12 hours or as directed by MD     metoprolol succinate (TOPROL-XL) 100 mg 24 hr tablet  Self Yes No   Sig: Take 200 mg by mouth    pantoprazole (PROTONIX) 40 mg tablet  Self Yes No   Sig: Take 40 mg by mouth daily Take before breakfast, early in the morning     pantoprazole (PROTONIX) 40 mg tablet  Mother Yes No   Sig: Take 40 mg by mouth daily    traMADol (ULTRAM) 50 mg tablet   Yes No   vitamin B-12 (VITAMIN B-12) 1,000 mcg tablet   Yes No   Sig: Take 1 tablet by mouth daily   zolpidem (AMBIEN) 10 mg tablet   Yes No      Facility-Administered Medications: None       Past Medical History:   Diagnosis Date    Bipolar 1 disorder (HCC)     Cancer ([facility] )     kidney    Cardiac disease     fast heart beat    Chronic pain     Fractures     GERD (gastroesophageal reflux disease)     Hard of hearing     Hypertension     Renal cell carcinoma ([facility] )     [facility]     2009 affected her speech, right sided weakness    TIA (transient ischemic attack)        Past Surgical History:   Procedure Laterality Date  APPENDECTOMY      CHOLECYSTECTOMY      JOINT REPLACEMENT Bilateral      bilateral knees    NEPHRECTOMY Right     IL COLONOSCOPY FLX DX W/COLLJ SPEC WHEN PFRMD N/A 8/7/2018    Procedure: COLONOSCOPY;  Surgeon: Kay Lennox, MD;  Location: [facility] MAIN OR;  Service: Gastroenterology    IL OPEN TREATMENT BIMALLEOLAR ANKLE FRACTURE Right 5/14/2019    Procedure: ANKLE - Bimalleolar OPEN REDUCTION W/ INTERNAL FIXATION (ORIF); Surgeon: Jovi Mathias; Location: [address] [place] [address] MAIN OR;  Service: Orthopedics       Family History   Problem Relation Age of Onset    Colon cancer Other     Alcohol abuse Other     Hypertension Other      I have reviewed and agree with the history as documented  E-Cigarette/Vaping    E-Cigarette Use Never User      E-Cigarette/Vaping Substances     Social History     Tobacco Use    Smoking status: Current Every Day Smoker     Packs/day: 0 50     Years: 7 00     Pack years: 3 50     Types: Cigarettes    Smokeless tobacco: Never Used    Tobacco comment: pt refused   Vaping Use    Vaping Use: Never used   Substance Use Topics    Alcohol use: Not Currently    Drug use: Not Currently       Review of Systems   Constitutional: Negative  Negative for chills and fever  HENT: Negative  Negative for sore throat and trouble swallowing  Eyes: Negative  Respiratory: Negative  Negative for cough, shortness of breath and wheezing  Cardiovascular: Negative  Negative for chest pain and leg swelling  Gastrointestinal: Negative  Negative for abdominal pain, blood in stool and vomiting  Endocrine: Negative  Genitourinary: Negative  Musculoskeletal: Negative  Negative for neck stiffness  Left lower leg pain   Skin: Negative  Allergic/Immunologic: Negative  Neurological: Negative  Negative for dizziness, seizures, speech difficulty, weakness, light-headedness, numbness and headaches  Hematological: Negative  Psychiatric/Behavioral: Negative      All other systems reviewed and are negative  Physical Exam  Physical Exam  Vitals reviewed  Constitutional:       General: She is not in acute distress  Appearance: Normal appearance  She is not ill-appearing, toxic-appearing or diaphoretic  HENT:      Head: Normocephalic and atraumatic  Right Ear: External ear normal       Left Ear: External ear normal    Eyes:      General: No scleral icterus  Right eye: No discharge  Left eye: No discharge  Extraocular Movements: Extraocular movements intact  Conjunctiva/sclera: Conjunctivae normal    Cardiovascular:      Rate and Rhythm: Normal rate  Pulses: Normal pulses  Pulmonary:      Effort: Pulmonary effort is normal  No respiratory distress  Breath sounds: No stridor  Musculoskeletal:         General: Tenderness present  No deformity or signs of injury  Cervical back: Normal range of motion  No rigidity  Comments: Patient has tenderness from the left tib-fib region down through the left ankle  There is no distal dorsal foot tenderness normal dorsalis pedis pulse capillary refill and sensation distally  She has normal range of motion of the hip without tenderness she has normal range of motion of the knee without tenderness  Skin:     General: Skin is warm  Coloration: Skin is not jaundiced  Findings: No lesion or rash  Neurological:      General: No focal deficit present  Mental Status: She is alert and oriented to person, place, and time  Mental status is at baseline  Gait: Gait normal    Psychiatric:         Mood and Affect: Mood normal          Thought Content:  Thought content normal          Judgment: Judgment normal          Vital Signs  ED Triage Vitals [05/09/22 0954]   Temperature Pulse Respirations Blood Pressure SpO2   97 8 °F (36 6 °C) 80 18 137/92 99 %      Temp Source Heart Rate Source Patient Position - Orthostatic VS BP Location FiO2 (%)   Temporal Monitor Sitting Right arm -- Pain Score       --           Vitals:    05/09/22 0954   BP: 137/92   Pulse: 80   Patient Position - Orthostatic VS: Sitting         Visual Acuity      ED Medications  Medications - No data to display    Diagnostic Studies  Results Reviewed     None                 CT lower extremity wo contrast left   Final Result by Richie Duckworth DO (05/09 1300)      Subacute appearing nondisplaced fracture proximal tibia  The study was marked in [facility] for immediate notification  Workstation performed: [id number]         XR knee 4+ views LEFT   ED Interpretation by Emy Angulo PA-C (05/09 1047)   No evidence of acute osseous abnormality of the left knee      Final Result by Yanick Cueva MD (05/09 1219)      No acute osseous abnormality  Proximal tibia diffuse ill-defined heterogeneous increased density of uncertain significance  Infiltrative process suspected  Unenhanced CT recommended  Kelley Martínez PA-C consulted directly  Workstation performed: [id number]         XR ankle 3+ views LEFT   Final Result by Yanick Cueva MD (05/09 1227)      Distal fibula lateral view tibia and fibula only, diametaphyseal junction linear cortical lucency probably reflects old trauma  Proximal tibia diffuse ill-defined heterogeneous increased density of uncertain significance noted left knee study same day  Workstation performed: [id number]         XR tibia fibula 2 views LEFT   Final Result by Yanick Cueva MD (05/09 1227)      Distal fibula lateral view tibia and fibula only, diametaphyseal junction linear cortical lucency probably reflects old trauma  Proximal tibia diffuse ill-defined heterogeneous increased density of uncertain significance noted left knee study same day  Workstation performed: [id number]                    Procedures  Procedures     I did place a short-leg posterior OCL up to the knee    Patient tolerated the procedure well   Was secured with Ace wrap  The wrap was placed prior to that  Normal neurovascular motor exams were noted postprocedure  ED Course  ED Course as of 05/09/22 1315   Mon May 09, 2022   1042 Blood Pressure: 137/92   1042 Temperature: 97 8 °F (36 6 °C)   1042 Pulse: 80   1042 Respirations: 18   1042 SpO2: 99 %   1213  Radiologist thought as though he saw some abnormalities on films requesting CT scan which is reasonable  [id number] Awaiting ct interp   1304 IMPRESSION:     Subacute appearing nondisplaced fracture proximal tibia  26 OCL is extended now posteriorly to be a long leg splint she will follow-up primary care  She states she is no longer taking tramadol  MDM    Disposition  Final diagnoses:   Fibula fracture   Tibia fracture     Time reflects when diagnosis was documented in both MDM as applicable and the Disposition within this note     Time User Action Codes Description Comment    5/9/2022 11:56 AM Mehul COMBS Add [Y78 441A] Fibula fracture     5/9/2022  1:04 PM Mike Mancini Add [S82 209A] Tibia fracture       ED Disposition     ED Disposition Condition Date/Time Comment    Discharge Stable Mon May 9, 2022 11:56 AM Sepideh Turpin discharge to home/self care              Follow-up Information     Follow up With Specialties Details Why Contact Info Additional [address] [facility] Specialists [place] [facility] Schedule an appointment as soon as possible for a visit   [address] [facility] [address]  [address] [place] [address] Specialists Addie Lim [address] [place] [address], [address]          Patient's Medications   Discharge Prescriptions    HYDROCODONE-ACETAMINOPHEN (NORCO) 5-325 MG PER TABLET    Take 1 tablet by mouth every 6 (six) hours as needed for pain for up to 10 days Max Daily Amount: 4 tablets       Start Date: 5/9/2022  End Date: 5/19/2022       Order Dose: 1 tablet       Quantity: 12 tablet    Refills: 0       No discharge procedures on file      PDMP Review       Value Time User    PDMP Reviewed  Yes 10/14/2021  6:38 PM Fariha Plata MD          ED Provider  Electronically Signed by           Emy Angulo PA-C  05/09/22 3799

## 2022-05-16 ENCOUNTER — OFFICE VISIT (OUTPATIENT)
Dept: OBGYN CLINIC | Facility: CLINIC | Age: 67
End: 2022-05-16
Payer: MEDICARE

## 2022-05-16 ENCOUNTER — HOSPITAL ENCOUNTER (OUTPATIENT)
Dept: NON INVASIVE DIAGNOSTICS | Facility: HOSPITAL | Age: 67
Discharge: HOME | End: 2022-05-16
Attending: FAMILY MEDICINE
Payer: MEDICARE

## 2022-05-16 ENCOUNTER — HOSPITAL ENCOUNTER (EMERGENCY)
Facility: HOSPITAL | Age: 67
Discharge: HOME | End: 2022-05-16
Attending: EMERGENCY MEDICINE
Payer: MEDICARE

## 2022-05-16 VITALS
SYSTOLIC BLOOD PRESSURE: 111 MMHG | BODY MASS INDEX: 29.52 KG/M2 | HEIGHT: 64 IN | DIASTOLIC BLOOD PRESSURE: 80 MMHG | HEART RATE: 74 BPM | TEMPERATURE: 98.4 F

## 2022-05-16 VITALS
WEIGHT: 172 LBS | HEIGHT: 64 IN | TEMPERATURE: 96.7 F | OXYGEN SATURATION: 98 % | SYSTOLIC BLOOD PRESSURE: 124 MMHG | BODY MASS INDEX: 29.37 KG/M2 | RESPIRATION RATE: 18 BRPM | DIASTOLIC BLOOD PRESSURE: 72 MMHG | HEART RATE: 84 BPM

## 2022-05-16 DIAGNOSIS — S82.192A OTHER CLOSED FRACTURE OF PROXIMAL END OF LEFT TIBIA, INITIAL ENCOUNTER: ICD-10-CM

## 2022-05-16 DIAGNOSIS — S99.912A ANKLE INJURY, LEFT, INITIAL ENCOUNTER: ICD-10-CM

## 2022-05-16 DIAGNOSIS — S82.192A OTHER CLOSED FRACTURE OF PROXIMAL END OF LEFT TIBIA, INITIAL ENCOUNTER: Primary | ICD-10-CM

## 2022-05-16 DIAGNOSIS — M79.662 PAIN OF LEFT CALF: ICD-10-CM

## 2022-05-16 DIAGNOSIS — I82.409 DVT (DEEP VENOUS THROMBOSIS) (HCC): Primary | ICD-10-CM

## 2022-05-16 DIAGNOSIS — D64.9 ANEMIA: ICD-10-CM

## 2022-05-16 LAB
ALBUMIN SERPL BCP-MCNC: 3.4 G/DL (ref 3.5–5)
ALP SERPL-CCNC: 171 U/L (ref 46–116)
ALT SERPL W P-5'-P-CCNC: 12 U/L (ref 12–78)
ANION GAP SERPL CALCULATED.3IONS-SCNC: 8 MMOL/L (ref 4–13)
APTT PPP: 27 SECONDS (ref 23–37)
AST SERPL W P-5'-P-CCNC: 20 U/L (ref 5–45)
BASOPHILS # BLD AUTO: 0.07 THOUSANDS/ΜL (ref 0–0.1)
BASOPHILS NFR BLD AUTO: 1 % (ref 0–1)
BILIRUB SERPL-MCNC: 0.29 MG/DL (ref 0.2–1)
BUN SERPL-MCNC: 17 MG/DL (ref 5–25)
CALCIUM ALBUM COR SERPL-MCNC: 9.9 MG/DL (ref 8.3–10.1)
CALCIUM SERPL-MCNC: 9.4 MG/DL (ref 8.3–10.1)
CHLORIDE SERPL-SCNC: 108 MMOL/L (ref 100–108)
CO2 SERPL-SCNC: 26 MMOL/L (ref 21–32)
CREAT SERPL-MCNC: 1.16 MG/DL (ref 0.6–1.3)
EOSINOPHIL # BLD AUTO: 0.54 THOUSAND/ΜL (ref 0–0.61)
EOSINOPHIL NFR BLD AUTO: 9 % (ref 0–6)
ERYTHROCYTE [DISTWIDTH] IN BLOOD BY AUTOMATED COUNT: 16.3 % (ref 11.6–15.1)
GFR SERPL CREATININE-BSD FRML MDRD: 49 ML/MIN/1.73SQ M
GLUCOSE SERPL-MCNC: 97 MG/DL (ref 65–140)
HCT VFR BLD AUTO: 30.4 % (ref 34.8–46.1)
HGB BLD-MCNC: 9.3 G/DL (ref 11.5–15.4)
IMM GRANULOCYTES # BLD AUTO: 0.02 THOUSAND/UL (ref 0–0.2)
IMM GRANULOCYTES NFR BLD AUTO: 0 % (ref 0–2)
INR PPP: 0.98 (ref 0.84–1.19)
LYMPHOCYTES # BLD AUTO: 1.51 THOUSANDS/ΜL (ref 0.6–4.47)
LYMPHOCYTES NFR BLD AUTO: 26 % (ref 14–44)
MCH RBC QN AUTO: 28.4 PG (ref 26.8–34.3)
MCHC RBC AUTO-ENTMCNC: 30.6 G/DL (ref 31.4–37.4)
MCV RBC AUTO: 93 FL (ref 82–98)
MONOCYTES # BLD AUTO: 0.51 THOUSAND/ΜL (ref 0.17–1.22)
MONOCYTES NFR BLD AUTO: 9 % (ref 4–12)
NEUTROPHILS # BLD AUTO: 3.18 THOUSANDS/ΜL (ref 1.85–7.62)
NEUTS SEG NFR BLD AUTO: 55 % (ref 43–75)
NRBC BLD AUTO-RTO: 0 /100 WBCS
PLATELET # BLD AUTO: 301 THOUSANDS/UL (ref 149–390)
PMV BLD AUTO: 8.6 FL (ref 8.9–12.7)
POTASSIUM SERPL-SCNC: 3.9 MMOL/L (ref 3.5–5.3)
PROT SERPL-MCNC: 7.6 G/DL (ref 6.4–8.2)
PROTHROMBIN TIME: 12.5 SECONDS (ref 11.6–14.5)
RBC # BLD AUTO: 3.28 MILLION/UL (ref 3.81–5.12)
SODIUM SERPL-SCNC: 142 MMOL/L (ref 136–145)
WBC # BLD AUTO: 5.83 THOUSAND/UL (ref 4.31–10.16)

## 2022-05-16 PROCEDURE — 80053 COMPREHEN METABOLIC PANEL: CPT | Performed by: EMERGENCY MEDICINE

## 2022-05-16 PROCEDURE — 99214 OFFICE O/P EST MOD 30 MIN: CPT | Performed by: FAMILY MEDICINE

## 2022-05-16 PROCEDURE — 93971 EXTREMITY STUDY: CPT

## 2022-05-16 PROCEDURE — 85025 COMPLETE CBC W/AUTO DIFF WBC: CPT | Performed by: EMERGENCY MEDICINE

## 2022-05-16 PROCEDURE — 99284 EMERGENCY DEPT VISIT MOD MDM: CPT | Performed by: EMERGENCY MEDICINE

## 2022-05-16 PROCEDURE — 85610 PROTHROMBIN TIME: CPT | Performed by: EMERGENCY MEDICINE

## 2022-05-16 PROCEDURE — 99283 EMERGENCY DEPT VISIT LOW MDM: CPT

## 2022-05-16 PROCEDURE — 93971 EXTREMITY STUDY: CPT | Performed by: SURGERY

## 2022-05-16 PROCEDURE — 85730 THROMBOPLASTIN TIME PARTIAL: CPT | Performed by: EMERGENCY MEDICINE

## 2022-05-16 PROCEDURE — 36415 COLL VENOUS BLD VENIPUNCTURE: CPT | Performed by: EMERGENCY MEDICINE

## 2022-05-16 RX ADMIN — APIXABAN 10 MG: 5 TABLET, FILM COATED ORAL at 18:23

## 2022-05-16 NOTE — DISCHARGE INSTRUCTIONS
Take Eliquis 10 mg twice daily for 1 week and then switch to 5 mg twice daily  Avoid the use of ibuprofen (Motrin) or Naprosyn (Aleve) as it will thin the blood too much  For pain use Tylenol  Tylenol 650mg every 6 hours as needed for pain, fever (max 3000mg in 24 hours)   If you should have any injury such of fall if you hit your head even if it is minor trauma any to return to the emergency department to be evaluated for internal bleeding due to the fact that you are on a strong blood thinner need to follow-up You need to follow up with your family doctor this week to re-evaluate your anemia because you need a blood thinner as well    Return with chest pain shortness of breath or any new or worsening symptoms worsening leg swelling or any concerns  You will need to have further refills of the blood thinner Eliquis from your family docotor you are just being placed on a 30 day supply

## 2022-05-16 NOTE — LETTER
May 16, 2022     Puja Alston, DO  [address] [address]    Patient: Estiven Sanford   YOB: 1955   Date of Visit: 5/16/2022       Dear Dr Eulogio Jacobs:    Thank you for referring Elda Koch to me for evaluation  Below are my notes for this consultation  If you have questions, please do not hesitate to call me  I look forward to following your patient along with you  Sincerely,        Екатерина Sánchez MD        CC: No Recipients  Екатерина Sánchez MD  5/16/2022  9:42 AM  Incomplete  [facility] SPECIALISTS [place]  [address] [place] [address]  [facility] [address]  [phone]      Chief Complaint:  Chief Complaint   Patient presents with    Left Lower Leg - Pain       Vitals:  /80 (BP Location: Left arm, Patient Position: Sitting, Cuff Size: Large)   Pulse 74   Temp 98 4 °F (36 9 °C) (Tympanic)   Ht 5' 4" (1 626 m)   BMI 29 52 kg/m²     The following portions of the patient's history were reviewed and updated as appropriate: allergies, current medications, past family history, past medical history, past social history, past surgical history, and problem list       Subjective:   Patient ID: Estiven Sanford is a 77 y o  female  Here c/o L knee pain/prox tibial fx  She fell last week over her  and then tripped over her dog  Seen in ER- XR done  Sent home  Seen again 2 days later  XR/CT done- showed tib fx  Pain with walking  Denies CP/SOB    CT left knee without IV contrast     INDICATION: Knee trauma, internal derangement suspected, xray done  abnormal xray      COMPARISON: Same day radiographs     TECHNIQUE: CT examination of the above was performed  This examination, like all CT scans performed in the [facility], was performed utilizing techniques to minimize radiation dose exposure, including the use of iterative reconstruction   and automated exposure control software    Sagittal and coronal two dimensional reconstructed images were also submitted for interpretation      Rad dose  238 mGy-cm      FINDINGS:     OSSEOUS STRUCTURES:  Nondisplaced fracture intercondylar region proximal tibia extending through the proximal tibial diametaphyseal region to the posterior medial cortex  Ill-defined sclerosis about the fracture suggesting a subacute time course  No   incongruity of the articular surface  Trace joint effusion  Mild tricompartmental osteoarthritis      VISUALIZED MUSCULATURE:  Unremarkable      SOFT TISSUES:  Mild anterior subcutaneous edema      OTHER PERTINENT FINDINGS:  None         IMPRESSION:     Subacute appearing nondisplaced fracture proximal tibia        LEFT TIBIA AND FIBULA     INDICATION:   fall      COMPARISON:  5/7/2022  Left ankle 10/19/2008     VIEWS:  XR TIBIA FIBULA 2 VW LEFT, XR ANKLE 3+ VW LEFT         FINDINGS:     Distal fibula proximal diametaphyseal junction posterior cortical longitudinal lucency seen to advantage, lateral view left tib-fib only      No significant degenerative changes      Proximal tibia metaphysis proximal mid and lateral diffuse ill-defined heterogeneous increased density  Noted left knee study same day  CT recommended      Soft tissues are unremarkable      IMPRESSION:     Distal fibula lateral view tibia and fibula only, diametaphyseal junction linear cortical lucency probably reflects old trauma      Proximal tibia diffuse ill-defined heterogeneous increased density of uncertain significance noted left knee study same day            Review of Systems   Constitutional: Negative for fatigue and fever  Respiratory: Negative for shortness of breath  Cardiovascular: Negative for chest pain  Gastrointestinal: Negative for abdominal pain and nausea  Genitourinary: Negative for dysuria  Musculoskeletal: Positive for arthralgias, gait problem and joint swelling  Skin: Negative for rash and wound     Neurological: Negative for weakness and headaches  Objective:  Left Ankle Exam     Tenderness   The patient is experiencing no tenderness  Left Knee Exam     Tenderness   The patient is experiencing tenderness in the medial joint line and lateral joint line  Physical Exam  Vitals and nursing note reviewed  Constitutional:       Appearance: Normal appearance  She is well-developed  HENT:      Head: Normocephalic  Mouth/Throat:      Mouth: Mucous membranes are moist    Eyes:      Extraocular Movements: Extraocular movements intact  Cardiovascular:      Rate and Rhythm: Normal rate and regular rhythm  Heart sounds: Normal heart sounds  Pulmonary:      Effort: Pulmonary effort is normal       Breath sounds: Normal breath sounds  Abdominal:      General: Bowel sounds are normal       Palpations: Abdomen is soft  Musculoskeletal:         General: Tenderness present  Normal range of motion  Cervical back: Normal range of motion  Comments: L calf TTP   Skin:     General: Skin is warm and dry  Neurological:      General: No focal deficit present  Mental Status: She is alert and oriented to person, place, and time  Psychiatric:         Mood and Affect: Mood normal          Behavior: Behavior normal          Thought Content: Thought content normal          I have personally reviewed pertinent films in PACS and my interpretation is CT L LE- prox tib nondisplaced fracture         Assessment/Plan:  Assessment/Plan   Diagnoses and all orders for this visit:    Other closed fracture of proximal end of left tibia, initial encounter  -     Knee Immobilizer  -     VAS lower limb venous duplex study, unilateral/limited; Future    Ankle injury, left, initial encounter  -     Knee Immobilizer  -     VAS lower limb venous duplex study, unilateral/limited; Future    Pain of left calf  -     VAS lower limb venous duplex study, unilateral/limited; Future        Return in about 2 weeks (around 5/30/2022) for Recheck  Maya Danielle MD

## 2022-05-16 NOTE — PROGRESS NOTES
[facility] SPECIALISTS [place] [address]  [facility] [address]  [phone]      Chief Complaint:  Chief Complaint   Patient presents with    Left Lower Leg - Pain       Vitals:  /80 (BP Location: Left arm, Patient Position: Sitting, Cuff Size: Large)   Pulse 74   Temp 98 4 °F (36 9 °C) (Tympanic)   Ht 5' 4" (1 626 m)   BMI 29 52 kg/m²     The following portions of the patient's history were reviewed and updated as appropriate: allergies, current medications, past family history, past medical history, past social history, past surgical history, and problem list       Subjective:   Patient ID: Bebeto Jane is a 77 y o  female  Here c/o L knee pain/prox tibial fx  She fell last week over her  and then tripped over her dog  Seen in ER- XR done  Sent home  Seen again 2 days later  XR/CT done- showed tib fx  Pain with walking  Denies CP/SOB    CT left knee without IV contrast     INDICATION: Knee trauma, internal derangement suspected, xray done  abnormal xray      COMPARISON: Same day radiographs     TECHNIQUE: CT examination of the above was performed  This examination, like all CT scans performed in the [facility], was performed utilizing techniques to minimize radiation dose exposure, including the use of iterative reconstruction   and automated exposure control software  Sagittal and coronal two dimensional reconstructed images were also submitted for interpretation      Rad dose  238 mGy-cm      FINDINGS:     OSSEOUS STRUCTURES:  Nondisplaced fracture intercondylar region proximal tibia extending through the proximal tibial diametaphyseal region to the posterior medial cortex  Ill-defined sclerosis about the fracture suggesting a subacute time course  No   incongruity of the articular surface  Trace joint effusion    Mild tricompartmental osteoarthritis      VISUALIZED MUSCULATURE:  Unremarkable      SOFT TISSUES: Mild anterior subcutaneous edema      OTHER PERTINENT FINDINGS:  None         IMPRESSION:     Subacute appearing nondisplaced fracture proximal tibia        LEFT TIBIA AND FIBULA     INDICATION:   fall      COMPARISON:  5/7/2022  Left ankle 10/19/2008     VIEWS:  XR TIBIA FIBULA 2 VW LEFT, XR ANKLE 3+ VW LEFT         FINDINGS:     Distal fibula proximal diametaphyseal junction posterior cortical longitudinal lucency seen to advantage, lateral view left tib-fib only      No significant degenerative changes      Proximal tibia metaphysis proximal mid and lateral diffuse ill-defined heterogeneous increased density  Noted left knee study same day  CT recommended      Soft tissues are unremarkable      IMPRESSION:     Distal fibula lateral view tibia and fibula only, diametaphyseal junction linear cortical lucency probably reflects old trauma      Proximal tibia diffuse ill-defined heterogeneous increased density of uncertain significance noted left knee study same day            Review of Systems   Constitutional: Negative for fatigue and fever  Respiratory: Negative for shortness of breath  Cardiovascular: Negative for chest pain  Gastrointestinal: Negative for abdominal pain and nausea  Genitourinary: Negative for dysuria  Musculoskeletal: Positive for arthralgias, gait problem and joint swelling  Skin: Negative for rash and wound  Neurological: Negative for weakness and headaches  Objective:  Left Ankle Exam     Tenderness   The patient is experiencing no tenderness  Left Knee Exam     Tenderness   The patient is experiencing tenderness in the medial joint line and lateral joint line  Physical Exam  Vitals and nursing note reviewed  Constitutional:       Appearance: Normal appearance  She is well-developed  HENT:      Head: Normocephalic  Mouth/Throat:      Mouth: Mucous membranes are moist    Eyes:      Extraocular Movements: Extraocular movements intact  Cardiovascular:      Rate and Rhythm: Normal rate and regular rhythm  Heart sounds: Normal heart sounds  Pulmonary:      Effort: Pulmonary effort is normal       Breath sounds: Normal breath sounds  Abdominal:      General: Bowel sounds are normal       Palpations: Abdomen is soft  Musculoskeletal:         General: Tenderness present  Normal range of motion  Cervical back: Normal range of motion  Comments: L calf TTP   Skin:     General: Skin is warm and dry  Neurological:      General: No focal deficit present  Mental Status: She is alert and oriented to person, place, and time  Psychiatric:         Mood and Affect: Mood normal          Behavior: Behavior normal          Thought Content: Thought content normal          I have personally reviewed pertinent films in PACS and my interpretation is CT L LE- prox tib nondisplaced fracture         Assessment/Plan:  Assessment/Plan   Diagnoses and all orders for this visit:    Other closed fracture of proximal end of left tibia, initial encounter  -     Knee Immobilizer  -     VAS lower limb venous duplex study, unilateral/limited; Future    Ankle injury, left, initial encounter  -     Knee Immobilizer  -     VAS lower limb venous duplex study, unilateral/limited; Future    Pain of left calf  -     VAS lower limb venous duplex study, unilateral/limited; Future        Return in about 2 weeks (around 5/30/2022) for Recheck       Sukhjinder Tran MD

## 2022-05-16 NOTE — ED PROVIDER NOTES
History  Chief Complaint   Patient presents with    Abnormal Lab     Patient reports that she was to come to ED for DVT  Recent surgery in left leg  78 yo female had an outpt DVT study of the left leg which was ordered by Dr Anju Denny of orthopedics she is being followed for proximal fibular fracture which is being treated with the knee immobilizer  The preliminary result venous Doppler ultrasound left lower extremity demonstrated DVT with some chronic features  She was sent over here for anticoagulation  Patient states heard left knee pain is well controlled  She is denying any chest pain or shortness of breath she has had no fevers  She has otherwise been in her usual state of health  Prior to Admission Medications   Prescriptions Last Dose Informant Patient Reported? Taking?    ALPRAZolam (XANAX) 1 mg tablet  Mother Yes No   Sig: Take by mouth 4 (four) times a day   Brexpiprazole (REXULTI) 2 MG tablet  Mother Yes No   Sig: Take 2 mg by mouth daily   HYDROcodone-acetaminophen (NORCO) 5-325 mg per tablet   Yes No   Sig: Take 1 tablet by mouth every 6 (six) hours as needed for pain     HYDROcodone-acetaminophen (NORCO) 5-325 mg per tablet  Mother Yes No   Sig: Take 1 tablet by mouth every 6 (six) hours as needed for pain     HYDROcodone-acetaminophen (Norco) 5-325 mg per tablet   No No   Sig: Take 1 tablet by mouth every 6 (six) hours as needed for pain for up to 10 days Max Daily Amount: 4 tablets   Melatonin 5 MG CAPS  Self Yes No   Sig: Take by mouth    OLANZapine (ZyPREXA) 20 MG tablet   Yes No   Sig: Take 20 mg by mouth daily at bedtime    QUEtiapine (SEROquel) 100 mg tablet  Self Yes No   Sig: Take 300 mg by mouth daily at bedtime    QUEtiapine (SEROquel) 200 mg tablet   No No   Sig: Take 1 tablet (200 mg total) by mouth daily at bedtime   albuterol (PROVENTIL HFA,VENTOLIN HFA) 90 mcg/act inhaler   No No   Sig: INHALE 2 PUFFS BY MOUTH EVERY 6 HOURS AS NEEDED OR WHEEZING   amLODIPine (NORVASC) 5 mg tablet   Yes No   Sig: Take 5 mg by mouth daily     amoxicillin-clavulanate (AUGMENTIN) 875-125 mg per tablet   Yes No   aspirin 81 mg chewable tablet   No No   Sig: Chew 1 tablet (81 mg total) daily   atorvastatin (LIPITOR) 10 mg tablet  Mother Yes No   Sig: Take 10 mg by mouth daily   atorvastatin (LIPITOR) 20 mg tablet   No No   Sig: Take 1 tablet (20 mg total) by mouth daily   carisoprodol (SOMA) 350 mg tablet   Yes No   docusate sodium (COLACE) 100 mg capsule  Self Yes No   Sig: Take by mouth   ergocalciferol (VITAMIN D2) 50,000 units  Self Yes No   Sig: Take by mouth once a week    famotidine (PEPCID) 20 mg tablet   No No   Sig: Take 1 tablet (20 mg total) by mouth daily   gabapentin (NEURONTIN) 800 mg tablet   No No   Sig: Take 0 5 tablets (400 mg total) by mouth 4 (four) times a day   haloperidol lactate (HALDOL) 5 mg/mL   Yes No   ibuprofen (MOTRIN) 800 mg tablet   Yes No   Sig: Take 1 tablet by mouth 3 (three) times a day as needed   ketotifen (ZADITOR) 0 025 % ophthalmic solution  Self Yes No   Sig: Apply to eye     lidocaine (LIDODERM) 5 %   Yes No   Sig: Apply 1 patch topically daily Remove & Discard patch within 12 hours or as directed by MD     metoprolol succinate (TOPROL-XL) 100 mg 24 hr tablet  Self Yes No   Sig: Take 200 mg by mouth    pantoprazole (PROTONIX) 40 mg tablet  Self Yes No   Sig: Take 40 mg by mouth daily Take before breakfast, early in the morning     pantoprazole (PROTONIX) 40 mg tablet  Mother Yes No   Sig: Take 40 mg by mouth daily    traMADol (ULTRAM) 50 mg tablet   Yes No   vitamin B-12 (VITAMIN B-12) 1,000 mcg tablet   Yes No   Sig: Take 1 tablet by mouth daily   zolpidem (AMBIEN) 10 mg tablet   Yes No      Facility-Administered Medications: None       Past Medical History:   Diagnosis Date    Bipolar 1 disorder (HCC)     Cancer ([facility] [address] )     kidney    Cardiac disease     fast heart beat    Chronic pain     Fractures     GERD (gastroesophageal reflux disease)  Hard of hearing     Hypertension     Renal cell carcinoma ([address] )     Stroke [facility])     2009 affected her speech, right sided weakness    TIA (transient ischemic attack)        Past Surgical History:   Procedure Laterality Date    APPENDECTOMY      CHOLECYSTECTOMY      JOINT REPLACEMENT Bilateral      bilateral knees    NEPHRECTOMY Right     GA COLONOSCOPY FLX DX W/COLLJ SPEC WHEN PFRMD N/A 8/7/2018    Procedure: COLONOSCOPY;  Surgeon: Wyatt Pope MD;  Location: [facility] MAIN OR;  Service: Gastroenterology    GA OPEN TREATMENT BIMALLEOLAR ANKLE FRACTURE Right 5/14/2019    Procedure: ANKLE - Bimalleolar OPEN REDUCTION W/ INTERNAL FIXATION (ORIF); Surgeon: Michi Tang; Location: [facility] MAIN OR;  Service: Orthopedics       Family History   Problem Relation Age of Onset    Colon cancer Other     Alcohol abuse Other     Hypertension Other      I have reviewed and agree with the history as documented  E-Cigarette/Vaping    E-Cigarette Use Never User      E-Cigarette/Vaping Substances     Social History     Tobacco Use    Smoking status: Current Every Day Smoker     Packs/day: 0 50     Years: 7 00     Pack years: 3 50     Types: Cigarettes    Smokeless tobacco: Never Used    Tobacco comment: pt refused   Vaping Use    Vaping Use: Never used   Substance Use Topics    Alcohol use: Not Currently    Drug use: Not Currently       Review of Systems   Constitutional: Negative for activity change, appetite change, chills and fever  HENT: Negative for congestion, ear pain, rhinorrhea, sneezing and sore throat  Eyes: Negative for discharge  Respiratory: Negative for cough and shortness of breath  Cardiovascular: Negative for chest pain and leg swelling  Gastrointestinal: Negative for abdominal pain and vomiting  Endocrine: Negative for polyuria  Musculoskeletal: Positive for arthralgias (Left knee pain secondary to fracture)  Negative for myalgias  Skin: Negative for rash     Neurological: Negative for dizziness, weakness and numbness  Hematological: Negative for adenopathy  Psychiatric/Behavioral: Negative for confusion  All other systems reviewed and are negative  Physical Exam  Physical Exam  Vitals and nursing note reviewed  Exam conducted with a chaperone present  Constitutional:       General: She is not in acute distress  Appearance: She is well-developed  She is not ill-appearing, toxic-appearing or diaphoretic  HENT:      Head: Normocephalic and atraumatic  Right Ear: External ear normal       Left Ear: External ear normal       Nose: Nose normal       Mouth/Throat:      Pharynx: No oropharyngeal exudate  Eyes:      General:         Right eye: No discharge  Left eye: No discharge  Conjunctiva/sclera: Conjunctivae normal       Pupils: Pupils are equal, round, and reactive to light  Neck:      Comments: No midline or paraspinous tenderness  Cardiovascular:      Rate and Rhythm: Normal rate and regular rhythm  Heart sounds: Normal heart sounds  Pulmonary:      Effort: Pulmonary effort is normal  No respiratory distress  Breath sounds: Normal breath sounds  No stridor  No wheezing, rhonchi or rales  Abdominal:      General: Bowel sounds are normal  There is no distension  Palpations: Abdomen is soft  Tenderness: There is no abdominal tenderness  There is no right CVA tenderness, left CVA tenderness, guarding or rebound  Comments: Back no midline or CVA tenderness   Genitourinary:     Rectum: Guaiac result negative  Comments: Rectal exam chaperoned by Kathya Manley tach normal tone brown stool on glove heme-negative controls intact  Musculoskeletal:         General: No tenderness or deformity  Normal range of motion  Cervical back: Normal range of motion and neck supple  Right lower leg: No edema  Left lower leg: Edema present  Comments: Left knee immobilizer was readjusted    See utilizing a walker   Skin: General: Skin is warm and dry  Capillary Refill: Capillary refill takes less than 2 seconds  Neurological:      Mental Status: She is alert and oriented to person, place, and time  Cranial Nerves: No cranial nerve deficit  Sensory: No sensory deficit  Motor: No weakness or abnormal muscle tone        Coordination: Coordination normal       Gait: Gait normal       Comments: Gait steady   Psychiatric:         Mood and Affect: Mood normal          Vital Signs  ED Triage Vitals [05/16/22 1635]   Temperature Pulse Respirations Blood Pressure SpO2   (!) 96 7 °F (35 9 °C) 84 18 124/72 98 %      Temp Source Heart Rate Source Patient Position - Orthostatic VS BP Location FiO2 (%)   Temporal Monitor Sitting Left arm --      Pain Score       8           Vitals:    05/16/22 1635   BP: 124/72   Pulse: 84   Patient Position - Orthostatic VS: Sitting         Visual Acuity      ED Medications  Medications   apixaban (ELIQUIS) tablet 10 mg (10 mg Oral Given 5/16/22 1823)       Diagnostic Studies  Results Reviewed     Procedure Component Value Units Date/Time    Comprehensive metabolic panel [[id number]]  (Abnormal) Collected: 05/16/22 1711    Lab Status: Final result Specimen: Blood from Arm, Left Updated: 05/16/22 1731     Sodium 142 mmol/L      Potassium 3 9 mmol/L      Chloride 108 mmol/L      CO2 26 mmol/L      ANION GAP 8 mmol/L      BUN 17 mg/dL      Creatinine 1 16 mg/dL      Glucose 97 mg/dL      Calcium 9 4 mg/dL      Corrected Calcium 9 9 mg/dL      AST 20 U/L      ALT 12 U/L      Alkaline Phosphatase 171 U/L      Total Protein 7 6 g/dL      Albumin 3 4 g/dL      Total Bilirubin 0 29 mg/dL      eGFR 49 ml/min/1 73sq m     Narrative:      [place] guidelines for Chronic Kidney Disease (CKD):     Stage 1 with normal or high GFR (GFR > 90 mL/min/1 73 square meters)    Stage 2 Mild CKD (GFR = 60-89 mL/min/1 73 square meters)    Stage 3A Moderate CKD (GFR = 45-59 mL/min/1 73 square meters)    Stage 3B Moderate CKD (GFR = 30-44 mL/min/1 73 square meters)    Stage 4 Severe CKD (GFR = 15-29 mL/min/1 73 square meters)    Stage 5 End Stage CKD (GFR <15 mL/min/1 73 square meters)  Note: GFR calculation is accurate only with a steady state creatinine    Protime-INR [[id number]]  (Normal) Collected: 05/16/22 1711    Lab Status: Final result Specimen: Blood from Arm, Left Updated: 05/16/22 1726     Protime 12 5 seconds      INR 0 98    APTT [[id number]]  (Normal) Collected: 05/16/22 1711    Lab Status: Final result Specimen: Blood from Arm, Left Updated: 05/16/22 1726     PTT 27 seconds     CBC and differential [[id number]]  (Abnormal) Collected: 05/16/22 1711    Lab Status: Final result Specimen: Blood from Arm, Left Updated: 05/16/22 1716     WBC 5 83 Thousand/uL      RBC 3 28 Million/uL      Hemoglobin 9 3 g/dL      Hematocrit 30 4 %      MCV 93 fL      MCH 28 4 pg      MCHC 30 6 g/dL      RDW 16 3 %      MPV 8 6 fL      Platelets 502 Thousands/uL      nRBC 0 /100 WBCs      Neutrophils Relative 55 %      Immat GRANS % 0 %      Lymphocytes Relative 26 %      Monocytes Relative 9 %      Eosinophils Relative 9 %      Basophils Relative 1 %      Neutrophils Absolute 3 18 Thousands/µL      Immature Grans Absolute 0 02 Thousand/uL      Lymphocytes Absolute 1 51 Thousands/µL      Monocytes Absolute 0 51 Thousand/µL      Eosinophils Absolute 0 54 Thousand/µL      Basophils Absolute 0 07 Thousands/µL                  No orders to display              Procedures  Procedures         ED Course  ED Course as of 05/17/22 0036   Mon May 16, 2022   1801 Hb 9 3 previous 11 8  months ago recommended to patient as well as her family member who is now present that she follow-up with her family doctor to recheck the anemia within a week  She is being placed on a blood thinner rectal exam is not demonstrating any active GI bleeding                                               MDM  Number of Diagnoses or Management Options  Diagnosis management comments: Mdm patient has has not had a lab draw since August of last year will obtain a screening CBC coags and CMP to evaluate for liver kidney dysfunction prior to initiation of Eliquis  I did review with her should she sustain any trauma she is to return to the emergency department for evaluation of internal bleeding specifically with even minor head injury  Patient verbalized understanding  She is to avoid use of NSAIDs such as ibuprofen or Naprosyn this will also be outlined in her discharge instructions  She is to follow both their family doctor and/or orthopedist for further anticoagulation      Disposition  Final diagnoses:   DVT (deep venous thrombosis) ([facility] ) - left leg   Anemia     Time reflects when diagnosis was documented in both MDM as applicable and the Disposition within this note     Time User Action Codes Description Comment    5/16/2022  6:00 PM Emiliano Baird Add [I82 409] DVT (deep venous thrombosis) ([facility] )     5/16/2022  6:00 PM Stanford Beaver [Q83 738] DVT (deep venous thrombosis) ([facility] ) left leg    5/16/2022  6:00 PM Emiliano Baird Add [D64 9] Anemia       ED Disposition     ED Disposition   Discharge    Condition   Stable    Date/Time   Mon May 16, 2022  6:00 PM    Comment   Aziza Led discharge to home/self care                 Follow-up Information     Follow up With Specialties Details Why Contact Cathleen Abraham DO Family Medicine Call in 1 day follow up this week [address] [place] [address], [place] [address]  [phone]            Discharge Medication List as of 5/16/2022  6:07 PM      START taking these medications    Details   apixaban (Eliquis) 5 mg Multiple Dosages:Starting Mon 5/16/2022, Until Sun 5/22/2022 at 2359, THEN Starting Mon 5/23/2022, Until Tue 6/14/2022 at 2359Take 2 tablets (10 mg total) by mouth 2 (two) times a day for 7 days, THEN 1 tablet (5 mg total) 2 (two) times a day for 23 d ays , Normal CONTINUE these medications which have NOT CHANGED    Details   albuterol (PROVENTIL HFA,VENTOLIN HFA) 90 mcg/act inhaler INHALE 2 PUFFS BY MOUTH EVERY 6 HOURS AS NEEDED OR WHEEZING, Normal      ALPRAZolam (XANAX) 1 mg tablet Take by mouth 4 (four) times a day, Historical Med      amLODIPine (NORVASC) 5 mg tablet Take 5 mg by mouth daily  , Historical Med      amoxicillin-clavulanate (AUGMENTIN) 875-125 mg per tablet Starting Wed 9/29/2021, Historical Med      aspirin 81 mg chewable tablet Chew 1 tablet (81 mg total) daily, Starting Sat 10/17/2020, Normal      !! atorvastatin (LIPITOR) 10 mg tablet Take 10 mg by mouth daily, Historical Med      !! atorvastatin (LIPITOR) 20 mg tablet Take 1 tablet (20 mg total) by mouth daily, Starting Sun 9/6/2020, Normal      Brexpiprazole (REXULTI) 2 MG tablet Take 2 mg by mouth daily, Historical Med      carisoprodol (SOMA) 350 mg tablet Starting Tue 11/9/2021, Historical Med      docusate sodium (COLACE) 100 mg capsule Take by mouth, Starting Thu 6/12/2014, Historical Med      ergocalciferol (VITAMIN D2) 50,000 units Take by mouth once a week , Starting Thu 9/11/2014, Historical Med      famotidine (PEPCID) 20 mg tablet Take 1 tablet (20 mg total) by mouth daily, Starting Thu 4/28/2022, Normal      gabapentin (NEURONTIN) 800 mg tablet Take 0 5 tablets (400 mg total) by mouth 4 (four) times a day, Starting Tue 7/6/2021, Normal      haloperidol lactate (HALDOL) 5 mg/mL Historical Med      !! HYDROcodone-acetaminophen (NORCO) 5-325 mg per tablet Take 1 tablet by mouth every 6 (six) hours as needed for pain  , Historical Med      !! HYDROcodone-acetaminophen (NORCO) 5-325 mg per tablet Take 1 tablet by mouth every 6 (six) hours as needed for pain  , Historical Med      !! HYDROcodone-acetaminophen (Norco) 5-325 mg per tablet Take 1 tablet by mouth every 6 (six) hours as needed for pain for up to 10 days Max Daily Amount: 4 tablets, Starting Mon 5/9/2022, Until Thu 5/19/2022 at 2359, Normal      ibuprofen (MOTRIN) 800 mg tablet Take 1 tablet by mouth 3 (three) times a day as needed, Starting Mon 3/21/2022, Historical Med      ketotifen (ZADITOR) 0 025 % ophthalmic solution Apply to eye  , Starting Mon 12/19/2016, Historical Med      lidocaine (LIDODERM) 5 % Apply 1 patch topically daily Remove & Discard patch within 12 hours or as directed by MD  , Historical Med      Melatonin 5 MG CAPS Take by mouth , Starting u 8/31/2017, Historical Med      metoprolol succinate (TOPROL-XL) 100 mg 24 hr tablet Take 200 mg by mouth , Historical Med      OLANZapine (ZyPREXA) 20 MG tablet Take 20 mg by mouth daily at bedtime , Historical Med      pantoprazole (PROTONIX) 40 mg tablet Take 40 mg by mouth daily , Historical Med      !! QUEtiapine (SEROquel) 100 mg tablet Take 300 mg by mouth daily at bedtime , Historical Med      !! QUEtiapine (SEROquel) 200 mg tablet Take 1 tablet (200 mg total) by mouth daily at bedtime, Starting Mon 6/28/2021, Normal      traMADol (ULTRAM) 50 mg tablet Starting Mon 11/22/2021, Historical Med      vitamin B-12 (VITAMIN B-12) 1,000 mcg tablet Take 1 tablet by mouth daily, Starting Tue 4/26/2022, Historical Med      zolpidem (AMBIEN) 10 mg tablet Starting Mon 11/22/2021, Historical Med       !! - Potential duplicate medications found  Please discuss with provider  No discharge procedures on file      PDMP Review       Value Time User    PDMP Reviewed  Yes 10/14/2021  6:38 PM Dorita Chaves MD          ED Provider  Electronically Signed by           Trupti Quintero MD  05/17/22 7074

## 2022-05-16 NOTE — PATIENT INSTRUCTIONS
F/u 2 wks  XR- L knee 2 wks  STAT- Venous duplex- L calf pain/tibial fx/dec ambulation  R/o DVT  Icing/heat/OTC pain meds as needed    Knee immobilizer  No weight bearing
Discharged

## 2022-05-20 ENCOUNTER — TRANSCRIBE ORDERS (OUTPATIENT)
Dept: URGENT CARE | Facility: MEDICAL CENTER | Age: 67
End: 2022-05-20

## 2022-05-20 ENCOUNTER — APPOINTMENT (OUTPATIENT)
Dept: RADIOLOGY | Facility: MEDICAL CENTER | Age: 67
End: 2022-05-20
Payer: MEDICARE

## 2022-05-20 DIAGNOSIS — M25.571 CHRONIC PAIN OF RIGHT ANKLE: ICD-10-CM

## 2022-05-20 DIAGNOSIS — M25.571 CHRONIC PAIN OF RIGHT ANKLE: Primary | ICD-10-CM

## 2022-05-20 DIAGNOSIS — G89.29 CHRONIC PAIN OF RIGHT ANKLE: ICD-10-CM

## 2022-05-20 DIAGNOSIS — G89.29 CHRONIC PAIN OF RIGHT ANKLE: Primary | ICD-10-CM

## 2022-05-20 PROCEDURE — 73610 X-RAY EXAM OF ANKLE: CPT

## 2022-06-03 ENCOUNTER — APPOINTMENT (OUTPATIENT)
Dept: RADIOLOGY | Facility: CLINIC | Age: 67
End: 2022-06-03
Payer: MEDICARE

## 2022-06-03 ENCOUNTER — OFFICE VISIT (OUTPATIENT)
Dept: OBGYN CLINIC | Facility: CLINIC | Age: 67
End: 2022-06-03
Payer: MEDICARE

## 2022-06-03 VITALS
HEART RATE: 70 BPM | DIASTOLIC BLOOD PRESSURE: 74 MMHG | SYSTOLIC BLOOD PRESSURE: 124 MMHG | BODY MASS INDEX: 29.52 KG/M2 | HEIGHT: 64 IN

## 2022-06-03 DIAGNOSIS — S82.102D CLOSED FRACTURE OF PROXIMAL END OF LEFT TIBIA WITH ROUTINE HEALING, UNSPECIFIED FRACTURE MORPHOLOGY, SUBSEQUENT ENCOUNTER: Primary | ICD-10-CM

## 2022-06-03 DIAGNOSIS — S82.102D CLOSED FRACTURE OF PROXIMAL END OF LEFT TIBIA WITH ROUTINE HEALING, UNSPECIFIED FRACTURE MORPHOLOGY, SUBSEQUENT ENCOUNTER: ICD-10-CM

## 2022-06-03 PROCEDURE — 73562 X-RAY EXAM OF KNEE 3: CPT

## 2022-06-03 PROCEDURE — 99214 OFFICE O/P EST MOD 30 MIN: CPT | Performed by: FAMILY MEDICINE

## 2022-06-03 NOTE — PROGRESS NOTES
[facility] SPECIALISTS [place] [address]  [facility] [address] [phone]      Chief Complaint:  Chief Complaint   Patient presents with    Left Knee - Follow-up       Vitals:  /74   Pulse 70   Ht 5' 4" (1 626 m)   BMI 29 52 kg/m²     The following portions of the patient's history were reviewed and updated as appropriate: allergies, current medications, past family history, past medical history, past social history, past surgical history, and problem list       Subjective:   Patient ID: Carla Sheppard is a 77 y o  female  Here for f/u  L knee pain/prox tibial fx  staying off the leg/using wheelchair  Wearing knee immob  Hurts to wt bear  Taking vicodin- not helping much         Review of Systems   Constitutional: Negative for fatigue and fever  Respiratory: Negative for shortness of breath  Cardiovascular: Negative for chest pain  Gastrointestinal: Negative for abdominal pain and nausea  Genitourinary: Negative for dysuria  Musculoskeletal: Positive for arthralgias and gait problem  Skin: Negative for rash and wound  Neurological: Negative for weakness and headaches  Objective:  Left Knee Exam     Tenderness   The patient is experiencing tenderness in the lateral joint line  Other   Swelling: none  Effusion: no effusion present          Observations   Left Knee   Negative for effusion  Physical Exam  Constitutional:       Appearance: Normal appearance  She is normal weight  Eyes:      Extraocular Movements: Extraocular movements intact  Pulmonary:      Effort: Pulmonary effort is normal    Musculoskeletal:      Cervical back: Normal range of motion  Left knee: No effusion  Skin:     General: Skin is warm and dry  Neurological:      General: No focal deficit present  Mental Status: She is alert and oriented to person, place, and time  Mental status is at baseline     Psychiatric:         Mood and Affect: Mood normal          Behavior: Behavior normal          Thought Content: Thought content normal          Judgment: Judgment normal          I have personally reviewed pertinent films in PACS and my interpretation is XR- L knee- healing prox tib fx with callus  Assessment/Plan:  Assessment/Plan   Diagnoses and all orders for this visit:    Closed fracture of proximal end of left tibia with routine healing, unspecified fracture morphology, subsequent encounter  -     XR knee 3 vw left non injury; Future  -     Brace  -     DXA bone density spine hip and pelvis; Future        Return in about 4 weeks (around 7/1/2022) for Recheck       Ruiz Bailey MD

## 2022-06-03 NOTE — PATIENT INSTRUCTIONS
F/u 4 wks  XR- L knee 4 wks  Begin using hinged knee brace  May begin bending L knee as tolerated  No weight bearing on L leg for 4 more wks  Icing/heat/OTC pain meds as needed

## 2022-06-15 ENCOUNTER — TELEPHONE (OUTPATIENT)
Dept: OBGYN CLINIC | Facility: CLINIC | Age: 67
End: 2022-06-15

## 2022-06-15 NOTE — TELEPHONE ENCOUNTER
HARDIK HCSYBY  RE: Blood Thinner  CB# [phone] [facility] NEHA ZULETA [facility])       Patients mother called into the office, she states her daughter is out of the blood thinners that were prescribed and she is wanting to know if she is to continue taking them, if so she will need a refill         Please advise and call back

## 2022-06-24 ENCOUNTER — OFFICE VISIT (OUTPATIENT)
Dept: OBGYN CLINIC | Facility: CLINIC | Age: 67
End: 2022-06-24
Payer: MEDICARE

## 2022-06-24 ENCOUNTER — APPOINTMENT (OUTPATIENT)
Dept: RADIOLOGY | Facility: CLINIC | Age: 67
End: 2022-06-24
Payer: MEDICARE

## 2022-06-24 VITALS
HEART RATE: 76 BPM | DIASTOLIC BLOOD PRESSURE: 74 MMHG | BODY MASS INDEX: 29.52 KG/M2 | SYSTOLIC BLOOD PRESSURE: 118 MMHG | HEIGHT: 64 IN

## 2022-06-24 DIAGNOSIS — S82.102D CLOSED FRACTURE OF PROXIMAL END OF LEFT TIBIA WITH ROUTINE HEALING, UNSPECIFIED FRACTURE MORPHOLOGY, SUBSEQUENT ENCOUNTER: Primary | ICD-10-CM

## 2022-06-24 DIAGNOSIS — S82.102D CLOSED FRACTURE OF PROXIMAL END OF LEFT TIBIA WITH ROUTINE HEALING, UNSPECIFIED FRACTURE MORPHOLOGY, SUBSEQUENT ENCOUNTER: ICD-10-CM

## 2022-06-24 PROCEDURE — 73562 X-RAY EXAM OF KNEE 3: CPT

## 2022-06-24 PROCEDURE — 99214 OFFICE O/P EST MOD 30 MIN: CPT | Performed by: FAMILY MEDICINE

## 2022-06-24 NOTE — PROGRESS NOTES
[facility] SPECIAL[facility] [place]  [address]  [facility] [address] [phone]      Chief Complaint:  Chief Complaint   Patient presents with    Left Knee - Follow-up       Vitals:  /74   Pulse 76   Ht 5' 4" (1 626 m)   BMI 29 52 kg/m²     The following portions of the patient's history were reviewed and updated as appropriate: allergies, current medications, past family history, past medical history, past social history, past surgical history, and problem list       Subjective:   Patient ID: Jocelynn Hinds is a 77 y o  female        Here for f/u  L knee pain/prox tibial fx  staying off the leg/using wheelchair  Wearing knee brace  No pain in L knee  Shin pain  Taking vicodin- not helping much      Review of Systems   Constitutional: Negative for fatigue and fever  Respiratory: Negative for shortness of breath  Cardiovascular: Negative for chest pain  Gastrointestinal: Negative for abdominal pain and nausea  Genitourinary: Negative for dysuria  Musculoskeletal: Positive for arthralgias  Skin: Negative for rash and wound  Neurological: Negative for weakness and headaches  Objective:  Left Knee Exam     Tenderness   The patient is experiencing tenderness in the lateral joint line  Range of Motion   The patient has normal left knee ROM  Other   Swelling: none  Effusion: no effusion present          Observations   Left Knee   Negative for effusion  Physical Exam  Constitutional:       Appearance: Normal appearance  She is normal weight  HENT:      Head: Normocephalic  Eyes:      Extraocular Movements: Extraocular movements intact  Pulmonary:      Effort: Pulmonary effort is normal    Musculoskeletal:      Cervical back: Normal range of motion  Left knee: No effusion  Skin:     General: Skin is warm and dry  Neurological:      General: No focal deficit present        Mental Status: She is alert and oriented to person, place, and time  Mental status is at baseline  Psychiatric:         Mood and Affect: Mood normal          Behavior: Behavior normal          Thought Content: Thought content normal          Judgment: Judgment normal          I have personally reviewed pertinent films in PACS and my interpretation is XR-  L knee- healing/nondisplaced lateral tibial plateau fx  Assessment/Plan:  Assessment/Plan   Diagnoses and all orders for this visit:    Closed fracture of proximal end of left tibia with routine healing, unspecified fracture morphology, subsequent encounter  -     XR knee 3 vw left non injury; Future  -     Crutches        Return in about 3 weeks (around 7/15/2022) for Recheck       Apple Peace MD

## 2022-06-24 NOTE — PATIENT INSTRUCTIONS
F/u 3 wks  Begin gradual weight bearing with crutches in 1 wk  Continue wearing knee brace    Continue drinking milk and Ca/VitD

## 2022-07-07 ENCOUNTER — TELEPHONE (OUTPATIENT)
Dept: GASTROENTEROLOGY | Facility: CLINIC | Age: 67
End: 2022-07-07

## 2022-07-18 ENCOUNTER — TELEPHONE (OUTPATIENT)
Dept: GASTROENTEROLOGY | Facility: CLINIC | Age: 67
End: 2022-07-18

## 2022-07-18 NOTE — TELEPHONE ENCOUNTER
Trina Jimenez [id number]  Patients insurance is not in network with [address] [place] [address] or [place] [address] [id number] and Tax ID [id number] and NPI [id number] ([id number])  I did request prior authorization for 4/28 visit  Insurance stated they are sending a a form to request coverage gave [phone] fax

## 2022-07-22 ENCOUNTER — APPOINTMENT (OUTPATIENT)
Dept: RADIOLOGY | Facility: CLINIC | Age: 67
End: 2022-07-22
Payer: MEDICARE

## 2022-07-22 ENCOUNTER — OFFICE VISIT (OUTPATIENT)
Dept: OBGYN CLINIC | Facility: CLINIC | Age: 67
End: 2022-07-22
Payer: MEDICARE

## 2022-07-22 ENCOUNTER — TELEPHONE (OUTPATIENT)
Dept: OBGYN CLINIC | Facility: HOSPITAL | Age: 67
End: 2022-07-22

## 2022-07-22 VITALS
SYSTOLIC BLOOD PRESSURE: 126 MMHG | BODY MASS INDEX: 29.52 KG/M2 | HEIGHT: 64 IN | HEART RATE: 76 BPM | DIASTOLIC BLOOD PRESSURE: 80 MMHG

## 2022-07-22 DIAGNOSIS — S82.202G: Primary | ICD-10-CM

## 2022-07-22 DIAGNOSIS — S99.912D ANKLE INJURY, LEFT, SUBSEQUENT ENCOUNTER: ICD-10-CM

## 2022-07-22 DIAGNOSIS — S82.402G: Primary | ICD-10-CM

## 2022-07-22 DIAGNOSIS — S82.102D CLOSED FRACTURE OF PROXIMAL END OF LEFT TIBIA WITH ROUTINE HEALING, UNSPECIFIED FRACTURE MORPHOLOGY, SUBSEQUENT ENCOUNTER: ICD-10-CM

## 2022-07-22 DIAGNOSIS — I82.4Y2 ACUTE DEEP VEIN THROMBOSIS (DVT) OF PROXIMAL VEIN OF LEFT LOWER EXTREMITY (HCC): ICD-10-CM

## 2022-07-22 PROCEDURE — 73610 X-RAY EXAM OF ANKLE: CPT

## 2022-07-22 PROCEDURE — 99215 OFFICE O/P EST HI 40 MIN: CPT | Performed by: FAMILY MEDICINE

## 2022-07-22 PROCEDURE — 29515 APPLICATION SHORT LEG SPLINT: CPT | Performed by: FAMILY MEDICINE

## 2022-07-22 RX ORDER — CEFAZOLIN SODIUM 2 G/50ML
2000 SOLUTION INTRAVENOUS ONCE
Status: CANCELLED | OUTPATIENT
Start: 2022-07-22 | End: 2022-07-22

## 2022-07-22 RX ORDER — SODIUM CHLORIDE, SODIUM LACTATE, POTASSIUM CHLORIDE, CALCIUM CHLORIDE 600; 310; 30; 20 MG/100ML; MG/100ML; MG/100ML; MG/100ML
100 INJECTION, SOLUTION INTRAVENOUS CONTINUOUS
Status: CANCELLED | OUTPATIENT
Start: 2022-07-22

## 2022-07-22 RX ORDER — CHLORHEXIDINE GLUCONATE 0.12 MG/ML
15 RINSE ORAL ONCE
Status: CANCELLED | OUTPATIENT
Start: 2022-07-22 | End: 2022-07-22

## 2022-07-22 NOTE — TELEPHONE ENCOUNTER
Dr Jessica Pepe from [facility] states that all orders need to be sent through"  "[place]" Please resend wheelchair order      CB# [phone]

## 2022-07-22 NOTE — H&P (VIEW-ONLY)
[facility] [place] [address]  [place] [id number] [phone]      Chief Complaint:  Chief Complaint   Patient presents with    Left Knee - Follow-up       Vitals:  /80   Pulse 76   Ht 5' 4" (1 626 m)   BMI 29 52 kg/m²     The following portions of the patient's history were reviewed and updated as appropriate: allergies, current medications, past family history, past medical history, past social history, past surgical history, and problem list       Subjective:   Patient ID: Lino Nash is a 77 y o  female  Here for f/u  L knee pain/prox tibial fx  She is having no pain in the L knee  Trying to use walker- but hurts to walk due to pain in L ankle  using wheelchair  Wearing knee brace  Taking vicodin- not helping much  Patient denies falling again  She did not complain of ankle pain at the first visit and the exam was normal without pain, but she did have L knee pain and tenderness on exam  At last visit she did have less L knee pain, only on exam, and some shin pain  She did have some mild tenderness but no deformity in the lower leg  I explained to her at that time that maybe her knee brace was too tight  She had been NWB until 2 wks ago when I allowed her to begin gradual weight bearing  She has been putting partial weight on the L leg with her walker to walk to the bathroom  She did try to avoid walking due to pain  Review of Systems   Constitutional: Negative for fatigue and fever  Respiratory: Negative for shortness of breath  Cardiovascular: Negative for chest pain  Gastrointestinal: Negative for abdominal pain and nausea  Genitourinary: Negative for dysuria  Musculoskeletal: Positive for arthralgias, gait problem and joint swelling  Skin: Negative for rash and wound  Neurological: Negative for weakness and headaches         Objective:  Left Ankle Exam     Tenderness   The patient is experiencing tenderness in the medial malleolus  Swelling: moderate    Comments:  L distal leg deformity/swelling? Left Knee Exam     Tenderness   The patient is experiencing tenderness in the lateral joint line  Physical Exam  Constitutional:       Appearance: Normal appearance  She is normal weight  HENT:      Head: Normocephalic  Eyes:      Extraocular Movements: Extraocular movements intact  Pulmonary:      Effort: Pulmonary effort is normal    Musculoskeletal:         General: Tenderness present  Cervical back: Normal range of motion  Skin:     General: Skin is warm and dry  Neurological:      General: No focal deficit present  Mental Status: She is alert and oriented to person, place, and time  Mental status is at baseline  Psychiatric:         Mood and Affect: Mood normal          Behavior: Behavior normal          Thought Content: Thought content normal          Judgment: Judgment normal      Splint application    Date/Time: 7/22/2022 9:21 AM  Performed by: Marilin Rosales MD  Authorized by: Marilin Rosales MD   Universal Protocol:  Consent: Verbal consent obtained  Risks and benefits: risks, benefits and alternatives were discussed  Consent given by: patient  Timeout called at: 7/22/2022 9:21 AM     Pre-procedure details:     Sensation:  Normal  Procedure details:     Laterality:  Left    Location:  Leg    Leg:  L lower leg    Splint type:  Short leg    Supplies:  Cotton padding and Ortho-Glass          I have personally reviewed pertinent films in PACS and my interpretation is XR-  L tib/fib- distal 1/3 displaced tib/fib fx with callus formation  Assessment/Plan:  Assessment/Plan   Diagnoses and all orders for this visit:    Fracture of tibia and fibula, left, closed, with delayed healing, subsequent encounter  -     Wheelchair  -     Ambulatory Referral to Orthopedic Surgery; Future    Ankle injury, left, subsequent encounter  -     XR ankle 3+ vw left;  Future  -     DXA bone density spine hip and pelvis; Future  -     Wheelchair  -     Ambulatory Referral to Orthopedic Surgery; Future    Closed fracture of proximal end of left tibia with routine healing, unspecified fracture morphology, subsequent encounter  -     DXA bone density spine hip and pelvis; Future  -     Wheelchair  -     Ambulatory Referral to Orthopedic Surgery; Future    Acute deep vein thrombosis (DVT) of proximal vein of left lower extremity ([facility] [address] )    Other orders  -     Cast application  -     Splint application        Return for f/u wtih Dr Livan Norris, [facility] Edward Douglas Lozada MD

## 2022-07-22 NOTE — PATIENT INSTRUCTIONS
F/u here as needed  D/w patient with Dr Johnna Aragon  He will reach out to his PA to assist in scheduling surgery  No weight bearing  Lower leg splint placed  Wheelchair  Elevation/OTC pain meds as needed  Tylenol/vicodin as needed  I spent >60 min coordinating care for this patient

## 2022-07-22 NOTE — PROGRESS NOTES
[facility] [place] [address]  [place] [id number] [phone]      Chief Complaint:  Chief Complaint   Patient presents with    Left Knee - Follow-up       Vitals:  /80   Pulse 76   Ht 5' 4" (1 626 m)   BMI 29 52 kg/m²     The following portions of the patient's history were reviewed and updated as appropriate: allergies, current medications, past family history, past medical history, past social history, past surgical history, and problem list       Subjective:   Patient ID: Artie Koyanagi is a 77 y o  female  Here for f/u  L knee pain/prox tibial fx  She is having no pain in the L knee  Trying to use walker- but hurts to walk due to pain in L ankle  using wheelchair  Wearing knee brace  Taking vicodin- not helping much  Patient denies falling again  She did not complain of ankle pain at the first visit and the exam was normal without pain, but she did have L knee pain and tenderness on exam  At last visit she did have less L knee pain, only on exam, and some shin pain  She did have some mild tenderness but no deformity in the lower leg  I explained to her at that time that maybe her knee brace was too tight  She had been NWB until 2 wks ago when I allowed her to begin gradual weight bearing  She has been putting partial weight on the L leg with her walker to walk to the bathroom  She did try to avoid walking due to pain  Review of Systems   Constitutional: Negative for fatigue and fever  Respiratory: Negative for shortness of breath  Cardiovascular: Negative for chest pain  Gastrointestinal: Negative for abdominal pain and nausea  Genitourinary: Negative for dysuria  Musculoskeletal: Positive for arthralgias, gait problem and joint swelling  Skin: Negative for rash and wound  Neurological: Negative for weakness and headaches         Objective:  Left Ankle Exam     Tenderness   The patient is experiencing tenderness in the medial malleolus  Swelling: moderate    Comments:  L distal leg deformity/swelling? Left Knee Exam     Tenderness   The patient is experiencing tenderness in the lateral joint line  Physical Exam  Constitutional:       Appearance: Normal appearance  She is normal weight  HENT:      Head: Normocephalic  Eyes:      Extraocular Movements: Extraocular movements intact  Pulmonary:      Effort: Pulmonary effort is normal    Musculoskeletal:         General: Tenderness present  Cervical back: Normal range of motion  Skin:     General: Skin is warm and dry  Neurological:      General: No focal deficit present  Mental Status: She is alert and oriented to person, place, and time  Mental status is at baseline  Psychiatric:         Mood and Affect: Mood normal          Behavior: Behavior normal          Thought Content: Thought content normal          Judgment: Judgment normal      Splint application    Date/Time: 7/22/2022 9:21 AM  Performed by: Nestor Cazares MD  Authorized by: Nestor Cazares MD   Universal Protocol:  Consent: Verbal consent obtained  Risks and benefits: risks, benefits and alternatives were discussed  Consent given by: patient  Timeout called at: 7/22/2022 9:21 AM     Pre-procedure details:     Sensation:  Normal  Procedure details:     Laterality:  Left    Location:  Leg    Leg:  L lower leg    Splint type:  Short leg    Supplies:  Cotton padding and Ortho-Glass          I have personally reviewed pertinent films in PACS and my interpretation is XR-  L tib/fib- distal 1/3 displaced tib/fib fx with callus formation  Assessment/Plan:  Assessment/Plan   Diagnoses and all orders for this visit:    Fracture of tibia and fibula, left, closed, with delayed healing, subsequent encounter  -     Wheelchair  -     Ambulatory Referral to Orthopedic Surgery; Future    Ankle injury, left, subsequent encounter  -     XR ankle 3+ vw left;  Future  -     DXA bone density spine hip and pelvis; Future  -     Wheelchair  -     Ambulatory Referral to Orthopedic Surgery; Future    Closed fracture of proximal end of left tibia with routine healing, unspecified fracture morphology, subsequent encounter  -     DXA bone density spine hip and pelvis; Future  -     Wheelchair  -     Ambulatory Referral to Orthopedic Surgery; Future    Acute deep vein thrombosis (DVT) of proximal vein of left lower extremity ([facility] [address] )    Other orders  -     Cast application  -     Splint application        Return for f/u wtih Dr Stacy Calloway, [address] [place] Romero MD

## 2022-07-25 ENCOUNTER — TELEPHONE (OUTPATIENT)
Dept: OBGYN CLINIC | Facility: MEDICAL CENTER | Age: 67
End: 2022-07-25

## 2022-07-25 NOTE — TELEPHONE ENCOUNTER
Patient sees Dr Javier Martins office is calling about this patient, she is having surgery on 7/27/2022 on her left ankle/leg, patient is stating that she does not have to hold her blood thinner medication  They asking for a call back confirming is this is correct  She is stating she does not need a clearance and is at the  office now       office phone [phone]

## 2022-07-25 NOTE — TELEPHONE ENCOUNTER
Patient sees Dr Hao Koch  Patient is calling to confirm her surgery, she is stating it is tomorrow, but the admissions tab states 7/27/2022  She is asking for a call back to confirm surgery         # [id number] or [phone]

## 2022-07-26 ENCOUNTER — ANESTHESIA EVENT (OUTPATIENT)
Dept: PERIOP | Facility: HOSPITAL | Age: 67
End: 2022-07-26
Payer: MEDICARE

## 2022-07-26 PROBLEM — S82.302A CLOSED FRACTURE OF DISTAL END OF LEFT TIBIA: Status: ACTIVE | Noted: 2022-07-26

## 2022-07-26 PROBLEM — I63.9 CVA (CEREBRAL VASCULAR ACCIDENT) (HCC): Status: ACTIVE | Noted: 2022-07-26

## 2022-07-26 NOTE — ANESTHESIA PREPROCEDURE EVALUATION
Procedure:  INSERTION NAIL IM TIBIA (Left Leg Lower)    This is a 77 y o  female who presents for INSERTION NAIL IM TIBIA (Left Leg Lower) on 07/27/22  Distal left tibial + fib fx w/ delayed healing    -- VITALS --  Ht 5' 4" (1 626 m)   Wt 78 kg (172 lb)   BMI 29 52 kg/m²   BP Readings from Last 3 Encounters:   07/22/22 126/80   06/24/22 118/74   06/03/22 124/74     -- LABS --  Results from Last 12 Months   Lab Units 05/16/22  1711 08/18/21  1226   SODIUM mmol/L 142 139   POTASSIUM mmol/L 3 9 4 7   CHLORIDE mmol/L 108 111*   CO2 mmol/L 26 25   ANION GAP mmol/L 8 3*   BUN mg/dL 17 19   CREATININE mg/dL 1 16 1 24   CALCIUM mg/dL 9 4 8 9   GLUCOSE RANDOM mg/dL 97  --    AST U/L 20 14   ALT U/L 12 9*   ALK PHOS U/L 171* 94   TOTAL BILIRUBIN mg/dL 0 29 0 27   ALBUMIN g/dL 3 4* 3 4*         Results from Last 12 Months   Lab Units 05/16/22  1711 08/18/21  1226   WBC Thousand/uL 5 83 7 08   HEMOGLOBIN g/dL 9 3* 11 8   HEMATOCRIT % 30 4* 37 4   PLATELETS Thousands/uL 301 339         - Coags:      -- EKG --  Normal sinus rhythm  Low voltage QRS  Nonspecific T wave abnormality  Abnormal ECG  No previous ECGs available  Confirmed by Jose Luis Dyer ([id number]) on 6/25/2021 5:02:50 AM    -- ECHO/RELEVANT IMAGING --  6/25/2021 TTE  LEFT VENTRICLE:  Systolic function was vigorous  Ejection fraction was estimated to be 65 %  There were no regional wall motion abnormalities      AORTIC VALVE:  There was mild regurgitation      TRICUSPID VALVE:  There was trace regurgitation      PULMONIC VALVE:  There was trace regurgitation        Relevant Problems   CARDIO   (+) Essential hypertension   (+) Hypertension   (+) Pure hypercholesterolemia      GI/HEPATIC   (+) GERD (gastroesophageal reflux disease)      /RENAL   (+) Renal cell carcinoma (HCC)      HEMATOLOGY   (+) Anemia      MUSCULOSKELETAL   (+) Chronic low back pain   (+) Rhabdomyolysis      NEURO/PSYCH   (+) Anxiety   (+) CVA (cerebral vascular accident) ([address] )   (+) Chronic low back pain   (+) Depression   (+) Generalized anxiety disorder   (+) History of CVA (cerebrovascular accident)   (+) History of tachycardia   (+) S/P ORIF (open reduction internal fixation) fracture      PULMONARY   (+) Chronic obstructive pulmonary disease (HCC)      Musculoskeletal and Integument   (+) Closed fracture of distal end of left tibia      Other   (+) Bipolar 1 disorder (HCC)        Physical Exam    Airway    Mallampati score: II  TM Distance: >3 FB  Neck ROM: full     Dental   No notable dental hx     Cardiovascular  Rate: normal,     Pulmonary  Pulmonary exam normal     Other Findings        Anesthesia Plan  ASA Score- 3     Anesthesia Type- general with ASA Monitors  Additional Monitors:   Airway Plan: LMA  Comment: Sciatic Pop  Plan Factors-Exercise tolerance (METS): >4 METS  Chart reviewed  EKG reviewed  Imaging results reviewed  Existing labs reviewed  Patient summary reviewed  Patient is a current smoker  Induction- intravenous  Postoperative Plan- Plan for postoperative opioid use  Informed Consent- Anesthetic plan and risks discussed with patient  I personally reviewed this patient with the CRNA  Discussed and agreed on the Anesthesia Plan with the CRNA  Phan Reid           -- ANESTHESIA RISK ASSESSMENT / QUESTIONNAIRE --   (1) Personal or family history of anesthesia related complications: n   (2) Patient took the following medications this morning: Neurontin, Relaxin, opiate   (3) Patient meets ASA NPO guidelines: y   (4) Relevant airway history: n   (5) Cardiac assessment       - Does the patient have severe, modifiable cardiac conditions including MI (w/i 14 dys of angioplasty, 1 mo after BMS, 2 mo after no intervention, 3-6 mo after BEA); decompensated CHF (consider TTE w/i 1 yr); decompensated valvular Dz (consider TTE w/i 1 yr), unstable arrhythmias, unstable pulmonary HTN?: mickey Talley's RCRI (From surgical risk, ICM, CHF, Cr >2, CVA/TIA, IDDM): 0 - MACE risk based on RCRI: 0= 0 4%; 1= 0 6%; 2= 7%; 3= 11%

## 2022-07-26 NOTE — TELEPHONE ENCOUNTER
Called spoke with patient  Clarified that surgery is on 7/27 with Sukh   She understood and will wait for the phone call from the PAT nurse

## 2022-07-26 NOTE — PRE-PROCEDURE INSTRUCTIONS
Pre-Surgery Instructions:   Medication Instructions    ALPRAZolam (XANAX) 1 mg tablet Take day of surgery   apixaban (Eliquis) 5 mg Instructions provided by MD   Wash Caller atorvastatin (LIPITOR) 10 mg tablet Take night before surgery    atorvastatin (LIPITOR) 20 mg tablet Take night before surgery    docusate sodium (COLACE) 100 mg capsule Hold day of surgery   ergocalciferol (VITAMIN D2) 50,000 units Hold until after surgery    gabapentin (NEURONTIN) 800 mg tablet Take day of surgery   HYDROcodone-acetaminophen (NORCO) 5-325 mg per tablet Uses PRN- OK to take day of surgery    ibuprofen (MOTRIN) 800 mg tablet Hold until after surgery    ketotifen (ZADITOR) 0 025 % ophthalmic solution Take day of surgery   lidocaine (LIDODERM) 5 % Hold day of surgery   OLANZapine (ZyPREXA) 20 MG tablet Take night before surgery    pantoprazole (PROTONIX) 40 mg tablet Hold day of surgery   QUEtiapine (SEROquel) 200 mg tablet Take night before surgery    vitamin B-12 (VITAMIN B-12) 1,000 mcg tablet Hold until after surgery      Reviewed with patient, in detail, instructions from "My Surgical Experience"  Instructed to avoid all  OTC vitamins/supplements and NSAIDS from now until after surgery per anesthesia guidelines  Tylenol ok to take PRN  Advised patient that Jorge Dobbs will call with surgery arrival time and hospital directions the business day prior to surgery  Advised patient nothing eat or drink after midnight prior to surgery  Instructed to call surgeon's office in meantime with any new illnesses/exposure  Patient verbalized understanding of current visitor restrictions/masking guidelines and advised that he/she can confirm these at time of arrival call with Jorge Dobbs  Patient verbalized understanding and knows to call surgeon's office with any additional questions prior to surgery

## 2022-07-26 NOTE — TELEPHONE ENCOUNTER
Pt is calling stating that she is having surgery on her lt ankle/leg tomorrow and states that we were getting her a wheelchair and pt would like to know the status of the wheelchair  pt states that she needs it and is wheelchair bound    #[phone]

## 2022-07-27 ENCOUNTER — ANESTHESIA (OUTPATIENT)
Dept: PERIOP | Facility: HOSPITAL | Age: 67
End: 2022-07-27
Payer: MEDICARE

## 2022-07-27 ENCOUNTER — HOSPITAL ENCOUNTER (OUTPATIENT)
Dept: RADIOLOGY | Facility: HOSPITAL | Age: 67
Setting detail: OUTPATIENT SURGERY
Discharge: HOME | End: 2022-07-27
Payer: MEDICARE

## 2022-07-27 ENCOUNTER — HOSPITAL ENCOUNTER (OUTPATIENT)
Facility: HOSPITAL | Age: 67
Setting detail: OUTPATIENT SURGERY
Discharge: HOME | End: 2022-07-29
Attending: ORTHOPAEDIC SURGERY | Admitting: ORTHOPAEDIC SURGERY
Payer: MEDICARE

## 2022-07-27 DIAGNOSIS — Z98.890 S/P ORIF (OPEN REDUCTION INTERNAL FIXATION) FRACTURE: ICD-10-CM

## 2022-07-27 DIAGNOSIS — S82.402G: ICD-10-CM

## 2022-07-27 DIAGNOSIS — S82.202G: ICD-10-CM

## 2022-07-27 DIAGNOSIS — S82.202A CLOSED FRACTURE OF SHAFT OF LEFT TIBIA, UNSPECIFIED FRACTURE MORPHOLOGY, INITIAL ENCOUNTER: Primary | ICD-10-CM

## 2022-07-27 DIAGNOSIS — Z87.81 S/P ORIF (OPEN REDUCTION INTERNAL FIXATION) FRACTURE: ICD-10-CM

## 2022-07-27 LAB
ATRIAL RATE: 59 BPM
P AXIS: 22 DEGREES
PR INTERVAL: 182 MS
QRS AXIS: 60 DEGREES
QRSD INTERVAL: 86 MS
QT INTERVAL: 406 MS
QTC INTERVAL: 401 MS
T WAVE AXIS: 50 DEGREES
VENTRICULAR RATE: 59 BPM

## 2022-07-27 PROCEDURE — 93010 ELECTROCARDIOGRAM REPORT: CPT | Performed by: INTERNAL MEDICINE

## 2022-07-27 PROCEDURE — 93005 ELECTROCARDIOGRAM TRACING: CPT

## 2022-07-27 PROCEDURE — C1713 ANCHOR/SCREW BN/BN,TIS/BN: HCPCS | Performed by: ORTHOPAEDIC SURGERY

## 2022-07-27 PROCEDURE — C1769 GUIDE WIRE: HCPCS | Performed by: ORTHOPAEDIC SURGERY

## 2022-07-27 PROCEDURE — 27506 TREATMENT OF THIGH FRACTURE: CPT | Performed by: ORTHOPAEDIC SURGERY

## 2022-07-27 PROCEDURE — 73590 X-RAY EXAM OF LOWER LEG: CPT

## 2022-07-27 DEVICE — TIBIAL NAIL-ADVANCED / 10MM 315MM / STERILE: Type: IMPLANTABLE DEVICE | Site: TIBIA | Status: FUNCTIONAL

## 2022-07-27 DEVICE — LOCKING SCREW FOR IM NAIL Ø 5MM/ 36MM/ XL25/ STERILE: Type: IMPLANTABLE DEVICE | Site: TIBIA | Status: FUNCTIONAL

## 2022-07-27 DEVICE — LOCKING SCREW FOR IM NAIL Ø 5MM/ 40MM/ XL25/ STERILE: Type: IMPLANTABLE DEVICE | Status: FUNCTIONAL

## 2022-07-27 DEVICE — LOCKING SCREW FOR IM NAIL Ø 5MM/ 34MM/ XL25/ STERILE: Type: IMPLANTABLE DEVICE | Site: TIBIA | Status: FUNCTIONAL

## 2022-07-27 DEVICE — LOCKING SCREW FOR IM NAIL Ø 5MM/ 28MM/ XL25/ STERILE: Type: IMPLANTABLE DEVICE | Site: TIBIA | Status: FUNCTIONAL

## 2022-07-27 RX ORDER — FENTANYL CITRATE/PF 50 MCG/ML
25 SYRINGE (ML) INJECTION
Status: DISCONTINUED | OUTPATIENT
Start: 2022-07-27 | End: 2022-07-27 | Stop reason: HOSPADM

## 2022-07-27 RX ORDER — DIPHENHYDRAMINE HYDROCHLORIDE 50 MG/ML
12.5 INJECTION INTRAMUSCULAR; INTRAVENOUS ONCE AS NEEDED
Status: DISCONTINUED | OUTPATIENT
Start: 2022-07-27 | End: 2022-07-27 | Stop reason: HOSPADM

## 2022-07-27 RX ORDER — CHLORHEXIDINE GLUCONATE 0.12 MG/ML
15 RINSE ORAL ONCE
Status: DISCONTINUED | OUTPATIENT
Start: 2022-07-27 | End: 2022-07-27

## 2022-07-27 RX ORDER — ALPRAZOLAM 0.5 MG/1
1 TABLET ORAL 4 TIMES DAILY
Status: DISCONTINUED | OUTPATIENT
Start: 2022-07-27 | End: 2022-07-29 | Stop reason: HOSPADM

## 2022-07-27 RX ORDER — METOPROLOL SUCCINATE 100 MG/1
200 TABLET, EXTENDED RELEASE ORAL EVERY MORNING
Status: DISCONTINUED | OUTPATIENT
Start: 2022-07-28 | End: 2022-07-29 | Stop reason: HOSPADM

## 2022-07-27 RX ORDER — GABAPENTIN 400 MG/1
400 CAPSULE ORAL 4 TIMES DAILY
Status: DISCONTINUED | OUTPATIENT
Start: 2022-07-27 | End: 2022-07-29 | Stop reason: HOSPADM

## 2022-07-27 RX ORDER — LIDOCAINE HYDROCHLORIDE 10 MG/ML
INJECTION, SOLUTION EPIDURAL; INFILTRATION; INTRACAUDAL; PERINEURAL AS NEEDED
Status: DISCONTINUED | OUTPATIENT
Start: 2022-07-27 | End: 2022-07-27

## 2022-07-27 RX ORDER — CEFAZOLIN SODIUM 2 G/50ML
2000 SOLUTION INTRAVENOUS ONCE
Status: COMPLETED | OUTPATIENT
Start: 2022-07-27 | End: 2022-07-27

## 2022-07-27 RX ORDER — SODIUM CHLORIDE, SODIUM LACTATE, POTASSIUM CHLORIDE, CALCIUM CHLORIDE 600; 310; 30; 20 MG/100ML; MG/100ML; MG/100ML; MG/100ML
INJECTION, SOLUTION INTRAVENOUS CONTINUOUS PRN
Status: DISCONTINUED | OUTPATIENT
Start: 2022-07-27 | End: 2022-07-27

## 2022-07-27 RX ORDER — ROPIVACAINE HYDROCHLORIDE 5 MG/ML
INJECTION, SOLUTION EPIDURAL; INFILTRATION; PERINEURAL
Status: COMPLETED | OUTPATIENT
Start: 2022-07-27 | End: 2022-07-27

## 2022-07-27 RX ORDER — OXYCODONE HYDROCHLORIDE 5 MG/1
5 TABLET ORAL EVERY 4 HOURS PRN
Status: DISCONTINUED | OUTPATIENT
Start: 2022-07-27 | End: 2022-07-29 | Stop reason: HOSPADM

## 2022-07-27 RX ORDER — HYDROMORPHONE HCL IN WATER/PF 6 MG/30 ML
0.2 PATIENT CONTROLLED ANALGESIA SYRINGE INTRAVENOUS
Status: DISCONTINUED | OUTPATIENT
Start: 2022-07-27 | End: 2022-07-27 | Stop reason: HOSPADM

## 2022-07-27 RX ORDER — ATORVASTATIN CALCIUM 20 MG/1
20 TABLET, FILM COATED ORAL
Status: DISCONTINUED | OUTPATIENT
Start: 2022-07-28 | End: 2022-07-28

## 2022-07-27 RX ORDER — CEFAZOLIN SODIUM 2 G/50ML
2000 SOLUTION INTRAVENOUS ONCE
Status: DISCONTINUED | OUTPATIENT
Start: 2022-07-27 | End: 2022-07-27 | Stop reason: SDUPTHER

## 2022-07-27 RX ORDER — ALBUTEROL SULFATE 90 UG/1
2 AEROSOL, METERED RESPIRATORY (INHALATION) EVERY 6 HOURS PRN
Status: DISCONTINUED | OUTPATIENT
Start: 2022-07-27 | End: 2022-07-29 | Stop reason: HOSPADM

## 2022-07-27 RX ORDER — SODIUM CHLORIDE, SODIUM LACTATE, POTASSIUM CHLORIDE, CALCIUM CHLORIDE 600; 310; 30; 20 MG/100ML; MG/100ML; MG/100ML; MG/100ML
100 INJECTION, SOLUTION INTRAVENOUS CONTINUOUS
Status: DISCONTINUED | OUTPATIENT
Start: 2022-07-27 | End: 2022-07-29 | Stop reason: HOSPADM

## 2022-07-27 RX ORDER — ONDANSETRON 2 MG/ML
INJECTION INTRAMUSCULAR; INTRAVENOUS AS NEEDED
Status: DISCONTINUED | OUTPATIENT
Start: 2022-07-27 | End: 2022-07-27

## 2022-07-27 RX ORDER — AMLODIPINE BESYLATE 5 MG/1
5 TABLET ORAL DAILY
Status: DISCONTINUED | OUTPATIENT
Start: 2022-07-28 | End: 2022-07-29 | Stop reason: HOSPADM

## 2022-07-27 RX ORDER — OXYCODONE HYDROCHLORIDE 10 MG/1
10 TABLET ORAL EVERY 4 HOURS PRN
Status: DISCONTINUED | OUTPATIENT
Start: 2022-07-27 | End: 2022-07-29 | Stop reason: HOSPADM

## 2022-07-27 RX ORDER — PROPOFOL 10 MG/ML
INJECTION, EMULSION INTRAVENOUS AS NEEDED
Status: DISCONTINUED | OUTPATIENT
Start: 2022-07-27 | End: 2022-07-27

## 2022-07-27 RX ORDER — ASPIRIN 81 MG/1
81 TABLET, CHEWABLE ORAL DAILY
Status: DISCONTINUED | OUTPATIENT
Start: 2022-07-28 | End: 2022-07-29 | Stop reason: HOSPADM

## 2022-07-27 RX ORDER — PANTOPRAZOLE SODIUM 40 MG/1
40 TABLET, DELAYED RELEASE ORAL DAILY
Status: DISCONTINUED | OUTPATIENT
Start: 2022-07-28 | End: 2022-07-29 | Stop reason: HOSPADM

## 2022-07-27 RX ORDER — FAMOTIDINE 20 MG/1
20 TABLET, FILM COATED ORAL DAILY
Status: DISCONTINUED | OUTPATIENT
Start: 2022-07-28 | End: 2022-07-29 | Stop reason: HOSPADM

## 2022-07-27 RX ORDER — QUETIAPINE FUMARATE 100 MG/1
200 TABLET, FILM COATED ORAL
Status: DISCONTINUED | OUTPATIENT
Start: 2022-07-27 | End: 2022-07-29 | Stop reason: HOSPADM

## 2022-07-27 RX ORDER — ATORVASTATIN CALCIUM 10 MG/1
10 TABLET, FILM COATED ORAL EVERY EVENING
Status: DISCONTINUED | OUTPATIENT
Start: 2022-07-27 | End: 2022-07-29 | Stop reason: HOSPADM

## 2022-07-27 RX ORDER — ONDANSETRON 2 MG/ML
4 INJECTION INTRAMUSCULAR; INTRAVENOUS ONCE AS NEEDED
Status: DISCONTINUED | OUTPATIENT
Start: 2022-07-27 | End: 2022-07-27 | Stop reason: HOSPADM

## 2022-07-27 RX ORDER — ROPIVACAINE HYDROCHLORIDE 5 MG/ML
INJECTION, SOLUTION EPIDURAL; INFILTRATION; PERINEURAL AS NEEDED
Status: DISCONTINUED | OUTPATIENT
Start: 2022-07-27 | End: 2022-07-27

## 2022-07-27 RX ORDER — FENTANYL CITRATE 50 UG/ML
INJECTION, SOLUTION INTRAMUSCULAR; INTRAVENOUS AS NEEDED
Status: DISCONTINUED | OUTPATIENT
Start: 2022-07-27 | End: 2022-07-27

## 2022-07-27 RX ORDER — DEXAMETHASONE SODIUM PHOSPHATE 10 MG/ML
INJECTION, SOLUTION INTRAMUSCULAR; INTRAVENOUS AS NEEDED
Status: DISCONTINUED | OUTPATIENT
Start: 2022-07-27 | End: 2022-07-27

## 2022-07-27 RX ORDER — LIDOCAINE HYDROCHLORIDE 10 MG/ML
0.5 INJECTION, SOLUTION EPIDURAL; INFILTRATION; INTRACAUDAL; PERINEURAL ONCE AS NEEDED
Status: DISCONTINUED | OUTPATIENT
Start: 2022-07-27 | End: 2022-07-27 | Stop reason: HOSPADM

## 2022-07-27 RX ORDER — CHLORHEXIDINE GLUCONATE 0.12 MG/ML
15 RINSE ORAL ONCE
Status: DISCONTINUED | OUTPATIENT
Start: 2022-07-27 | End: 2022-07-29 | Stop reason: HOSPADM

## 2022-07-27 RX ORDER — GLYCOPYRROLATE 0.2 MG/ML
INJECTION INTRAMUSCULAR; INTRAVENOUS AS NEEDED
Status: DISCONTINUED | OUTPATIENT
Start: 2022-07-27 | End: 2022-07-27

## 2022-07-27 RX ORDER — ENOXAPARIN SODIUM 100 MG/ML
40 INJECTION SUBCUTANEOUS
Status: DISCONTINUED | OUTPATIENT
Start: 2022-07-28 | End: 2022-07-28

## 2022-07-27 RX ORDER — EPHEDRINE SULFATE 50 MG/ML
INJECTION INTRAVENOUS AS NEEDED
Status: DISCONTINUED | OUTPATIENT
Start: 2022-07-27 | End: 2022-07-27

## 2022-07-27 RX ORDER — OLANZAPINE 10 MG/1
20 TABLET ORAL
Status: DISCONTINUED | OUTPATIENT
Start: 2022-07-27 | End: 2022-07-29 | Stop reason: HOSPADM

## 2022-07-27 RX ORDER — MIDAZOLAM HYDROCHLORIDE 2 MG/2ML
INJECTION, SOLUTION INTRAMUSCULAR; INTRAVENOUS AS NEEDED
Status: DISCONTINUED | OUTPATIENT
Start: 2022-07-27 | End: 2022-07-27

## 2022-07-27 RX ADMIN — SODIUM CHLORIDE, SODIUM LACTATE, POTASSIUM CHLORIDE, AND CALCIUM CHLORIDE 100 ML/HR: .6; .31; .03; .02 INJECTION, SOLUTION INTRAVENOUS at 22:27

## 2022-07-27 RX ADMIN — CEFAZOLIN SODIUM 2000 MG: 2 SOLUTION INTRAVENOUS at 16:43

## 2022-07-27 RX ADMIN — GABAPENTIN 400 MG: 400 CAPSULE ORAL at 22:26

## 2022-07-27 RX ADMIN — ROPIVACAINE HYDROCHLORIDE 30 MG: 5 INJECTION, SOLUTION EPIDURAL; INFILTRATION; PERINEURAL at 15:43

## 2022-07-27 RX ADMIN — PROPOFOL 170 MG: 10 INJECTION, EMULSION INTRAVENOUS at 16:47

## 2022-07-27 RX ADMIN — SODIUM CHLORIDE, SODIUM LACTATE, POTASSIUM CHLORIDE, AND CALCIUM CHLORIDE 100 ML/HR: .6; .31; .03; .02 INJECTION, SOLUTION INTRAVENOUS at 13:10

## 2022-07-27 RX ADMIN — PROPOFOL 40 MG: 10 INJECTION, EMULSION INTRAVENOUS at 17:32

## 2022-07-27 RX ADMIN — HYDROMORPHONE HYDROCHLORIDE 0.2 MG: 0.2 INJECTION, SOLUTION INTRAMUSCULAR; INTRAVENOUS; SUBCUTANEOUS at 20:23

## 2022-07-27 RX ADMIN — SODIUM CHLORIDE, SODIUM LACTATE, POTASSIUM CHLORIDE, AND CALCIUM CHLORIDE: .6; .31; .03; .02 INJECTION, SOLUTION INTRAVENOUS at 16:43

## 2022-07-27 RX ADMIN — QUETIAPINE FUMARATE 200 MG: 100 TABLET ORAL at 22:26

## 2022-07-27 RX ADMIN — DEXAMETHASONE SODIUM PHOSPHATE 10 MG: 10 INJECTION, SOLUTION INTRAMUSCULAR; INTRAVENOUS at 16:47

## 2022-07-27 RX ADMIN — FENTANYL CITRATE 25 MCG: 50 INJECTION INTRAMUSCULAR; INTRAVENOUS at 20:05

## 2022-07-27 RX ADMIN — FENTANYL CITRATE 25 MCG: 50 INJECTION INTRAMUSCULAR; INTRAVENOUS at 17:23

## 2022-07-27 RX ADMIN — LIDOCAINE HYDROCHLORIDE 50 MG: 10 INJECTION, SOLUTION EPIDURAL; INFILTRATION; INTRACAUDAL; PERINEURAL at 16:47

## 2022-07-27 RX ADMIN — OXYCODONE HYDROCHLORIDE 10 MG: 10 TABLET ORAL at 20:44

## 2022-07-27 RX ADMIN — ALPRAZOLAM 1 MG: 0.5 TABLET ORAL at 22:25

## 2022-07-27 RX ADMIN — FENTANYL CITRATE 25 MCG: 50 INJECTION INTRAMUSCULAR; INTRAVENOUS at 17:32

## 2022-07-27 RX ADMIN — FENTANYL CITRATE 25 MCG: 50 INJECTION INTRAMUSCULAR; INTRAVENOUS at 20:20

## 2022-07-27 RX ADMIN — GLYCOPYRROLATE 0.1 MG: 0.2 INJECTION, SOLUTION INTRAMUSCULAR; INTRAVENOUS at 17:09

## 2022-07-27 RX ADMIN — FENTANYL CITRATE 25 MCG: 50 INJECTION INTRAMUSCULAR; INTRAVENOUS at 20:00

## 2022-07-27 RX ADMIN — EPHEDRINE SULFATE 10 MG: 50 INJECTION INTRAVENOUS at 17:11

## 2022-07-27 RX ADMIN — FENTANYL CITRATE 50 MCG: 50 INJECTION INTRAMUSCULAR; INTRAVENOUS at 16:47

## 2022-07-27 RX ADMIN — SODIUM CHLORIDE, SODIUM LACTATE, POTASSIUM CHLORIDE, AND CALCIUM CHLORIDE: .6; .31; .03; .02 INJECTION, SOLUTION INTRAVENOUS at 19:15

## 2022-07-27 RX ADMIN — MIDAZOLAM 2 MG: 1 INJECTION INTRAMUSCULAR; INTRAVENOUS at 16:43

## 2022-07-27 RX ADMIN — ONDANSETRON 4 MG: 2 INJECTION INTRAMUSCULAR; INTRAVENOUS at 17:09

## 2022-07-27 RX ADMIN — ROPIVACAINE HYDROCHLORIDE 30 ML: 5 INJECTION, SOLUTION EPIDURAL; INFILTRATION; PERINEURAL at 15:45

## 2022-07-27 RX ADMIN — FENTANYL CITRATE 25 MCG: 50 INJECTION INTRAMUSCULAR; INTRAVENOUS at 20:15

## 2022-07-27 NOTE — ANESTHESIA PROCEDURE NOTES
Peripheral Block    Patient location during procedure: pre-op  Start time: 7/27/2022 3:43 PM  Reason for block: at surgeon's request and post-op pain management  Staffing  Performed: Anesthesiologist   Anesthesiologist: Óscar Bertrand  Preanesthetic Checklist  Completed: patient identified, IV checked, site marked, risks and benefits discussed, surgical consent, monitors and equipment checked, pre-op evaluation and timeout performed  Peripheral Block  Patient position: supine  Prep: ChloraPrep  Patient monitoring: heart rate, continuous pulse ox and frequent blood pressure checks  Block type: popliteal  Laterality: left  Injection technique: single-shot  Procedures: ultrasound guided, Ultrasound guidance required for the procedure to increase accuracy and safety of medication placement and decrease risk of complications    Ultrasound permanent image savedropivacaine (NAROPIN) 0 5 % - Perineural   30 mL - 7/27/2022 3:45:00 PM  Needle  Needle type: Stimuplex   Needle gauge: 22 G  Needle length: 5 cm  Needle localization: ultrasound guidance  Needle insertion depth: 4 cm  Assessment  Injection assessment: incremental injection, local visualized surrounding nerve on ultrasound, negative aspiration for CSF and no paresthesia on injection  Paresthesia pain: none  Heart rate change: no  Slow fractionated injection: yes  patient tolerated the procedure well with no immediate complications

## 2022-07-27 NOTE — ANESTHESIA POSTPROCEDURE EVALUATION
Post-Op Assessment Note    CV Status:  Stable  Pain Score: 0    Pain management: adequate     Mental Status:  Alert and awake   Hydration Status:  Euvolemic   PONV Controlled:  Controlled   Airway Patency:  Patent      Post Op Vitals Reviewed: Yes      Staff: CRNA         No complications documented      BP   100/59   Temp  87 8   Pulse  68   Resp   15   SpO2   99%

## 2022-07-27 NOTE — INTERVAL H&P NOTE
H&P reviewed  After examining the patient I find no changes in the patients condition since the H&P had been written      Vitals:    07/27/22 1231   BP: 134/79   Pulse: 61   Resp: 20   Temp: (!) 96 5 °F (35 8 °C)   SpO2: 98%

## 2022-07-27 NOTE — DISCHARGE INSTRUCTIONS
Discharge Instructions - Orthopedics  Irving Buerger 77 y o  female MRN: [id number]  Unit/Bed#: PACU 13    Weight Bearing Status:                                           Weight bearing as tolerated left leg     DVT prophylaxis  Take your Aspirin at home as prescribed  81mg twice a day     Pain:  Continue analgesics as directed    Dressing Instructions:   Please keep clean, dry and intact until follow up     Appt Instructions: If you do not have your appointment, please call the clinic at [phone] to follow up with Dr Livan Norris in two weeks   Otherwise followup as scheduled     Contact the office sooner if you experience any increased numbness/tingling in the extremities        Miscellaneous:  ***

## 2022-07-28 LAB
ABO GROUP BLD: NORMAL
ANION GAP SERPL CALCULATED.3IONS-SCNC: 6 MMOL/L (ref 4–13)
BLD GP AB SCN SERPL QL: NEGATIVE
BUN SERPL-MCNC: 9 MG/DL (ref 5–25)
CALCIUM SERPL-MCNC: 8.7 MG/DL (ref 8.3–10.1)
CHLORIDE SERPL-SCNC: 106 MMOL/L (ref 96–108)
CO2 SERPL-SCNC: 24 MMOL/L (ref 21–32)
CREAT SERPL-MCNC: 0.68 MG/DL (ref 0.6–1.3)
ERYTHROCYTE [DISTWIDTH] IN BLOOD BY AUTOMATED COUNT: 15.4 % (ref 11.6–15.1)
GFR SERPL CREATININE-BSD FRML MDRD: 91 ML/MIN/1.73SQ M
GLUCOSE P FAST SERPL-MCNC: 110 MG/DL (ref 65–99)
GLUCOSE SERPL-MCNC: 110 MG/DL (ref 65–140)
HCT VFR BLD AUTO: 23.8 % (ref 34.8–46.1)
HGB BLD-MCNC: 7 G/DL (ref 11.5–15.4)
MCH RBC QN AUTO: 27 PG (ref 26.8–34.3)
MCHC RBC AUTO-ENTMCNC: 29.4 G/DL (ref 31.4–37.4)
MCV RBC AUTO: 92 FL (ref 82–98)
PLATELET # BLD AUTO: 273 THOUSANDS/UL (ref 149–390)
PMV BLD AUTO: 8.4 FL (ref 8.9–12.7)
POTASSIUM SERPL-SCNC: 4.4 MMOL/L (ref 3.5–5.3)
RBC # BLD AUTO: 2.59 MILLION/UL (ref 3.81–5.12)
RH BLD: POSITIVE
SODIUM SERPL-SCNC: 136 MMOL/L (ref 135–147)
SPECIMEN EXPIRATION DATE: NORMAL
WBC # BLD AUTO: 6.43 THOUSAND/UL (ref 4.31–10.16)

## 2022-07-28 PROCEDURE — 86901 BLOOD TYPING SEROLOGIC RH(D): CPT | Performed by: ORTHOPAEDIC SURGERY

## 2022-07-28 PROCEDURE — NC001 PR NO CHARGE: Performed by: PHYSICIAN ASSISTANT

## 2022-07-28 PROCEDURE — 97163 PT EVAL HIGH COMPLEX 45 MIN: CPT

## 2022-07-28 PROCEDURE — 86900 BLOOD TYPING SEROLOGIC ABO: CPT | Performed by: ORTHOPAEDIC SURGERY

## 2022-07-28 PROCEDURE — 99024 POSTOP FOLLOW-UP VISIT: CPT | Performed by: PHYSICIAN ASSISTANT

## 2022-07-28 PROCEDURE — 99214 OFFICE O/P EST MOD 30 MIN: CPT | Performed by: PHYSICIAN ASSISTANT

## 2022-07-28 PROCEDURE — 99214 OFFICE O/P EST MOD 30 MIN: CPT | Performed by: NURSE PRACTITIONER

## 2022-07-28 PROCEDURE — 80048 BASIC METABOLIC PNL TOTAL CA: CPT | Performed by: STUDENT IN AN ORGANIZED HEALTH CARE EDUCATION/TRAINING PROGRAM

## 2022-07-28 PROCEDURE — 86850 RBC ANTIBODY SCREEN: CPT | Performed by: ORTHOPAEDIC SURGERY

## 2022-07-28 PROCEDURE — 85027 COMPLETE CBC AUTOMATED: CPT | Performed by: STUDENT IN AN ORGANIZED HEALTH CARE EDUCATION/TRAINING PROGRAM

## 2022-07-28 PROCEDURE — 97167 OT EVAL HIGH COMPLEX 60 MIN: CPT

## 2022-07-28 RX ADMIN — GABAPENTIN 400 MG: 400 CAPSULE ORAL at 21:55

## 2022-07-28 RX ADMIN — GABAPENTIN 400 MG: 400 CAPSULE ORAL at 17:03

## 2022-07-28 RX ADMIN — ALPRAZOLAM 1 MG: 0.5 TABLET ORAL at 12:03

## 2022-07-28 RX ADMIN — QUETIAPINE FUMARATE 200 MG: 100 TABLET ORAL at 21:55

## 2022-07-28 RX ADMIN — ALPRAZOLAM 1 MG: 0.5 TABLET ORAL at 08:45

## 2022-07-28 RX ADMIN — OLANZAPINE 20 MG: 10 TABLET, FILM COATED ORAL at 21:58

## 2022-07-28 RX ADMIN — ASPIRIN 81 MG CHEWABLE TABLET 81 MG: 81 TABLET CHEWABLE at 08:45

## 2022-07-28 RX ADMIN — ENOXAPARIN SODIUM 40 MG: 40 INJECTION SUBCUTANEOUS at 08:44

## 2022-07-28 RX ADMIN — GABAPENTIN 400 MG: 400 CAPSULE ORAL at 12:04

## 2022-07-28 RX ADMIN — FAMOTIDINE 20 MG: 20 TABLET, FILM COATED ORAL at 08:45

## 2022-07-28 RX ADMIN — OXYCODONE HYDROCHLORIDE 10 MG: 10 TABLET ORAL at 13:21

## 2022-07-28 RX ADMIN — PANTOPRAZOLE SODIUM 40 MG: 40 TABLET, DELAYED RELEASE ORAL at 08:45

## 2022-07-28 RX ADMIN — AMLODIPINE BESYLATE 5 MG: 5 TABLET ORAL at 08:51

## 2022-07-28 RX ADMIN — GABAPENTIN 400 MG: 400 CAPSULE ORAL at 08:53

## 2022-07-28 RX ADMIN — METOPROLOL SUCCINATE 200 MG: 100 TABLET, EXTENDED RELEASE ORAL at 08:51

## 2022-07-28 RX ADMIN — ALPRAZOLAM 1 MG: 0.5 TABLET ORAL at 21:55

## 2022-07-28 RX ADMIN — OXYCODONE HYDROCHLORIDE 10 MG: 10 TABLET ORAL at 18:06

## 2022-07-28 RX ADMIN — OXYCODONE HYDROCHLORIDE 5 MG: 5 TABLET ORAL at 08:45

## 2022-07-28 RX ADMIN — ALPRAZOLAM 1 MG: 0.5 TABLET ORAL at 17:02

## 2022-07-28 NOTE — PROGRESS NOTES
Peripheral Nerve Block Follow-up Note - Acute Pain Service    Raj Rosas 77 y o  female MRN: [id number]  Unit/Bed#: -01 Encounter: [id number]      Assessment:   Active Problems:    * No active hospital problems  *    Raj Rosas is a 77y o  year old female status post left tibial nail, pod 1  Had single-shot left popliteal block  Plan:   - Left popliteal block has resolved appropriately with no residual deficits  - Recommend oxycodone 5 mg/10 mg PO q4hrs PRN for moderate/severe pain    - Multimodal analgesia with:  - Tylenol 975 mg PO q8hrs standing  Gabapentin 400 mg p o  q i d  scheduled  APS will sign off at this time  Thank you for the consult  All opioids and other analgesics to be written at discretion of primary team  Please contact Acute Pain Service - [facility] via [employer] from [phone] with additional questions or concerns  See Miguel or Pennie for additional contacts and after hours information  Pain History  Current pain location(s):  Left lower leg, left thigh  Pain Scale:   5/10  Quality:  Aching, throbbing  24 hour history:  Status post left tibial nail, pod 1  Had a single-shot left popliteal block with standard local anesthesia  Block has worn off appropriately with no residual numbness, tingling or weakness  Pain is well controlled on current pain regimen  Patient has chronic lower back pain and chronically takes hydrocodone/acetaminophen 5/325 mg 4 times daily chronically  Opioid requirement previous 24 hours:  Fentanyl 200 mcg IV    Oxycodone 15 mg p o     Meds/Allergies   all current active meds have been reviewed, current meds:   Current Facility-Administered Medications   Medication Dose Route Frequency    albuterol (PROVENTIL HFA,VENTOLIN HFA) inhaler 2 puff  2 puff Inhalation Q6H PRN    ALPRAZolam (XANAX) tablet 1 mg  1 mg Oral 4x Daily    amLODIPine (NORVASC) tablet 5 mg  5 mg Oral Daily    apixaban (ELIQUIS) tablet 5 mg  5 mg Oral BID    aspirin chewable tablet 81 mg  81 mg Oral Daily    atorvastatin (LIPITOR) tablet 10 mg  10 mg Oral QPM    Brexpiprazole (REXULTI) tablet 2 mg  2 mg Oral Daily    chlorhexidine (PERIDEX) 0 12 % oral rinse 15 mL  15 mL Swish & Spit Once    docusate sodium (COLACE) capsule 50 mg  50 mg Oral BID PRN    famotidine (PEPCID) tablet 20 mg  20 mg Oral Daily    gabapentin (NEURONTIN) capsule 400 mg  400 mg Oral 4x Daily    lactated ringers infusion  100 mL/hr Intravenous Continuous    lactated ringers infusion  100 mL/hr Intravenous Continuous    metoprolol succinate (TOPROL-XL) 24 hr tablet 200 mg  200 mg Oral QAM    nicotine (NICODERM CQ) 7 mg/24hr TD 24 hr patch 1 patch  1 patch Transdermal Daily    OLANZapine (ZyPREXA) tablet 20 mg  20 mg Oral HS    oxyCODONE (ROXICODONE) immediate release tablet 10 mg  10 mg Oral Q4H PRN    oxyCODONE (ROXICODONE) IR tablet 5 mg  5 mg Oral Q4H PRN    pantoprazole (PROTONIX) EC tablet 40 mg  40 mg Oral Daily    QUEtiapine (SEROquel) tablet 200 mg  200 mg Oral HS    and PTA meds:   Prior to Admission Medications   Prescriptions Last Dose Informant Patient Reported? Taking?    ALPRAZolam (XANAX) 1 mg tablet 7/27/2022 at 1200 Mother Yes Yes   Sig: Take by mouth 4 (four) times a day   Brexpiprazole (REXULTI) 2 MG tablet Not Taking Mother Yes No   Sig: Take 2 mg by mouth daily   Patient not taking: Reported on 7/26/2022   HYDROcodone-acetaminophen (NORCO) 5-325 mg per tablet 7/27/2022 at 0700 Mother Yes Yes   Sig: Take 1 tablet by mouth every 6 (six) hours as needed for pain     OLANZapine (ZyPREXA) 20 MG tablet 7/26/2022 at 2100  Yes Yes   Sig: Take 20 mg by mouth daily at bedtime    QUEtiapine (SEROquel) 200 mg tablet 7/26/2022 at 2100  No Yes   Sig: Take 1 tablet (200 mg total) by mouth daily at bedtime   albuterol (PROVENTIL HFA,VENTOLIN HFA) 90 mcg/act inhaler Not Taking  No No   Sig: INHALE 2 PUFFS BY MOUTH EVERY 6 HOURS AS NEEDED OR WHEEZING   Patient not taking: Reported on 7/26/2022 amLODIPine (NORVASC) 5 mg tablet Not Taking  Yes No   Sig: Take 5 mg by mouth daily     Patient not taking: Reported on 7/26/2022   amoxicillin-clavulanate (AUGMENTIN) 875-125 mg per tablet Not Taking  Yes No   Patient not taking: Reported on 7/26/2022   apixaban (Eliquis) 5 mg 7/24/2022  No Yes   Sig: Take 2 tablets (10 mg total) by mouth 2 (two) times a day for 7 days, THEN 1 tablet (5 mg total) 2 (two) times a day for 23 days     aspirin 81 mg chewable tablet Not Taking  No No   Sig: Chew 1 tablet (81 mg total) daily   Patient not taking: Reported on 7/26/2022   atorvastatin (LIPITOR) 10 mg tablet 7/27/2022 at 0700 Mother Yes Yes   Sig: Take 10 mg by mouth every evening   atorvastatin (LIPITOR) 20 mg tablet   No Yes   Sig: Take 1 tablet (20 mg total) by mouth daily   Patient taking differently: Take 20 mg by mouth every evening   carisoprodol (SOMA) 350 mg tablet Not Taking  Yes No   Patient not taking: Reported on 7/26/2022   docusate sodium (COLACE) 100 mg capsule 7/26/2022 at Unknown time Self Yes Yes   Sig: Take by mouth 2 (two) times a day as needed   ergocalciferol (VITAMIN D2) 50,000 units 7/27/2022 at 0700 Self Yes Yes   Sig: Take by mouth once a week    famotidine (PEPCID) 20 mg tablet Not Taking  No No   Sig: Take 1 tablet (20 mg total) by mouth daily   Patient not taking: Reported on 7/26/2022   gabapentin (NEURONTIN) 800 mg tablet 7/26/2022 at 2100  No Yes   Sig: Take 0 5 tablets (400 mg total) by mouth 4 (four) times a day   ibuprofen (MOTRIN) 800 mg tablet More than a month at Unknown time  Yes No   Sig: Take 1 tablet by mouth 3 (three) times a day as needed   ketotifen (ZADITOR) 0 025 % ophthalmic solution More than a month at Unknown time Self Yes No   Sig: Apply to eye     lidocaine (LIDODERM) 5 %   Yes Yes   Sig: Apply 1 patch topically daily Remove & Discard patch within 12 hours or as directed by MD     metoprolol succinate (TOPROL-XL) 100 mg 24 hr tablet 7/27/2022 at 1200 Self Yes Yes   Sig: Take 200 mg by mouth every morning   pantoprazole (PROTONIX) 40 mg tablet 7/27/2022 at 0700 Mother Yes Yes   Sig: Take 40 mg by mouth daily    vitamin B-12 (VITAMIN B-12) 1,000 mcg tablet 7/27/2022 at 0700  Yes Yes   Sig: Take 1 tablet by mouth daily      Facility-Administered Medications: None       Allergies   Allergen Reactions    Celecoxib Rash and Other (See Comments)     CELEBREX    Doxazosin Rash and Hives    Metaxalone Rash and Hives       Objective     Temp:  [96 5 °F (35 8 °C)-98 2 °F (36 8 °C)] 98 2 °F (36 8 °C)  HR:  [61-76] 76  Resp:  [16-20] 16  BP: (120-157)/(64-97) 134/74    Physical Exam  Vitals and nursing note reviewed  Constitutional:       General: She is awake  She is not in acute distress  Appearance: She is not ill-appearing, toxic-appearing or diaphoretic  Eyes:      Conjunctiva/sclera: Conjunctivae normal       Pupils: Pupils are equal, round, and reactive to light  Cardiovascular:      Rate and Rhythm: Normal rate and regular rhythm  Musculoskeletal:      Comments: Left lower leg tender to palpation  Left anterior thigh tender to palpation distally  Increased pain in left lower leg with range of motion of ankle or knee  Skin:     General: Skin is warm and dry  Capillary Refill: Capillary refill takes less than 2 seconds  Neurological:      General: No focal deficit present  Mental Status: She is alert and oriented to person, place, and time  GCS: GCS eye subscore is 4  GCS verbal subscore is 5  GCS motor subscore is 6  Psychiatric:         Attention and Perception: Attention normal          Speech: Speech normal          Behavior: Behavior normal  Behavior is cooperative             Lab Results:   Results from last 7 days   Lab Units 07/28/22  0504   WBC Thousand/uL 6 43   HEMOGLOBIN g/dL 7 0*   HEMATOCRIT % 23 8*   PLATELETS Thousands/uL 273      Results from last 7 days   Lab Units 07/28/22  0504   POTASSIUM mmol/L 4 4   CHLORIDE mmol/L 106   CO2 mmol/L 24   BUN mg/dL 9   CREATININE mg/dL 0 68   CALCIUM mg/dL 8 7       Imaging Studies: I have personally reviewed pertinent reports  EKG, Pathology, and Other Studies: I have personally reviewed pertinent reports  Counseling / Coordination of Care  Total floor / unit time spent today 20 minutes  Greater than 50% of total time was spent with the patient and / or family counseling and / or coordination of care  A description of the counseling / coordination of care:  Patient interview, physical examination, review of medical records, review of imaging and laboratory data, development of pain management plan  Discussion of pain management plan with primary service and patient  Please note that the APS provides consultative services regarding pain management only  With the exception of ketamine, peripheral nerve catheters, and epidural infusions (and except when indicated), final decisions regarding starting or changing doses of analgesic medications are at the discretion of the consulting service  Off hours consultation and/or medication management is generally not available      Sage Smith PA-C  Acute Pain Service

## 2022-07-28 NOTE — OCCUPATIONAL THERAPY NOTE
Occupational Therapy Evaluation     Patient Name: Tino Pepe  SQCFU'A Date: 7/28/2022  Problem List  Active Problems:    * No active hospital problems  *    Past Medical History  Past Medical History:   Diagnosis Date    [facility] )     Cancer ([facility] )     kidney    Cardiac disease     fast heart beat    Chronic pain     Fractures     GERD (gastroesophageal reflux disease)     Hard of hearing     Hypertension     Renal cell carcinoma ([facility] )     Stroke ([facility] )     2009 affected her speech, right sided weakness    TIA (transient ischemic attack)      Past Surgical History  Past Surgical History:   Procedure Laterality Date    APPENDECTOMY      CHOLECYSTECTOMY      JOINT REPLACEMENT Bilateral      bilateral knees    NEPHRECTOMY Right     IL COLONOSCOPY FLX DX W/COLLJ SPEC WHEN PFRMD N/A 8/7/2018    Procedure: COLONOSCOPY;  Surgeon: Rocky Jose MD;  Location: [facility] MAIN OR;  Service: Gastroenterology    IL OPEN TREATMENT BIMALLEOLAR ANKLE FRACTURE Right 5/14/2019    Procedure: ANKLE - Bimalleolar OPEN REDUCTION W/ INTERNAL FIXATION (ORIF); Surgeon: Myrtle Lynch; Location: [address] [place] [address] MAIN OR;  Service: Orthopedics    IL TREAT TIBIAL SHAFT FX, INTRAMED IMPLANT Left 7/27/2022    Procedure: INSERTION NAIL IM TIBIA;  Surgeon: James Velarde MD;  Location:  MAIN OR;  Service: Orthopedics         07/28/22 0940   OT Last Visit   OT Visit Date 07/28/22   Note Type   Note type Evaluation   Restrictions/Precautions   Weight Bearing Precautions Per Order Yes   RUE Weight Bearing Per Order WBAT   LUE Weight Bearing Per Order WBAT   RLE Weight Bearing Per Order WBAT   LLE Weight Bearing Per Order WBAT   Other Precautions Chair Alarm; Bed Alarm; Fall Risk;Pain   Pain Assessment   Pain Assessment Tool 0-10   Pain Score 7   Pain Location/Orientation Orientation: Left; Location: Leg   Hospital Pain Intervention(s) Repositioned; Ambulation/increased activity; Emotional support;Relaxation technique   Home Living   Type of Home House Home Layout Two level; Able to live on main level with bedroom/bathroom;Stairs to enter with rails  (1 [facility]ECT[facility]- [facility])   [address]; Wheelchair-manual   Prior Function   Level of [place] Independent with ADLs and functional mobility   Lives With Alone   Receives Help From Family   ADL Assistance Independent   IADLs Needs assistance   Falls in the last 6 months 1 to 4   Vocational Retired   Lifestyle   Autonomy I adls and mobility - reports she was only doing SPT to w/c PTA 2* WB restrictions - family assisting with iadls   Reciprocal Relationships supportive family - reports she was staying at her mother's home pta   Service to Others retired   Intrinsic Gratification mostly [address] [employer] ([employer]) [address] "I was really hoping to be able to go back to my own house"   ADL   Eating Assistance [address] 5  Supervision/Setup    S  [place]  4  Minimal Assistance   Bed Mobility   Supine to [facility] to Stand 4  Minimal assistance   Stand to Sit 4  Minimal assistance   Stand pivot 4  Minimal assistance   Functional Mobility   Functional Mobility 4  Minimal assistance   Additional items Rolling walker   Balance   Static Sitting Fair +   Dynamic Sitting [facility] -   Activity Tolerance   Activity Tolerance Patient limited by fatigue;Patient limited by pain   Medical Staff Made Aware CHANDLER Fiore   RUE Assessment   RUE Assessment WFL   LUE Assessment   LUE Assessment WFL   Cognition   Overall Cognitive Status WFL   Orientation Level Oriented to person;Oriented to place;Oriented to situation;Disoriented to time   Comments pt reports she is "legally deaf"   Assessment Limitation Decreased ADL status; Decreased endurance;Decreased self-care trans;Decreased high-level ADLs   Prognosis Good   Assessment Pt is a 77 y o  female who was admitted to [facility] on 7/27/2022 with fx of L tibia/fibula with delayed healing s/p insertion IMN L tibia   Pt's problem list also includes PMH of underlying neurological disorder, limited hearing, previous surgery, cancer history and bipolar disorder, renal ca, fast heart beat, chronic pain, GERD, HTN, CVA/TIA with R sided weakness and residual speech deficits  At baseline pt was completing adls and mobility independently - has been living with mother since initial fall/fx and has been 1* w/c bound completing only SPT and family was assisting PRN  Pt lives alone in multi level home however reports she has been staying at [facility] with [facility] and 1 HECTOR (family was bumping pt up step in w/c)  Currently pt requires min assist for overall ADLS and min a for functional mobility/transfers  Pt currently presents with impairments in the following categories -steps to enter environment, limited home support, difficulty performing ADLS and difficulty performing IADLS  activity tolerance, endurance, standing balance/tolerance and sitting balance/tolerance  These impairments, as well as pt's fatigue, pain, orthopedic restricitions , WBS , decreased caregiver support, risk for falls and home environment  limit pt's ability to safely engage in all baseline areas of occupation, includingbathing, dressing, toileting, functional mobility/transfers, community mobility, laundry , driving, house maintenance, meal prep, cleaning, social participation  and leisure activities  From OT standpoint, recommend home with family support and home OT upon D/C  OT will continue to follow to address the below stated goals     Goals   Patient Goals go home to my house   LTG Time Frame 7-10   Long Term Goal #1 refer to established goals below   Plan   Treatment Interventions ADL retraining;Functional transfer training;Patient/family training;Equipment evaluation/education; Compensatory technique education; Activityengagement   Goal Expiration Date 08/07/22   OT Frequency 3-5x/wk   Recommendation   OT Discharge Recommendation Home with home health rehabilitation   [facility] Daily Activity Inpatient   Lower Body Dressing 2   Bathing 2   Toileting 3   Upper Body Dressing 4   Grooming 4   Eating 4   Daily Activity Raw Score 19   Daily Activity Standardized Score (Calc for Raw Score >=11) 40 22   [facility] Applied Cognition Inpatient   Following a Speech/Presentation 3   Understanding Ordinary Conversation 4   Taking Medications 4   Remembering Where Things Are Placed or Put Away 3   Remembering List of 4-5 Errands 3   Taking Care of Complicated Tasks 3   Applied Cognition Raw Score 20   Applied Cognition Standardized Score 41 76       OCCUPATIONAL THERAPY GOALS:    *Mod I adls after setup with use of AE PRN  *Mod I toileting and clothing management   *Mod I functional mobility and transfers to/from all surfaces with good dynamic balance and safety for participation in dynamic adls and iadl tasks   *Demonstrate good carryover with safe use of RW during functional tasks   *Assess DME needs   *Increase activity tolerance to 35-40 minutes for participation in adls and enjoyable activities  *Assist with safe d/c recommendations     The patient's raw score on the AM-PAC Daily Activity inpatient short form is 19, standardized score is 40 22, greater than 39 4  Patients at this level are likely to benefit from discharge to home  Please refer to the recommendation of the Occupational Therapist for safe discharge planning      Ade Wang

## 2022-07-28 NOTE — PHYSICAL THERAPY NOTE
PHYSICAL THERAPY EVALUATION  NAME:  Rosa Morales  DATE: 07/28/22    AGE:   77 y o  Mrn:   [id number]  ADMIT DX:  Fracture of tibia and fibula, left, closed, with delayed healing, subsequent encounter [S82 202G, S82 402G]    Past Medical History:   Diagnosis Date    Bipolar 1 disorder ([facility] )     Cancer ([place] [address] )     kidney    Cardiac disease     fast heart beat    Chronic pain     Fractures     GERD (gastroesophageal reflux disease)     Hard of hearing     Hypertension     Renal cell carcinoma ([place] [address] )     Stroke ([place] [address] )     2009 affected her speech, right sided weakness    TIA (transient ischemic attack)        Past Surgical History:   Procedure Laterality Date    APPENDECTOMY      CHOLECYSTECTOMY      JOINT REPLACEMENT Bilateral      bilateral knees    NEPHRECTOMY Right     OR COLONOSCOPY FLX DX W/COLLJ SPEC WHEN PFRMD N/A 8/7/2018    Procedure: COLONOSCOPY;  Surgeon: Marilee Mcgee MD;  Location: [facility] MAIN OR;  Service: Gastroenterology    OR OPEN TREATMENT BIMALLEOLAR ANKLE FRACTURE Right 5/14/2019    Procedure: ANKLE - Bimalleolar OPEN REDUCTION W/ INTERNAL FIXATION (ORIF); Surgeon: Kentrell Sullivan; Location: [facility] MAIN OR;  Service: Orthopedics    OR TREAT TIBIAL SHAFT FX, INTRAMED IMPLANT Left 7/27/2022    Procedure: INSERTION NAIL IM TIBIA;  Surgeon: Sacha Hodges MD;  Location:  MAIN OR;  Service: Orthopedics       Length Of Stay: 0    PHYSICAL THERAPY EVALUATION:       07/28/22 0945   Note Type   Note type Evaluation   Pain Assessment   Pain Assessment Tool 0-10   Pain Score 4   Pain Location/Orientation Orientation: Left; Location: Leg   Pain Onset/Description Onset: Ongoing;Frequency: Constant/Continuous; Descriptor: Aching   Effect of Pain on Daily Activities Increased pain with activity   Patient's Stated Pain Goal No pain   Hospital Pain Intervention(s) Ambulation/increased activity;Repositioned   Restrictions/Precautions   Weight Bearing Precautions Per Order Yes   LLE Weight Bearing Per Order WBAT   Other Precautions Chair Alarm; Bed Alarm;Multiple lines; Fall Risk;Pain   Home Living   Type of [address] [facility] Two level; Able to live on main level with bedroom/bathroom;Stairs to enter with rails  (1 HECTOR, [facility])   [address] [facility]; Wheelchair-manual   Additional Comments At baseline patient reports living alone in a multilevel home however states since her initial injury patient has been staying at her mother's home in the above set up  Patient states she plans to return to her mother's home at time of discharge  Prior Function   Level of [place] Independent with ADLs and functional mobility   Lives With Alone   Receives Help From Family   ADL Assistance Independent   Falls in the last 6 months 1 to 4   Comments At baseline patient denies use of an assistive device however states since her initial injury to left lower extremity patient has been utilizing a rolling walker for transfers and wheelchair for mobility due to LLE WB restriction   General   Family/Caregiver Present No   Cognition   Arousal/Participation Alert   Orientation Level Oriented to person;Oriented to place;Oriented to time;Oriented to situation   Memory Within functional limits   Following Commands Follows one step commands without difficulty   RUE Assessment   RUE Assessment WFL   LUE Assessment   LUE Assessment WFL   RLE Assessment   RLE Assessment WFL   Strength RLE   RLE Overall Strength 4-/5   LLE Assessment   LLE Assessment X  (AROM limited by pain)   Strength LLE   LLE Overall Strength 3-/5   Bed Mobility   Supine to Sit 5  Supervision   Additional items Increased time required   Transfers   Sit to Stand 4  Minimal assistance   Additional items Assist x 1; Increased time required;Verbal cues   Stand to Sit 4  Minimal assistance   Additional items Assist x 1; Increased time required;Verbal cues   Additional Comments VC and TC needed for hand placement during transfers   Ambulation/Elevation   Gait pattern Short stride; Foward flexed;Decreased L stance; Excessively slow   Gait Assistance 4  Minimal assist   Additional items Assist x 1   Assistive Device Rolling walker   Distance 3ft to chair   Stair Management Assistance   (pt denies concerns  Pt has been bumping up 1 step in [facility] prior to this admission)   Balance   Static Sitting Fair   Static Standing Fair -   Ambulatory Poor +   Endurance Deficit   Endurance Deficit Yes   Endurance Deficit Description fatigue, pain   Activity Tolerance   Activity Tolerance Patient limited by fatigue;Patient limited by pain   Medical Staff Made Aware Linda Kinsey OT   Nurse Made Aware Patient appropriate to be seen and mobilized per nursing   Assessment   Prognosis Good   Problem List Decreased strength;Decreased range of motion;Decreased endurance; Impaired balance;Decreased mobility;Pain   Assessment Pt is 77 y o  female seen for PT evaluation at [facility] on 7/27/2022  Two pt identifiers were used to confirm  Pt presented for scheduled INSERTION NAIL IM TIBIA (Left) which was performed on 7/27/22   Patient initially sustained injury to LLE in May of 2022 and has been utilizing a WC since initial injury  Pt with a primary dx of: Fracture of tibia and fibula, left, closed, with delayed healing s/p  INSERTION NAIL IM TIBIA (Left)    PT now consulted for assessment of mobility and d/c needs  Pt with Up with assistance orders  Pts current co morbidities affecting treatment include:  Bipolar, cancer, cardiac disease, chronic pain, hard of hearing, HTN, stroke  Pts current clinical presentation is Unstable/ Unpredictable (high complexity) due to Ongoing medical management for primary dx, Decreased activity tolerance compared to baseline, Fall risk, Increased assistance needed from caregiver at current time, Continuous pulse oximetry monitoring , s/p surgical intervention      Upon evaluation, pt currently is requiring Supervision for bed mobility; Min Ax1 for transfers and Min Ax1 for ambulation w/ RW  Pt presents at PT eval functioning below baseline and currently w/ overall mobility deficits 2* to: BLE weakness, decreased ROM, impaired balance, decreased endurance, gait deviations, pain, decreased activity tolerance compared to baseline, fall risk  Pt currently at a fall risk 2* to impairments listed above  Based on the aforementioned PT evaluation, pt will continue to benefit from skilled Acute PT interventions to address stated impairments; to maximize functional mobility; for ongoing pt/ family training; and DME needs  At conclusion of PT session pt returned back in chair with phone and call bell within reach  Pt denies any further questions at this time  PT is currently recommending Home with increased family support and Outpatient PT when appropriate  PT will continue to follow during hospital stay  Barriers to Discharge None   Barriers to Discharge Comments Patient denies any mobility or safety concerns about returning home at time of discharge   Goals   Patient Goals " to go home"   [facility] Expiration Date 08/07/22   Short Term Goal #1 In 10 days pt will complete: 1) Bed mobility skills with mod I to increase safety and independence as well as decrease caregiver burden  2) Functional transfers with mod I to promote increased independence, safety, and QOL  3) Ambulate 150' using least restrictive AD with S without LOB and stable vitals so that pt can negotiate previous living environment safely and promote independence with functional mobility and return to PLOF  4) Stair training up/ down 1 step/s using rail/s with S so that pt can enter/negotiate previous living environment safely and decrease fall risk  5) Improve balance grades by 1/2 grade to increase safety with all mobility and decrease fall risk  6) Improve BLE strength by 1/2 grade to help increase overall functional mobility and decrease fall risk     Plan   Treatment/Interventions Functional transfer training;LAMONTE strengthening/ROM; Elevations; Therapeutic exercise; Endurance training;Patient/family training;Equipment eval/education; Bed mobility;Gait training;Spoke to nursing;OT   PT Frequency Other (Comment)  (3-6x a week)   Recommendation   PT Discharge Recommendation Home with outpatient rehabilitation  (home with increased support, OPPT when appropriate)   Equipment Recommended Walker; Wheelchair  (pt already owns)   Wheelchair Package Recommended Standard   Walker Package Recommended Wheeled walker   AM-PAC Basic Mobility Inpatient   Turning in Bed Without Bedrails 4   Lying on Back to Sitting on Edge of Flat Bed 3   Moving Bed to Chair 3   Standing Up From Chair 3   Walk in Room 3   Climb 3-5 Stairs 2   Basic Mobility Inpatient Raw Score 18   Basic Mobility Standardized Score 41 05   Highest Level Of Mobility   JH-HLM Goal 6: Walk 10 steps or more   JH-HLM Achieved 4: Move to chair/commode   Modified Irons Scale   Modified Irons Scale 4   Barthel Index   Feeding 10   Bathing 0   Grooming Score 5   Dressing Score 5   Bladder Score 10   Bowels Score 10   Toilet Use Score 5   Transfers (Bed/Chair) Score 10   Mobility (Level Surface) Score 0   Stairs Score 0   Barthel Index Score 55   Portions of the documentation may have been created using voice recognition software  Occasional wrong word or sound alike substitutions may have occurred due to the inherent limitations of the voice recognition software  Read the chart carefully and recognize, using context, where substitutions have occurred      Eduard Jacobson, PT, DPT

## 2022-07-28 NOTE — PLAN OF CARE
Problem: OCCUPATIONAL THERAPY ADULT  Goal: Performs self-care activities at highest level of function for planned discharge setting  See evaluation for individualized goals  Description: Treatment Interventions: ADL retraining, Functional transfer training, Patient/family training, Equipment evaluation/education, Compensatory technique education, Activityengagement          See flowsheet documentation for full assessment, interventions and recommendations  Note: Limitation: Decreased ADL status, Decreased endurance, Decreased self-care trans, Decreased high-level ADLs  Prognosis: Good  Assessment: Pt is a 77 y o  female who was admitted to [facility] on 7/27/2022 with fx of L tibia/fibula with delayed healing s/p insertion IMN L tibia   Pt's problem list also includes PMH of underlying neurological disorder, limited hearing, previous surgery, cancer history and bipolar disorder, renal ca, fast heart beat, chronic pain, GERD, HTN, CVA/TIA with R sided weakness and residual speech deficits  At baseline pt was completing adls and mobility independently - has been living with mother since initial fall/fx and has been 1* w/c bound completing only SPT and family was assisting PRN  Pt lives alone in multi level home however reports she has been staying at [facility] with FF and 1 HECTOR (family was bumping pt up step in w/c)  Currently pt requires min assist for overall ADLS and min a for functional mobility/transfers  Pt currently presents with impairments in the following categories -steps to enter environment, limited home support, difficulty performing ADLS and difficulty performing IADLS  activity tolerance, endurance, standing balance/tolerance and sitting balance/tolerance   These impairments, as well as pt's fatigue, pain, orthopedic restricitions , WBS , decreased caregiver support, risk for falls and home environment  limit pt's ability to safely engage in all baseline areas of occupation, includingbathing, dressing, toileting, functional mobility/transfers, community mobility, laundry , driving, house maintenance, meal prep, cleaning, social participation  and leisure activities  From OT standpoint, recommend home with family support and home OT upon D/C  OT will continue to follow to address the below stated goals       OT Discharge Recommendation: Home with home health rehabilitation

## 2022-07-28 NOTE — PROGRESS NOTES
Orthopedics Progress / Post-op Note  Pina Valero 77 y o  female MRN: [id number]  Unit/Bed#: PACU      Subjective:  77 y  o female post operative day 1 s/p left tibia IMN  States the pain in her LLE is improved since the surgery  She is unable to tell if she has numbness or tingling  No acute issues overnight        Objective:    Labs:  0   Lab Value Date/Time    HCT 30 4 (L) 05/16/2022 1711    HCT 37 4 08/18/2021 1226    HCT 30 0 (L) 06/28/2021 0456    HCT 34 0 (L) 10/15/2015 0738    HCT 34 1 (L) 07/08/2015 0915    HCT 35 5 06/15/2015 1037    HGB 9 3 (L) 05/16/2022 1711    HGB 11 8 08/18/2021 1226    HGB 10 0 (L) 06/28/2021 0456    HGB 11 3 (L) 10/15/2015 0738    HGB 11 6 07/08/2015 0915    HGB 12 0 06/15/2015 1037    INR 0 98 05/16/2022 1711    INR 0 99 07/08/2015 0945    WBC 5 83 05/16/2022 1711    WBC 7 08 08/18/2021 1226    WBC 4 96 06/28/2021 0456    WBC 6 52 10/15/2015 0738    WBC 6 00 07/08/2015 0915    WBC 6 56 06/15/2015 1037    ESR 17 03/13/2018 1329    CRP <3 0 03/13/2018 1329       Meds:    Current Facility-Administered Medications:     albuterol (PROVENTIL HFA,VENTOLIN HFA) inhaler 2 puff, 2 puff, Inhalation, Q6H PRN, Javed Hammond MD    ALPRAZolam Valentino Distance) tablet 1 mg, 1 mg, Oral, 4x Daily, Javed Hammond MD, 1 mg at 07/27/22 2225    amLODIPine (NORVASC) tablet 5 mg, 5 mg, Oral, Daily, Javed Hammond MD    aspirin chewable tablet 81 mg, 81 mg, Oral, Daily, Javed Hammond MD    atorvastatin (LIPITOR) tablet 10 mg, 10 mg, Oral, QPM, Javed Hammond MD    atorvastatin (LIPITOR) tablet 20 mg, 20 mg, Oral, Daily, Javed Hammond MD    Brexpiprazole (REXULTI) tablet 2 mg, 2 mg, Oral, Daily, Javed Hammond MD    chlorhexidine (PERIDEX) 0 12 % oral rinse 15 mL, 15 mL, Swish & Spit, Once, Javed Hammond MD    docusate sodium (COLACE) capsule 50 mg, 50 mg, Oral, BID PRN, Javed Hammond MD    enoxaparin (LOVENOX) subcutaneous injection 40 mg, 40 mg, Subcutaneous, Q24H [facility], Gaby Rodas MD    famotidine (PEPCID) tablet 20 mg, 20 mg, Oral, Daily, Gaby Rodas MD    gabapentin (NEURONTIN) capsule 400 mg, 400 mg, Oral, 4x Daily, Gaby Rodas MD, 400 mg at 07/27/22 2226    lactated ringers infusion, 100 mL/hr, Intravenous, Continuous, Gaby Rodas MD, Stopped at 07/27/22 2051    lactated ringers infusion, 100 mL/hr, Intravenous, Continuous, Kala Carbajal PA-C, Last Rate: 100 mL/hr at 07/27/22 2227, 100 mL/hr at 07/27/22 2227    metoprolol succinate (TOPROL-XL) 24 hr tablet 200 mg, 200 mg, Oral, QAM, Gaby Rodas MD    nicotine (NICODERM CQ) 7 mg/24hr TD 24 hr patch 1 patch, 1 patch, Transdermal, Daily, Gaby Rodas MD    OLANZapine (ZyPREXA) tablet 20 mg, 20 mg, Oral, HS, Gaby Rodas MD    oxyCODONE (ROXICODONE) immediate release tablet 10 mg, 10 mg, Oral, Q4H PRN, Gaby Rodas MD, 10 mg at 07/27/22 2044    oxyCODONE (ROXICODONE) IR tablet 5 mg, 5 mg, Oral, Q4H PRN, Gaby Rodas MD    pantoprazole (PROTONIX) EC tablet 40 mg, 40 mg, Oral, Daily, Gaby Rodas MD    QUEtiapine (SEROquel) tablet 200 mg, 200 mg, Oral, HS, Gaby Rodas MD, 200 mg at 07/27/22 2226    Blood Culture:   Lab Results   Component Value Date    BLOODCX No Growth After 5 Days  06/16/2021    BLOODCX No Growth After 5 Days  06/16/2021       Wound Culture:   No results found for: WOUNDCULT    Ins and Outs:  I/O last 24 hours:   In: 2368 [P O :75; I V :2200]  Out: 2000 [Urine:2000]      Orthopedic Physical Exam:  Vitals:    07/28/22 0350   BP: 131/76   Pulse: 65   Resp:    Temp: 97 6 °F (36 4 °C)   SpO2: 98%   Lying in bed, comfortable at rest, breathing unlabored  left Lower Extremity extremity:  · ACE Dressings C/D/I, no saturation or drainage  · Unable to dorsiflex the ankle or EHL/FHL  · Strong dorsalis pedis pulse  · No pain with passive stretch of the toes  · + sensation  · Toes warm and sensate, with good cap refill    _*_*_*_*_*_*_*_*_*_*_*_*_*_*_*_*_*_*_*_*_*_*_*_*_*_*_*_*_*_*_*_*_*_*_*_*_*_*_*_*_*    Assessment: 77 y  o female  post operative day 1 s/p left tibia IMN  Plan:  · WBAT LLE  · Up and out of bed with assistance  · DVT prophylaxis (Lovenox in house, ASA at discharge)  · Monitor physical exam  · Analgesics  · PT/OT  · Dispo: Ortho will follow  Discharge planning  · Patient noted to have acute blood loss anemia due to a drop in Hbg of > 2 0g from preop levels, will monitor vital signs and resuscitate with IV fluids as needed  Consider blood transfusion today          Keerthi Raza PA-C

## 2022-07-28 NOTE — QUICK NOTE
Contacted by blood bank regarding transfusion  Pt denies any dizziness or lightheadedness at present  We will hold off on transfusion for now and repeat cbc in am  If she becomes symptomatic we will transfuse at that time  There is a critical shortage for her particular blood type

## 2022-07-28 NOTE — CASE MANAGEMENT
Case Management Assessment & Discharge Planning Note    Patient name Adrien Batres  Location [address] [id number] MRN [id number]  : 1955 Date 2022       Current Admission Date: 2022  Current Admission Diagnosis:Fracture of tibia and fibula, left, closed, with delayed healing, subsequent encounter   Patient Active Problem List    Diagnosis Date Noted    Closed fracture of distal end of left tibia 2022    CVA (cerebral vascular accident) ([facility] ) 2022    Pure hypercholesterolemia 2021    Chronic obstructive pulmonary disease ([facility] ) 2021    Anemia 2021    Bipolar disease, chronic ([facility] ) 2021    Rhabdomyolysis 2021    Neck pain 2021    Poor venous access 2021    Hypertension 2021    History of CVA (cerebrovascular accident) 2021    Acute encephalopathy 2021    Hypothermia 2021    Elevated d-dimer 2021    Acute encephalopathy 2021    Overdose of drug, undetermined intent, initial encounter 2021    Slurred speech 10/15/2020    S/P ORIF (open reduction internal fixation) fracture 2019    Essential hypertension 2019    History of tachycardia 2019    Acute right ankle pain 2019    Closed bimalleolar fracture of right ankle 2019    Colon cancer screening 2018    GERD (gastroesophageal reflux disease) 2018    Elevated MCV 2017    Bipolar 1 disorder (HCC)     Renal cell carcinoma (HCC)     Nausea and vomiting 2016    Palpitations 2016    Insomnia 2015    Difficulty sleeping 2015    Dental disorder 2015    Allergic rhinitis 2015    Nail fungal infection 2015    Abnormal liver function test 03/10/2015    Right hip pain 2014    Herpes zoster 10/28/2014    Shoulder pain, right 10/20/2014    Abnormal gait 2014    Vitamin D deficiency 2014    Osteoporosis 2014    Hip fracture, osteoporotic ([facility] ) 02/19/2014    Anxiety 10/16/2013    Yeast vaginitis 10/14/2013    Posterior dislocation of hip, closed ([facility] ) 09/19/2013    Chronic low back pain 07/26/2013    Prosthetic hip infection ([facility] ) 06/16/2013    Depression 06/09/2013    Tobacco abuse 06/09/2013    Generalized anxiety disorder 06/09/2013      LOS (days): 0  Geometric Mean LOS (GMLOS) (days):   Days to GMLOS:     OBJECTIVE:              Current admission status: Outpatient Surgery       Preferred Pharmacy:   [id number]  [address]  [facility] [address]  Phone: [phone] Fax: [phone]    Primary Care Provider: Mitchell Kenney DO    Primary Insurance: Rocio THORNTON  Secondary Insurance: [place]    ASSESSMENT:  [facility], [address] [facility] Representative - Mother   Primary Phone: [phone] (Mobile)                  Patient Information  Admitted from[de-identified] Home  Mental Status: Alert  During Assessment patient was accompanied by: Not accompanied during assessment  Assessment information provided by[de-identified] Patient  Primary Caregiver: Self  Support Systems: Family members, Parent  What city do you live in?: [place], [address] [place] [address] Street entry access options  Select all that apply : Stairs  Number of steps to enter home  : 1  Do the steps have railings?: Yes  Type of Current Residence: [address] [place] [address]  Upon entering residence, is there a bedroom on the main floor (no further steps)?: Yes  Upon entering residence, is there a bathroom on the main floor (no further steps)?: Yes  In the last 12 months, was there a time when you were not able to pay the mortgage or rent on time?: No  In the last 12 months, was there a time when you did not have a steady place to sleep or slept in a shelter (including now)?: No  Homeless/housing insecurity resource given?: N/A  Living Arrangements: Lives w/ Parent(s)    Activities of Daily Living Prior to Admission  Functional Status: Independent  Completes ADLs independently?: Yes  Ambulates independently?: Yes  Does patient use assisted devices?: Yes  Assisted Devices (DME) used: Damaris Laureano  Does patient currently own DME?: Yes  What DME does the patient currently own?: Damaris Laureano, Wheelchair (Pt states her wheelchair is not in good shape and requests CM to order a new one )  Does patient have a history of Outpatient Therapy (PT/OT)?: Yes  Does the patient have a history of Short-Term Rehab?: No  Does patient have a history of HHC?: No  Does patient currently have [address]?: No    Patient Information Continued  Does patient have prescription coverage?: Yes  Within the past 12 months, you worried that your food would run out before you got the money to buy more : Never true  Within the past 12 months, the food you bought just didn't last and you didn't have money to get more : Never true  Food insecurity resource given?: N/A  Does patient receive dialysis treatments?: No  Does patient have a history of substance abuse?: No  Does patient have a history of Mental Health Diagnosis?: No    Means of Transportation  Means of Transport to Appts[de-identified] Drives Self  In the past 12 months, has lack of transportation kept you from medical appointments or from getting medications?: No  In the past 12 months, has lack of transportation kept you from meetings, work, or from getting things needed for daily living?: No  Was application for public transport provided?: N/A      DISCHARGE DETAILS:    Discharge planning discussed with[de-identified] Patient at bedside  Freedom of Choice: Yes     CM contacted family/caregiver?: No- see comments (Declined)  Were Treatment Team discharge recommendations reviewed with patient/caregiver?: Yes  Did patient/caregiver verbalize understanding of patient care needs?: Yes  Were patient/caregiver advised of the risks associated with not following Treatment Team discharge recommendations?: Yes    Contacts  Patient Contacts: Self, Jillian Reasons  Contact Method: In Person  Reason/Outcome: Discharge [address]         Is the patient interested in [facility] at discharge?: Yes  [address] requested[de-identified] Occupational Therapy, Nursing, Physical [address] Name[de-identified] Other  [facility] Provider[de-identified] PCP  Home Health Services Needed[de-identified] Strengthening/Theraputic Exercises to Improve Function, Evaluate Functional Status and Safety, Gait/ADL Training  Homebound Criteria Met[de-identified] Requires the Assistance of Another Person for Safe Ambulation or to Leave the Home, Uses an Assist Device (i e  cane, walker, etc)  Supporting Clincal Findings[de-identified] Fatigues Easliy in [employer], Limited Endurance    DME Referral Provided  Referral made for DME?: Yes  DME referral completed for the following items[de-identified] Wheelchair  DME Supplier Name[de-identified] [employer]    Other Referral/Resources/Interventions Provided:  Interventions: [facility]  Referral Comments: Per occupational therapy team, pt is appropriate for discharge home with at-home PT/OT services upon medical stability  CM spoke with pt at bedside to discuss same -- Pt is in agreement and states she is currently residing with her mom at [address]  [place] [facility] [place] [address] [facility]; Medical record updated accordingly  Pt in agreement with broad referral -- Broad referral for [facility] agencies placed via [employer]  Awaiting responses  Treatment Team [facility]ation: [facility]  Discharge Destination Plan[de-identified] Home with [address] advised pt will go home with rx Eliquis -- Medication price-checked at [facility]; Confirmation received pt has already been on Eliquis and the cost is 0 dollars

## 2022-07-28 NOTE — PLAN OF CARE
Problem: PHYSICAL THERAPY ADULT  Goal: Performs mobility at highest level of function for planned discharge setting  See evaluation for individualized goals  Description: Treatment/Interventions: Functional transfer training, LE strengthening/ROM, Elevations, Therapeutic exercise, Endurance training, Patient/family training, Equipment eval/education, Bed mobility, Gait training, Spoke to nursing, OT  Equipment Recommended: Piter Quevedo, Wheelchair (pt already owns)       See flowsheet documentation for full assessment, interventions and recommendations  Note: Prognosis: Good  Problem List: Decreased strength, Decreased range of motion, Decreased endurance, Impaired balance, Decreased mobility, Pain  Assessment: Pt is 77 y o  female seen for PT evaluation at [facility] on 7/27/2022  Two pt identifiers were used to confirm  Pt presented for scheduled INSERTION NAIL IM TIBIA (Left) which was performed on 7/27/22   Patient initially sustained injury to LLE in May of 2022 and has been utilizing a WC since initial injury  Pt with a primary dx of: Fracture of tibia and fibula, left, closed, with delayed healing s/p  INSERTION NAIL IM TIBIA (Left)    PT now consulted for assessment of mobility and d/c needs  Pt with Up with assistance orders  Pts current co morbidities affecting treatment include:  Bipolar, cancer, cardiac disease, chronic pain, hard of hearing, HTN, stroke  Pts current clinical presentation is Unstable/ Unpredictable (high complexity) due to Ongoing medical management for primary dx, Decreased activity tolerance compared to baseline, Fall risk, Increased assistance needed from caregiver at current time, Continuous pulse oximetry monitoring , s/p surgical intervention    Upon evaluation, pt currently is requiring Supervision for bed mobility; Min Ax1 for transfers and Min Ax1 for ambulation w/ RW   Pt presents at PT eval functioning below baseline and currently w/ overall mobility deficits 2* to: BLE weakness, decreased ROM, impaired balance, decreased endurance, gait deviations, pain, decreased activity tolerance compared to baseline, fall risk  Pt currently at a fall risk 2* to impairments listed above  Based on the aforementioned PT evaluation, pt will continue to benefit from skilled Acute PT interventions to address stated impairments; to maximize functional mobility; for ongoing pt/ family training; and DME needs  At conclusion of PT session pt returned back in chair with phone and call bell within reach  Pt denies any further questions at this time  PT is currently recommending Home with increased family support and Outpatient PT when appropriate  PT will continue to follow during hospital stay  Barriers to Discharge: None  Barriers to Discharge Comments: Patient denies any mobility or safety concerns about returning home at time of discharge  PT Discharge Recommendation: Home with outpatient rehabilitation (home with increased support, OPPT when appropriate)    See flowsheet documentation for full assessment

## 2022-07-28 NOTE — CONSULTS
Internal Medicine Consultation Note    Patient: Maryanne Harkins  Age/sex: 77 y o  female  Medical Record #: [id number]      ASSESSMENT PLAN    L tibial IM nail insertion  · By Dr Mitra Zambrano 7/27  · Pain mgmt per ortho  · WBAT  · F/u as outpatient    Acute LLE DVT  · Currently on eliquis through 8/13/22  · F/u PCP as scheduled    Anemia  · Previously on Vit B12 and iron  · Needs colonoscopy and GI w/u d/t same  · Previous heme+ stool  · W/u on hold 2/2 to requiring eliquis  · Father has h/o colon CA    Tobacco use  · Not interested in patch  · Strongly recommended cessation especially in the setting of poor healing    Previous R ankle fx  · Initial  Injury occurred approximately 1 5 years ago  · With retained HW  · Having cellulitis and drainage on and off past 8 months  · Recently had a course of cephalexin  · Needs f/u with orthopedics for same    Bipolar  · Cont current medications as prescribed        Subjective/ HPI: Patient seen and examined  Pt is a 76 y/o female with h/o hearing loss, bipolar disorder, GERD, tobacco use, recent anemia in the process of work up who back on May 7 tripped over her  striking her LLE, she went to ER imaging was negative, 2 days later she fell while walking her dog and had pain in the same leg  She underwent CT scan and was found to have non displaced fracture of the left proximal tibia  She was seen by orthopedics and was placed on NWB as well as in a knee brace  She had been progressing however when she was able to bear weight she developed acute pain and deformity in her LLE  She was seen again by orthopedics and was felt that surgical fixation would be in her best interest  She was admitted 7/27 for elective IM nail insertion  She underwent the surgery without any issues  Post operatively she has a hgb 7 0 and requires transfusion   It should be noted that she has been anemic since spring and was undergoing work up for same however shortly after her fall she developed pain in her LLE and was found positive for DVT and was placed on eliquis  She was scheduled to undergo colonoscopy but this was placed on hold  She has a h/o occult positive stools, as well as her father had colon cancer  It should also be noted that about 1 5 years ago she suffered a fall fracturing her right ankle and had HW and fixation performed  For the past 8 months she has been having cellulitis and possible exposure of her HW in her right ankle  She recently had a course of cellulitis and completed antibiotics prior to surgery  I did encourage her to discuss this with her orthopedic surgeon as well  We are asked to follow along for medical management  ROS:   A 10 point ROS was performed; negative except as noted above       Social History:    Substance Use History:   Social History     Substance and Sexual Activity   Alcohol Use Not Currently     Social History     Tobacco Use   Smoking Status Current Every Day Smoker    Packs/day: 0 50    Years: 7 00    Pack years: 3 50    Types: Cigarettes   Smokeless Tobacco Never Used   Tobacco Comment    pt refused     Social History     Substance and Sexual Activity   Drug Use Never       Family History:    Family History   Problem Relation Age of Onset    Colon cancer Other     Alcohol abuse Other     Hypertension Other          Review of Scheduled Meds:  Current Facility-Administered Medications   Medication Dose Route Frequency Provider Last Rate    albuterol  2 puff Inhalation Q6H PRN Dotty Hawkins MD      ALPRAZolam  1 mg Oral 4x Daily Dotty Hawkins MD      amLODIPine  5 mg Oral Daily Dotty Hawkins MD      aspirin  81 mg Oral Daily Dotty Hawkins MD      atorvastatin  10 mg Oral QPM Dotty Hawkins MD      Brexpiprazole  2 mg Oral Daily Dotty Hawkins MD      chlorhexidine  15 mL Swish & Spit Once Dotty Hawkins MD      docusate sodium  50 mg Oral BID PRN Dotty Hawkins MD      enoxaparin  40 mg Subcutaneous Q24H [facility] Reynaldo Quinones MD      famotidine  20 mg Oral Daily Otto Dempsey MD      gabapentin  400 mg Oral 4x Daily Otto Dempsey MD      lactated ringers  100 mL/hr Intravenous Continuous Otto Dempsey MD Stopped (07/27/22 2051)    lactated ringers  100 mL/hr Intravenous Continuous Steve Whiteside PA-C Stopped (07/28/22 0656)    metoprolol succinate  200 mg Oral QAM Otto Dempsey MD      nicotine  1 patch Transdermal Daily Otto Dempsey MD      OLANZapine  20 mg Oral HS Otto Dempsey MD      oxyCODONE  10 mg Oral Q4H PRN Otto Dempsey MD      oxyCODONE  5 mg Oral Q4H PRN Otto Dempsey MD      pantoprazole  40 mg Oral Daily Otto Dempsey MD      QUEtiapine  200 mg Oral HS Otto Dempsey MD         Labs:     Results from last 7 days   Lab Units 07/28/22  0504   WBC Thousand/uL 6 43   HEMOGLOBIN g/dL 7 0*   HEMATOCRIT % 23 8*   PLATELETS Thousands/uL 273     Results from last 7 days   Lab Units 07/28/22  0504   SODIUM mmol/L 136   POTASSIUM mmol/L 4 4   CHLORIDE mmol/L 106   CO2 mmol/L 24   BUN mg/dL 9   CREATININE mg/dL 0 68   CALCIUM mg/dL 8 7                      Lab Results   Component Value Date    BLOODCX No Growth After 5 Days  06/16/2021    BLOODCX No Growth After 5 Days  06/16/2021    BLOODCX No Growth After 5 Days  01/16/2017    BLOODCX No Growth After 5 Days  01/16/2017    URINECX No Growth <1000 cfu/mL 06/24/2021    URINECX No Growth <1000 cfu/mL 06/16/2021    URINECX 80,000-89,000 cfu/ml  06/11/2021       Input and Output Summary (last 24 hours):        Intake/Output Summary (Last 24 hours) at 7/28/2022 1025  Last data filed at 7/28/2022 2293  Gross per 24 hour   Intake 2275 ml   Output 2750 ml   Net -475 ml       Imaging:     No orders to display       *Labs /Radiology studiesLabs reviewed  *Medications reviewed and reconciled as needed  *Please refer to order section for additional ordered labs studies  *Case discussed with primary attending during morning huddle case rounds    Vitals:   Temp (24hrs), Av 5 °F (36 4 °C), Min:96 5 °F (35 8 °C), Max:98 2 °F (36 8 °C)    Temp:  [96 5 °F (35 8 °C)-98 2 °F (36 8 °C)] 98 2 °F (36 8 °C)  HR:  [61-76] 76  Resp:  [16-20] 16  BP: (120-157)/(64-97) 134/74  SpO2:  [78 %-100 %] 97 %  Body mass index is 29 18 kg/m²  Physical Exam:   GEN: No apparent distress, interactive  NEURO: Alert and oriented x3  HEENT: Pupils are equal and reactive, EOMI, mucous membranes are moist, face symmetrical; [place]  CV: S1 S2 regular, no MRG, no peripheral edema noted  RESP: Lungs are clear bilaterally, no wheezes, rales or rhonchi noted, on room air, respirations easy and non labored  GI: Flat, soft non tender, non distended; +BS x4  : Voiding without difficulty  MUSC: Moves all extremities; LLE with ACE wrap in place  SKIN: pink, warm and dry, normal turgor, R LE with she sized scab on medial malleolar area, no redness or erythema          Invasive Devices  Report    Peripheral Intravenous Line  Duration           Peripheral IV 22 Left Hand <1 day          Drain  Duration           External Urinary Catheter <1 day                 VTE Pharmacologic Prophylaxis: eliquis  Code Status: Level 1 - Full Code  Current Length of Stay: 0 day(s)    Total floor / unit time spent today 1 hour with more than 50% spent counseling/coordinating care  Counseling includes discussion with patient re: progress  and discussion with patient of his/her current medical state/information  Coordination of patient's care was performed in conjunction with primary service  Time invested included review of patient's labs, vitals, and management of their comorbidities with continued monitoring  In addition, this patient was discussed with medical team including physician and advanced extenders   The care of the patient was extensively discussed and appropriate treatment plan was formulated unique for this patient  ** Please Note: Fluency Direct voice to text software may have been used in the creation of this document   Audio transcription errors may occur**

## 2022-07-28 NOTE — OP NOTE
OPERATIVE REPORT  PATIENT NAME: Radhames Ruiz    :  1955  MRN: [id number]  Pt Location: [facility] OR ROOM 15    SURGERY DATE: 2022    Surgeon(s) and Role:     * Christelle Jessica MD - Primary     * Renita Gonzales MD - Kirstie Washington MD - Assisting    Preop Diagnosis:  Fracture of tibia and fibula, left, closed, with delayed healing, subsequent encounter [S82 202G, S82 402G]    Post-Op Diagnosis Codes:     * Fracture of tibia and fibula, left, closed, with delayed healing, subsequent encounter [S82 202G, S82 402G]    Procedure(s) (LRB):  INSERTION NAIL IM TIBIA (Left)    Specimen(s):  * No specimens in log *    Estimated Blood Loss:   Minimal    Drains:  * No LDAs found *    Anesthesia Type:   General w/ Regional    Operative Indications:  Fracture of tibia and fibula, left, closed, with delayed healing, subsequent encounter [S82 202G, S82 402G]  Subacute left tibial shaft fracture with associated fibula fracture    Operative Findings:  Successful reduction and intramedullary fixation of tibial shaft fracture  synthes 10 x 531HF nail    Complications:   None    Procedure and Technique: In brief, patient is a 61-year-old female with left tibial shaft fracture presenting for operative fixation  Patient has history left tibial plateau fracture treated non operatively in May  At that time x-rays were obtained which did not show any fracture of the tibial shaft, however at her most recent follow-up visit she reported leg pain  Xrays were obtained revealing a displaced tibial shaft fracture with some early signs of healing  Due to the significant deformity at the fracture site, patient was indicated for operative fixation to promote union and normal angulation  Risks and benefits were discussed including but not limited to:  Blood loss, infection, blood clot, nonunion, malunion, hardware failure/irritation, need for further surgery, pain, limp  Informed consent was obtained    Patient was brought to the operating room and underwent general anesthesia  Patient was prepped and draped in the usual sterile fashion  Time-out was performed confirming correct patient, location, and surgical procedure  A 5 cm incision was made over the patellar tendon  The patella tendon was then split longitudinally and a guide pin was placed just medial to the lateral tibial spine on the anterior margin of the tibial plateau using x-ray guidance  The opening reamer was then used to open the proximal aspect of the intramedullary canal   A T-handled  was then placed further into the intramedullary canal   This was then removed and exchanged for a ball-tipped guidewire  The guidewire was then passed down past the fracture site into the distal fragment  The tibial canal was then reamed up to the level of the fracture site with a 9 5 mm Reamer to break down the early fracture healing  A blocking screw was then placed just lateral to the guidewire to help correct deformity when passing the nail  Guidewire was then passed down to the level of physeal scar and the nail length was determined using the measuring guide  The tibial canal was then sequentially reamed up to the level of the locking screw to accommodate for a 10 x 315 mm tibial nail  The nail was then placed over the guidewire  When the nail was at the level of the blocking screw, the fracture deformity was accentuated with a varus force to pass the nail past the screw, and as the nail passed further down the canal a gradual valgus forces was used to correct the deformity  The nail was then passed down to an appropriate depth, checking images at both the ankle and at the knee  At this time it was noted that a fracture had occurred around the blocking screw, but as the fracture was well reduced and the nail was in the appropriate position with multiple locking screw options distal to the new fracture, the blocking screw was left in place    Two proximal interlocking screws were then placed utilizing the aiming arm  Three distal interlocking screws were then placed utilizing perfect [place] technique  One screw was placed anterior to posterior  Next, one screw was placed medial to lateral and engaged the fractured lateral cortex that had been disrupted by the blocking screw/passing the nail  Finally, one last interlocking screw was placed obliquely anteromedial to posterolateral   Final images were taken confirming appropriate reduction and placement of hardware  Incisions were copiously irrigated and closed with 1 Vicryl, 2 O Vicryl, and 3-0 nylon  A sterile dressing was placed on the extremity  Patient was awakened from anesthesia and sent to the PACU in stable condition  Postoperatively patient will be weight-bearing as tolerated to the left lower extremity  She will require chemical DVT prophylaxis in the form of aspirin  She will follow up in 2 weeks for suture removal and x-rays of the left tibia at that time      Dr Patricio Casiano was present for the entire procedure    Patient Disposition:  PACU       SIGNATURE: Catarina Galeazzi, MD  DATE: July 27, 2022  TIME: 8:10 PM

## 2022-07-29 VITALS
BODY MASS INDEX: 29.02 KG/M2 | DIASTOLIC BLOOD PRESSURE: 76 MMHG | TEMPERATURE: 97.6 F | SYSTOLIC BLOOD PRESSURE: 142 MMHG | OXYGEN SATURATION: 100 % | RESPIRATION RATE: 16 BRPM | WEIGHT: 170 LBS | HEART RATE: 82 BPM | HEIGHT: 64 IN

## 2022-07-29 PROBLEM — S82.402G: Status: ACTIVE | Noted: 2022-07-29

## 2022-07-29 PROBLEM — S82.202G: Status: ACTIVE | Noted: 2022-07-29

## 2022-07-29 LAB
ANION GAP SERPL CALCULATED.3IONS-SCNC: 3 MMOL/L (ref 4–13)
BASOPHILS # BLD AUTO: 0.04 THOUSANDS/ΜL (ref 0–0.1)
BASOPHILS NFR BLD AUTO: 1 % (ref 0–1)
BUN SERPL-MCNC: 12 MG/DL (ref 5–25)
CALCIUM SERPL-MCNC: 9.1 MG/DL (ref 8.3–10.1)
CHLORIDE SERPL-SCNC: 109 MMOL/L (ref 96–108)
CO2 SERPL-SCNC: 28 MMOL/L (ref 21–32)
CREAT SERPL-MCNC: 0.87 MG/DL (ref 0.6–1.3)
EOSINOPHIL # BLD AUTO: 0.06 THOUSAND/ΜL (ref 0–0.61)
EOSINOPHIL NFR BLD AUTO: 1 % (ref 0–6)
ERYTHROCYTE [DISTWIDTH] IN BLOOD BY AUTOMATED COUNT: 16 % (ref 11.6–15.1)
GFR SERPL CREATININE-BSD FRML MDRD: 69 ML/MIN/1.73SQ M
GLUCOSE SERPL-MCNC: 99 MG/DL (ref 65–140)
HCT VFR BLD AUTO: 26.3 % (ref 34.8–46.1)
HGB BLD-MCNC: 8 G/DL (ref 11.5–15.4)
IMM GRANULOCYTES # BLD AUTO: 0.02 THOUSAND/UL (ref 0–0.2)
IMM GRANULOCYTES NFR BLD AUTO: 0 % (ref 0–2)
LYMPHOCYTES # BLD AUTO: 1.48 THOUSANDS/ΜL (ref 0.6–4.47)
LYMPHOCYTES NFR BLD AUTO: 29 % (ref 14–44)
MCH RBC QN AUTO: 27.2 PG (ref 26.8–34.3)
MCHC RBC AUTO-ENTMCNC: 30.4 G/DL (ref 31.4–37.4)
MCV RBC AUTO: 90 FL (ref 82–98)
MONOCYTES # BLD AUTO: 0.57 THOUSAND/ΜL (ref 0.17–1.22)
MONOCYTES NFR BLD AUTO: 11 % (ref 4–12)
NEUTROPHILS # BLD AUTO: 2.87 THOUSANDS/ΜL (ref 1.85–7.62)
NEUTS SEG NFR BLD AUTO: 58 % (ref 43–75)
NRBC BLD AUTO-RTO: 0 /100 WBCS
PLATELET # BLD AUTO: 281 THOUSANDS/UL (ref 149–390)
PMV BLD AUTO: 7.9 FL (ref 8.9–12.7)
POTASSIUM SERPL-SCNC: 3.8 MMOL/L (ref 3.5–5.3)
RBC # BLD AUTO: 2.94 MILLION/UL (ref 3.81–5.12)
SODIUM SERPL-SCNC: 140 MMOL/L (ref 135–147)
WBC # BLD AUTO: 5.04 THOUSAND/UL (ref 4.31–10.16)

## 2022-07-29 PROCEDURE — 99024 POSTOP FOLLOW-UP VISIT: CPT | Performed by: PHYSICIAN ASSISTANT

## 2022-07-29 PROCEDURE — 85025 COMPLETE CBC W/AUTO DIFF WBC: CPT | Performed by: NURSE PRACTITIONER

## 2022-07-29 PROCEDURE — 80048 BASIC METABOLIC PNL TOTAL CA: CPT | Performed by: NURSE PRACTITIONER

## 2022-07-29 PROCEDURE — 99214 OFFICE O/P EST MOD 30 MIN: CPT | Performed by: NURSE PRACTITIONER

## 2022-07-29 PROCEDURE — NC001 PR NO CHARGE: Performed by: ORTHOPAEDIC SURGERY

## 2022-07-29 RX ORDER — OXYCODONE HYDROCHLORIDE 5 MG/1
5 TABLET ORAL EVERY 6 HOURS PRN
Qty: 30 TABLET | Refills: 0 | Status: SHIPPED | OUTPATIENT
Start: 2022-07-29 | End: 2022-08-08

## 2022-07-29 RX ADMIN — OXYCODONE HYDROCHLORIDE 10 MG: 10 TABLET ORAL at 10:18

## 2022-07-29 RX ADMIN — APIXABAN 5 MG: 5 TABLET, FILM COATED ORAL at 10:08

## 2022-07-29 RX ADMIN — METOPROLOL SUCCINATE 200 MG: 100 TABLET, EXTENDED RELEASE ORAL at 10:12

## 2022-07-29 RX ADMIN — GABAPENTIN 400 MG: 400 CAPSULE ORAL at 10:08

## 2022-07-29 RX ADMIN — GABAPENTIN 400 MG: 400 CAPSULE ORAL at 13:18

## 2022-07-29 RX ADMIN — ALPRAZOLAM 1 MG: 0.5 TABLET ORAL at 10:07

## 2022-07-29 RX ADMIN — NICOTINE 1 PATCH: 7 PATCH, EXTENDED RELEASE TRANSDERMAL at 10:09

## 2022-07-29 RX ADMIN — FAMOTIDINE 20 MG: 20 TABLET, FILM COATED ORAL at 10:09

## 2022-07-29 RX ADMIN — ALPRAZOLAM 1 MG: 0.5 TABLET ORAL at 13:18

## 2022-07-29 RX ADMIN — PANTOPRAZOLE SODIUM 40 MG: 40 TABLET, DELAYED RELEASE ORAL at 10:08

## 2022-07-29 RX ADMIN — ASPIRIN 81 MG CHEWABLE TABLET 81 MG: 81 TABLET CHEWABLE at 10:08

## 2022-07-29 RX ADMIN — AMLODIPINE BESYLATE 5 MG: 5 TABLET ORAL at 10:13

## 2022-07-29 RX ADMIN — OXYCODONE HYDROCHLORIDE 10 MG: 10 TABLET ORAL at 16:20

## 2022-07-29 NOTE — DISCHARGE SUMMARY
Discharge Summary - Orthopedics   [facility] [place] 77 y o  female MRN: [id number]  Unit/Bed#: -95    Attending Physician: Lopez Jama    Admitting diagnosis: Fracture of tibia and fibula, left, closed, with delayed healing, subsequent encounter [S82 202G, S82 402G]    Discharge diagnosis: Fracture of tibia and fibula, left, closed, with delayed healing, subsequent encounter [S82 202G, S82 402G]    Date of admission: 7/27/2022    Date of discharge: 07/29/22    Procedure: Left tibial IMN     HPI & Hospital course:  77 y o  female who sustained a left distal tibia and fibula fracture  Pt was admitted to the orthopaedic service and taken to the OR on 7/27 for left tibial IMN  Prior to surgery the risks and benefits of surgery were explained and informed consent was obtained  Surgery went without complications and pt was discharged to the PACU in a stable condition and was transferred to the floor  On discharge date pt was cleared by PT and the medicine team and determined to be safe for discharge  Daily discussion was had with the patient, nursing staff, orthopaedic team, and family members if present  All questions were answered to the patients satisifaction        0   Lab Value Date/Time    HGB 8 0 (L) 07/29/2022 0732    HGB 7 0 (L) 07/28/2022 0504    HGB 9 3 (L) 05/16/2022 1711    HGB 11 8 08/18/2021 1226    HGB 10 0 (L) 06/28/2021 0456    HGB 10 3 (L) 06/27/2021 0510    HGB 10 6 (L) 06/26/2021 0518    HGB 11 1 (L) 06/25/2021 0521    HGB 12 6 06/24/2021 1315    HGB 12 4 06/24/2021 1313    HGB 11 9 06/16/2021 1040    HGB 11 6 06/11/2021 1144    HGB 11 7 05/30/2021 0549    HGB 11 9 05/29/2021 1815    HGB 11 7 10/16/2020 0438    HGB 11 3 (L) 10/15/2020 1014    HGB 10 1 (L) 05/15/2019 0453    HGB 11 9 03/06/2018 1045    HGB 12 7 08/29/2017 0728    HGB 12 0 01/16/2017 1025    HGB 13 0 06/02/2016 1300    HGB 12 5 01/19/2016 0745    HGB 11 3 (L) 10/15/2015 0738    HGB 11 6 07/08/2015 0915    HGB 12 0 06/15/2015 1037    HGB 11 5 03/13/2015 1109    HGB 11 6 09/03/2014 1259       Patient was monitored for signs of acute blood loss anemia  Vital signs remained stable and pt was resuscitated with IVF as needed  Body mass index is 29 18 kg/m²  Discharge Instructions:   · WBAT LLE  · Keep dressings clean and dry at all times   · Complete DVT prophylaxis as prescribed   · Physical therapy  · Follow-up as scheduled, otherwise call for appt  Discharge Medications: For the complete list of discharge medications, please refer to the patient's medication reconciliation

## 2022-07-29 NOTE — PROGRESS NOTES
Internal Medicine Progress Note  Patient: Mahnaz Choudhury  Age/sex: 77 y o  female  Medical Record #: [id number]      ASSESSMENT/PLAN: (Interval History)  Mahnaz Choudhury is seen and examined and management for following issues:    L tibial IM nail insertion  · By Dr Beth Ashley 7/27  · Pain mgmt per ortho  · WBAT  · F/u as outpatient     Acute LLE DVT  · Currently on eliquis through 8/13/22  · F/u PCP as scheduled     Anemia  · Previously on Vit B12 and iron  · Needs colonoscopy and GI w/u d/t same  · Previous heme+ stool  · W/u on hold 2/2 to requiring eliquis  · Father has h/o colon CA  · Held transfusion 7/28  · hgb better today     Tobacco use  · Not interested in patch  · Strongly recommended cessation especially in the setting of poor healing     Previous R ankle fx  · Initial  Injury occurred approximately 1 5 years ago  · With retained HW  · Having cellulitis and drainage on and off past 8 months  · Recently had a course of cephalexin  · Needs f/u with orthopedics for same     Bipolar  · Cont current medications as prescribed      DC planning: OK for dc from medicine standpoint  The above assessment and plan was reviewed and updated as determined by my evaluation of the patient on 7/29/2022      Labs:   Results from last 7 days   Lab Units 07/29/22  0732 07/28/22  0504   WBC Thousand/uL 5 04 6 43   HEMOGLOBIN g/dL 8 0* 7 0*   HEMATOCRIT % 26 3* 23 8*   PLATELETS Thousands/uL 281 273     Results from last 7 days   Lab Units 07/29/22  0732 07/28/22  0504   SODIUM mmol/L 140 136   POTASSIUM mmol/L 3 8 4 4   CHLORIDE mmol/L 109* 106   CO2 mmol/L 28 24   BUN mg/dL 12 9   CREATININE mg/dL 0 87 0 68   CALCIUM mg/dL 9 1 8 7                   Review of Scheduled Meds:  Current Facility-Administered Medications   Medication Dose Route Frequency Provider Last Rate    albuterol  2 puff Inhalation Q6H PRN Jaylin Bradley MD      ALPRAZolam  1 mg Oral 4x Daily Jaylin Bradley MD      amLODIPine  5 mg Oral Daily Vivienne Whitmore Charles Clayton MD      apixaban  5 mg Oral BID YASMANI Arriaga      aspirin  81 mg Oral Daily Luisa Pearson MD      atorvastatin  10 mg Oral QPM Luisa Pearson MD      Brexpiprazole  2 mg Oral Daily Luisa Pearson MD      chlorhexidine  15 mL Swish & Spit Once Luisa Pearson MD      docusate sodium  50 mg Oral BID PRN Luisa Pearson MD      famotidine  20 mg Oral Daily Luisa Pearson MD      gabapentin  400 mg Oral 4x Daily Luisa Pearson MD      lactated ringers  100 mL/hr Intravenous Continuous Luisa Pearson MD Stopped (07/27/22 2051)    lactated ringers  100 mL/hr Intravenous Continuous Bayron Pabon PA-C Stopped (07/28/22 5101)    metoprolol succinate  200 mg Oral QAM Luisa Pearson MD      nicotine  1 patch Transdermal Daily Luisa Pearson MD      OLANZapine  20 mg Oral HS Luisa Pearson MD      oxyCODONE  10 mg Oral Q4H PRN Luisa Pearson MD      oxyCODONE  5 mg Oral Q4H PRN Luisanancy Pearson MD      pantoprazole  40 mg Oral Daily Luisa Pearson MD      QUEtiapine  200 mg Oral HS Luisa Pearson MD         Subjective/ HPI: Patient seen and examined  Patients overnight issues or events were reviewed with nursing or staff during rounds or morning huddle session  New or overnight issues include the following:     Pt seen at bedside  Reviewed labs with her  She feels she is ready for dc    ROS:   A 10 point ROS was performed; negative except as noted above         Imaging:     No orders to display       *Labs /Radiology studies Reviewed  *Medications  reviewed and reconciled as needed  *Please refer to order section for additional ordered labs studies  *Case discussed with primary attending during morning huddle case rounds    Physical Examination:  Vitals:   Vitals:    07/28/22 1514 07/28/22 1926 07/28/22 2358 07/29/22 0730   BP: 113/67 111/62 101/57 106/69   Pulse: 79 74 67 84   Resp: 18 16  16   Temp: (!) 97 °F (36 1 °C)  97 5 °F (36 4 °C) 97 6 °F (36 4 °C)   TempSrc:       SpO2: 94% 94% 99% 100%   Weight:       Height:           GEN: No apparent distress, interactive  NEURO: Alert and oriented x3  HEENT: Pupils are equal and reactive, EOMI, mucous membranes are moist, face symmetrical  CV: S1 S2 regular, no MRG, no peripheral edema noted  RESP: Lungs are clear bilaterally, no wheezes, rales or rhonchi noted, on room air, respirations easy and non labored  GI: Flat, soft non tender, non distended; +BS x4  : Voiding without difficulty  MUSC: Moves all extremities; LLE dressing in place  SKIN: pink, warm and dry, normal turgor, R lateral malleolar area with nickel sized scab in place; no erythema      The above physical exam was reviewed and updated as determined by my evaluation of the patient on 7/29/2022  Invasive Devices  Report    Peripheral Intravenous Line  Duration           Peripheral IV 07/27/22 Left Hand 1 day          Drain  Duration           External Urinary Catheter 1 day                   VTE Pharmacologic Prophylaxis: Eliquis  Code Status: Level 1 - Full Code  Current Length of Stay: 0 day(s)      Total time spent:  30 minutes with more than 50% spent counseling/coordinating care  Counseling includes discussion with patient re: progress  and discussion with patient of his/her current medical state/information  Coordination of patient's care was performed in conjunction with primary service  Time invested included review of patient's labs, vitals, and management of their comorbidities with continued monitoring  In addition, this patient was discussed with medical team including physician and advanced extenders  The care of the patient was extensively discussed and appropriate treatment plan was formulated unique for this patient  ** Please Note:  voice to text software may have been used in the creation of this document   Although proof errors in transcription or interpretation are a potential of such software**

## 2022-07-29 NOTE — PLAN OF CARE
Problem: MOBILITY - ADULT  Goal: Maintain or return to baseline ADL function  Description: INTERVENTIONS:  -  Assess patient's ability to carry out ADLs; assess patient's baseline for ADL function and identify physical deficits which impact ability to perform ADLs (bathing, care of mouth/teeth, toileting, grooming, dressing, etc )  - Assess/evaluate cause of self-care deficits   - Assess range of motion  - Assess patient's mobility; develop plan if impaired  - Assess patient's need for assistive devices and provide as appropriate  - Encourage maximum independence but intervene and supervise when necessary  - Involve family in performance of ADLs  - Assess for home care needs following discharge   - Consider OT consult to assist with ADL evaluation and planning for discharge  - Provide patient education as appropriate  Outcome: Adequate for Discharge

## 2022-08-01 ENCOUNTER — TELEPHONE (OUTPATIENT)
Dept: GASTROENTEROLOGY | Facility: CLINIC | Age: 67
End: 2022-08-01

## 2022-08-01 ENCOUNTER — TELEPHONE (OUTPATIENT)
Dept: OBGYN CLINIC | Facility: HOSPITAL | Age: 67
End: 2022-08-01

## 2022-08-01 NOTE — TELEPHONE ENCOUNTER
PCM call placed  Patient is doing well  Verbalized understanding of discharge instruction per AVS   Mom would like to know if Home Health order can be placed for PT/OT/Homehealth aid?

## 2022-08-02 DIAGNOSIS — S82.202G: Primary | ICD-10-CM

## 2022-08-02 DIAGNOSIS — Z47.89 ORTHOPEDIC AFTERCARE: ICD-10-CM

## 2022-08-02 DIAGNOSIS — S82.402G: Primary | ICD-10-CM

## 2022-08-02 LAB
DME PARACHUTE DELIVERY DATE ACTUAL: NORMAL
DME PARACHUTE DELIVERY DATE EXPECTED: NORMAL
DME PARACHUTE DELIVERY DATE REQUESTED: NORMAL
DME PARACHUTE ITEM DESCRIPTION: NORMAL
DME PARACHUTE ORDER STATUS: NORMAL
DME PARACHUTE SUPPLIER NAME: NORMAL
DME PARACHUTE SUPPLIER PHONE: NORMAL

## 2022-08-02 NOTE — TELEPHONE ENCOUNTER
Left above detailed msg with patient's mother via voicemail  Contact number given for MARJORIE ROWLEY

## 2022-08-05 ENCOUNTER — TELEPHONE (OUTPATIENT)
Dept: OTHER | Facility: OTHER | Age: 67
End: 2022-08-05

## 2022-08-05 NOTE — TELEPHONE ENCOUNTER
Pt called to make office aware that she has [employer] medicare and [employer] and should be able to be seen in the office  She says her appointment was cancelled on 8/1/2022 for insurance reasons  Please call to discuss

## 2022-08-09 ENCOUNTER — HOSPITAL ENCOUNTER (OUTPATIENT)
Dept: RADIOLOGY | Facility: HOSPITAL | Age: 67
Discharge: HOME | End: 2022-08-09
Attending: ORTHOPAEDIC SURGERY
Payer: MEDICARE

## 2022-08-09 ENCOUNTER — TELEPHONE (OUTPATIENT)
Dept: OBGYN CLINIC | Facility: HOSPITAL | Age: 67
End: 2022-08-09

## 2022-08-09 ENCOUNTER — OFFICE VISIT (OUTPATIENT)
Dept: OBGYN CLINIC | Facility: HOSPITAL | Age: 67
End: 2022-08-09

## 2022-08-09 VITALS
BODY MASS INDEX: 29.02 KG/M2 | HEART RATE: 78 BPM | DIASTOLIC BLOOD PRESSURE: 80 MMHG | WEIGHT: 170 LBS | HEIGHT: 64 IN | SYSTOLIC BLOOD PRESSURE: 129 MMHG

## 2022-08-09 DIAGNOSIS — Z98.890 STATUS POST OPEN REDUCTION WITH INTERNAL FIXATION (ORIF) OF FRACTURE OF ANKLE: Primary | ICD-10-CM

## 2022-08-09 DIAGNOSIS — S82.402G: ICD-10-CM

## 2022-08-09 DIAGNOSIS — S82.202G: ICD-10-CM

## 2022-08-09 DIAGNOSIS — Z87.81 STATUS POST OPEN REDUCTION WITH INTERNAL FIXATION (ORIF) OF FRACTURE OF ANKLE: Primary | ICD-10-CM

## 2022-08-09 PROCEDURE — 73590 X-RAY EXAM OF LOWER LEG: CPT

## 2022-08-09 PROCEDURE — 99024 POSTOP FOLLOW-UP VISIT: CPT | Performed by: ORTHOPAEDIC SURGERY

## 2022-08-09 RX ORDER — HYDROCODONE BITARTRATE AND ACETAMINOPHEN 5; 325 MG/1; MG/1
TABLET ORAL
COMMUNITY
Start: 2022-08-08

## 2022-08-09 RX ORDER — METOPROLOL SUCCINATE 200 MG/1
TABLET, EXTENDED RELEASE ORAL
COMMUNITY
Start: 2022-08-04

## 2022-08-09 RX ORDER — QUETIAPINE FUMARATE 50 MG/1
TABLET, FILM COATED ORAL
COMMUNITY
Start: 2022-08-03

## 2022-08-09 RX ORDER — TRAMADOL HYDROCHLORIDE 50 MG/1
50 TABLET ORAL EVERY 6 HOURS PRN
Qty: 30 TABLET | Refills: 1 | Status: SHIPPED | OUTPATIENT
Start: 2022-08-09 | End: 2022-09-08

## 2022-08-09 NOTE — PROGRESS NOTES
Assessment/Plan:   Diagnoses and all orders for this visit:    Status post open reduction with internal fixation (ORIF) of fracture of ankle  -     Ambulatory referral to Physical Therapy; Future    Other orders  -     HYDROcodone-acetaminophen (NORCO) 5-325 mg per tablet  -     metoprolol succinate (TOPROL-XL) 200 MG 24 hr tablet  -     QUEtiapine (SEROquel) 50 mg tablet    Reviewed today's physical exam findings and x-ray findings with patient at time of visit  She is cleared to begin progressive weight-bearing to tolerance  Referral to Physical therapy provided to work on gentle active and passive range of motion and weight-bearing as tolerated  Prescription for tramadol was sent to the pharmacy on follow-up for pain relief  Patient will be re-evaluated in 4 weeks with repeat x-rays of the left tib-fib  Patient expresses understanding is in agreement with treatment plan    Subjective:   Patient ID: Tino Pepe  1955     HPI  Patient is a 77 y o  female who presents for 1st postop evaluation of left tibial IM nail insertion performed 7/27/2022  Patient is now 13 days postop and expresses that she is progressing well  She states she is able to tolerate short distance weight-bearing activity with use of the walker  She reports continued swelling and bruising postoperatively, but denies any numbness or tingling  She is currently using naproxen daily pain, and takes Vicodin at night to sleep  She states that she has used her Vicodin prescription in total   She denies any recent fever, chills, headache, nausea, dizziness, or malaise      The following portions of the patient's history were reviewed and updated as appropriate:  Past medical history, past surgical history, Family history, social history, current medications and allergies    Past Medical History:   Diagnosis Date    [facility]     Cancer ([facility] )     kidney    Cardiac disease     fast heart beat    Chronic pain     Fractures  GERD (gastroesophageal reflux disease)     Hard of hearing     Hypertension     [facility]     Stroke ([facility] )     2009 affected her speech, right sided weakness    TIA (transient ischemic attack)        Past Surgical History:   Procedure Laterality Date    APPENDECTOMY      CHOLECYSTECTOMY      JOINT REPLACEMENT Bilateral      bilateral knees    NEPHRECTOMY Right     TX COLONOSCOPY FLX DX W/COLLJ SPEC WHEN PFRMD N/A 8/7/2018    Procedure: COLONOSCOPY;  Surgeon: Anny Auguste MD;  Location: [facility] MA[facility];  Service: Gastroenterology    TX OPEN TREATMENT BIMALLEOLAR ANKLE FRACTURE Right 5/14/2019    Procedure: ANKLE - Bimalleolar OPEN REDUCTION W/ INTERNAL FIXATION (ORIF); Surgeon: Shelia Vogt;   Location: [address] [place] [address] MAIN OR;  Service: Orthopedics    TX TREAT TIBIAL SHAFT FX, INTRAMED IMPLANT Left 7/27/2022    Procedure: INSERTION NAIL IM TIBIA;  Surgeon: Tea Wood MD;  Location:  MAIN OR;  Service: Orthopedics       Family History   Problem Relation Age of Onset    Colon cancer Other     Alcohol abuse Other     Hypertension Other        Social History     Socioeconomic History    Marital status: Single     Spouse name: None    Number of children: None    Years of education: None    Highest education level: None   Occupational History    None   Tobacco Use    Smoking status: Current Every Day Smoker     Packs/day: 0 50     Years: 7 00     Pack years: 3 50     Types: Cigarettes    Smokeless tobacco: Never Used    Tobacco comment: pt refused   Vaping Use    Vaping Use: Never used   Substance and Sexual Activity    Alcohol use: Not Currently    Drug use: Never    Sexual activity: Not Currently   Other Topics Concern    None   Social History Narrative    ** Merged History Encounter **          Social Determinants of Health     Financial Resource Strain: Not on file   Food Insecurity: No Food Insecurity    Worried About Running Out of Food in the Last Year: Never true    Ran Out of Food in the Last Year: Never true   Transportation Needs: No Transportation Needs    Lack of Transportation (Medical): No    Lack of Transportation (Non-Medical):  No   Physical Activity: Not on file   Stress: Not on file   Social Connections: Not on file   Intimate Partner Violence: Not on file   Housing Stability: Unknown    Unable to Pay for Housing in the Last Year: No    Number of Places Lived in the Last Year: Not on file    Unstable Housing in the Last Year: No         Current Outpatient Medications:     ALPRAZolam (XANAX) 1 mg tablet, Take by mouth 4 (four) times a day, Disp: , Rfl:     atorvastatin (LIPITOR) 10 mg tablet, Take 10 mg by mouth every evening, Disp: , Rfl:     atorvastatin (LIPITOR) 20 mg tablet, Take 1 tablet (20 mg total) by mouth daily (Patient taking differently: Take 20 mg by mouth every evening), Disp: 90 tablet, Rfl: 1    docusate sodium (COLACE) 100 mg capsule, Take by mouth 2 (two) times a day as needed, Disp: , Rfl:     ergocalciferol (VITAMIN D2) 50,000 units, Take by mouth once a week , Disp: , Rfl:     gabapentin (NEURONTIN) 800 mg tablet, Take 0 5 tablets (400 mg total) by mouth 4 (four) times a day, Disp: 60 tablet, Rfl: 0    HYDROcodone-acetaminophen (NORCO) 5-325 mg per tablet, , Disp: , Rfl:     ibuprofen (MOTRIN) 800 mg tablet, Take 1 tablet by mouth 3 (three) times a day as needed, Disp: , Rfl:     ketotifen (ZADITOR) 0 025 % ophthalmic solution, Apply to eye  , Disp: , Rfl:     lidocaine (LIDODERM) 5 %, Apply 1 patch topically daily Remove & Discard patch within 12 hours or as directed by MD  , Disp: , Rfl:     metoprolol succinate (TOPROL-XL) 100 mg 24 hr tablet, Take 200 mg by mouth every morning, Disp: , Rfl:     metoprolol succinate (TOPROL-XL) 200 MG 24 hr tablet, , Disp: , Rfl:     OLANZapine (ZyPREXA) 20 MG tablet, Take 20 mg by mouth daily at bedtime , Disp: , Rfl:     pantoprazole (PROTONIX) 40 mg tablet, Take 40 mg by mouth daily , Disp: , Rfl:     QUEtiapine (SEROquel) 200 mg tablet, Take 1 tablet (200 mg total) by mouth daily at bedtime (Patient taking differently: Take 300 mg by mouth daily at bedtime), Disp: 30 tablet, Rfl: 0    QUEtiapine (SEROquel) 50 mg tablet, , Disp: , Rfl:     vitamin B-12 (VITAMIN B-12) 1,000 mcg tablet, Take 1 tablet by mouth daily, Disp: , Rfl:     albuterol (PROVENTIL HFA,VENTOLIN HFA) 90 mcg/act inhaler, INHALE 2 PUFFS BY MOUTH EVERY 6 HOURS AS NEEDED OR WHEEZING (Patient not taking: No sig reported), Disp: 18 g, Rfl: 0    amLODIPine (NORVASC) 5 mg tablet, Take 5 mg by mouth daily   (Patient not taking: No sig reported), Disp: , Rfl:     amoxicillin-clavulanate (AUGMENTIN) 875-125 mg per tablet, , Disp: , Rfl:     apixaban (Eliquis) 5 mg, Take 2 tablets (10 mg total) by mouth 2 (two) times a day for 7 days, THEN 1 tablet (5 mg total) 2 (two) times a day for 23 days  , Disp: 74 tablet, Rfl: 0    aspirin 81 mg chewable tablet, Chew 1 tablet (81 mg total) daily (Patient not taking: No sig reported), Disp: 30 tablet, Rfl: 0    Brexpiprazole (REXULTI) 2 MG tablet, Take 2 mg by mouth daily (Patient not taking: No sig reported), Disp: , Rfl:     carisoprodol (SOMA) 350 mg tablet, , Disp: , Rfl:     famotidine (PEPCID) 20 mg tablet, Take 1 tablet (20 mg total) by mouth daily (Patient not taking: No sig reported), Disp: 30 tablet, Rfl: 4    Allergies   Allergen Reactions    Celecoxib Rash and Other (See Comments)     CELEBREX    Doxazosin Rash and Hives    Metaxalone Rash and Hives       Review of Systems   Constitutional: Negative for fatigue  HENT: Negative for hearing loss  Gastrointestinal: Negative for nausea  Musculoskeletal:        As noted in HPI   Neurological: Negative for dizziness, weakness, numbness and headaches  All other systems reviewed and are negative         Objective:  /80   Pulse 78   Ht 5' 4" (1 626 m)   Wt 77 1 kg (170 lb)   BMI 29 18 kg/m²     Ortho Exam  Left tib-fib -  Weight-bearing status:  Full WBAT with walker  Incision(s):  Clean, dry, edges well approximated with sutures - no signs of drainage or dehiscence  Skin is warm and dry with no signs of erythema or infection  Generalized soft tissue swelling and ecchymosis as expected postoperatively, no effusion  ROM:  Is able demonstrate active ankle dorsiflexion plantar flexion, is able demonstrate seated knee flexion and extension, toes are pink and mobile  Calf compartments are soft  - Kris's sign  2+ TP and DP pulses with brisk capillary refill to the toes  Sural, saphenous, tibial, superficial and deep peroneal motor and sensory distributions intact  Sensation light touch intact distally    Physical Exam  Vitals and nursing note reviewed  Constitutional:       General: She is not in acute distress  Appearance: She is well-developed  HENT:      Head: Normocephalic and atraumatic  Eyes:      Conjunctiva/sclera: Conjunctivae normal    Cardiovascular:      Rate and Rhythm: Normal rate  Pulmonary:      Effort: Pulmonary effort is normal    Musculoskeletal:      Cervical back: Neck supple  Skin:     General: Skin is warm and dry  Capillary Refill: Capillary refill takes less than 2 seconds  Neurological:      Mental Status: She is alert and oriented to person, place, and time  Psychiatric:         Mood and Affect: Mood normal          Behavior: Behavior normal           Diagnostic Test Review:  Attending Physician has personally reviewed pertinent imaging in PACS, impression is as follows:    Review of radiographic series taken 8/9/2022 of the left tib-fib shows successful reduction of distal diaphyseal fractures of the tibia and fibula with maintained alignment, hardware well-fixed, and callus formation consistent with routine healing      Procedures   Sutures removed at time of visit without difficulty    Scribe Attestation    I,:  Meg Blanchard am acting as a scribe while in the presence of the attending physician :       I,:  Rosalino Eugene MD personally performed the services described in this documentation    as scribed in my presence :

## 2022-08-09 NOTE — TELEPHONE ENCOUNTER
DR Abigail Rodriguez  RE: Tramadol and maintenance on hydrocodone 5 times daily  CB: [phone]    [facility] from [employer] called stating she received a script for Tramadol from Dr Abigail Rodriguez  She wanted to make him aware that she is on a maintenance dose of Hydrocodone-acetaminophen 5 times daily  She is calling for varification   Caller asked to speak to clinical team

## 2022-08-12 DIAGNOSIS — S82.202G: Primary | ICD-10-CM

## 2022-08-12 DIAGNOSIS — S82.402G: Primary | ICD-10-CM

## 2022-09-06 ENCOUNTER — OFFICE VISIT (OUTPATIENT)
Dept: OBGYN CLINIC | Facility: HOSPITAL | Age: 67
End: 2022-09-06

## 2022-09-06 ENCOUNTER — HOSPITAL ENCOUNTER (OUTPATIENT)
Dept: RADIOLOGY | Facility: HOSPITAL | Age: 67
Discharge: HOME | End: 2022-09-06
Attending: ORTHOPAEDIC SURGERY
Payer: MEDICARE

## 2022-09-06 VITALS
BODY MASS INDEX: 29.02 KG/M2 | HEART RATE: 73 BPM | HEIGHT: 64 IN | DIASTOLIC BLOOD PRESSURE: 87 MMHG | WEIGHT: 170 LBS | SYSTOLIC BLOOD PRESSURE: 154 MMHG

## 2022-09-06 DIAGNOSIS — S82.202G: Primary | ICD-10-CM

## 2022-09-06 DIAGNOSIS — S82.202G: ICD-10-CM

## 2022-09-06 DIAGNOSIS — S82.402G: Primary | ICD-10-CM

## 2022-09-06 DIAGNOSIS — S82.402G: ICD-10-CM

## 2022-09-06 PROCEDURE — 99024 POSTOP FOLLOW-UP VISIT: CPT | Performed by: ORTHOPAEDIC SURGERY

## 2022-09-06 PROCEDURE — 73590 X-RAY EXAM OF LOWER LEG: CPT

## 2022-09-06 NOTE — PROGRESS NOTES
Assessment/Plan:  1  Fracture of tibia and fibula, left, closed, with delayed healing, subsequent encounter  Ambulatory Referral to Physical Therapy     Scribe Attestation    I,:  Aury Lopez MA am acting as a scribe while in the presence of the attending physician :       I,:  James Velarde MD personally performed the services described in this documentation    as scribed in my presence :             Essence Fink is doing well postoperatively  X-rays were reviewed in the office today  A referral was provided to formal therapy to work on stairs and gait  She can continue to be WBAT and wean off the walker  She will follow up in 6 weeks for repeat evaluation and repeat x-rays  Subjective:   Tino Pepe is a 77 y o  female who presents to the office today for follow up evaluation 5 weeks status post left tibial IM nail performed on 7/27/22  The patient states she is doing well  She has not been attending formal therapy however, as been compliant with physician directed HEP  She has transitioned to a walker  Review of Systems   Constitutional: Negative for chills and fever  HENT: Negative for drooling and sneezing  Eyes: Negative for redness  Respiratory: Negative for cough and wheezing  Gastrointestinal: Negative for nausea and vomiting  Musculoskeletal: Negative for arthralgias, joint swelling and myalgias  Neurological: Negative for weakness and numbness  Psychiatric/Behavioral: Negative for behavioral problems  The patient is not nervous/anxious            Past Medical History:   Diagnosis Date    [facility]     [facility]     kidney    Cardiac disease     fast heart beat    Chronic pain     Fractures     GERD (gastroesophageal reflux disease)     Hard of hearing     Hypertension     [facility]     Stroke ([facility] )     2009 affected her speech, right sided weakness    TIA (transient ischemic attack)        Past Surgical History:   Procedure Laterality Date    APPENDECTOMY      CHOLECYSTECTOMY      JOINT REPLACEMENT Bilateral      bilateral knees    NEPHRECTOMY Right     MT COLONOSCOPY FLX DX W/COLLJ SPEC WHEN PFRMD N/A 8/7/2018    Procedure: COLONOSCOPY;  Surgeon: Gladys Ray MD;  Location: [facility];  Service: Gastroenterology    MT OPEN TREATMENT BIMALLEOLAR ANKLE FRACTURE Right 5/14/2019    Procedure: ANKLE - Bimalleolar OPEN REDUCTION W/ INTERNAL FIXATION (ORIF); Surgeon: Hilary Traore;   Location: [facility] MAIN OR;  Service: Orthopedics    MT TREAT TIBIAL SHAFT FX, INTRAMED IMPLANT Left 7/27/2022    Procedure: INSERTION NAIL IM TIBIA;  Surgeon: Savi Huizar MD;  Location:  MAIN OR;  Service: Orthopedics       Family History   Problem Relation Age of Onset    Colon cancer Other     Alcohol abuse Other     Hypertension Other        Social History     Occupational History    Not on file   Tobacco Use    Smoking status: Current Every Day Smoker     Packs/day: 0 50     Years: 7 00     Pack years: 3 50     Types: Cigarettes    Smokeless tobacco: Never Used    Tobacco comment: pt refused   Vaping Use    Vaping Use: Never used   Substance and Sexual Activity    Alcohol use: Not Currently    Drug use: Never    Sexual activity: Not Currently         Current Outpatient Medications:     ALPRAZolam (XANAX) 1 mg tablet, Take by mouth 4 (four) times a day, Disp: , Rfl:     atorvastatin (LIPITOR) 10 mg tablet, Take 10 mg by mouth every evening, Disp: , Rfl:     atorvastatin (LIPITOR) 20 mg tablet, Take 1 tablet (20 mg total) by mouth daily (Patient taking differently: Take 20 mg by mouth every evening), Disp: 90 tablet, Rfl: 1    docusate sodium (COLACE) 100 mg capsule, Take by mouth 2 (two) times a day as needed, Disp: , Rfl:     ergocalciferol (VITAMIN D2) 50,000 units, Take by mouth once a week , Disp: , Rfl:     gabapentin (NEURONTIN) 800 mg tablet, Take 0 5 tablets (400 mg total) by mouth 4 (four) times a day, Disp: 60 tablet, Rfl: 0   HYDROcodone-acetaminophen (NORCO) 5-325 mg per tablet, , Disp: , Rfl:     ibuprofen (MOTRIN) 800 mg tablet, Take 1 tablet by mouth 3 (three) times a day as needed, Disp: , Rfl:     ketotifen (ZADITOR) 0 025 % ophthalmic solution, Apply to eye  , Disp: , Rfl:     lidocaine (LIDODERM) 5 %, Apply 1 patch topically daily Remove & Discard patch within 12 hours or as directed by MD  , Disp: , Rfl:     metoprolol succinate (TOPROL-XL) 100 mg 24 hr tablet, Take 200 mg by mouth every morning, Disp: , Rfl:     metoprolol succinate (TOPROL-XL) 200 MG 24 hr tablet, , Disp: , Rfl:     OLANZapine (ZyPREXA) 20 MG tablet, Take 20 mg by mouth daily at bedtime , Disp: , Rfl:     pantoprazole (PROTONIX) 40 mg tablet, Take 40 mg by mouth daily , Disp: , Rfl:     QUEtiapine (SEROquel) 200 mg tablet, Take 1 tablet (200 mg total) by mouth daily at bedtime (Patient taking differently: Take 300 mg by mouth daily at bedtime), Disp: 30 tablet, Rfl: 0    QUEtiapine (SEROquel) 50 mg tablet, , Disp: , Rfl:     traMADol (Ultram) 50 mg tablet, Take 1 tablet (50 mg total) by mouth every 6 (six) hours as needed for moderate pain, Disp: 30 tablet, Rfl: 1    vitamin B-12 (VITAMIN B-12) 1,000 mcg tablet, Take 1 tablet by mouth daily, Disp: , Rfl:     albuterol (PROVENTIL HFA,VENTOLIN HFA) 90 mcg/act inhaler, INHALE 2 PUFFS BY MOUTH EVERY 6 HOURS AS NEEDED OR WHEEZING (Patient not taking: No sig reported), Disp: 18 g, Rfl: 0    amLODIPine (NORVASC) 5 mg tablet, Take 5 mg by mouth daily   (Patient not taking: No sig reported), Disp: , Rfl:     amoxicillin-clavulanate (AUGMENTIN) 875-125 mg per tablet, , Disp: , Rfl:     apixaban (Eliquis) 5 mg, Take 2 tablets (10 mg total) by mouth 2 (two) times a day for 7 days, THEN 1 tablet (5 mg total) 2 (two) times a day for 23 days  , Disp: 74 tablet, Rfl: 0    aspirin 81 mg chewable tablet, Chew 1 tablet (81 mg total) daily (Patient not taking: No sig reported), Disp: 30 tablet, Rfl: 0   Brexpiprazole (REXULTI) 2 MG tablet, Take 2 mg by mouth daily (Patient not taking: No sig reported), Disp: , Rfl:     carisoprodol (SOMA) 350 mg tablet, , Disp: , Rfl:     famotidine (PEPCID) 20 mg tablet, Take 1 tablet (20 mg total) by mouth daily (Patient not taking: No sig reported), Disp: 30 tablet, Rfl: 4    Allergies   Allergen Reactions    Celecoxib Rash and Other (See Comments)     CELEBREX    Doxazosin Rash and Hives    Metaxalone Rash and Hives       Objective:  Vitals:    09/06/22 1123   BP: 154/87   Pulse: 73       Ortho Exam     Left lower extremity     Incisions well healed  0 knee ext  NVI distally   Compartments soft  No erythema     Physical Exam  Constitutional:       Appearance: She is well-developed  HENT:      Head: Normocephalic and atraumatic  Eyes:      General:         Right eye: No discharge  Left eye: No discharge  Conjunctiva/sclera: Conjunctivae normal    Cardiovascular:      Rate and Rhythm: Normal rate  Pulmonary:      Effort: Pulmonary effort is normal  No respiratory distress  Musculoskeletal:      Cervical back: Normal range of motion and neck supple  Comments: As noted in HPI   Skin:     General: Skin is warm and dry  Neurological:      Mental Status: She is alert and oriented to person, place, and time  Psychiatric:         Behavior: Behavior normal          Thought Content: Thought content normal          Judgment: Judgment normal          I have personally reviewed pertinent films in PACS and my interpretation is as follows:X-rays left tibi-fib performed in the office today demonstrate good fracture healing with stable hardware  This document was created using speech voice recognition software  Grammatical errors, random word insertions, pronoun errors, and incomplete sentences are an occasional consequence of this system due to software limitations, ambient noise, and hardware issues     Any formal questions or concerns about content, text, or information contained within the body of this dictation should be directly addressed to the provider for clarification

## 2022-09-16 ENCOUNTER — TRANSCRIBE ORDERS (OUTPATIENT)
Dept: ADMINISTRATIVE | Facility: HOSPITAL | Age: 67
End: 2022-09-16

## 2022-09-16 DIAGNOSIS — M25.571 CHRONIC PAIN OF RIGHT ANKLE: Primary | ICD-10-CM

## 2022-09-16 DIAGNOSIS — I82.462 ACUTE EMBOLISM AND THROMBOSIS OF LEFT CALF MUSCLE VEIN (HCC): ICD-10-CM

## 2022-09-16 DIAGNOSIS — S91.001D WOUND OF ANKLE, RIGHT, SUBSEQUENT ENCOUNTER: ICD-10-CM

## 2022-09-16 DIAGNOSIS — Z96.9 PRESENCE OF RETAINED HARDWARE: ICD-10-CM

## 2022-09-16 DIAGNOSIS — G89.29 CHRONIC PAIN OF RIGHT ANKLE: Primary | ICD-10-CM

## 2022-09-17 ENCOUNTER — APPOINTMENT (OUTPATIENT)
Dept: RADIOLOGY | Facility: MEDICAL CENTER | Age: 67
End: 2022-09-17
Payer: MEDICARE

## 2022-09-17 DIAGNOSIS — Z96.9 PRESENCE OF RETAINED HARDWARE: ICD-10-CM

## 2022-09-17 DIAGNOSIS — G89.29 CHRONIC PAIN OF RIGHT ANKLE: ICD-10-CM

## 2022-09-17 DIAGNOSIS — M25.571 CHRONIC PAIN OF RIGHT ANKLE: ICD-10-CM

## 2022-09-17 DIAGNOSIS — S91.001D WOUND OF ANKLE, RIGHT, SUBSEQUENT ENCOUNTER: ICD-10-CM

## 2022-09-17 PROCEDURE — 73610 X-RAY EXAM OF ANKLE: CPT

## 2022-09-23 DIAGNOSIS — S82.102D CLOSED FRACTURE OF PROXIMAL END OF LEFT TIBIA WITH ROUTINE HEALING, UNSPECIFIED FRACTURE MORPHOLOGY, SUBSEQUENT ENCOUNTER: Primary | ICD-10-CM

## 2022-10-18 ENCOUNTER — OFFICE VISIT (OUTPATIENT)
Dept: OBGYN CLINIC | Facility: HOSPITAL | Age: 67
End: 2022-10-18

## 2022-10-18 ENCOUNTER — HOSPITAL ENCOUNTER (OUTPATIENT)
Dept: RADIOLOGY | Facility: HOSPITAL | Age: 67
Discharge: HOME | End: 2022-10-18
Attending: ORTHOPAEDIC SURGERY
Payer: MEDICARE

## 2022-10-18 VITALS
DIASTOLIC BLOOD PRESSURE: 85 MMHG | SYSTOLIC BLOOD PRESSURE: 146 MMHG | BODY MASS INDEX: 29.02 KG/M2 | HEIGHT: 64 IN | HEART RATE: 80 BPM | WEIGHT: 170 LBS

## 2022-10-18 DIAGNOSIS — Z47.89 ORTHOPEDIC AFTERCARE: ICD-10-CM

## 2022-10-18 DIAGNOSIS — S82.102D CLOSED FRACTURE OF PROXIMAL END OF LEFT TIBIA WITH ROUTINE HEALING, UNSPECIFIED FRACTURE MORPHOLOGY, SUBSEQUENT ENCOUNTER: Primary | ICD-10-CM

## 2022-10-18 DIAGNOSIS — S82.102D CLOSED FRACTURE OF PROXIMAL END OF LEFT TIBIA WITH ROUTINE HEALING, UNSPECIFIED FRACTURE MORPHOLOGY, SUBSEQUENT ENCOUNTER: ICD-10-CM

## 2022-10-18 PROCEDURE — 73590 X-RAY EXAM OF LOWER LEG: CPT

## 2022-10-18 PROCEDURE — 99024 POSTOP FOLLOW-UP VISIT: CPT | Performed by: ORTHOPAEDIC SURGERY

## 2022-10-18 NOTE — PROGRESS NOTES
Assessment:   Diagnosis ICD-10-CM Associated Orders   1  Closed fracture of proximal end of left tibia with routine healing, unspecified fracture morphology, subsequent encounter  S82 102D    2  Orthopedic aftercare  [id number]        Plan:  · A discussion was had with the patient that her imaging looks very good at today's visit  · She may continue to be WBAT to LLE at this time  She has no specific restrictions at this time  · The physician was consulted and was instrumental in the formulation of the plan  To do next visit:  Follow-up in 3 months if any issues for further imaging and to assess post-op pain and function  If she has no issues at all, she may call and cancel the appointment and follow-up PRN  The above stated was discussed in layman's terms and the patient expressed understanding  All questions were answered to the patient's satisfaction  Subjective:   Shay Crow is a 77 y o  female who presents to the office today for a 12-week post-op visit from her left IM tibial nail insertion on 7/27/2022  Today the patient reports mild pain in the leg  She is on Vicodin and gabapentin for chronic back pain  She takes nothing specifically for leg pain  She walks without any kind of assist device  She is very satisfied with her progress so far        Review of systems negative unless otherwise specified in HPI    Past Medical History:   Diagnosis Date   • Bipolar 1 disorder ([facility] [address] )    • Cancer ([facility] [address] )     kidney   • Cardiac disease     fast heart beat   • Chronic pain    • Fractures    • GERD (gastroesophageal reflux disease)    • Hard of hearing    • Hypertension    • Renal cell carcinoma ([facility] [address] )    • Stroke [facility])     2009 affected her speech, right sided weakness   • TIA (transient ischemic attack)        Past Surgical History:   Procedure Laterality Date   • APPENDECTOMY     • CHOLECYSTECTOMY     • JOINT REPLACEMENT Bilateral      bilateral knees   • NEPHRECTOMY Right    • IA COLONOSCOPY FLX DX W/COLLJ SPEC WHEN PFRMD N/A 8/7/2018    Procedure: COLONOSCOPY;  Surgeon: Robles Schofield MD;  Location: [facility] MAIN OR;  Service: Gastroenterology   • MO OPEN TREATMENT BIMALLEOLAR ANKLE FRACTURE Right 5/14/2019    Procedure: ANKLE - Bimalleolar OPEN REDUCTION W/ INTERNAL FIXATION (ORIF); Surgeon: Genevieve Patricia;   Location: [facility] MAIN OR;  Service: Orthopedics   • MO TREAT TIBIAL SHAFT FX, INTRAMED IMPLANT Left 7/27/2022    Procedure: INSERTION NAIL IM TIBIA;  Surgeon: Maribel Fontanez MD;  Location:  MAIN OR;  Service: Orthopedics       Family History   Problem Relation Age of Onset   • Colon cancer Other    • Alcohol abuse Other    • Hypertension Other        Social History     Occupational History   • Not on file   Tobacco Use   • Smoking status: Current Every Day Smoker     Packs/day: 0 50     Years: 7 00     Pack years: 3 50     Types: Cigarettes   • Smokeless tobacco: Never Used   • Tobacco comment: pt refused   Vaping Use   • Vaping Use: Never used   Substance and Sexual Activity   • Alcohol use: Not Currently   • Drug use: Never   • Sexual activity: Not Currently         Current Outpatient Medications:   •  ALPRAZolam (XANAX) 1 mg tablet, Take by mouth 4 (four) times a day, Disp: , Rfl:   •  atorvastatin (LIPITOR) 10 mg tablet, Take 10 mg by mouth every evening, Disp: , Rfl:   •  docusate sodium (COLACE) 100 mg capsule, Take by mouth 2 (two) times a day as needed, Disp: , Rfl:   •  ergocalciferol (VITAMIN D2) 50,000 units, Take by mouth once a week , Disp: , Rfl:   •  gabapentin (NEURONTIN) 800 mg tablet, Take 0 5 tablets (400 mg total) by mouth 4 (four) times a day, Disp: 60 tablet, Rfl: 0  •  HYDROcodone-acetaminophen (NORCO) 5-325 mg per tablet, , Disp: , Rfl:   •  ibuprofen (MOTRIN) 800 mg tablet, Take 1 tablet by mouth 3 (three) times a day as needed, Disp: , Rfl:   •  ketotifen (ZADITOR) 0 025 % ophthalmic solution, Apply to eye  , Disp: , Rfl:   •  lidocaine (LIDODERM) 5 %, Apply 1 patch topically daily Remove & Discard patch within 12 hours or as directed by MD  , Disp: , Rfl:   •  metoprolol succinate (TOPROL-XL) 100 mg 24 hr tablet, Take 200 mg by mouth every morning, Disp: , Rfl:   •  metoprolol succinate (TOPROL-XL) 200 MG 24 hr tablet, , Disp: , Rfl:   •  OLANZapine (ZyPREXA) 20 MG tablet, Take 20 mg by mouth daily at bedtime , Disp: , Rfl:   •  pantoprazole (PROTONIX) 40 mg tablet, Take 40 mg by mouth daily , Disp: , Rfl:   •  QUEtiapine (SEROquel) 200 mg tablet, Take 1 tablet (200 mg total) by mouth daily at bedtime (Patient taking differently: Take 300 mg by mouth daily at bedtime), Disp: 30 tablet, Rfl: 0  •  QUEtiapine (SEROquel) 50 mg tablet, , Disp: , Rfl:   •  vitamin B-12 (VITAMIN B-12) 1,000 mcg tablet, Take 1 tablet by mouth daily, Disp: , Rfl:   •  albuterol (PROVENTIL HFA,VENTOLIN HFA) 90 mcg/act inhaler, INHALE 2 PUFFS BY MOUTH EVERY 6 HOURS AS NEEDED OR WHEEZING (Patient not taking: No sig reported), Disp: 18 g, Rfl: 0  •  amLODIPine (NORVASC) 5 mg tablet, Take 5 mg by mouth daily   (Patient not taking: No sig reported), Disp: , Rfl:   •  amoxicillin-clavulanate (AUGMENTIN) 875-125 mg per tablet, , Disp: , Rfl:   •  apixaban (Eliquis) 5 mg, Take 2 tablets (10 mg total) by mouth 2 (two) times a day for 7 days, THEN 1 tablet (5 mg total) 2 (two) times a day for 23 days  , Disp: 74 tablet, Rfl: 0  •  aspirin 81 mg chewable tablet, Chew 1 tablet (81 mg total) daily (Patient not taking: No sig reported), Disp: 30 tablet, Rfl: 0  •  atorvastatin (LIPITOR) 20 mg tablet, Take 1 tablet (20 mg total) by mouth daily (Patient not taking: Reported on 10/18/2022), Disp: 90 tablet, Rfl: 1  •  Brexpiprazole (REXULTI) 2 MG tablet, Take 2 mg by mouth daily (Patient not taking: No sig reported), Disp: , Rfl:   •  carisoprodol (SOMA) 350 mg tablet, , Disp: , Rfl:   •  famotidine (PEPCID) 20 mg tablet, Take 1 tablet (20 mg total) by mouth daily (Patient not taking: No sig reported), Disp: 30 tablet, Rfl: 4    Allergies   Allergen Reactions   • Celecoxib Rash and Other (See Comments)     CELEBREX   • Doxazosin Rash and Hives   • Metaxalone Rash and Hives            Vitals:    10/18/22 1102   BP: 146/85   Pulse: 80       Objective:    General:  Patient is WDWN, alert and oriented, appears stated age, and is in no acute distress  Musculoskeletal:    Left Leg:    Inspection:  Incisions are well-approximated and well-healed without erythema or purulent material indicative of infection  Range of Motion:  She is able to achieve 0 degrees of active knee extension  She is able to achieve approximately 120 degrees of active knee flexion  Palpation:  She is mildly tender with palpation over the incision sites  Special Tests:  Negative Ballottement test         Diagnostics, reviewed and taken today if performed as documented: The attending physician has personally reviewed the pertinent films in PACS and interpretation is as follows:  Left Tibia X-rays:  The patient's fracture is held in stable alignment  There is no evidence of hardware loosening or failure  Procedures, if performed today:    None performed      Portions of the record may have been created with voice recognition software  Occasional wrong word or "sound a like" substitutions may have occurred due to the inherent limitations of voice recognition software  Read the chart carefully and recognize, using context, where substitutions have occurred

## 2022-10-24 ENCOUNTER — TELEPHONE (OUTPATIENT)
Dept: OBGYN CLINIC | Facility: HOSPITAL | Age: 67
End: 2022-10-24

## 2022-10-24 NOTE — TELEPHONE ENCOUNTER
Caller: Angélica Rosales    Doctor/Office Samara Bloom    Radiology CB# : [phone]    [address] [place] [address] Radiology called to report significant findings on tib fib xray

## 2022-11-16 ENCOUNTER — TELEPHONE (OUTPATIENT)
Dept: GASTROENTEROLOGY | Facility: CLINIC | Age: 67
End: 2022-11-16

## 2022-11-16 NOTE — TELEPHONE ENCOUNTER
Called patient to verify we have the proper ins info and to let her know we are non par with [facility]

## 2022-11-17 ENCOUNTER — APPOINTMENT (EMERGENCY)
Dept: CT IMAGING | Facility: HOSPITAL | Age: 67
End: 2022-11-17

## 2022-11-17 ENCOUNTER — APPOINTMENT (EMERGENCY)
Dept: RADIOLOGY | Facility: HOSPITAL | Age: 67
End: 2022-11-17

## 2022-11-17 ENCOUNTER — HOSPITAL ENCOUNTER (OUTPATIENT)
Facility: HOSPITAL | Age: 67
Setting detail: OBSERVATION
Discharge: HOME | End: 2022-11-18
Attending: EMERGENCY MEDICINE | Admitting: FAMILY MEDICINE

## 2022-11-17 DIAGNOSIS — R47.81 SLURRED SPEECH: ICD-10-CM

## 2022-11-17 DIAGNOSIS — E55.9 VITAMIN D INSUFFICIENCY: ICD-10-CM

## 2022-11-17 DIAGNOSIS — R79.89 LOW TSH LEVEL: ICD-10-CM

## 2022-11-17 DIAGNOSIS — F31.9 BIPOLAR 1 DISORDER (HCC): ICD-10-CM

## 2022-11-17 DIAGNOSIS — F31.9 BIPOLAR DISEASE, CHRONIC (HCC): ICD-10-CM

## 2022-11-17 DIAGNOSIS — I63.9 CEREBROVASCULAR ACCIDENT (CVA), UNSPECIFIED MECHANISM (HCC): ICD-10-CM

## 2022-11-17 DIAGNOSIS — R06.02 SOB (SHORTNESS OF BREATH): ICD-10-CM

## 2022-11-17 DIAGNOSIS — D64.9 ANEMIA, UNSPECIFIED TYPE: ICD-10-CM

## 2022-11-17 DIAGNOSIS — I10 HYPERTENSION, UNSPECIFIED TYPE: ICD-10-CM

## 2022-11-17 DIAGNOSIS — G93.41 ACUTE METABOLIC ENCEPHALOPATHY: ICD-10-CM

## 2022-11-17 DIAGNOSIS — R41.82 AMS (ALTERED MENTAL STATUS): ICD-10-CM

## 2022-11-17 DIAGNOSIS — F41.9 ANXIETY: ICD-10-CM

## 2022-11-17 DIAGNOSIS — G47.34 NOCTURNAL HYPOXIA: ICD-10-CM

## 2022-11-17 DIAGNOSIS — R47.9 SPEECH DISTURBANCE: Primary | ICD-10-CM

## 2022-11-17 DIAGNOSIS — Z86.73 HISTORY OF CVA (CEREBROVASCULAR ACCIDENT): ICD-10-CM

## 2022-11-17 PROBLEM — G89.29 CHRONIC PAIN: Status: ACTIVE | Noted: 2022-11-17

## 2022-11-17 LAB
ALBUMIN SERPL BCP-MCNC: 2.9 G/DL (ref 3.5–5)
ALBUMIN SERPL BCP-MCNC: 3.2 G/DL (ref 3.5–5)
ALP SERPL-CCNC: 113 U/L (ref 46–116)
ALP SERPL-CCNC: 126 U/L (ref 46–116)
ALT SERPL W P-5'-P-CCNC: 14 U/L (ref 12–78)
ALT SERPL W P-5'-P-CCNC: 9 U/L (ref 12–78)
ANION GAP SERPL CALCULATED.3IONS-SCNC: 4 MMOL/L (ref 4–13)
ANION GAP SERPL CALCULATED.3IONS-SCNC: 5 MMOL/L (ref 4–13)
APTT PPP: 26 SECONDS (ref 23–37)
AST SERPL W P-5'-P-CCNC: 15 U/L (ref 5–45)
ATRIAL RATE: 68 BPM
BASE EXCESS BLDA CALC-SCNC: 1.1 MMOL/L
BASOPHILS # BLD AUTO: 0.04 THOUSANDS/ÂΜL (ref 0–0.1)
BASOPHILS # BLD AUTO: 0.06 THOUSANDS/ÂΜL (ref 0–0.1)
BASOPHILS NFR BLD AUTO: 1 % (ref 0–1)
BASOPHILS NFR BLD AUTO: 1 % (ref 0–1)
BILIRUB SERPL-MCNC: 0.25 MG/DL (ref 0.2–1)
BILIRUB SERPL-MCNC: 0.26 MG/DL (ref 0.2–1)
BUN SERPL-MCNC: 17 MG/DL (ref 5–25)
BUN SERPL-MCNC: 22 MG/DL (ref 5–25)
CALCIUM ALBUM COR SERPL-MCNC: 9.6 MG/DL (ref 8.3–10.1)
CALCIUM ALBUM COR SERPL-MCNC: 9.6 MG/DL (ref 8.3–10.1)
CALCIUM SERPL-MCNC: 8.7 MG/DL (ref 8.3–10.1)
CALCIUM SERPL-MCNC: 9 MG/DL (ref 8.3–10.1)
CARDIAC TROPONIN I PNL SERPL HS: 2 NG/L
CHLORIDE SERPL-SCNC: 106 MMOL/L (ref 96–108)
CHLORIDE SERPL-SCNC: 107 MMOL/L (ref 96–108)
CK SERPL-CCNC: 57 U/L (ref 26–192)
CO2 SERPL-SCNC: 30 MMOL/L (ref 21–32)
CO2 SERPL-SCNC: 31 MMOL/L (ref 21–32)
CREAT SERPL-MCNC: 0.97 MG/DL (ref 0.6–1.3)
CREAT SERPL-MCNC: 1.08 MG/DL (ref 0.6–1.3)
EOSINOPHIL # BLD AUTO: 0.21 THOUSAND/ÂΜL (ref 0–0.61)
EOSINOPHIL # BLD AUTO: 0.31 THOUSAND/ÂΜL (ref 0–0.61)
EOSINOPHIL NFR BLD AUTO: 3 % (ref 0–6)
EOSINOPHIL NFR BLD AUTO: 7 % (ref 0–6)
ERYTHROCYTE [DISTWIDTH] IN BLOOD BY AUTOMATED COUNT: 26.4 % (ref 11.6–15.1)
ERYTHROCYTE [DISTWIDTH] IN BLOOD BY AUTOMATED COUNT: 26.6 % (ref 11.6–15.1)
ETHANOL SERPL-MCNC: <3 MG/DL (ref 0–3)
FLUAV RNA RESP QL NAA+PROBE: NEGATIVE
FLUBV RNA RESP QL NAA+PROBE: NEGATIVE
GFR SERPL CREATININE-BSD FRML MDRD: 53 ML/MIN/1.73SQ M
GFR SERPL CREATININE-BSD FRML MDRD: 61 ML/MIN/1.73SQ M
GLUCOSE SERPL-MCNC: 130 MG/DL (ref 65–140)
GLUCOSE SERPL-MCNC: 78 MG/DL (ref 65–140)
GLUCOSE SERPL-MCNC: 96 MG/DL (ref 65–140)
HCO3 BLDA-SCNC: 26.5 MMOL/L (ref 22–28)
HCT VFR BLD AUTO: 27.2 % (ref 34.8–46.1)
HCT VFR BLD AUTO: 29.5 % (ref 34.8–46.1)
HGB BLD-MCNC: 8.2 G/DL (ref 11.5–15.4)
HGB BLD-MCNC: 9 G/DL (ref 11.5–15.4)
IMM GRANULOCYTES # BLD AUTO: 0.01 THOUSAND/UL (ref 0–0.2)
IMM GRANULOCYTES # BLD AUTO: 0.02 THOUSAND/UL (ref 0–0.2)
IMM GRANULOCYTES NFR BLD AUTO: 0 % (ref 0–2)
IMM GRANULOCYTES NFR BLD AUTO: 0 % (ref 0–2)
INR PPP: 1.01 (ref 0.84–1.19)
LYMPHOCYTES # BLD AUTO: 1.08 THOUSANDS/ÂΜL (ref 0.6–4.47)
LYMPHOCYTES # BLD AUTO: 1.18 THOUSANDS/ÂΜL (ref 0.6–4.47)
LYMPHOCYTES NFR BLD AUTO: 18 % (ref 14–44)
LYMPHOCYTES NFR BLD AUTO: 26 % (ref 14–44)
MAGNESIUM SERPL-MCNC: 1.7 MG/DL (ref 1.6–2.6)
MCH RBC QN AUTO: 27 PG (ref 26.8–34.3)
MCH RBC QN AUTO: 27.2 PG (ref 26.8–34.3)
MCHC RBC AUTO-ENTMCNC: 30.1 G/DL (ref 31.4–37.4)
MCHC RBC AUTO-ENTMCNC: 30.5 G/DL (ref 31.4–37.4)
MCV RBC AUTO: 89 FL (ref 82–98)
MCV RBC AUTO: 90 FL (ref 82–98)
MONOCYTES # BLD AUTO: 0.27 THOUSAND/ÂΜL (ref 0.17–1.22)
MONOCYTES # BLD AUTO: 0.46 THOUSAND/ÂΜL (ref 0.17–1.22)
MONOCYTES NFR BLD AUTO: 6 % (ref 4–12)
MONOCYTES NFR BLD AUTO: 8 % (ref 4–12)
NASAL CANNULA: 2
NEUTROPHILS # BLD AUTO: 2.73 THOUSANDS/ÂΜL (ref 1.85–7.62)
NEUTROPHILS # BLD AUTO: 4.33 THOUSANDS/ÂΜL (ref 1.85–7.62)
NEUTS SEG NFR BLD AUTO: 60 % (ref 43–75)
NEUTS SEG NFR BLD AUTO: 70 % (ref 43–75)
NRBC BLD AUTO-RTO: 0 /100 WBCS
NRBC BLD AUTO-RTO: 0 /100 WBCS
O2 CT BLDA-SCNC: 13.2 ML/DL (ref 16–23)
OTHER FIO2: ABNORMAL %
OXYHGB MFR BLDA: 94.7 % (ref 94–97)
P AXIS: 74 DEGREES
PCO2 BLDA: 46 MM HG (ref 36–44)
PH BLDA: 7.38 [PH] (ref 7.35–7.45)
PHOSPHATE SERPL-MCNC: 3.7 MG/DL (ref 2.3–4.1)
PLATELET # BLD AUTO: 262 THOUSANDS/UL (ref 149–390)
PLATELET # BLD AUTO: 305 THOUSANDS/UL (ref 149–390)
PMV BLD AUTO: 8.2 FL (ref 8.9–12.7)
PMV BLD AUTO: 8.3 FL (ref 8.9–12.7)
PO2 BLDA: 107.5 MM HG (ref 75–129)
POTASSIUM SERPL-SCNC: 3.6 MMOL/L (ref 3.5–5.3)
POTASSIUM SERPL-SCNC: 4.4 MMOL/L (ref 3.5–5.3)
PR INTERVAL: 196 MS
PROT SERPL-MCNC: 6.2 G/DL (ref 6.4–8.4)
PROT SERPL-MCNC: 6.8 G/DL (ref 6.4–8.4)
PROTHROMBIN TIME: 13.5 SECONDS (ref 11.6–14.5)
QRS AXIS: 75 DEGREES
QRSD INTERVAL: 86 MS
QT INTERVAL: 398 MS
QTC INTERVAL: 423 MS
RBC # BLD AUTO: 3.02 MILLION/UL (ref 3.81–5.12)
RBC # BLD AUTO: 3.33 MILLION/UL (ref 3.81–5.12)
RSV RNA RESP QL NAA+PROBE: NEGATIVE
SARS-COV-2 RNA RESP QL NAA+PROBE: NEGATIVE
SODIUM SERPL-SCNC: 141 MMOL/L (ref 135–147)
SODIUM SERPL-SCNC: 142 MMOL/L (ref 135–147)
SPECIMEN SOURCE: ABNORMAL
T WAVE AXIS: 65 DEGREES
TSH SERPL DL<=0.05 MIU/L-ACNC: 0.44 UIU/ML (ref 0.45–4.5)
VENTRICULAR RATE: 68 BPM
WBC # BLD AUTO: 4.57 THOUSAND/UL (ref 4.31–10.16)
WBC # BLD AUTO: 6.13 THOUSAND/UL (ref 4.31–10.16)

## 2022-11-17 RX ORDER — ENOXAPARIN SODIUM 100 MG/ML
40 INJECTION SUBCUTANEOUS DAILY
Status: DISCONTINUED | OUTPATIENT
Start: 2022-11-17 | End: 2022-11-17

## 2022-11-17 RX ORDER — ATORVASTATIN CALCIUM 40 MG/1
20 TABLET, FILM COATED ORAL DAILY
Status: DISCONTINUED | OUTPATIENT
Start: 2022-11-17 | End: 2022-11-17

## 2022-11-17 RX ORDER — ASPIRIN 81 MG/1
81 TABLET, CHEWABLE ORAL DAILY
Status: CANCELLED | OUTPATIENT
Start: 2022-11-17

## 2022-11-17 RX ORDER — METOPROLOL SUCCINATE 100 MG/1
200 TABLET, EXTENDED RELEASE ORAL DAILY
Status: DISCONTINUED | OUTPATIENT
Start: 2022-11-18 | End: 2022-11-17

## 2022-11-17 RX ORDER — ALPRAZOLAM 0.5 MG/1
0.5 TABLET ORAL 2 TIMES DAILY
Status: CANCELLED | OUTPATIENT
Start: 2022-11-17

## 2022-11-17 RX ORDER — ATORVASTATIN CALCIUM 10 MG/1
10 TABLET, FILM COATED ORAL EVERY EVENING
Status: CANCELLED | OUTPATIENT
Start: 2022-11-17

## 2022-11-17 RX ORDER — LANOLIN ALCOHOL/MO/W.PET/CERES
3 CREAM (GRAM) TOPICAL ONCE
Status: DISCONTINUED | OUTPATIENT
Start: 2022-11-17 | End: 2022-11-18 | Stop reason: HOSPADM

## 2022-11-17 RX ORDER — FAMOTIDINE 20 MG/1
20 TABLET, FILM COATED ORAL DAILY
Status: DISCONTINUED | OUTPATIENT
Start: 2022-11-17 | End: 2022-11-18 | Stop reason: HOSPADM

## 2022-11-17 RX ORDER — PANTOPRAZOLE SODIUM 40 MG/1
40 TABLET, DELAYED RELEASE ORAL DAILY
Status: CANCELLED | OUTPATIENT
Start: 2022-11-17

## 2022-11-17 RX ORDER — GABAPENTIN 800 MG/1
400 TABLET ORAL 2 TIMES DAILY
Status: CANCELLED | OUTPATIENT
Start: 2022-11-17

## 2022-11-17 RX ORDER — ATORVASTATIN CALCIUM 40 MG/1
20 TABLET, FILM COATED ORAL DAILY
Status: CANCELLED | OUTPATIENT
Start: 2022-11-17

## 2022-11-17 RX ORDER — SODIUM CHLORIDE 9 MG/ML
125 INJECTION, SOLUTION INTRAVENOUS CONTINUOUS
Status: DISCONTINUED | OUTPATIENT
Start: 2022-11-17 | End: 2022-11-18

## 2022-11-17 RX ORDER — METOPROLOL SUCCINATE 100 MG/1
100 TABLET, EXTENDED RELEASE ORAL DAILY
Status: DISCONTINUED | OUTPATIENT
Start: 2022-11-18 | End: 2022-11-18 | Stop reason: HOSPADM

## 2022-11-17 RX ORDER — ASPIRIN 81 MG/1
81 TABLET, CHEWABLE ORAL DAILY
Status: DISCONTINUED | OUTPATIENT
Start: 2022-11-17 | End: 2022-11-18 | Stop reason: HOSPADM

## 2022-11-17 RX ORDER — OLANZAPINE 10 MG/1
20 TABLET ORAL
Status: CANCELLED | OUTPATIENT
Start: 2022-11-17

## 2022-11-17 RX ORDER — AMLODIPINE BESYLATE 5 MG/1
5 TABLET ORAL DAILY
Status: DISCONTINUED | OUTPATIENT
Start: 2022-11-17 | End: 2022-11-18 | Stop reason: HOSPADM

## 2022-11-17 RX ORDER — QUETIAPINE FUMARATE 100 MG/1
300 TABLET, FILM COATED ORAL
Status: CANCELLED | OUTPATIENT
Start: 2022-11-17

## 2022-11-17 RX ORDER — METOPROLOL SUCCINATE 100 MG/1
200 TABLET, EXTENDED RELEASE ORAL EVERY MORNING
Status: CANCELLED | OUTPATIENT
Start: 2022-11-17

## 2022-11-17 RX ORDER — NICOTINE 21 MG/24HR
1 PATCH, TRANSDERMAL 24 HOURS TRANSDERMAL DAILY
Status: DISCONTINUED | OUTPATIENT
Start: 2022-11-17 | End: 2022-11-18 | Stop reason: HOSPADM

## 2022-11-17 RX ORDER — PANTOPRAZOLE SODIUM 40 MG/1
40 TABLET, DELAYED RELEASE ORAL DAILY
Status: DISCONTINUED | OUTPATIENT
Start: 2022-11-18 | End: 2022-11-18 | Stop reason: HOSPADM

## 2022-11-17 RX ORDER — ATORVASTATIN CALCIUM 40 MG/1
40 TABLET, FILM COATED ORAL EVERY EVENING
Status: DISCONTINUED | OUTPATIENT
Start: 2022-11-17 | End: 2022-11-18 | Stop reason: HOSPADM

## 2022-11-17 RX ORDER — ALBUTEROL SULFATE 90 UG/1
2 AEROSOL, METERED RESPIRATORY (INHALATION) EVERY 4 HOURS PRN
Status: DISCONTINUED | OUTPATIENT
Start: 2022-11-17 | End: 2022-11-18 | Stop reason: HOSPADM

## 2022-11-17 RX ORDER — ONDANSETRON 2 MG/ML
4 INJECTION INTRAMUSCULAR; INTRAVENOUS EVERY 6 HOURS PRN
Status: DISCONTINUED | OUTPATIENT
Start: 2022-11-17 | End: 2022-11-18 | Stop reason: HOSPADM

## 2022-11-17 RX ORDER — ENOXAPARIN SODIUM 100 MG/ML
40 INJECTION SUBCUTANEOUS EVERY 24 HOURS
Status: DISCONTINUED | OUTPATIENT
Start: 2022-11-17 | End: 2022-11-18 | Stop reason: HOSPADM

## 2022-11-17 RX ORDER — ACETAMINOPHEN 325 MG/1
650 TABLET ORAL EVERY 6 HOURS PRN
Status: DISCONTINUED | OUTPATIENT
Start: 2022-11-17 | End: 2022-11-18 | Stop reason: HOSPADM

## 2022-11-17 RX ADMIN — SODIUM CHLORIDE 125 ML/HR: 0.9 INJECTION, SOLUTION INTRAVENOUS at 12:50

## 2022-11-17 RX ADMIN — ENOXAPARIN SODIUM 40 MG: 40 INJECTION SUBCUTANEOUS at 18:45

## 2022-11-17 RX ADMIN — ACETAMINOPHEN 650 MG: 325 TABLET ORAL at 18:49

## 2022-11-17 RX ADMIN — IOHEXOL 100 ML: 350 INJECTION, SOLUTION INTRAVENOUS at 11:27

## 2022-11-17 RX ADMIN — AMLODIPINE BESYLATE 5 MG: 5 TABLET ORAL at 18:44

## 2022-11-17 RX ADMIN — CYANOCOBALAMIN TAB 1000 MCG 1000 MCG: 1000 TAB at 18:44

## 2022-11-17 RX ADMIN — FAMOTIDINE 20 MG: 20 TABLET, FILM COATED ORAL at 18:44

## 2022-11-17 RX ADMIN — ATORVASTATIN CALCIUM 40 MG: 40 TABLET, FILM COATED ORAL at 18:44

## 2022-11-17 RX ADMIN — ASPIRIN 81 MG CHEWABLE TABLET 81 MG: 81 TABLET CHEWABLE at 18:44

## 2022-11-17 RX ADMIN — SODIUM CHLORIDE 125 ML/HR: 0.9 INJECTION, SOLUTION INTRAVENOUS at 18:54

## 2022-11-17 RX ADMIN — NICOTINE 1 PATCH: 14 PATCH, EXTENDED RELEASE TRANSDERMAL at 18:44

## 2022-11-17 NOTE — ASSESSMENT & PLAN NOTE
Assessment: History of chronic pain in setting of orthopedic intervention in July 2022      Plan  · Acetaminophen 650 Q6H  · Hold home [address] while patient has, is evaluated for AMS

## 2022-11-17 NOTE — ED NOTES
New central line placed due to unsuccessful placement on previous central line  Delay in CT scan due to this reason  Awaiting XR for placement confirmation        Pily Phan, [place]  11/17/22 1017

## 2022-11-17 NOTE — ASSESSMENT & PLAN NOTE
Assessment: 76 y/o female with PMHx of left tib-fib fracture (s/p left IMN 7/27/22), bipolar 1 disorder, CVA 2009 (residual slurred speech), chronic pain presented via ems after being difficult to arouse, worsening slurring of speech this morning  Patient endorses several days of N/V, diarrhea, abdominal pain prior to presentation today  Denies taking more than usual of her medication the night prior, denies alcohol use, recreational drug use  CTH, CT head and neck w wo contrast negative for acute processes  ABG notable for mild hypercapnia  During her evaluation in the ED, patient's speech became less slurred and she became significantly more alert  Family reports speech is back at baseline but that she is more somnolent than usual still  Admitted for workup of AMS  Plan  · Monitor mental status  · Hold antipsychotics, anxiolytics, opioid medication    · Follow BMP, CBC  · Urine drug screen, urinalysis, ucx  · MRI brain  · Neurology consult  · Psychiatry consult

## 2022-11-17 NOTE — ED PROVIDER NOTES
History  Chief Complaint   Patient presents with   • Slurred Speech     PT HAS HISTORY OF CVA WITH SLURRED SPEECH PUT AWOKE THIS AM WITH WORSE SLURRING OF WORDS AND LETHARGY     To e with concern for cva  As per reported last known well last night at 8 pm  Her family woke her up today and noted slurred speech  Hx of cva with residual slurred speech per report but now much worse, fam can not understand  No hx of trauma  Pt sleep in room, altered on my exam and she is wo complaints  following commands but quickly goes back to snoring  She denies HA, fever, chills, cp, sob, abd pain, gi sx, uti sx  Prior to Admission Medications   Prescriptions Last Dose Informant Patient Reported? Taking?    ALPRAZolam (XANAX) 1 mg tablet Past Week  Yes Yes   Sig: Take by mouth 4 (four) times a day   Brexpiprazole (REXULTI) 2 MG tablet Not Taking  Yes No   Sig: Take 2 mg by mouth daily   Patient not taking: Reported on 7/26/2022   HYDROcodone-acetaminophen (NORCO) 5-325 mg per tablet Past Week  Yes Yes   OLANZapine (ZyPREXA) 20 MG tablet Past Week  Yes Yes   Sig: Take 20 mg by mouth daily at bedtime    QUEtiapine (SEROquel) 200 mg tablet Past Week  No Yes   Sig: Take 1 tablet (200 mg total) by mouth daily at bedtime   Patient taking differently: Take 300 mg by mouth daily at bedtime   QUEtiapine (SEROquel) 50 mg tablet Past Week  Yes Yes   albuterol (PROVENTIL HFA,VENTOLIN HFA) 90 mcg/act inhaler Not Taking  No No   Sig: INHALE 2 PUFFS BY MOUTH EVERY 6 HOURS AS NEEDED OR WHEEZING   Patient not taking: Reported on 7/26/2022   amLODIPine (NORVASC) 5 mg tablet Not Taking  Yes No   Sig: Take 5 mg by mouth daily     Patient not taking: Reported on 7/26/2022   amoxicillin-clavulanate (AUGMENTIN) 875-125 mg per tablet Not Taking  Yes No   Patient not taking: Reported on 7/26/2022   apixaban (Eliquis) 5 mg   No No   Sig: Take 2 tablets (10 mg total) by mouth 2 (two) times a day for 7 days, THEN 1 tablet (5 mg total) 2 (two) times a day for 23 days     aspirin 81 mg chewable tablet Not Taking  No No   Sig: Chew 1 tablet (81 mg total) daily   Patient not taking: Reported on 7/26/2022   atorvastatin (LIPITOR) 10 mg tablet Past Week  Yes Yes   Sig: Take 10 mg by mouth every evening   atorvastatin (LIPITOR) 20 mg tablet Not Taking  No No   Sig: Take 1 tablet (20 mg total) by mouth daily   Patient not taking: Reported on 10/18/2022   carisoprodol (SOMA) 350 mg tablet Not Taking  Yes No   Patient not taking: Reported on 7/26/2022   docusate sodium (COLACE) 100 mg capsule Past Week  Yes Yes   Sig: Take by mouth 2 (two) times a day as needed   ergocalciferol (VITAMIN D2) 50,000 units Past Week  Yes Yes   Sig: Take by mouth once a week    famotidine (PEPCID) 20 mg tablet Not Taking  No No   Sig: Take 1 tablet (20 mg total) by mouth daily   Patient not taking: Reported on 7/26/2022   gabapentin (NEURONTIN) 800 mg tablet Past Week  No Yes   Sig: Take 0 5 tablets (400 mg total) by mouth 4 (four) times a day   ibuprofen (MOTRIN) 800 mg tablet Past Week  Yes Yes   Sig: Take 1 tablet by mouth 3 (three) times a day as needed   ketotifen (ZADITOR) 0 025 % ophthalmic solution Past Week  Yes Yes   Sig: Apply to eye     lidocaine (LIDODERM) 5 % Not Taking  Yes No   Sig: Apply 1 patch topically daily Remove & Discard patch within 12 hours or as directed by MD     Patient not taking: Reported on 11/17/2022   metoprolol succinate (TOPROL-XL) 100 mg 24 hr tablet Past Week  Yes Yes   Sig: Take 200 mg by mouth every morning   metoprolol succinate (TOPROL-XL) 200 MG 24 hr tablet Past Week  Yes Yes   pantoprazole (PROTONIX) 40 mg tablet Past Week  Yes Yes   Sig: Take 40 mg by mouth daily    vitamin B-12 (VITAMIN B-12) 1,000 mcg tablet Past Week  Yes Yes   Sig: Take 1 tablet by mouth daily      Facility-Administered Medications: None       Past Medical History:   Diagnosis Date   • Bipolar 1 disorder (HCC)    • Cancer (HCC)     kidney   • Cardiac disease     fast heart beat • Chronic pain    • Fractures    • GERD (gastroesophageal reflux disease)    • Hard of hearing    • Hypertension    • Renal cell carcinoma ([facility] [address] )    • Stroke [facility])     2009 affected her speech, right sided weakness   • TIA (transient ischemic attack)        Past Surgical History:   Procedure Laterality Date   • APPENDECTOMY     • CHOLECYSTECTOMY     • JOINT REPLACEMENT Bilateral      bilateral knees   • NEPHRECTOMY Right    • TN COLONOSCOPY FLX DX W/COLLJ SPEC WHEN PFRMD N/A 8/7/2018    Procedure: COLONOSCOPY;  Surgeon: Bert Calvo MD;  Location: [facility] MAIN OR;  Service: Gastroenterology   • TN OPEN TREATMENT BIMALLEOLAR ANKLE FRACTURE Right 5/14/2019    Procedure: ANKLE - Bimalleolar OPEN REDUCTION W/ INTERNAL FIXATION (ORIF); Surgeon: Carlos Weems; Location: [facility] MAIN OR;  Service: Orthopedics   • TN TREAT TIBIAL SHAFT FX, INTRAMED IMPLANT Left 7/27/2022    Procedure: INSERTION NAIL IM TIBIA;  Surgeon: Jh Bobo MD;  Location:  MAIN OR;  Service: Orthopedics       Family History   Problem Relation Age of Onset   • Colon cancer Other    • Alcohol abuse Other    • Hypertension Other      I have reviewed and agree with the history as documented  E-Cigarette/Vaping   • E-Cigarette Use Never User      E-Cigarette/Vaping Substances   • Nicotine No    • THC No    • CBD No    • Flavoring No    • Other No    • Unknown No      Social History     Tobacco Use   • Smoking status: Every Day     Packs/day: 0 50     Years: 7 00     Pack years: 3 50     Types: Cigarettes   • Smokeless tobacco: Never   • Tobacco comments:     pt refused   Vaping Use   • Vaping Use: Never used   Substance Use Topics   • Alcohol use: Not Currently   • Drug use: Never       Review of Systems   All other systems reviewed and are negative  Physical Exam  Physical Exam  Vitals and nursing note reviewed  Constitutional:       General: She is not in acute distress  Appearance: Normal appearance   She is well-developed and well-nourished  She is not toxic-appearing or diaphoretic  HENT:      Head: Normocephalic and atraumatic  Right Ear: External ear normal       Left Ear: External ear normal       Mouth/Throat:      Mouth: Mucous membranes are moist    Eyes:      General:         Right eye: No discharge  Left eye: No discharge  Conjunctiva/sclera: Conjunctivae normal       Pupils: Pupils are equal, round, and reactive to light  Neck:      Vascular: No JVD  Cardiovascular:      Rate and Rhythm: Normal rate and regular rhythm  Pulses: Normal pulses  Heart sounds: Normal heart sounds  No murmur heard  No friction rub  No gallop  Pulmonary:      Effort: Pulmonary effort is normal  No respiratory distress  Breath sounds: Normal breath sounds  No stridor  No wheezing, rhonchi or rales  Chest:      Chest wall: No tenderness  Abdominal:      General: Abdomen is flat  Bowel sounds are normal  There is no distension  Palpations: Abdomen is soft  There is no mass  Tenderness: There is no abdominal tenderness  There is no guarding or rebound  Hernia: No hernia is present  Comments: Tender throughout wo localization or peritoneal signs  Musculoskeletal:         General: No swelling, tenderness, deformity, signs of injury or edema  Normal range of motion  Cervical back: Normal range of motion and neck supple  Skin:     General: Skin is warm and dry  Capillary Refill: Capillary refill takes less than 2 seconds  Coloration: Skin is not jaundiced or pale  Findings: No bruising, erythema, lesion or rash  Neurological:      Mental Status: She is alert  Cranial Nerves: No cranial nerve deficit  Motor: No abnormal muscle tone  Deep Tendon Reflexes: Reflexes normal       Comments: Sleeping on eval, easily wakes, significant slurred spech  Can not hold either leg or arm off bed at all (bl)  Does have strong motor at hand  bl  No facial droop  Pupils 2 mm and reactive bl  GCS 10- eyes to pain, incomp sounds, obeys commands      Psychiatric:         Mood and Affect: Mood and affect and mood normal          Vital Signs  ED Triage Vitals [11/17/22 0737]   Temperature Pulse Respirations Blood Pressure SpO2   (!) 96 1 °F (35 6 °C) 67 20 (!) 140/101 96 %      Temp Source Heart Rate Source Patient Position - Orthostatic VS BP Location FiO2 (%)   Temporal Monitor Lying Left arm --      Pain Score       No Pain           Vitals:    11/18/22 0645 11/18/22 0823 11/18/22 1033 11/18/22 1447   BP: 109/59 130/71 149/76 143/81   Pulse: 81 75 78 76   Patient Position - Orthostatic VS:    Lying         Visual Acuity  Visual Acuity    Flowsheet Row Most Recent Value   L Pupil Size (mm) 3   R Pupil Size (mm) 3   L Pupil Shape Round   R Pupil Shape Round          ED Medications  Medications   iohexol (OMNIPAQUE) 350 MG/ML injection (SINGLE-DOSE) 100 mL (100 mL Intravenous Given 11/17/22 1127)   potassium chloride (K-DUR,KLOR-CON) CR tablet 40 mEq (40 mEq Oral Given 11/18/22 0819)   magnesium sulfate IVPB (premix) SOLN 1 g (1 g Intravenous New Bag 11/18/22 0825)       Diagnostic Studies  Results Reviewed     Procedure Component Value Units Date/Time    Hepatitis panel, acute [[id number]]  (Normal) Collected: 11/18/22 0435    Lab Status: Final result Specimen: Blood from Central Venous Line Updated: 11/18/22 1243     Hepatitis B Surface Ag Non-reactive     Hep A IgM Non-reactive     Hepatitis C Ab Non-reactive     Hep B C IgM Non-reactive    Hemoglobin A1c w/EAG Estimation [[id number]] Collected: 11/18/22 0435    Lab Status: Final result Specimen: Blood from Central Venous Line Updated: 11/18/22 1217     Hemoglobin A1C 4 6 %      EAG 85 mg/dl     Urinalysis with microscopic [[id number]]  (Abnormal) Collected: 11/18/22 0435    Lab Status: Final result Specimen: Urine, Clean Catch Updated: 11/18/22 0959     Color, UA Yellow     Clarity, UA Clear     Specific Gravity, UA 1 010     pH, UA 6 0     Leukocytes, UA Negative     Nitrite, UA Negative     Protein, UA Negative mg/dl      Glucose, UA Negative mg/dl      Ketones, UA Negative mg/dl      Urobilinogen, UA 0 2 E U /dl      Bilirubin, UA Negative     Occult Blood, UA Negative     RBC, UA None Seen /hpf      WBC, UA 0-1 /hpf      Epithelial Cells None Seen /hpf      Bacteria, UA None Seen /hpf     High Sensitivity Troponin I Random [[id number]]  (Abnormal) Collected: 11/18/22 0435    Lab Status: Final result Specimen: Blood from Central Venous Line Updated: 11/18/22 0546     HS TnI random 3 ng/L     Lipid Panel with Direct LDL reflex [[id number]]  (Abnormal) Collected: 11/18/22 0435    Lab Status: Final result Specimen: Blood from Central Venous Line Updated: 11/18/22 0543     Cholesterol 122 mg/dL      Triglycerides 134 mg/dL      HDL, Direct 46 mg/dL      LDL Calculated 49 mg/dL     Rapid drug screen, urine [[id number]]  (Abnormal) Collected: 11/18/22 0435    Lab Status: Final result Specimen: Urine, Clean Catch Updated: 11/18/22 0525     Amph/Meth UR Negative     Barbiturate Ur Negative     Benzodiazepine Urine Positive     Cocaine Urine Negative     Methadone Urine Negative     Opiate Urine Positive     PCP Ur Negative     THC Urine Negative     Oxycodone Urine Negative    Narrative:      Presumptive report  If requested, specimen will be sent to reference lab for confirmation  FOR MEDICAL PURPOSES ONLY  IF CONFIRMATION NEEDED PLEASE CONTACT THE LAB WITHIN 5 DAYS  Drug Screen Cutoff Levels:  AMPHETAMINE/METHAMPHETAMINES  1000 ng/mL  BARBITURATES     200 ng/mL  BENZODIAZEPINES     200 ng/mL  COCAINE      300 ng/mL  METHADONE      300 ng/mL  OPIATES      300 ng/mL  PHENCYCLIDINE     25 ng/mL  THC       50 ng/mL  OXYCODONE      100 ng/mL    HIV 1/2 Antigen/Antibody (4th Generation) w Reflex [facility] [[id number]] Collected: 11/18/22 0435    Lab Status:  In process Specimen: Blood from Central Venous Line Updated: 11/18/22 0508    Urine culture [[id number]] Collected: 11/18/22 0439    Lab Status:  In process Specimen: Urine, Clean Catch Updated: 11/18/22 0506    Comprehensive metabolic panel [[id number]]  (Abnormal) Collected: 11/17/22 1921    Lab Status: Final result Specimen: Blood from Central Venous Line Updated: 11/1955     Sodium 142 mmol/L      Potassium 3 6 mmol/L      Chloride 107 mmol/L      CO2 30 mmol/L      ANION GAP 5 mmol/L      BUN 17 mg/dL      Creatinine 0 97 mg/dL      Glucose 130 mg/dL      Calcium 8 7 mg/dL      Corrected Calcium 9 6 mg/dL      AST 15 U/L      ALT 9 U/L      Alkaline Phosphatase 113 U/L      Total Protein 6 2 g/dL      Albumin 2 9 g/dL      Total Bilirubin 0 26 mg/dL      eGFR 61 ml/min/1 73sq m     Narrative:      [place] guidelines for Chronic Kidney Disease (CKD):   •  Stage 1 with normal or high GFR (GFR > 90 mL/min/1 73 square meters)  •  Stage 2 Mild CKD (GFR = 60-89 mL/min/1 73 square meters)  •  Stage 3A Moderate CKD (GFR = 45-59 mL/min/1 73 square meters)  •  Stage 3B Moderate CKD (GFR = 30-44 mL/min/1 73 square meters)  •  Stage 4 Severe CKD (GFR = 15-29 mL/min/1 73 square meters)  •  Stage 5 End Stage CKD (GFR <15 mL/min/1 73 square meters)  Note: GFR calculation is accurate only with a steady state creatinine    CK (with reflex to MB) [[id number]]  (Normal) Collected: 11/17/22 1921    Lab Status: Final result Specimen: Blood from Central Venous Line Updated: 11/17/22 1941     Total CK 57 U/L     Magnesium [[id number]]  (Normal) Collected: 11/17/22 1921    Lab Status: Final result Specimen: Blood from Central Venous Line Updated: 11/17/22 1941     Magnesium 1 7 mg/dL     Phosphorus [[id number]]  (Normal) Collected: 11/17/22 1921    Lab Status: Final result Specimen: Blood from Central Venous Line Updated: 11/17/22 1941     Phosphorus 3 7 mg/dL     CBC and differential [[id number]]  (Abnormal) Collected: 11/17/22 1921    Lab Status: Final result Specimen: Blood from Central Venous Line Updated: 11/17/22 1927     WBC 6 13 Thousand/uL      RBC 3 02 Million/uL      Hemoglobin 8 2 g/dL      Hematocrit 27 2 %      MCV 90 fL      MCH 27 2 pg      MCHC 30 1 g/dL      RDW 26 6 %      MPV 8 2 fL      Platelets 767 Thousands/uL      nRBC 0 /100 WBCs      Neutrophils Relative 70 %      Immat GRANS % 0 %      Lymphocytes Relative 18 %      Monocytes Relative 8 %      Eosinophils Relative 3 %      Basophils Relative 1 %      Neutrophils Absolute 4 33 Thousands/µL      Immature Grans Absolute 0 01 Thousand/uL      Lymphocytes Absolute 1 08 Thousands/µL      Monocytes Absolute 0 46 Thousand/µL      Eosinophils Absolute 0 21 Thousand/µL      Basophils Absolute 0 04 Thousands/µL     COVID[facility], [facility]/B, [facility] [[id number]]  (Normal) Collected: 11/17/22 1251    Lab Status: Final result Specimen: Nasopharyngeal Swab Updated: 11/17/22 1338     SARS-CoV-2 Negative     INFLUENZA A PCR Negative     INFLUENZA B PCR Negative     RSV PCR Negative    Narrative:      FOR PEDIATRIC PATIENTS - copy/paste COVID Guidelines URL to browser: [email or web address]  [employer]    SARS-CoV-2 assay is a Nucleic Acid Amplification assay intended for the  qualitative detection of nucleic acid from SARS-CoV-2 in nasopharyngeal  swabs  Results are for the presumptive identification of SARS-CoV-2 RNA  Positive results are indicative of infection with SARS-CoV-2, the virus  causing COVID-19, but do not rule out bacterial infection or co-infection  with other viruses  Laboratories within the [place] and its  territories are required to report all positive results to the appropriate  public health authorities  Negative results do not preclude SARS-CoV-2  infection and should not be used as the sole basis for treatment or other  patient management decisions  Negative results must be combined with  clinical observations, patient history, and epidemiological information    This test has not been FDA cleared or approved  This test has been authorized by FDA under an Emergency Use Authorization  (EUA)  This test is only authorized for the duration of time the  declaration that circumstances exist justifying the authorization of the  emergency use of an in vitro diagnostic tests for detection of SARS-CoV-2  virus and/or diagnosis of COVID-19 infection under section 564(b)(1) of  the Act, 21 U  S C  170EIU-0(T)(3), unless the authorization is terminated  or revoked sooner  The test has been validated but independent review by FDA  and CLIA is pending  Test performed using [employer] GeneXpert: This RT-PCR assay targets N2,  a region unique to SARS-CoV-2  A conserved region in the E-gene was chosen  for pan-Sarbecovirus detection which includes SARS-CoV-2  According to CMS-2020-01-R, this platform meets the definition of high-throughput technology      HS Troponin 0hr (reflex protocol) [[id number]]  (Normal) Collected: 11/17/22 0838    Lab Status: Final result Specimen: Blood from Central Venous Line Updated: 11/17/22 1137     hs TnI 0hr 2 ng/L     Blood gas, arterial [[id number]]  (Abnormal) Collected: 11/17/22 0941    Lab Status: Final result Specimen: Blood, Arterial from Radial, Right Updated: 11/17/22 0947     pH, Arterial 7 379     pCO2, Arterial 46 0 mm Hg      pO2, Arterial 107 5 mm Hg      HCO3, Arterial 26 5 mmol/L      Base Excess, Arterial 1 1 mmol/L      O2 Content, Arterial 13 2 mL/dL      O2 HGB,Arterial  94 7 %      SOURCE Radial, Right     Nasal Cannula 2     Other FIO2       pt went to CT scan off and on 2 l/m nc, none on transport, placed on 2 l/m nc upon return     %    Ethanol [[id number]]  (Normal) Collected: 11/17/22 0838    Lab Status: Final result Specimen: Blood from Arm, Right Updated: 11/17/22 0914     Ethanol Lvl <3 mg/dL     Comprehensive metabolic panel [[id number]]  (Abnormal) Collected: 11/17/22 0838    Lab Status: Final result Specimen: Blood from Central Venous Line Updated: 11/17/22 0913     Sodium 141 mmol/L      Potassium 4 4 mmol/L      Chloride 106 mmol/L      CO2 31 mmol/L      ANION GAP 4 mmol/L      BUN 22 mg/dL      Creatinine 1 08 mg/dL      Glucose 96 mg/dL      Calcium 9 0 mg/dL      Corrected Calcium 9 6 mg/dL      AST --     ALT 14 U/L      Alkaline Phosphatase 126 U/L      Total Protein 6 8 g/dL      Albumin 3 2 g/dL      Total Bilirubin 0 25 mg/dL      eGFR 53 ml/min/1 73sq m     Narrative:      [place] guidelines for Chronic Kidney Disease (CKD):   •  Stage 1 with normal or high GFR (GFR > 90 mL/min/1 73 square meters)  •  Stage 2 Mild CKD (GFR = 60-89 mL/min/1 73 square meters)  •  Stage 3A Moderate CKD (GFR = 45-59 mL/min/1 73 square meters)  •  Stage 3B Moderate CKD (GFR = 30-44 mL/min/1 73 square meters)  •  Stage 4 Severe CKD (GFR = 15-29 mL/min/1 73 square meters)  •  Stage 5 End Stage CKD (GFR <15 mL/min/1 73 square meters)  Note: GFR calculation is accurate only with a steady state creatinine    TSH [[id number]]  (Abnormal) Collected: 11/17/22 0838    Lab Status: Final result Specimen: Blood from Central Venous Line Updated: 11/17/22 0911     TSH 3RD GENERATON 0 438 uIU/mL     Narrative:      Patients undergoing fluorescein dye angiography may retain small amounts of fluorescein in the body for 48-72 hours post procedure  Samples containing fluorescein can produce falsely depressed TSH values  If the patient had this procedure,a specimen should be resubmitted post fluorescein clearance        Gabriel Andrews [[id number]]  (Normal) Collected: 11/17/22 0838    Lab Status: Final result Specimen: Blood from Arm, Right Updated: 11/17/22 0859     Protime 13 5 seconds      INR 1 01    APTT [[id number]]  (Normal) Collected: 11/17/22 0838    Lab Status: Final result Specimen: Blood from Arm, Right Updated: 11/17/22 0859     PTT 26 seconds     CBC and differential [[id number]]  (Abnormal) Collected: 11/17/22 0838    Lab Status: Final result Specimen: Blood from Central Venous Line Updated: 11/17/22 0845     WBC 4 57 Thousand/uL      RBC 3 33 Million/uL      Hemoglobin 9 0 g/dL      Hematocrit 29 5 %      MCV 89 fL      MCH 27 0 pg      MCHC 30 5 g/dL      RDW 26 4 %      MPV 8 3 fL      Platelets 591 Thousands/uL      nRBC 0 /100 WBCs      Neutrophils Relative 60 %      Immat GRANS % 0 %      Lymphocytes Relative 26 %      Monocytes Relative 6 %      Eosinophils Relative 7 %      Basophils Relative 1 %      Neutrophils Absolute 2 73 Thousands/µL      Immature Grans Absolute 0 02 Thousand/uL      Lymphocytes Absolute 1 18 Thousands/µL      Monocytes Absolute 0 27 Thousand/µL      Eosinophils Absolute 0 31 Thousand/µL      Basophils Absolute 0 06 Thousands/µL     Fingerstick Glucose (POCT) [[id number]]  (Normal) Collected: 11/17/22 0738    Lab Status: Final result Updated: 11/17/22 0739     POC Glucose 78 mg/dl                  MRI brain wo contrast   Final Result by Lacey Sorenson MD (11/18 1402)      No interval change  No mass effect, acute intracranial hemorrhage or evidence of recent infarction  Mild chronic microvascular ischemic change and chronic lacunar infarcts are stable since the prior exam       Workstation performed: [id number]         CTA head and neck w wo contrast   Final Result by Gladys Smith MD (11/17 6084)      No significant stenosis of the cervical carotid or vertebral arteries   No significant intracranial stenosis, large vessel occlusion or aneurysm  Workstation performed: [id number]         XR chest 1 view portable   Final Result by Hari Casey MD (11/17 9732)      No acute cardiopulmonary disease  Workstation performed: [id number]         CT head wo contrast   Final Result by oRdney Goyal MD (11/17 4604)      No evidence of acute intracranial process  Chronic microangiopathy                    Workstation performed: [id number]         XR chest 1 view portable   Final Result by Minda Catherine MD (01/77 5531)      Right jugular catheter malpositioned in the right axillary vein  No acute pulmonary disease with no pneumothorax  I personally discussed this study with Dr Jennifer Hollis on 11/17/2022 at 9:14 AM                            Workstation performed: [id number]                    Procedures  Procedures         ED Course  ED Course as of 11/19/22 0711   Thu Nov 17, 2022   0847 XR chest 1 view portable                                             MDM  Number of Diagnoses or Management Options  Diagnosis management comments: Numerous attempts by nursing staff for line placement unsuccessful, several nurses have attempted with US  I have placed a right IJ for IV access, bld return and contrast for her cta  she remains with snoring respers, wakes to painful stimuli  Will add abg to assure she is not retaining  Cental line placed using us guidance, first attempt, dark bld to all ports returned  I have confirmed guidewire is in IJ with us prior to dilation  Tolerating well on first attempt  All ports flushed and with bld return  Central line placed and on cxr appears to go to subclavian vein  There was dark appearing non arterial appear bld return, no pulsatile bld return, I did confirmed guidewire was in IJ prior to dilation  spoke to reading radiologist who states ok to use line for contrast  Will plan to image and then reposition line  I have attempted to reposition line but am unable to confidently reposition so have removed line and held pressure then placed a left fem line  First attempted, US guided, bld in all ports and flushed  Repeat exam much improved, alert and oriented, speaking more clearly  Moving all ext well  Family reported they found her in bed poorly responsive  Her RR was reassuring and her pupils not pin point at arrival  Pt denies excessive med use  I do see xanax in her med list    Spoke to dr Kellen Ramirez who accepted  Disposition  Final diagnoses:   Speech disturbance   AMS (altered mental status)     Time reflects when diagnosis was documented in both MDM as applicable and the Disposition within this note     Time User Action Codes Description Comment    11/17/2022 12:27 PM Gisela Dudley Add [R47 9] Speech disturbance     11/17/2022 12:27 PM Gisela Dudley Add [R41 82] AMS (altered mental status)     11/17/2022  3:02 PM Megan, Poonam Brigitte Add [I63 9] Cerebrovascular accident (CVA), unspecified mechanism ([facility] [address] )     11/18/2022 12:03 PM Rich Corrente Add [F31 9] Bipolar disease, chronic ([facility] [address] )     11/18/2022 12:04 PM Megan, Poonam Brigitte Add [R47 81] Slurred speech     11/18/2022 12:04 PM Montgomery, Pilar Brigitte Add [R06 02] SOB (shortness of breath)     11/18/2022 12:20 PM Rich Corrente Add [G47 34] Nocturnal hypoxia     11/18/2022 12:20 PM Rich Corrente Add [F41 9] Anxiety     11/18/2022 12:20 PM Rich Corrente Add [E55 9] Vitamin D insufficiency     11/18/2022 12:20 PM Megan, Pilar Brigitte Add [I10] Hypertension, unspecified type     11/18/2022 12:20 PM Rich Corrente Add [Z86 73] History of CVA (cerebrovascular accident)     11/18/2022 12:20 PM Montgomery, Pilar Brigitte Add [D64 9] Anemia, unspecified type     11/18/2022 12:20 PM Megan, Pilar Brigitte Add [F31 9] Bipolar 1 disorder ([facility] [address] )     11/18/2022  2:27 PM Megan, Pilar Brigitte Add [R79 89] Low TSH level     11/18/2022  2:27 PM Montgomery, Pilar Brigitte Add [B34 12] Acute metabolic encephalopathy       ED Disposition     ED Disposition   Admit    Condition   Stable    Date/Time   u Nov 17, 2022 12:27 PM    Comment   Case was discussed with alicia and the patient's admission status was agreed to be Admission Status: observation status to the service of Dr Elayne Blackwell              Follow-up Information     Follow up With Specialties Details Why Contact Info    Bo Hammond DO Family Medicine Follow up  [address]  Chanelle Lucero 2047 [address] Cristiano Montiel, [address] [facility]      Cee Marcial MD Pulmonology, Pulmonary Disease, Critical Care Medicine, Intensive Care Call in 1 week(s)  [address] [facility] [phone]      Paula Landeros MD Family Medicine, Neurology, Sleep Medicine Call in 1 week(s) for a sleep study to check for obstructive sleep apnea [address] [address]  [phone]            Discharge Medication List as of 11/18/2022  3:14 PM      START taking these medications    Details   acetaminophen (TYLENOL) 325 mg tablet Take 2 tablets (650 mg total) by mouth every 6 (six) hours as needed for mild pain, fever or headaches Do not exceed a total of 3 grams of tylenol/acetaminophen in a 24-hour period  Do not take additional tylenol if you are taking vicodin  , Starting Fri 11/18/2022, No Print      cholecalciferol (VITAMIN D3) 1,000 units tablet Take 1 tablet (1,000 Units total) by mouth daily Take this in place of ergocalciferol, Starting Fri 11/18/2022, Until Sun 12/18/2022, Normal         CONTINUE these medications which have CHANGED    Details   albuterol (PROVENTIL HFA,VENTOLIN HFA) 90 mcg/act inhaler Inhale 2 puffs every 4 (four) hours as needed for wheezing or shortness of breath, Starting Fri 11/18/2022, No Print      ALPRAZolam (XANAX) 1 mg tablet Take 1 tablet (1 mg total) by mouth 4 (four) times a day as needed for anxiety for up to 10 days, Starting Fri 11/18/2022, Until Mon 11/28/2022 at 2359, No Print      amLODIPine (NORVASC) 5 mg tablet Take 1 tablet (5 mg total) by mouth daily, Starting Fri 11/18/2022, Normal      aspirin 81 mg chewable tablet Chew 1 tablet (81 mg total) daily To prevent stroke and heart attack, Starting Fri 11/18/2022, Normal      atorvastatin (LIPITOR) 40 mg tablet Take 1 tablet (40 mg total) by mouth every evening To prevent stroke and heart attack, Starting Fri 11/18/2022, Normal      metoprolol succinate (TOPROL-XL) 100 mg 24 hr tablet Take 1 tablet (100 mg total) by mouth daily Do not start before November 19, 2022 , Starting Sat 11/19/2022, Normal      vitamin B-12 (VITAMIN B-12) 1,000 mcg tablet Take 0 5 tablets (500 mcg total) by mouth daily, Starting Fri 11/18/2022, No Print         CONTINUE these medications which have NOT CHANGED    Details   docusate sodium (COLACE) 100 mg capsule Take by mouth 2 (two) times a day as needed, Starting Thu 6/12/2014, Historical Med      ketotifen (ZADITOR) 0 025 % ophthalmic solution Apply to eye  , Starting Mon 12/19/2016, Historical Med      OLANZapine (ZyPREXA) 20 MG tablet Take 20 mg by mouth daily at bedtime , Historical Med      pantoprazole (PROTONIX) 40 mg tablet Take 40 mg by mouth daily , Historical Med         STOP taking these medications       ergocalciferol (VITAMIN D2) 50,000 units Comments:   Reason for Stopping:         gabapentin (NEURONTIN) 800 mg tablet Comments:   Reason for Stopping:         HYDROcodone-acetaminophen (NORCO) 5-325 mg per tablet Comments:   Reason for Stopping:         ibuprofen (MOTRIN) 800 mg tablet Comments:   Reason for Stopping:         QUEtiapine (SEROquel) 200 mg tablet Comments:   Reason for Stopping:         QUEtiapine (SEROquel) 50 mg tablet Comments:   Reason for Stopping:         amoxicillin-clavulanate (AUGMENTIN) 875-125 mg per tablet Comments:   Reason for Stopping:         apixaban (Eliquis) 5 mg Comments:   Reason for Stopping:         Brexpiprazole (REXULTI) 2 MG tablet Comments:   Reason for Stopping:         carisoprodol (SOMA) 350 mg tablet Comments:   Reason for Stopping:         famotidine (PEPCID) 20 mg tablet Comments:   Reason for Stopping:         lidocaine (LIDODERM) 5 % Comments:   Reason for Stopping:               Outpatient Discharge Orders   CBC and differential   Standing Status: Future Standing Exp  Date: 11/18/23     Comprehensive metabolic panel   Standing Status: Future Standing Exp   Date: 11/18/23 Magnesium   Standing Status: Future Standing Exp  Date: 11/18/23     Vitamin B12   Standing Status: Future Standing Exp  Date: 11/18/23     Folate   Standing Status: Future Standing Exp  Date: 11/18/23     VITAMIN D, 25 HYDROXY, TOTAL   Standing Status: Future Standing Exp  Date: 11/18/23     TSH, 3rd generation   Standing Status: Future Standing Exp  Date: 11/18/23     T4, free   Standing Status: Future Standing Exp  Date: 11/18/23     Ambulatory Referral to Pulmonology   Standing Status: Future Standing Exp  Date: 11/18/23      Ambulatory Referral to Sleep Medicine   Standing Status: Future Standing Exp  Date: 11/18/23      Ambulatory Referral to Neurology   Standing Status: Future Standing Exp  Date: 11/18/23      Ambulatory Referral to Psychiatry   Standing Status: Future Standing Exp  Date: 11/18/23      Discharge Diet     Activity as tolerated     Call provider for:  persistent nausea or vomiting     Call provider for:  severe uncontrolled pain     Call provider for: active or persistent bleeding     Call provider for:  redness, tenderness, or signs of infection (pain, swelling, redness, odor or green/yellow discharge around incision site)     Call provider for:  difficulty breathing, headache or visual disturbances     Call provider for:  hives     Call provider for:  persistent dizziness or light-headedness     Call provider for:  extreme fatigue     Call provider for:     Iron Panel (Includes Ferritin, Iron Sat%, Iron, and TIBC)   Standing Status: Future Standing Exp   Date: 11/18/23       PDMP Review       Value Time User    PDMP Reviewed  Yes 8/9/2022 10:28 AM Lela Gray MD          ED Provider  Electronically Signed by           Lenny Garcia MD  11/19/22 2898

## 2022-11-17 NOTE — H&P
[address]  H&P- Marti Lira 1955, 77 y o  female MRN: [id number]  Unit/Bed#: HP32 Encounter: [id number]  Primary Care Provider: Oumou Escobedo DO   Date and time admitted to hospital: 11/17/2022  7:34 AM    * AMS (altered mental status)  Assessment & Plan  Assessment: 76 y/o female with PMHx of left tib-fib fracture (s/p left IMN 7/27/22), bipolar 1 disorder, CVA 2009 (residual slurred speech), chronic pain presented via ems after being difficult to arouse, worsening slurring of speech this morning  Patient endorses several days of N/V, diarrhea, abdominal pain prior to presentation today  Denies taking more than usual of her medication the night prior, denies alcohol use, recreational drug use  CTH, CT head and neck w wo contrast negative for acute processes  ABG notable for mild hypercapnia  During her evaluation in the ED, patient's speech became less slurred and she became significantly more alert  Family reports speech is back at baseline but that she is more somnolent than usual still  Admitted for workup of AMS  Plan  · Monitor mental status  · Hold antipsychotics, anxiolytics, opioid medication  · Follow BMP, CBC  · Urine drug screen, urinalysis, ucx  · MRI brain  · Neurology consult  · Psychiatry consult    Bipolar 1 disorder [facility])  Assessment & Plan  Assessment: Patient with PMHx of bipolar 1 disorder  Reports "not having any episodes for a very long time " Reports taking seroquel 300 qPM, zyprexa 20 qPM, xanax 1mg TID  Evaluation of bipolar 1 disorder limited due to AMS  Plan  · Revaluation of behavioral health history after AMS improves    · Psychiatry consult    GERD (gastroesophageal reflux disease)  Assessment & Plan  Assessment: Hx of GERD    Plan  · Continue home protonix 40 BID  · Continue home pepcid 20 once daily    Chronic pain  Assessment & Plan  Assessment: History of chronic pain in setting of orthopedic intervention in July 2022     Plan  · Acetaminophen 650 Q6H  · Hold home [facility] while patient has, is evaluated for AMS      VTE Prophylaxis: Enoxaparin (Lovenox)  / sequential compression device   Code Status: Level 1-- Full Code  POLST: POLST form is not discussed and not completed at this time  Discussion with family: No, patient asks H+P writer not to notify mother of status in hospital during initial evaluation  Anticipated Length of Stay:  Patient will be admitted on an Observation basis with an anticipated length of stay of  < 2 midnights  Justification for Hospital Stay: Evaluation of acute AMS    Total Time for Visit, including Counseling / Coordination of Care: 45 minutes  Greater than 50% of this total time spent on direct patient counseling and coordination of care  Chief Complaint:   I am very tired and have worse slurred speech    History of Present Illness:    Gautam Justice is a 77 y o  female with PMHx of left tib-fib fracture (s/p left IMN 7/27/22), bipolar 1 disorder, CVA 2009 (residual slurred speech), chronic pain presented via ems after being difficult to arouse, worsening slurring of speech on waking up this morning  Patient endorses several days of N/V, diarrhea, abdominal pain prior to presentation today, but reports clinical course improving  Last BM last night and was diarrhea  Denies taking more than usual of her medication the night prior, denies alcohol use, recreational drug use  During her evaluation in the ED, patient's speech became less slurred and she became significantly more alert  Per ED RN, family reports speech is back at baseline but that she is more somnolent than usual still  Review of Systems:    Review of Systems   Constitutional: Positive for fatigue  HENT: Positive for voice change  Eyes: Negative  Respiratory: Negative for shortness of breath  Cardiovascular: Negative for chest pain     Gastrointestinal: Positive for abdominal pain, diarrhea, nausea and vomiting  Endocrine: Negative  Genitourinary: Negative  Musculoskeletal: Negative  Skin: Negative  Allergic/Immunologic: Negative  Neurological: Positive for speech difficulty  Hematological: Negative  Psychiatric/Behavioral: Positive for confusion  Past Medical and Surgical History:     Past Medical History:   Diagnosis Date   • Bipolar 1 disorder ([facility] )    • Cancer ([facility] )     kidney   • Cardiac disease     fast heart beat   • Chronic pain    • Fractures    • GERD (gastroesophageal reflux disease)    • Hard of hearing    • Hypertension    • Renal cell carcinoma ([facility] )    • Stroke ([facility] )     2009 affected her speech, right sided weakness   • TIA (transient ischemic attack)        Past Surgical History:   Procedure Laterality Date   • APPENDECTOMY     • CHOLECYSTECTOMY     • JOINT REPLACEMENT Bilateral      bilateral knees   • NEPHRECTOMY Right    • SD COLONOSCOPY FLX DX W/COLLJ SPEC WHEN PFRMD N/A 8/7/2018    Procedure: COLONOSCOPY;  Surgeon: Kaylie Nowak MD;  Location: [facility] MAIN OR;  Service: Gastroenterology   • SD OPEN TREATMENT BIMALLEOLAR ANKLE FRACTURE Right 5/14/2019    Procedure: ANKLE - Bimalleolar OPEN REDUCTION W/ INTERNAL FIXATION (ORIF); Surgeon: Milo Wong; Location: [address] [place] [address] MAIN OR;  Service: Orthopedics   • SD TREAT TIBIAL SHAFT FX, INTRAMED IMPLANT Left 7/27/2022    Procedure: INSERTION NAIL IM TIBIA;  Surgeon: Sumanth Marvin MD;  Location:  MAIN OR;  Service: Orthopedics       Meds/Allergies:    Prior to Admission medications    Medication Sig Start Date End Date Taking?  Authorizing Provider   albuterol (PROVENTIL HFA,VENTOLIN HFA) 90 mcg/act inhaler INHALE 2 PUFFS BY MOUTH EVERY 6 HOURS AS NEEDED OR WHEEZING  Patient not taking: No sig reported 12/8/20   Lucy Goodwin PA-C   ALPRAZolam Mendez Metzger) 1 mg tablet Take by mouth 4 (four) times a day    Historical Provider, MD   amLODIPine (NORVASC) 5 mg tablet Take 5 mg by mouth daily    Patient not taking: No sig reported Historical Provider, MD   amoxicillin-clavulanate (AUGMENTIN) 875-125 mg per tablet  9/29/21   Historical Provider, MD   apixaban (Eliquis) 5 mg Take 2 tablets (10 mg total) by mouth 2 (two) times a day for 7 days, THEN 1 tablet (5 mg total) 2 (two) times a day for 23 days   5/16/22 7/26/22  Michi Danielle MD   aspirin 81 mg chewable tablet Chew 1 tablet (81 mg total) daily  Patient not taking: No sig reported 10/17/20   Michael Reyes MD   atorvastatin (LIPITOR) 10 mg tablet Take 10 mg by mouth every evening    Historical Provider, MD   atorvastatin (LIPITOR) 20 mg tablet Take 1 tablet (20 mg total) by mouth daily  Patient not taking: Reported on 10/18/2022 9/6/20   Sara Patricia MD   Brexpiprazole (REXULTI) 2 MG tablet Take 2 mg by mouth daily  Patient not taking: No sig reported    Historical Provider, MD   carisoprodol (SOMA) 350 mg tablet  11/9/21   Historical Provider, MD   docusate sodium (COLACE) 100 mg capsule Take by mouth 2 (two) times a day as needed 6/12/14   Historical Provider, MD   ergocalciferol (VITAMIN D2) 50,000 units Take by mouth once a week  9/11/14   Historical Provider, MD   famotidine (PEPCID) 20 mg tablet Take 1 tablet (20 mg total) by mouth daily  Patient not taking: No sig reported 4/28/22   YASMANI Post   gabapentin (NEURONTIN) 800 mg tablet Take 0 5 tablets (400 mg total) by mouth 4 (four) times a day 7/6/21   Paula Talley MD   HYDROcodone-acetaminophen [facility]) 5-325 mg per tablet  8/8/22   Historical Provider, MD   ibuprofen (MOTRIN) 800 mg tablet Take 1 tablet by mouth 3 (three) times a day as needed 3/21/22   Historical Provider, MD   ketotifen (ZADITOR) 0 025 % ophthalmic solution Apply to eye   12/19/16   Historical Provider, MD   lidocaine (LIDODERM) 5 % Apply 1 patch topically daily Remove & Discard patch within 12 hours or as directed by MD      Historical Provider, MD   metoprolol succinate (TOPROL-XL) 100 mg 24 hr tablet Take 200 mg by mouth every morning    Historical Provider, MD   metoprolol succinate (TOPROL-XL) 200 MG 24 hr tablet  8/4/22   Historical Provider, MD   OLANZapine (ZyPREXA) 20 MG tablet Take 20 mg by mouth daily at bedtime     Historical Provider, MD   pantoprazole (PROTONIX) 40 mg tablet Take 40 mg by mouth daily     Historical Provider, MD   QUEtiapine (SEROquel) 200 mg tablet Take 1 tablet (200 mg total) by mouth daily at bedtime  Patient taking differently: Take 300 mg by mouth daily at bedtime 6/28/21   Meir Connelly PA-C   QUEtiapine (SEROquel) 50 mg tablet  8/3/22   Historical Provider, MD   vitamin B-12 (VITAMIN B-12) 1,000 mcg tablet Take 1 tablet by mouth daily 4/26/22   Historical Provider, MD     I have reviewed home medications with patient personally  Allergies:    Allergies   Allergen Reactions   • Celecoxib Rash and Other (See Comments)     CELEBREX   • Doxazosin Rash and Hives   • Metaxalone Rash and Hives       Social History:     Marital Status: Single   Occupation: Retired  Patient Pre-hospital Living Situation: Lives alone, was staying with mom for several days while recovering from GI illness  Patient Pre-hospital Level of Mobility: Limited due to tib-fib fracture  Patient Pre-hospital Diet Restrictions: No  Substance Use History:   Social History     Substance and Sexual Activity   Alcohol Use Not Currently     Social History     Tobacco Use   Smoking Status Every Day   • Packs/day: 0 50   • Years: 7 00   • Pack years: 3 50   • Types: Cigarettes   Smokeless Tobacco Never   Tobacco Comments    pt refused     Social History     Substance and Sexual Activity   Drug Use Never       Family History:    non-contributory    Physical Exam:     Vitals:   Blood Pressure: 161/78 (11/17/22 1415)  Pulse: 59 (11/17/22 1415)  Temperature: 97 8 °F (36 6 °C) (11/17/22 1415)  Temp Source: Temporal (11/17/22 1415)  Respirations: 18 (11/17/22 1415)  Weight - Scale: 89 8 kg (197 lb 15 6 oz) (11/17/22 0737)  SpO2: 100 % (11/17/22 1415)    Physical Exam  Constitutional:       Appearance: Normal appearance  She is normal weight  HENT:      Head: Normocephalic  Right Ear: External ear normal       Left Ear: External ear normal       Mouth/Throat:      Mouth: Mucous membranes are dry  Pharynx: Oropharynx is clear  Eyes:      Extraocular Movements: Extraocular movements intact  Conjunctiva/sclera: Conjunctivae normal    Cardiovascular:      Rate and Rhythm: Normal rate and regular rhythm  Pulses: Normal pulses  Heart sounds: Normal heart sounds  Pulmonary:      Effort: Pulmonary effort is normal       Breath sounds: Normal breath sounds  Abdominal:      General: Bowel sounds are normal       Palpations: Abdomen is soft  Skin:     General: Skin is warm  Capillary Refill: Capillary refill takes less than 2 seconds  Neurological:      General: No focal deficit present  Psychiatric:         Mood and Affect: Mood normal          Behavior: Behavior normal            Additional Data:     Lab Results: I have personally reviewed pertinent reports  Results from last 7 days   Lab Units 11/17/22  0838   WBC Thousand/uL 4 57   HEMOGLOBIN g/dL 9 0*   HEMATOCRIT % 29 5*   PLATELETS Thousands/uL 305   NEUTROS PCT % 60   LYMPHS PCT % 26   MONOS PCT % 6   EOS PCT % 7*     Results from last 7 days   Lab Units 11/17/22  0838   SODIUM mmol/L 141   POTASSIUM mmol/L 4 4   CHLORIDE mmol/L 106   CO2 mmol/L 31   BUN mg/dL 22   CREATININE mg/dL 1 08   ANION GAP mmol/L 4   CALCIUM mg/dL 9 0   ALBUMIN g/dL 3 2*   TOTAL BILIRUBIN mg/dL 0 25   ALK PHOS U/L 126*   ALT U/L 14   GLUCOSE RANDOM mg/dL 96     Results from last 7 days   Lab Units 11/17/22  0838   INR  1 01     Results from last 7 days   Lab Units 11/17/22  0738   POC GLUCOSE mg/dl 78               Imaging: I have personally reviewed pertinent reports        CTA head and neck w wo contrast   Final Result by Latrice Swan MD (11/17 1028)      No significant stenosis of the cervical carotid or vertebral arteries   No significant intracranial stenosis, large vessel occlusion or aneurysm  Workstation performed: [id number]         XR chest 1 view portable   Final Result by Starla Camejo MD (11/17 1143)      No acute cardiopulmonary disease  Workstation performed: [id number]         CT head wo contrast   Final Result by Milo Yuen MD (11/17 0957)      No evidence of acute intracranial process  Chronic microangiopathy  Workstation performed: [id number]         XR chest 1 view portable   Final Result by Jose Guadalupe Solares MD (38/64 1003)      Right jugular catheter malpositioned in the right axillary vein  No acute pulmonary disease with no pneumothorax  I personally discussed this study with Dr Keo Resendiz on 11/17/2022 at 9:14 AM                            Workstation performed: [id number]         MRI inpatient order    (Results Pending)       EKG, Pathology, and Other Studies Reviewed on Admission:   · EKG: NSR    Allscripts / Epic Records Reviewed: Yes     ** Please Note: This note has been constructed using a voice recognition system   **

## 2022-11-17 NOTE — ASSESSMENT & PLAN NOTE
Assessment: Hx of GERD    Plan  · Continue home protonix 40 BID  · Continue home pepcid 20 once daily

## 2022-11-17 NOTE — ED NOTES
No stroke alert called at this time per provider  Patient's last known well 8pm yesterday        Pily Phan, [facility]  11/17/22 8956

## 2022-11-17 NOTE — ASSESSMENT & PLAN NOTE
Assessment: Patient with PMHx of bipolar 1 disorder  Reports "not having any episodes for a very long time " Reports taking seroquel 300 qPM, zyprexa 20 qPM, xanax 1mg TID  Evaluation of bipolar 1 disorder limited due to AMS  Plan  · Revaluation of behavioral health history after AMS improves    · Psychiatry consult

## 2022-11-17 NOTE — PLAN OF CARE
Problem: MOBILITY - ADULT  Goal: Maintain or return to baseline ADL function  Description: INTERVENTIONS:  -  Assess patient's ability to carry out ADLs; assess patient's baseline for ADL function and identify physical deficits which impact ability to perform ADLs (bathing, care of mouth/teeth, toileting, grooming, dressing, etc )  - Assess/evaluate cause of self-care deficits   - Assess range of motion  - Assess patient's mobility; develop plan if impaired  - Assess patient's need for assistive devices and provide as appropriate  - Encourage maximum independence but intervene and supervise when necessary  - Involve family in performance of ADLs  - Assess for home care needs following discharge   - Consider OT consult to assist with ADL evaluation and planning for discharge  - Provide patient education as appropriate  Outcome: Progressing  Goal: Maintains/Returns to pre admission functional level  Description: INTERVENTIONS:  - Perform BMAT or MOVE assessment daily    - Set and communicate daily mobility goal to care team and patient/family/caregiver  - Collaborate with rehabilitation services on mobility goals if consulted  - Perform Range of Motion 3 times a day  - Reposition patient every 2 hours    - Dangle patient 3 times a day  - Stand patient 3 times a day  - Ambulate patient 3 times a day  - Out of bed to chair 3 times a day   - Out of bed for meals 3 times a day  - Out of bed for toileting  - Record patient progress and toleration of activity level   Outcome: Progressing     Problem: PAIN - ADULT  Goal: Verbalizes/displays adequate comfort level or baseline comfort level  Description: Interventions:  - Encourage patient to monitor pain and request assistance  - Assess pain using appropriate pain scale  - Administer analgesics based on type and severity of pain and evaluate response  - Implement non-pharmacological measures as appropriate and evaluate response  - Consider cultural and social influences on pain and pain management  - Notify physician/advanced practitioner if interventions unsuccessful or patient reports new pain  Outcome: Progressing     Problem: SAFETY ADULT  Goal: Maintain or return to baseline ADL function  Description: INTERVENTIONS:  -  Assess patient's ability to carry out ADLs; assess patient's baseline for ADL function and identify physical deficits which impact ability to perform ADLs (bathing, care of mouth/teeth, toileting, grooming, dressing, etc )  - Assess/evaluate cause of self-care deficits   - Assess range of motion  - Assess patient's mobility; develop plan if impaired  - Assess patient's need for assistive devices and provide as appropriate  - Encourage maximum independence but intervene and supervise when necessary  - Involve family in performance of ADLs  - Assess for home care needs following discharge   - Consider OT consult to assist with ADL evaluation and planning for discharge  - Provide patient education as appropriate  Outcome: Progressing  Goal: Maintains/Returns to pre admission functional level  Description: INTERVENTIONS:  - Perform BMAT or MOVE assessment daily    - Set and communicate daily mobility goal to care team and patient/family/caregiver  - Collaborate with rehabilitation services on mobility goals if consulted  - Perform Range of Motion 3 times a day  - Reposition patient every 2 hours    - Dangle patient 3 times a day  - Stand patient 3 times a day  - Ambulate patient 3 times a day  - Out of bed to chair 3 times a day   - Out of bed for meals 3 times a day  - Out of bed for toileting  - Record patient progress and toleration of activity level   Outcome: Progressing  Goal: Patient will remain free of falls  Description: INTERVENTIONS:  - Educate patient/family on patient safety including physical limitations  - Instruct patient to call for assistance with activity   - Consult OT/PT to assist with strengthening/mobility   - Keep Call bell within reach  - Keep bed low and locked with side rails adjusted as appropriate  - Keep care items and personal belongings within reach  - Initiate and maintain comfort rounds  - Make Fall Risk Sign visible to staff  - Offer Toileting every 2 Hours, in advance of need  - Initiate/Maintain bed/chair alarm  - Obtain necessary fall risk management equipment: bed/chair alarm, nonskid socks, call bell within reach  - Apply yellow socks and bracelet for high fall risk patients  - Consider moving patient to room near nurses station  Outcome: Progressing     Problem: DISCHARGE PLANNING  Goal: Discharge to home or other facility with appropriate resources  Description: INTERVENTIONS:  - Identify barriers to discharge w/patient and caregiver  - Arrange for needed discharge resources and transportation as appropriate  - Identify discharge learning needs (meds, wound care, etc )  - Arrange for interpretive services to assist at discharge as needed  - Refer to Case Management Department for coordinating discharge planning if the patient needs post-hospital services based on physician/advanced practitioner order or complex needs related to functional status, cognitive ability, or social support system  Outcome: Progressing     Problem: Knowledge Deficit  Goal: Patient/family/caregiver demonstrates understanding of disease process, treatment plan, medications, and discharge instructions  Description: Complete learning assessment and assess knowledge base    Interventions:  - Provide teaching at level of understanding  - Provide teaching via preferred learning methods  Outcome: Progressing     Problem: NEUROSENSORY - ADULT  Goal: Achieves stable or improved neurological status  Description: INTERVENTIONS  - Monitor and report changes in neurological status  - Monitor vital signs such as temperature, blood pressure, glucose, and any other labs ordered   - Initiate measures to prevent increased intracranial pressure  - Monitor for seizure activity and implement precautions if appropriate      Outcome: Progressing  Goal: Remains free of injury related to seizures activity  Description: INTERVENTIONS  - Maintain airway, patient safety  and administer oxygen as ordered  - Monitor patient for seizure activity, document and report duration and description of seizure to physician/advanced practitioner  - If seizure occurs,  ensure patient safety during seizure  - Reorient patient post seizure  - Seizure pads on all 4 side rails  - Instruct patient/family to notify RN of any seizure activity including if an aura is experienced  - Instruct patient/family to call for assistance with activity based on nursing assessment  - Administer anti-seizure medications if ordered    Outcome: Progressing  Goal: Achieves maximal functionality and self care  Description: INTERVENTIONS  - Monitor swallowing and airway patency with patient fatigue and changes in neurological status  - Encourage and assist patient to increase activity and self care     - Encourage visually impaired, hearing impaired and aphasic patients to use assistive/communication devices  Outcome: Progressing     Problem: CARDIOVASCULAR - ADULT  Goal: Maintains optimal cardiac output and hemodynamic stability  Description: INTERVENTIONS:  - Monitor I/O, vital signs and rhythm  - Monitor for S/S and trends of decreased cardiac output  - Administer and titrate ordered vasoactive medications to optimize hemodynamic stability  - Assess quality of pulses, skin color and temperature  - Assess for signs of decreased coronary artery perfusion  - Instruct patient to report change in severity of symptoms  Outcome: Progressing  Goal: Absence of cardiac dysrhythmias or at baseline rhythm  Description: INTERVENTIONS:  - Continuous cardiac monitoring, vital signs, obtain 12 lead EKG if ordered  - Administer antiarrhythmic and heart rate control medications as ordered  - Monitor electrolytes and administer replacement therapy as ordered  Outcome: Progressing     Problem: SKIN/TISSUE INTEGRITY - ADULT  Goal: Skin Integrity remains intact(Skin Breakdown Prevention)  Description: Assess:  -Perform Dimitri assessment every shift  -Clean and moisturize skin every day  -Inspect skin when repositioning, toileting, and assisting with ADLS  -Assess under medical devices such as patricia every shift  -Assess extremities for adequate circulation and sensation     Bed Management:  -Have minimal linens on bed & keep smooth, unwrinkled  -Change linens as needed when moist or perspiring  -Avoid sitting or lying in one position for more than 2 hours while in bed  -Keep HOB at 30 degrees     Toileting:  -Offer bedside commode  -Assess for incontinence every 3 hours  -Use incontinent care products after each incontinent episode such as cleansing wipes, incontinence cleanser, barrier protective lotion    Activity:  -Mobilize patient 3 times a day  -Encourage activity and walks on unit  -Encourage or provide ROM exercises   -Turn and reposition patient every 2 Hours  -Use appropriate equipment to lift or move patient in bed  -Instruct/ Assist with weight shifting every 30 minutes when out of bed in chair  -Consider limitation of chair time 2 hour intervals    Skin Care:  -Avoid use of baby powder, tape, friction and shearing, hot water or constrictive clothing  -Relieve pressure over bony prominences using allevyn  -Do not massage red bony areas    Next Steps:  -Teach patient strategies to minimize risks such as falls, pressure injuries   -Consider consults to  interdisciplinary teams such as dietary, PT, OT, SLP  Outcome: Progressing  Goal: Incision(s), wounds(s) or drain site(s) healing without S/S of infection  Description: INTERVENTIONS  - Assess and document dressing, incision, wound bed, drain sites and surrounding tissue  - Provide patient and family education  - Perform skin care/dressing changes every day  Outcome: Progressing  Goal: Pressure injury heals and does not worsen  Description: Interventions:  - Implement low air loss mattress or specialty surface (Criteria met)  - Apply silicone foam dressing  - Instruct/assist with weight shifting every 30 minutes when in chair   - Limit chair time to 2 hour intervals  - Use special pressure reducing interventions such as waffle cushion when in chair   - Apply fecal or urinary incontinence containment device   - Perform passive or active ROM every shift  - Turn and reposition patient & offload bony prominences every 2 hours   - Utilize friction reducing device or surface for transfers   - Consider consults to  interdisciplinary teams such as PT, OT, SLP  - Use incontinent care products after each incontinent episode such as incontinence cleanser, barrier lotions  - Consider nutrition services referral as needed  Outcome: Progressing     Problem: MUSCULOSKELETAL - ADULT  Goal: Maintain or return mobility to safest level of function  Description: INTERVENTIONS:  - Assess patient's ability to carry out ADLs; assess patient's baseline for ADL function and identify physical deficits which impact ability to perform ADLs (bathing, care of mouth/teeth, toileting, grooming, dressing, etc )  - Assess/evaluate cause of self-care deficits   - Assess range of motion  - Assess patient's mobility  - Assess patient's need for assistive devices and provide as appropriate  - Encourage maximum independence but intervene and supervise when necessary  - Involve family in performance of ADLs  - Assess for home care needs following discharge   - Consider OT consult to assist with ADL evaluation and planning for discharge  - Provide patient education as appropriate  Outcome: Progressing  Goal: Maintain proper alignment of affected body part  Description: INTERVENTIONS:  - Support, maintain and protect limb and body alignment  - Provide patient/ family with appropriate education  Outcome: Progressing     Problem: Nutrition/Hydration-ADULT  Goal: Nutrient/Hydration intake appropriate for improving, restoring or maintaining nutritional needs  Description: Monitor and assess patient's nutrition/hydration status for malnutrition  Collaborate with interdisciplinary team and initiate plan and interventions as ordered  Monitor patient's weight and dietary intake as ordered or per policy  Utilize nutrition screening tool and intervene as necessary  Determine patient's food preferences and provide high-protein, high-caloric foods as appropriate       INTERVENTIONS:  - Monitor oral intake, urinary output, labs, and treatment plans  - Assess nutrition and hydration status and recommend course of action  - Evaluate amount of meals eaten  - Assist patient with eating if necessary   - Allow adequate time for meals  - Recommend/ encourage appropriate diets, oral nutritional supplements, and vitamin/mineral supplements  - Order, calculate, and assess calorie counts as needed  - Recommend, monitor, and adjust tube feedings and TPN/PPN based on assessed needs  - Assess need for intravenous fluids  - Provide specific nutrition/hydration education as appropriate  - Include patient/family/caregiver in decisions related to nutrition  Outcome: Progressing

## 2022-11-17 NOTE — ED NOTES
Delay in CT Scan due to not being successful with establishing peripheral IV access, and awaiting XR confirmation of central line placement        Pily Phan, [place]  11/17/22 1083

## 2022-11-18 ENCOUNTER — APPOINTMENT (OUTPATIENT)
Dept: MRI IMAGING | Facility: HOSPITAL | Age: 67
End: 2022-11-18

## 2022-11-18 ENCOUNTER — APPOINTMENT (OUTPATIENT)
Dept: NON INVASIVE DIAGNOSTICS | Facility: HOSPITAL | Age: 67
End: 2022-11-18

## 2022-11-18 VITALS
HEIGHT: 64 IN | TEMPERATURE: 99 F | DIASTOLIC BLOOD PRESSURE: 81 MMHG | SYSTOLIC BLOOD PRESSURE: 143 MMHG | WEIGHT: 193 LBS | RESPIRATION RATE: 20 BRPM | HEART RATE: 76 BPM | OXYGEN SATURATION: 96 % | BODY MASS INDEX: 32.95 KG/M2

## 2022-11-18 PROBLEM — R13.10 DYSPHAGIA: Status: ACTIVE | Noted: 2022-11-18

## 2022-11-18 PROBLEM — E55.9 VITAMIN D INSUFFICIENCY: Status: ACTIVE | Noted: 2022-11-18

## 2022-11-18 PROBLEM — G47.34 NOCTURNAL HYPOXIA: Status: ACTIVE | Noted: 2022-11-18

## 2022-11-18 PROBLEM — I51.89 DIASTOLIC DYSFUNCTION: Status: ACTIVE | Noted: 2022-11-18

## 2022-11-18 PROBLEM — Z72.0 TOBACCO USE: Status: ACTIVE | Noted: 2022-11-18

## 2022-11-18 PROBLEM — R79.89 LOW TSH LEVEL: Status: ACTIVE | Noted: 2022-11-18

## 2022-11-18 PROBLEM — G93.41 ACUTE METABOLIC ENCEPHALOPATHY: Status: ACTIVE | Noted: 2022-11-17

## 2022-11-18 PROBLEM — I67.9 CEREBROVASCULAR DISEASE: Status: ACTIVE | Noted: 2022-11-18

## 2022-11-18 LAB
AMPHETAMINES SERPL QL SCN: NEGATIVE
AORTIC ROOT: 2.8 CM
APICAL FOUR CHAMBER EJECTION FRACTION: 59 %
BACTERIA UR QL AUTO: ABNORMAL /HPF
BARBITURATES UR QL: NEGATIVE
BENZODIAZ UR QL: POSITIVE
BILIRUB UR QL STRIP: NEGATIVE
CARDIAC TROPONIN I PNL SERPL HS: 3 NG/L (ref 8–18)
CHOLEST SERPL-MCNC: 122 MG/DL
CLARITY UR: CLEAR
COCAINE UR QL: NEGATIVE
COLOR UR: YELLOW
E WAVE DECELERATION TIME: 164 MS
EST. AVERAGE GLUCOSE BLD GHB EST-MCNC: 85 MG/DL
FRACTIONAL SHORTENING: 30 % (ref 28–44)
GLUCOSE UR STRIP-MCNC: NEGATIVE MG/DL
HAV IGM SER QL: NORMAL
HBA1C MFR BLD: 4.6 %
HBV CORE IGM SER QL: NORMAL
HBV SURFACE AG SER QL: NORMAL
HCV AB SER QL: NORMAL
HDLC SERPL-MCNC: 46 MG/DL
HGB UR QL STRIP.AUTO: NEGATIVE
INTERVENTRICULAR SEPTUM IN DIASTOLE (PARASTERNAL SHORT AXIS VIEW): 1.2 CM
INTERVENTRICULAR SEPTUM: 1.2 CM (ref 0.6–1.1)
KETONES UR STRIP-MCNC: NEGATIVE MG/DL
LDLC SERPL CALC-MCNC: 49 MG/DL (ref 0–100)
LEFT ATRIUM AREA SYSTOLE SINGLE PLANE A4C: 11.8 CM2
LEFT ATRIUM SIZE: 3.7 CM
LEFT INTERNAL DIMENSION IN SYSTOLE: 3.1 CM (ref 2.1–4)
LEFT VENTRICULAR INTERNAL DIMENSION IN DIASTOLE: 4.4 CM (ref 3.5–6)
LEFT VENTRICULAR POSTERIOR WALL IN END DIASTOLE: 1.1 CM
LEFT VENTRICULAR STROKE VOLUME: 48 ML
LEUKOCYTE ESTERASE UR QL STRIP: NEGATIVE
LVSV (TEICH): 48 ML
METHADONE UR QL: NEGATIVE
MV E'TISSUE VEL-SEP: 11 CM/S
MV PEAK A VEL: 1.16 M/S
MV PEAK E VEL: 88 CM/S
MV STENOSIS PRESSURE HALF TIME: 47 MS
MV VALVE AREA P 1/2 METHOD: 4.68 CM2
NITRITE UR QL STRIP: NEGATIVE
NON-SQ EPI CELLS URNS QL MICRO: ABNORMAL /HPF
OPIATES UR QL SCN: POSITIVE
OXYCODONE+OXYMORPHONE UR QL SCN: NEGATIVE
PCP UR QL: NEGATIVE
PH UR STRIP.AUTO: 6 [PH]
PROT UR STRIP-MCNC: NEGATIVE MG/DL
RBC #/AREA URNS AUTO: ABNORMAL /HPF
RIGHT ATRIUM AREA SYSTOLE A4C: 11.1 CM2
RIGHT VENTRICLE ID DIMENSION: 3.3 CM
SL CV LV EF: 60
SL CV PED ECHO LEFT VENTRICLE DIASTOLIC VOLUME (MOD BIPLANE) 2D: 86 ML
SL CV PED ECHO LEFT VENTRICLE SYSTOLIC VOLUME (MOD BIPLANE) 2D: 37 ML
SP GR UR STRIP.AUTO: 1.01 (ref 1–1.03)
THC UR QL: NEGATIVE
TRICUSPID ANNULAR PLANE SYSTOLIC EXCURSION: 1.9 CM
TRICUSPID VALVE PEAK REGURGITATION VELOCITY: 1.9 M/S
TRIGL SERPL-MCNC: 134 MG/DL
UROBILINOGEN UR QL STRIP.AUTO: 0.2 E.U./DL
WBC #/AREA URNS AUTO: ABNORMAL /HPF

## 2022-11-18 RX ORDER — ASPIRIN 81 MG/1
81 TABLET, CHEWABLE ORAL DAILY
Qty: 30 TABLET | Refills: 0 | Status: SHIPPED | OUTPATIENT
Start: 2022-11-18 | End: 2022-11-18 | Stop reason: SDUPTHER

## 2022-11-18 RX ORDER — MELATONIN
1000 DAILY
Qty: 30 TABLET | Refills: 0 | Status: SHIPPED | OUTPATIENT
Start: 2022-11-18 | End: 2022-12-18

## 2022-11-18 RX ORDER — METOPROLOL SUCCINATE 100 MG/1
100 TABLET, EXTENDED RELEASE ORAL DAILY
Qty: 30 TABLET | Refills: 0 | Status: SHIPPED | OUTPATIENT
Start: 2022-11-19

## 2022-11-18 RX ORDER — POTASSIUM CHLORIDE 20 MEQ/1
40 TABLET, EXTENDED RELEASE ORAL ONCE
Status: COMPLETED | OUTPATIENT
Start: 2022-11-18 | End: 2022-11-18

## 2022-11-18 RX ORDER — ASPIRIN 81 MG/1
81 TABLET, CHEWABLE ORAL DAILY
Qty: 30 TABLET | Refills: 0 | Status: SHIPPED | OUTPATIENT
Start: 2022-11-18

## 2022-11-18 RX ORDER — LANOLIN ALCOHOL/MO/W.PET/CERES
500 CREAM (GRAM) TOPICAL DAILY
Refills: 0
Start: 2022-11-18

## 2022-11-18 RX ORDER — ATORVASTATIN CALCIUM 40 MG/1
40 TABLET, FILM COATED ORAL EVERY EVENING
Qty: 30 TABLET | Refills: 0 | Status: SHIPPED | OUTPATIENT
Start: 2022-11-18

## 2022-11-18 RX ORDER — ALPRAZOLAM 1 MG/1
1 TABLET ORAL 4 TIMES DAILY PRN
Refills: 0
Start: 2022-11-18 | End: 2022-11-28

## 2022-11-18 RX ORDER — ATORVASTATIN CALCIUM 40 MG/1
40 TABLET, FILM COATED ORAL EVERY EVENING
Qty: 30 TABLET | Refills: 0 | Status: SHIPPED | OUTPATIENT
Start: 2022-11-18 | End: 2022-11-18 | Stop reason: SDUPTHER

## 2022-11-18 RX ORDER — QUETIAPINE FUMARATE 200 MG/1
300 TABLET, FILM COATED ORAL
Start: 2022-11-18 | End: 2022-11-18

## 2022-11-18 RX ORDER — ALBUTEROL SULFATE 90 UG/1
2 AEROSOL, METERED RESPIRATORY (INHALATION) EVERY 4 HOURS PRN
Qty: 18 G | Refills: 0
Start: 2022-11-18

## 2022-11-18 RX ORDER — AMLODIPINE BESYLATE 5 MG/1
5 TABLET ORAL DAILY
Qty: 30 TABLET | Refills: 0 | Status: SHIPPED | OUTPATIENT
Start: 2022-11-18

## 2022-11-18 RX ORDER — ACETAMINOPHEN 325 MG/1
650 TABLET ORAL EVERY 6 HOURS PRN
Refills: 0
Start: 2022-11-18

## 2022-11-18 RX ORDER — MAGNESIUM SULFATE 1 G/100ML
1 INJECTION INTRAVENOUS ONCE
Status: COMPLETED | OUTPATIENT
Start: 2022-11-18 | End: 2022-11-18

## 2022-11-18 RX ADMIN — CYANOCOBALAMIN TAB 1000 MCG 1000 MCG: 1000 TAB at 08:19

## 2022-11-18 RX ADMIN — ACETAMINOPHEN 650 MG: 325 TABLET ORAL at 03:54

## 2022-11-18 RX ADMIN — FAMOTIDINE 20 MG: 20 TABLET, FILM COATED ORAL at 08:19

## 2022-11-18 RX ADMIN — PANTOPRAZOLE SODIUM 40 MG: 40 TABLET, DELAYED RELEASE ORAL at 08:19

## 2022-11-18 RX ADMIN — SODIUM CHLORIDE 125 ML/HR: 0.9 INJECTION, SOLUTION INTRAVENOUS at 02:29

## 2022-11-18 RX ADMIN — METOPROLOL SUCCINATE 100 MG: 100 TABLET, EXTENDED RELEASE ORAL at 08:19

## 2022-11-18 RX ADMIN — MAGNESIUM SULFATE IN DEXTROSE 1 G: 10 INJECTION, SOLUTION INTRAVENOUS at 08:25

## 2022-11-18 RX ADMIN — POTASSIUM CHLORIDE 40 MEQ: 1500 TABLET, EXTENDED RELEASE ORAL at 08:19

## 2022-11-18 RX ADMIN — AMLODIPINE BESYLATE 5 MG: 5 TABLET ORAL at 08:19

## 2022-11-18 RX ADMIN — ASPIRIN 81 MG CHEWABLE TABLET 81 MG: 81 TABLET CHEWABLE at 08:19

## 2022-11-18 NOTE — ASSESSMENT & PLAN NOTE
· Present on admission  · Likely due to polypharmacy patient being on multiple sedative medications  · Treated with NSS IV fluids at 125 ml/hr  · No sign acute intracranial hemorrhage or acute CVA on MRI of the brain completed on 11/18/2022  · Hold Neurontin, Norco, and Seroquel for now per Psychiatry  · The patient's mental status returned to her baseline normal mental status by the time of discharge  · Outpatient follow-up with PCP and Psychiatry  · Outpatient Neurology evaluation    MRI of the brain (11/18/2022): IMPRESSION:     No interval change  No mass effect, acute intracranial hemorrhage or evidence of recent infarction    Mild chronic microvascular ischemic change and chronic lacunar infarcts are stable since the prior exam

## 2022-11-18 NOTE — RESPIRATORY THERAPY NOTE
Home Oxygen Qualifying Test     Patient name: Radha Garza        : 1955   Date of Test:  2022  Diagnosis: altered mental status   Home Oxygen Test:    **Medicare Guidelines require item(s) 1-5 on all ambulatory patients or 1 and 2 on non-ambulatory patients  1  Baseline SPO2 on Room Air at rest 95 %   a  If <= 88% on Room Air add O2 via NC to obtain SpO2 >=88%  If LPM needed, document LPM na needed to reach =>88%    2  SPO2 during exertion on Room Air 95 %  a  During exertion monitor SPO2  If SPO2 increases >=89%, do not add supplemental oxygen    3  SPO2 on Oxygen at Rest na % at na LPM    4  SPO2 during exertion on Oxygen na % at na LPM    5  Test performed during exertion activity  Walking 235 ft, 8 minutes     []  Supplemental Home Oxygen is indicated  [x]  Client does not qualify for home oxygen      Respiratory Additional Notes- pt ambulated on room air 325 ft, no rest, no SOB  Burnetta Dakin, RT

## 2022-11-18 NOTE — ASSESSMENT & PLAN NOTE
Assessment: Patient with PMHx of bipolar 1 disorder  Reports "not having any episodes for a very long time " Reports taking seroquel 300 qPM, zyprexa 20 qPM, xanax 1mg TID  Evaluation of bipolar 1 disorder limited due to AMS  · The patient was seen in consultation by [address] [place] [facility]  · Hold gabapentin and seroquel for now  · Continue zyprexa 20 mg PO QHS and PRN PO xanax  · Outpatient follow-up with Psychiatry    Tele Psychiatry evaluation on 11/17/2022:  "Assessment:  Will Damon is a 78 y/o female with PMH significant for Bipolar Disorder and Chronic Pain that presented for AMS  Patients presentation consistent with Metabolic Encephalopathy 2/2 polypharmacy with BDZ, Opioids, Gabapentin, and dual or tripple antipsychotic therapy  Patient should ideally be optimized on one antipsychotic  Patient without any active mood symptoms to include trinity or depression  Patient does endorse some passive SI without plan or intent and spontaneously produces protective factors   Patient without any indication for voluntary or involuntary IP psychiatric admission or 1:1       Metabolic Encephalopathy      Treatment Plan:     Planned Medication Changes:     -Consider using only Olanzapine 20 mg qhs"

## 2022-11-18 NOTE — ASSESSMENT & PLAN NOTE
· The patient was evaluated by Speech Therapy with the following impressions/recommendations:  "Summary   Pt presented with s/s suggestive of minimal oral and suspected mild pharyngeal dysphagia    Symptoms or concerns included mild difficulty with mastication suspected pharyngeal swallow delay, suspected pharyngeal residue and multiple swallows        Risk/s for Aspiration: low     Recommended Diet: soft/level 3 diet and thin liquids   Recommended Form of Meds: whole with liquid   Aspiration precautions and swallowing strategies: upright posture and alternating bites and sips  Other Recommendations: Continue frequent oral care, possible VFSS if swallow function does not improve with implemented compensatory strategies"

## 2022-11-18 NOTE — SPEECH THERAPY NOTE
SLP Motor Speech Evaluation    Patient Name: Katharina Kendall  Today's Date: 11/18/2022     Problem List  Principal Problem:    AMS (altered mental status)  Active Problems:    Bipolar 1 disorder ([facility] )    GERD (gastroesophageal reflux disease)    Anemia    Chronic pain    Nocturnal hypoxia    Dysphagia    Past Medical History  Past Medical History:   Diagnosis Date   • B[facility] 1 disorder ([facility] )    • Cancer ([facility] )     kidney   • Cardiac disease     fast heart beat   • Chronic pain    • Fractures    • GERD (gastroesophageal reflux disease)    • Hard of hearing    • Hypertension    • Renal cell carcinoma ([facility] )    • Stroke [facility])     2009 affected her speech, right sided weakness   • TIA (transient ischemic attack)      Past Surgical History  Past Surgical History:   Procedure Laterality Date   • APPENDECTOMY     • CHOLECYSTECTOMY     • JOINT REPLACEMENT Bilateral      bilateral knees   • NEPHRECTOMY Right    • DC COLONOSCOPY FLX DX W/COLLJ SPEC WHEN PFRMD N/A 8/7/2018    Procedure: COLONOSCOPY;  Surgeon: Dayna Rodriguez MD;  Location: [facility] MAIN OR;  Service: Gastroenterology   • DC OPEN TREATMENT BIMALLEOLAR ANKLE FRACTURE Right 5/14/2019    Procedure: ANKLE - Bimalleolar OPEN REDUCTION W/ INTERNAL FIXATION (ORIF); Surgeon: Liban Peters; Location: [facility] MAIN OR;  Service: Orthopedics   • DC TREAT TIBIAL SHAFT FX, INTRAMED IMPLANT Left 7/27/2022    Procedure: INSERTION NAIL IM TIBIA;  Surgeon: Alan Monsivais MD;  Location:  MAIN OR;  Service: Orthopedics     Impressions: Pt presents with mild-moderate dysarthria, characterized by reduced articulator (labial and lingual) strength and ROM, as well as mildly reduced intelligibility with imprecise articulation and reduced self awareness/monitoring      Speech Therapy recommended:  yes,  x weekly while hospitalized    Patient's goal: "Make my speech easier to understand, I guess "    Goals:  LONG TERM GOALS:  -The patient will demonstrate intelligible speech in all activities of daily living including dynamic conversation     -The patient will independently utilize compensatory strategies to maximize communication in all ADLs  SHORT TERM GOALS:    -Patient will use trained strategies (eg slowed rate, over articulation, increased oral opening, writing key word, increased loudness, phrasing)  to improve speech intelligibility in word,  phrases, sentences and conversation with 80% accuracy      -Patient will discriminate between intelligible and unintelligible speech and use trained strategies to repair communication (eg slowed rate, exaggerated articulation, repetition, rephrasal)     HISTORY AND PHYSICAL:    Per 11/17/22 H&P - 77 y o  female with PMHx of left tib-fib fracture (s/p left IMN 7/27/22), bipolar 1 disorder, CVA 2009 (residual slurred speech), chronic pain presented via ems after being difficult to arouse, worsening slurring of speech on waking up this morning  Patient endorses several days of N/V, diarrhea, abdominal pain prior to presentation today, but reports clinical course improving  Last BM last night and was diarrhea  Denies taking more than usual of her medication the night prior, denies alcohol use, recreational drug use  During her evaluation in the ED, patient's speech became less slurred and she became significantly more alert  Per ED RN, family reports speech is back at baseline but that she is more somnolent than usual still      Speech and Swallowing Mechanism Exam   Facial: symmetrical  Labial: bilateral decreased ROM  Lingual: decreased ROM and bilateral decreased strength  Velum: symmetrical  Mandible: adequate ROM  Dentition: adequate  Respiratory Support: on 2L O2 via NC    Respiration and Phonation  Maximum Phonation Time  (Normal 15-20 seconds for healthy older adult with standard deviation of 5 5-6): 15 seconds    Able to sustain phonation counting from 1 to 10: 100%   Sustains phonation across words, phrases, sentences and in normal conversational speech: 100%   Vocal Quality:  clear/adequate     Articulation:  Single Syllable Words: 85%  Multisyllabic Words: 75%  Phrase Level: 75%  Sentence Level: 70%  Conversational Speech:  66%    Diadochokinesis:   tuh tuh tuh (normal should exceed 25 reps in 10 seconds): 25  kuh kuh kuh (normal should exceed 25 reps in 10 seconds): 23  puh tuh kuh (pattycake or buttercup)- 7    Resonation: Normal nasality    S/S Oral apraxia:  None    S/S Verbal Apraxia:  None      INTELLIGIBILITY at the WORD LEVEL:  85%      INTELLIGIBILITY at the PHRASE LEVEL:  75%    INTELLIGIBILITY at the SENTENCE LEVEL:  70%    INTELLIGIBILITY in CONVERSATIONAL SPEECH:  66%    Conversation:  Imprecise articulation

## 2022-11-18 NOTE — PLAN OF CARE
Problem: PHYSICAL THERAPY ADULT  Goal: Performs mobility at highest level of function for planned discharge setting  See evaluation for individualized goals  Description: Treatment/Interventions: Functional transfer training, Elevations, Therapeutic exercise, Endurance training, Gait training, Bed mobility          See flowsheet documentation for full assessment, interventions and recommendations  Outcome: Progressing  Note: Prognosis: Fair     Assessment: Pt is 77 y o  female seen for PT evaluation s/p admit to [address] on 11/17/2022 w/ AMS (altered mental status)  PT consulted to assess pt's functional mobility and d/c needs  Order placed for PT eval and tx, w/ up and OOB as tolerated order  Pt agreeable to PT  session upon arrival, pt found supine in bed  PTA, pt was independent w/ all functional mobility w/ no AD, has 1 HECTOR, lives w/ mother in 2 level home and on disability  Pt to benefit from continued PT tx to address deficits and maximize level of functional independent mobility and consistency  From PT/mobility standpoint, recommendation at time of d/c would be home with home health rehabilitation pending progress in order to facilitate return to PLOF  Upon conclusion pt  seated in recliner  Complexity: Comorbidities affecting pt's physical performance at time of assessment include: CVA and speech disturbance and tibia/fibula fx  Personal factors affecting pt at time of IE include: stairs to enter home, inability to navigate community distances, decreased cognition and positive fall history  Please find objective findings from PT assessment regarding body systems outlined above with impairments and limitations including impaired balance, decreased endurance, gait deviations, pain, decreased activity tolerance, decreased functional mobility tolerance, altered sensation, decreased safety awareness, fall risk and decreased cognition    Pt's clinical presentation is currently unstable/unpredictable seen in pt's presentation of new onset of O2 use, pain, polypharmacy and wounds  The patient's AM-PAC Basic Mobility Inpatient Short Form Raw Score is 22  A Raw score of greater than 16 suggests the patient may benefit from discharge to home  Please also refer to the recommendation of the Physical Therapist for safe discharge planning  Pt seen as a co-eval with OT due to the patient's co-morbidities, clinically unstable presentation, and present impairments which are a regression from the patient's baseline  Barriers to Discharge: Decreased caregiver support     PT Discharge Recommendation: Home with home health rehabilitation    See flowsheet documentation for full assessment

## 2022-11-18 NOTE — PLAN OF CARE
Problem: MOBILITY - ADULT  Goal: Maintain or return to baseline ADL function  Description: INTERVENTIONS:  -  Assess patient's ability to carry out ADLs; assess patient's baseline for ADL function and identify physical deficits which impact ability to perform ADLs (bathing, care of mouth/teeth, toileting, grooming, dressing, etc )  - Assess/evaluate cause of self-care deficits   - Assess range of motion  - Assess patient's mobility; develop plan if impaired  - Assess patient's need for assistive devices and provide as appropriate  - Encourage maximum independence but intervene and supervise when necessary  - Involve family in performance of ADLs  - Assess for home care needs following discharge   - Consider OT consult to assist with ADL evaluation and planning for discharge  - Provide patient education as appropriate  11/18/2022 1435 by Marline Bourgeois RN  Outcome: Adequate for Discharge  11/18/2022 1432 by Marline Bourgeois RN  Outcome: Adequate for Discharge  Goal: Maintains/Returns to pre admission functional level  Description: INTERVENTIONS:  - Perform BMAT or MOVE assessment daily    - Set and communicate daily mobility goal to care team and patient/family/caregiver  - Collaborate with rehabilitation services on mobility goals if consulted  - Perform Range of Motion 3-4 times a day  - Reposition patient every 1-2 hours    - Dangle patient 3-4 times a day  - Stand patient 3-4 times a day  - Ambulate patient 3-4 times a day  - Out of bed to chair 3-4 times a day   - Out of bed for meals 3-4 times a day  - Out of bed for toileting  - Record patient progress and toleration of activity level   11/18/2022 1435 by Marline Bourgeois RN  Outcome: Adequate for Discharge  11/18/2022 1432 by Marline Bourgeois RN  Outcome: Adequate for Discharge

## 2022-11-18 NOTE — ASSESSMENT & PLAN NOTE
· History of previous lacunar infarcts  · Utilize aspirin 81 mg PO Qdaily and high-intensity statin therapy with atorvastatin 40 mg PO Qdaily in the evening  · Outpatient Neurology evaluation

## 2022-11-18 NOTE — SPEECH THERAPY NOTE
Speech-Language Pathology Bedside Swallow Evaluation    Patient Name: Cookie Ma  Today's Date: 11/18/2022     Problem List  Principal Problem:    AMS (altered mental status)  Active Problems:    Bipolar 1 disorder ([facility] )    GERD (gastroesophageal reflux disease)    Anemia    Chronic pain    Nocturnal hypoxia    Dysphagia    Past Medical History  Past Medical History:   Diagnosis Date   • Bipolar 1 disorder ([facility] )    • Cancer ([facility] )     kidney   • Cardiac disease     fast heart beat   • Chronic pain    • Fractures    • GERD (gastroesophageal reflux disease)    • Hard of hearing    • Hypertension    • Renal cell carcinoma ([facility] )    • Stroke [facility])     2009 affected her speech, right sided weakness   • TIA (transient ischemic attack)      Past Surgical History  Past Surgical History:   Procedure Laterality Date   • APPENDECTOMY     • CHOLECYSTECTOMY     • JOINT REPLACEMENT Bilateral      bilateral knees   • NEPHRECTOMY Right    • IA COLONOSCOPY FLX DX W/COLLJ SPEC WHEN PFRMD N/A 8/7/2018    Procedure: COLONOSCOPY;  Surgeon: Sarah Bain MD;  Location: [facility] MAIN OR;  Service: Gastroenterology   • IA OPEN TREATMENT BIMALLEOLAR ANKLE FRACTURE Right 5/14/2019    Procedure: ANKLE - Bimalleolar OPEN REDUCTION W/ INTERNAL FIXATION (ORIF); Surgeon: Author Melendez; Location: [address] [place] [address] MAIN OR;  Service: Orthopedics   • IA TREAT TIBIAL SHAFT FX, INTRAMED IMPLANT Left 7/27/2022    Procedure: INSERTION NAIL IM TIBIA;  Surgeon: Sandro Hammond MD;  Location:  MAIN OR;  Service: Orthopedics     Summary   Pt presented with s/s suggestive of minimal oral and suspected mild pharyngeal dysphagia  Symptoms or concerns included mild difficulty with mastication suspected pharyngeal swallow delay, suspected pharyngeal residue and multiple swallows        Risk/s for Aspiration: low     Recommended Diet: soft/level 3 diet and thin liquids   Recommended Form of Meds: whole with liquid   Aspiration precautions and swallowing strategies: upright posture and alternating bites and sips  Other Recommendations: Continue frequent oral care, possible VFSS if swallow function does not improve with implemented compensatory strategies    Current Medical Status  Per 11/18/22 H&P - Pt is a 77 y o  female with PMHx of left tib-fib fracture (s/p left IMN 7/27/22), bipolar 1 disorder, CVA 2009 (residual slurred speech), chronic pain presented via ems after being difficult to arouse, worsening slurring of speech on waking up this morning  Patient endorses several days of N/V, diarrhea, abdominal pain prior to presentation today, but reports clinical course improving  Last BM last night and was diarrhea  Denies taking more than usual of her medication the night prior, denies alcohol use, recreational drug use  During her evaluation in the ED, patient's speech became less slurred and she became significantly more alert  Per ED RN, family reports speech is back at baseline but that she is more somnolent than usual still  Current Precautions:  Fall  Aspiration     Allergies:  No known food allergies    Special Studies:  11/17/22 Chest XR impression - Right jugular catheter malpositioned in the right axillary vein  No acute pulmonary disease with no pneumothorax  11/17/22 CT head impression - No evidence of acute intracranial process  Chronic microangiopathy  11/17/22 CTA head and neck impression - No significant stenosis of the cervical carotid or vertebral arteries  No significant intracranial stenosis, large vessel occlusion or aneurysm      Social/Education/Vocational Hx:  Pt lives alone    Swallow Information   Current Risks for Dysphagia & Aspiration: CVA and dysarthria  Current Symptoms/Concerns: change in respiratory status and reports of difficulty swallowing  Current Diet: regular diet and thin liquids   Baseline Diet: regular diet and thin liquids    Baseline Assessment   Behavior/Cognition: alert and oriented x3  Speech/Language Status: able to participate in conversation and able to follow commands  Patient Positioning: upright in bed  Pain Status/Interventions/Response to Interventions: No report of or nonverbal indications of pain  Swallow Mechanism Exam  Facial: symmetrical  Labial: bilateral decreased ROM and decreased strength  Lingual: decreased ROM and bilateral decreased strength  Velum: symmetrical  Mandible: adequate ROM  Dentition: adequate  Vocal quality:clear/adequate   Volitional Cough: strong/productive   Respiratory Status: on 2L O2  Tracheostomy: n/a    Consistencies Assessed and Performance   Consistencies Administered: thin liquids, puree and hard solids  Materials administered included: water via cup and straw, applesauce, and helen cracker    Oral Stage: minimal  Mastication was minimally prolonged with the materials administered today  Bolus formation and transfer were functional with no significant oral residue noted  No overt s/s reduced oral control  Pharyngeal Stage: suspected pharyngeal swallow delay, suspected pharyngeal residue and multiple swallows  Swallow Mechanics:  Swallowing initiation appeared minimally delayed  Laryngeal rise was palpated and judged to be slightly reduced  No coughing, throat clearing, change in vocal quality or respiratory status noted today  However, pt reported solid "sticking" in throat, requiring use of alternation of solids and liquids, as well as solids and puree      Esophageal Concerns: none reported    Strategies and Efficacy: alternation of solids and liquids or solids and purees improved pharyngeal clearance    Summary and Recommendations (see above)    Results Reviewed with: patient and MD     Treatment Recommended: yes     Frequency of treatment: 1-3x/wk    Patient Stated Goal: "I don't know why I just can't clear it sometimes"    Dysphagia LTG  -Patient will demonstrate safe and effective oral intake (without overt s/s significant oral/pharyngeal dysphagia including s/s penetration or aspiration) for the highest appropriate diet level       Short Term Goals:  -Pt will tolerate Dysphagia 3/advanced (dental soft) diet and thin liquid with no significant s/s oral or pharyngeal dysphagia across 1-3 diagnostic session/s     -Patient will demonstrate adequate mastication to safely consume the  least restrictive diet with no verbal, visual or tactile cues needed     -Patient will utilize trained compensatory strategies (eg   controlled bite/sip size, controlled rate, ”prep set”, neck flexion, multiple swallows, alternation of food with liquid,head turn etc ) with 80% accuracy to eliminate overt s/s penetration/aspiration with the least restrictive food/liquid consistencies    Speech Therapy Prognosis   Prognosis: good    Prognosis Considerations: age, prior medical history, respiratory status and therapeutic potential

## 2022-11-18 NOTE — ASSESSMENT & PLAN NOTE
· Grade 1 diastolic dysfunction on echocardiogram completed on 11/18/2022  · Outpatient Cardiology evaluation

## 2022-11-18 NOTE — PLAN OF CARE
Problem: MOBILITY - ADULT  Goal: Maintain or return to baseline ADL function  Description: INTERVENTIONS:  -  Assess patient's ability to carry out ADLs; assess patient's baseline for ADL function and identify physical deficits which impact ability to perform ADLs (bathing, care of mouth/teeth, toileting, grooming, dressing, etc )  - Assess/evaluate cause of self-care deficits   - Assess range of motion  - Assess patient's mobility; develop plan if impaired  - Assess patient's need for assistive devices and provide as appropriate  - Encourage maximum independence but intervene and supervise when necessary  - Involve family in performance of ADLs  - Assess for home care needs following discharge   - Consider OT consult to assist with ADL evaluation and planning for discharge  - Provide patient education as appropriate  Outcome: Progressing  Goal: Maintains/Returns to pre admission functional level  Description: INTERVENTIONS:  - Perform BMAT or MOVE assessment daily    - Set and communicate daily mobility goal to care team and patient/family/caregiver  - Collaborate with rehabilitation services on mobility goals if consulted  - Perform Range of Motion 3-4 times a day  - Reposition patient every 1-2 hours    - Dangle patient 3-4 times a day  - Stand patient 3-4 times a day  - Ambulate patient 3-4 times a day  - Out of bed to chair 3-4 times a day   - Out of bed for meals 3-4 times a day  - Out of bed for toileting  - Record patient progress and toleration of activity level   Outcome: Progressing     Problem: PAIN - ADULT  Goal: Verbalizes/displays adequate comfort level or baseline comfort level  Description: Interventions:  - Encourage patient to monitor pain and request assistance  - Assess pain using appropriate pain scale  - Administer analgesics based on type and severity of pain and evaluate response  - Implement non-pharmacological measures as appropriate and evaluate response  - Consider cultural and social influences on pain and pain management  - Notify physician/advanced practitioner if interventions unsuccessful or patient reports new pain  Outcome: Progressing     Problem: SAFETY ADULT  Goal: Maintain or return to baseline ADL function  Description: INTERVENTIONS:  -  Assess patient's ability to carry out ADLs; assess patient's baseline for ADL function and identify physical deficits which impact ability to perform ADLs (bathing, care of mouth/teeth, toileting, grooming, dressing, etc )  - Assess/evaluate cause of self-care deficits   - Assess range of motion  - Assess patient's mobility; develop plan if impaired  - Assess patient's need for assistive devices and provide as appropriate  - Encourage maximum independence but intervene and supervise when necessary  - Involve family in performance of ADLs  - Assess for home care needs following discharge   - Consider OT consult to assist with ADL evaluation and planning for discharge  - Provide patient education as appropriate  Outcome: Progressing  Goal: Maintains/Returns to pre admission functional level  Description: INTERVENTIONS:  - Perform BMAT or MOVE assessment daily    - Set and communicate daily mobility goal to care team and patient/family/caregiver  - Collaborate with rehabilitation services on mobility goals if consulted  - Perform Range of Motion 3-4 times a day  - Reposition patient every 1-2 hours    - Dangle patient 3-4 times a day  - Stand patient 3-4 times a day  - Ambulate patient 3-4 times a day  - Out of bed to chair 3-4 times a day   - Out of bed for meals 3-4 times a day  - Out of bed for toileting  - Record patient progress and toleration of activity level   Outcome: Progressing  Goal: Patient will remain free of falls  Description: INTERVENTIONS:  - Educate patient/family on patient safety including physical limitations  - Instruct patient to call for assistance with activity   - Consult OT/PT to assist with strengthening/mobility - Keep Call bell within reach  - Keep bed low and locked with side rails adjusted as appropriate  - Keep care items and personal belongings within reach  - Initiate and maintain comfort rounds  - Make Fall Risk Sign visible to staff  - Offer Toileting every 2 Hours, in advance of need  - Initiate/Maintain bed/chair alarm  - Obtain necessary fall risk management equipment: fall bracelet, fall risk sign, fall cushion  - Apply yellow socks and bracelet for high fall risk patients  - Consider moving patient to room near nurses station  Outcome: Progressing     Problem: DISCHARGE PLANNING  Goal: Discharge to home or other facility with appropriate resources  Description: INTERVENTIONS:  - Identify barriers to discharge w/patient and caregiver  - Arrange for needed discharge resources and transportation as appropriate  - Identify discharge learning needs (meds, wound care, etc )  - Arrange for interpretive services to assist at discharge as needed  - Refer to Case Management Department for coordinating discharge planning if the patient needs post-hospital services based on physician/advanced practitioner order or complex needs related to functional status, cognitive ability, or social support system  Outcome: Progressing     Problem: Knowledge Deficit  Goal: Patient/family/caregiver demonstrates understanding of disease process, treatment plan, medications, and discharge instructions  Description: Complete learning assessment and assess knowledge base    Interventions:  - Provide teaching at level of understanding  - Provide teaching via preferred learning methods  Outcome: Progressing     Problem: NEUROSENSORY - ADULT  Goal: Achieves stable or improved neurological status  Description: INTERVENTIONS  - Monitor and report changes in neurological status  - Monitor vital signs such as temperature, blood pressure, glucose, and any other labs ordered   - Initiate measures to prevent increased intracranial pressure  - Monitor for seizure activity and implement precautions if appropriate      Outcome: Progressing  Goal: Remains free of injury related to seizures activity  Description: INTERVENTIONS  - Maintain airway, patient safety  and administer oxygen as ordered  - Monitor patient for seizure activity, document and report duration and description of seizure to physician/advanced practitioner  - If seizure occurs,  ensure patient safety during seizure  - Reorient patient post seizure  - Seizure pads on all 4 side rails  - Instruct patient/family to notify RN of any seizure activity including if an aura is experienced  - Instruct patient/family to call for assistance with activity based on nursing assessment  - Administer anti-seizure medications if ordered    Outcome: Progressing  Goal: Achieves maximal functionality and self care  Description: INTERVENTIONS  - Monitor swallowing and airway patency with patient fatigue and changes in neurological status  - Encourage and assist patient to increase activity and self care     - Encourage visually impaired, hearing impaired and aphasic patients to use assistive/communication devices  Outcome: Progressing     Problem: CARDIOVASCULAR - ADULT  Goal: Maintains optimal cardiac output and hemodynamic stability  Description: INTERVENTIONS:  - Monitor I/O, vital signs and rhythm  - Monitor for S/S and trends of decreased cardiac output  - Administer and titrate ordered vasoactive medications to optimize hemodynamic stability  - Assess quality of pulses, skin color and temperature  - Assess for signs of decreased coronary artery perfusion  - Instruct patient to report change in severity of symptoms  Outcome: Progressing  Goal: Absence of cardiac dysrhythmias or at baseline rhythm  Description: INTERVENTIONS:  - Continuous cardiac monitoring, vital signs, obtain 12 lead EKG if ordered  - Administer antiarrhythmic and heart rate control medications as ordered  - Monitor electrolytes and administer replacement therapy as ordered  Outcome: Progressing     Problem: SKIN/TISSUE INTEGRITY - ADULT  Goal: Skin Integrity remains intact(Skin Breakdown Prevention)  Description: Assess:  -Perform Dimitri assessment every 12 hrs  -Clean and moisturize skin every 24 hrs  -Inspect skin when repositioning, toileting, and assisting with ADLS  -Assess under medical devices   -Assess extremities for adequate circulation and sensation     Bed Management:  -Have minimal linens on bed & keep smooth, unwrinkled  -Change linens as needed when moist or perspiring  -Avoid sitting or lying in one position for more than 1-2 hours while in bed  -Keep HOB at 30 degrees     Toileting:  -Offer bedside commode  -Assess for incontinence every 1-2 hrs  -Use incontinent care products after each incontinent episode     Activity:  -Mobilize patient 3-4 times a day  -Encourage activity and walks on unit  -Encourage or provide ROM exercises   -Turn and reposition patient every 1-2 Hours  -Use appropriate equipment to lift or move patient in bed  -Instruct/ Assist with weight shifting every 90 min when out of bed in chair  -Consider limitation of chair time 1-2 hour intervals    Skin Care:  -Avoid use of baby powder, tape, friction and shearing, hot water or constrictive clothing  -Relieve pressure over bony prominences   -Do not massage red bony areas    Outcome: Progressing  Goal: Incision(s), wounds(s) or drain site(s) healing without S/S of infection  Description: INTERVENTIONS  - Assess and document dressing, incision, wound bed, drain sites and surrounding tissue  - Provide patient and family education  - Perform skin care/dressing changes every 24 hrs  Outcome: Progressing  Goal: Pressure injury heals and does not worsen  Description: Interventions:  - Implement low air loss mattress or specialty surface (Criteria met)  - Apply silicone foam dressing  - Instruct/assist with weight shifting every 90 minutes when in chair   - Limit chair time to 1-2 hour intervals  - Use special pressure reducing interventions such as pillow cushion when in chair   - Apply fecal or urinary incontinence containment device   - Perform passive or active ROM every 3-4 hrs  - Turn and reposition patient & offload bony prominences every 1-2 hours   - Utilize friction reducing device or surface for transfers   - Consider consults to  interdisciplinary teams such as wound team  - Use incontinent care products after each incontinent episode   - Consider nutrition services referral as needed  Outcome: Progressing     Problem: MUSCULOSKELETAL - ADULT  Goal: Maintain or return mobility to safest level of function  Description: INTERVENTIONS:  - Assess patient's ability to carry out ADLs; assess patient's baseline for ADL function and identify physical deficits which impact ability to perform ADLs (bathing, care of mouth/teeth, toileting, grooming, dressing, etc )  - Assess/evaluate cause of self-care deficits   - Assess range of motion  - Assess patient's mobility  - Assess patient's need for assistive devices and provide as appropriate  - Encourage maximum independence but intervene and supervise when necessary  - Involve family in performance of ADLs  - Assess for home care needs following discharge   - Consider OT consult to assist with ADL evaluation and planning for discharge  - Provide patient education as appropriate  Outcome: Progressing  Goal: Maintain proper alignment of affected body part  Description: INTERVENTIONS:  - Support, maintain and protect limb and body alignment  - Provide patient/ family with appropriate education  Outcome: Progressing     Problem: Nutrition/Hydration-ADULT  Goal: Nutrient/Hydration intake appropriate for improving, restoring or maintaining nutritional needs  Description: Monitor and assess patient's nutrition/hydration status for malnutrition  Collaborate with interdisciplinary team and initiate plan and interventions as ordered    Monitor patient's weight and dietary intake as ordered or per policy  Utilize nutrition screening tool and intervene as necessary  Determine patient's food preferences and provide high-protein, high-caloric foods as appropriate       INTERVENTIONS:  - Monitor oral intake, urinary output, labs, and treatment plans  - Assess nutrition and hydration status and recommend course of action  - Evaluate amount of meals eaten  - Assist patient with eating if necessary   - Allow adequate time for meals  - Recommend/ encourage appropriate diets, oral nutritional supplements, and vitamin/mineral supplements  - Order, calculate, and assess calorie counts as needed  - Recommend, monitor, and adjust tube feedings and TPN/PPN based on assessed needs  - Assess need for intravenous fluids  - Provide specific nutrition/hydration education as appropriate  - Include patient/family/caregiver in decisions related to nutrition  Outcome: Progressing     Problem: Potential for Falls  Goal: Patient will remain free of falls  Description: INTERVENTIONS:  - Educate patient/family on patient safety including physical limitations  - Instruct patient to call for assistance with activity   - Consult OT/PT to assist with strengthening/mobility   - Keep Call bell within reach  - Keep bed low and locked with side rails adjusted as appropriate  - Keep care items and personal belongings within reach  - Initiate and maintain comfort rounds  - Make Fall Risk Sign visible to staff  - Offer Toileting every 2 Hours, in advance of need  - Initiate/Maintain bed/chair alarm  - Obtain necessary fall risk management equipment: fall bracelet, fall risk sign, fall cushion   - Apply yellow socks and bracelet for high fall risk patients  - Consider moving patient to room near nurses station  Outcome: Progressing     Problem: Prexisting or High Potential for Compromised Skin Integrity  Goal: Skin integrity is maintained or improved  Description: INTERVENTIONS:  - Identify patients at risk for skin breakdown  - Assess and monitor skin integrity  - Assess and monitor nutrition and hydration status  - Monitor labs   - Assess for incontinence   - Turn and reposition patient  - Assist with mobility/ambulation  - Relieve pressure over bony prominences  - Avoid friction and shearing  - Provide appropriate hygiene as needed including keeping skin clean and dry  - Evaluate need for skin moisturizer/barrier cream  - Collaborate with interdisciplinary team   - Patient/family teaching  - Consider wound care consult   Outcome: Progressing

## 2022-11-18 NOTE — PLAN OF CARE
Problem: MOBILITY - ADULT  Goal: Maintain or return to baseline ADL function  Description: INTERVENTIONS:  -  Assess patient's ability to carry out ADLs; assess patient's baseline for ADL function and identify physical deficits which impact ability to perform ADLs (bathing, care of mouth/teeth, toileting, grooming, dressing, etc )  - Assess/evaluate cause of self-care deficits   - Assess range of motion  - Assess patient's mobility; develop plan if impaired  - Assess patient's need for assistive devices and provide as appropriate  - Encourage maximum independence but intervene and supervise when necessary  - Involve family in performance of ADLs  - Assess for home care needs following discharge   - Consider OT consult to assist with ADL evaluation and planning for discharge  - Provide patient education as appropriate  Outcome: Progressing  Goal: Maintains/Returns to pre admission functional level  Description: INTERVENTIONS:  - Perform BMAT or MOVE assessment daily    - Set and communicate daily mobility goal to care team and patient/family/caregiver  - Collaborate with rehabilitation services on mobility goals if consulted  - Perform Range of Motion 3 times a day  - Reposition patient every 2 hours    - Dangle patient 3 times a day  - Stand patient 3 times a day  - Ambulate patient 3 times a day  - Out of bed to chair 3 times a day   - Out of bed for meals 3 times a day  - Out of bed for toileting  - Record patient progress and toleration of activity level   Outcome: Progressing     Problem: PAIN - ADULT  Goal: Verbalizes/displays adequate comfort level or baseline comfort level  Description: Interventions:  - Encourage patient to monitor pain and request assistance  - Assess pain using appropriate pain scale  - Administer analgesics based on type and severity of pain and evaluate response  - Implement non-pharmacological measures as appropriate and evaluate response  - Consider cultural and social influences on pain and pain management  - Notify physician/advanced practitioner if interventions unsuccessful or patient reports new pain  Outcome: Progressing     Problem: SAFETY ADULT  Goal: Maintain or return to baseline ADL function  Description: INTERVENTIONS:  -  Assess patient's ability to carry out ADLs; assess patient's baseline for ADL function and identify physical deficits which impact ability to perform ADLs (bathing, care of mouth/teeth, toileting, grooming, dressing, etc )  - Assess/evaluate cause of self-care deficits   - Assess range of motion  - Assess patient's mobility; develop plan if impaired  - Assess patient's need for assistive devices and provide as appropriate  - Encourage maximum independence but intervene and supervise when necessary  - Involve family in performance of ADLs  - Assess for home care needs following discharge   - Consider OT consult to assist with ADL evaluation and planning for discharge  - Provide patient education as appropriate  Outcome: Progressing  Goal: Maintains/Returns to pre admission functional level  Description: INTERVENTIONS:  - Perform BMAT or MOVE assessment daily    - Set and communicate daily mobility goal to care team and patient/family/caregiver  - Collaborate with rehabilitation services on mobility goals if consulted  - Perform Range of Motion 3 times a day  - Reposition patient every 2 hours    - Dangle patient 3 times a day  - Stand patient 3 times a day  - Ambulate patient 3 times a day  - Out of bed to chair 3 times a day   - Out of bed for meals 3 times a day  - Out of bed for toileting  - Record patient progress and toleration of activity level   Outcome: Progressing  Goal: Patient will remain free of falls  Description: INTERVENTIONS:  - Educate patient/family on patient safety including physical limitations  - Instruct patient to call for assistance with activity   - Consult OT/PT to assist with strengthening/mobility   - Keep Call bell within reach  - Keep bed low and locked with side rails adjusted as appropriate  - Keep care items and personal belongings within reach  - Initiate and maintain comfort rounds  - Make Fall Risk Sign visible to staff  - Offer Toileting every 4 Hours, in advance of need  - Initiate/Maintain bed alarm  - Obtain necessary fall risk management equipment  - Apply yellow socks and bracelet for high fall risk patients  - Consider moving patient to room near nurses station  Outcome: Progressing     Problem: DISCHARGE PLANNING  Goal: Discharge to home or other facility with appropriate resources  Description: INTERVENTIONS:  - Identify barriers to discharge w/patient and caregiver  - Arrange for needed discharge resources and transportation as appropriate  - Identify discharge learning needs (meds, wound care, etc )  - Arrange for interpretive services to assist at discharge as needed  - Refer to Case Management Department for coordinating discharge planning if the patient needs post-hospital services based on physician/advanced practitioner order or complex needs related to functional status, cognitive ability, or social support system  Outcome: Progressing     Problem: Knowledge Deficit  Goal: Patient/family/caregiver demonstrates understanding of disease process, treatment plan, medications, and discharge instructions  Description: Complete learning assessment and assess knowledge base    Interventions:  - Provide teaching at level of understanding  - Provide teaching via preferred learning methods  Outcome: Progressing     Problem: NEUROSENSORY - ADULT  Goal: Achieves stable or improved neurological status  Description: INTERVENTIONS  - Monitor and report changes in neurological status  - Monitor vital signs such as temperature, blood pressure, glucose, and any other labs ordered   - Initiate measures to prevent increased intracranial pressure  - Monitor for seizure activity and implement precautions if appropriate      Outcome: Progressing  Goal: Remains free of injury related to seizures activity  Description: INTERVENTIONS  - Maintain airway, patient safety  and administer oxygen as ordered  - Monitor patient for seizure activity, document and report duration and description of seizure to physician/advanced practitioner  - If seizure occurs,  ensure patient safety during seizure  - Reorient patient post seizure  - Seizure pads on all 4 side rails  - Instruct patient/family to notify RN of any seizure activity including if an aura is experienced  - Instruct patient/family to call for assistance with activity based on nursing assessment  - Administer anti-seizure medications if ordered    Outcome: Progressing  Goal: Achieves maximal functionality and self care  Description: INTERVENTIONS  - Monitor swallowing and airway patency with patient fatigue and changes in neurological status  - Encourage and assist patient to increase activity and self care     - Encourage visually impaired, hearing impaired and aphasic patients to use assistive/communication devices  Outcome: Progressing     Problem: CARDIOVASCULAR - ADULT  Goal: Maintains optimal cardiac output and hemodynamic stability  Description: INTERVENTIONS:  - Monitor I/O, vital signs and rhythm  - Monitor for S/S and trends of decreased cardiac output  - Administer and titrate ordered vasoactive medications to optimize hemodynamic stability  - Assess quality of pulses, skin color and temperature  - Assess for signs of decreased coronary artery perfusion  - Instruct patient to report change in severity of symptoms  Outcome: Progressing  Goal: Absence of cardiac dysrhythmias or at baseline rhythm  Description: INTERVENTIONS:  - Continuous cardiac monitoring, vital signs, obtain 12 lead EKG if ordered  - Administer antiarrhythmic and heart rate control medications as ordered  - Monitor electrolytes and administer replacement therapy as ordered  Outcome: Progressing     Problem: SKIN/TISSUE INTEGRITY - ADULT  Goal: Skin Integrity remains intact(Skin Breakdown Prevention)  Description: Assess:  -Perform Dimitri assessment every shift  -Clean and moisturize skin every shift  -Inspect skin when repositioning, toileting, and assisting with ADLS  -Assess under medical devices such as masimos every shift  -Assess extremities for adequate circulation and sensation     Bed Management:  -Have minimal linens on bed & keep smooth, unwrinkled  -Change linens as needed when moist or perspiring  -Avoid sitting or lying in one position for more than 2 hours while in bed  -Keep HOB at 35 degrees     Toileting:  -Offer bedside commode  -Assess for incontinence every shift  -Use incontinent care products after each incontinent episode such as barrier cream    Activity:  -Mobilize patient 3 times a day  -Encourage activity and walks on unit  -Encourage or provide ROM exercises   -Turn and reposition patient every 2 Hours  -Use appropriate equipment to lift or move patient in bed  -Instruct/ Assist with weight shifting every 2 hours when out of bed in chair  -Consider limitation of chair time 5 hour intervals    Skin Care:  -Avoid use of baby powder, tape, friction and shearing, hot water or constrictive clothing  -Relieve pressure over bony prominences using pillows  -Do not massage red bony areas    Next Steps:  -Teach patient strategies to minimize risks such as weight shifting   -Consider consults to  interdisciplinary teams such as PT  Outcome: Progressing  Goal: Incision(s), wounds(s) or drain site(s) healing without S/S of infection  Description: INTERVENTIONS  - Assess and document dressing, incision, wound bed, drain sites and surrounding tissue  - Provide patient and family education  - Perform skin care/dressing changes every shift  Outcome: Progressing  Goal: Pressure injury heals and does not worsen  Description: Interventions:  - Implement low air loss mattress or specialty surface (Criteria met)  - Apply silicone foam dressing  - Instruct/assist with weight shifting every 120 minutes when in chair   - Limit chair time to 5 hour intervals  - Use special pressure reducing interventions such as cushions when in chair   - Apply fecal or urinary incontinence containment device   - Perform passive or active ROM every 5 hours  - Turn and reposition patient & offload bony prominences every 2 hours   - Utilize friction reducing device or surface for transfers   - Consider consults to  interdisciplinary teams such as PT  - Use incontinent care products after each incontinent episode such as barrier cream  - Consider nutrition services referral as needed  Outcome: Progressing     Problem: MUSCULOSKELETAL - ADULT  Goal: Maintain or return mobility to safest level of function  Description: INTERVENTIONS:  - Assess patient's ability to carry out ADLs; assess patient's baseline for ADL function and identify physical deficits which impact ability to perform ADLs (bathing, care of mouth/teeth, toileting, grooming, dressing, etc )  - Assess/evaluate cause of self-care deficits   - Assess range of motion  - Assess patient's mobility  - Assess patient's need for assistive devices and provide as appropriate  - Encourage maximum independence but intervene and supervise when necessary  - Involve family in performance of ADLs  - Assess for home care needs following discharge   - Consider OT consult to assist with ADL evaluation and planning for discharge  - Provide patient education as appropriate  Outcome: Progressing  Goal: Maintain proper alignment of affected body part  Description: INTERVENTIONS:  - Support, maintain and protect limb and body alignment  - Provide patient/ family with appropriate education  Outcome: Progressing     Problem: Nutrition/Hydration-ADULT  Goal: Nutrient/Hydration intake appropriate for improving, restoring or maintaining nutritional needs  Description: Monitor and assess patient's nutrition/hydration status for malnutrition  Collaborate with interdisciplinary team and initiate plan and interventions as ordered  Monitor patient's weight and dietary intake as ordered or per policy  Utilize nutrition screening tool and intervene as necessary  Determine patient's food preferences and provide high-protein, high-caloric foods as appropriate       INTERVENTIONS:  - Monitor oral intake, urinary output, labs, and treatment plans  - Assess nutrition and hydration status and recommend course of action  - Evaluate amount of meals eaten  - Assist patient with eating if necessary   - Allow adequate time for meals  - Recommend/ encourage appropriate diets, oral nutritional supplements, and vitamin/mineral supplements  - Order, calculate, and assess calorie counts as needed  - Recommend, monitor, and adjust tube feedings and TPN/PPN based on assessed needs  - Assess need for intravenous fluids  - Provide specific nutrition/hydration education as appropriate  - Include patient/family/caregiver in decisions related to nutrition  Outcome: Progressing     Problem: Potential for Falls  Goal: Patient will remain free of falls  Description: INTERVENTIONS:  - Educate patient/family on patient safety including physical limitations  - Instruct patient to call for assistance with activity   - Consult OT/PT to assist with strengthening/mobility   - Keep Call bell within reach  - Keep bed low and locked with side rails adjusted as appropriate  - Keep care items and personal belongings within reach  - Initiate and maintain comfort rounds  - Make Fall Risk Sign visible to staff  - Offer Toileting every 4 Hours, in advance of need  - Initiate/Maintain bed alarm  - Obtain necessary fall risk management equipment  - Apply yellow socks and bracelet for high fall risk patients  - Consider moving patient to room near nurses station  Outcome: Progressing     Problem: Prexisting or High Potential for Compromised Skin Integrity  Goal: Skin integrity is maintained or improved  Description: INTERVENTIONS:  - Identify patients at risk for skin breakdown  - Assess and monitor skin integrity  - Assess and monitor nutrition and hydration status  - Monitor labs   - Assess for incontinence   - Turn and reposition patient  - Assist with mobility/ambulation  - Relieve pressure over bony prominences  - Avoid friction and shearing  - Provide appropriate hygiene as needed including keeping skin clean and dry  - Evaluate need for skin moisturizer/barrier cream  - Collaborate with interdisciplinary team   - Patient/family teaching  - Consider wound care consult   Outcome: Progressing

## 2022-11-18 NOTE — ASSESSMENT & PLAN NOTE
· Continue PRN PO Tylenol  · Hold Norco in the setting of acute metabolic encephalopathy  • Outpatient follow-up with PCP in regards to this matter

## 2022-11-18 NOTE — ASSESSMENT & PLAN NOTE
· The patient did not qualify for home supplemental oxygen treatment per the Respiratory Therapy evaluation as detailed below  · Outpatient Pulmonology evaluation  · Outpatient sleep study to check for obstructive sleep apnea    "Home Oxygen Qualifying Test     Patient name: Yoli Reyna        : 1955   Date of Test:  2022             Diagnosis: altered mental status   Home Oxygen Test:    **Medicare Guidelines require item(s) 1-5 on all ambulatory patients or 1 and 2 on non-ambulatory patients      1  Baseline SPO2 on Room Air at rest 95 %   a  If <= 88% on Room Air add O2 via NC to obtain SpO2 >=88%  If LPM needed, document LPM na needed to reach =>88%     1  SPO2 during exertion on Room Air 95 %  a  During exertion monitor SPO2  If SPO2 increases >=89%, do not add supplemental oxygen     2  SPO2 on Oxygen at Rest na % at na LPM     3  SPO2 during exertion on Oxygen na % at na LPM     4  Test performed during exertion activity  Walking 235 ft, 8 minutes     []? Supplemental Home Oxygen is indicated      [x]?   Client does not qualify for home oxygen      Respiratory Additional Notes- pt ambulated on room air 325 ft, no rest, no SOB  Ruben Harmon, RT"

## 2022-11-18 NOTE — ASSESSMENT & PLAN NOTE
· Check an iron panel, vitamin B12 level, and folate level as an outpatient with her PCP  · Follow the CBC after discharge with her PCP  · Transfuse for a hemoglobin less than 7 g/dl    Results from last 7 days   Lab Units 11/17/22  1921 11/17/22  0838   WBC Thousand/uL 6 13 4 57   HEMOGLOBIN g/dL 8 2* 9 0*   HEMATOCRIT % 27 2* 29 5*   PLATELETS Thousands/uL 262 305

## 2022-11-18 NOTE — ASSESSMENT & PLAN NOTE
· Discontinue ergocalciferol  · Initiate cholecalciferol 1000 I  U  PO Qdaily  · Follow the vitamin D 25-OH level as an outpatient with her PCP

## 2022-11-18 NOTE — PLAN OF CARE
Problem: OCCUPATIONAL THERAPY ADULT  Goal: Performs self-care activities at highest level of function for planned discharge setting  See evaluation for individualized goals  Description: Treatment Interventions: ADL retraining, Functional transfer training, UE strengthening/ROM, Cognitive reorientation, Patient/family training, Equipment evaluation/education          See flowsheet documentation for full assessment, interventions and recommendations  Note: Limitation: Decreased ADL status, Decreased Safe judgement during ADL, Decreased endurance, Decreased cognition, Decreased self-care trans, Decreased high-level ADLs  Prognosis: Good  Assessment: Patient is a 77 y o  female seen for OT evaluation at [address] [place] [address] following admission on 11/17/2022  s/p AMS (altered mental status)  Comorbidities and significant PMHx impacting functional performance include: GERD, Anemia, chronic pain, nocturnal hypoxia, dysphagia, vitamin D insufficiency, Hx CVA with residual deficits, Tib/fib fx s/p IM nail fixation    Patient presents with active orders for OT eval and treat and up and OOB as tolerated   Performed at least 2 patient identifiers during session including name and wristband  At baseline , pt is (I) c ADL/IADls  Lives c family in 2 story home with 1 HECTOR Upon initial evaluation, patient is independent for UB ADLs, supervision for LB ADLs, and supervision for transfers and functional mobility household distance with no AD  Based on functional eval, pt presents with impaired  attention, impaired  safety awareness, impaired  problem solving skills, and intact   memory  Occupational performance is affected by the following deficits: endurance , decreased dynamic balance impacting functional reach, attention to task, (+) pain  and direction following Based on these findings, functional performance deficits, and medical complexity pt has been identified as a high complexity evaluation   Personal factors impacting performance include: decreased (+) Hx of falls , steps to enter home and decreased recall of precautions   Patient would benefit from OT services within the acute care setting to maximize level of functional independence in the following occupational areas bathing/showering, toileting, dressing , functional mobility, transfer to all surfaces, meal preparation, household management and fall prevention   From OT standpoint, recommendation at time of D/C would be return to previous environment with home health rehabilitation       OT Discharge Recommendation: Home with home health rehabilitation        Oscar Truong

## 2022-11-18 NOTE — DISCHARGE SUMMARY
[facility] [address]  Discharge- Marti Lira 1955, 77 y o  female MRN: [id number]  Unit/Bed#: 145-35 Encounter: [id number]  Primary Care Provider: Oumou Escobedo DO   Date and time admitted to hospital: 11/17/2022  7:34 AM    * Acute metabolic encephalopathy  Assessment & Plan  · Present on admission  · Likely due to polypharmacy patient being on multiple sedative medications  · Treated with NSS IV fluids at 125 ml/hr  · No sign acute intracranial hemorrhage or acute CVA on MRI of the brain completed on 11/18/2022  · Hold Neurontin, Norco, and Seroquel for now per Psychiatry  · The patient's mental status returned to her baseline normal mental status by the time of discharge  · Outpatient follow-up with PCP and Psychiatry  · Outpatient Neurology evaluation    MRI of the brain (11/18/2022): IMPRESSION:     No interval change  No mass effect, acute intracranial hemorrhage or evidence of recent infarction  Mild chronic microvascular ischemic change and chronic lacunar infarcts are stable since the prior exam     Cerebrovascular disease  Assessment & Plan  · History of previous lacunar infarcts  · Utilize aspirin 81 mg PO Qdaily and high-intensity statin therapy with atorvastatin 40 mg PO Qdaily in the evening  · Outpatient Neurology evaluation    Tobacco use  Assessment & Plan  · A nicotine patch was utilized during the hospitalization  · Smoking cessation counseling was given to the patient during the hospitalization  Diastolic dysfunction  Assessment & Plan  · Grade 1 diastolic dysfunction on echocardiogram completed on 11/18/2022  · Outpatient Cardiology evaluation    Low TSH level  Assessment & Plan  · Recheck a TSH level and free T4 level next week with her PCP    Vitamin D insufficiency  Assessment & Plan  · Discontinue ergocalciferol  · Initiate cholecalciferol 1000 I  U  PO Qdaily  · Follow the vitamin D 25-OH level as an outpatient with her PCP    Dysphagia  Assessment & Plan  · The patient was evaluated by Speech Therapy with the following impressions/recommendations:  "Summary   Pt presented with s/s suggestive of minimal oral and suspected mild pharyngeal dysphagia  Symptoms or concerns included mild difficulty with mastication suspected pharyngeal swallow delay, suspected pharyngeal residue and multiple swallows        Risk/s for Aspiration: low     Recommended Diet: soft/level 3 diet and thin liquids   Recommended Form of Meds: whole with liquid   Aspiration precautions and swallowing strategies: upright posture and alternating bites and sips  Other Recommendations: Continue frequent oral care, possible VFSS if swallow function does not improve with implemented compensatory strategies"    Nocturnal hypoxia  Assessment & Plan  · The patient did not qualify for home supplemental oxygen treatment per the Respiratory Therapy evaluation as detailed below  · Outpatient Pulmonology evaluation  · Outpatient sleep study to check for obstructive sleep apnea    "Home Oxygen Qualifying Test     Patient name: Victor M Mccabe        : 1955   Date of Test:  2022             Diagnosis: altered mental status   Home Oxygen Test:    **Medicare Guidelines require item(s) 1-5 on all ambulatory patients or 1 and 2 on non-ambulatory patients      1  Baseline SPO2 on Room Air at rest 95 %   a  If <= 88% on Room Air add O2 via NC to obtain SpO2 >=88%  If LPM needed, document LPM na needed to reach =>88%     1  SPO2 during exertion on Room Air 95 %  a  During exertion monitor SPO2  If SPO2 increases >=89%, do not add supplemental oxygen     2  SPO2 on Oxygen at Rest na % at na LPM     3  SPO2 during exertion on Oxygen na % at na LPM     4  Test performed during exertion activity  Walking 235 ft, 8 minutes     []? Supplemental Home Oxygen is indicated      [x]?   Client does not qualify for home oxygen      Respiratory Additional Notes- pt ambulated on room air 325 ft, no rest, no SOB  Parker Montelongo, RT"      Chronic pain  Assessment & Plan  · Continue PRN PO Tylenol  · Hold Norco in the setting of acute metabolic encephalopathy  • Outpatient follow-up with PCP in regards to this matter    Anemia  Assessment & Plan  · Check an iron panel, vitamin B12 level, and folate level as an outpatient with her PCP  · Follow the CBC after discharge with her PCP  · Transfuse for a hemoglobin less than 7 g/dl    Results from last 7 days   Lab Units 11/17/22  1921 11/17/22  0838   WBC Thousand/uL 6 13 4 57   HEMOGLOBIN g/dL 8 2* 9 0*   HEMATOCRIT % 27 2* 29 5*   PLATELETS Thousands/uL 262 305         GERD (gastroesophageal reflux disease)  Assessment & Plan  · Continue PPI therapy    Bipolar 1 disorder (HCC)  Assessment & Plan  Assessment: Patient with PMHx of bipolar 1 disorder  Reports "not having any episodes for a very long time " Reports taking seroquel 300 qPM, zyprexa 20 qPM, xanax 1mg TID  Evaluation of bipolar 1 disorder limited due to AMS  · The patient was seen in consultation by [facility]  · Hold gabapentin and seroquel for now  · Continue zyprexa 20 mg PO QHS and PRN PO xanax  · Outpatient follow-up with Psychiatry    Tele Psychiatry evaluation on 11/17/2022:  "Assessment:  Amanda Burns is a 76 y/o female with PMH significant for Bipolar Disorder and Chronic Pain that presented for AMS  Patients presentation consistent with Metabolic Encephalopathy 2/2 polypharmacy with BDZ, Opioids, Gabapentin, and dual or tripple antipsychotic therapy  Patient should ideally be optimized on one antipsychotic  Patient without any active mood symptoms to include trinity or depression  Patient does endorse some passive SI without plan or intent and spontaneously produces protective factors   Patient without any indication for voluntary or involuntary IP psychiatric admission or 1:1       Metabolic Encephalopathy      Treatment Plan:     Planned Medication Changes:     -Consider using only Olanzapine 20 mg qhs"          Discharging Physician / Practitioner: Jorge L Genao DO  PCP: Alexa Chang DO  Admission Date:   Admission Orders (From admission, onward)     Ordered        11/17/22 1227  Place in Observation  Once                      Discharge Date: 11/18/22    Consultations During Hospital Stay:  · Tele Psychiatry    Procedures Performed:   · None    Significant Findings / Test Results:   CTA head and neck w wo contrast    Result Date: 11/17/2022  Impression: No significant stenosis of the cervical carotid or vertebral arteries No significant intracranial stenosis, large vessel occlusion or aneurysm  Workstation performed: [id number]     XR chest 1 view portable    Result Date: 11/17/2022  Impression: No acute cardiopulmonary disease  Workstation performed: [id number]     XR chest 1 view portable    Result Date: 11/17/2022  Impression: Right jugular catheter malpositioned in the right axillary vein  No acute pulmonary disease with no pneumothorax  I personally discussed this study with Dr Kareem Cole on 11/17/2022 at 9:14 AM  Workstation performed: [id number]     CT head wo contrast    Result Date: 11/17/2022  Impression: No evidence of acute intracranial process  Chronic microangiopathy  Workstation performed: [id number]     MRI brain wo contrast  Status: Final result     PACS Images     Show images for MRI brain wo contrast  Study Result    Narrative & Impression   MRI BRAIN WITHOUT CONTRAST     INDICATION: R/O stroke in context of 1 day acute AMS, previous hx of CVA 2009      COMPARISON:   10/15/2020     TECHNIQUE:  Multiplanar, multisequence imaging of the brain was performed         IMAGE QUALITY:  Diagnostic      FINDINGS:     BRAIN PARENCHYMA:  There is no discrete mass, mass effect or midline shift  There is no intracranial hemorrhage  There is no evidence of acute infarction and diffusion imaging is unremarkable  There is mild chronic microvascular ischemic change    There   is a chronic left caudate head lacunar infarct  There are chronic bilateral cerebellar lacunar infarcts, stable        VENTRICLES:  Normal for the patient's age      SELLA AND PITUITARY GLAND:  Normal      ORBITS:  Status post right-sided cataract surgery      PARANASAL SINUSES:  There is mucosal thickening of the left maxillary sinus      VASCULATURE:  Evaluation of the major intracranial vasculature demonstrates appropriate flow voids      CALVARIUM AND SKULL BASE:  Normal      EXTRACRANIAL SOFT TISSUES:  Normal      IMPRESSION:     No interval change  No mass effect, acute intracranial hemorrhage or evidence of recent infarction  Mild chronic microvascular ischemic change and chronic lacunar infarcts are stable since the prior exam      ECG 12 lead  Status: Final result     MUSE LINK     View MUSE Strips  Study Result    Narrative & Impression   Normal sinus rhythm  Normal ECG  When compared with ECG of 2022 12:33,  No significant change was found  Confirmed by Lori La ([id number]) on 2022 8:57:33 AM     [facility] and [address]  Goldie Flakes  Echo complete  Order# [id number]  Reading physician: Emir Kent DO Ordering physician: Natasha Zaidi DO Study date: 22     Name: Goldie Young                       : 1955  MRN: [id number]                       Age: 77 y o  Patient Status: Observation          Gender: female  Vitals    Height Weight BSA (Calculated - m2) BP Pulse   5' 4" (1 626 m) 87 5 kg (193 lb) 1 93 sq meters 109/59 81     PACS Images     Show images for Echo complete w/ contrast if indicated  Study Details    This transthoracic echocardiogram was performed at bedside  This was a routine, inpatient study  Study quality was adequate  A complete 2D, color flow Doppler, spectral Doppler and 2D transthoracic echocardiogram was performed  The apical, parasternal, subcostal and suprasternal views were obtained  Indications  Priority: Routine  Cerebral Thrombosis, Embolism, Artery Occlusion     Interpretation Summary       •  Left Ventricle: Left ventricular cavity size is normal  Wall thickness is mildly increased  The left ventricular ejection fraction is 60%  Systolic function is normal  Wall motion is normal  Diastolic function is mildly abnormal, consistent with grade I (abnormal) relaxation  •  Right Ventricle: Right ventricular cavity size is normal  Systolic function is normal      Findings    Left Ventricle Left ventricular cavity size is normal  Wall thickness is mildly increased  The left ventricular ejection fraction is 60%  Systolic function is normal   Wall motion is normal  Diastolic function is mildly abnormal, consistent with grade I (abnormal) relaxation  Right Ventricle Right ventricular cavity size is normal  Systolic function is normal  Wall thickness is normal    Left Atrium The atrium is normal in size  Right Atrium The atrium is normal in size  Aortic Valve The aortic valve is trileaflet  The leaflets are not thickened  The leaflets are not calcified  The leaflets exhibit normal mobility  There is trace regurgitation  The aortic valve has no significant stenosis  Mitral Valve Mitral valve structure is normal  There is trace regurgitation  There is no evidence of stenosis  Tricuspid Valve Tricuspid valve structure is normal  There is trace regurgitation  There is no evidence of stenosis  There is no indirect evidence of pulmonary hypertension  Pulmonic Valve Pulmonic valve structure is normal  There is trace regurgitation  There is no evidence of stenosis  Ascending Aorta The aortic root is normal in size  IVC/SVC The inferior vena cava is normal in size  Respirophasic changes were normal    Pericardium There is no pericardial effusion  The pericardium is normal in appearance       Results from last 7 days   Lab Units 11/17/22  1921 11/17/22  0838   WBC Thousand/uL 6 13 4 57 HEMOGLOBIN g/dL 8 2* 9 0*   HEMATOCRIT % 27 2* 29 5*   PLATELETS Thousands/uL 262 305     Results from last 7 days   Lab Units 11/17/22  1921 11/17/22  0838   SODIUM mmol/L 142 141   POTASSIUM mmol/L 3 6 4 4   CHLORIDE mmol/L 107 106   CO2 mmol/L 30 31   BUN mg/dL 17 22   CREATININE mg/dL 0 97 1 08   CALCIUM mg/dL 8 7 9 0     Results from last 7 days   Lab Units 11/17/22  1921   MAGNESIUM mg/dL 1 7     Results from last 7 days   Lab Units 11/17/22  1921 11/17/22  0838   ALK PHOS U/L 113 126*   ALT U/L 9* 14   AST U/L 15  --      Microbiology Results (last 21 days)    Collected Updated Procedure Result Status Patient Facility Result Comment    11/18/2022 0439 11/18/2022 0506 Urine culture [[id number]]   Urine, Clean Catch    In process [address]  Component Value   No component results             11/17/2022 1251 11/17/2022 1338 COVID19, Influenza A/B, RSV PCR, [facility] [[id number]]    Nasopharyngeal Swab    Final result [address] URL to browser: [email or web address]   SARS-CoV-2 assay is a Nucleic Acid Amplification assay intended for the   qualitative detection of nucleic acid from SARS-CoV-2 in nasopharyngeal   swabs  Results are for the presumptive identification of SARS-CoV-2 RNA  Positive results are indicative of infection with SARS-CoV-2, the virus   causing COVID-19, but do not rule out bacterial infection or co-infection   with other viruses  Laboratories within the [place] and its   territories are required to report all positive results to the appropriate   public health authorities  Negative results do not preclude SARS-CoV-2   infection and should not be used as the sole basis for treatment or other   patient management decisions  Negative results must be combined with   clinical observations, patient history, and epidemiological information  This test has not been FDA cleared or approved  This test has been authorized by FDA under an Emergency Use Authorization   (EUA)  This test is only authorized for the duration of time the   declaration that circumstances exist justifying the authorization of the   emergency use of an in vitro diagnostic tests for detection of SARS-CoV-2   virus and/or diagnosis of COVID-19 infection under section 564(b)(1) of   the Act, 21 U  S C  414TPC-2(A)(6), unless the authorization is terminated   or revoked sooner  The test has been validated but independent review by FDA   and CLIA is pending  Test performed using [employer] Gene[employer]pert: This RT-PCR assay targets N2,   a region unique to SARS-CoV-2  A conserved region in the E-gene was chosen   for pan-Sarbecovirus detection which includes SARS-CoV-2  According to CMS-2020-01-R, this platform meets the definition of high-[employer] technology  Component Value   SARS-CoV-2 Negative   INFLUENZA A PCR Negative   INFLUENZA B PCR Negative   RSV PCR Negative                Incidental Findings:   · As above    Test Results Pending at Discharge (will require follow up):   · Urine culture and HIV antigen/antibody profile from 11/18/2022     Outpatient Tests Requested:  · CBC with diff , CMP, Magnesium level, Phosphorus level, Iron panel, Folate level, Vitamin B12 level, Vitamin D 25-OH level, TSH level, and Free T4 level in 3 days with PCP  · Outpatient sleep study    Complications:  None    Reason for Admission:  Acute metabolic encephalopathy    Hospital Course:   Cookie Ma is a 77 y o  female patient who originally presented to the hospital on 11/17/2022 due to confusion, lethargic, and aphasia  Please see above list of diagnoses and related plan for additional information  Condition at Discharge: good    Discharge Day Visit / Exam:   Subjective: The patient was seen and examined  The patient is doing better    The aphasia, confusion, and lethargy have all improved  No chest pain  No shortness of breath  No abdominal pain  No nausea or vomiting  Vitals: Blood Pressure: 149/76 (11/18/22 1033)  Pulse: 78 (11/18/22 1033)  Temperature: 98 8 °F (37 1 °C) (11/18/22 1033)  Temp Source: Axillary (11/17/22 1533)  Respirations: 18 (11/18/22 1033)  Height: 5' 4" (162 6 cm) (11/18/22 0645)  Weight - Scale: 87 5 kg (193 lb) (11/18/22 0645)  SpO2: 97 % (11/18/22 1033)  Exam:   Physical Exam   General:  NAD, follows commands  HEENT:  NC/AT, mucous membranes moist  Neck:  Supple, No JVP elevation  CV:  + S1, + S2, RRR  Pulm:  Lung fields are CTA bilaterally  Abd:  Soft, Non-tender, Non-distended  Ext:  No clubbing/cyanosis/edema  Skin:  No rashes  Neuro:  Awake, alert, oriented  Psych:  Normal mood and affect      Discussion with Family: Patient declined call to   Discharge instructions/Information to patient and family:   See after visit summary for information provided to patient and family  Provisions for Follow-Up Care:  See after visit summary for information related to follow-up care and any pertinent home health orders  Disposition:   Home    Planned Readmission:  No     Discharge Statement:  I spent 25 minutes discharging the patient  This time was spent on the day of discharge  I had direct contact with the patient on the day of discharge  Greater than 50% of the total time was spent examining patient, answering all patient questions, arranging and discussing plan of care with patient as well as directly providing post-discharge instructions  Additional time then spent on discharge activities  Discharge Medications:  See after visit summary for reconciled discharge medications provided to patient and/or family        **Please Note: This note may have been constructed using a voice recognition system**

## 2022-11-18 NOTE — CASE MANAGEMENT
Case Management Assessment & Discharge Planning Note    Patient name Lino Nash  Location [address] [id number] MRN [id number]  : 1955 Date 2022       Current Admission Date: 2022  Current Admission Diagnosis:AMS (altered mental status)   Patient Active Problem List    Diagnosis Date Noted   • Nocturnal hypoxia 2022   • Dysphagia 2022   • AMS (altered mental status) 2022   • Chronic pain 2022   • Fracture of tibia and fibula, left, closed, with delayed healing, subsequent encounter 2022   • Closed fracture of distal end of left tibia 2022   • CVA (cerebral vascular accident) ([facility] ) 2022   • Pure hypercholesterolemia 2021   • Chronic obstructive pulmonary disease ([facility] ) 2021   • Anemia 2021   • Bipolar disease, chronic ([facility] ) 2021   • Rhabdomyolysis 2021   • Neck pain 2021   • Poor venous access 2021   • Hypertension 2021   • History of CVA (cerebrovascular accident) 2021   • Acute encephalopathy 2021   • Hypothermia 2021   • Elevated d-dimer 2021   • Acute encephalopathy 2021   • Overdose of drug, undetermined intent, initial encounter 2021   • Slurred speech 10/15/2020   • S/P ORIF (open reduction internal fixation) fracture 2019   • Essential hypertension 2019   • History of tachycardia 2019   • Acute right ankle pain 2019   • Closed bimalleolar fracture of right ankle 2019   • Colon cancer screening 2018   • GERD (gastroesophageal reflux disease) 2018   • Elevated MCV 2017   • Bipolar 1 disorder ([facility] )    • Renal cell carcinoma (HCC)    • Nausea and vomiting 2016   • Palpitations 2016   • Insomnia 2015   • Difficulty sleeping 2015   • Dental disorder 2015   • Allergic rhinitis 2015   • Nail fungal infection 2015   • Abnormal liver function test 03/10/2015   • Right hip pain 2014 • Herpes zoster 10/28/2014   • Shoulder pain, right 10/20/2014   • Abnormal gait 09/03/2014   • Vitamin D deficiency 02/19/2014   • Osteoporosis 02/19/2014   • Hip fracture, osteoporotic ([facility] ) 02/19/2014   • Anxiety 10/16/2013   • Yeast vaginitis 10/14/2013   • Posterior dislocation of hip, closed ([facility] ) 09/19/2013   • Chronic low back pain 07/26/2013   • Prosthetic hip infection ([facility] ) 06/16/2013   • Depression 06/09/2013   • Tobacco abuse 06/09/2013   • Generalized anxiety disorder 06/09/2013      LOS (days): 0  Geometric Mean LOS (GMLOS) (days):   Days to GMLOS:     OBJECTIVE:     Current admission status: Observation       Preferred Pharmacy:   [facility], [address] [facility]  [address]  [address] [facility]  [address]  [address] [address]  Phone: [phone] Fax: [phone]    Primary Care Provider: Liv Beckett DO    Primary Insurance: Roberta THORNTON  Secondary Insurance: Nikky Cindy    ASSESSMENT:  [facility], [address] Representative - Mother   Primary Phone: [phone] (Mobile)               Advance Directives  Does patient have a [address]?: No  Was patient offered paperwork?: Yes  Does patient currently have a Health Care decision maker?: Yes, please see Health Care Proxy section  Does patient have Advance Directives?: Yes  Advance Directives: Living will (copy requested)  Primary Contact: Laura Zapata (Mother)   [phone] (M)         Readmission Root Cause  30 Day Readmission: No    Patient Information  Admitted from[de-identified] Home  Mental Status: Alert  During Assessment patient was accompanied by: Not accompanied during assessment  Assessment information provided by[de-identified] Patient  Primary Caregiver: Self  Support Systems: Parent (mother)  [place] of Residence: [facility] do you live in?: [address] entry access options   Select all that apply : Stairs  Number of steps to enter home : 1  Type of Current Residence: 2 story home  Upon entering residence, is there a bedroom on the main floor (no further steps)?: Yes  Upon entering residence, is there a bathroom on the main floor (no further steps)?: Yes  In the last 12 months, was there a time when you were not able to pay the mortgage or rent on time?: No  In the last 12 months, how many places have you lived?: 1  In the last 12 months, was there a time when you did not have a steady place to sleep or slept in a shelter (including now)?: No  Homeless/housing insecurity resource given?: N/A  Living Arrangements: Lives w/ Parent(s) (going to stay with her mom on dc for a short time)  Is patient a ?: No    Activities of Daily Living Prior to Admission  Functional Status: Independent  Completes ADLs independently?: Yes  Ambulates independently?: Yes  Does patient use assisted devices?: No  Does patient currently own DME?: Yes  What DME does the patient currently own?: [employer], Wheelchair  Does patient have a history of Outpatient Therapy (PT/OT)?: No  Does the patient have a history of Short-Term Rehab?: No  Does patient have a history of HHC?: No  Does patient currently have [address]?: No         Patient Information Continued  Income Source: SSI/SSD  Does patient have prescription coverage?: Yes  Within the past 12 months, you worried that your food would run out before you got the money to buy more : Never true  Within the past 12 months, the food you bought just didn't last and you didn't have money to get more : Never true  Food insecurity resource given?: N/A  Does patient receive dialysis treatments?: No  Does patient have a history of substance abuse?: No  Does patient have a history of Mental Health Diagnosis?: No  Addendum:bipolar disorder       Means of Transportation  Means of Transport to Appts[de-identified] Family transport (mom)  In the past 12 months, has lack of transportation kept you from medical appointments or from getting medications?: No  In the past 12 months, has lack of transportation kept you from meetings, work, or from getting things needed for daily living?: No  Was application for public transport provided?: N/A        DISCHARGE DETAILS:    Discharge planning discussed with[de-identified] patient        CM contacted family/caregiver?: No- see comments (declines)  Were Treatment Team discharge recommendations reviewed with patient/caregiver?: Yes  Did patient/caregiver verbalize understanding of patient care needs?: Yes  Were patient/caregiver advised of the risks associated with not following Treatment Team discharge recommendations?: Yes      Would you like to participate in our [facility] [address] service program?  : No - Declined       Discharge Destination Plan[de-identified] Home  Transport at Discharge : Family (mom)      Plans at this time are home on dc with OP follow up  Pt is a tentative dc home after her MRI today

## 2022-11-18 NOTE — PHYSICAL THERAPY NOTE
PHYSICAL THERAPY EVALUATION  NAME:  Jozef Howe  DATE: 11/18/22    AGE:   77 y o    Mrn:   [id number]  ADMIT DX:  Speech disturbance [R47 9]  Stroke-like symptoms [R29 90]  Cerebrovascular accident (CVA), unspecified mechanism ([facility] ) [I63 9]  AMS (altered mental status) [R41 82]  Problem List:   Patient Active Problem List   Diagnosis    Bipolar 1 disorder ([facility] )    Renal cell carcinoma (HCC)    GERD (gastroesophageal reflux disease)    Colon cancer screening    Acute right ankle pain    Closed bimalleolar fracture of right ankle    Essential hypertension    History of tachycardia    S/P ORIF (open reduction internal fixation) fracture    Slurred speech    Elevated d-dimer    Acute encephalopathy    Overdose of drug, undetermined intent, initial encounter    Hypothermia    Acute encephalopathy    Bipolar disease, chronic ([place] [address] )    Rhabdomyolysis    Neck pain    Poor venous access    Hypertension    History of CVA (cerebrovascular accident)    Anemia    Chronic obstructive pulmonary disease (HCC)    Depression    Yeast vaginitis    Vitamin D deficiency    Tobacco abuse    Shoulder pain, right    Right hip pain    Pure hypercholesterolemia    Prosthetic hip infection (HCC)    Posterior dislocation of hip, closed (HCC)    Palpitations    Osteoporosis    Nausea and vomiting    Nail fungal infection    Insomnia    Hip fracture, osteoporotic (HCC)    Herpes zoster    Generalized anxiety disorder    Elevated MCV    Difficulty sleeping    Dental disorder    Chronic low back pain    Anxiety    Allergic rhinitis    Abnormal liver function test    Abnormal gait    Closed fracture of distal end of left tibia    CVA (cerebral vascular accident) ([facility] )    Fracture of tibia and fibula, left, closed, with delayed healing, subsequent encounter    AMS (altered mental status)    Chronic pain    Nocturnal hypoxia    Dysphagia    Vitamin D insufficiency       Past Medical History  Past Medical History:   Diagnosis Date    Bipolar 1 disorder ([facility] )     Cancer ([facility] )     kidney    Cardiac disease     fast heart beat    Chronic pain     Fractures     GERD (gastroesophageal reflux disease)     Hard of hearing     Hypertension     Renal cell carcinoma ([facility] )     Stroke ([facility] )     2009 affected her speech, right sided weakness    TIA (transient ischemic attack)        Past Surgical History  Past Surgical History:   Procedure Laterality Date    APPENDECTOMY      CHOLECYSTECTOMY      JOINT REPLACEMENT Bilateral      bilateral knees    NEPHRECTOMY Right     IL COLONOSCOPY FLX DX W/COLLJ SPEC WHEN PFRMD N/A 8/7/2018    Procedure: COLONOSCOPY;  Surgeon: Jhonathan Gates MD;  Location: [facility] MAIN OR;  Service: Gastroenterology    IL OPEN TREATMENT BIMALLEOLAR ANKLE FRACTURE Right 5/14/2019    Procedure: ANKLE - Bimalleolar OPEN REDUCTION W/ INTERNAL FIXATION (ORIF); Surgeon: Giselle Morales; Location: [facility] MAIN OR;  Service: Orthopedics    IL TREAT TIBIAL SHAFT FX, INTRAMED IMPLANT Left 7/27/2022    Procedure: INSERTION NAIL IM TIBIA;  Surgeon: Addie Hughes MD;  Location:  MAIN OR;  Service: Orthopedics       Length Of Stay: 0  Performed at least 2 patient identifiers during session: Name and Birthday       11/18/22 0932   PT Last Visit   PT Visit Date 11/18/22   Note Type   Note type Evaluation   Pain Assessment   Pain Assessment Tool 0-10   Pain Score 8   Pain Location/Orientation Location: Hip;Location: Back   Pain Onset/Description Onset: Ongoing   Hospital Pain Intervention(s) Medication (See MAR); Repositioned   Restrictions/Precautions   Weight Bearing Precautions Per Order Yes   LLE Weight Bearing Per Order WBAT   Braces or Orthoses   (none per pt)   Other Precautions Hard of hearing;O2;Fall Risk;Multiple lines;Telemetry;Pain  (2L/min)   Home Living   Type of Home House   Home Layout Two level;Performs ADLs on one level; Able to live on main level with bedroom/bathroom  (1 [address])   Bathroom Shower/Tub Tub/shower unit   [address] Equipment Chavo Knapp; Wheelchair-manual  (not used at [facility])   Additional Comments currently staying at her Mom's house which is described as above due to her home having 15 steps   Prior Function   Level of [place] Independent with ADLs; Needs assistance with IADLS; Independent with functional mobility   Lives With   (mother)   Receives Help From Family   IADLs Family/Friend/Other provides transportation   Falls in the last 6 months 1 to 4   Vocational On disability   General   Family/Caregiver Present Yes   Cognition   Overall Cognitive Status Impaired   Arousal/Participation Alert   Orientation Level Oriented X4   Memory Within functional limits   Following Commands Follows one step commands inconsistently   Comments difficulty focusing on task   Subjective   Subjective drainage noted form L lower leg wound   RLE Assessment   RLE Assessment WFL   LLE Assessment   LLE Assessment WFL   Vision-Basic Assessment   Current Vision Wears glasses all the time   Coordination   Movements are Fluid and Coordinated 0   Coordination and Movement Description slow movement with decreased accuracy   Sensation X   Light Touch   RLE Light Touch Impaired   LLE Light Touch Impaired   Bed Mobility   Supine to Sit 6  Modified independent   Additional items HOB elevated; Increased time required   Additional Comments pt able to sit EOB with Supervision; pt denied dizziness   Transfers   Sit to Stand 5  Supervision   Additional items Verbal cues; Impulsive   Stand to Sit 5  Supervision   Additional items Verbal cues; Increased time required   Additional Comments pt required cues for safety and direction   Ambulation/Elevation   Gait pattern Improper Weight shift;Decreased foot clearance  (generalized unsteady gait pattern)   Gait Assistance 5  Supervision   Additional items Verbal cues   Assistive Device None   Distance 90 ft   Ambulation/Elevation Additional Comments pt requires standing rest breaks due fatigue   Balance   Static Sitting Good   Dynamic Sitting Fair +   Static Standing Fair   Dynamic Standing Fair -   Ambulatory Fair -   Endurance Deficit   Endurance Deficit Yes   Endurance Deficit Description pt reporting fatigue and required rest breaks   Activity Tolerance   Activity Tolerance Patient limited by fatigue   Assessment   Prognosis Fair   Assessment Pt is 77 y o  female seen for PT evaluation s/p admit to [facility] on 11/17/2022 w/ AMS (altered mental status)  PT consulted to assess pt's functional mobility and d/c needs  Order placed for PT eval and tx, w/ up and OOB as tolerated order  Pt agreeable to PT  session upon arrival, pt found supine in bed  PTA, pt was independent w/ all functional mobility w/ no AD, has 1 HECTOR, lives w/ mother in 2 level home and on disability  Pt to benefit from continued PT tx to address deficits and maximize level of functional independent mobility and consistency  From PT/mobility standpoint, recommendation at time of d/c would be home with home health rehabilitation pending progress in order to facilitate return to PLOF  Upon conclusion pt  seated in recliner  Complexity: Comorbidities affecting pt's physical performance at time of assessment include: CVA and speech disturbance and tibia/fibula fx  Personal factors affecting pt at time of IE include: stairs to enter home, inability to navigate community distances, decreased cognition and positive fall history  Please find objective findings from PT assessment regarding body systems outlined above with impairments and limitations including impaired balance, decreased endurance, gait deviations, pain, decreased activity tolerance, decreased functional mobility tolerance, altered sensation, decreased safety awareness, fall risk and decreased cognition  Pt's clinical presentation is currently unstable/unpredictable seen in pt's presentation of new onset of O2 use, pain, polypharmacy and wounds   The patient's AM-PAC Basic Mobility Inpatient Short Form Raw Score is 22  A Raw score of greater than 16 suggests the patient may benefit from discharge to home  Please also refer to the recommendation of the Physical Therapist for safe discharge planning  Pt seen as a co-eval with OT due to the patient's co-morbidities, clinically unstable presentation, and present impairments which are a regression from the patient's baseline  Barriers to Discharge Decreased caregiver support   Goals   Patient Goals to get therapy at home   LTG Expiration Date 11/28/22   Long Term Goal #1 Pt will: Perform bed mobility tasks to modified I to improve ease of bed mobility  Perform transfers to modified I to improve ease of transfers  Perform ambulation with MI and least restrictive AD for 250 feet to  increase Indep in home environment  Increase dynamic standing balance to F+ to decrease fall risk  Increase OOB activity tolerance to 10 minutes without s/s of exertion to decrease fall risk  Navigate up and down 1 step with MI so patient can enter and exit home  Plan   Treatment/Interventions Functional transfer training;Elevations; Therapeutic exercise; Endurance training;Gait training;Bed mobility   PT Frequency 3-5x/wk   Recommendation   PT Discharge Recommendation Home with home health rehabilitation   AM-PAC Basic Mobility Inpatient   Turning in Bed Without Bedrails 4   Lying on Back to Sitting on Edge of Flat Bed 4   Moving Bed to Chair 4   Standing Up From Chair 4   Walk in Room 3   Climb 3-5 Stairs 3   Basic Mobility Inpatient Raw Score 22   Basic Mobility Standardized Score 47 4   Highest Level Of Mobility   JH-HLM Goal 7: Walk 25 feet or more   JH-HLM Achieved 7: Walk 25 feet or more       Time In: 0908  Time Out: 0932  Total Evaluation Minutes: 24    Shraddha Donnelly, PT

## 2022-11-19 LAB — BACTERIA UR CULT: NORMAL

## 2022-11-22 LAB — HIV 1+2 AB+HIV1 P24 AG SERPL QL IA: NORMAL

## 2022-11-25 ENCOUNTER — TELEPHONE (OUTPATIENT)
Dept: PSYCHIATRY | Facility: CLINIC | Age: 67
End: 2022-11-25

## 2022-11-25 NOTE — TELEPHONE ENCOUNTER
" Called patient regarding routine referral  LVM advising to return call to intake @ [phone] to be placed on wait list

## 2022-12-01 NOTE — TELEPHONE ENCOUNTER
Called patient regarding referral to be placed on proper wait list  LVM for patient to call intake dept ([phone]) back

## 2022-12-09 RX ORDER — FAMOTIDINE 20 MG/1
20 TABLET, FILM COATED ORAL DAILY
COMMUNITY
Start: 2022-11-23

## 2022-12-09 RX ORDER — SUCRALFATE 1 G/1
1 TABLET ORAL
COMMUNITY
Start: 2022-11-08

## 2022-12-09 RX ORDER — FERROUS SULFATE 325(65) MG
1 TABLET ORAL 2 TIMES DAILY WITH MEALS
COMMUNITY
Start: 2022-10-28 | End: 2023-10-28

## 2022-12-12 ENCOUNTER — OFFICE VISIT (OUTPATIENT)
Dept: GASTROENTEROLOGY | Facility: CLINIC | Age: 67
End: 2022-12-12

## 2022-12-12 VITALS
HEART RATE: 84 BPM | DIASTOLIC BLOOD PRESSURE: 80 MMHG | OXYGEN SATURATION: 98 % | WEIGHT: 185.4 LBS | BODY MASS INDEX: 31.65 KG/M2 | HEIGHT: 64 IN | RESPIRATION RATE: 18 BRPM | SYSTOLIC BLOOD PRESSURE: 140 MMHG | TEMPERATURE: 97.7 F

## 2022-12-12 DIAGNOSIS — D64.9 ANEMIA, UNSPECIFIED TYPE: ICD-10-CM

## 2022-12-12 DIAGNOSIS — R13.19 ESOPHAGEAL DYSPHAGIA: ICD-10-CM

## 2022-12-12 DIAGNOSIS — R19.8 ALTERNATING CONSTIPATION AND DIARRHEA: ICD-10-CM

## 2022-12-12 DIAGNOSIS — R19.5 POSITIVE FECAL OCCULT BLOOD TEST: ICD-10-CM

## 2022-12-12 DIAGNOSIS — K92.1 BLACK STOOLS: ICD-10-CM

## 2022-12-12 DIAGNOSIS — K21.9 GASTROESOPHAGEAL REFLUX DISEASE, UNSPECIFIED WHETHER ESOPHAGITIS PRESENT: Primary | ICD-10-CM

## 2022-12-12 NOTE — PATIENT INSTRUCTIONS
Take protonix before breakfast and dinner, then take pepcid before bed  Take benefiber daily, can increase to twice a day     Scheduled date of EGD/colonoscopy (as of today):2/8/2023  Physician performing EGD/colonoscopy: Dr Gaurav Quiles  Location of EGD/colonoscopy: [address] [place] [address]  Desired bowel prep reviewed with patient: 2 day Miralax/Dulcolax  Instructions reviewed with patient by:  Meghann Resendez  Clearances:    N/A  Recall in for a Follow up appointment

## 2022-12-12 NOTE — PROGRESS NOTES
[facility] - Outpatient Follow-up Note  Ric Lopez 79 y o  female MRN: [id number]  Encounter: [id number]          ASSESSMENT AND PLAN:      1  Gastroesophageal reflux disease, unspecified whether esophagitis present  2  Esophageal dysphagia    Patient reports that she continues with acid reflux daily and has started with feeling like food gets stuck after she swallows  She is currently taking pantoprazole twice a day and Pepcid in the morning  She was recommended for EGD at our last visit however did not undergo this due to insurance issues  Patient is agreeable to undergo EGD at this time  Discussed taking the pantoprazole in the morning before breakfast and before dinner and move the Pepcid to bedtime  Patient verbalizes understanding     -Take pantoprazole 40 mg before breakfast and before dinner  -Take Pepcid at bedtime  - EGD; Future    3  Positive fecal occult blood test  4  Anemia, unspecified type  5  Black stools    Patient was found to have black stools and anemia at her last visit which she was recommended for EGD and colonoscopy however did not have this done due to insurance issues  She is currently taking iron with an increase in her hemoglobin however still low  Her most recent iron panel has improved  Would continue to recommend EGD and colonoscopy at this time  Process, risks and benefits discussed, she is agreeable  - Colonoscopy; Future    6  Alternating constipation and diarrhea    Patient reports that she has been taking Colace daily but continues with alternating constipation and diarrhea  She reports if she does not take the Colace she will not have a bowel movement    Discussed starting Benefiber daily and titrating up to twice a day if needed, patient is agreeable to a trial of this     -Start Benefiber daily and increase to twice a day if needed    We will see patient back after procedures  ______________________________________________________________________    SUBJECTIVE: Meseret Orantes is a 26-year-old deaf female that presents today for a follow-up of multiple GI issues  She was seen in April with complaints of acid reflux, nausea, black stools, abdominal cramping and alternating diarrhea and constipation  She was found to have anemia and a positive fecal occult  She was recommended for EGD and colonoscopy but states that she did not undergo this due to insurance issues  She is currently taking pantoprazole twice a day and Pepcid in the morning with continued acid reflux most days  She reports that the nausea however is much improved  She does state that she has started with difficulty swallowing and feeling like food gets stuck  She is unsure of her last EGD  She was started on twice daily iron last month due to continued anemia and iron deficiency  Her most recent hemoglobin is improved but still low at 9 8  Recent iron panel shows improvement  She continues to complain of black stools  She states that she continues with alternating constipation and diarrhea  She reports she has been taking Colace daily  She states that if she does not take this she will not have a bowel movement  She does also report hemorrhoids and rectal bleeding as well  Her last colonoscopy was in 2018 that revealed mild diverticulosis and small internal hemorrhoids but otherwise normal         REVIEW OF SYSTEMS:  Review of Systems   HENT: Positive for trouble swallowing  Gastrointestinal: Positive for abdominal distention, abdominal pain, anal bleeding, constipation and diarrhea  Negative for nausea and vomiting           Historical Information   Past Medical History:   Diagnosis Date   • Bipolar 1 disorder ([facility] )    • Cancer ([facility] )     kidney   • Cardiac disease     fast heart beat   • Chronic pain    • Fractures    • GERD (gastroesophageal reflux disease)    • Hard of hearing    • Hypertension • Renal cell carcinoma ([facility] )    • Stroke [facility])     2009 affected her speech, right sided weakness   • TIA (transient ischemic attack)      Past Surgical History:   Procedure Laterality Date   • APPENDECTOMY     • CHOLECYSTECTOMY     • JOINT REPLACEMENT Bilateral      bilateral knees   • NEPHRECTOMY Right    • [place] COLONOSCOPY FLX DX W/COLLJ SPEC WHEN PFRMD N/A 8/7/2018    Procedure: COLONOSCOPY;  Surgeon: Richard Soto MD;  Location: [facility] MAIN OR;  Service: Gastroenterology   • NJ OPEN TREATMENT BIMALLEOLAR ANKLE FRACTURE Right 5/14/2019    Procedure: ANKLE - Bimalleolar OPEN REDUCTION W/ INTERNAL FIXATION (ORIF); Surgeon: Savanah Dominguez;   Location: [facility] MAIN OR;  Service: Orthopedics   • NJ Adina GARCIA FX IMED IMPLT W/WO SCREWS&/CERCLA Left 7/27/2022    Procedure: INSERTION NAIL IM TIBIA;  Surgeon: Nora Woods MD;  Location:  MAIN OR;  Service: Orthopedics     Social History   Social History     Substance and Sexual Activity   Alcohol Use Not Currently     Social History     Substance and Sexual Activity   Drug Use Never     Social History     Tobacco Use   Smoking Status Every Day   • Packs/day: 0 50   • Years: 7 00   • Pack years: 3 50   • Types: Cigarettes   Smokeless Tobacco Never   Tobacco Comments    pt refused     Family History   Problem Relation Age of Onset   • Colon cancer Other    • Alcohol abuse Other    • Hypertension Other        Meds/Allergies       Current Outpatient Medications:   •  acetaminophen (TYLENOL) 325 mg tablet  •  albuterol (PROVENTIL HFA,VENTOLIN HFA) 90 mcg/act inhaler  •  amLODIPine (NORVASC) 5 mg tablet  •  aspirin 81 mg chewable tablet  •  atorvastatin (LIPITOR) 40 mg tablet  •  cholecalciferol (VITAMIN D3) 1,000 units tablet  •  docusate sodium (COLACE) 100 mg capsule  •  famotidine (PEPCID) 20 mg tablet  •  ferrous sulfate 325 (65 Fe) mg tablet  •  ketotifen (ZADITOR) 0 025 % ophthalmic solution  •  metoprolol succinate (TOPROL-XL) 100 mg 24 hr tablet  •  OLANZapine (ZyPREXA) 20 MG tablet  •  pantoprazole (PROTONIX) 40 mg tablet  •  sucralfate (CARAFATE) 1 g tablet  •  vitamin B-12 (VITAMIN B-12) 1,000 mcg tablet  •  ALPRAZolam (XANAX) 1 mg tablet    Allergies   Allergen Reactions   • Celecoxib Rash and Other (See Comments)     CELEBREX   • Doxazosin Rash and Hives   • Metaxalone Rash and Hives           Objective     Blood pressure 140/80, pulse 84, temperature 97 7 °F (36 5 °C), temperature source Tympanic, resp  rate 18, height 5' 4" (1 626 m), weight 84 1 kg (185 lb 6 4 oz), SpO2 98 %  Body mass index is 31 82 kg/m²  PHYSICAL EXAM:      General Appearance:   Alert, cooperative, no distress   HEENT:   Normocephalic, atraumatic, anicteric  Neck:  Supple, symmetrical, trachea midline   Lungs:   Clear to auscultation bilaterally; no rales, rhonchi or wheezing; respirations unlabored    Heart[de-identified]   Regular rate and rhythm; no murmur, rub, or gallop  Abdomen:   Soft, non-tender, non-distended; normal bowel sounds; no masses, no organomegaly    Genitalia:   Deferred    Rectal:   Deferred    Extremities:  No cyanosis, clubbing or edema    Skin:  No jaundice, rashes, or lesions             Lab Results:   No visits with results within 1 Day(s) from this visit     Latest known visit with results is:   Admission on 11/17/2022, Discharged on 11/18/2022   Component Date Value   • Ventricular Rate 11/17/2022 68    • Atrial Rate 11/17/2022 68    • CT Interval 11/17/2022 196    • QRSD Interval 11/17/2022 86    • QT Interval 11/17/2022 398    • QTC Interval 11/17/2022 423    • P Axis 11/17/2022 74    • QRS Axis 11/17/2022 75    • T Wave Axis 11/17/2022 65    • POC Glucose 11/17/2022 78    • WBC 11/17/2022 4 57    • RBC 11/17/2022 3 33 (L)    • Hemoglobin 11/17/2022 9 0 (L)    • Hematocrit 11/17/2022 29 5 (L)    • MCV 11/17/2022 89    • MCH 11/17/2022 27 0    • MCHC 11/17/2022 30 5 (L)    • RDW 11/17/2022 26 4 (H)    • MPV 11/17/2022 8 3 (L)    • Platelets 03/77/7315 305    • nRBC 11/17/2022 0 • Neutrophils Relative 11/17/2022 60    • Immat GRANS % 11/17/2022 0    • Lymphocytes Relative 11/17/2022 26    • Monocytes Relative 11/17/2022 6    • Eosinophils Relative 11/17/2022 7 (H)    • Basophils Relative 11/17/2022 1    • Neutrophils Absolute 11/17/2022 2 73    • Immature Grans Absolute 11/17/2022 0 02    • Lymphocytes Absolute 11/17/2022 1 18    • Monocytes Absolute 11/17/2022 0 27    • Eosinophils Absolute 11/17/2022 0 31    • Basophils Absolute 11/17/2022 0 06    • Protime 11/17/2022 13 5    • INR 11/17/2022 1 01    • PTT 11/17/2022 26    • Sodium 11/17/2022 141    • Potassium 11/17/2022 4 4    • Chloride 11/17/2022 106    • CO2 11/17/2022 31    • ANION GAP 11/17/2022 4    • BUN 11/17/2022 22    • Creatinine 11/17/2022 1 08    • Glucose 11/17/2022 96    • Calcium 11/17/2022 9 0    • Corrected Calcium 11/17/2022 9 6    • AST 11/17/2022     • ALT 11/17/2022 14    • Alkaline Phosphatase 11/17/2022 126 (H)    • Total Protein 11/17/2022 6 8    • Albumin 11/17/2022 3 2 (L)    • Total Bilirubin 11/17/2022 0 25    • eGFR 11/17/2022 53    • hs TnI 0hr 11/17/2022 2    • Ethanol Lvl 11/17/2022 <3    • TSH 3RD GENERATON 11/17/2022 0 438 (L)    • pH, Arterial 11/17/2022 7 379    • pCO2, Arterial 11/17/2022 46 0 (H)    • pO2, Arterial 11/17/2022 107 5    • HCO3, Arterial 11/17/2022 26 5    • Base Excess, Arterial 11/17/2022 1 1    • O2 Content, Arterial 11/17/2022 13 2 (L)    • O2 HGB,Arterial  11/17/2022 94 7    • SOURCE 11/17/2022 Radial, Right    • Nasal Cannula 11/17/2022 2    • Other FIO2 11/17/2022 pt went to CT scan off and on 2 l/m nc, none on transport, placed on 2 l/m nc upon return    • Amph/Meth UR 11/18/2022 Negative    • Barbiturate Ur 11/18/2022 Negative    • Benzodiazepine Urine 11/18/2022 Positive (A)    • Cocaine Urine 11/18/2022 Negative    • Methadone Urine 11/18/2022 Negative    • Opiate Urine 11/18/2022 Positive (A)    • PCP Ur 11/18/2022 Negative    • THC Urine 11/18/2022 Negative    • Oxycodone Urine 11/18/2022 Negative    • SARS-CoV-2 11/17/2022 Negative    • INFLUENZA A PCR 11/17/2022 Negative    • INFLUENZA B PCR 11/17/2022 Negative    • RSV PCR 11/17/2022 Negative    • Color, UA 11/18/2022 Yellow    • Clarity, UA 11/18/2022 Clear    • Specific Gravity, UA 11/18/2022 1 010    • pH, UA 11/18/2022 6 0    • Leukocytes, UA 11/18/2022 Negative    • Nitrite, UA 11/18/2022 Negative    • Protein, UA 11/18/2022 Negative    • Glucose, UA 11/18/2022 Negative    • Ketones, UA 11/18/2022 Negative    • Urobilinogen, UA 11/18/2022 0 2    • Bilirubin, UA 11/18/2022 Negative    • Occult Blood, UA 11/18/2022 Negative    • RBC, UA 11/18/2022 None Seen    • WBC, UA 11/18/2022 0-1 (A)    • Epithelial Cells 11/18/2022 None Seen    • Bacteria, UA 11/18/2022 None Seen    • Urine Culture 11/18/2022 <10,000 cfu/ml    • LA size 11/18/2022 3 7    • Tricuspid valve peak reg* 11/18/2022 1 90    • LVPWd 11/18/2022 1 10    • MV E' Tissue Velocity Se* 11/18/2022 11    • Tricuspid annular plane * 11/18/2022 1 90    • IVSd 11/18/2022 7 98    • LV DIASTOLIC VOLUME (MOD* 13/53/4798 86    • LEFT VENTRICLE SYSTOLIC * 67/23/1862 37    • Left ventricular stroke * 11/18/2022 48 00    • A4C EF 11/18/2022 59    • LVIDd 11/18/2022 4 40    • IVS 11/18/2022 1 2    • LVIDS 11/18/2022 3 10    • FS 11/18/2022 30    • Ao root 11/18/2022 2 80    • RVID d 11/18/2022 3 3    • MV valve area p 1/2 meth* 11/18/2022 4 68    • E wave deceleration time 11/18/2022 164    • MV Peak E Guilherme 11/18/2022 88    • MV Peak A Guilherme 11/18/2022 1 16    • KENIA A4C 11/18/2022 11 8    • RAA A4C 11/18/2022 11 1    • MV stenosis pressure 1/2* 11/18/2022 47    • LVSV, 2D 11/18/2022 48    • LV EF 11/18/2022 60    • WBC 11/17/2022 6 13    • RBC 11/17/2022 3 02 (L)    • Hemoglobin 11/17/2022 8 2 (L)    • Hematocrit 11/17/2022 27 2 (L)    • MCV 11/17/2022 90    • MCH 11/17/2022 27 2    • MCHC 11/17/2022 30 1 (L)    • RDW 11/17/2022 26 6 (H)    • MPV 11/17/2022 8 2 (L)    • Platelets 21/24/7888 262    • nRBC 11/17/2022 0    • Neutrophils Relative 11/17/2022 70    • Immat GRANS % 11/17/2022 0    • Lymphocytes Relative 11/17/2022 18    • Monocytes Relative 11/17/2022 8    • Eosinophils Relative 11/17/2022 3    • Basophils Relative 11/17/2022 1    • Neutrophils Absolute 11/17/2022 4 33    • Immature Grans Absolute 11/17/2022 0 01    • Lymphocytes Absolute 11/17/2022 1 08    • Monocytes Absolute 11/17/2022 0 46    • Eosinophils Absolute 11/17/2022 0 21    • Basophils Absolute 11/17/2022 0 04    • Total CK 11/17/2022 57    • Sodium 11/17/2022 142    • Potassium 11/17/2022 3 6    • Chloride 11/17/2022 107    • CO2 11/17/2022 30    • ANION GAP 11/17/2022 5    • BUN 11/17/2022 17    • Creatinine 11/17/2022 0 97    • Glucose 11/17/2022 130    • Calcium 11/17/2022 8 7    • Corrected Calcium 11/17/2022 9 6    • AST 11/17/2022 15    • ALT 11/17/2022 9 (L)    • Alkaline Phosphatase 11/17/2022 113    • Total Protein 11/17/2022 6 2 (L)    • Albumin 11/17/2022 2 9 (L)    • Total Bilirubin 11/17/2022 0 26    • eGFR 11/17/2022 61    • Magnesium 11/17/2022 1 7    • Phosphorus 11/17/2022 3 7    • Cholesterol 11/18/2022 122    • Triglycerides 11/18/2022 134    • HDL, Direct 11/18/2022 46 (L)    • LDL Calculated 11/18/2022 49    • Hemoglobin A1C 11/18/2022 4 6    • EAG 11/18/2022 85    • HS TnI random 11/18/2022 3 (L)    • Hepatitis B Surface Ag 11/18/2022 Non-reactive    • Hep A IgM 11/18/2022 Non-reactive    • Hepatitis C Ab 11/18/2022 Non-reactive    • Hep B C IgM 11/18/2022 Non-reactive    • HIV-1/HIV-2 Ab 11/18/2022 Non-Reactive          Radiology Results:   CTA head and neck w wo contrast    Result Date: 11/17/2022  Narrative: CTA NECK AND BRAIN WITH CONTRAST INDICATION: cva sx- speech, ams COMPARISON:   Noncontrast CT from earlier today  TECHNIQUE:   Post contrast imaging was performed after administration of iodinated contrast through the neck and brain   Post contrast axial 0 625 mm images timed to opacify the arterial system  3D rendering was performed on an independent workstation  MIP reconstructions performed  Coronal reconstructions were performed of the noncontrast portion of the brain  Radiation dose length product (DLP) for this visit:  360 17 mGy-cm   This examination, like all CT scans performed in the [facility], was performed utilizing techniques to minimize radiation dose exposure, including the use of iterative  reconstruction and automated exposure control  IV Contrast:  100 mL of iohexol (OMNIPAQUE)  IMAGE QUALITY:   Diagnostic FINDINGS: NONCONTRAST BRAIN PARENCHYMA:  No intracranial mass, mass effect or midline shift  No CT signs of acute infarction  No acute parenchymal hemorrhage  VENTRICLES AND EXTRA-AXIAL SPACES:  Normal for the patient's age  VISUALIZED ORBITS AND PARANASAL SINUSES:  Unremarkable  CERVICAL VASCULATURE AORTIC ARCH AND GREAT VESSELS:  Normal aortic arch and great vessel origins  Normal visualized subclavian vessels  RIGHT VERTEBRAL ARTERY CERVICAL SEGMENT:  Normal origin  Developmentally small  No significant stenosis  LEFT VERTEBRAL ARTERY CERVICAL SEGMENT: Dominant  Normal origin  The vessel is normal in caliber throughout the neck  RIGHT EXTRACRANIAL CAROTID SEGMENT:  Normal caliber common carotid artery  Normal bifurcation and cervical internal carotid artery  No stenosis or dissection  LEFT EXTRACRANIAL CAROTID SEGMENT:  Normal caliber common carotid artery  Normal bifurcation and cervical internal carotid artery  No stenosis or dissection  NASCET criteria was used to determine the degree of internal carotid artery diameter stenosis  INTRACRANIAL VASCULATURE INTERNAL CAROTID ARTERIES:  Calcified plaque in the cavernous and supraclinoid internal carotid arteries with mild narrowing but no high-grade stenosis  Normal ophthalmic artery origins  Normal ICA terminus   ANTERIOR CIRCULATION:  Symmetric A1 segments and anterior cerebral arteries with normal enhancement  Normal anterior communicating artery  MIDDLE CEREBRAL ARTERY CIRCULATION:  M1 segment and middle cerebral artery branches demonstrate normal enhancement bilaterally  DISTAL VERTEBRAL ARTERIES:  Unremarkable dominant left vertebral artery  Patent hypoplastic right vertebral artery is diminutive distal to PICA  Posterior inferior cerebellar artery origins are normal  Normal vertebral basilar junction  BASILAR ARTERY:  Basilar artery is normal in caliber  Normal superior cerebellar arteries  POSTERIOR CEREBRAL ARTERIES: Both posterior cerebral arteries arises from the basilar tip  Both arteries demonstrate normal enhancement  VENOUS STRUCTURES:  Normal  NON VASCULAR ANATOMY BONY STRUCTURES:  Spinal degenerative changes  Sclerotic lesion in the anterior C6 vertebral body is most likely benign given stability dating back to cervical spine CT dated 7/8/2015  SOFT TISSUES OF THE NECK:  Unremarkable  THORACIC INLET:  Mild subsegmental atelectasis  Impression: No significant stenosis of the cervical carotid or vertebral arteries No significant intracranial stenosis, large vessel occlusion or aneurysm  Workstation performed: [id number]     XR chest 1 view portable    Result Date: 11/17/2022  Narrative: CHEST INDICATION:   placement of central line  COMPARISON:  Earlier today  EXAM PERFORMED/VIEWS:  XR CHEST PORTABLE  AP supine Images: 4 FINDINGS:  Right IJ line has been pulled  Cardiomediastinal silhouette appears unremarkable  The lungs are clear  No pneumothorax or pleural effusion  Osseous structures appear within normal limits for patient age  Impression: No acute cardiopulmonary disease  Workstation performed: [id number]     XR chest 1 view portable    Result Date: 11/17/2022  Narrative: CHEST INDICATION:   ams, cva sx  COMPARISON:  CXR and CT 6/24/2021  EXAM PERFORMED/VIEWS:  XR CHEST PORTABLE FINDINGS:  Right jugular catheter malpositioned in the right axillary vein  Cardiomediastinal silhouette appears unremarkable  The lungs are clear  No pneumothorax or pleural effusion  Osseous structures appear within normal limits for patient age  Impression: Right jugular catheter malpositioned in the right axillary vein  No acute pulmonary disease with no pneumothorax  I personally discussed this study with Dr Ricarda Escalante on 11/17/2022 at 9:14 AM  Workstation performed: [id number]     CT head wo contrast    Result Date: 11/17/2022  Narrative: CT BRAIN - WITHOUT CONTRAST INDICATION:   slurred speech  COMPARISON:  CT head 6/24/2021 TECHNIQUE:  CT examination of the brain was performed  In addition to axial images, sagittal and coronal 2D reformatted images were created and submitted for interpretation  Radiation dose length product (DLP) for this visit:  908  31 mGy-cm   This examination, like all CT scans performed in the [facility], was performed utilizing techniques to minimize radiation dose exposure, including the use of iterative  reconstruction and automated exposure control  IMAGE QUALITY:  Diagnostic  FINDINGS: PARENCHYMA:  No intracranial mass, mass effect or midline shift  No CT signs of acute infarction  No acute parenchymal hemorrhage  Periventricular and centrum semiovale white matter hypodensity is a nonspecific finding likely representing chronic microangiopathy  Chronic lacunar infarct left basal ganglia  Calcification bilateral cavernous internal carotid arteries  VENTRICLES AND EXTRA-AXIAL SPACES:  Normal for the patient's age  VISUALIZED ORBITS AND PARANASAL SINUSES:  Changes of right-sided lens replacement noted  Minimal mucosal thickening left maxillary sinus  CALVARIUM AND EXTRACRANIAL SOFT TISSUES:  Normal      Impression: No evidence of acute intracranial process  Chronic microangiopathy   Workstation performed: [id number]     MRI brain wo contrast    Result Date: 11/18/2022  Narrative: MRI BRAIN WITHOUT CONTRAST INDICATION: R/O stroke in context of 1 day acute AMS, previous hx of CVA 2009  COMPARISON:   10/15/2020 TECHNIQUE:  Multiplanar, multisequence imaging of the brain was performed  IMAGE QUALITY:  Diagnostic  FINDINGS: BRAIN PARENCHYMA:  There is no discrete mass, mass effect or midline shift  There is no intracranial hemorrhage  There is no evidence of acute infarction and diffusion imaging is unremarkable  There is mild chronic microvascular ischemic change  There  is a chronic left caudate head lacunar infarct  There are chronic bilateral cerebellar lacunar infarcts, stable  VENTRICLES:  Normal for the patient's age  SELLA AND PITUITARY GLAND:  Normal  ORBITS:  Status post right-sided cataract surgery  PARANASAL SINUSES:  There is mucosal thickening of the left maxillary sinus  VASCULATURE:  Evaluation of the major intracranial vasculature demonstrates appropriate flow voids  CALVARIUM AND SKULL BASE:  Normal  EXTRACRANIAL SOFT TISSUES:  Normal      Impression: No interval change  No mass effect, acute intracranial hemorrhage or evidence of recent infarction  Mild chronic microvascular ischemic change and chronic lacunar infarcts are stable since the prior exam  Workstation performed: [id number]     Echo complete w/ contrast if indicated    Result Date: 11/18/2022  Narrative: •  Left Ventricle: Left ventricular cavity size is normal  Wall thickness is mildly increased  The left ventricular ejection fraction is 60%  Systolic function is normal  Wall motion is normal  Diastolic function is mildly abnormal, consistent with grade I (abnormal) relaxation   •  Right Ventricle: Right ventricular cavity size is normal  Systolic function is normal

## 2022-12-23 ENCOUNTER — HOSPITAL ENCOUNTER (OUTPATIENT)
Dept: RADIOLOGY | Facility: CLINIC | Age: 67
End: 2022-12-23

## 2022-12-23 ENCOUNTER — OFFICE VISIT (OUTPATIENT)
Dept: OBGYN CLINIC | Facility: CLINIC | Age: 67
End: 2022-12-23

## 2022-12-23 VITALS
WEIGHT: 188 LBS | TEMPERATURE: 97.6 F | HEIGHT: 64 IN | DIASTOLIC BLOOD PRESSURE: 80 MMHG | HEART RATE: 95 BPM | BODY MASS INDEX: 32.1 KG/M2 | SYSTOLIC BLOOD PRESSURE: 120 MMHG

## 2022-12-23 DIAGNOSIS — Z98.890 STATUS POST OPEN REDUCTION WITH INTERNAL FIXATION (ORIF) OF FRACTURE OF ANKLE: Primary | ICD-10-CM

## 2022-12-23 DIAGNOSIS — Z87.81 STATUS POST OPEN REDUCTION WITH INTERNAL FIXATION (ORIF) OF FRACTURE OF ANKLE: ICD-10-CM

## 2022-12-23 DIAGNOSIS — Z98.890 STATUS POST OPEN REDUCTION WITH INTERNAL FIXATION (ORIF) OF FRACTURE OF ANKLE: ICD-10-CM

## 2022-12-23 DIAGNOSIS — M25.571 CHRONIC PAIN OF RIGHT ANKLE: ICD-10-CM

## 2022-12-23 DIAGNOSIS — S82.841S CLOSED BIMALLEOLAR FRACTURE OF RIGHT ANKLE, SEQUELA: ICD-10-CM

## 2022-12-23 DIAGNOSIS — G89.29 CHRONIC PAIN OF RIGHT ANKLE: ICD-10-CM

## 2022-12-23 DIAGNOSIS — Z87.81 STATUS POST OPEN REDUCTION WITH INTERNAL FIXATION (ORIF) OF FRACTURE OF ANKLE: Primary | ICD-10-CM

## 2022-12-23 NOTE — PROGRESS NOTES
ASSESSMENT/PLAN:    Diagnoses and all orders for this visit:    Status post open reduction with internal fixation (ORIF) of fracture of ankle  -     XR ankle 3+ vw right; Future    Closed bimalleolar fracture of right ankle, sequela    Chronic pain of right ankle        Plan: I do think that hardware removal would be a reasonable option  However, there is always the risk of wound problems after hardware removal   At this time, she would like to go home and discuss this with her mother with whom she lives  Her brother was with her today and will relay information  If she wants to proceed with hardware removal, she is to contact the office to follow-up with me and we will be happy to schedule her for hardware removal   If she wants to continue with wound care since she has seen some improvement, then I would recommend follow-up as needed  However, if the wound does not fully heal within a period of 3 to 4 weeks, reevaluation would be warranted and a stronger indication for hardware removal would then exist     Return for Patient will call       _____________________________________________________  CHIEF COMPLAINT:  Chief Complaint   Patient presents with   • Right Ankle - Pain         SUBJECTIVE:  August Lechuga is a 79y o  year old female who presents for evaluation of her right ankle  She was last seen by me in August 2029 status post fixation of her bimalleolar ankle fracture  She states that she did well in regards to the right lower extremity up until about 8 months ago when she began developing some drainage from the surgical wound, laterally  This was treated by her primary care physician with dressing changes until she was recently referred to wound care and was seen by wound care on 12/16/2023  Wound care initiated treatment but she was referred for orthopedic consultation and treatment by wound care as a result of her history of ankle surgery  She denies systemic symptoms    She notes minimal, if any pain   She notes improvement in the appearance of the wound especially since being seen by wound care  She does continue to note some mild thin, yellow drainage  She denies any red streaking or significant swelling  PAST MEDICAL HISTORY:  Past Medical History:   Diagnosis Date   • Bipolar 1 disorder ([facility] )    • Cancer ([facility] )     kidney   • Cardiac disease     fast heart beat   • Chronic pain    • Fractures    • GERD (gastroesophageal reflux disease)    • Hard of hearing    • Hypertension    • Renal cell carcinoma ([facility] )    • Stroke ([facility] )     2009 affected her speech, right sided weakness   • TIA (transient ischemic attack)        PAST SURGICAL HISTORY:  Past Surgical History:   Procedure Laterality Date   • APPENDECTOMY     • CHOLECYSTECTOMY     • JOINT REPLACEMENT Bilateral      bilateral knees   • NEPHRECTOMY Right    • [place] COLONOSCOPY FLX DX W/COLLJ SPEC WHEN PFRMD N/A 8/7/2018    Procedure: COLONOSCOPY;  Surgeon: Tiffany Rubio MD;  Location: [facility];  Service: Gastroenterology   • NC OPEN TREATMENT BIMALLEOLAR ANKLE FRACTURE Right 5/14/2019    Procedure: ANKLE - Bimalleolar OPEN REDUCTION W/ INTERNAL FIXATION (ORIF); Surgeon: Cale Ingram;   Location: [address] [place] [address] MAIN OR;  Service: Orthopedics   • NC TREAT TIBIAL SHAFT FX, INTRAMED IMPLANT Left 7/27/2022    Procedure: INSERTION NAIL IM TIBIA;  Surgeon: Dulce Maria Aj MD;  Location:  MAIN OR;  Service: Orthopedics       FAMILY HISTORY:  Family History   Problem Relation Age of Onset   • Colon cancer Other    • Alcohol abuse Other    • Hypertension Other        SOCIAL HISTORY:  Social History     Tobacco Use   • Smoking status: Every Day     Packs/day: 0 50     Years: 7 00     Pack years: 3 50     Types: Cigarettes   • Smokeless tobacco: Never   • Tobacco comments:     pt refused   Vaping Use   • Vaping Use: Never used   Substance Use Topics   • Alcohol use: Not Currently   • Drug use: Never       MEDICATIONS:    Current Outpatient Medications:   •  acetaminophen (TYLENOL) 325 mg tablet, Take 2 tablets (650 mg total) by mouth every 6 (six) hours as needed for mild pain, fever or headaches Do not exceed a total of 3 grams of tylenol/acetaminophen in a 24-hour period    Do not take additional tylenol if you are taking vicodin , Disp: , Rfl: 0  •  albuterol (PROVENTIL HFA,VENTOLIN HFA) 90 mcg/act inhaler, Inhale 2 puffs every 4 (four) hours as needed for wheezing or shortness of breath, Disp: 18 g, Rfl: 0  •  ALPRAZolam (XANAX) 1 mg tablet, Take 1 tablet (1 mg total) by mouth 4 (four) times a day as needed for anxiety for up to 10 days, Disp: , Rfl: 0  •  amLODIPine (NORVASC) 5 mg tablet, Take 1 tablet (5 mg total) by mouth daily, Disp: 30 tablet, Rfl: 0  •  aspirin 81 mg chewable tablet, Chew 1 tablet (81 mg total) daily To prevent stroke and heart attack, Disp: 30 tablet, Rfl: 0  •  atorvastatin (LIPITOR) 40 mg tablet, Take 1 tablet (40 mg total) by mouth every evening To prevent stroke and heart attack, Disp: 30 tablet, Rfl: 0  •  cholecalciferol (VITAMIN D3) 1,000 units tablet, Take 1 tablet (1,000 Units total) by mouth daily Take this in place of ergocalciferol, Disp: 30 tablet, Rfl: 0  •  docusate sodium (COLACE) 100 mg capsule, Take by mouth 2 (two) times a day as needed, Disp: , Rfl:   •  famotidine (PEPCID) 20 mg tablet, Take 20 mg by mouth daily, Disp: , Rfl:   •  ferrous sulfate 325 (65 Fe) mg tablet, Take 1 tablet by mouth 2 (two) times a day with meals, Disp: , Rfl:   •  ketotifen (ZADITOR) 0 025 % ophthalmic solution, Apply to eye  , Disp: , Rfl:   •  metoprolol succinate (TOPROL-XL) 100 mg 24 hr tablet, Take 1 tablet (100 mg total) by mouth daily Do not start before November 19, 2022 , Disp: 30 tablet, Rfl: 0  •  OLANZapine (ZyPREXA) 20 MG tablet, Take 20 mg by mouth daily at bedtime , Disp: , Rfl:   •  pantoprazole (PROTONIX) 40 mg tablet, Take 40 mg by mouth daily , Disp: , Rfl:   •  sucralfate (CARAFATE) 1 g tablet, Take 1 g by mouth, Disp: , Rfl:   •  vitamin B-12 (VITAMIN B-12) 1,000 mcg tablet, Take 0 5 tablets (500 mcg total) by mouth daily, Disp: , Rfl: 0    ALLERGIES:  Allergies   Allergen Reactions   • Celecoxib Rash and Other (See Comments)     CELEBREX   • Doxazosin Rash and Hives   • Metaxalone Rash and Hives       Review of systems:   Constitutional: Negative for fatigue, fever or loss of apetite  HENT: Negative  Respiratory: Negative for shortness of breath, dyspnea  Cardiovascular: Negative for chest pain/tightness  Gastrointestinal: Negative for abdominal pain, N/V  Endocrine: Negative for cold/heat intolerance, unexplained weight loss/gain  Genitourinary: Negative for flank pain, dysuria, hematuria  Musculoskeletal: Positive as in the HPI  Skin: Negative for rash  Neurological: Positive as it is consistent with her past history of stroke  Psychiatric/Behavioral: Negative for agitation  _____________________________________________________  PHYSICAL EXAMINATION:    Blood pressure 120/80, pulse 95, temperature 97 6 °F (36 4 °C), temperature source Temporal, height 5' 4" (1 626 m), weight 85 3 kg (188 lb)  General: well developed and well nourished, alert, oriented times 3 and appears comfortable  Psychiatric: Normal  HEENT: Benign  Cardiovascular: Regular    Pulmonary: No wheezing or stridor  Abdomen: Soft, Nontender  Skin: No masses, erythema, lacerations, fluctation, ulcerations  Neurovascular: Motor and sensory exams are grossly intact  Pulses palpable  MUSCULOSKELETAL EXAMINATION:    Right ankle exam demonstrated dressings in place  These were removed without difficulty  There is a 1 to 2 mm area of partial-thickness skin loss about 1-1/2 cm proximal to the distalmost portion of the lateral surgical incision  There is an area of approximately 3 to 4 mm of full-thickness skin loss at the more distal aspect of the incision  There was no active drainage and I was unable to express any drainage    There is no significant erythema or rubor  There is no ascending lymphangitis or adenopathy  She does complain of diffuse tenderness to palpation of the lateral leg and ankle upon initial questioning but at other times indicates that no change from her baseline pain  She has good range of motion  I do not see any exposed hardware       _____________________________________________________  STUDIES REVIEWED:  X-rays of the right ankle had been obtained in September 2022  These demonstrated healing of the fracture without evidence of lytic changes, periosteal reaction or other suggestions of infection or hardware loosening  X-rays obtained today once again demonstrate complete healing of the fractures of the ankle with no evidence of hardware loosening, periosteal reaction or lytic changes to suggest any kind of bony infection  Available reports for previous x-rays were reviewed, previous x-rays were reviewed  The wound care note was reviewed in the patient's chart        Yoseph Lr

## 2023-01-16 ENCOUNTER — OFFICE VISIT (OUTPATIENT)
Dept: OBGYN CLINIC | Facility: CLINIC | Age: 68
End: 2023-01-16

## 2023-01-16 VITALS
HEIGHT: 64 IN | SYSTOLIC BLOOD PRESSURE: 122 MMHG | TEMPERATURE: 96.9 F | DIASTOLIC BLOOD PRESSURE: 80 MMHG | HEART RATE: 79 BPM | BODY MASS INDEX: 30.63 KG/M2 | WEIGHT: 179.4 LBS

## 2023-01-16 DIAGNOSIS — Z87.81 STATUS POST OPEN REDUCTION WITH INTERNAL FIXATION (ORIF) OF FRACTURE OF ANKLE: Primary | ICD-10-CM

## 2023-01-16 DIAGNOSIS — Z98.890 STATUS POST OPEN REDUCTION WITH INTERNAL FIXATION (ORIF) OF FRACTURE OF ANKLE: Primary | ICD-10-CM

## 2023-01-16 DIAGNOSIS — L24.A9 WOUND DRAINAGE: ICD-10-CM

## 2023-01-16 DIAGNOSIS — S82.841S CLOSED BIMALLEOLAR FRACTURE OF RIGHT ANKLE, SEQUELA: ICD-10-CM

## 2023-01-16 RX ORDER — CHLORHEXIDINE GLUCONATE 0.12 MG/ML
15 RINSE ORAL ONCE
OUTPATIENT
Start: 2023-01-16 | End: 2023-01-16

## 2023-01-16 NOTE — PROGRESS NOTES
79 y o female presents for follow-up of right ankle pain status post bimalleolar ORIF 5/14/2019  As per her last note from 12/23/2022 patient had some drainage surgical wound on the lateral side and hardware removal was suggested patient did not want surgery  Presents today for follow-up  She notes approximately 2 days ago the drainage stopped wound care told her to follow-up with her surgeon to discuss hardware removal   She would prefer not going through surgery again but if it is recommended, she would do it  Review of Systems  Review of systems negative unless otherwise specified in HPI    Past Medical History  Past Medical History:   Diagnosis Date   • Bipolar 1 disorder ([facility] )    • Cancer ([facility] )     kidney   • Cardiac disease     fast heart beat   • Chronic pain    • Fractures    • GERD (gastroesophageal reflux disease)    • Hard of hearing    • Hypertension    • Renal cell carcinoma ([facility] )    • Stroke [facility])     2009 affected her speech, right sided weakness   • TIA (transient ischemic attack)      Past Surgical History  Past Surgical History:   Procedure Laterality Date   • APPENDECTOMY     • CHOLECYSTECTOMY     • JOINT REPLACEMENT Bilateral      bilateral knees   • NEPHRECTOMY Right    • [place] COLONOSCOPY FLX DX W/COLLJ SPEC WHEN PFRMD N/A 8/7/2018    Procedure: COLONOSCOPY;  Surgeon: Martin Servin MD;  Location: [facility] MA[facility];  Service: Gastroenterology   • [place] OPEN TREATMENT BIMALLEOLAR ANKLE FRACTURE Right 5/14/2019    Procedure: ANKLE - Bimalleolar OPEN REDUCTION W/ INTERNAL FIXATION (ORIF); Surgeon: Cyndi Coelho;   Location: [address] [place] [address] MAIN OR;  Service: Orthopedics   • NJ Bianka Diogenes SHFT FX IMED IMPLT W/WO SCREWS&/CERCLA Left 7/27/2022    Procedure: INSERTION NAIL IM TIBIA;  Surgeon: Celso Moreira MD;  Location:  MAIN OR;  Service: Orthopedics     Current Medications  Current Outpatient Medications on File Prior to Visit   Medication Sig Dispense Refill   • acetaminophen (TYLENOL) 325 mg tablet Take 2 tablets (650 mg total) by mouth every 6 (six) hours as needed for mild pain, fever or headaches Do not exceed a total of 3 grams of tylenol/acetaminophen in a 24-hour period  Do not take additional tylenol if you are taking vicodin  0   • albuterol (PROVENTIL HFA,VENTOLIN HFA) 90 mcg/act inhaler Inhale 2 puffs every 4 (four) hours as needed for wheezing or shortness of breath 18 g 0   • ALPRAZolam (XANAX) 1 mg tablet Take 1 tablet (1 mg total) by mouth 4 (four) times a day as needed for anxiety for up to 10 days  0   • amLODIPine (NORVASC) 5 mg tablet Take 1 tablet (5 mg total) by mouth daily 30 tablet 0   • aspirin 81 mg chewable tablet Chew 1 tablet (81 mg total) daily To prevent stroke and heart attack 30 tablet 0   • atorvastatin (LIPITOR) 40 mg tablet Take 1 tablet (40 mg total) by mouth every evening To prevent stroke and heart attack 30 tablet 0   • cholecalciferol (VITAMIN D3) 1,000 units tablet Take 1 tablet (1,000 Units total) by mouth daily Take this in place of ergocalciferol 30 tablet 0   • docusate sodium (COLACE) 100 mg capsule Take by mouth 2 (two) times a day as needed     • famotidine (PEPCID) 20 mg tablet Take 20 mg by mouth daily     • ferrous sulfate 325 (65 Fe) mg tablet Take 1 tablet by mouth 2 (two) times a day with meals     • ketotifen (ZADITOR) 0 025 % ophthalmic solution Apply to eye       • metoprolol succinate (TOPROL-XL) 100 mg 24 hr tablet Take 1 tablet (100 mg total) by mouth daily Do not start before November 19, 2022  30 tablet 0   • OLANZapine (ZyPREXA) 20 MG tablet Take 20 mg by mouth daily at bedtime      • pantoprazole (PROTONIX) 40 mg tablet Take 40 mg by mouth daily      • sucralfate (CARAFATE) 1 g tablet Take 1 g by mouth     • vitamin B-12 (VITAMIN B-12) 1,000 mcg tablet Take 0 5 tablets (500 mcg total) by mouth daily  0     No current facility-administered medications on file prior to visit       Recent Labs (HCT,HGB,PT,INR,ESR,CRP,GLU,HgA1C)  0   Lab Value Date/Time    HCT 27 2 (L) 11/17/2022 1921    HCT 34 0 (L) 10/15/2015 0738    HGB 8 2 (L) 11/17/2022 1921    HGB 11 3 (L) 10/15/2015 0738    WBC 6 13 11/17/2022 1921    WBC 6 52 10/15/2015 0738    INR 1 01 11/17/2022 0838    INR 0 99 07/08/2015 0945    ESR 17 03/13/2018 1329    CRP <3 0 03/13/2018 1329    GLUCOSE 112 06/24/2021 1315    GLUCOSE 88 10/15/2015 0738    HGBA1C 4 6 11/18/2022 0435     Physical exam  Body mass index is 30 79 kg/m²  · General: Awake, Alert, Oriented  · Eyes: Pupils equal, round and reactive to light  · Heart: regular rate and rhythm  · Lungs: No audible wheezing  · Abdomen: soft  right Ankle  · There is a thickened millimeter round scab over the distal lateral incision  No gross discharge  Is discomfort in the area proximately 3 cm above this and 1 cm distal   There is no anterior posterior or medial ankle discomfort  She has gross range of motion of the ankle in all planes  There is no numbness or tingling  Procedure  None today  Imaging  1/12/23 CT scan done [facility] report notes no bony destructive lesions nor osteomyelitis or fluid collection  There is some lateral edema in the soft tissue as well is a superficial soft tissue defect  12/23/22 x rays again reviewed noting the fracture to be healed  The plate and 9 screws laterally, and 2 medial screws  1  Status post open reduction with internal fixation (ORIF) of fracture of ankle    2  Closed bimalleolar fracture of right ankle, sequela    3  Wound drainage      Assessment: right ankle ORIF 3-1/2 years ago with some recent drainage which is now stopped there is no reported abscess or osteomyelitis by CAT scan  Is retained hardware in this area which is symptomatic  Plan: Again discussion about hardware removal again reviewed with the patient, and that there are risks with surgery and no guarantees could be made    Is also reviewed with the patient that the hardware may be causing irritation and that removing this could allow complete healing, hopefully without reoccurrence  She has elected surgery  She understands that she cannot eat or drink after midnight the night before surgery  A soft dressing was applied by the MA in the office and has been maintained for any drainage  The patient has failed non operative measures and has opted for surgical intervention  Risks and benefits of the treatment options and surgery were discussed in detail with the patient by Dr Tacho Wallis  The risks of surgery including infection, bleeding, injury to nerves, injury to the vessels, excess scar tissue formation, risk of failure of the procedure, the possible need for further surgery, and potential risk of loss of limb and life  After weighing all the treatment options available, the patient has opted for surgical intervention and informed consent was obtained  We will schedule the patient to be seen back postoperatively  I would anticipate this being performed as an outpatient procedure but if there is evidence of infection, overnight admission with postoperative antibiotics and awaiting culture results would be appropriate  I will order preoperative testing including a CBC and sed rate as well as pursue vascular studies  This note was created with voice recognition software

## 2023-01-16 NOTE — PATIENT INSTRUCTIONS
Again discussion about hardware removal again reviewed with the patient, and that there are risks with surgery and no guarantees could be made  Is also reviewed with the patient that the hardware may be causing irritation and that removing this could allow complete healing, hopefully without reoccurrence  She has elected surgery  She understands that she cannot eat or drink after midnight the night before surgery

## 2023-01-16 NOTE — H&P (VIEW-ONLY)
79 y o female presents for follow-up of right ankle pain status post bimalleolar ORIF 5/14/2019  As per her last note from 12/23/2022 patient had some drainage surgical wound on the lateral side and hardware removal was suggested patient did not want surgery  Presents today for follow-up  She notes approximately 2 days ago the drainage stopped wound care told her to follow-up with her surgeon to discuss hardware removal   She would prefer not going through surgery again but if it is recommended, she would do it  Review of Systems  Review of systems negative unless otherwise specified in HPI    Past Medical History  Past Medical History:   Diagnosis Date   • Bipolar 1 disorder ([facility] )    • Cancer ([facility] )     kidney   • Cardiac disease     fast heart beat   • Chronic pain    • Fractures    • GERD (gastroesophageal reflux disease)    • Hard of hearing    • Hypertension    • Renal cell carcinoma ([facility] )    • Stroke [facility])     2009 affected her speech, right sided weakness   • TIA (transient ischemic attack)      Past Surgical History  Past Surgical History:   Procedure Laterality Date   • APPENDECTOMY     • CHOLECYSTECTOMY     • JOINT REPLACEMENT Bilateral      bilateral knees   • NEPHRECTOMY Right    • [place] COLONOSCOPY FLX DX W/COLLJ SPEC WHEN PFRMD N/A 8/7/2018    Procedure: COLONOSCOPY;  Surgeon: Nupur Barahona MD;  Location: [facility] MA[facility];  Service: Gastroenterology   • [place] OPEN TREATMENT BIMALLEOLAR ANKLE FRACTURE Right 5/14/2019    Procedure: ANKLE - Bimalleolar OPEN REDUCTION W/ INTERNAL FIXATION (ORIF); Surgeon: Lisa Lopez;   Location: [address] [place] [address] MAIN OR;  Service: Orthopedics   • VT Michael Cameron SHFT FX IMED IMPLT W/WO SCREWS&/CERCLA Left 7/27/2022    Procedure: INSERTION NAIL IM TIBIA;  Surgeon: Live Adam MD;  Location:  MAIN OR;  Service: Orthopedics     Current Medications  Current Outpatient Medications on File Prior to Visit   Medication Sig Dispense Refill   • acetaminophen (TYLENOL) 325 mg tablet Take 2 tablets (650 mg total) by mouth every 6 (six) hours as needed for mild pain, fever or headaches Do not exceed a total of 3 grams of tylenol/acetaminophen in a 24-hour period  Do not take additional tylenol if you are taking vicodin  0   • albuterol (PROVENTIL HFA,VENTOLIN HFA) 90 mcg/act inhaler Inhale 2 puffs every 4 (four) hours as needed for wheezing or shortness of breath 18 g 0   • ALPRAZolam (XANAX) 1 mg tablet Take 1 tablet (1 mg total) by mouth 4 (four) times a day as needed for anxiety for up to 10 days  0   • amLODIPine (NORVASC) 5 mg tablet Take 1 tablet (5 mg total) by mouth daily 30 tablet 0   • aspirin 81 mg chewable tablet Chew 1 tablet (81 mg total) daily To prevent stroke and heart attack 30 tablet 0   • atorvastatin (LIPITOR) 40 mg tablet Take 1 tablet (40 mg total) by mouth every evening To prevent stroke and heart attack 30 tablet 0   • cholecalciferol (VITAMIN D3) 1,000 units tablet Take 1 tablet (1,000 Units total) by mouth daily Take this in place of ergocalciferol 30 tablet 0   • docusate sodium (COLACE) 100 mg capsule Take by mouth 2 (two) times a day as needed     • famotidine (PEPCID) 20 mg tablet Take 20 mg by mouth daily     • ferrous sulfate 325 (65 Fe) mg tablet Take 1 tablet by mouth 2 (two) times a day with meals     • ketotifen (ZADITOR) 0 025 % ophthalmic solution Apply to eye       • metoprolol succinate (TOPROL-XL) 100 mg 24 hr tablet Take 1 tablet (100 mg total) by mouth daily Do not start before November 19, 2022  30 tablet 0   • OLANZapine (ZyPREXA) 20 MG tablet Take 20 mg by mouth daily at bedtime      • pantoprazole (PROTONIX) 40 mg tablet Take 40 mg by mouth daily      • sucralfate (CARAFATE) 1 g tablet Take 1 g by mouth     • vitamin B-12 (VITAMIN B-12) 1,000 mcg tablet Take 0 5 tablets (500 mcg total) by mouth daily  0     No current facility-administered medications on file prior to visit       Recent Labs (HCT,HGB,PT,INR,ESR,CRP,GLU,HgA1C)  0   Lab Value Date/Time    HCT 27 2 (L) 11/17/2022 1921    HCT 34 0 (L) 10/15/2015 0738    HGB 8 2 (L) 11/17/2022 1921    HGB 11 3 (L) 10/15/2015 0738    WBC 6 13 11/17/2022 1921    WBC 6 52 10/15/2015 0738    INR 1 01 11/17/2022 0838    INR 0 99 07/08/2015 0945    ESR 17 03/13/2018 1329    CRP <3 0 03/13/2018 1329    GLUCOSE 112 06/24/2021 1315    GLUCOSE 88 10/15/2015 0738    HGBA1C 4 6 11/18/2022 0435     Physical exam  Body mass index is 30 79 kg/m²  · General: Awake, Alert, Oriented  · Eyes: Pupils equal, round and reactive to light  · Heart: regular rate and rhythm  · Lungs: No audible wheezing  · Abdomen: soft  right Ankle  · There is a thickened millimeter round scab over the distal lateral incision  No gross discharge  Is discomfort in the area proximately 3 cm above this and 1 cm distal   There is no anterior posterior or medial ankle discomfort  She has gross range of motion of the ankle in all planes  There is no numbness or tingling  Procedure  None today  Imaging  1/12/23 CT scan done at [facility] report notes no bony destructive lesions nor osteomyelitis or fluid collection  There is some lateral edema in the soft tissue as well is a superficial soft tissue defect  12/23/22 x rays again reviewed noting the fracture to be healed  The plate and 9 screws laterally, and 2 medial screws  1  Status post open reduction with internal fixation (ORIF) of fracture of ankle    2  Closed bimalleolar fracture of right ankle, sequela    3  Wound drainage      Assessment: right ankle ORIF 3-1/2 years ago with some recent drainage which is now stopped there is no reported abscess or osteomyelitis by CAT scan  Is retained hardware in this area which is symptomatic  Plan: Again discussion about hardware removal again reviewed with the patient, and that there are risks with surgery and no guarantees could be made    Is also reviewed with the patient that the hardware may be causing irritation and that removing this could allow complete healing, hopefully without reoccurrence  She has elected surgery  She understands that she cannot eat or drink after midnight the night before surgery  A soft dressing was applied by the MA in the office and has been maintained for any drainage  The patient has failed non operative measures and has opted for surgical intervention  Risks and benefits of the treatment options and surgery were discussed in detail with the patient by Dr Lou Lozada  The risks of surgery including infection, bleeding, injury to nerves, injury to the vessels, excess scar tissue formation, risk of failure of the procedure, the possible need for further surgery, and potential risk of loss of limb and life  After weighing all the treatment options available, the patient has opted for surgical intervention and informed consent was obtained  We will schedule the patient to be seen back postoperatively  I would anticipate this being performed as an outpatient procedure but if there is evidence of infection, overnight admission with postoperative antibiotics and awaiting culture results would be appropriate  I will order preoperative testing including a CBC and sed rate as well as pursue vascular studies  This note was created with voice recognition software

## 2023-01-18 DIAGNOSIS — Z87.81 STATUS POST OPEN REDUCTION WITH INTERNAL FIXATION (ORIF) OF FRACTURE OF ANKLE: Primary | ICD-10-CM

## 2023-01-18 DIAGNOSIS — S82.841S CLOSED BIMALLEOLAR FRACTURE OF RIGHT ANKLE, SEQUELA: ICD-10-CM

## 2023-01-18 DIAGNOSIS — L24.A9 WOUND DRAINAGE: ICD-10-CM

## 2023-01-18 DIAGNOSIS — Z98.890 STATUS POST OPEN REDUCTION WITH INTERNAL FIXATION (ORIF) OF FRACTURE OF ANKLE: Primary | ICD-10-CM

## 2023-01-19 ENCOUNTER — TELEPHONE (OUTPATIENT)
Dept: PAIN MEDICINE | Facility: CLINIC | Age: 68
End: 2023-01-19

## 2023-01-19 NOTE — TELEPHONE ENCOUNTER
Spoke with patient and insurance company  Both rep and myself tried to explain to patient GSL is OON with her insurance and the only way we can proceed is if an [place] is approved  Although, rep told patient there is no guarantee whether it will get approved  Explained to patient surgery/vascular study canceled, pending OON auth

## 2023-01-25 ENCOUNTER — TELEPHONE (OUTPATIENT)
Dept: PAIN MEDICINE | Facility: CLINIC | Age: 68
End: 2023-01-25

## 2023-01-26 ENCOUNTER — TELEPHONE (OUTPATIENT)
Dept: OBGYN CLINIC | Facility: HOSPITAL | Age: 68
End: 2023-01-26

## 2023-01-26 NOTE — TELEPHONE ENCOUNTER
Caller: [facility]    Doctor/Office: katina TORRES#:       What needs to be faxed: VAS duplex scri[t  ATTN to: [address] [facility] ([facility])    Fax#: [phone]

## 2023-01-30 ENCOUNTER — ANESTHESIA EVENT (OUTPATIENT)
Dept: PERIOP | Facility: HOSPITAL | Age: 68
End: 2023-01-30

## 2023-01-30 ENCOUNTER — APPOINTMENT (OUTPATIENT)
Dept: LAB | Facility: MEDICAL CENTER | Age: 68
End: 2023-01-30

## 2023-01-30 DIAGNOSIS — S82.841S CLOSED BIMALLEOLAR FRACTURE OF RIGHT ANKLE, SEQUELA: ICD-10-CM

## 2023-01-30 DIAGNOSIS — Z98.890 STATUS POST OPEN REDUCTION WITH INTERNAL FIXATION (ORIF) OF FRACTURE OF ANKLE: ICD-10-CM

## 2023-01-30 DIAGNOSIS — Z87.81 STATUS POST OPEN REDUCTION WITH INTERNAL FIXATION (ORIF) OF FRACTURE OF ANKLE: ICD-10-CM

## 2023-01-30 DIAGNOSIS — L24.A9 WOUND DRAINAGE: ICD-10-CM

## 2023-01-30 LAB
CRP SERPL QL: 3.2 MG/L
ERYTHROCYTE [SEDIMENTATION RATE] IN BLOOD: 35 MM/HOUR (ref 0–29)

## 2023-01-30 RX ORDER — HYDROCODONE BITARTRATE AND ACETAMINOPHEN 5; 325 MG/1; MG/1
1 TABLET ORAL EVERY 6 HOURS PRN
COMMUNITY

## 2023-01-30 RX ORDER — QUETIAPINE FUMARATE 300 MG/1
300 TABLET, FILM COATED ORAL
COMMUNITY

## 2023-01-30 NOTE — ANESTHESIA PREPROCEDURE EVALUATION
Procedure:  REMOVAL HARDWARE ANKLE (Right: Ankle)    Relevant Problems   CARDIO   (+) Essential hypertension   (+) Hypertension   (+) Pure hypercholesterolemia      GI/HEPATIC   (+) Dysphagia   (+) GERD (gastroesophageal reflux disease)      /RENAL   (+) Renal cell carcinoma (HCC)      HEMATOLOGY   (+) Anemia      MUSCULOSKELETAL   (+) Chronic low back pain   (+) Rhabdomyolysis      NEURO/PSYCH   (+) Anxiety   (+) CVA (cerebral vascular accident) ([address] )   (+) Chronic low back pain   (+) Chronic pain   (+) Chronic pain of right ankle   (+) Depression   (+) Generalized anxiety disorder   (+) History of CVA (cerebrovascular accident)   (+) History of tachycardia      PULMONARY   (+) Chronic obstructive pulmonary disease (HCC)      Smoking  Physical Exam    Airway    Mallampati score: II  TM Distance: >3 FB  Neck ROM: full     Dental   No notable dental hx     Cardiovascular  Cardiovascular exam normal    Pulmonary  Pulmonary exam normal     Other Findings      •  Left Ventricle: Left ventricular cavity size is normal  Wall thickness is mildly increased  The left ventricular ejection fraction is 60%  Systolic function is normal  Wall motion is normal  Diastolic function is mildly abnormal, consistent with grade I (abnormal) relaxation  •  Right Ventricle: Right ventricular cavity size is normal  Systolic function is normal     EKG  NSR  Anesthesia Plan  ASA Score- 3     Anesthesia Type- regional with ASA Monitors  Additional Monitors:   Airway Plan: LMA  Plan Factors-Exercise tolerance (METS): >4 METS  Chart reviewed  EKG reviewed  Imaging results reviewed  Existing labs reviewed  Patient summary reviewed  Patient is a current smoker  Patient instructed to abstain from smoking on day of procedure  Patient did not smoke on day of surgery  Induction- intravenous  Postoperative Plan-     Informed Consent- Anesthetic plan and risks discussed with patient    I personally reviewed this patient with the CRNA  Discussed and agreed on the Anesthesia Plan with the CRNA  Sanjuana Pro         11/2022 admitted for acute metabolic encephalopathy secondary to polypharmacy

## 2023-01-30 NOTE — PRE-PROCEDURE INSTRUCTIONS
Pre-Surgery Instructions:   Medication Instructions   • acetaminophen (TYLENOL) 325 mg tablet Uses PRN- OK to take day of surgery   • albuterol (PROVENTIL HFA,VENTOLIN HFA) 90 mcg/act inhaler Uses PRN- OK to take day of surgery Pt instructed to bring the DOS   • ALPRAZolam (XANAX) 1 mg tablet Uses PRN- OK to take day of surgery   • amLODIPine (NORVASC) 5 mg tablet Take night before surgery   • aspirin 81 mg chewable tablet Hold day of surgery  • atorvastatin (LIPITOR) 40 mg tablet Take day of surgery  • cholecalciferol (VITAMIN D3) 1,000 units tablet Hold day of surgery  • docusate sodium (COLACE) 100 mg capsule Uses PRN- OK to take day of surgery   • famotidine (PEPCID) 20 mg tablet Pt takes at lunch time    • ferrous sulfate 325 (65 Fe) mg tablet Hold day of surgery  • HYDROcodone-acetaminophen (NORCO) 5-325 mg per tablet Uses PRN- OK to take day of surgery   • mupirocin (BACTROBAN) 2 % ointment Hold day of surgery  • OLANZapine (ZyPREXA) 20 MG tablet Take night before surgery   • pantoprazole (PROTONIX) 40 mg tablet Take day of surgery  • QUEtiapine (SEROquel) 300 mg tablet Take night before surgery   • vitamin B-12 (VITAMIN B-12) 1,000 mcg tablet Hold day of surgery     See above  Pt was instructed to take am meds with a small sip of water

## 2023-01-31 ENCOUNTER — APPOINTMENT (OUTPATIENT)
Dept: RADIOLOGY | Facility: HOSPITAL | Age: 68
End: 2023-01-31

## 2023-01-31 ENCOUNTER — ANESTHESIA (OUTPATIENT)
Dept: PERIOP | Facility: HOSPITAL | Age: 68
End: 2023-01-31

## 2023-01-31 ENCOUNTER — HOSPITAL ENCOUNTER (INPATIENT)
Facility: HOSPITAL | Age: 68
LOS: 1 days | Discharge: HOME | End: 2023-02-02
Attending: ORTHOPAEDIC SURGERY | Admitting: ORTHOPAEDIC SURGERY

## 2023-01-31 DIAGNOSIS — S82.841S CLOSED BIMALLEOLAR FRACTURE OF RIGHT ANKLE, SEQUELA: ICD-10-CM

## 2023-01-31 DIAGNOSIS — L24.A9 WOUND DRAINAGE: ICD-10-CM

## 2023-01-31 DIAGNOSIS — R47.81 SLURRED SPEECH: ICD-10-CM

## 2023-01-31 DIAGNOSIS — S82.841S: Primary | ICD-10-CM

## 2023-01-31 PROBLEM — S82.202S: Status: ACTIVE | Noted: 2022-07-29

## 2023-01-31 PROBLEM — S82.402S: Status: ACTIVE | Noted: 2022-07-29

## 2023-01-31 PROCEDURE — 0QPJ04Z REMOVAL OF INTERNAL FIXATION DEVICE FROM RIGHT FIBULA, OPEN APPROACH: ICD-10-PCS | Performed by: ORTHOPAEDIC SURGERY

## 2023-01-31 RX ORDER — FERROUS SULFATE 325(65) MG
325 TABLET ORAL 2 TIMES DAILY WITH MEALS
Status: DISCONTINUED | OUTPATIENT
Start: 2023-01-31 | End: 2023-02-02 | Stop reason: HOSPADM

## 2023-01-31 RX ORDER — ALBUTEROL SULFATE 2.5 MG/3ML
2.5 SOLUTION RESPIRATORY (INHALATION) ONCE
Status: COMPLETED | OUTPATIENT
Start: 2023-01-31 | End: 2023-01-31

## 2023-01-31 RX ORDER — LIDOCAINE HYDROCHLORIDE 10 MG/ML
INJECTION, SOLUTION EPIDURAL; INFILTRATION; INTRACAUDAL; PERINEURAL AS NEEDED
Status: DISCONTINUED | OUTPATIENT
Start: 2023-01-31 | End: 2023-01-31

## 2023-01-31 RX ORDER — SODIUM CHLORIDE 9 MG/ML
75 INJECTION, SOLUTION INTRAVENOUS CONTINUOUS
Status: DISCONTINUED | OUTPATIENT
Start: 2023-01-31 | End: 2023-02-02 | Stop reason: HOSPADM

## 2023-01-31 RX ORDER — CALCIUM CARBONATE 200(500)MG
1000 TABLET,CHEWABLE ORAL DAILY PRN
Status: DISCONTINUED | OUTPATIENT
Start: 2023-01-31 | End: 2023-02-02 | Stop reason: HOSPADM

## 2023-01-31 RX ORDER — FENTANYL CITRATE 50 UG/ML
INJECTION, SOLUTION INTRAMUSCULAR; INTRAVENOUS AS NEEDED
Status: DISCONTINUED | OUTPATIENT
Start: 2023-01-31 | End: 2023-01-31

## 2023-01-31 RX ORDER — ALBUTEROL SULFATE 90 UG/1
2 AEROSOL, METERED RESPIRATORY (INHALATION) EVERY 4 HOURS PRN
Status: DISCONTINUED | OUTPATIENT
Start: 2023-01-31 | End: 2023-02-02 | Stop reason: HOSPADM

## 2023-01-31 RX ORDER — OLANZAPINE 10 MG/1
20 TABLET ORAL
Status: DISCONTINUED | OUTPATIENT
Start: 2023-01-31 | End: 2023-02-02 | Stop reason: HOSPADM

## 2023-01-31 RX ORDER — METOPROLOL SUCCINATE 100 MG/1
100 TABLET, EXTENDED RELEASE ORAL DAILY
Status: DISCONTINUED | OUTPATIENT
Start: 2023-01-31 | End: 2023-02-02 | Stop reason: HOSPADM

## 2023-01-31 RX ORDER — OXYCODONE HYDROCHLORIDE 10 MG/1
10 TABLET ORAL EVERY 4 HOURS PRN
Status: DISCONTINUED | OUTPATIENT
Start: 2023-01-31 | End: 2023-02-02 | Stop reason: HOSPADM

## 2023-01-31 RX ORDER — DEXAMETHASONE SODIUM PHOSPHATE 10 MG/ML
INJECTION, SOLUTION INTRAMUSCULAR; INTRAVENOUS AS NEEDED
Status: DISCONTINUED | OUTPATIENT
Start: 2023-01-31 | End: 2023-01-31

## 2023-01-31 RX ORDER — FAMOTIDINE 20 MG/1
20 TABLET, FILM COATED ORAL DAILY
Status: DISCONTINUED | OUTPATIENT
Start: 2023-01-31 | End: 2023-02-02 | Stop reason: HOSPADM

## 2023-01-31 RX ORDER — PROPOFOL 10 MG/ML
INJECTION, EMULSION INTRAVENOUS AS NEEDED
Status: DISCONTINUED | OUTPATIENT
Start: 2023-01-31 | End: 2023-01-31

## 2023-01-31 RX ORDER — ONDANSETRON 2 MG/ML
4 INJECTION INTRAMUSCULAR; INTRAVENOUS EVERY 6 HOURS PRN
Status: DISCONTINUED | OUTPATIENT
Start: 2023-01-31 | End: 2023-02-02 | Stop reason: HOSPADM

## 2023-01-31 RX ORDER — FENTANYL CITRATE/PF 50 MCG/ML
50 SYRINGE (ML) INJECTION
Status: DISCONTINUED | OUTPATIENT
Start: 2023-01-31 | End: 2023-01-31 | Stop reason: HOSPADM

## 2023-01-31 RX ORDER — ONDANSETRON 2 MG/ML
INJECTION INTRAMUSCULAR; INTRAVENOUS AS NEEDED
Status: DISCONTINUED | OUTPATIENT
Start: 2023-01-31 | End: 2023-01-31

## 2023-01-31 RX ORDER — OXYCODONE HYDROCHLORIDE 5 MG/1
5 TABLET ORAL EVERY 4 HOURS PRN
Status: DISCONTINUED | OUTPATIENT
Start: 2023-01-31 | End: 2023-02-02 | Stop reason: HOSPADM

## 2023-01-31 RX ORDER — SODIUM CHLORIDE, SODIUM LACTATE, POTASSIUM CHLORIDE, CALCIUM CHLORIDE 600; 310; 30; 20 MG/100ML; MG/100ML; MG/100ML; MG/100ML
50 INJECTION, SOLUTION INTRAVENOUS CONTINUOUS
Status: DISCONTINUED | OUTPATIENT
Start: 2023-01-31 | End: 2023-01-31

## 2023-01-31 RX ORDER — BUPIVACAINE HYDROCHLORIDE AND EPINEPHRINE 2.5; 5 MG/ML; UG/ML
INJECTION, SOLUTION EPIDURAL; INFILTRATION; INTRACAUDAL; PERINEURAL AS NEEDED
Status: DISCONTINUED | OUTPATIENT
Start: 2023-01-31 | End: 2023-01-31 | Stop reason: HOSPADM

## 2023-01-31 RX ORDER — FAMOTIDINE 20 MG/1
20 TABLET, FILM COATED ORAL ONCE
Status: DISCONTINUED | OUTPATIENT
Start: 2023-01-31 | End: 2023-01-31

## 2023-01-31 RX ORDER — SODIUM CHLORIDE, SODIUM LACTATE, POTASSIUM CHLORIDE, CALCIUM CHLORIDE 600; 310; 30; 20 MG/100ML; MG/100ML; MG/100ML; MG/100ML
INJECTION, SOLUTION INTRAVENOUS CONTINUOUS PRN
Status: DISCONTINUED | OUTPATIENT
Start: 2023-01-31 | End: 2023-01-31

## 2023-01-31 RX ORDER — HYDROMORPHONE HCL IN WATER/PF 6 MG/30 ML
0.2 PATIENT CONTROLLED ANALGESIA SYRINGE INTRAVENOUS
Status: DISCONTINUED | OUTPATIENT
Start: 2023-01-31 | End: 2023-01-31 | Stop reason: HOSPADM

## 2023-01-31 RX ORDER — ONDANSETRON 2 MG/ML
4 INJECTION INTRAMUSCULAR; INTRAVENOUS ONCE AS NEEDED
Status: DISCONTINUED | OUTPATIENT
Start: 2023-01-31 | End: 2023-01-31 | Stop reason: HOSPADM

## 2023-01-31 RX ORDER — AMLODIPINE BESYLATE 5 MG/1
5 TABLET ORAL DAILY
Status: DISCONTINUED | OUTPATIENT
Start: 2023-01-31 | End: 2023-02-02 | Stop reason: HOSPADM

## 2023-01-31 RX ORDER — PANTOPRAZOLE SODIUM 40 MG/1
40 TABLET, DELAYED RELEASE ORAL DAILY
Status: DISCONTINUED | OUTPATIENT
Start: 2023-01-31 | End: 2023-02-02 | Stop reason: HOSPADM

## 2023-01-31 RX ORDER — ACETAMINOPHEN 325 MG/1
975 TABLET ORAL ONCE
Status: COMPLETED | OUTPATIENT
Start: 2023-01-31 | End: 2023-01-31

## 2023-01-31 RX ORDER — KETOTIFEN FUMARATE 0.35 MG/ML
1 SOLUTION/ DROPS OPHTHALMIC 2 TIMES DAILY
Status: DISCONTINUED | OUTPATIENT
Start: 2023-01-31 | End: 2023-02-02 | Stop reason: HOSPADM

## 2023-01-31 RX ORDER — FAMOTIDINE 10 MG/ML
20 INJECTION, SOLUTION INTRAVENOUS ONCE
Status: DISCONTINUED | OUTPATIENT
Start: 2023-01-31 | End: 2023-01-31

## 2023-01-31 RX ORDER — ENOXAPARIN SODIUM 100 MG/ML
40 INJECTION SUBCUTANEOUS DAILY
Status: DISCONTINUED | OUTPATIENT
Start: 2023-02-01 | End: 2023-02-02 | Stop reason: HOSPADM

## 2023-01-31 RX ORDER — MAGNESIUM HYDROXIDE 1200 MG/15ML
LIQUID ORAL AS NEEDED
Status: DISCONTINUED | OUTPATIENT
Start: 2023-01-31 | End: 2023-01-31 | Stop reason: HOSPADM

## 2023-01-31 RX ORDER — CEFAZOLIN SODIUM 2 G/50ML
2000 SOLUTION INTRAVENOUS EVERY 8 HOURS
Status: COMPLETED | OUTPATIENT
Start: 2023-01-31 | End: 2023-01-31

## 2023-01-31 RX ORDER — ALPRAZOLAM 0.5 MG/1
1 TABLET ORAL 4 TIMES DAILY PRN
Status: DISCONTINUED | OUTPATIENT
Start: 2023-01-31 | End: 2023-02-02 | Stop reason: HOSPADM

## 2023-01-31 RX ORDER — ATORVASTATIN CALCIUM 40 MG/1
40 TABLET, FILM COATED ORAL EVERY EVENING
Status: DISCONTINUED | OUTPATIENT
Start: 2023-01-31 | End: 2023-02-02 | Stop reason: HOSPADM

## 2023-01-31 RX ORDER — CEFAZOLIN SODIUM 2 G/50ML
2000 SOLUTION INTRAVENOUS ONCE
Status: COMPLETED | OUTPATIENT
Start: 2023-01-31 | End: 2023-01-31

## 2023-01-31 RX ORDER — DOCUSATE SODIUM 100 MG/1
100 CAPSULE, LIQUID FILLED ORAL 2 TIMES DAILY
Status: DISCONTINUED | OUTPATIENT
Start: 2023-01-31 | End: 2023-02-02 | Stop reason: HOSPADM

## 2023-01-31 RX ORDER — CHLORHEXIDINE GLUCONATE 0.12 MG/ML
15 RINSE ORAL ONCE
Status: COMPLETED | OUTPATIENT
Start: 2023-01-31 | End: 2023-01-31

## 2023-01-31 RX ORDER — GINSENG 100 MG
CAPSULE ORAL AS NEEDED
Status: DISCONTINUED | OUTPATIENT
Start: 2023-01-31 | End: 2023-01-31 | Stop reason: HOSPADM

## 2023-01-31 RX ORDER — DOCUSATE SODIUM 100 MG/1
100 CAPSULE, LIQUID FILLED ORAL 2 TIMES DAILY
Status: DISCONTINUED | OUTPATIENT
Start: 2023-01-31 | End: 2023-01-31

## 2023-01-31 RX ADMIN — CYANOCOBALAMIN TAB 500 MCG 500 MCG: 500 TAB at 14:50

## 2023-01-31 RX ADMIN — PANTOPRAZOLE SODIUM 40 MG: 40 TABLET, DELAYED RELEASE ORAL at 14:50

## 2023-01-31 RX ADMIN — SODIUM CHLORIDE 75 ML/HR: 0.9 INJECTION, SOLUTION INTRAVENOUS at 19:28

## 2023-01-31 RX ADMIN — FERROUS SULFATE TAB 325 MG (65 MG ELEMENTAL FE) 325 MG: 325 (65 FE) TAB at 15:51

## 2023-01-31 RX ADMIN — FAMOTIDINE 20 MG: 20 TABLET ORAL at 14:50

## 2023-01-31 RX ADMIN — FENTANYL CITRATE 25 MCG: 50 INJECTION INTRAMUSCULAR; INTRAVENOUS at 12:17

## 2023-01-31 RX ADMIN — CEFAZOLIN SODIUM 2000 MG: 2 SOLUTION INTRAVENOUS at 20:08

## 2023-01-31 RX ADMIN — OXYCODONE HYDROCHLORIDE 10 MG: 10 TABLET ORAL at 20:05

## 2023-01-31 RX ADMIN — SODIUM CHLORIDE, SODIUM LACTATE, POTASSIUM CHLORIDE, AND CALCIUM CHLORIDE 50 ML/HR: .6; .31; .03; .02 INJECTION, SOLUTION INTRAVENOUS at 14:51

## 2023-01-31 RX ADMIN — FENTANYL CITRATE 25 MCG: 50 INJECTION INTRAMUSCULAR; INTRAVENOUS at 12:53

## 2023-01-31 RX ADMIN — METOPROLOL SUCCINATE 100 MG: 100 TABLET, EXTENDED RELEASE ORAL at 14:51

## 2023-01-31 RX ADMIN — AMLODIPINE BESYLATE 5 MG: 5 TABLET ORAL at 14:51

## 2023-01-31 RX ADMIN — ATORVASTATIN CALCIUM 40 MG: 40 TABLET, FILM COATED ORAL at 17:22

## 2023-01-31 RX ADMIN — FENTANYL CITRATE 50 MCG: 50 INJECTION INTRAMUSCULAR; INTRAVENOUS at 13:25

## 2023-01-31 RX ADMIN — OLANZAPINE 20 MG: 10 TABLET, FILM COATED ORAL at 21:38

## 2023-01-31 RX ADMIN — PROPOFOL 200 MG: 10 INJECTION, EMULSION INTRAVENOUS at 11:53

## 2023-01-31 RX ADMIN — LIDOCAINE HYDROCHLORIDE 50 MG: 10 INJECTION, SOLUTION EPIDURAL; INFILTRATION; INTRACAUDAL at 11:53

## 2023-01-31 RX ADMIN — NICOTINE 1 PATCH: 7 PATCH, EXTENDED RELEASE TRANSDERMAL at 14:51

## 2023-01-31 RX ADMIN — ONDANSETRON 4 MG: 2 INJECTION INTRAMUSCULAR; INTRAVENOUS at 11:53

## 2023-01-31 RX ADMIN — FENTANYL CITRATE 25 MCG: 50 INJECTION INTRAMUSCULAR; INTRAVENOUS at 12:04

## 2023-01-31 RX ADMIN — CHLORHEXIDINE GLUCONATE 0.12% ORAL RINSE 15 ML: 1.2 LIQUID ORAL at 09:57

## 2023-01-31 RX ADMIN — FENTANYL CITRATE 50 MCG: 50 INJECTION INTRAMUSCULAR; INTRAVENOUS at 11:53

## 2023-01-31 RX ADMIN — OXYCODONE HYDROCHLORIDE 10 MG: 10 TABLET ORAL at 15:53

## 2023-01-31 RX ADMIN — FENTANYL CITRATE 50 MCG: 50 INJECTION INTRAMUSCULAR; INTRAVENOUS at 12:10

## 2023-01-31 RX ADMIN — MORPHINE SULFATE 2 MG: 2 INJECTION, SOLUTION INTRAMUSCULAR; INTRAVENOUS at 21:38

## 2023-01-31 RX ADMIN — ALPRAZOLAM 1 MG: 0.5 TABLET ORAL at 21:38

## 2023-01-31 RX ADMIN — SODIUM CHLORIDE, SODIUM LACTATE, POTASSIUM CHLORIDE, AND CALCIUM CHLORIDE: .6; .31; .03; .02 INJECTION, SOLUTION INTRAVENOUS at 10:11

## 2023-01-31 RX ADMIN — FENTANYL CITRATE 50 MCG: 50 INJECTION INTRAMUSCULAR; INTRAVENOUS at 13:10

## 2023-01-31 RX ADMIN — CEFAZOLIN SODIUM 2000 MG: 2 SOLUTION INTRAVENOUS at 12:10

## 2023-01-31 RX ADMIN — ALBUTEROL SULFATE 2.5 MG: 2.5 SOLUTION RESPIRATORY (INHALATION) at 09:57

## 2023-01-31 RX ADMIN — FENTANYL CITRATE 25 MCG: 50 INJECTION INTRAMUSCULAR; INTRAVENOUS at 12:09

## 2023-01-31 RX ADMIN — DOCUSATE SODIUM 100 MG: 100 CAPSULE ORAL at 14:50

## 2023-01-31 RX ADMIN — ACETAMINOPHEN 325MG 975 MG: 325 TABLET ORAL at 09:57

## 2023-01-31 RX ADMIN — QUETIAPINE 300 MG: 100 TABLET, FILM COATED ORAL at 21:38

## 2023-01-31 RX ADMIN — DEXAMETHASONE SODIUM PHOSPHATE 10 MG: 10 INJECTION INTRAMUSCULAR; INTRAVENOUS at 11:53

## 2023-01-31 NOTE — PLAN OF CARE
Problem: MOBILITY - ADULT  Goal: Maintain or return to baseline ADL function  Description: INTERVENTIONS:  -  Assess patient's ability to carry out ADLs; assess patient's baseline for ADL function and identify physical deficits which impact ability to perform ADLs (bathing, care of mouth/teeth, toileting, grooming, dressing, etc )  - Assess/evaluate cause of self-care deficits   - Assess range of motion  - Assess patient's mobility; develop plan if impaired  - Assess patient's need for assistive devices and provide as appropriate  - Encourage maximum independence but intervene and supervise when necessary  - Involve family in performance of ADLs  - Assess for home care needs following discharge   - Consider OT consult to assist with ADL evaluation and planning for discharge  - Provide patient education as appropriate  Outcome: Progressing  Goal: Maintains/Returns to pre admission functional level  Description: INTERVENTIONS:  - Perform BMAT or MOVE assessment daily    - Set and communicate daily mobility goal to care team and patient/family/caregiver  - Collaborate with rehabilitation services on mobility goals if consulted  - Perform Range of Motion 3 times a day  - Reposition patient every 2 hours    - Dangle patient 3 times a day  - Stand patient 3 times a day  - Ambulate patient 3 times a day  - Out of bed to chair 3 times a day   - Out of bed for meals 3 times a day  - Out of bed for toileting  - Record patient progress and toleration of activity level   Outcome: Progressing     Problem: PAIN - ADULT  Goal: Verbalizes/displays adequate comfort level or baseline comfort level  Description: Interventions:  - Encourage patient to monitor pain and request assistance  - Assess pain using appropriate pain scale  - Administer analgesics based on type and severity of pain and evaluate response  - Implement non-pharmacological measures as appropriate and evaluate response  - Consider cultural and social influences on pain and pain management  - Notify physician/advanced practitioner if interventions unsuccessful or patient reports new pain  Outcome: Progressing     Problem: INFECTION - ADULT  Goal: Absence or prevention of progression during hospitalization  Description: INTERVENTIONS:  - Assess and monitor for signs and symptoms of infection  - Monitor lab/diagnostic results  - Monitor all insertion sites, i e  indwelling lines, tubes, and drains  - Monitor endotracheal if appropriate and nasal secretions for changes in amount and color  - [place] appropriate cooling/warming therapies per order  - Administer medications as ordered  - Instruct and encourage patient and family to use good hand hygiene technique  - Identify and instruct in appropriate isolation precautions for identified infection/condition  Outcome: Progressing  Goal: Absence of fever/infection during neutropenic period  Description: INTERVENTIONS:  - Monitor WBC    Outcome: Progressing     Problem: SAFETY ADULT  Goal: Maintain or return to baseline ADL function  Description: INTERVENTIONS:  -  Assess patient's ability to carry out ADLs; assess patient's baseline for ADL function and identify physical deficits which impact ability to perform ADLs (bathing, care of mouth/teeth, toileting, grooming, dressing, etc )  - Assess/evaluate cause of self-care deficits   - Assess range of motion  - Assess patient's mobility; develop plan if impaired  - Assess patient's need for assistive devices and provide as appropriate  - Encourage maximum independence but intervene and supervise when necessary  - Involve family in performance of ADLs  - Assess for home care needs following discharge   - Consider OT consult to assist with ADL evaluation and planning for discharge  - Provide patient education as appropriate  Outcome: Progressing  Goal: Maintains/Returns to pre admission functional level  Description: INTERVENTIONS:  - Perform BMAT or MOVE assessment daily    - Set and communicate daily mobility goal to care team and patient/family/caregiver  - Collaborate with rehabilitation services on mobility goals if consulted  - Perform Range of Motion 3 times a day  - Reposition patient every 2 hours    - Dangle patient 3 times a day  - Stand patient 3 times a day  - Ambulate patient 3 times a day  - Out of bed to chair 3 times a day   - Out of bed for meals 3 times a day  - Out of bed for toileting  - Record patient progress and toleration of activity level   Outcome: Progressing  Goal: Patient will remain free of falls  Description: INTERVENTIONS:  - Educate patient/family on patient safety including physical limitations  - Instruct patient to call for assistance with activity   - Consult OT/PT to assist with strengthening/mobility   - Keep Call bell within reach  - Keep bed low and locked with side rails adjusted as appropriate  - Keep care items and personal belongings within reach  - Initiate and maintain comfort rounds  - Make Fall Risk Sign visible to staff  - Offer Toileting every 2-4  Hours, in advance of need  - Initiate/Maintain bed/chair alarm  - Obtain necessary fall risk management equipment: call bell in reach/room camera  - Apply yellow socks and bracelet for high fall risk patients  - Consider moving patient to room near nurses station  Outcome: Progressing

## 2023-01-31 NOTE — INTERVAL H&P NOTE
H&P reviewed  After examining the patient I find no changes in the patients condition since the H&P had been written      Vitals:    01/31/23 0942   BP: 137/93   Pulse: 101   Resp: 20   Temp: 98 3 °F (36 8 °C)   SpO2: 96%

## 2023-01-31 NOTE — ANESTHESIA POSTPROCEDURE EVALUATION
Post-Op Assessment Note    CV Status:  Stable  Pain Score: 7 (pt resting quietly; PACU giving pain meds)    Pain management: adequate     Mental Status:  Alert and awake   Hydration Status:  Euvolemic   PONV Controlled:  Controlled   Airway Patency:  Patent      Post Op Vitals Reviewed: Yes      Staff: CRNA         No notable events documented      /74 (01/31/23 1300)    Temp 98 2 °F (36 8 °C) (01/31/23 1300)    Pulse 90 (01/31/23 1300)   Resp 20 (01/31/23 1300)    SpO2 100 % (01/31/23 1300)

## 2023-01-31 NOTE — NURSING NOTE
Pt transferred to  326 from ICU  Report obtained from [facility]  Pt is stable and shift assessment completed   Call bell in place

## 2023-01-31 NOTE — OP NOTE
OPERATIVE REPORT  PATIENT NAME: Emilee Galeas    :  1955  MRN: [id number]  Pt Location: OW OR ROOM 02    SURGERY DATE: 2023    Surgeon(s) and Role:     * Morgan Ernandez - Primary     * Carol Cortés PA-C - Assisting    Preop Diagnosis:  Closed bimalleolar fracture of right ankle, sequela [S82 841S]  Wound drainage [L24  A9]    Post-Op Diagnosis Codes:     * Closed bimalleolar fracture of right ankle, sequela [S82 841S]     * Wound drainage [F25  A9]    Procedure(s):  Right - REMOVAL HARDWARE ANKLE    Specimen(s):  ID Type Source Tests Collected by Time Destination   A : right ankle wound Wound Wound WOUND CULTURE Morgan Ernandez 2023 1229    B : right ankle wound Wound Wound ANAEROBIC CULTURE AND GRAM STAIN Morgan Ernandez 2023 1230        Estimated Blood Loss:   Minimal    Tourniquet time: 35 minutes at 300 mmHg    Anesthesia Type:   Local utilizing 20 cc of 0 25% Marcaine with epinephrine    Fluids: 400 cc      Operative Indications:  Closed bimalleolar fracture of right ankle, sequela [S82 841S]  Wound drainage [L24  Mayford Davey is a 26-year-old female who underwent surgical fixation of a right ankle fracture in May 2019  She was last seen for this injury, by me, in 2019  She returned to the office in 2022 indicating that she had noted some onset of drainage about 8 months earlier which was initially treated by her primary care physician  She was then referred to wound care who referred her to my office  When seen in the office, the risk, benefits, options and alternatives of treatment were discussed and it was elected to proceed with hardware removal     Operative Findings: At the time of the procedure, the fracture was fully healed  There was no evidence of osteomyelitis  The bone quality was good with no softening  All screws were solidly fixated within bone    Upon initial incision, a small amount of slightly thickened serous fluid was noted without gross purulence  Cultures were obtained  After removal of the hardware, fluoroscopic images demonstrated no retained lateral hardware  Due to the fact that the patient had a well-healed medial incision and no tenderness, the medial hardware was not removed  Complications:   None    Procedure and Technique:  Simon Banuelos was taken to the operating room where she was administered a general anesthetic  A well-padded tourniquet was applied to the proximal right thigh of the right lower extremity then prepped and draped in standard fashion  A timeout was performed  The limb was elevated, exsanguinated with an Esmarch bandage and the tourniquet then inflated to 300 mmHg  There was a small area of scab formation at the very distal aspect of the original surgical incision with no drainage, no erythema or rubor  An incision was then made utilizing the previous surgical incision from fixation of the fracture with the area of scab formation circumferentially excised  After removal of the small area of skin and then further division of the skin incision, there was a small amount of yellowish, somewhat thickened fluid and cultures were obtained  The plate was then exposed subperiosteally with a small amount of additional yellow somewhat thickened fluid noted  All screws were removed without difficulty and noted to be well-seated within the fibular bone  The plate was removed and required use of an osteotome to remove the plate as it was well fixated and small amounts of bone were covering the plate  The 2 screws from anterior to posterior were then removed utilizing fluoroscopic guidance with overgrowth of bone noted  Again, the screws were well fixated within the bone  All screw sites were curetted  The lateral fibula was then curetted and debrided with a rongeur  The wound was then irrigated with copious amounts of saline solution  20 cc of 0 25% Marcaine with epinephrine were injected as local block    There was no evidence of any necrotic tissue and the skin was reapproximated with 4-0 nylon without tension  Dressings were applied consisting of bacitracin, Adaptic, gauze and Webril  The tourniquet had been deflated after total tourniquet time of 35 minutes prior to application of dressings  The lower leg was wrapped with an Ace bandage from toes to proximal calf and then a cast boot applied to the right lower extremity  The patient was awakened from the general anesthetic and transferred to the recovery room in stable and satisfactory postoperative condition     I was present for the entire procedure, A qualified resident physician was not available and A physician assistant was required during the procedure for retraction tissue handling,dissection and suturing    Patient Disposition:  APU        SIGNATURE: Kristen Crimes  DATE: January 31, 2023  TIME: 1:01 PM

## 2023-02-01 LAB
ALBUMIN SERPL BCP-MCNC: 3.7 G/DL (ref 3.5–5)
ALP SERPL-CCNC: 85 U/L (ref 34–104)
ALT SERPL W P-5'-P-CCNC: 8 U/L (ref 7–52)
ANION GAP SERPL CALCULATED.3IONS-SCNC: 5 MMOL/L (ref 4–13)
AST SERPL W P-5'-P-CCNC: 12 U/L (ref 13–39)
BASOPHILS # BLD AUTO: 0.01 THOUSANDS/ÂΜL (ref 0–0.1)
BASOPHILS NFR BLD AUTO: 0 % (ref 0–1)
BILIRUB SERPL-MCNC: 0.28 MG/DL (ref 0.2–1)
BUN SERPL-MCNC: 12 MG/DL (ref 5–25)
CALCIUM SERPL-MCNC: 8.7 MG/DL (ref 8.4–10.2)
CHLORIDE SERPL-SCNC: 109 MMOL/L (ref 96–108)
CO2 SERPL-SCNC: 25 MMOL/L (ref 21–32)
CREAT SERPL-MCNC: 0.79 MG/DL (ref 0.6–1.3)
EOSINOPHIL # BLD AUTO: 0 THOUSAND/ÂΜL (ref 0–0.61)
EOSINOPHIL NFR BLD AUTO: 0 % (ref 0–6)
ERYTHROCYTE [DISTWIDTH] IN BLOOD BY AUTOMATED COUNT: 15.7 % (ref 11.6–15.1)
GFR SERPL CREATININE-BSD FRML MDRD: 77 ML/MIN/1.73SQ M
GLUCOSE SERPL-MCNC: 95 MG/DL (ref 65–140)
HCT VFR BLD AUTO: 37 % (ref 34.8–46.1)
HGB BLD-MCNC: 12.1 G/DL (ref 11.5–15.4)
IMM GRANULOCYTES # BLD AUTO: 0.03 THOUSAND/UL (ref 0–0.2)
IMM GRANULOCYTES NFR BLD AUTO: 0 % (ref 0–2)
LYMPHOCYTES # BLD AUTO: 0.86 THOUSANDS/ÂΜL (ref 0.6–4.47)
LYMPHOCYTES NFR BLD AUTO: 10 % (ref 14–44)
MCH RBC QN AUTO: 32.2 PG (ref 26.8–34.3)
MCHC RBC AUTO-ENTMCNC: 32.7 G/DL (ref 31.4–37.4)
MCV RBC AUTO: 98 FL (ref 82–98)
MONOCYTES # BLD AUTO: 0.51 THOUSAND/ÂΜL (ref 0.17–1.22)
MONOCYTES NFR BLD AUTO: 6 % (ref 4–12)
NEUTROPHILS # BLD AUTO: 7.24 THOUSANDS/ÂΜL (ref 1.85–7.62)
NEUTS SEG NFR BLD AUTO: 84 % (ref 43–75)
NRBC BLD AUTO-RTO: 0 /100 WBCS
PLATELET # BLD AUTO: 231 THOUSANDS/UL (ref 149–390)
PMV BLD AUTO: 8.3 FL (ref 8.9–12.7)
POTASSIUM SERPL-SCNC: 3.9 MMOL/L (ref 3.5–5.3)
PROCALCITONIN SERPL-MCNC: <0.05 NG/ML
PROT SERPL-MCNC: 6.2 G/DL (ref 6.4–8.4)
RBC # BLD AUTO: 3.76 MILLION/UL (ref 3.81–5.12)
SODIUM SERPL-SCNC: 139 MMOL/L (ref 135–147)
WBC # BLD AUTO: 8.65 THOUSAND/UL (ref 4.31–10.16)

## 2023-02-01 RX ORDER — CEFAZOLIN SODIUM 2 G/50ML
2000 SOLUTION INTRAVENOUS EVERY 8 HOURS
Status: COMPLETED | OUTPATIENT
Start: 2023-02-01 | End: 2023-02-02

## 2023-02-01 RX ADMIN — CYANOCOBALAMIN TAB 500 MCG 500 MCG: 500 TAB at 08:05

## 2023-02-01 RX ADMIN — ALPRAZOLAM 1 MG: 0.5 TABLET ORAL at 14:52

## 2023-02-01 RX ADMIN — FAMOTIDINE 20 MG: 20 TABLET ORAL at 08:05

## 2023-02-01 RX ADMIN — ATORVASTATIN CALCIUM 40 MG: 40 TABLET, FILM COATED ORAL at 17:02

## 2023-02-01 RX ADMIN — MORPHINE SULFATE 2 MG: 2 INJECTION, SOLUTION INTRAMUSCULAR; INTRAVENOUS at 07:24

## 2023-02-01 RX ADMIN — DOCUSATE SODIUM 100 MG: 100 CAPSULE ORAL at 08:05

## 2023-02-01 RX ADMIN — QUETIAPINE 300 MG: 100 TABLET, FILM COATED ORAL at 21:33

## 2023-02-01 RX ADMIN — DOCUSATE SODIUM 100 MG: 100 CAPSULE ORAL at 17:02

## 2023-02-01 RX ADMIN — FERROUS SULFATE TAB 325 MG (65 MG ELEMENTAL FE) 325 MG: 325 (65 FE) TAB at 17:02

## 2023-02-01 RX ADMIN — PANTOPRAZOLE SODIUM 40 MG: 40 TABLET, DELAYED RELEASE ORAL at 08:05

## 2023-02-01 RX ADMIN — OXYCODONE HYDROCHLORIDE 10 MG: 10 TABLET ORAL at 09:51

## 2023-02-01 RX ADMIN — CEFAZOLIN SODIUM 2000 MG: 2 SOLUTION INTRAVENOUS at 21:33

## 2023-02-01 RX ADMIN — ALPRAZOLAM 1 MG: 0.5 TABLET ORAL at 08:05

## 2023-02-01 RX ADMIN — METOPROLOL SUCCINATE 100 MG: 100 TABLET, EXTENDED RELEASE ORAL at 08:05

## 2023-02-01 RX ADMIN — MORPHINE SULFATE 2 MG: 2 INJECTION, SOLUTION INTRAMUSCULAR; INTRAVENOUS at 12:03

## 2023-02-01 RX ADMIN — KETOTIFEN FUMARATE 1 DROP: 0.35 SOLUTION/ DROPS OPHTHALMIC at 17:05

## 2023-02-01 RX ADMIN — OXYCODONE HYDROCHLORIDE 10 MG: 10 TABLET ORAL at 05:35

## 2023-02-01 RX ADMIN — OXYCODONE HYDROCHLORIDE 10 MG: 10 TABLET ORAL at 13:41

## 2023-02-01 RX ADMIN — FERROUS SULFATE TAB 325 MG (65 MG ELEMENTAL FE) 325 MG: 325 (65 FE) TAB at 07:24

## 2023-02-01 RX ADMIN — CEFAZOLIN SODIUM 2000 MG: 2 SOLUTION INTRAVENOUS at 14:53

## 2023-02-01 RX ADMIN — OLANZAPINE 20 MG: 10 TABLET, FILM COATED ORAL at 21:33

## 2023-02-01 RX ADMIN — ALPRAZOLAM 1 MG: 0.5 TABLET ORAL at 21:33

## 2023-02-01 RX ADMIN — MORPHINE SULFATE 2 MG: 2 INJECTION, SOLUTION INTRAMUSCULAR; INTRAVENOUS at 17:01

## 2023-02-01 RX ADMIN — NICOTINE 1 PATCH: 7 PATCH, EXTENDED RELEASE TRANSDERMAL at 08:06

## 2023-02-01 RX ADMIN — AMLODIPINE BESYLATE 5 MG: 5 TABLET ORAL at 08:05

## 2023-02-01 RX ADMIN — KETOTIFEN FUMARATE 1 DROP: 0.35 SOLUTION/ DROPS OPHTHALMIC at 09:12

## 2023-02-01 RX ADMIN — ENOXAPARIN SODIUM 40 MG: 40 INJECTION SUBCUTANEOUS at 08:05

## 2023-02-01 NOTE — PROGRESS NOTES
Progress Note - Orthopedics   David Rajput 79 y o  female MRN: [id number]  Unit/Bed#: -01 Encounter: [id number]    Assessment:  POD #1 removal of hardware distal right fibula; ambulatory dysfunction; tobacco abuse; history of CVA, hypertension and bipolar disease    Plan:  Continue current treatment plan  Gram stain is negative for organisms  However, culture reports are not available  I would recommend continuing inpatient stay at least until later today pending review of initial culture report  If initial culture report demonstrates no growth, discharge might be appropriate  However, if there is any question on the cultures, or if culture reports are not available today, continuing in the hospital overnight until initial culture report is available would be appropriate  Weight bearing: WBAT in CAM boot    VTE Pharmacologic Prophylaxis: Enoxaparin (Lovenox)  VTE Mechanical Prophylaxis: sequential compression device    Subjective: Hieu Montes reports pain both at rest and with activity without significant change in symptoms between the 2 activity levels  She denies lightheadedness or dizziness  She denies chest pain or shortness of breath  Vitals: Blood pressure 135/75, pulse 63, temperature 97 5 °F (36 4 °C), resp  rate 18, height 5' 4" (1 626 m), weight 81 2 kg (179 lb), SpO2 96 %  ,Body mass index is 30 73 kg/m²  Intake/Output Summary (Last 24 hours) at 2/1/2023 0934  Last data filed at 2/1/2023 0820  Gross per 24 hour   Intake 1177 5 ml   Output 650 ml   Net 527 5 ml       Invasive Devices     Peripheral Intravenous Line  Duration           Peripheral IV 01/31/23 Dorsal (posterior); Right Hand <1 day                Physical Exam: The cam boot is in place right lower extremity  Her toes demonstrated good color and capillary refill  She is comfortable resting in bedside and she was seen ambulating in the patient room with a walker assistance without significant difficulty      Lab, Imaging and other studies:   CBC:   Lab Results   Component Value Date    WBC 8 65 02/01/2023    HGB 12 1 02/01/2023    HCT 37 0 02/01/2023    MCV 98 02/01/2023     02/01/2023    MCH 32 2 02/01/2023    MCHC 32 7 02/01/2023    RDW 15 7 (H) 02/01/2023    MPV 8 3 (L) 02/01/2023    NRBC 0 02/01/2023     CMP:   Lab Results   Component Value Date    SODIUM 139 02/01/2023     (H) 02/01/2023    CO2 25 02/01/2023    BUN 12 02/01/2023    CREATININE 0 79 02/01/2023    CALCIUM 8 7 02/01/2023    AST 12 (L) 02/01/2023    ALT 8 02/01/2023    ALKPHOS 85 02/01/2023    EGFR 77 02/01/2023     Gram stain shows no organisms

## 2023-02-01 NOTE — ASSESSMENT & PLAN NOTE
· Hx of right ankle bimalleolar ORIF in 2019 , recent drainage from surgical wound site noted as outpatient  ·  S/p elective right ankle hardware removal and wound cultures on 1/31/23   · Currently afebrile    · F/U wound cultures, continue Ancef per primary team   · PRN pain control   · Rest of care per primary team

## 2023-02-01 NOTE — PLAN OF CARE
Problem: PHYSICAL THERAPY ADULT  Goal: Performs mobility at highest level of function for planned discharge setting  See evaluation for individualized goals  Description: Treatment/Interventions: ADL retraining, Functional transfer training, LE strengthening/ROM, Elevations, Therapeutic exercise, Endurance training, Patient/family training, Equipment eval/education, Bed mobility, Gait training, Compensatory technique education, Spoke to nursing, Spoke to case management, OT  Equipment Recommended:  (pt has RW)       See flowsheet documentation for full assessment, interventions and recommendations  Note: Prognosis: Good  Problem List: Decreased strength, Decreased endurance, Impaired balance, Decreased mobility, Impaired judgement, Decreased safety awareness, Impaired hearing, Obesity, Decreased skin integrity, Pain  Assessment: Pt is a 79 y o  female seen for PT evaluation s/p admission to [address] [place] [address] on 1/31/2023 with Closed fracture of ankle, bimalleolar, right, sequela  Order placed for PT services    Upon evaluation: Pt is presenting with impaired functional mobility due to pain, decreased strength, decreased endurance, impaired balance, gait deviations, decreased safety awareness, fall risk, LE edema, and impaired skin integrity requiring  stand by to minimal assistance for transfers and minimal to steadying assistance for ambulation with out AD and Supervision for transfers and ambulation with RW   Pt's clinical presentation is currently unpredictable given the functional mobility deficits above, especially weakness, edema of extremities, decreased skin integrity, decreased endurance, gait deviations, pain, decreased activity tolerance, decreased functional mobility tolerance, and decreased safety awareness, coupled with fall risks as indicated by AM-PAC 6-Clicks: 26/07 as well as hx of falls, impaired balance, polypharmacy, and decreased safety awareness and combined with medical complications of pain impacting overall mobility status, multiple readmissions and need for input for mobility technique/safety  Pt's PMHx and comorbidities that may affect physical performance and progress include: HTN, CVA and bipolar disorder, closed bimalleolar fx on right, TIA, renal cell carcinoma, [place]  Personal factors affecting pt at time of IE include: step(s) to enter environment, inability to perform IADLs, inability to perform ADLs, inability to navigate level surfaces without external assistance and recent fall(s)/fall history  Pt will benefit from continued skilled PT services to address deficits as defined above and to maximize level of functional mobility to facilitate return toward PLOF and improved QOL  From PT/mobility standpoint, recommendation at time of d/c would be OP PT, home with family support and with walker pending progress in order to reduce fall risk and maximize pt's functional independence and consistency with mobility in order to facilitate return to PLOF  Recommend trial with walker next 1-2 sessions and ther ex next 1-2 sessions  Barriers to Discharge: None  Barriers to Discharge Comments: has support form her mother prn  PT Discharge Recommendation: Home with outpatient rehabilitation    See flowsheet documentation for full assessment

## 2023-02-01 NOTE — NURSING NOTE
Patient was having urine retention last evening  So far, PVR bladder scan this am was 117ml  Will monitor

## 2023-02-01 NOTE — PHYSICAL THERAPY NOTE
PHYSICAL THERAPY EVALUATION  NAME:  Ab Seymour  DATE: 02/01/23    AGE:   79 y o  Mrn:   [id number]  ADMIT DX:  Closed bimalleolar fracture of right ankle, sequela [S82 841S]  Wound drainage [L24  A9]    Past Medical History:   Diagnosis Date   • Bipolar 1 disorder ([facility] )    • Cancer ([place] [id number] )     kidney   • Cardiac disease     fast heart beat   • Chronic pain    • Fractures    • GERD (gastroesophageal reflux disease)    • Hard of hearing    • Hypertension    • Renal cell carcinoma ([place] [id number] )    • Stroke [facility])     2009 affected her speech, right sided weakness   • TIA (transient ischemic attack)      Length Of Stay: 1  Performed at least 2 patient identifiers during session: Name and Birthday  PHYSICAL THERAPY EVALUATION :    02/01/23 0918   PT Last Visit   PT Visit Date 02/01/23   Note Type   Note type Evaluation   Pain Assessment   Pain Assessment Tool FLACC   Pain Location/Orientation Orientation: Right  (ankle)   Pain Rating: FLACC (Rest) - Face 0   Pain Rating: FLACC (Rest) - Legs 0   Pain Rating: FLACC (Rest) - Activity 0   Pain Rating: FLACC (Rest) - Cry 0   Pain Rating: FLACC (Rest) - Consolability 0   Score: FLACC (Rest) 0   Pain Rating: FLACC (Activity) - Face 1   Pain Rating: FLACC (Activity) - Legs 0   Pain Rating: FLACC (Activity) - Activity 0   Pain Rating: FLACC (Activity) - Cry 1   Pain Rating: FLACC (Activity) - Consolability 0   Score: FLACC (Activity) 2   Restrictions/Precautions   Weight Bearing Precautions Per Order Yes   RLE Weight Bearing Per Order WBAT  (in CAM boot)   Braces or Orthoses CAM Boot  (R LE)   Other Precautions Chair Alarm;Multiple lines; Fall Risk;Pain   Home Living   Type of Home House  (1 HECTOR no HR)   Home Layout Two level;Performs ADLs on one level; Able to live on main level with bedroom/bathroom   Bathroom Shower/Tub Tub/shower unit   Bathroom Toilet Raised   Bathroom Equipment Shower chair;Grab bars in [facility] 124  (didn't use PTA)   Additional Comments Reports living in a 2  with 1 HECTOR and 1st floor set up  Reports She has been staying wiht her mother who has been providing patient assistance prn  Has RW to use if needed  Prior Function   Level of [place] Independent with ADLs; Independent with functional mobility; Needs assistance with IADLS   Lives With   ("i have my own house but margi been staying with my mom")   Receives Help From Family   IADLs Family/Friend/Other provides transportation; Family/Friend/Other provides meals; Family/Friend/Other provides medication management   Falls in the last 6 months 1 to 4   Comments Reports being indpendent with mobility, ADLs and has assistance from her mother for IADLs  General   Additional Pertinent History Pt is status post right bimalleolar ORIF 5/14/2019 as a result of a fall with ankle fx  Per Dr Erazo Rings note on 1/16/23, "As per her last note from 12/23/2022 patient had some drainage surgical wound on the lateral side and hardware removal was suggested patient did not want surgery  Presents today for follow-up  She notes approximately 2 days ago the drainage stopped wound care told her to follow-up with her surgeon to discuss hardware removal   She would prefer not going through surgery again but if it is recommended, she would do it " Pt is now s/p Right - REMOVAL HARDWARE ANKLE on 1/31/23  WBAT in Cam boot  Cognition   Orientation Level Oriented X4   Following Commands Follows one step commands without difficulty   Subjective   Subjective "I am deaf" (patient reports she can here a little, better oiut of left ear than right)   RLE Assessment   RLE Assessment WFL  (right ankle not assessed in CAM boot  strength 3+/5 at hip and knee)   LLE Assessment   LLE Assessment WFL  (4/5)   Bed Mobility   Additional Comments Pt sitting OOB in recliner chair at start of session     Transfers   Sit to Stand   (SBA without AD and supervision with RW)   Additional items Increased time required;Verbal cues   Stand to Sit (SBA without AD and supervision wiht RW)   Additional items Increased time required;Verbal cues   Stand pivot   (minimal to steadying assistance without AD and supervision with RW)   Additional items Increased time required;Verbal cues   Additional Comments no AD initially  sit<>stand with SBA with wide MIKA, pt guarded  inc time to complete  spt without AD with minimal to steadying assistance with dec stance R LE, decreased L step legnth, pt reaching for external support and need for wt shifting initially with R LE stance  cues for turning completely prior to sitting  intiiated use of RW  sit<>stand with RW with Supervision wiht min cues for hand placement  spt with RW with Supervision with min cues for turning compeltely prior to sitting  Ambulation/Elevation   Gait pattern Wide MIKA; Antalgic; Improper Weight shift; Short stride; Excessively slow  (reaching for external support)   Gait Assistance   (minimal to steadying assistance)   Additional items Assist x 1;Verbal cues   Assistive Device None;Rolling walker   Distance ambulated 22'x1 without AD with min/steadying assistance wiht patient reaching for external support, decreased stance R LE, dec L step length  manual cues for balance and wt shifting   initiated use of RW  ambulated 12'x2 and 110'x1 with RW with significantly slowed rafael, min cues to stay wihtin MIKA of RW, decreased stance R LE  increased path deviation with head turns to talk wi therapist    Kady Araiza [id number]   (pt declined stair trial  explained proper sequence for completion wiht RW, pt verbalized understanding )   Balance   Static Sitting Normal   Dynamic Sitting Good   Static Standing Fair +   Dynamic Standing [address] -  (fair wiht RW)   Activity Tolerance   Activity Tolerance Patient limited by fatigue;Patient limited by pain   Medical Staff Made Aware Verena GRIMES   Nurse Made Aware RNGos[place]   Assessment   Prognosis Good   Problem List Decreased strength;Decreased endurance; Impaired balance;Decreased mobility; Impaired judgement;Decreased safety awareness; Impaired hearing;Obesity; Decreased skin integrity;Pain   Barriers to Discharge None   Barriers to Discharge Comments has support form her mother prn   Goals   Patient Goals "Go home"   STG Expiration Date 02/15/23   PT Treatment Day 0   Plan   Treatment/Interventions ADL retraining;Functional transfer training;LE strengthening/ROM; Elevations; Therapeutic exercise; Endurance training;Patient/family training;Equipment eval/education; Bed mobility;Gait training; Compensatory technique education;Spoke to nursing;Spoke to case management;OT   PT Frequency 2-3x/wk   Recommendation   PT Discharge Recommendation Home with outpatient rehabilitation   Equipment Recommended   (pt has RW)   Additional Comments pt reports she doesn't drive, but can get a ride to OPPT  AM-PAC Basic Mobility Inpatient   Turning in Flat Bed Without Bedrails 4   Lying on Back to Sitting on Edge of Flat Bed Without Bedrails 4   Moving Bed to Chair 3   Standing Up From Chair Using Arms 3   Walk in Room 3   Climb 3-5 Stairs With Railing 3   Basic Mobility Inpatient Raw Score 20   Basic Mobility Standardized Score 43 99   Highest Level Of Mobility   JH-HLM Goal 6: Walk 10 steps or more   JH-HLM Achieved 7: Walk 25 feet or more   End of Consult   Patient Position at End of Consult Bedside chair;Bed/Chair alarm activated; All needs within reach     (Please find full objective findings from PT assessment regarding body systems outlined above)  Assessment: Pt is a 79 y o  female seen for PT evaluation s/p admission to [address] [place] [address] on 1/31/2023 with Closed fracture of ankle, bimalleolar, right, sequela  Order placed for PT services    Upon evaluation: Pt is presenting with impaired functional mobility due to pain, decreased strength, decreased endurance, impaired balance, gait deviations, decreased safety awareness, fall risk, LE edema, and impaired skin integrity requiring  stand by to minimal assistance for transfers and minimal to steadying assistance for ambulation with out AD and Supervision for transfers and ambulation with RW   Pt's clinical presentation is currently unpredictable given the functional mobility deficits above, especially weakness, edema of extremities, decreased skin integrity, decreased endurance, gait deviations, pain, decreased activity tolerance, decreased functional mobility tolerance, and decreased safety awareness, coupled with fall risks as indicated by AM-PAC 6-Clicks: 06/24 as well as hx of falls, impaired balance, polypharmacy, and decreased safety awareness and combined with medical complications of pain impacting overall mobility status, multiple readmissions and need for input for mobility technique/safety  Pt's PMHx and comorbidities that may affect physical performance and progress include: HTN, CVA and bipolar disorder, closed bimalleolar fx on right, TIA, renal cell carcinoma, [place]  Personal factors affecting pt at time of IE include: step(s) to enter environment, inability to perform IADLs, inability to perform ADLs, inability to navigate level surfaces without external assistance and recent fall(s)/fall history  Pt will benefit from continued skilled PT services to address deficits as defined above and to maximize level of functional mobility to facilitate return toward PLOF and improved QOL  From PT/mobility standpoint, recommendation at time of d/c would be OP PT, home with family support and with walker pending progress in order to reduce fall risk and maximize pt's functional independence and consistency with mobility in order to facilitate return to PLOF  Recommend trial with walker next 1-2 sessions and ther ex next 1-2 sessions  The patient's AM-PAC Basic Mobility Inpatient Short Form Raw Score is 20  A Raw score of greater than 16 suggests the patient may benefit from discharge to home  Please also refer to the recommendation of the Physical Therapist for safe discharge planning  Goals: Pt will: Perform bed mobility tasks with modified I to reposition in bed and prepare for transfers  Pt will perform transfers with modified I to decrease burden of care and decrease risk for falls and prepare for ambulation  Pt will ambulate with LRAD for >/= 250' with  modified I  to decrease burden of care, decrease risk for falls, improve activity tolerance and improve gait quality and to access home environment  Pt will complete >/= 4 steps with with bilateral handrails and 1 step with LRAD with modified I to decrease burden of care, return to home with HECTOR, decrease risk for falls and improve activity tolerance  Pt will participate in objective balance assessment to determine baseline fall risk  Pt will increase B LE strength >/= 1/2 MMT grade to facilitate functional mobility        Ewa Salinas, PT,DPT

## 2023-02-01 NOTE — PROGRESS NOTES
[address]  Progress Note - Jose Laughlin 1955, 79 y o  female MRN: [id number]  Unit/Bed#: -01 Encounter: [id number]  Primary Care Provider: Cecile Vogel DO   Date and time admitted to hospital: 1/31/2023  9:16 AM    Tobacco abuse  Assessment & Plan  · Smoking cessation encouraged  · Continue nicotine patch     History of CVA (cerebrovascular accident)  Assessment & Plan  · Continue statin     Hypertension  Assessment & Plan  · BP's controlled  · Continue Norvasc, Toprol  mg     Bipolar disease, chronic (HCC)  Assessment & Plan  · Continue home Seroquel, Zyprexa, Xanax    * Closed fracture of ankle, bimalleolar, right, sequela  Assessment & Plan  · Hx of right ankle bimalleolar ORIF in 2019 , recent drainage from surgical wound site noted as outpatient  ·  S/p elective right ankle hardware removal and wound cultures on 1/31/23   · Currently afebrile  · F/U wound cultures, continue Ancef per primary team   · PRN pain control   · Rest of care per primary team         VTE Pharmacologic Prophylaxis: VTE Score: 3 Moderate Risk (Score 3-4) - Pharmacological DVT Prophylaxis Ordered: enoxaparin (Lovenox)  Patient Centered Rounds: I performed bedside rounds with nursing staff today  Discussions with Specialists or Other Care Team Provider: Ortho    Education and Discussions with Family / Patient: Primary team to contact family  Time Spent for Care: 30 minutes  More than 50% of total time spent on counseling and coordination of care as described above  Current Length of Stay: 1 day(s)  Current Patient Status: Outpatient Surgery   Certification Statement: The patient will continue to require additional inpatient hospital stay due to Discharge to be determined by primary team  Discharge Plan: [address] is following this patient on consult  They are medically stable for discharge when deemed appropriate by primary service      Code Status: Level 1 - Full Code    Subjective:   Patient seen and examined at bedside  Denies any complaints at this time  Objective:     Vitals:   Temp (24hrs), Av 4 °F (36 3 °C), Min:97 2 °F (36 2 °C), Max:97 5 °F (36 4 °C)    Temp:  [97 2 °F (36 2 °C)-97 5 °F (36 4 °C)] 97 5 °F (36 4 °C)  HR:  [59-63] 59  Resp:  [13-18] 18  BP: (125-135)/(73-77) 132/73  SpO2:  [93 %-97 %] 97 %  Body mass index is 30 73 kg/m²  Input and Output Summary (last 24 hours): Intake/Output Summary (Last 24 hours) at 2023 1642  Last data filed at 2023 1604  Gross per 24 hour   Intake 909 17 ml   Output 2150 ml   Net -1240 83 ml       Physical Exam:   Physical Exam  Vitals and nursing note reviewed  Constitutional:       General: She is not in acute distress  Appearance: She is well-developed  HENT:      Head: Normocephalic and atraumatic  Eyes:      Conjunctiva/sclera: Conjunctivae normal    Cardiovascular:      Rate and Rhythm: Normal rate and regular rhythm  Heart sounds: No murmur heard  Pulmonary:      Effort: Pulmonary effort is normal  No respiratory distress  Breath sounds: Normal breath sounds  Abdominal:      Palpations: Abdomen is soft  Tenderness: There is no abdominal tenderness  Musculoskeletal:         General: No swelling  Cervical back: Neck supple  Skin:     General: Skin is warm and dry  Capillary Refill: Capillary refill takes less than 2 seconds  Neurological:      Mental Status: She is alert     Psychiatric:         Mood and Affect: Mood normal             Additional Data:     Labs:  Results from last 7 days   Lab Units 23  0529   WBC Thousand/uL 8 65   HEMOGLOBIN g/dL 12 1   HEMATOCRIT % 37 0   PLATELETS Thousands/uL 231   NEUTROS PCT % 84*   LYMPHS PCT % 10*   MONOS PCT % 6   EOS PCT % 0     Results from last 7 days   Lab Units 23  0529   SODIUM mmol/L 139   POTASSIUM mmol/L 3 9   CHLORIDE mmol/L 109*   CO2 mmol/L 25   BUN mg/dL 12   CREATININE mg/dL 0 79   ANION GAP mmol/L 5   CALCIUM mg/dL 8 7   ALBUMIN g/dL 3 7   TOTAL BILIRUBIN mg/dL 0 28   ALK PHOS U/L 85   ALT U/L 8   AST U/L 12*   GLUCOSE RANDOM mg/dL 95                 Results from last 7 days   Lab Units 02/01/23  0529   PROCALCITONIN ng/ml <0 05       Lines/Drains:  Invasive Devices     Peripheral Intravenous Line  Duration           Peripheral IV 01/31/23 Dorsal (posterior); Right Hand 1 day                      Imaging: Reviewed radiology reports from this admission including: xray(s)    Recent Cultures (last 7 days):   Results from last 7 days   Lab Units 01/31/23  1229   GRAM STAIN RESULT  1+ Polys  No bacteria seen   WOUND CULTURE  Culture too young- will reincubate       Last 24 Hours Medication List:   Current Facility-Administered Medications   Medication Dose Route Frequency Provider Last Rate   • albuterol  2 puff Inhalation Q4H PRN Abena Hi PA-C     • ALPRAZolam  1 mg Oral 4x Daily PRN Abena Hi PA-C     • amLODIPine  5 mg Oral Daily Abena Hi PA-C     • atorvastatin  40 mg Oral QPM Abena Hi PA-C     • calcium carbonate  1,000 mg Oral Daily PRN Abena Hi PA-C     • cefazolin  2,000 mg Intravenous Q8H Soheila Garner PA-C 2,000 mg (02/01/23 5283)   • vitamin B-12  500 mcg Oral Daily Abena Hi PA-C     • docusate sodium  100 mg Oral BID Abena Hi PA-C     • enoxaparin  40 mg Subcutaneous Daily Abena Hi PA-C     • famotidine  20 mg Oral Daily Abena Hi PA-C     • ferrous sulfate  325 mg Oral BID With Meals Abena Hi PA-C     • ketotifen  1 drop Both Eyes BID Abena Hi PA-C     • metoprolol succinate  100 mg Oral Daily Abena Hi PA-C     • morphine injection  2 mg Intravenous Q4H PRN YASMANI Fischer     • nicotine  1 patch Transdermal Daily Abena Hi PA-C     • OLANZapine  20 mg Oral HS Abena Hi PA-C     • ondansetron  4 mg Intravenous Q6H PRN Abena Hi PA-C     • oxyCODONE  10 mg Oral Q4H PRN Abena Hi EDY     • oxyCODONE  5 mg Oral Q4H PRN Avery Ramos PA-C     • pantoprazole  40 mg Oral Daily Avery Ramos PA-C     • QUEtiapine  300 mg Oral HS Avery Ramos PA-C     • sodium chloride  75 mL/hr Intravenous Continuous Avery Ramos PA-C 75 mL/hr (01/31/23 1928)        Today, Patient Was Seen By: Madison Weston MD    **Please Note: This note may have been constructed using a voice recognition system  **

## 2023-02-01 NOTE — NURSING NOTE
Patient is requiring morphine IV for breakthrough pain  She had 2 doses since 0700 this am so far  Oxycodone helps, but she asks for it 1 to 2 hours before it is due again

## 2023-02-01 NOTE — PLAN OF CARE
Problem: MOBILITY - ADULT  Goal: Maintain or return to baseline ADL function  Description: INTERVENTIONS:  -  Assess patient's ability to carry out ADLs; assess patient's baseline for ADL function and identify physical deficits which impact ability to perform ADLs (bathing, care of mouth/teeth, toileting, grooming, dressing, etc )  - Assess/evaluate cause of self-care deficits   - Assess range of motion  - Assess patient's mobility; develop plan if impaired  - Assess patient's need for assistive devices and provide as appropriate  - Encourage maximum independence but intervene and supervise when necessary  - Involve family in performance of ADLs  - Assess for home care needs following discharge   - Consider OT consult to assist with ADL evaluation and planning for discharge  - Provide patient education as appropriate  Outcome: Progressing  Goal: Maintains/Returns to pre admission functional level  Description: INTERVENTIONS:  - Perform BMAT or MOVE assessment daily    - Set and communicate daily mobility goal to care team and patient/family/caregiver  - Collaborate with rehabilitation services on mobility goals if consulted  - Perform Range of Motion 3 times a day  - Reposition patient every 2 hours    - Dangle patient 3 times a day  - Stand patient 3 times a day  - Ambulate patient 3 times a day  - Out of bed to chair 3 times a day   - Out of bed for meals 3 times a day  - Out of bed for toileting  - Record patient progress and toleration of activity level   Outcome: Progressing     Problem: PAIN - ADULT  Goal: Verbalizes/displays adequate comfort level or baseline comfort level  Description: Interventions:  - Encourage patient to monitor pain and request assistance  - Assess pain using appropriate pain scale  - Administer analgesics based on type and severity of pain and evaluate response  - Implement non-pharmacological measures as appropriate and evaluate response  - Consider cultural and social influences on pain and pain management  - Notify physician/advanced practitioner if interventions unsuccessful or patient reports new pain  Outcome: Progressing     Problem: INFECTION - ADULT  Goal: Absence or prevention of progression during hospitalization  Description: INTERVENTIONS:  - Assess and monitor for signs and symptoms of infection  - Monitor lab/diagnostic results  - Monitor all insertion sites, i e  indwelling lines, tubes, and drains  - Monitor endotracheal if appropriate and nasal secretions for changes in amount and color  - [place] appropriate cooling/warming therapies per order  - Administer medications as ordered  - Instruct and encourage patient and family to use good hand hygiene technique  - Identify and instruct in appropriate isolation precautions for identified infection/condition  Outcome: Progressing     Problem: SAFETY ADULT  Goal: Maintain or return to baseline ADL function  Description: INTERVENTIONS:  -  Assess patient's ability to carry out ADLs; assess patient's baseline for ADL function and identify physical deficits which impact ability to perform ADLs (bathing, care of mouth/teeth, toileting, grooming, dressing, etc )  - Assess/evaluate cause of self-care deficits   - Assess range of motion  - Assess patient's mobility; develop plan if impaired  - Assess patient's need for assistive devices and provide as appropriate  - Encourage maximum independence but intervene and supervise when necessary  - Involve family in performance of ADLs  - Assess for home care needs following discharge   - Consider OT consult to assist with ADL evaluation and planning for discharge  - Provide patient education as appropriate  Outcome: Progressing  Goal: Maintains/Returns to pre admission functional level  Description: INTERVENTIONS:  - Perform BMAT or MOVE assessment daily    - Set and communicate daily mobility goal to care team and patient/family/caregiver     - Collaborate with rehabilitation services on mobility goals if consulted  - Perform Range of Motion 3 times a day  - Reposition patient every 2 hours    - Dangle patient 3 times a day  - Stand patient 3 times a day  - Ambulate patient 3 times a day  - Out of bed to chair 3 times a day   - Out of bed for meals 3 times a day  - Out of bed for toileting  - Record patient progress and toleration of activity level   Outcome: Progressing  Goal: Patient will remain free of falls  Description: INTERVENTIONS:  - Educate patient/family on patient safety including physical limitations  - Instruct patient to call for assistance with activity   - Consult OT/PT to assist with strengthening/mobility   - Keep Call bell within reach  - Keep bed low and locked with side rails adjusted as appropriate  - Keep care items and personal belongings within reach  - Initiate and maintain comfort rounds  - Make Fall Risk Sign visible to staff  - Offer Toileting every 2-4  Hours, in advance of need  - Initiate/Maintain bed/chair alarm  - Obtain necessary fall risk management equipment: call bell in reach/room camera  - Apply yellow socks and bracelet for high fall risk patients  - Consider moving patient to room near nurses station  Outcome: Progressing     Problem: DISCHARGE PLANNING  Goal: Discharge to home or other facility with appropriate resources  Description: INTERVENTIONS:  - Identify barriers to discharge w/patient and caregiver  - Arrange for needed discharge resources and transportation as appropriate  - Identify discharge learning needs (meds, wound care, etc )  - Arrange for interpretive services to assist at discharge as needed  - Refer to Case Management Department for coordinating discharge planning if the patient needs post-hospital services based on physician/advanced practitioner order or complex needs related to functional status, cognitive ability, or social support system  Outcome: Progressing     Problem: Knowledge Deficit  Goal: Patient/family/caregiver demonstrates understanding of disease process, treatment plan, medications, and discharge instructions  Description: Complete learning assessment and assess knowledge base    Interventions:  - Provide teaching at level of understanding  - Provide teaching via preferred learning methods  Outcome: Progressing

## 2023-02-01 NOTE — ASSESSMENT & PLAN NOTE
· Hx of right ankle bimalleolar ORIF in 2019 , recent drainage from surgical wound site noted as outpatient  ·  S/p elective right ankle hardware removal and wound cultures on 1/31/23   · Currently afebrile    · F/U wound cultures, continue Ancef per primary team   · F/U AM labs - monitor CBC , check procal  · PRN pain control   · Rest of care per primary team

## 2023-02-01 NOTE — PLAN OF CARE
Problem: MOBILITY - ADULT  Goal: Maintain or return to baseline ADL function  Description: INTERVENTIONS:  -  Assess patient's ability to carry out ADLs; assess patient's baseline for ADL function and identify physical deficits which impact ability to perform ADLs (bathing, care of mouth/teeth, toileting, grooming, dressing, etc )  - Assess/evaluate cause of self-care deficits   - Assess range of motion  - Assess patient's mobility; develop plan if impaired  - Assess patient's need for assistive devices and provide as appropriate  - Encourage maximum independence but intervene and supervise when necessary  - Involve family in performance of ADLs  - Assess for home care needs following discharge   - Consider OT consult to assist with ADL evaluation and planning for discharge  - Provide patient education as appropriate  Outcome: Progressing  Goal: Maintains/Returns to pre admission functional level  Description: INTERVENTIONS:  - Perform BMAT or MOVE assessment daily    - Set and communicate daily mobility goal to care team and patient/family/caregiver  - Collaborate with rehabilitation services on mobility goals if consulted  - Perform Range of Motion 3 times a day  - Reposition patient every 2 hours    - Dangle patient 3 times a day  - Stand patient 3 times a day  - Ambulate patient 3 times a day  - Out of bed to chair 3 times a day   - Out of bed for meals 3 times a day  - Out of bed for toileting  - Record patient progress and toleration of activity level   Outcome: Progressing     Problem: PAIN - ADULT  Goal: Verbalizes/displays adequate comfort level or baseline comfort level  Description: Interventions:  - Encourage patient to monitor pain and request assistance  - Assess pain using appropriate pain scale  - Administer analgesics based on type and severity of pain and evaluate response  - Implement non-pharmacological measures as appropriate and evaluate response  - Consider cultural and social influences on pain and pain management  - Notify physician/advanced practitioner if interventions unsuccessful or patient reports new pain  Outcome: Progressing     Problem: INFECTION - ADULT  Goal: Absence or prevention of progression during hospitalization  Description: INTERVENTIONS:  - Assess and monitor for signs and symptoms of infection  - Monitor lab/diagnostic results  - Monitor all insertion sites, i e  indwelling lines, tubes, and drains  - Monitor endotracheal if appropriate and nasal secretions for changes in amount and color  - [place] appropriate cooling/warming therapies per order  - Administer medications as ordered  - Instruct and encourage patient and family to use good hand hygiene technique  - Identify and instruct in appropriate isolation precautions for identified infection/condition  Outcome: Progressing  Goal: Absence of fever/infection during neutropenic period  Description: INTERVENTIONS:  - Monitor WBC    Outcome: Progressing     Problem: SAFETY ADULT  Goal: Maintain or return to baseline ADL function  Description: INTERVENTIONS:  -  Assess patient's ability to carry out ADLs; assess patient's baseline for ADL function and identify physical deficits which impact ability to perform ADLs (bathing, care of mouth/teeth, toileting, grooming, dressing, etc )  - Assess/evaluate cause of self-care deficits   - Assess range of motion  - Assess patient's mobility; develop plan if impaired  - Assess patient's need for assistive devices and provide as appropriate  - Encourage maximum independence but intervene and supervise when necessary  - Involve family in performance of ADLs  - Assess for home care needs following discharge   - Consider OT consult to assist with ADL evaluation and planning for discharge  - Provide patient education as appropriate  Outcome: Progressing  Goal: Maintains/Returns to pre admission functional level  Description: INTERVENTIONS:  - Perform BMAT or MOVE assessment daily    - Set and communicate daily mobility goal to care team and patient/family/caregiver  - Collaborate with rehabilitation services on mobility goals if consulted  - Perform Range of Motion 3 times a day  - Reposition patient every 2 hours    - Dangle patient 3 times a day  - Stand patient 3 times a day  - Ambulate patient 3 times a day  - Out of bed to chair 3 times a day   - Out of bed for meals 3 times a day  - Out of bed for toileting  - Record patient progress and toleration of activity level   Outcome: Progressing  Goal: Patient will remain free of falls  Description: INTERVENTIONS:  - Educate patient/family on patient safety including physical limitations  - Instruct patient to call for assistance with activity   - Consult OT/PT to assist with strengthening/mobility   - Keep Call bell within reach  - Keep bed low and locked with side rails adjusted as appropriate  - Keep care items and personal belongings within reach  - Initiate and maintain comfort rounds  - Make Fall Risk Sign visible to staff  - Offer Toileting every 2-4  Hours, in advance of need  - Initiate/Maintain bed/chair alarm  - Obtain necessary fall risk management equipment: call bell in reach/room camera  - Apply yellow socks and bracelet for high fall risk patients  - Consider moving patient to room near nurses station  Outcome: Progressing     Problem: DISCHARGE PLANNING  Goal: Discharge to home or other facility with appropriate resources  Description: INTERVENTIONS:  - Identify barriers to discharge w/patient and caregiver  - Arrange for needed discharge resources and transportation as appropriate  - Identify discharge learning needs (meds, wound care, etc )  - Arrange for interpretive services to assist at discharge as needed  - Refer to Case Management Department for coordinating discharge planning if the patient needs post-hospital services based on physician/advanced practitioner order or complex needs related to functional status, cognitive ability, or social support system  Outcome: Progressing     Problem: Knowledge Deficit  Goal: Patient/family/caregiver demonstrates understanding of disease process, treatment plan, medications, and discharge instructions  Description: Complete learning assessment and assess knowledge base    Interventions:  - Provide teaching at level of understanding  - Provide teaching via preferred learning methods  Outcome: Progressing

## 2023-02-01 NOTE — CONSULTS
[address] Woodard 1955, 79 y o  female MRN: [id number]  Unit/Bed#: -01 Encounter: [id number]  Primary Care Provider: Johnnie Ashley DO   Date and time admitted to hospital: 1/31/2023  9:16 AM    Inpatient consult to Internal Medicine  Consult performed by: YASMANI Haskins  Consult ordered by: Fiorella Burrows          * Closed fracture of ankle, bimalleolar, right, sequela  Assessment & Plan  · Hx of right ankle bimalleolar ORIF in 2019 , recent drainage from surgical wound site noted as outpatient  ·  S/p elective right ankle hardware removal and wound cultures on 1/31/23   · Currently afebrile  · F/U wound cultures, continue Ancef per primary team   · F/U AM labs - monitor CBC , check procal  · PRN pain control   · Rest of care per primary team     Tobacco abuse  Assessment & Plan  · Smoking cessation encouraged  · Continue nicotine patch     History of CVA (cerebrovascular accident)  Assessment & Plan  · Continue statin     Hypertension  Assessment & Plan  · BP's controlled  · Continue Norvasc, Toprol  mg     Bipolar disease, chronic (HCC)  Assessment & Plan  · Continue home Seroquel, Zyprexa, Xanax        VTE Prophylaxis: VTE Score: 3 Moderate Risk (Score 3-4) - Pharmacological DVT Prophylaxis Ordered: enoxaparin (Lovenox)  Recommendations for Discharge:  · TBD    Counseling / Coordination of Care Time: 45 minutes Greater than 50% of total time spent on patient counseling and coordination of care  Collaboration of Care: Were Recommendations Directly Discussed with Primary Treatment Team? Yes    History of Present Illness:  Lorrie Sanchez is a 79 y o  female who is originally admitted to the orthopedic service due to elective right ankle hardware removal   We are consulted for post op medical management   Patient underwent right ankle bimalleolar ORIF in 2019   Reports recently having drainage from surgical site for several months per patient  Patient had elective right hardware ankle removal and wound cultures with Dr Toribio Reilly on 1/31/23   Currently awaiting wound cultures, patient is on Ancef  Afebrile post op  Patient complaining of post op pain , will add PRN morphine for breakthrough  Home medications have been resumed  Continue rest of plan per primary team      Review of Systems:  Review of Systems   Constitutional: Negative for activity change, chills and fever  Respiratory: Negative for cough and shortness of breath  Cardiovascular: Negative for chest pain, palpitations and leg swelling  Gastrointestinal: Negative for abdominal pain, constipation, diarrhea, nausea and vomiting  Genitourinary: Negative for difficulty urinating  Musculoskeletal: Negative for arthralgias  Neurological: Negative for dizziness, light-headedness, numbness and headaches  Past Medical and Surgical History:   Past Medical History:   Diagnosis Date   • Bipolar 1 disorder ([facility] )    • Cancer ([facility] )     kidney   • Cardiac disease     fast heart beat   • Chronic pain    • Fractures    • GERD (gastroesophageal reflux disease)    • Hard of hearing    • Hypertension    • Renal cell carcinoma ([facility] )    • Stroke ([facility] )     2009 affected her speech, right sided weakness   • TIA (transient ischemic attack)        Past Surgical History:   Procedure Laterality Date   • APPENDECTOMY     • CHOLECYSTECTOMY     • JOINT REPLACEMENT Bilateral      bilateral knees   • NEPHRECTOMY Right    • DC COLONOSCOPY FLX DX W/COLLJ SPEC WHEN PFRMD N/A 8/7/2018    Procedure: COLONOSCOPY;  Surgeon: Massimo Lugo MD;  Location: [facility] MAIN OR;  Service: Gastroenterology   • DC OPEN TREATMENT BIMALLEOLAR ANKLE FRACTURE Right 5/14/2019    Procedure: ANKLE - Bimalleolar OPEN REDUCTION W/ INTERNAL FIXATION (ORIF); Surgeon: Yoseph Lr;   Location: [address] [place] [address] MAIN OR;  Service: Orthopedics   • DC REMOVAL IMPLANT DEEP Right 1/31/2023    Procedure: REMOVAL HARDWARE ANKLE;  Surgeon: Arash Maharaj Keila Hails; Location:  MAIN OR;  Service: Orthopedics   • FL Francy Franco SHFT FX IMED IMPLT W/WO SCREWS&/CERCLA Left 7/27/2022    Procedure: INSERTION NAIL IM TIBIA;  Surgeon: Kalpana Rodgers MD;  Location:  MAIN OR;  Service: Orthopedics       Meds/Allergies:  all medications and allergies reviewed    Allergies: Allergies   Allergen Reactions   • Nsaids Other (See Comments)     Pt only has one kidney   • Celecoxib Rash and Other (See Comments)     CELEBREX   • Doxazosin Rash and Hives   • Metaxalone Rash and Hives       Social History:  Marital Status: Single  Substance Use History:   Social History     Substance and Sexual Activity   Alcohol Use Not Currently     Social History     Tobacco Use   Smoking Status Every Day   • Packs/day: 0 50   • Years: 7 00   • Pack years: 3 50   • Types: Cigarettes   Smokeless Tobacco Never   Tobacco Comments    pt refused referral     Social History     Substance and Sexual Activity   Drug Use Never       Family History:  Family History   Problem Relation Age of Onset   • Colon cancer Other    • Alcohol abuse Other    • Hypertension Other        Physical Exam:   Vitals:   Blood Pressure: 126/77 (01/31/23 1758)  Pulse: 70 (01/31/23 1553)  Temperature: (!) 97 2 °F (36 2 °C) (01/31/23 1758)  Temp Source: Temporal (01/31/23 1415)  Respirations: 13 (01/31/23 1758)  Height: 5' 4" (162 6 cm) (01/31/23 0942)  Weight - Scale: 81 2 kg (179 lb) (01/31/23 0942)  SpO2: 96 % (01/31/23 1553)    Physical Exam  Vitals and nursing note reviewed  Constitutional:       General: She is not in acute distress  Appearance: Normal appearance  She is not ill-appearing, toxic-appearing or diaphoretic  Cardiovascular:      Rate and Rhythm: Normal rate and regular rhythm  Pulses: Normal pulses  Heart sounds: Normal heart sounds  Pulmonary:      Effort: Pulmonary effort is normal  No respiratory distress  Breath sounds: Normal breath sounds  No wheezing, rhonchi or rales     Abdominal: General: Bowel sounds are normal  There is no distension  Palpations: Abdomen is soft  Tenderness: There is no abdominal tenderness  There is no guarding  Musculoskeletal:         General: Normal range of motion  Cervical back: Normal range of motion  Left lower leg: No edema  Comments: Right leg in boot cast, unable to visualize surgical site  Right toes are warm and patient able to move them   Skin:     General: Skin is warm and dry  Neurological:      General: No focal deficit present  Mental Status: She is alert and oriented to person, place, and time  Additional Data:   Lab Results:                    Lab Results   Component Value Date/Time    HGBA1C 4 6 11/18/2022 04:35 AM    HGBA1C 5 3 10/16/2020 04:38 AM    HGBA1C 5 3 08/29/2017 07:28 AM               Imaging: Personally reviewed the following imaging: xray(s)  XR ankle 2 vw right   Final Result by Scotty Hernandez MD (01/31 2249)      Fluoroscopic guidance provided for procedure guidance  Please refer to the separate procedure notes for additional details  Workstation performed: [id number]             EKG, Pathology, and Other Studies Reviewed on Admission:   · EKG: No EKG obtained  ** Please Note: This note may have been constructed using a voice recognition system   **

## 2023-02-01 NOTE — DISCHARGE INSTR - AVS FIRST PAGE
Discharge Instructions - Orthopedics  Cindy Escamilla 79 y o  female MRN: [id number]  Unit/Bed#: -01    Weight Bearing Status:                                           Weight Bearing as tolerated to right leg in cam boot  DVT prophylaxis:  ASA 325mg BID x 2 weeks    Pain:  Continue analgesics as directed    Showering Instructions:   Keep incision clean and dry  Keep current bandages in place until seen in the office next week  Driving Instructions:  No driving until cleared by Orthopaedic Surgery  PT/OT:  Continue PT/OT on outpatient basis as directed    Appt Instructions: If you do not have your appointment, please call the clinic at [phone] to follow-up with Dr Kelsie Richards within 1 week of discharge  Otherwise followup as scheduled  Contact the office sooner if you experience any increased numbness/tingling in the extremities or extreme pain not responding to pain medication

## 2023-02-01 NOTE — OCCUPATIONAL THERAPY NOTE
Occupational Therapy Evaluation     Patient Name: Edmond Chandler  Today's Date: 2/1/2023  Problem List  Principal Problem:    Closed fracture of ankle, bimalleolar, right, sequela  Active Problems:    Bipolar disease, chronic ([facility] )    Hypertension    History of CVA (cerebrovascular accident)    Tobacco abuse    Past Medical History  Past Medical History:   Diagnosis Date    Bipolar 1 disorder ([facility] )     Cancer ([facility] )     kidney    Cardiac disease     fast heart beat    Chronic pain     Fractures     GERD (gastroesophageal reflux disease)     Hard of hearing     Hypertension     Renal cell carcinoma ([facility] )     Stroke ([place] [address] )     2009 affected her speech, right sided weakness    TIA (transient ischemic attack)      Past Surgical History  Past Surgical History:   Procedure Laterality Date    APPENDECTOMY      CHOLECYSTECTOMY      JOINT REPLACEMENT Bilateral      bilateral knees    NEPHRECTOMY Right     [place] COLONOSCOPY FLX DX W/COLLJ SPEC WHEN PFRMD N/A 8/7/2018    Procedure: COLONOSCOPY;  Surgeon: Nadia Carpio MD;  Location: [facility] MAIN OR;  Service: Gastroenterology    [place] OPEN TREATMENT BIMALLEOLAR ANKLE FRACTURE Right 5/14/2019    Procedure: ANKLE - Bimalleolar OPEN REDUCTION W/ INTERNAL FIXATION (ORIF); Surgeon: Bassam Lucero; Location: [facility] MAIN OR;  Service: Orthopedics    [place] REMOVAL IMPLANT DEEP Right 1/31/2023    Procedure: REMOVAL HARDWARE ANKLE;  Surgeon: Bassam Lucero;   Location:  MAIN OR;  Service: Orthopedics    [place] TIBL SHFT FX IMED IMPLT W/WO SCREWS&/CERCLA Left 7/27/2022    Procedure: INSERTION NAIL IM TIBIA;  Surgeon: Nicol Mohan MD;  Location:  MAIN OR;  Service: Orthopedics         02/01/23 0917   Note Type   Note type Evaluation   Pain Assessment   Pain Assessment Tool FLACC   Pain Score 8   Pain Location/Orientation   (R ankle)   Pain Rating: FLACC (Rest) - Face 0   Pain Rating: FLACC (Rest) - Legs 0   Pain Rating: FLACC (Rest) - Activity 0   Pain Rating: FLACC (Rest) - Cry 0   Pain Rating: FLACC (Rest) - Consolability 0   Score: FLACC (Rest) 0   Pain Rating: FLACC (Activity) - Face 1   Pain Rating: FLACC (Activity) - Legs 0   Pain Rating: FLACC (Activity) - Activity 0   Pain Rating: FLACC (Activity) - Cry 0   Pain Rating: FLACC (Activity) - Consolability 0   Score: FLACC (Activity) 1   Restrictions/Precautions   Braces or Orthoses CAM Boot  (RLE)   Other Precautions Fall Risk;Pain;Multiple lines   Home Living   Type of Home House  (1 HECTOR no HR)   Home Layout Two level;Performs ADLs on one level; Able to live on main level with bedroom/bathroom   Bathroom Shower/Tub Tub/shower unit   Bathroom Toilet Raised   Bathroom Equipment Shower chair;Grab bars in [facility] 124  (did not use PTA)   Additional Comments Pt reports she has her own home but she has been staying with her mother, "shes been helping me if i need it"  Pt's mothers home has 1 HECTOR and then Pt has as 1st floor set-up  Pt has RW available at home   Prior Function   Level of [place] Independent with ADLs; Independent with functional mobility; Needs assistance with IADLS   Lives With   ("i have my own house but margi been staying with my mom")   Receives Help From Family   IADLs Family/Friend/Other provides transportation; Family/Friend/Other provides meals; Family/Friend/Other provides medication management   Falls in the last 6 months 1 to 4   Comments Pt reports completing ADLs and functional ambulation @ Mod I  Pt has assistance from her mother for IADLs   Pt reports "i help when i can"   ADL   Where Assessed Chair   Grooming Assistance 5  Supervision/Setup  (standing at sinkside)   Grooming Deficit Supervision/safety;Verbal cueing   UB Dressing Assistance 6  Modified independent   UB Dressing Deficit Setup    [address] 5  Supervision/Setup   LB Dressing Deficit Verbal cueing;Supervision/safety; Requires assistive device for steadying   Toileting Assistance  5  Supervision/Setup   Toileting Deficit Verbal cueing;Supervison/safety; Increased time to complete   Additional Comments Pt completing ADL tasks while seated OOB in recliner chair  UB Dressing @ Mod I after set-up  LB Dressing and toileting tasks @ S including donning socks/pants around feet and CM around waist as well as thorough pericare with UB supported from RW PRN  Pt then standign at sinkside while completing hand hygiene @ S with no LOB noted  Bed Mobility   Additional Comments Pt seated OOB at start and end of session with call blel in reach, chair alarm intact [place] needs met at this time   Transfers   Sit to Stand 5  Supervision   Additional items Verbal cues   Stand to Sit 5  Supervision   Additional items Verbal cues   Stand pivot 5  Supervision   Additional items Verbal cues   Toilet transfer 5  Supervision   Additional items Verbal cues;Standard toilet   Additional Comments Pt completing functional transfers with use of RW ofr UB support  S for STS and SPT with vc'ing PRN for asfety and RW management  Balance   Static Sitting Normal   Dynamic Sitting Good   Static Standing Fair +   Dynamic Standing Fair -   Activity Tolerance   Activity Tolerance Patient limited by pain   Medical Staff Made Aware Spoke with PT, Florencio Reynoso   Nurse Made Aware Spoke with RN   RUE Assessment   RUE Assessment WFL   LUE Assessment   LUE Assessment WFL   Hand Function   Gross Motor Coordination Functional   Fine Motor Coordination Functional   Sensation   Light Touch No apparent deficits   Cognition   Overall Cognitive Status WFL   Arousal/Participation Alert; Responsive; Cooperative   Attention Attends with cues to redirect   Orientation Level Oriented X4   Memory Within functional limits   Following Commands Follows one step commands without difficulty   Assessment   Limitation Decreased ADL status; Decreased UE ROM; Decreased Safe judgement during ADL;Decreased high-level ADLs   Prognosis Good   Assessment Pt is a 79 y o  female, admitted to [facility] 1/31/2023 for an outpatient procedure  Pt underwent- "Right - REMOVAL HARDWARE ANKLE"  Pt is WBAT RLE with CAM boot  Dx: closed fracture of R ankle  Pt with PMHx impacting their performance during ADL tasks, including: bipolar 1, kidney CA, chronic pain, GERD, [place], HTN, CVA (speech effected)  Prior to admission to the hospital Pt was performing ADLs without physical assistance  IADLs with physical assistance  Functional transfers/ambulation without physical assistance  Cognitive status was PTA was intact  OT order placed to assess Pt's ADLs, cognitive status, and performance during functional tasks in order to maximize safety and independence while making most appropriate d/c recommendations  Pt's clinical presentation is currently evolving given new onset deficits that effect Pt's occupational performance and ability to safely return to PLOF including decrease activity tolerance, decrease standing balance, decrease safety awareness , increase impulsiveness, increased pain, decrease generalized strength, high fall risk and limited insight to deficits combined with medical complications of pain impacting overall mobility status, abnormal CBC, edema/swelling, decreased skin integrity, need for input for mobility technique/safety and low body temp  Personal factors affecting Pt at time of initial evaluation include: unable to perform caregiver support/tasks, past experience, inability to perform current job functions, inability to perform IADLs, new need for AD, inability to navigate community distances, recent fall(s)/fall history and questionable non-compliance  Pt at this time appears to be completing ADLs and functional tasks @ PLOF  Pt has good support from her mother at home with whom she is staying with upon d/c  No further acute OT needs required  D/c OT effective 2/1/2023  If new concerns arise, please re-consult     Plan   OT Frequency Eval only   Recommendation   OT Discharge Recommendation No rehabilitation needs   AM-PAC Daily Activity Inpatient   Lower Body Dressing 3   Bathing 3   Toileting 3   Upper Body Dressing 4   Grooming 3   Eating 4   Daily Activity Raw Score 20   Daily Activity Standardized Score (Calc for Raw Score >=11) 42 03   AM-[facility] Applied Cognition Inpatient   Following a Speech/Presentation 4   Understanding Ordinary Conversation 4   Taking Medications 4   Remembering Where Things Are Placed or Put Away 4   Remembering List of 4-5 Errands 4   Taking Care of Complicated Tasks 4   Applied Cognition Raw Score 24   Applied Cognition Standardized Score 62 21     The patient's raw score on the AM-PAC Daily Activity inpatient short form is 20, standardized score is 42 03, greater than 39 4  Patients at this level are likely to benefit from DC to home  Please refer to the recommendation of the Occupational Therapist for safe DC planning      Francisco Deng OTR/SHAMA

## 2023-02-02 VITALS
BODY MASS INDEX: 30.56 KG/M2 | WEIGHT: 179 LBS | HEIGHT: 64 IN | HEART RATE: 83 BPM | TEMPERATURE: 97.5 F | DIASTOLIC BLOOD PRESSURE: 89 MMHG | SYSTOLIC BLOOD PRESSURE: 156 MMHG | RESPIRATION RATE: 17 BRPM | OXYGEN SATURATION: 95 %

## 2023-02-02 RX ORDER — ASPIRIN 325 MG
325 TABLET, DELAYED RELEASE (ENTERIC COATED) ORAL 2 TIMES DAILY
Qty: 30 TABLET | Refills: 0 | Status: SHIPPED | OUTPATIENT
Start: 2023-02-02

## 2023-02-02 RX ADMIN — NICOTINE 1 PATCH: 7 PATCH, EXTENDED RELEASE TRANSDERMAL at 08:18

## 2023-02-02 RX ADMIN — OXYCODONE HYDROCHLORIDE 10 MG: 10 TABLET ORAL at 00:11

## 2023-02-02 RX ADMIN — FAMOTIDINE 20 MG: 20 TABLET ORAL at 08:20

## 2023-02-02 RX ADMIN — AMLODIPINE BESYLATE 5 MG: 5 TABLET ORAL at 08:20

## 2023-02-02 RX ADMIN — METOPROLOL SUCCINATE 100 MG: 100 TABLET, EXTENDED RELEASE ORAL at 08:20

## 2023-02-02 RX ADMIN — FERROUS SULFATE TAB 325 MG (65 MG ELEMENTAL FE) 325 MG: 325 (65 FE) TAB at 08:19

## 2023-02-02 RX ADMIN — CYANOCOBALAMIN TAB 500 MCG 500 MCG: 500 TAB at 08:20

## 2023-02-02 RX ADMIN — OXYCODONE HYDROCHLORIDE 10 MG: 10 TABLET ORAL at 09:21

## 2023-02-02 RX ADMIN — CEFAZOLIN SODIUM 2000 MG: 2 SOLUTION INTRAVENOUS at 05:35

## 2023-02-02 RX ADMIN — SODIUM CHLORIDE 75 ML/HR: 0.9 INJECTION, SOLUTION INTRAVENOUS at 00:11

## 2023-02-02 RX ADMIN — DOCUSATE SODIUM 100 MG: 100 CAPSULE ORAL at 08:19

## 2023-02-02 RX ADMIN — ENOXAPARIN SODIUM 40 MG: 40 INJECTION SUBCUTANEOUS at 08:19

## 2023-02-02 RX ADMIN — PANTOPRAZOLE SODIUM 40 MG: 40 TABLET, DELAYED RELEASE ORAL at 08:20

## 2023-02-02 NOTE — PLAN OF CARE
Problem: MOBILITY - ADULT  Goal: Maintain or return to baseline ADL function  Description: INTERVENTIONS:  -  Assess patient's ability to carry out ADLs; assess patient's baseline for ADL function and identify physical deficits which impact ability to perform ADLs (bathing, care of mouth/teeth, toileting, grooming, dressing, etc )  - Assess/evaluate cause of self-care deficits   - Assess range of motion  - Assess patient's mobility; develop plan if impaired  - Assess patient's need for assistive devices and provide as appropriate  - Encourage maximum independence but intervene and supervise when necessary  - Involve family in performance of ADLs  - Assess for home care needs following discharge   - Consider OT consult to assist with ADL evaluation and planning for discharge  - Provide patient education as appropriate  Outcome: Progressing  Goal: Maintains/Returns to pre admission functional level  Description: INTERVENTIONS:  - Perform BMAT or MOVE assessment daily    - Set and communicate daily mobility goal to care team and patient/family/caregiver  - Collaborate with rehabilitation services on mobility goals if consulted  - Perform Range of Motion 3 times a day  - Reposition patient every 2 hours    - Dangle patient 3 times a day  - Stand patient 3 times a day  - Ambulate patient 3 times a day  - Out of bed to chair 3 times a day   - Out of bed for meals 3 times a day  - Out of bed for toileting  - Record patient progress and toleration of activity level   Outcome: Progressing     Problem: PAIN - ADULT  Goal: Verbalizes/displays adequate comfort level or baseline comfort level  Description: Interventions:  - Encourage patient to monitor pain and request assistance  - Assess pain using appropriate pain scale  - Administer analgesics based on type and severity of pain and evaluate response  - Implement non-pharmacological measures as appropriate and evaluate response  - Consider cultural and social influences on pain and pain management  - Notify physician/advanced practitioner if interventions unsuccessful or patient reports new pain  Outcome: Progressing     Problem: INFECTION - ADULT  Goal: Absence or prevention of progression during hospitalization  Description: INTERVENTIONS:  - Assess and monitor for signs and symptoms of infection  - Monitor lab/diagnostic results  - Monitor all insertion sites, i e  indwelling lines, tubes, and drains  - Monitor endotracheal if appropriate and nasal secretions for changes in amount and color  - [place] appropriate cooling/warming therapies per order  - Administer medications as ordered  - Instruct and encourage patient and family to use good hand hygiene technique  - Identify and instruct in appropriate isolation precautions for identified infection/condition  Outcome: Progressing     Problem: SAFETY ADULT  Goal: Maintain or return to baseline ADL function  Description: INTERVENTIONS:  -  Assess patient's ability to carry out ADLs; assess patient's baseline for ADL function and identify physical deficits which impact ability to perform ADLs (bathing, care of mouth/teeth, toileting, grooming, dressing, etc )  - Assess/evaluate cause of self-care deficits   - Assess range of motion  - Assess patient's mobility; develop plan if impaired  - Assess patient's need for assistive devices and provide as appropriate  - Encourage maximum independence but intervene and supervise when necessary  - Involve family in performance of ADLs  - Assess for home care needs following discharge   - Consider OT consult to assist with ADL evaluation and planning for discharge  - Provide patient education as appropriate  Outcome: Progressing  Goal: Maintains/Returns to pre admission functional level  Description: INTERVENTIONS:  - Perform BMAT or MOVE assessment daily    - Set and communicate daily mobility goal to care team and patient/family/caregiver     - Collaborate with rehabilitation services on mobility goals if consulted  - Perform Range of Motion 3 times a day  - Reposition patient every 2 hours    - Dangle patient 3 times a day  - Stand patient 3 times a day  - Ambulate patient 3 times a day  - Out of bed to chair 3 times a day   - Out of bed for meals 3 times a day  - Out of bed for toileting  - Record patient progress and toleration of activity level   Outcome: Progressing  Goal: Patient will remain free of falls  Description: INTERVENTIONS:  - Educate patient/family on patient safety including physical limitations  - Instruct patient to call for assistance with activity   - Consult OT/PT to assist with strengthening/mobility   - Keep Call bell within reach  - Keep bed low and locked with side rails adjusted as appropriate  - Keep care items and personal belongings within reach  - Initiate and maintain comfort rounds  - Make Fall Risk Sign visible to staff  - Offer Toileting every 2-4  Hours, in advance of need  - Initiate/Maintain bed/chair alarm  - Obtain necessary fall risk management equipment: call bell in reach/room camera  - Apply yellow socks and bracelet for high fall risk patients  - Consider moving patient to room near nurses station  Outcome: Progressing     Problem: DISCHARGE PLANNING  Goal: Discharge to home or other facility with appropriate resources  Description: INTERVENTIONS:  - Identify barriers to discharge w/patient and caregiver  - Arrange for needed discharge resources and transportation as appropriate  - Identify discharge learning needs (meds, wound care, etc )  - Arrange for interpretive services to assist at discharge as needed  - Refer to Case Management Department for coordinating discharge planning if the patient needs post-hospital services based on physician/advanced practitioner order or complex needs related to functional status, cognitive ability, or social support system  Outcome: Progressing     Problem: Knowledge Deficit  Goal: Patient/family/caregiver demonstrates understanding of disease process, treatment plan, medications, and discharge instructions  Description: Complete learning assessment and assess knowledge base    Interventions:  - Provide teaching at level of understanding  - Provide teaching via preferred learning methods  Outcome: Progressing

## 2023-02-02 NOTE — UTILIZATION REVIEW
Initial Clinical Review    Elective OP surgical procedure 1/31 UPGRADED TO INPATIENT 2/2 WITH NEED FOR CONTINUED TX POST-OP D/T PAIN CONTROL AND NEED TO AWAIT INTRA-OP WOUND CX FOR ABX DETERMINATION, as well as safe d/c plan    Admission Orders: Date/Time/Statement:   Admission Orders (From admission, onward)     Ordered        02/02/23 0758  Inpatient Admission  Once                Orders Placed This Encounter   Procedures   • Inpatient Admission     Standing Status:   Standing     Number of Occurrences:   1     Order Specific Question:   Level of Care     Answer:   Med Surg [16]     Order Specific Question:   Estimated length of stay     Answer:   More than 2 Midnights     Order Specific Question:   Certification     Answer:   I certify that inpatient services are medically necessary for this patient for a duration of greater than two midnights  See H&P and MD Progress Notes for additional information about the patient's course of treatment  Age/Sex: 79 y o  female  Surgery Date: 1/31/2023  Procedure: Right - REMOVAL HARDWARE RIGHT ANKLE  Anesthesia: Local utilizing 20 cc of 0 25% Marcaine with epinephrine  Operative Findings: At the time of the procedure, the fracture was fully healed  There was no evidence of osteomyelitis  The bone quality was good with no softening  All screws were solidly fixated within bone  Upon initial incision, a small amount of slightly thickened serous fluid was noted without gross purulence  Cultures were obtained  After removal of the hardware, fluoroscopic images demonstrated no retained lateral hardware  Due to the fact that the patient had a well-healed medial incision and no tenderness, the medial hardware was not removed  POD#1 2/1 Progress Note: Pt c/o post op pain, will add PRN morphine for breakthrough  Home medications have been resumed  VSS  Continue IV Ancef  Await wound cxs  Reg diet  Lovenox sq, SCD's LLE  WBAT RLE in CAM boot   PT/OT evals for d/c disposition  POD#2 2/2 Progress Note: Pt states some pain with ambulation, but controlled on oral analgesics today  The RLE dressings were changed  The incision is without erythema or rubor  Dressings demonstrated some bloody drainage but no purulence or serous drainage  NV intact  Gram stain demonstrated no organisms  Pt was able to ambulate without significant difficulty  PT/OT with recommendation for home with op PT  Continue pta po meds  Reg diet         Vital Signs:   Date/Time Temp Pulse Resp BP MAP (mmHg) SpO2 O2 Flow Rate (L/min) O2 Device   02/02/23 07:13:17 97 5 °F (36 4 °C) 83 17 156/89 111 95 % -- --   02/01/23 23:19:20 97 7 °F (36 5 °C) 69 16 121/74 90 93 % -- --   02/01/23 2030 -- -- -- -- -- 93 % -- None (Room air)   02/01/23 19:42:21 97 5 °F (36 4 °C) 63 19 132/73 93 99 % -- --   02/01/23 13:57:03 97 5 °F (36 4 °C) 59 18 132/73 93 97 % -- --   02/01/23 07:33:05 97 5 °F (36 4 °C) 63 18 135/75 95 96 % -- --   02/01/23 0730 -- -- -- -- -- 97 % -- None (Room air)   01/31/23 22:08:15 97 2 °F (36 2 °C) Abnormal  -- 14 125/73 90 -- -- --   01/31/23 2000 -- -- -- -- -- 93 % -- None (Room air)   01/31/23 1802 -- -- -- -- -- -- 2 L/min --   01/31/23 17:58:51 97 2 °F (36 2 °C) Abnormal  -- 13 126/77 93 -- -- --   01/31/23 1553 -- 70 -- -- -- 96 % -- --   01/31/23 1500 -- 77 -- -- -- -- -- --   01/31/23 1415 97 5 °F (36 4 °C) 76 18 148/75 -- 93 % -- None (Room air)   01/31/23 1402 -- -- -- -- -- 92 % -- None (Room air)   01/31/23 1400 -- 67 16 132/70 96 89 % Abnormal  -- None (Room air)   01/31/23 1345 98 4 °F (36 9 °C) 71 18 133/64 92 97 % -- None (Room air)   01/31/23 1335 -- 74 18 136/63 91 91 % -- None (Room air)   01/31/23 1330 -- 75 17 146/76 104 96 % -- None (Room air)   01/31/23 1325 -- -- -- -- -- -- -- None (Room air)   01/31/23 1315 -- 83 20 141/70 100 93 % -- None (Room air)   01/31/23 1310 -- 87 20 139/72 99 94 % -- None (Room air)   01/31/23 1305 -- 87 18 145/74 103 100 % -- None (Room air) 01/31/23 1300 98 2 °F (36 8 °C) 90 20 149/74 -- 100 % 6 L/min Simple mask   01/31/23 0942 98 3 °F (36 8 °C) 101 20 137/93 -- 96 % -- None (Room air)       Pertinent Labs/Diagnostic Test Results:   XR ankle 2 vw right   Final Result by Roni Suggs MD (01/31 1508)      Fluoroscopic guidance provided for procedure guidance  Please refer to the separate procedure notes for additional details               Results from last 7 days   Lab Units 02/01/23  0529   WBC Thousand/uL 8 65   HEMOGLOBIN g/dL 12 1   HEMATOCRIT % 37 0   PLATELETS Thousands/uL 231   NEUTROS ABS Thousands/µL 7 24     Results from last 7 days   Lab Units 02/01/23  0529   SODIUM mmol/L 139   POTASSIUM mmol/L 3 9   CHLORIDE mmol/L 109*   CO2 mmol/L 25   ANION GAP mmol/L 5   BUN mg/dL 12   CREATININE mg/dL 0 79   EGFR ml/min/1 73sq m 77   CALCIUM mg/dL 8 7     Results from last 7 days   Lab Units 02/01/23  0529   AST U/L 12*   ALT U/L 8   ALK PHOS U/L 85   TOTAL PROTEIN g/dL 6 2*   ALBUMIN g/dL 3 7   TOTAL BILIRUBIN mg/dL 0 28     Results from last 7 days   Lab Units 02/01/23  0529   GLUCOSE RANDOM mg/dL 95     Results from last 7 days   Lab Units 02/01/23  0529   PROCALCITONIN ng/ml <0 05     Results from last 7 days   Lab Units 01/30/23  1415   CRP mg/L 3 2*   SED RATE mm/hour 35*     Results from last 7 days   Lab Units 01/31/23  1229   GRAM STAIN RESULT  1+ Polys  No bacteria seen   WOUND CULTURE  Culture too young- will reincubate       Medications/Pain Control:   Scheduled Medications:  amLODIPine, 5 mg, Oral, Daily  atorvastatin, 40 mg, Oral, QPM  Cefazolin 2,000 mg, Intravenous, Q8H  vitamin B-12, 500 mcg, Oral, Daily  docusate sodium, 100 mg, Oral, BID  enoxaparin, 40 mg, Subcutaneous, Daily  famotidine, 20 mg, Oral, Daily  ferrous sulfate, 325 mg, Oral, BID With Meals  ketotifen, 1 drop, Both Eyes, BID  metoprolol succinate, 100 mg, Oral, Daily  nicotine, 1 patch, Transdermal, Daily  OLANZapine, 20 mg, Oral, HS  pantoprazole, 40 mg, Oral, Daily  QUEtiapine, 300 mg, Oral, HS    Continuous IV Infusions:  sodium chloride, 75 mL/hr, Intravenous, Continuous    PRN Meds:  albuterol, 2 puff, Inhalation, Q4H PRN  ALPRAZolam, 1 mg, Oral, 4x Daily PRN 1/31 x1, 2/1 x3  calcium carbonate, 1,000 mg, Oral, Daily PRN  morphine injection, 2 mg, Intravenous, Q4H PRN 1/31 x1, 2/1 x3  ondansetron, 4 mg, Intravenous, Q6H PRN  oxyCODONE, 10 mg, Oral, Q4H PRN 1/31 x2, 2/1 x3, 2/2 x1  oxyCODONE, 5 mg, Oral, Q4H PRN        Network Utilization Review Department  ATTENTION: Please call with any questions or concerns to [phone] and carefully listen to the prompts so that you are directed to the right person  All voicemails are confidential   [place] all requests for admission clinical reviews, approved or denied determinations and any other requests to dedicated fax number below belonging to the campus where the patient is receiving treatment   List of dedicated fax numbers for the Facilities:  [address] [place] [address] DENIALS (Administrative/Medical Necessity) [phone]   [address] (Maternity/NICU/Pediatrics) [phone]   [address] Risa Taylor [phone]   [facility] [address] [phone]   [address] [address] Edi Pantoja 28 [phone]   [facility] Ignacio Merritt [facility] [address] [facility] [phone]

## 2023-02-02 NOTE — PROGRESS NOTES
[address]  Progress Note - [facility] 1955, 79 y o  female MRN: [id number]  Unit/Bed#: -Brian Encounter: [id number]  Primary Care Provider: Jaycee Calvo DO   Date and time admitted to hospital: 1/31/2023  9:16 AM    * Closed fracture of ankle, bimalleolar, right, sequela  Assessment & Plan  · Hx of right ankle bimalleolar ORIF in 2019 , recent drainage from surgical wound site noted as outpatient  ·  S/p elective right ankle hardware removal and wound cultures on 1/31/23   · Currently afebrile  · F/U wound cultures, continue Ancef per primary team   · No bacteria seen, anaerobic cultures pending  · PRN pain control   · Rest of care per primary team     Tobacco abuse  Assessment & Plan  · Smoking cessation encouraged  · Continue nicotine patch     History of CVA (cerebrovascular accident)  Assessment & Plan  · Continue statin     Hypertension  Assessment & Plan  · BP's controlled  · Continue Norvasc, Toprol  mg     Bipolar disease, chronic (HCC)  Assessment & Plan  · Continue home Seroquel, Zyprexa, Xanax      VTE Pharmacologic Prophylaxis: VTE Score: 3 Moderate Risk (Score 3-4) - Pharmacological DVT Prophylaxis Ordered: enoxaparin (Lovenox)  Patient Centered Rounds: I performed bedside rounds with nursing staff today  Discussions with Specialists or Other Care Team Provider: ortho    Education and Discussions with Family / Patient: Update per primary team   Patient provided updated bedside  Time Spent for Care: 30 minutes  More than 50% of total time spent on counseling and coordination of care as described above  Current Length of Stay: 1 day(s)  Current Patient Status: Inpatient   Certification Statement: The patient, who was admitted as an inpatient, is being discharged sooner than originally anticipated due to Per primary team medically stable for discharge  Discharge Plan: Faye Rodriguez is following this patient on consult   They are medically stable for discharge when deemed appropriate by primary service  Code Status: Level 1 - Full Code    Subjective:   Comfortably in bed orthopedic surgery evaluated patient this morning patient states she is being discharged today to her family members  Reports some lower extremity pain    Objective:     Vitals:   Temp (24hrs), Av 6 °F (36 4 °C), Min:97 5 °F (36 4 °C), Max:97 7 °F (36 5 °C)    Temp:  [97 5 °F (36 4 °C)-97 7 °F (36 5 °C)] 97 5 °F (36 4 °C)  HR:  [59-83] 83  Resp:  [16-19] 17  BP: (121-156)/(73-89) 156/89  SpO2:  [93 %-99 %] 95 %  Body mass index is 30 73 kg/m²  Input and Output Summary (last 24 hours): Intake/Output Summary (Last 24 hours) at 2023 0850  Last data filed at 2023 6604  Gross per 24 hour   Intake 240 ml   Output 3100 ml   Net -2860 ml       Physical Exam:   Physical Exam  Vitals and nursing note reviewed  Constitutional:       General: She is not in acute distress  Appearance: She is well-developed  HENT:      Head: Normocephalic and atraumatic  Mouth/Throat:      Mouth: Mucous membranes are moist    Eyes:      Conjunctiva/sclera: Conjunctivae normal       Pupils: Pupils are equal, round, and reactive to light  Cardiovascular:      Rate and Rhythm: Normal rate and regular rhythm  Pulses: Normal pulses  Heart sounds: No murmur heard  Pulmonary:      Effort: Pulmonary effort is normal  No respiratory distress  Breath sounds: Normal breath sounds  Abdominal:      General: Bowel sounds are normal       Palpations: Abdomen is soft  Tenderness: There is no abdominal tenderness  Musculoskeletal:         General: Tenderness (Right lower extremity) present  No swelling  Cervical back: Neck supple  Comments: Right lower extremity Cam boot   Skin:     General: Skin is warm and dry  Capillary Refill: Capillary refill takes less than 2 seconds  Neurological:      General: No focal deficit present  Mental Status: She is alert  Psychiatric:         Mood and Affect: Mood normal          Thought Content: Thought content normal          Additional Data:     Labs:  Results from last 7 days   Lab Units 02/01/23  0529   WBC Thousand/uL 8 65   HEMOGLOBIN g/dL 12 1   HEMATOCRIT % 37 0   PLATELETS Thousands/uL 231   NEUTROS PCT % 84*   LYMPHS PCT % 10*   MONOS PCT % 6   EOS PCT % 0     Results from last 7 days   Lab Units 02/01/23  0529   SODIUM mmol/L 139   POTASSIUM mmol/L 3 9   CHLORIDE mmol/L 109*   CO2 mmol/L 25   BUN mg/dL 12   CREATININE mg/dL 0 79   ANION GAP mmol/L 5   CALCIUM mg/dL 8 7   ALBUMIN g/dL 3 7   TOTAL BILIRUBIN mg/dL 0 28   ALK PHOS U/L 85   ALT U/L 8   AST U/L 12*   GLUCOSE RANDOM mg/dL 95                 Results from last 7 days   Lab Units 02/01/23  0529   PROCALCITONIN ng/ml <0 05       Lines/Drains:  Invasive Devices     Peripheral Intravenous Line  Duration           Peripheral IV 01/31/23 Dorsal (posterior); Right Hand 1 day                      Imaging: Reviewed radiology reports from this admission including: xray(s)    Recent Cultures (last 7 days):   Results from last 7 days   Lab Units 01/31/23  1229   GRAM STAIN RESULT  1+ Polys  No bacteria seen   WOUND CULTURE  Culture too young- will reincubate       Last 24 Hours Medication List:   Current Facility-Administered Medications   Medication Dose Route Frequency Provider Last Rate   • albuterol  2 puff Inhalation Q4H PRN Shellia Crimes, PA-C     • ALPRAZolam  1 mg Oral 4x Daily PRN Shellia Crimes, PA-C     • amLODIPine  5 mg Oral Daily Shellia Crimes, PA-C     • atorvastatin  40 mg Oral QPM Shellia Crimes, PA-C     • calcium carbonate  1,000 mg Oral Daily PRN Shellia Crimes, PA-C     • vitamin B-12  500 mcg Oral Daily Shellia Crimes, PA-C     • docusate sodium  100 mg Oral BID Shellia Crimes, PA-C     • enoxaparin  40 mg Subcutaneous Daily Shellia Crimes, PA-C     • famotidine  20 mg Oral Daily Shellia Crimes, PA-C     • ferrous sulfate  325 mg Oral BID With Meals [facility] Peter Núñez PA-C     • ketotifen  1 drop Both Eyes BID Margo Hernandez PA-C     • metoprolol succinate  100 mg Oral Daily Margo Hernandez PA-C     • morphine injection  2 mg Intravenous Q4H PRN YASMANI Weller     • nicotine  1 patch Transdermal Daily Margo Hernandez PA-C     • OLANZapine  20 mg Oral HS Margo Hernandez PA-C     • ondansetron  4 mg Intravenous Q6H PRN Margo Hernandez PA-C     • oxyCODONE  10 mg Oral Q4H PRN Margo Hernandez PA-C     • oxyCODONE  5 mg Oral Q4H PRN Margo Hernandez PA-C     • pantoprazole  40 mg Oral Daily Margo Hernandez PA-C     • QUEtiapine  300 mg Oral HS Margo Hernandez PA-C     • sodium chloride  75 mL/hr Intravenous Continuous Margo Hernandez PA-C 75 mL/hr (02/02/23 0011)        Today, Patient Was Seen By: YASMANI Marquez    **Please Note: This note may have been constructed using a voice recognition system  **

## 2023-02-02 NOTE — DISCHARGE SUMMARY
Discharge summary    Admit diagnosis: Painful hardware/wound dehiscence lateral right ankle status post ORIF ankle fracture; ambulatory dysfunction; tobacco abuse; CVA, hypertension and bipolar disease    Discharge diagnosis: Unchanged status post removal of hardware    Procedure: Removal of hardware distal right fibula    History: Jenae Snell is a 29-year-old female who underwent open reduction internal fixation of her distal right fibula and tibia fractures in 2019  When last seen postoperatively, she had well-healed incisions, was asymptomatic and was discharged to follow-up  She returned to the office recently complaining of an 8-month history of wound problems over the lateral ankle treated with her primary care physician  After approximately 7 to 8 months of treatment, he referred her to wound care who then referred her to me for orthopedic consultation  It was elected to proceed with hardware removal     Course in Hospital: The patient was maintained in the hospital postoperatively as there was some mild serous slightly cloudy drainage  She was not placed on perioperative antibiotics other than the preoperative dosing  She was comfortable postoperatively and gram stain demonstrated no organisms  Cultures are still pending  Dressing change was performed on 2/2/2022, postop day 2, and was noted to be benign with no erythema or rubor  There was some bloody drainage but no other drainage  She was neurovascularly intact  Discharge: Patient was discharged home on 2/2/2023  She will follow-up with me as scheduled next week  The dressings and cam boot are to remain in place at all times  Dressings will be changed at the office at follow-up  She was to contact me if questions or concerns arise

## 2023-02-02 NOTE — CASE MANAGEMENT
Case Management Discharge Planning Note    Patient name Anuel García  Location /-49 MRN [id number]  : 1955 Date 2023       Current Admission Date: 2023  Current Admission Diagnosis:Closed fracture of ankle, bimalleolar, right, sequela   Patient Active Problem List    Diagnosis Date Noted   • Chronic pain of right ankle 2022   • Nocturnal hypoxia 2022   • Dysphagia 2022   • Vitamin D insufficiency 2022   • Low TSH level 2022   • Cerebrovascular disease    • Diastolic dysfunction 213   • Tobacco use 2022   • Acute metabolic encephalopathy    • Chronic pain 2022   • Closed fracture of ankle, bimalleolar, right, sequela 2022   • Closed fracture of distal end of left tibia 2022   • CVA (cerebral vascular accident) ([facility] ) 2022   • Pure hypercholesterolemia 2021   • Chronic obstructive pulmonary disease ([facility] ) 2021   • Anemia 2021   • Bipolar disease, chronic ([address] ) 2021   • Rhabdomyolysis 2021   • Neck pain 2021   • Poor venous access 2021   • Hypertension 2021   • History of CVA (cerebrovascular accident) 2021   • Acute encephalopathy 2021   • Hypothermia 2021   • Elevated d-dimer 2021   • Acute encephalopathy 2021   • Overdose of drug, undetermined intent, initial encounter 2021   • Slurred speech 10/15/2020   • Status post open reduction with internal fixation (ORIF) of fracture of ankle 2019   • Essential hypertension 2019   • History of tachycardia 2019   • Acute right ankle pain 2019   • Closed bimalleolar fracture of right ankle 2019   • Colon cancer screening 2018   • GERD (gastroesophageal reflux disease) 2018   • Elevated MCV 2017   • Bipolar 1 disorder ([facility] )    • Renal cell carcinoma ([facility] )    • Nausea and vomiting 2016   • Palpitations 2016   • Insomnia 11/05/2015   • Difficulty sleeping 11/05/2015   • Dental disorder 08/07/2015   • Allergic rhinitis 07/17/2015   • Nail fungal infection 07/16/2015   • Abnormal liver function test 03/10/2015   • Right hip pain 11/03/2014   • Herpes zoster 10/28/2014   • Shoulder pain, right 10/20/2014   • Abnormal gait 09/03/2014   • Vitamin D deficiency 02/19/2014   • Osteoporosis 02/19/2014   • Hip fracture, osteoporotic ([facility] ) 02/19/2014   • Anxiety 10/16/2013   • Yeast vaginitis 10/14/2013   • Posterior dislocation of hip, closed ([facility] ) 09/19/2013   • Chronic low back pain 07/26/2013   • Prosthetic hip infection ([facility] ) 06/16/2013   • Depression 06/09/2013   • Tobacco abuse 06/09/2013   • Generalized anxiety disorder 06/09/2013      LOS (days): 1  Geometric Mean LOS (GMLOS) (days): 2 70  Days to GMLOS:2 5     OBJECTIVE:  Risk of Unplanned Readmission Score: 16 9         Current admission status: Inpatient   Preferred Pharmacy:   [address] [place] [address] [address]  [address] [place] [address] [address]  [place] [address]  Phone: [phone] Fax: [phone]    Primary Care Provider: John Brooke DO    Primary Insurance: Osman THORNTON  Secondary Insurance: [facility] DETAILS:           1100 CM submitted [place] request for RW  CM met with patient and brother to discuss RW order  Patient refused to wait for RW and CM encouraged patients brother to secure one at [facility]  Patient reported she has $300 on a health insurance card and will utilize those funds for RW

## 2023-02-02 NOTE — PLAN OF CARE
Problem: MOBILITY - ADULT  Goal: Maintain or return to baseline ADL function  Description: INTERVENTIONS:  -  Assess patient's ability to carry out ADLs; assess patient's baseline for ADL function and identify physical deficits which impact ability to perform ADLs (bathing, care of mouth/teeth, toileting, grooming, dressing, etc )  - Assess/evaluate cause of self-care deficits   - Assess range of motion  - Assess patient's mobility; develop plan if impaired  - Assess patient's need for assistive devices and provide as appropriate  - Encourage maximum independence but intervene and supervise when necessary  - Involve family in performance of ADLs  - Assess for home care needs following discharge   - Consider OT consult to assist with ADL evaluation and planning for discharge  - Provide patient education as appropriate  Outcome: Progressing  Goal: Maintains/Returns to pre admission functional level  Description: INTERVENTIONS:  - Perform BMAT or MOVE assessment daily    - Set and communicate daily mobility goal to care team and patient/family/caregiver  - Collaborate with rehabilitation services on mobility goals if consulted  - Perform Range of Motion 3 times a day  - Reposition patient every 2 hours    - Dangle patient 3 times a day  - Stand patient 3 times a day  - Ambulate patient 3 times a day  - Out of bed to chair 3 times a day   - Out of bed for meals 3 times a day  - Out of bed for toileting  - Record patient progress and toleration of activity level   Outcome: Progressing     Problem: PAIN - ADULT  Goal: Verbalizes/displays adequate comfort level or baseline comfort level  Description: Interventions:  - Encourage patient to monitor pain and request assistance  - Assess pain using appropriate pain scale  - Administer analgesics based on type and severity of pain and evaluate response  - Implement non-pharmacological measures as appropriate and evaluate response  - Consider cultural and social influences on pain and pain management  - Notify physician/advanced practitioner if interventions unsuccessful or patient reports new pain  Outcome: Progressing     Problem: INFECTION - ADULT  Goal: Absence or prevention of progression during hospitalization  Description: INTERVENTIONS:  - Assess and monitor for signs and symptoms of infection  - Monitor lab/diagnostic results  - Monitor all insertion sites, i e  indwelling lines, tubes, and drains  - Monitor endotracheal if appropriate and nasal secretions for changes in amount and color  - [place] appropriate cooling/warming therapies per order  - Administer medications as ordered  - Instruct and encourage patient and family to use good hand hygiene technique  - Identify and instruct in appropriate isolation precautions for identified infection/condition  Outcome: Progressing     Problem: SAFETY ADULT  Goal: Maintain or return to baseline ADL function  Description: INTERVENTIONS:  -  Assess patient's ability to carry out ADLs; assess patient's baseline for ADL function and identify physical deficits which impact ability to perform ADLs (bathing, care of mouth/teeth, toileting, grooming, dressing, etc )  - Assess/evaluate cause of self-care deficits   - Assess range of motion  - Assess patient's mobility; develop plan if impaired  - Assess patient's need for assistive devices and provide as appropriate  - Encourage maximum independence but intervene and supervise when necessary  - Involve family in performance of ADLs  - Assess for home care needs following discharge   - Consider OT consult to assist with ADL evaluation and planning for discharge  - Provide patient education as appropriate  Outcome: Progressing  Goal: Maintains/Returns to pre admission functional level  Description: INTERVENTIONS:  - Perform BMAT or MOVE assessment daily    - Set and communicate daily mobility goal to care team and patient/family/caregiver     - Collaborate with rehabilitation services on mobility goals if consulted  - Perform Range of Motion 3 times a day  - Reposition patient every 2 hours  - Dangle patient 3 times a day  - Stand patient 3 times a day  - Ambulate patient 3 times a day  - Out of bed to chair 3 times a day   - Out of bed for meals 3 times a day  - Out of bed for toileting  - Record patient progress and toleration of activity level   Outcome: Progressing  Goal: Patient will remain free of falls  Description: INTERVENTIONS:  - Educate patient/family on patient safety including physical limitations  - Instruct patient to call for assistance with activity   - Consult OT/PT to assist with strengthening/mobility   - Keep Call bell within reach  - Keep bed low and locked with side rails adjusted as appropriate  - Keep care items and personal belongings within reach  - Initiate and maintain comfort rounds  - Make Fall Risk Sign visible to staff  - Offer Toileting every 2-4  Hours, in advance of need  - Initiate/Maintain bed/chair alarm  - Obtain necessary fall risk management equipment: call bell in reach/room camera  - Apply yellow socks and bracelet for high fall risk patients  - Consider moving patient to room near nurses station  Outcome: Progressing     Problem: Knowledge Deficit  Goal: Patient/family/caregiver demonstrates understanding of disease process, treatment plan, medications, and discharge instructions  Description: Complete learning assessment and assess knowledge base  Interventions:  - Provide teaching at level of understanding  - Provide teaching via preferred     methods  Outcome: Progressing     Problem: DISCHARGE PLANNING  Goal: Discharge to home or other facility with appropriate resources  Description: INTERVENTIONS:  - Identify barriers to discharge w/patient and caregiver  - Arrange for needed discharge resources and transportation as appropriate  - Identify discharge learning needs (meds, wound care, etc )  - Arrange for interpretive services to assist at discharge as needed  - Refer to Case Management Department for coordinating discharge planning if the patient needs post-hospital services based on physician/advanced practitioner order or complex needs related to functional status, cognitive ability, or social support system  Outcome: Progressing

## 2023-02-02 NOTE — ASSESSMENT & PLAN NOTE
· Hx of right ankle bimalleolar ORIF in 2019 , recent drainage from surgical wound site noted as outpatient  ·  S/p elective right ankle hardware removal and wound cultures on 1/31/23   · Currently afebrile    · F/U wound cultures, continue Ancef per primary team   · No bacteria seen, anaerobic cultures pending  · PRN pain control   · Rest of care per primary team

## 2023-02-02 NOTE — PLAN OF CARE
Problem: MOBILITY - ADULT  Goal: Maintain or return to baseline ADL function  Description: INTERVENTIONS:  -  Assess patient's ability to carry out ADLs; assess patient's baseline for ADL function and identify physical deficits which impact ability to perform ADLs (bathing, care of mouth/teeth, toileting, grooming, dressing, etc )  - Assess/evaluate cause of self-care deficits   - Assess range of motion  - Assess patient's mobility; develop plan if impaired  - Assess patient's need for assistive devices and provide as appropriate  - Encourage maximum independence but intervene and supervise when necessary  - Involve family in performance of ADLs  - Assess for home care needs following discharge   - Consider OT consult to assist with ADL evaluation and planning for discharge  - Provide patient education as appropriate  2/2/2023 1027 by Ofelia Quijano RN  Outcome: Adequate for Discharge  2/2/2023 0717 by Ofelia Quijano RN  Outcome: Progressing  Goal: Maintains/Returns to pre admission functional level  Description: INTERVENTIONS:  - Perform BMAT or MOVE assessment daily    - Set and communicate daily mobility goal to care team and patient/family/caregiver  - Collaborate with rehabilitation services on mobility goals if consulted  - Perform Range of Motion 3 times a day  - Reposition patient every 2 hours    - Dangle patient 3 times a day  - Stand patient 3 times a day  - Ambulate patient 3 times a day  - Out of bed to chair 3 times a day   - Out of bed for meals 3 times a day  - Out of bed for toileting  - Record patient progress and toleration of activity level   2/2/2023 1027 by Ofelia Quijano RN  Outcome: Adequate for Discharge  2/2/2023 7067 by Ofelia Quijano RN  Outcome: Progressing     Problem: PAIN - ADULT  Goal: Verbalizes/displays adequate comfort level or baseline comfort level  Description: Interventions:  - Encourage patient to monitor pain and request assistance  - Assess pain using appropriate pain scale  - Administer analgesics based on type and severity of pain and evaluate response  - Implement non-pharmacological measures as appropriate and evaluate response  - Consider cultural and social influences on pain and pain management  - Notify physician/advanced practitioner if interventions unsuccessful or patient reports new pain  2/2/2023 1027 by Gonzalez Murry RN  Outcome: Adequate for Discharge  2/2/2023 3313 by Gonzalez Murry RN  Outcome: Progressing     Problem: INFECTION - ADULT  Goal: Absence or prevention of progression during hospitalization  Description: INTERVENTIONS:  - Assess and monitor for signs and symptoms of infection  - Monitor lab/diagnostic results  - Monitor all insertion sites, i e  indwelling lines, tubes, and drains  - Monitor endotracheal if appropriate and nasal secretions for changes in amount and color  - [place] appropriate cooling/warming therapies per order  - Administer medications as ordered  - Instruct and encourage patient and family to use good hand hygiene technique  - Identify and instruct in appropriate isolation precautions for identified infection/condition  2/2/2023 1027 by Gonzalez Murry RN  Outcome: Adequate for Discharge  2/2/2023 4240 by Gonzalez Murry RN  Outcome: Progressing     Problem: SAFETY ADULT  Goal: Maintain or return to baseline ADL function  Description: INTERVENTIONS:  -  Assess patient's ability to carry out ADLs; assess patient's baseline for ADL function and identify physical deficits which impact ability to perform ADLs (bathing, care of mouth/teeth, toileting, grooming, dressing, etc )  - Assess/evaluate cause of self-care deficits   - Assess range of motion  - Assess patient's mobility; develop plan if impaired  - Assess patient's need for assistive devices and provide as appropriate  - Encourage maximum independence but intervene and supervise when necessary  - Involve family in performance of ADLs  - Assess for home care needs following discharge   - Consider OT consult to assist with ADL evaluation and planning for discharge  - Provide patient education as appropriate  2/2/2023 1027 by Arnulfo Ohara RN  Outcome: Adequate for Discharge  2/2/2023 5160 by Arnulfo Ohara RN  Outcome: Progressing  Goal: Maintains/Returns to pre admission functional level  Description: INTERVENTIONS:  - Perform BMAT or MOVE assessment daily    - Set and communicate daily mobility goal to care team and patient/family/caregiver  - Collaborate with rehabilitation services on mobility goals if consulted  - Perform Range of Motion 3 times a day  - Reposition patient every 2 hours    - Dangle patient 3 times a day  - Stand patient 3 times a day  - Ambulate patient 3 times a day  - Out of bed to chair 3 times a day   - Out of bed for meals 3 times a day  - Out of bed for toileting  - Record patient progress and toleration of activity level   2/2/2023 1027 by Arnulfo Ohara RN  Outcome: Adequate for Discharge  2/2/2023 1634 by Arnulfo Ohara RN  Outcome: Progressing  Goal: Patient will remain free of falls  Description: INTERVENTIONS:  - Educate patient/family on patient safety including physical limitations  - Instruct patient to call for assistance with activity   - Consult OT/PT to assist with strengthening/mobility   - Keep Call bell within reach  - Keep bed low and locked with side rails adjusted as appropriate  - Keep care items and personal belongings within reach  - Initiate and maintain comfort rounds  - Make Fall Risk Sign visible to staff  - Offer Toileting every 2-4  Hours, in advance of need  - Initiate/Maintain bed/chair alarm  - Obtain necessary fall risk management equipment: call bell in reach/room camera  - Apply yellow socks and bracelet for high fall risk patients  - Consider moving patient to room near nurses station  2/2/2023 1027 by Arnulfo Ohara RN  Outcome: Adequate for Discharge  2/2/2023 7589 by Arnulfo Ohara RN  Outcome: Progressing     Problem: DISCHARGE PLANNING  Goal: Discharge to home or other facility with appropriate resources  Description: INTERVENTIONS:  - Identify barriers to discharge w/patient and caregiver  - Arrange for needed discharge resources and transportation as appropriate  - Identify discharge learning needs (meds, wound care, etc )  - Arrange for interpretive services to assist at discharge as needed  - Refer to Case Management Department for coordinating discharge planning if the patient needs post-hospital services based on physician/advanced practitioner order or complex needs related to functional status, cognitive ability, or social support system  2/2/2023 1027 by Armando Martin RN  Outcome: Adequate for Discharge  2/2/2023 2848 by Armando Martin RN  Outcome: Progressing     Problem: Knowledge Deficit  Goal: Patient/family/caregiver demonstrates understanding of disease process, treatment plan, medications, and discharge instructions  Description: Complete learning assessment and assess knowledge base  Interventions:  - Provide teaching at level of understanding  - Provide teaching via preferred learning methods  2/2/2023 1027 by Armando Martin RN  Outcome: Adequate for Discharge  2/2/2023 7684 by Armando Martin RN  Outcome: Progressing     Problem: PHYSICAL THERAPY ADULT  Goal: Performs mobility at highest level of function for planned discharge setting  See evaluation for individualized goals  Description: Treatment/Interventions: ADL retraining, Functional transfer training, LE strengthening/ROM, Elevations, Therapeutic exercise, Endurance training, Patient/family training, Equipment eval/education, Bed mobility, Gait training, Compensatory technique education, Spoke to nursing, Spoke to case management, OT  Equipment Recommended:  (pt has RW)       See flowsheet documentation for full assessment, interventions and recommendations    Outcome: Adequate for Discharge

## 2023-02-02 NOTE — NURSING NOTE
Discharge instructions reviewed with patient  Patient verbalized understanding of same and agrees to follow up with outpatient appointments

## 2023-02-02 NOTE — UTILIZATION REVIEW
NOTIFICATION OF INPATIENT ADMISSION   AUTHORIZATION REQUEST   SERVICING FACILITY:   [address] [place] [address]  Ed[address], [address]  Tax ID: [id number]  NPI: [id number] ATTENDING PROVIDER:  Attending Name and NPI#: Isabel Briones [[id number]]  Address: [facility]  Edilma Ross[address], [address] [place] [place] [address]  Phone: [phone]   ADMISSION INFORMATION:  Place of Service: [place] [address]  Place of Service Code: 21  Inpatient Admission Date/Time: 1/31/23 12:55 PM  Discharge Date/Time: No discharge date for patient encounter  Admitting Diagnosis Code/Description:  Closed bimalleolar fracture of right ankle, sequela [S82 841S]  Wound drainage [L24  A9]     UTILIZATION REVIEW CONTACT:  Juan Lilly Utilization   Network Utilization Review Department  Phone: [phone]  Fax [phone]  Email: [email or web address]  Contact for approvals/pending authorizations, clinical reviews, and discharge  PHYSICIAN ADVISORY SERVICES:  Medical Necessity Denial & Jlad-yk-Mpar Review  Phone: [phone]  Fax: [phone]  Email: [email or web address]

## 2023-02-03 ENCOUNTER — TELEPHONE (OUTPATIENT)
Dept: GASTROENTEROLOGY | Facility: CLINIC | Age: 68
End: 2023-02-03

## 2023-02-03 DIAGNOSIS — L24.A9 WOUND DRAINAGE: ICD-10-CM

## 2023-02-03 DIAGNOSIS — S82.841S CLOSED BIMALLEOLAR FRACTURE OF RIGHT ANKLE, SEQUELA: Primary | ICD-10-CM

## 2023-02-03 LAB
BACTERIA SPEC ANAEROBE CULT: ABNORMAL
BACTERIA WND AEROBE CULT: ABNORMAL
GRAM STN SPEC: ABNORMAL
GRAM STN SPEC: ABNORMAL

## 2023-02-03 RX ORDER — CEPHALEXIN 500 MG/1
500 CAPSULE ORAL EVERY 8 HOURS SCHEDULED
Qty: 30 CAPSULE | Refills: 0 | Status: SHIPPED | OUTPATIENT
Start: 2023-02-03 | End: 2023-02-13

## 2023-02-03 NOTE — UTILIZATION REVIEW
NOTIFICATION OF ADMISSION DISCHARGE   This is a Notification of Discharge from [facility]  Please be advised that this patient has been discharge from our facility  Below you will find the admission and discharge date and time including the patient’s disposition  UTILIZATION REVIEW CONTACT:  Noe Cano  Utilization   Network Utilization Review Department  Phone: [phone] x carefully listen to the prompts  All voicemails are confidential   Email: [email or web address]     ADMISSION INFORMATION  PRESENTATION DATE: 1/31/2023  9:16 AM  OBERVATION ADMISSION DATE:  INPATIENT ADMISSION DATE: 2/2/23  7:58 AM   DISCHARGE DATE: 2/2/2023 12:11 PM   DISPOSITION:Home/Self Care    IMPORTANT INFORMATION:  Send all requests for admission clinical reviews, approved or denied determinations and any other requests to dedicated fax number below belonging to the campus where the patient is receiving treatment   List of dedicated fax numbers:  [address] DENIALS (Administrative/Medical Necessity) [phone]   [address] (Maternity/NICU/Pediatrics) [phone]   [facility] [phone]   [place] [address] [phone]   [address] [phone]   [address] [facility] [phone]   [address] [place] [address] [phone]   [address] [place] [address] 119 [phone]   [facility]  [phone]   [facility] [phone]   [facility] [address] [facility] [phone]

## 2023-02-03 NOTE — PROGRESS NOTES
Patient's culture reports shows small amount of staph aureus  Prescription sent to patient's pharmacy  Message left on patient's voicemail to contact the office so I can discuss this with her  I will attempt to contact her later

## 2023-02-07 ENCOUNTER — TELEPHONE (OUTPATIENT)
Dept: GASTROENTEROLOGY | Facility: CLINIC | Age: 68
End: 2023-02-07

## 2023-02-07 NOTE — TELEPHONE ENCOUNTER
Called patient in regards to insurance for recently scheduled procedure  Patients states she was recently in hospital after a surgery with an infection  Also, Derick Ag is not in network with insurance  Patient aware to contact insurance company for provider in network or change insurance

## 2023-02-08 ENCOUNTER — OFFICE VISIT (OUTPATIENT)
Dept: OBGYN CLINIC | Facility: CLINIC | Age: 68
End: 2023-02-08

## 2023-02-08 VITALS
WEIGHT: 180 LBS | DIASTOLIC BLOOD PRESSURE: 83 MMHG | HEART RATE: 83 BPM | HEIGHT: 64 IN | SYSTOLIC BLOOD PRESSURE: 130 MMHG | TEMPERATURE: 97.3 F | BODY MASS INDEX: 30.73 KG/M2

## 2023-02-08 DIAGNOSIS — S82.841S: Primary | ICD-10-CM

## 2023-02-08 NOTE — PROGRESS NOTES
Patient Name:  Emilee Galeas  MRN:  [id number]    Assessment     1  Closed fracture of ankle, bimalleolar, right, sequela            Plan     1  I recommend follow-up in 1 week  Her stitches will be removed  A light dressing was applied  She was given some instructions on how to cleanse the cam boot  This should be worn when she is active but she is permitted weightbearing as tolerated  She is to continue the antibiotics as ordered  X-rays of the ankle will be obtained at follow-up in 1 week  Return in about 1 week (around 2/15/2023)  Subjective   Emilee Galeas returns for follow-up of her right ankle  The patient is 1 week(s) post hardware removal and returns for routine follow-up  Patient complains of some persistent lateral ankle pain  She denies any systemic symptoms  Objective     /83 (BP Location: Left arm)   Pulse 83   Temp (!) 97 3 °F (36 3 °C) (Temporal)   Ht 5' 4" (1 626 m)   Wt 81 6 kg (180 lb)   BMI 30 90 kg/m²     Exam today demonstrated the incision to be benign with a minimal degree of mild bloody drainage  She does have some mild tenderness primarily at the very distal aspect of the incision  Stitches are in place  There is no ascending lymphangitis, adenopathy, rubor or erythema  She ambulates with a walker cast boot in place without difficulty  She has good range of motion about the ankle when the boot is not in place without complaint          Morgan Ernandez

## 2023-02-15 ENCOUNTER — OFFICE VISIT (OUTPATIENT)
Dept: OBGYN CLINIC | Facility: CLINIC | Age: 68
End: 2023-02-15

## 2023-02-15 ENCOUNTER — HOSPITAL ENCOUNTER (OUTPATIENT)
Dept: RADIOLOGY | Facility: CLINIC | Age: 68
Discharge: HOME | End: 2023-02-15

## 2023-02-15 VITALS
HEIGHT: 64 IN | TEMPERATURE: 98.1 F | DIASTOLIC BLOOD PRESSURE: 80 MMHG | WEIGHT: 177 LBS | SYSTOLIC BLOOD PRESSURE: 115 MMHG | BODY MASS INDEX: 30.22 KG/M2 | HEART RATE: 110 BPM

## 2023-02-15 DIAGNOSIS — Z09 POSTOP CHECK: ICD-10-CM

## 2023-02-15 DIAGNOSIS — S82.841S: ICD-10-CM

## 2023-02-15 DIAGNOSIS — L24.A9 WOUND DRAINAGE: ICD-10-CM

## 2023-02-15 DIAGNOSIS — S82.841S: Primary | ICD-10-CM

## 2023-02-15 RX ORDER — CEPHALEXIN 500 MG/1
500 CAPSULE ORAL EVERY 8 HOURS SCHEDULED
Qty: 30 CAPSULE | Refills: 0 | Status: SHIPPED | OUTPATIENT
Start: 2023-02-15 | End: 2023-02-25

## 2023-02-15 NOTE — PATIENT INSTRUCTIONS
We will continue the patient on Keflex 100 mg 3 times daily for another 10 days  Refer the patient to wound care  Patient may come out of the cam boot  Contained dressings to the right ankle  Weight-bear as tolerated in regular shoe  Reevaluate in 1 week in orthopedic office

## 2023-02-15 NOTE — PROGRESS NOTES
Patient Name:  Edmond Chandler  MRN:  [id number]    Assessment     1  Closed fracture of ankle, bimalleolar, right, sequela  XR ankle 2 vw right      2  Postop check        3  Wound drainage            Plan     We will continue the patient on Keflex 100 mg 3 times daily for another 10 days  Refer the patient to wound care  Patient may come out of the cam boot  Contained dressings to the right ankle  Weight-bear as tolerated in regular shoe  Reevaluate in 1 week in orthopedic office and likely removal of the remaining stitches  Return in about 1 week (around 2/22/2023)  Subjective   Edmond Chandler returns for follow-up of right ankle where removal 1/31/2023 for wound drainage  He had initial bimalleolar ORIF 5/14/2019  the patient is 15 day(s) post op and returns for routine follow-up and suture removal  Patient complains of pain and drainage in the lateral incision and also rubbing her cam boot causing medial ankle soreness x 2 days  Patient finished her last antibiotic this morning  Her wound cultures at the time of surgery noted Staph aureus and anaerobic cultures noting Finegoldia magna (likely skin contaminant)  Objective     /80 (BP Location: Left arm)   Pulse (!) 110   Temp 98 1 °F (36 7 °C) (Temporal)   Ht 5' 4" (1 626 m)   Wt 80 3 kg (177 lb)   BMI 30 38 kg/m²     Medial ankle with a small Band-Aid overlying an area of superficial scab approximately 8 mm round, with faint erythema in the area  The lateral incision has retained sutures with partial scab formation and incomplete healing with clear moisture  There is tenderness in the area  Patient has adequate range of motion with plantar and dorsiflexion  No calf pain  No fluctuance nor ability to express any fluid collections  Data Review     I personally reviewed x rays taken in the office today which note retained screws medial malleolus  Distal fibula notes adequate healing without gross signs of periosteal reaction  Mecca Moyer PA-C

## 2023-02-16 ENCOUNTER — OFFICE VISIT (OUTPATIENT)
Dept: PODIATRY | Facility: CLINIC | Age: 68
End: 2023-02-16

## 2023-02-16 VITALS
HEART RATE: 124 BPM | HEIGHT: 64 IN | DIASTOLIC BLOOD PRESSURE: 81 MMHG | WEIGHT: 177 LBS | BODY MASS INDEX: 30.22 KG/M2 | SYSTOLIC BLOOD PRESSURE: 125 MMHG | TEMPERATURE: 98 F

## 2023-02-16 DIAGNOSIS — L24.A9 WOUND DRAINAGE: ICD-10-CM

## 2023-02-16 DIAGNOSIS — I87.311 IDIOPATHIC CHRONIC VENOUS HYPERTENSION OF RIGHT LOWER EXTREMITY WITH ULCER (HCC): Primary | ICD-10-CM

## 2023-02-16 DIAGNOSIS — L97.919 IDIOPATHIC CHRONIC VENOUS HYPERTENSION OF RIGHT LOWER EXTREMITY WITH ULCER (HCC): Primary | ICD-10-CM

## 2023-02-16 NOTE — PATIENT INSTRUCTIONS
Right ankle wound care:  Do every 1-2 days for drainage  Do not shower  Wear gloves  Flush with wound cleanser (buy at drug store)  Pat dry with sterile 4x4/gauze    Apply alginate dressing  Apply gauze and rolled gauze  Apply ACE  Elevate

## 2023-02-16 NOTE — PROGRESS NOTES
Assessment/Plan:     Diagnoses and all orders for this visit:    Idiopathic chronic venous hypertension of right lower extremity with ulcer ([address] )    Wound drainage  -     Ambulatory Referral to Wound Care  -     Ambulatory Referral to Wound Care; Future          Imaging Reviewed at this visit (I personally reviewed/independently interpreted images and reports in PACS)  · XR right ankle 1/32/23 and 2/15/23 reviewed: s/p HWR to fibula  Screws intact to medial mall  No HECTOR nor evidence of osseous erosion at this time   · CT RLE 1/12/23: without evidence of OM or fluid collection  · RLE VDUS 1/12/23: neg VTE  · RLE BHARTI 1/27/23: triphasic waveforms, no evidence of athersclerotic dz       IMPRESSION:  · Right lateral ankle ulceration  S/p ORIF bimall fx 5/14/19 with issues healing  S/p HWR 1/21/23 due to wound drainage  Currently on po cephalexin  · Right medial ankle ulceration  · Tobacco abuse     PLAN:  · I reviewed clinical exam with patient in detail today  I have discussed with the patient the pathophysiology of this diagnosis and reviewed how the examination correlates with this diagnosis  · Right lateral ankle ulceration, s/p HRW complicated with venous hypertension  · Wound care orders given: Maxorb dsd Q2-3d chg, ACE  · Elevate LE as much as possible above heart  · I discussed need for smoking cessation  · F/u -[facility] wound care center in one week of d/c    I spent >50min on this patient exam today including addressing one problem with uncertain prognosis, reviewing prior external notes (ortho), reviewing results of a unique test (XR, LEADs), independently interpreting a test (XR)  >50% of face-to-face time with the patient was spent counseling and/or coordinating care  Subjective:      Patient ID: Ab Seymour is a 79 y o  female  Kalaramses Santos presents to clinic today concerning consult for right ankle poor wound healing  She is s/p ORIF bimall fx 5/14/19 with issues healing   S/p HWR 1/21/23 due to wound drainage  I was consulted due to poor wound healing after this most recent surgery (lateral wound)  Has new medial ankle wound due to rubbing  She is on po cephalexin due to intra-op wcx staph and Finegoldia magna  Notes tenderness  Has been WBAT in CAM boot  Applying xeroform  The following portions of the patient's history were reviewed and updated as appropriate: allergies, current medications, past family history, past medical history, past social history, past surgical history and problem list     Review of Systems   Constitutional: Negative for activity change, chills and fever  HENT: Negative  Respiratory: Negative for cough, chest tightness and shortness of breath  Cardiovascular: Positive for leg swelling (Mild B/L)  Negative for chest pain  Endocrine: Negative  Genitourinary: Negative  Skin: Positive for wound (Right lateral and medial ankle)  Neurological: Negative  Negative for numbness  Psychiatric/Behavioral: Negative  Negative for agitation and behavioral problems  Objective:      /81 (BP Location: Left arm, Patient Position: Sitting, Cuff Size: Large)   Pulse (!) 124   Temp 98 °F (36 7 °C)   Ht 5' 4" (1 626 m)   Wt 80 3 kg (177 lb)   BMI 30 38 kg/m²          Physical Exam  Constitutional:       General: She is not in acute distress  Appearance: Normal appearance  She is not ill-appearing  Cardiovascular:      Pulses: Normal pulses  Comments: Bilateral DP & PT pulses 1/4  CRT WNL  Pedal hair present  Feet warm and well perfused  Varicosities with mild LE edema noted  Pulmonary:      Effort: No respiratory distress  Musculoskeletal:         General: Tenderness (Right lateral ankle along ulcer) present  Comments: Bilateral ankle, STJ, TNJ, TMTJ, MTPJ ROM WNL and without pain  LE muscle strength WNL  Skin:     General: Skin is warm  Capillary Refill: Capillary refill takes less than 2 seconds        Findings: Lesion (Right lateral ankle ulceration, full thickness, grnaular base without exposed bone, sutures intact  Minimal serous drainage  ) present  No erythema  Neurological:      General: No focal deficit present  Mental Status: She is alert and oriented to person, place, and time  Sensory: No sensory deficit  Comments: Gross sensation intact  Denies N/T/B to B/L feet      Psychiatric:         Mood and Affect: Mood normal          Behavior: Behavior normal

## 2023-02-22 ENCOUNTER — OFFICE VISIT (OUTPATIENT)
Dept: OBGYN CLINIC | Facility: CLINIC | Age: 68
End: 2023-02-22

## 2023-02-22 VITALS
HEART RATE: 64 BPM | BODY MASS INDEX: 30.22 KG/M2 | HEIGHT: 64 IN | TEMPERATURE: 97.9 F | SYSTOLIC BLOOD PRESSURE: 136 MMHG | WEIGHT: 177 LBS | DIASTOLIC BLOOD PRESSURE: 84 MMHG

## 2023-02-22 DIAGNOSIS — L24.A9 WOUND DRAINAGE: ICD-10-CM

## 2023-02-22 DIAGNOSIS — S82.841S: ICD-10-CM

## 2023-02-22 DIAGNOSIS — Z09 POSTOP CHECK: Primary | ICD-10-CM

## 2023-02-22 NOTE — PATIENT INSTRUCTIONS
Reevaluate the patient in 2 weeks in the orthopedic office  Patient cannot be seen in the outpatient wound clinic until March 3, keep that appointment  Since patient ran out of Maxorb dressing she will use saline wet-to-dry dressings to the right lateral ankle until seen in wound care clinic  Reviewed with the patient that if she does not make improvement may need surgical intervention including a repeat I&D and possible VAC dressing  Patient was again informed that she should be out of the cam boot and in a regular shoe to prevent medial ankle breakdown and pressure on the lateral ankle

## 2023-02-22 NOTE — PROGRESS NOTES
Patient Name:  Maryanne Harkins  MRN:  [id number]    Assessment     1  Postop check        2  Wound drainage        3  Closed fracture of ankle, bimalleolar, right, sequela            Plan     1  Reevaluate the patient in 2 weeks in the orthopedic office  Patient cannot be seen in the outpatient wound clinic until March 3, keep that appointment  Since patient ran out of Maxorb dressing she will use saline wet-to-dry dressings to the right lateral ankle until seen in wound care clinic  Reviewed with the patient that if she does not make improvement may need surgical intervention including a repeat I&D and possible VAC dressing  Patient was again informed that she should be out of the cam boot and in a regular shoe to prevent medial ankle breakdown and pressure on the lateral ankle  Return in about 2 weeks (around 3/8/2023) for Recheck  Subjective   Maryanne Harkins returns for follow-up of ankle lateral hardware removal 1/31/2023  Initial ankle ORIF was 5/14/2019  The patient is 22 day(s) post op and returns for routine follow-up  Patient complains of continued drainage  Patient was seen by Dr Tana Drake for wound consult  Patient was prescribed Maxorb dressing changes but ran out of Maxorb dressings  Denies numbness or tingling but does note some distal ankle discomfort laterally and medially  Denies calf pain  Objective     /84   Pulse 64   Temp 97 9 °F (36 6 °C) (Temporal)   Ht 5' 4" (1 626 m)   Wt 80 3 kg (177 lb)   BMI 30 38 kg/m²     Lateral ankle wound with retained sutures notes complete healing and slight dehiscence with serous type drainage  There is no gross purulence  There is minimal wound edge erythema tenderness distally  Sutures are becoming overgrown all remaining sutures were removed in the office today  Maintains adequate range of motion of the ankle  The medial ankle has a proximately 10 cm round area of faint erythema from pressure from the cam boot    No obvious infection  Mild tenderness to this area  A saline wet-to-dry dressing was applied to the right ankle  Patient seen and evaluated by myself and Dr Mei Vallejo Seat

## 2023-03-01 ENCOUNTER — TELEPHONE (OUTPATIENT)
Dept: OBGYN CLINIC | Facility: CLINIC | Age: 68
End: 2023-03-01

## 2023-03-01 NOTE — TELEPHONE ENCOUNTER
----- Message from Franklyn Solis sent at 3/1/2023 11:02 AM EST -----  Regarding: RE: medicine  If you could contact her and have her see me today or tomorrow if she has concerns regarding her incision   ----- Message -----  From: Ap Caro  Sent: 3/1/2023  10:16 AM EST  To: Franklyn Solis  Subject: medicine                                         Patient's pcp just called that patient called them asking for Keflex? They wanted you to be aware and if we needed to reach out to her  They told her they wouldn't  prescribe that as they are not treating her for this issue   Please advise

## 2023-03-01 NOTE — TELEPHONE ENCOUNTER
Called patient and left voicemail to call our office to schedule sooner appoinment if concerned about her incision

## 2023-03-08 ENCOUNTER — OFFICE VISIT (OUTPATIENT)
Dept: OBGYN CLINIC | Facility: CLINIC | Age: 68
End: 2023-03-08

## 2023-03-08 VITALS
HEIGHT: 64 IN | DIASTOLIC BLOOD PRESSURE: 82 MMHG | SYSTOLIC BLOOD PRESSURE: 125 MMHG | WEIGHT: 177 LBS | HEART RATE: 76 BPM | TEMPERATURE: 97.4 F | BODY MASS INDEX: 30.22 KG/M2

## 2023-03-08 DIAGNOSIS — S82.841S: Primary | ICD-10-CM

## 2023-03-08 PROBLEM — S82.841A CLOSED BIMALLEOLAR FRACTURE OF RIGHT ANKLE: Status: RESOLVED | Noted: 2019-05-09 | Resolved: 2023-03-08

## 2023-03-08 PROBLEM — S82.302A CLOSED FRACTURE OF DISTAL END OF LEFT TIBIA: Status: RESOLVED | Noted: 2022-07-26 | Resolved: 2023-03-08

## 2023-03-08 NOTE — PROGRESS NOTES
Patient Name:  Linda Ratliff  MRN:  [id number]    Assessment     1  Closed fracture of ankle, bimalleolar, right, sequela            Plan     1  I would recommend follow-up in 4 weeks  She is to continue with wound care  The office should be contacted if questions or concerns arise and she will be seen more urgently if need be x-rays of the ankle will be obtained at follow-up including AP and lateral views  Return in about 4 weeks (around 4/5/2023)  Subjective   Linda Ratliff returns for follow-up of her right ankle  She is approximately 6 weeks status post hardware removal with some wound dehiscence  She is currently being treated through wound care and was seen last week  She is performing daily dressing changes  Objective     /82 (BP Location: Left arm)   Pulse 76   Temp (!) 97 4 °F (36 3 °C) (Temporal)   Ht 5' 4" (1 626 m)   Wt 80 3 kg (177 lb)   BMI 30 38 kg/m²     Exam today demonstrated the dressings in place  These were removed without difficulty including the Maxorb  The incision is healed and the proximal half with a couple millimeters of dehiscence distally but a healthy granulation bed and no drainage  There is no surrounding erythema or rubor  She does indicate some diffuse tenderness but this is not localized to just the distal fibula but includes surrounding soft tissues          Cyndi Coelho

## 2023-08-01 ENCOUNTER — HOSPITAL ENCOUNTER (INPATIENT)
Facility: HOSPITAL | Age: 68
LOS: 2 days | Discharge: HOME | End: 2023-08-03
Attending: EMERGENCY MEDICINE | Admitting: HOSPITALIST
Payer: MEDICARE

## 2023-08-01 ENCOUNTER — APPOINTMENT (EMERGENCY)
Dept: CT IMAGING | Facility: HOSPITAL | Age: 68
End: 2023-08-01
Payer: MEDICARE

## 2023-08-01 ENCOUNTER — APPOINTMENT (EMERGENCY)
Dept: RADIOLOGY | Facility: HOSPITAL | Age: 68
End: 2023-08-01
Payer: MEDICARE

## 2023-08-01 DIAGNOSIS — T39.1X1A TYLENOL OVERDOSE, ACCIDENTAL OR UNINTENTIONAL, INITIAL ENCOUNTER: Primary | ICD-10-CM

## 2023-08-01 DIAGNOSIS — S82.841S CLOSED BIMALLEOLAR FRACTURE OF RIGHT ANKLE, SEQUELA: ICD-10-CM

## 2023-08-01 DIAGNOSIS — R53.1 WEAKNESS: ICD-10-CM

## 2023-08-01 PROBLEM — K59.00 CONSTIPATION: Status: ACTIVE | Noted: 2023-08-01

## 2023-08-01 LAB
2HR DELTA HS TROPONIN: -1 NG/L
4HR DELTA HS TROPONIN: 11 NG/L
ALBUMIN SERPL BCP-MCNC: 3.4 G/DL (ref 3.5–5)
ALBUMIN SERPL BCP-MCNC: 3.6 G/DL (ref 3.5–5)
ALP SERPL-CCNC: 125 U/L (ref 34–104)
ALP SERPL-CCNC: 132 U/L (ref 34–104)
ALT SERPL W P-5'-P-CCNC: 56 U/L (ref 7–52)
ALT SERPL W P-5'-P-CCNC: 64 U/L (ref 7–52)
ANION GAP SERPL CALCULATED.3IONS-SCNC: 12 MMOL/L
ANION GAP SERPL CALCULATED.3IONS-SCNC: 16 MMOL/L
APAP SERPL-MCNC: 100 UG/ML (ref 10–20)
APAP SERPL-MCNC: 14 UG/ML (ref 10–20)
APTT PPP: 29 SECONDS (ref 23–37)
AST SERPL W P-5'-P-CCNC: 222 U/L (ref 13–39)
AST SERPL W P-5'-P-CCNC: 228 U/L (ref 13–39)
BASE EX.OXY STD BLDV CALC-SCNC: 45.3 % (ref 60–80)
BASE EXCESS BLDV CALC-SCNC: -9.5 MMOL/L
BASOPHILS # BLD AUTO: 0.03 THOUSANDS/ÂΜL (ref 0–0.1)
BASOPHILS NFR BLD AUTO: 1 % (ref 0–1)
BILIRUB SERPL-MCNC: 0.58 MG/DL (ref 0.2–1)
BILIRUB SERPL-MCNC: 0.62 MG/DL (ref 0.2–1)
BUN SERPL-MCNC: 12 MG/DL (ref 5–25)
BUN SERPL-MCNC: 16 MG/DL (ref 5–25)
CALCIUM ALBUM COR SERPL-MCNC: 9.3 MG/DL (ref 8.3–10.1)
CALCIUM SERPL-MCNC: 8.4 MG/DL (ref 8.4–10.2)
CALCIUM SERPL-MCNC: 8.8 MG/DL (ref 8.4–10.2)
CARDIAC TROPONIN I PNL SERPL HS: 12 NG/L
CARDIAC TROPONIN I PNL SERPL HS: 13 NG/L
CARDIAC TROPONIN I PNL SERPL HS: 24 NG/L
CHLORIDE SERPL-SCNC: 108 MMOL/L (ref 96–108)
CHLORIDE SERPL-SCNC: 112 MMOL/L (ref 96–108)
CK SERPL-CCNC: 3961 U/L (ref 26–192)
CO2 SERPL-SCNC: 17 MMOL/L (ref 21–32)
CO2 SERPL-SCNC: 18 MMOL/L (ref 21–32)
CREAT SERPL-MCNC: 0.72 MG/DL (ref 0.6–1.3)
CREAT SERPL-MCNC: 0.84 MG/DL (ref 0.6–1.3)
EOSINOPHIL # BLD AUTO: 0.17 THOUSAND/ÂΜL (ref 0–0.61)
EOSINOPHIL NFR BLD AUTO: 3 % (ref 0–6)
ERYTHROCYTE [DISTWIDTH] IN BLOOD BY AUTOMATED COUNT: 14.9 % (ref 11.6–15.1)
ETHANOL SERPL-MCNC: <10 MG/DL
GFR SERPL CREATININE-BSD FRML MDRD: 72 ML/MIN/1.73SQ M
GFR SERPL CREATININE-BSD FRML MDRD: 86 ML/MIN/1.73SQ M
GLUCOSE SERPL-MCNC: 106 MG/DL (ref 65–140)
GLUCOSE SERPL-MCNC: 109 MG/DL (ref 65–140)
GLUCOSE SERPL-MCNC: 120 MG/DL (ref 65–140)
HCO3 BLDV-SCNC: 16.8 MMOL/L (ref 24–30)
HCT VFR BLD AUTO: 38.8 % (ref 34.8–46.1)
HGB BLD-MCNC: 13.1 G/DL (ref 11.5–15.4)
IMM GRANULOCYTES # BLD AUTO: 0.13 THOUSAND/UL (ref 0–0.2)
IMM GRANULOCYTES NFR BLD AUTO: 2 % (ref 0–2)
INR PPP: 0.96 (ref 0.84–1.19)
INR PPP: 1 (ref 0.84–1.19)
LYMPHOCYTES # BLD AUTO: 1.29 THOUSANDS/ÂΜL (ref 0.6–4.47)
LYMPHOCYTES NFR BLD AUTO: 21 % (ref 14–44)
MAGNESIUM SERPL-MCNC: 1.8 MG/DL (ref 1.9–2.7)
MCH RBC QN AUTO: 36.5 PG (ref 26.8–34.3)
MCHC RBC AUTO-ENTMCNC: 33.8 G/DL (ref 31.4–37.4)
MCV RBC AUTO: 108 FL (ref 82–98)
MONOCYTES # BLD AUTO: 0.69 THOUSAND/ÂΜL (ref 0.17–1.22)
MONOCYTES NFR BLD AUTO: 11 % (ref 4–12)
NEUTROPHILS # BLD AUTO: 3.85 THOUSANDS/ÂΜL (ref 1.85–7.62)
NEUTS SEG NFR BLD AUTO: 62 % (ref 43–75)
NRBC BLD AUTO-RTO: 0 /100 WBCS
O2 CT BLDV-SCNC: 8.2 ML/DL
PCO2 BLDV: 38 MM HG (ref 42–50)
PH BLDV: 7.26 [PH] (ref 7.3–7.4)
PLATELET # BLD AUTO: 278 THOUSANDS/UL (ref 149–390)
PMV BLD AUTO: 10 FL (ref 8.9–12.7)
PO2 BLDV: 27.6 MM HG (ref 35–45)
POTASSIUM SERPL-SCNC: 2.8 MMOL/L (ref 3.5–5.3)
POTASSIUM SERPL-SCNC: 3.5 MMOL/L (ref 3.5–5.3)
PROT SERPL-MCNC: 5.9 G/DL (ref 6.4–8.4)
PROT SERPL-MCNC: 6.1 G/DL (ref 6.4–8.4)
PROTHROMBIN TIME: 13 SECONDS (ref 11.6–14.5)
PROTHROMBIN TIME: 13.4 SECONDS (ref 11.6–14.5)
RBC # BLD AUTO: 3.59 MILLION/UL (ref 3.81–5.12)
SALICYLATES SERPL-MCNC: <5 MG/DL (ref 3–20)
SODIUM SERPL-SCNC: 141 MMOL/L (ref 135–147)
SODIUM SERPL-SCNC: 142 MMOL/L (ref 135–147)
TSH SERPL DL<=0.05 MIU/L-ACNC: 1.52 UIU/ML (ref 0.45–4.5)
WBC # BLD AUTO: 6.16 THOUSAND/UL (ref 4.31–10.16)

## 2023-08-01 PROCEDURE — 99448 NTRPROF PH1/NTRNET/EHR 21-30: CPT | Performed by: EMERGENCY MEDICINE

## 2023-08-01 PROCEDURE — 36415 COLL VENOUS BLD VENIPUNCTURE: CPT | Performed by: EMERGENCY MEDICINE

## 2023-08-01 PROCEDURE — G1004 CDSM NDSC: HCPCS

## 2023-08-01 PROCEDURE — 70496 CT ANGIOGRAPHY HEAD: CPT

## 2023-08-01 PROCEDURE — 96361 HYDRATE IV INFUSION ADD-ON: CPT

## 2023-08-01 PROCEDURE — 99223 1ST HOSP IP/OBS HIGH 75: CPT | Performed by: HOSPITALIST

## 2023-08-01 PROCEDURE — 83735 ASSAY OF MAGNESIUM: CPT | Performed by: FAMILY MEDICINE

## 2023-08-01 PROCEDURE — 82550 ASSAY OF CK (CPK): CPT | Performed by: EMERGENCY MEDICINE

## 2023-08-01 PROCEDURE — 99285 EMERGENCY DEPT VISIT HI MDM: CPT

## 2023-08-01 PROCEDURE — 99291 CRITICAL CARE FIRST HOUR: CPT | Performed by: EMERGENCY MEDICINE

## 2023-08-01 PROCEDURE — 85025 COMPLETE CBC W/AUTO DIFF WBC: CPT | Performed by: EMERGENCY MEDICINE

## 2023-08-01 PROCEDURE — 70498 CT ANGIOGRAPHY NECK: CPT

## 2023-08-01 PROCEDURE — 80053 COMPREHEN METABOLIC PANEL: CPT | Performed by: HOSPITALIST

## 2023-08-01 PROCEDURE — 82805 BLOOD GASES W/O2 SATURATION: CPT | Performed by: EMERGENCY MEDICINE

## 2023-08-01 PROCEDURE — 82077 ASSAY SPEC XCP UR&BREATH IA: CPT | Performed by: EMERGENCY MEDICINE

## 2023-08-01 PROCEDURE — 85730 THROMBOPLASTIN TIME PARTIAL: CPT | Performed by: EMERGENCY MEDICINE

## 2023-08-01 PROCEDURE — 80179 DRUG ASSAY SALICYLATE: CPT | Performed by: EMERGENCY MEDICINE

## 2023-08-01 PROCEDURE — 71045 X-RAY EXAM CHEST 1 VIEW: CPT

## 2023-08-01 PROCEDURE — 80143 DRUG ASSAY ACETAMINOPHEN: CPT | Performed by: HOSPITALIST

## 2023-08-01 PROCEDURE — 84484 ASSAY OF TROPONIN QUANT: CPT | Performed by: HOSPITALIST

## 2023-08-01 PROCEDURE — 84443 ASSAY THYROID STIM HORMONE: CPT | Performed by: EMERGENCY MEDICINE

## 2023-08-01 PROCEDURE — 80053 COMPREHEN METABOLIC PANEL: CPT | Performed by: EMERGENCY MEDICINE

## 2023-08-01 PROCEDURE — 96365 THER/PROPH/DIAG IV INF INIT: CPT

## 2023-08-01 PROCEDURE — 85610 PROTHROMBIN TIME: CPT | Performed by: EMERGENCY MEDICINE

## 2023-08-01 PROCEDURE — 82948 REAGENT STRIP/BLOOD GLUCOSE: CPT

## 2023-08-01 PROCEDURE — 80143 DRUG ASSAY ACETAMINOPHEN: CPT | Performed by: EMERGENCY MEDICINE

## 2023-08-01 PROCEDURE — 85610 PROTHROMBIN TIME: CPT | Performed by: HOSPITALIST

## 2023-08-01 PROCEDURE — 84484 ASSAY OF TROPONIN QUANT: CPT | Performed by: EMERGENCY MEDICINE

## 2023-08-01 RX ORDER — QUETIAPINE FUMARATE 100 MG/1
300 TABLET, FILM COATED ORAL
Status: DISCONTINUED | OUTPATIENT
Start: 2023-08-01 | End: 2023-08-03 | Stop reason: HOSPADM

## 2023-08-01 RX ORDER — METOPROLOL SUCCINATE 100 MG/1
100 TABLET, EXTENDED RELEASE ORAL DAILY
Status: DISCONTINUED | OUTPATIENT
Start: 2023-08-02 | End: 2023-08-03 | Stop reason: HOSPADM

## 2023-08-01 RX ORDER — PANTOPRAZOLE SODIUM 40 MG/1
40 TABLET, DELAYED RELEASE ORAL
Status: DISCONTINUED | OUTPATIENT
Start: 2023-08-02 | End: 2023-08-03 | Stop reason: HOSPADM

## 2023-08-01 RX ORDER — DOCUSATE SODIUM 100 MG/1
100 CAPSULE, LIQUID FILLED ORAL 2 TIMES DAILY
Status: DISCONTINUED | OUTPATIENT
Start: 2023-08-01 | End: 2023-08-03 | Stop reason: HOSPADM

## 2023-08-01 RX ORDER — POLYETHYLENE GLYCOL 3350 17 G/17G
17 POWDER, FOR SOLUTION ORAL DAILY
Status: DISCONTINUED | OUTPATIENT
Start: 2023-08-02 | End: 2023-08-03 | Stop reason: HOSPADM

## 2023-08-01 RX ORDER — PANTOPRAZOLE SODIUM 40 MG/1
40 TABLET, DELAYED RELEASE ORAL 2 TIMES DAILY
Status: CANCELLED | OUTPATIENT
Start: 2023-08-01

## 2023-08-01 RX ORDER — POTASSIUM CHLORIDE 14.9 MG/ML
20 INJECTION INTRAVENOUS
Status: COMPLETED | OUTPATIENT
Start: 2023-08-01 | End: 2023-08-02

## 2023-08-01 RX ORDER — ASPIRIN 81 MG/1
81 TABLET, CHEWABLE ORAL DAILY
Status: DISCONTINUED | OUTPATIENT
Start: 2023-08-02 | End: 2023-08-03 | Stop reason: HOSPADM

## 2023-08-01 RX ORDER — ATORVASTATIN CALCIUM 40 MG/1
40 TABLET, FILM COATED ORAL EVERY EVENING
Status: CANCELLED | OUTPATIENT
Start: 2023-08-01

## 2023-08-01 RX ORDER — MAGNESIUM SULFATE HEPTAHYDRATE 40 MG/ML
2 INJECTION, SOLUTION INTRAVENOUS ONCE
Status: COMPLETED | OUTPATIENT
Start: 2023-08-01 | End: 2023-08-02

## 2023-08-01 RX ORDER — DOCUSATE SODIUM 100 MG/1
100 CAPSULE, LIQUID FILLED ORAL 2 TIMES DAILY PRN
Status: CANCELLED | OUTPATIENT
Start: 2023-08-01

## 2023-08-01 RX ORDER — ENOXAPARIN SODIUM 100 MG/ML
40 INJECTION SUBCUTANEOUS DAILY
Status: CANCELLED | OUTPATIENT
Start: 2023-08-02

## 2023-08-01 RX ORDER — ALBUTEROL SULFATE 90 UG/1
2 AEROSOL, METERED RESPIRATORY (INHALATION) EVERY 4 HOURS PRN
Status: DISCONTINUED | OUTPATIENT
Start: 2023-08-01 | End: 2023-08-03 | Stop reason: HOSPADM

## 2023-08-01 RX ORDER — ALPRAZOLAM 0.5 MG/1
1 TABLET ORAL 3 TIMES DAILY PRN
Status: DISCONTINUED | OUTPATIENT
Start: 2023-08-01 | End: 2023-08-02

## 2023-08-01 RX ORDER — OLANZAPINE 10 MG/1
20 TABLET ORAL
Status: DISCONTINUED | OUTPATIENT
Start: 2023-08-01 | End: 2023-08-03 | Stop reason: HOSPADM

## 2023-08-01 RX ORDER — FAMOTIDINE 20 MG/1
20 TABLET, FILM COATED ORAL DAILY
Status: DISCONTINUED | OUTPATIENT
Start: 2023-08-02 | End: 2023-08-03 | Stop reason: HOSPADM

## 2023-08-01 RX ORDER — OXYCODONE HYDROCHLORIDE 5 MG/1
5 TABLET ORAL EVERY 6 HOURS PRN
Status: DISCONTINUED | OUTPATIENT
Start: 2023-08-01 | End: 2023-08-03 | Stop reason: HOSPADM

## 2023-08-01 RX ORDER — METOPROLOL SUCCINATE 100 MG/1
100 TABLET, EXTENDED RELEASE ORAL DAILY
Status: CANCELLED | OUTPATIENT
Start: 2023-08-02

## 2023-08-01 RX ORDER — AMLODIPINE BESYLATE 5 MG/1
5 TABLET ORAL DAILY
Status: DISCONTINUED | OUTPATIENT
Start: 2023-08-02 | End: 2023-08-03 | Stop reason: HOSPADM

## 2023-08-01 RX ORDER — ENOXAPARIN SODIUM 100 MG/ML
40 INJECTION SUBCUTANEOUS DAILY
Status: DISCONTINUED | OUTPATIENT
Start: 2023-08-02 | End: 2023-08-03 | Stop reason: HOSPADM

## 2023-08-01 RX ADMIN — ACETYLCYSTEINE 12045 MG: 200 INJECTION, SOLUTION INTRAVENOUS at 15:07

## 2023-08-01 RX ADMIN — SODIUM CHLORIDE 1000 ML: 0.9 INJECTION, SOLUTION INTRAVENOUS at 13:28

## 2023-08-01 RX ADMIN — IOHEXOL 100 ML: 350 INJECTION, SOLUTION INTRAVENOUS at 14:18

## 2023-08-01 RX ADMIN — POTASSIUM CHLORIDE 20 MEQ: 14.9 INJECTION, SOLUTION INTRAVENOUS at 22:54

## 2023-08-01 RX ADMIN — ACETYLCYSTEINE 4015 MG: 200 INJECTION, SOLUTION INTRAVENOUS at 17:22

## 2023-08-01 RX ADMIN — ALPRAZOLAM 1 MG: 0.5 TABLET ORAL at 20:00

## 2023-08-01 RX ADMIN — DOCUSATE SODIUM 100 MG: 100 CAPSULE, LIQUID FILLED ORAL at 19:59

## 2023-08-01 RX ADMIN — OLANZAPINE 20 MG: 10 TABLET, FILM COATED ORAL at 22:54

## 2023-08-01 RX ADMIN — QUETIAPINE FUMARATE 300 MG: 100 TABLET ORAL at 22:54

## 2023-08-01 RX ADMIN — MAGNESIUM SULFATE HEPTAHYDRATE 2 G: 40 INJECTION, SOLUTION INTRAVENOUS at 22:54

## 2023-08-01 RX ADMIN — ACETYLCYSTEINE 8030 MG: 200 INJECTION, SOLUTION INTRAVENOUS at 22:54

## 2023-08-01 NOTE — PLAN OF CARE
Problem: PAIN - ADULT  Goal: Verbalizes/displays adequate comfort level or baseline comfort level  Description: Interventions:  - Encourage patient to monitor pain and request assistance  - Assess pain using appropriate pain scale  - Administer analgesics based on type and severity of pain and evaluate response  - Implement non-pharmacological measures as appropriate and evaluate response  - Consider cultural and social influences on pain and pain management  - Notify physician/advanced practitioner if interventions unsuccessful or patient reports new pain  Outcome: Progressing     Problem: INFECTION - ADULT  Goal: Absence or prevention of progression during hospitalization  Description: INTERVENTIONS:  - Assess and monitor for signs and symptoms of infection  - Monitor lab/diagnostic results  - Monitor all insertion sites, i.e. indwelling lines, tubes, and drains  - Monitor endotracheal if appropriate and nasal secretions for changes in amount and color  - [place] appropriate cooling/warming therapies per order  - Administer medications as ordered  - Instruct and encourage patient and family to use good hand hygiene technique  - Identify and instruct in appropriate isolation precautions for identified infection/condition  Outcome: Progressing     Problem: SAFETY ADULT  Goal: Patient will remain free of falls  Description: INTERVENTIONS:  - Educate patient/family on patient safety including physical limitations  - Instruct patient to call for assistance with activity   - Consult OT/PT to assist with strengthening/mobility   - Keep Call bell within reach  - Keep bed low and locked with side rails adjusted as appropriate  - Keep care items and personal belongings within reach  - Initiate and maintain comfort rounds  - Make Fall Risk Sign visible to staff  - Offer Toileting every 1-2 Hours, in advance of need  - Initiate/Maintain bed and chair alarm  - Obtain necessary fall risk management equipment: fall socks and call bell in reach  - Apply yellow socks and bracelet for high fall risk patients  - Consider moving patient to room near nurses station  Outcome: Progressing  Goal: Maintain or return to baseline ADL function  Description: INTERVENTIONS:  -  Assess patient's ability to carry out ADLs; assess patient's baseline for ADL function and identify physical deficits which impact ability to perform ADLs (bathing, care of mouth/teeth, toileting, grooming, dressing, etc.)  - Assess/evaluate cause of self-care deficits   - Assess range of motion  - Assess patient's mobility; develop plan if impaired  - Assess patient's need for assistive devices and provide as appropriate  - Encourage maximum independence but intervene and supervise when necessary  - Involve family in performance of ADLs  - Assess for home care needs following discharge   - Consider OT consult to assist with ADL evaluation and planning for discharge  - Provide patient education as appropriate  Outcome: Progressing  Goal: Maintains/Returns to pre admission functional level  Description: INTERVENTIONS:  - Perform BMAT or MOVE assessment daily.   - Set and communicate daily mobility goal to care team and patient/family/caregiver. - Collaborate with rehabilitation services on mobility goals if consulted  - Perform Range of Motion 3-4 times a day. - Reposition patient every 1-2 hours.   - Dangle patient 3-4 times a day  - Stand patient 3-4 times a day  - Ambulate patient 3-4 times a day  - Out of bed to chair 3-4 times a day   - Out of bed for meals 3-4 times a day  - Out of bed for toileting  - Record patient progress and toleration of activity level   Outcome: Progressing     Problem: DISCHARGE PLANNING  Goal: Discharge to home or other facility with appropriate resources  Description: INTERVENTIONS:  - Identify barriers to discharge w/patient and caregiver  - Arrange for needed discharge resources and transportation as appropriate  - Identify discharge learning needs (meds, wound care, etc.)  - Arrange for interpretive services to assist at discharge as needed  - Refer to Case Management Department for coordinating discharge planning if the patient needs post-hospital services based on physician/advanced practitioner order or complex needs related to functional status, cognitive ability, or social support system  Outcome: Progressing     Problem: Knowledge Deficit  Goal: Patient/family/caregiver demonstrates understanding of disease process, treatment plan, medications, and discharge instructions  Description: Complete learning assessment and assess knowledge base.   Interventions:  - Provide teaching at level of understanding  - Provide teaching via preferred learning methods  Outcome: Progressing     Problem: METABOLIC, FLUID AND ELECTROLYTES - ADULT  Goal: Electrolytes maintained within normal limits  Description: INTERVENTIONS:  - Monitor labs and assess patient for signs and symptoms of electrolyte imbalances  - Administer electrolyte replacement as ordered  - Monitor response to electrolyte replacements, including repeat lab results as appropriate  - Instruct patient on fluid and nutrition as appropriate  Outcome: Progressing  Goal: Fluid balance maintained  Description: INTERVENTIONS:  - Monitor labs   - Monitor I/O and WT  - Instruct patient on fluid and nutrition as appropriate  - Assess for signs & symptoms of volume excess or deficit  Outcome: Progressing  Goal: Glucose maintained within target range  Description: INTERVENTIONS:  - Monitor Blood Glucose as ordered  - Assess for signs and symptoms of hyperglycemia and hypoglycemia  - Administer ordered medications to maintain glucose within target range  - Assess nutritional intake and initiate nutrition service referral as needed  Outcome: Progressing     Problem: MUSCULOSKELETAL - ADULT  Goal: Maintain or return mobility to safest level of function  Description: INTERVENTIONS:  - Assess patient's ability to carry out ADLs; assess patient's baseline for ADL function and identify physical deficits which impact ability to perform ADLs (bathing, care of mouth/teeth, toileting, grooming, dressing, etc.)  - Assess/evaluate cause of self-care deficits   - Assess range of motion  - Assess patient's mobility  - Assess patient's need for assistive devices and provide as appropriate  - Encourage maximum independence but intervene and supervise when necessary  - Involve family in performance of ADLs  - Assess for home care needs following discharge   - Consider OT consult to assist with ADL evaluation and planning for discharge  - Provide patient education as appropriate  Outcome: Progressing  Goal: Maintain proper alignment of affected body part  Description: INTERVENTIONS:  - Support, maintain and protect limb and body alignment  - Provide patient/ family with appropriate education  Outcome: Progressing

## 2023-08-01 NOTE — ASSESSMENT & PLAN NOTE
Patient takes XANAX 1mg 3-4 times every day  Will schedule for TID as patient reports she takes it that way at home

## 2023-08-01 NOTE — ASSESSMENT & PLAN NOTE
Patient was admitted due to generalized weakness and altered mental status. Hx of polypharmacy at home. Patient's mom mentioned she was taking Tylenol four times a day for a while, unsure of the time period, along with Xanax 1mg and Norco 5-325mg 3-4 times a day. Patient developed altered mental status with worsening weakness after she took the Tylenol, Xanax, and Norco at noon today 08/01/2023. Admitted abdominal pain more so in the RUQ. Denied fever, chills, SOB, CP, palpitation, urinary incontinence, LOC at home. - ED lab shows elevated acetaminophen level in 100 with elevated LFTs,  and ALT 56. VBG with metabolic acidosis  - EKG HR with NSR  - CXR negative and CTA head negative  - NIH screen rule out stroke    Plan  -  toxicology consulted, suggested CK to rule out rhabdo, above treatment plan (NAC), and trend CMP, INR, and Acetaminophen levels Q6H  - ED medication N-acetylcysteine 74902hb given as the initial dose, and 4015 mg second dose given over the next 4 hours.  Third dose 8030mg is scheduled to be given after four hours  - put on IV fluids  - hold Tylenol and statin due to elevated LFTs  - f/u morning CBC result  - PT/OT order placed

## 2023-08-01 NOTE — ED PROVIDER NOTES
History  Chief Complaint   Patient presents with   • Weakness - Generalized     Pt c/o generalized weakness and diarrhea for 9 days, called her PCP and was told to come get checked. Patient is a 80-year-old female presenting with generalized weakness. Patient here with her family members. Patient has history of TIAs, bipolar disorder, chronic pain syndrome, previous stroke that did affect part of her speech and cause right-sided weakness. Family states she is so weak she can barely walk around the house. Patient does take Xanax and opiate pain medication at home and did take those medications prior to arrival in the department. Initial [facility] stroke evaluation is unremarkable. Previous records reviewed. We will continue with weakness/stroke evaluation. Prior to Admission Medications   Prescriptions Last Dose Informant Patient Reported? Taking? ALPRAZolam (XANAX) 1 mg tablet   No No   Sig: Take 1 tablet (1 mg total) by mouth 4 (four) times a day as needed for anxiety for up to 10 days   HYDROcodone-acetaminophen (NORCO) 5-325 mg per tablet   Yes No   Sig: Take 1 tablet by mouth every 6 (six) hours as needed for pain   OLANZapine (ZyPREXA) 20 MG tablet   Yes No   Sig: Take 20 mg by mouth daily at bedtime    QUEtiapine (SEROquel) 300 mg tablet   Yes No   Sig: Take 300 mg by mouth daily at bedtime   acetaminophen (TYLENOL) 325 mg tablet   No No   Sig: Take 2 tablets (650 mg total) by mouth every 6 (six) hours as needed for mild pain, fever or headaches Do not exceed a total of 3 grams of tylenol/acetaminophen in a 24-hour period. Do not take additional tylenol if you are taking vicodin.    albuterol (PROVENTIL HFA,VENTOLIN HFA) 90 mcg/act inhaler   No No   Sig: Inhale 2 puffs every 4 (four) hours as needed for wheezing or shortness of breath   amLODIPine (NORVASC) 5 mg tablet   No No   Sig: Take 1 tablet (5 mg total) by mouth daily   aspirin (ECOTRIN) 325 mg EC tablet   No No   Sig: Take 1 tablet (325 mg total) by mouth 2 (two) times a day   atorvastatin (LIPITOR) 40 mg tablet   No No   Sig: Take 1 tablet (40 mg total) by mouth every evening To prevent stroke and heart attack   cholecalciferol (VITAMIN D3) 1,000 units tablet   No No   Sig: Take 1 tablet (1,000 Units total) by mouth daily Take this in place of ergocalciferol   docusate sodium (COLACE) 100 mg capsule  Self Yes No   Sig: Take by mouth 2 (two) times a day as needed   famotidine (PEPCID) 20 mg tablet   Yes No   Sig: Take 20 mg by mouth daily   ferrous sulfate 325 (65 Fe) mg tablet   Yes No   Sig: Take 1 tablet by mouth 2 (two) times a day with meals   metoprolol succinate (TOPROL-XL) 100 mg 24 hr tablet   No No   Sig: Take 1 tablet (100 mg total) by mouth daily Do not start before November 19, 2022.    mupirocin (BACTROBAN) 2 % ointment   Yes No   Sig: Apply topically daily   pantoprazole (PROTONIX) 40 mg tablet  Mother Yes No   Sig: Take 40 mg by mouth daily    vitamin B-12 (VITAMIN B-12) 1,000 mcg tablet   No No   Sig: Take 0.5 tablets (500 mcg total) by mouth daily      Facility-Administered Medications: None       Past Medical History:   Diagnosis Date   • Bipolar 1 disorder ([address])    • Cancer ([address])     kidney   • Cardiac disease     fast heart beat   • Chronic pain    • Fractures    • GERD (gastroesophageal reflux disease)    • Hard of hearing    • Hypertension    • Renal cell carcinoma ([address])    • Stroke ([address])     2009 affected her speech, right sided weakness   • TIA (transient ischemic attack)        Past Surgical History:   Procedure Laterality Date   • APPENDECTOMY     • CHOLECYSTECTOMY     • FL GUIDED NEEDLE PLAC BX/ASP/INJ  5/30/2014   • FL GUIDED NEEDLE PLAC BX/ASP/INJ  12/19/2013   • FL GUIDED NEEDLE PLAC BX/ASP/INJ  6/10/2013   • JOINT REPLACEMENT Bilateral      bilateral knees   • NEPHRECTOMY Right    • MT COLONOSCOPY FLX DX W/COLLJ SPEC WHEN PFRMD N/A 8/7/2018    Procedure: COLONOSCOPY;  Surgeon: Dejuan Land MD;  Location: [facility] OR;  Service: Gastroenterology   • [facility] OPEN TREATMENT BIMALLEOLAR ANKLE FRACTURE Right 5/14/2019    Procedure: ANKLE - Bimalleolar OPEN REDUCTION W/ INTERNAL FIXATION (ORIF); Surgeon: Florence Bryan; Location: [address] [place] [address] MAIN OR;  Service: Orthopedics   • VA REMOVAL IMPLANT DEEP Right 1/31/2023    Procedure: REMOVAL HARDWARE ANKLE;  Surgeon: Florence Bryan; Location:  MAIN OR;  Service: Orthopedics   • VA Jairo Gums SHFT FX IMED IMPLT W/WO SCREWS&/CERCLA Left 7/27/2022    Procedure: INSERTION NAIL IM TIBIA;  Surgeon: Marilee Felix MD;  Location: [facility] MAIN OR;  Service: Orthopedics       Family History   Problem Relation Age of Onset   • Colon cancer Other    • Alcohol abuse Other    • Hypertension Other      I have reviewed and agree with the history as documented. E-Cigarette/Vaping   • E-Cigarette Use Never User      E-Cigarette/Vaping Substances   • Nicotine No    • THC No    • CBD No    • Flavoring No    • Other No    • Unknown No      Social History     Tobacco Use   • Smoking status: Every Day     Packs/day: 0.50     Years: 7.00     Total pack years: 3.50     Types: Cigarettes   • Smokeless tobacco: Never   • Tobacco comments:     pt refused referral   Vaping Use   • Vaping Use: Never used   Substance Use Topics   • Alcohol use: Not Currently   • Drug use: Never       Review of Systems   Constitutional: Negative for appetite change, chills, fatigue, fever and unexpected weight change. HENT: Negative for congestion, ear pain, rhinorrhea and sore throat. Eyes: Negative for pain and visual disturbance. Respiratory: Negative for cough, chest tightness, shortness of breath and wheezing. Cardiovascular: Negative for chest pain, palpitations and leg swelling. Gastrointestinal: Negative for abdominal pain, constipation, diarrhea, nausea and vomiting. Genitourinary: Negative for difficulty urinating, dysuria, frequency, hematuria, menstrual problem, pelvic pain, vaginal bleeding and vaginal discharge. Musculoskeletal: Positive for arthralgias, back pain and myalgias. Negative for neck pain. Skin: Negative for color change and rash. Neurological: Positive for speech difficulty and weakness. Negative for dizziness, seizures, syncope, light-headedness and headaches. Psychiatric/Behavioral: Negative for confusion and sleep disturbance. All other systems reviewed and are negative. Physical Exam  Physical Exam  Vitals and nursing note reviewed. Constitutional:       General: She is not in acute distress. Appearance: Normal appearance. She is well-developed and normal weight. She is not ill-appearing, toxic-appearing or diaphoretic. HENT:      Head: Normocephalic and atraumatic. Nose: Nose normal.      Mouth/Throat:      Mouth: Mucous membranes are moist.      Pharynx: Oropharynx is clear. Eyes:      General: No scleral icterus. Extraocular Movements: Extraocular movements intact. Conjunctiva/sclera: Conjunctivae normal.   Cardiovascular:      Rate and Rhythm: Normal rate and regular rhythm. Pulses: Normal pulses. Heart sounds: Normal heart sounds. No murmur heard. No friction rub. No gallop. Pulmonary:      Effort: Pulmonary effort is normal. No respiratory distress. Breath sounds: Normal breath sounds. No wheezing or rales. Abdominal:      Palpations: Abdomen is soft. There is no mass. Tenderness: There is no abdominal tenderness. There is no right CVA tenderness, left CVA tenderness, guarding or rebound. Hernia: No hernia is present. Musculoskeletal:         General: No swelling. Normal range of motion. Cervical back: Normal range of motion and neck supple. No rigidity or tenderness. Right lower leg: Edema present. Left lower leg: Edema present. Comments: +1 bilateral pedal edema with no calf pain tenderness or asymmetry   Lymphadenopathy:      Cervical: No cervical adenopathy. Skin:     General: Skin is warm and dry. Capillary Refill: Capillary refill takes less than 2 seconds. Coloration: Skin is not jaundiced or pale. Findings: No lesion or rash. Neurological:      General: No focal deficit present. Mental Status: She is alert and oriented to person, place, and time. Mental status is at baseline. Cranial Nerves: No cranial nerve deficit. Comments: Endorses generalized weakness but no focal motor or sensory neurological deficits.    Psychiatric:         Mood and Affect: Mood normal.         Behavior: Behavior normal.         Vital Signs  ED Triage Vitals [08/01/23 1144]   Temperature Pulse Respirations Blood Pressure SpO2   97.9 °F (36.6 °C) 100 18 118/89 97 %      Temp Source Heart Rate Source Patient Position - Orthostatic VS BP Location FiO2 (%)   Temporal Monitor Lying Right arm --      Pain Score       --           Vitals:    08/01/23 1144   BP: 118/89   Pulse: 100   Patient Position - Orthostatic VS: Lying         Visual Acuity      ED Medications  Medications   acetylcysteine (ACETADOTE) 12,045 mg in dextrose 5 % 200 mL IVPB (12,045 mg Intravenous New Bag 8/1/23 1507)   acetylcysteine (ACETADOTE) 4,015 mg in dextrose 5 % 500 mL IVPB (has no administration in time range)   acetylcysteine (ACETADOTE) 8,030 mg in dextrose 5 % 1,000 mL IVPB (has no administration in time range)   sodium chloride 0.9 % bolus 1,000 mL (1,000 mL Intravenous New Bag 8/1/23 1328)   iohexol (OMNIPAQUE) 350 MG/ML injection (SINGLE-DOSE) 100 mL (100 mL Intravenous Given 8/1/23 1418)       Diagnostic Studies  Results Reviewed     Procedure Component Value Units Date/Time    HS Troponin I 2hr [[id number]]  (Normal) Collected: 08/01/23 1438    Lab Status: Final result Specimen: Blood from Arm, Left Updated: 08/01/23 1505     hs TnI 2hr 12 ng/L      Delta 2hr hsTnI -1 ng/L     Protime-INR [[id number]]  (Normal) Collected: 08/01/23 1438    Lab Status: Final result Specimen: Blood from Arm, Left Updated: 08/01/23 1453     Protime 13.0 seconds      INR 0.96    APTT [[id number]]  (Normal) Collected: 08/01/23 1438    Lab Status: Final result Specimen: Blood from Arm, Left Updated: 08/01/23 1453     PTT 29 seconds     CK [[id number]]     Lab Status: No result Specimen: Blood     Blood gas, venous [[id number]]  (Abnormal) Collected: 08/01/23 1438    Lab Status: Final result Specimen: Blood from Arm, Left Updated: 08/01/23 1448     pH, Jorge 7.263     pCO2, Jorge 38.0 mm Hg      pO2, Jorge 27.6 mm Hg      HCO3, Jorge 16.8 mmol/L      Base Excess, Jorge -9.5 mmol/L      O2 Content, Jorge 8.2 ml/dL      O2 HGB, VENOUS 45.3 %     HS Troponin I 4hr [[id number]]     Lab Status: No result Specimen: Blood     Acetaminophen level-If concentration is detectable, please discuss with medical  on call.  [[id number]]  (Abnormal) Collected: 08/01/23 1258    Lab Status: Final result Specimen: Blood from Arm, Left Updated: 08/01/23 1350     Acetaminophen Level 529 ug/mL     Salicylate level [[id number]]  (Normal) Collected: 08/01/23 1258    Lab Status: Final result Specimen: Blood from Arm, Left Updated: 17/05/55 4135     Salicylate Lvl <5 mg/dL     Ethanol [[id number]]  (Normal) Collected: 08/01/23 1258    Lab Status: Final result Specimen: Blood from Arm, Left Updated: 08/01/23 1342     Ethanol Lvl <10 mg/dL     TSH [[id number]]  (Normal) Collected: 08/01/23 1229    Lab Status: Final result Specimen: Blood from Arm, Left Updated: 08/01/23 1306     TSH 3RD GENERATON 1.520 uIU/mL     HS Troponin 0hr (reflex protocol) [[id number]]  (Normal) Collected: 08/01/23 1229    Lab Status: Final result Specimen: Blood from Arm, Left Updated: 08/01/23 1255     hs TnI 0hr 13 ng/L     Comprehensive metabolic panel [[id number]]  (Abnormal) Collected: 08/01/23 1229    Lab Status: Final result Specimen: Blood from Arm, Left Updated: 08/01/23 1250     Sodium 142 mmol/L      Potassium 3.5 mmol/L      Chloride 112 mmol/L      CO2 18 mmol/L      ANION GAP 12 mmol/L      BUN 16 mg/dL Creatinine 0.84 mg/dL      Glucose 106 mg/dL      Calcium 8.8 mg/dL      Corrected Calcium 9.3 mg/dL       U/L      ALT 56 U/L      Alkaline Phosphatase 132 U/L      Total Protein 5.9 g/dL      Albumin 3.4 g/dL      Total Bilirubin 0.62 mg/dL      eGFR 72 ml/min/1.73sq m     Narrative:      National Kidney Disease Foundation guidelines for Chronic Kidney Disease (CKD):   •  Stage 1 with normal or high GFR (GFR > 90 mL/min/1.73 square meters)  •  Stage 2 Mild CKD (GFR = 60-89 mL/min/1.73 square meters)  •  Stage 3A Moderate CKD (GFR = 45-59 mL/min/1.73 square meters)  •  Stage 3B Moderate CKD (GFR = 30-44 mL/min/1.73 square meters)  •  Stage 4 Severe CKD (GFR = 15-29 mL/min/1.73 square meters)  •  Stage 5 End Stage CKD (GFR <15 mL/min/1.73 square meters)  Note: GFR calculation is accurate only with a steady state creatinine    CBC and differential [[id number]]  (Abnormal) Collected: 08/01/23 1210    Lab Status: Final result Specimen: Blood from Arm, Left Updated: 08/01/23 1218     WBC 6.16 Thousand/uL      RBC 3.59 Million/uL      Hemoglobin 13.1 g/dL      Hematocrit 38.8 %       fL      MCH 36.5 pg      MCHC 33.8 g/dL      RDW 14.9 %      MPV 10.0 fL      Platelets 699 Thousands/uL      nRBC 0 /100 WBCs      Neutrophils Relative 62 %      Immat GRANS % 2 %      Lymphocytes Relative 21 %      Monocytes Relative 11 %      Eosinophils Relative 3 %      Basophils Relative 1 %      Neutrophils Absolute 3.85 Thousands/µL      Immature Grans Absolute 0.13 Thousand/uL      Lymphocytes Absolute 1.29 Thousands/µL      Monocytes Absolute 0.69 Thousand/µL      Eosinophils Absolute 0.17 Thousand/µL      Basophils Absolute 0.03 Thousands/µL     Fingerstick Glucose (POCT) [[id number]]  (Normal) Collected: 08/01/23 1141    Lab Status: Final result Updated: 08/01/23 1204     POC Glucose 109 mg/dl                  CTA head and neck with and without contrast   Final Result by Magdaleno Rodriguez MD (08/01 5548)      1.   No acute intracranial abnormality. Stable old left caudate lacunar infarct. 2.  Patent major vessels of the [place] of more without high-grade stenosis. No aneurysm. 3.  No hemodynamically significant stenosis or dissection of cervical carotid and vertebral arteries. 4.  Left maxillary sinusitis with fluid level favoring acute/subacute nature. Workstation performed: [id number]         XR chest 1 view portable   Final Result by Salomón Delong MD (08/01 1539)      No acute cardiopulmonary disease.                   Workstation performed: [id number]                    Procedures  ECG 12 Lead Documentation Only    Date/Time: 8/1/2023 11:59 AM    Performed by: Reji Sheehan DO  Authorized by: Reji Sheehan DO    Indications / Diagnosis:  Weakness  ECG reviewed by me, the ED Provider: yes    Patient location:  ED  Previous ECG:     Comparison to cardiac monitor: Yes    Rate:     ECG rate:  96    ECG rate assessment: normal    Rhythm:     Rhythm: sinus rhythm    Ectopy:     Ectopy: none    QRS:     QRS axis:  Normal    QRS intervals:  Normal  Conduction:     Conduction: normal    ST segments:     ST segments:  Normal  T waves:     T waves: normal      CriticalCare Time    Date/Time: 8/1/2023 3:43 PM    Performed by: Reji Sheehan DO  Authorized by: Reji Sheehan DO    Critical care provider statement:     Critical care time (minutes):  40    Critical care time was exclusive of:  Separately billable procedures and treating other patients and teaching time    Critical care was necessary to treat or prevent imminent or life-threatening deterioration of the following conditions:  Toxidrome    Critical care was time spent personally by me on the following activities:  Blood draw for specimens, obtaining history from patient or surrogate, development of treatment plan with patient or surrogate, discussions with consultants, discussions with primary provider, evaluation of patient's response to treatment, examination of patient, review of old charts, re-evaluation of patient's condition, ordering and review of laboratory studies and ordering and performing treatments and interventions    I assumed direction of critical care for this patient from another provider in my specialty: no               ED Course                  Stroke Assessment     Row Name 08/01/23 1206             NIH Stroke Scale    Interval --      Level of Consciousness (1a.) 0      LOC Questions (1b.) 0      LOC Commands (1c.) 0      Best Gaze (2.) 0      Visual (3.) 0      Facial Palsy (4.) 0      Motor Arm, Left (5a.) 0      Motor Arm, Right (5b.) 0      Motor Leg, Left (6a.) 0      Motor Leg, Right (6b.) 0      Limb Ataxia (7.) 0      Sensory (8.) 0      Best Language (9.) 0      Dysarthria (10.) 0      Extinction and Inattention (11.) (Formerly Neglect) 0      Total 0              Flowsheet Row Most Recent Value   Thrombolytic Decision Options    Thrombolytic Decision Patient not a candidate. Patient is not a candidate options Symptoms resolved/clearly non disabling., Unclear time of onset outside appropriate time window. SBIRT 22yo+    Flowsheet Row Most Recent Value   Initial Alcohol Screen: US AUDIT-C     1. How often do you have a drink containing alcohol? 0 Filed at: 08/01/2023 1144   2. How many drinks containing alcohol do you have on a typical day you are drinking? 0 Filed at: 08/01/2023 1144   3a. Male UNDER 65: How often do you have five or more drinks on one occasion? 0 Filed at: 08/01/2023 1144   3b. FEMALE Any Age, or MALE 65+: How often do you have 4 or more drinks on one occassion? 0 Filed at: 08/01/2023 1144   Audit-C Score 0 Filed at: 08/01/2023 1144                    Medical Decision Making  80-year-old female presenting with concern of altered mental status, strokelike symptoms, altered speech.     Tylenol overdose, accidental or unintentional, initial encounter: complicated acute illness or injury with systemic symptoms that poses a threat to life or bodily functions  Weakness: chronic illness or injury  Amount and/or Complexity of Data Reviewed  Independent Historian: caregiver  External Data Reviewed: notes. Labs: ordered. Decision-making details documented in ED Course. Details: Elevated acetaminophen level and abnormal LFTs  Radiology: ordered and independent interpretation performed. Decision-making details documented in ED Course. ECG/medicine tests: ordered and independent interpretation performed. Decision-making details documented in ED Course. Discussion of management or test interpretation with external provider(s): Consulted with toxicology service. Agrees with initiation of N-acetylcysteine. Check lab values as instructed. Risk  Prescription drug management. Decision regarding hospitalization. Risk Details: Labs reviewed. Patient denies any intention to harm herself. We will continue on N-acetylcysteine, admit to the hospital.  Family updated at bedside. No evidence for acute CVA, sepsis, severe sepsis or septic shock. Disposition  Final diagnoses:   Tylenol overdose, accidental or unintentional, initial encounter   Weakness     Time reflects when diagnosis was documented in both MDM as applicable and the Disposition within this note     Time User Action Codes Description Comment    8/1/2023  2:50 PM mauricio Amado, Summer Parisi Tylenol overdose, accidental or unintentional, initial encounter     8/1/2023  3:39 PM Kirsten BEYER Add [R53.1] Weakness       ED Disposition     ED Disposition   Admit    Condition   Stable    Date/Time   Tue Aug 1, 2023  3:39 PM    Comment   Case was discussed with JASON and the patient's admission status was agreed to be Admission Status: inpatient status to the service of Dr. Army Phalen.            Follow-up Information    None         Patient's Medications   Discharge Prescriptions    No medications on file       No discharge procedures on file.     PDMP Review       Value Time User    PDMP Reviewed  Yes 1/31/2023  8:30 PM [address] CRNP          ED Provider  Electronically Signed by           Rosa Elena Gonzales,   08/01/23 [address] [place], [place] [id number] [facility],   08/01/23 5995

## 2023-08-01 NOTE — ASSESSMENT & PLAN NOTE
Patient was admitted due to generalized weakness and altered mental status. She had recent EGD/Colonoscopy and had felt sick since, roughly past 9 days with abdominal pain and nausea, poor oral intake, and was taking her chronic Norco as well as additional Tylenol for the abdominal pain. - ED lab shows elevated acetaminophen level in 100 with elevated LFTs,  and ALT 56.  VBG with metabolic acidosis  - Toxicology contacted  - NAC protocol  - serial monitoring of labs  - patient has associated electrolyte derangements, repleting as needed  - elevated CK, possible mild rhabdomyolysis related to tylenol toxicity, improving, will give IVF    - hold Norco, continuing Oxycodone 5mg PRN q6h for chronic pain

## 2023-08-01 NOTE — H&P
[address]  H&P  Name: Francesca Tran 79 y.o. female I MRN: [id number]  Unit/Bed#: RM10 I Date of Admission: 8/1/2023   Date of Service: 8/1/2023 I Hospital Day: 0      Assessment/Plan   * Tylenol toxicity  Assessment & Plan  Patient was admitted due to generalized weakness and altered mental status. Hx of polypharmacy at home. Patient's mom mentioned she was taking Tylenol four times a day for a while, unsure of the time period, along with Xanax 1mg and Norco 5-325mg 3-4 times a day. Patient developed altered mental status with worsening weakness after she took the Tylenol, Xanax, and Norco at noon today 08/01/2023. Admitted abdominal pain more so in the RUQ. Denied fever, chills, SOB, CP, palpitation, urinary incontinence, LOC at home. - ED lab shows elevated acetaminophen level in 100 with elevated LFTs,  and ALT 56. VBG with metabolic acidosis  - EKG HR with NSR  - CXR negative and CTA head negative  - NIH screen rule out stroke    Plan  -  toxicology consulted, suggested CK to rule out rhabdo, above treatment plan (NAC), and trend CMP, INR, and Acetaminophen levels Q6H  - ED medication N-acetylcysteine 11244yr given as the initial dose, and 4015 mg second dose given over the next 4 hours. Third dose 8030mg is scheduled to be given after four hours  - put on IV fluids  - hold Tylenol and statin due to elevated LFTs  - f/u CK  - f/u CMP, INR, and acetaminophen level Q6H   - f/u morning CBC result  - PT/OT order placed           Bipolar 1 disorder [facility])  Assessment & Plan  Mood and affect stable in ED.   - continue home med Olanzapine and Quietiapine    Generalized anxiety disorder  Assessment & Plan  Patient takes XANAX 1mg 3-4 times every day  - continue the home prescribed dosage    Constipation  Assessment & Plan  intermittent constipation and uses pepcid and protonix PRN at home.   - continue home meds    Essential hypertension  Assessment & Plan  Blood pressure stable in ED.   - continue home med, Amlodipine  - monitor BP    GERD (gastroesophageal reflux disease)  Assessment & Plan  Hx of GERD on pepcid and protonix 40mg  - continue same home meds         VTE Prophylaxis: Enoxaparin (Lovenox)  / sequential compression device   Code Status: full code  Discussion with family: discuss with patient's mother    Anticipated Length of Stay:  Patient will be admitted on an Inpatient basis with an anticipated length of stay of  2 midnights. Justification for Hospital Stay: IV fluids and antidote medication for Acetaminophen overdose     Total Time for Visit, including Counseling / Coordination of Care: 60 minutes. Greater than 50% of this total time spent on direct patient counseling and coordination of care. Chief Complaint:   "I feel generalized weakness worsen today and my head is not clear"    History of Present Illness:    Vasiliy Simeon is a 79y.o. year old female with a PMH of TIA, Stroke with R sided facial weakness and eye droop, bipolar, anxiety, GERD, and HTN was admitted to the medicine floor on 08/01/2023 for generalized weakness and altered mental status symptoms. Patient states that the sx began today around noon. The onset of the weakness and altered mental status came after the patient took the Tylenol at noon. Patient mentioned she was feeling nauseated and vomited a few times this past 8 days, with clear vomit not correlated with food intake. Her mother stated she was taking too many pills and she took Tylenol pill along with Xanax 1mg and Norco 3-4 times daily. Patient mentioned associated abdominal pain mainly around the RUQ. Patient denied fever, chills, SOB, CP, palpitation, urinary incontinence, or LOC at home. ED Course: Pt was brought to ED on 08/01/2023 with symptoms of generalized weakness and altered mental status. IV fluids and IV N-acetylcysteine initial dose and second dose were given to patient.  Lab shows elevated acetaminophen level in 100 with elevated LFTs,  and ALT 56. VBG with metabolic acidosis  CXR and CTA of the head were done, result negative. NIH screen and PE r/o stroke. Therefore, patient was admitted to the med surg floor for acetaminophen overdose management. Review of Systems:    Review of Systems   Constitutional: Positive for fatigue. Negative for appetite change, chills, fever and unexpected weight change. HENT: Negative for congestion and sore throat. Respiratory: Negative for apnea, cough, chest tightness and shortness of breath. Cardiovascular: Negative for chest pain, palpitations and leg swelling. Gastrointestinal: Positive for abdominal pain and constipation. Negative for abdominal distention, diarrhea, nausea and vomiting. Genitourinary: Negative for dysuria. Neurological: Positive for speech difficulty and weakness. Negative for seizures and headaches. Psychiatric/Behavioral: Negative for agitation. The patient is not nervous/anxious.         Past Medical and Surgical History:     Past Medical History:   Diagnosis Date   • Bipolar 1 disorder ([facility])    • Cancer ([facility])     kidney   • Cardiac disease     fast heart beat   • Chronic pain    • Fractures    • GERD (gastroesophageal reflux disease)    • Hard of hearing    • Hypertension    • Renal cell carcinoma ([facility])    • Stroke [facility])     2009 affected her speech, right sided weakness   • TIA (transient ischemic attack)        Past Surgical History:   Procedure Laterality Date   • APPENDECTOMY     • CHOLECYSTECTOMY     • FL GUIDED NEEDLE PLAC BX/ASP/INJ  5/30/2014   • FL GUIDED NEEDLE PLAC BX/ASP/INJ  12/19/2013   • FL GUIDED NEEDLE PLAC BX/ASP/INJ  6/10/2013   • JOINT REPLACEMENT Bilateral      bilateral knees   • NEPHRECTOMY Right    • MO COLONOSCOPY FLX DX W/COLLJ SPEC WHEN PFRMD N/A 8/7/2018    Procedure: COLONOSCOPY;  Surgeon: Will Leggett MD;  Location: [facility] MAIN OR;  Service: Gastroenterology   • MO OPEN TREATMENT BIMALLEOLAR ANKLE FRACTURE Right 5/14/2019 Procedure: ANKLE - Bimalleolar OPEN REDUCTION W/ INTERNAL FIXATION (ORIF); Surgeon: Florence Bryan; Location: [address] [place] [address] MAIN OR;  Service: Orthopedics   • MA REMOVAL IMPLANT DEEP Right 1/31/2023    Procedure: REMOVAL HARDWARE ANKLE;  Surgeon: Florence Bryan; Location:  MAIN OR;  Service: Orthopedics   • MA Jairo Gums SHFT FX IMED IMPLT W/WO SCREWS&/CERCLA Left 7/27/2022    Procedure: INSERTION NAIL IM TIBIA;  Surgeon: Marilee Felix MD;  Location: [facility] MAIN [facility];  Service: Orthopedics       Meds/Allergies:    Prior to Admission medications    Medication Sig Start Date End Date Taking? Authorizing Provider   acetaminophen (TYLENOL) 325 mg tablet Take 2 tablets (650 mg total) by mouth every 6 (six) hours as needed for mild pain, fever or headaches Do not exceed a total of 3 grams of tylenol/acetaminophen in a 24-hour period. Do not take additional tylenol if you are taking vicodin.  11/18/22  Yes Min Morgan DO   albuterol (PROVENTIL HFA,VENTOLIN HFA) 90 mcg/act inhaler Inhale 2 puffs every 4 (four) hours as needed for wheezing or shortness of breath 11/18/22  Yes Darryle Gey Hostetter, DO   ALPRAZolam Elsa Felix) 1 mg tablet Take 1 tablet (1 mg total) by mouth 4 (four) times a day as needed for anxiety for up to 10 days  Patient taking differently: Take 1 mg by mouth 3 (three) times a day as needed for anxiety 11/18/22 8/1/23 Yes Darryle Gey Hostetter, DO   amLODIPine (NORVASC) 5 mg tablet Take 1 tablet (5 mg total) by mouth daily 11/18/22  Yes Darryle Gey Hostetter, DO   aspirin (ECOTRIN) 325 mg EC tablet Take 1 tablet (325 mg total) by mouth 2 (two) times a day  Patient taking differently: Take 81 mg by mouth daily 2/2/23  Yes Maggi Hogan PA-C   atorvastatin (LIPITOR) 40 mg tablet Take 1 tablet (40 mg total) by mouth every evening To prevent stroke and heart attack 11/18/22  Yes Darryle Gey Harrison, DO   docusate sodium (COLACE) 100 mg capsule Take by mouth 2 (two) times a day as needed 6/12/14  Yes Historical Provider, MD   famotidine (PEPCID) 20 mg tablet Take 20 mg by mouth daily 11/23/22  Yes Historical Provider, MD   HYDROcodone-acetaminophen ([address]) 5-325 mg per tablet Take 1 tablet by mouth every 6 (six) hours as needed for pain   Yes Historical Provider, MD   metoprolol succinate (TOPROL-XL) 100 mg 24 hr tablet Take 1 tablet (100 mg total) by mouth daily Do not start before November 19, 2022. 11/19/22  Yes Ramón Funes,    OLANZapine (ZyPREXA) 20 MG tablet Take 20 mg by mouth daily at bedtime    Yes Historical Provider, MD   pantoprazole (PROTONIX) 40 mg tablet Take 40 mg by mouth 2 (two) times a day   Yes Historical Provider, MD   QUEtiapine (SEROquel) 300 mg tablet Take 300 mg by mouth daily at bedtime   Yes Historical Provider, MD   vitamin B-12 (VITAMIN B-12) 1,000 mcg tablet Take 0.5 tablets (500 mcg total) by mouth daily 11/18/22  Yes Dorothy Guzman DO   cholecalciferol (VITAMIN D3) 1,000 units tablet Take 1 tablet (1,000 Units total) by mouth daily Take this in place of ergocalciferol 11/18/22 2/15/23  Dorothy Guzman DO   ferrous sulfate 325 (65 Fe) mg tablet Take 1 tablet by mouth 2 (two) times a day with meals  Patient not taking: Reported on 8/1/2023 10/28/22 10/28/23  Historical Provider, MD   mupirocin (BACTROBAN) 2 % ointment Apply topically daily    Historical Provider, MD     I have reviewed home medications with patient personally. Allergies:    Allergies   Allergen Reactions   • Nsaids Other (See Comments)     Pt only has one kidney   • Celecoxib Rash and Other (See Comments)     CELEBREX   • Doxazosin Rash and Hives   • Metaxalone Rash and Hives       Social History:     Marital Status: Single   Occupation:   Patient Pre-hospital Living Situation: lives with mother  Patient Pre-hospital Level of Mobility: freely mobile  Patient Pre-hospital Diet Restrictions: regular diet  Substance Use History:   Social History     Substance and Sexual Activity   Alcohol Use Not Currently Social History     Tobacco Use   Smoking Status Every Day   • Packs/day: 0.50   • Years: 7.00   • Total pack years: 3.50   • Types: Cigarettes   Smokeless Tobacco Never   Tobacco Comments    pt refused referral     Social History     Substance and Sexual Activity   Drug Use Never       Family History:    Family History   Problem Relation Age of Onset   • Colon cancer Other    • Alcohol abuse Other    • Hypertension Other        Physical Exam:     Vitals:   Blood Pressure: 118/89 (08/01/23 1144)  Pulse: 100 (08/01/23 1144)  Temperature: 97.9 °F (36.6 °C) (08/01/23 1144)  Temp Source: Temporal (08/01/23 1144)  Respirations: 18 (08/01/23 1144)  SpO2: 97 % (08/01/23 1144)    Physical Exam  Constitutional:       Appearance: She is toxic-appearing. Comments: Altered mental status   HENT:      Head: Normocephalic and atraumatic. Nose: Nose normal.      Mouth/Throat:      Mouth: Mucous membranes are moist.      Pharynx: Oropharynx is clear. Eyes:      General: No scleral icterus. Right eye: No discharge. Left eye: No discharge. Extraocular Movements: Extraocular movements intact. Conjunctiva/sclera: Conjunctivae normal.      Pupils: Pupils are equal, round, and reactive to light. Cardiovascular:      Rate and Rhythm: Normal rate and regular rhythm. Pulses: Normal pulses. Heart sounds: Normal heart sounds. No murmur heard. Pulmonary:      Effort: Pulmonary effort is normal. No respiratory distress. Breath sounds: Normal breath sounds. No wheezing. Chest:      Chest wall: No tenderness. Abdominal:      General: Abdomen is flat. Bowel sounds are normal. There is no distension. Palpations: Abdomen is soft. There is no mass. Tenderness: There is abdominal tenderness. There is no guarding. Comments: Tenderness of the RUQ   Musculoskeletal:         General: Normal range of motion. Cervical back: Normal range of motion. No rigidity or tenderness. Skin:     General: Skin is warm and dry. Capillary Refill: Capillary refill takes less than 2 seconds. Neurological:      Cranial Nerves: No cranial nerve deficit. Sensory: No sensory deficit. Motor: Weakness present. Psychiatric:         Mood and Affect: Mood normal.         Additional Data:     Lab Results: I have personally reviewed pertinent reports. Results from last 7 days   Lab Units 08/01/23  1210   WBC Thousand/uL 6.16   HEMOGLOBIN g/dL 13.1   HEMATOCRIT % 38.8   PLATELETS Thousands/uL 278   NEUTROS PCT % 62   LYMPHS PCT % 21   MONOS PCT % 11   EOS PCT % 3     Results from last 7 days   Lab Units 08/01/23  1229   SODIUM mmol/L 142   POTASSIUM mmol/L 3.5   CHLORIDE mmol/L 112*   CO2 mmol/L 18*   BUN mg/dL 16   CREATININE mg/dL 0.84   ANION GAP mmol/L 12   CALCIUM mg/dL 8.8   ALBUMIN g/dL 3.4*   TOTAL BILIRUBIN mg/dL 0.62   ALK PHOS U/L 132*   ALT U/L 56*   AST U/L 222*   GLUCOSE RANDOM mg/dL 106     Results from last 7 days   Lab Units 08/01/23  1438   INR  0.96     Results from last 7 days   Lab Units 08/01/23  1141   POC GLUCOSE mg/dl 109               Imaging: I have personally reviewed pertinent reports. CTA head and neck with and without contrast   Final Result by Marlin Jacobs MD (08/01 6758)      1. No acute intracranial abnormality. Stable old left caudate lacunar infarct. 2.  Patent major vessels of the [place] of more without high-grade stenosis. No aneurysm. 3.  No hemodynamically significant stenosis or dissection of cervical carotid and vertebral arteries. 4.  Left maxillary sinusitis with fluid level favoring acute/subacute nature. Workstation performed: [id number]         XR chest 1 view portable   Final Result by Mark Blackmon MD (08/01 2819)      No acute cardiopulmonary disease.                   Workstation performed: [id number]             EKG, Pathology, and Other Studies Reviewed on Admission:   · EKG: NSR with HR 96    [employer] / [facility] Records Reviewed: Yes     ** Please Note: This note has been constructed using a voice recognition system.  **

## 2023-08-01 NOTE — CONSULTS
INTERPROFESSIONAL (PHONE) [id number] [facility] Toxicology  Jagruti Samano 79 y.o. female MRN: [id number]  Unit/Bed#: RM10 Encounter: [id number]      Reason for Consult / Principal Problem: Tylenol overdose    Inpatient consult to Toxicology  Consult performed by: Michael Hanley MD  Consult ordered by: Darryl Segovia DO        08/01/23      ASSESSMENT:    80 yo F w/ PMHx of chronic pain on Vicodin presents for generalized weakness. negative workup for CVA, found to have elevated , and Acetaminophen level of 100. Given unknown time of ingestion, elevated acetaminophen levels, and evidence of hepatotoxicity patient needs N-Acetylcysteine treatment. RECOMMENDATIONS:  · CK to rule out rhabdo  · Treatment with NAC  · Trend CMP, INR, and Acetaminophen levels Q6H    For further questions, please contact the medical  on call via "[employer] or through the [employer] or [employer]. Please see additional teaching note below:    Medical Toxicology Teaching Note  [facility] [facility]  Acetaminophen Toxicity  Last revised October 2017     Acetaminophen (Tylenol) is a nonopiod analgesic and antipyretic medication found in many over-the-counter and prescription products such as Tylenol PM, Norco, Percocet, Nyquil, Vicks Formula 44-D. The recommended maximum daily dose of acetaminophen for adults is 3g/day, and 75-90mg/kg/day for children. Alcoholics may safely take Tylenol in therapeutic doses, but they may be at increased risk for hepatotoxicity in overdose. Mechanism of Toxicity: Acetaminophen is primarily metabolized by the liver. In therapeutic doses, about 90% of acetaminophen is conjugated to nontoxic metabolites (glucoronides and sulfates). A small portion (<5%) is conjugated by cytochrome P450 enzyme, subunit CYP2E1, to a toxic metabolite, N-acetyl-p-benzoquinoneimine (NAPQI).  This metabolite is further conjugated by glutathione, to nontoxic metabolites eliminated by the kidneys. Liver Injury:  In toxic doses, the usual metabolic pathways are overwhelmed; acetaminophen is shunted to the cytochrome P450 pathway, creating NAPQI. Glutathione stores are depleted and NAPQI is produced. Cellular injury and hepatic necrosis may occur as NAPQI accumulates. Renal Injury:  Cytochrome P450 activity in the kidneys is thought to cause direct renal damage. Renal insufficiency may also develop during fulminant hepatic failure due to hepatorenal syndrome. Renal toxicity is usually associated with liver injury. Pharmacokinetics:  Acetaminophen is rapidly absorbed. Peak levels occur within  minutes with normal doses. Delayed absorption may occur with sustained release products or with co-ingestions that slow the GI tract (opiods, anticholinergics). The elimination half-life is 1-3 hours after therapeutic doses and may extend to 12 hours after overdose. Toxic Dose:  Toxicity in adults may occur with acute ingestions of 7g, and 200mg/kg in children. Hepatic injury following chronic ingestions may occur at any dose above the daily recommended dose. Clinical Presentation:    Acute Ingestion: Within 8 hrs of an acute ingestion, there are usually few symptoms. Between 8-30 hours after a toxic, acute ingestion, a transaminitis will develop. Nausea, vomiting, and right upper quadrant pain may occur. Within 12-36 hours, worsening AST/ALT develops with elevated bilirubin and INR. The most severe cases will develop fulminant liver failure with hepatic encephalopathy and acidosis, usually within 3-7 days post overdose. The patient should be evaluated for a liver transplantation. Repeated Supra-therapeutic Ingestion: Due to a sub-acute course, patients may present anywhere along a spectrum - normal LFTS to asymptomatic elevation of enzymes to hepatic failure. Diagnosis   Acute Ingestion (Time of Ingestion Known):  After an acute ingestion at a known time, obtain a 4-hour post-ingestion serum acetaminophen level and plot the level on the Devora-Torey’s nomogram (see below). This nomogram is used to predict the likelihood of hepatic toxicity based on the level of acetaminophen between 4 and 24 hours post-ingestion. The nomogram CANNOT be used if the time of ingestion is unknown. The dotted line (Rumack-Torey line), marking a 4-hour level at 200 mcg/ml, is the original line developed from the study above which hepatic toxicity will probably occur. The solid line (Treatment Line), marking a 4-hour level at 150ug/ml. is the treatment line accepted as the standard of care in the [facility] and is 25% lower as a safety margin. If the patient’s serum APAP level falls above the treatment line, start treatment with N-acetylcysteine (NAC). (see Treatment below)           Acute Ingestion (Time of Ingestion Unknown) or Repeated Supra-therapeutic Ingestion An acetaminophen level CANNOT be plotted on the Rummahin-Torey’s nomogram. Draw an APAP level and AST/ALT at time of presentation. Anyone with an APAP level> 10mcg/ml OR elevated AST/ALT should start NAC. (see Treatment below)     TREATMENT   Emergency and Supportive Care: Treat nausea and vomiting to protect airway and support safe administration of charcoal and NAC, when indicated (see below). Provide standard supportive care for liver and renal failure. Contact liver transplant team if fulminant hepatic failure occurs. Decontamination:  Administer activated charcoal within 2 hours of ingestion (consider later if extended release preparations). Use antiemetics for nausea. Activated charcoal does bind to NAC, but the effect is not thought to be clinically significant. Gastric emptying is not recommended. Specific Drugs and Antidotes. Acute Ingestion Treat with NAC if the APAP level falls above the Treatment Line on the nomogram. The maximal benefit occurs if given within 8 hours of acute ingestion.  Therefore, it is recommended to empirically start NAC before a level is obtained if there is a reasonable concern of a toxic ingestion presenting close to 8 hours or beyond. In late presenters (>8hrs), start NAC and treat for a full course or longer if LFTS remain abnormal. Treatment maybe stopped when AST/ALT peak and then downtrend, with an INR <2 and patient is clinically well. If abnormal labs persist, continue NAC and call Toxicology. There are two routes of administration for NAC, oral and IV. The treatment protocols are described below. Acute Ingestion (Time of Ingestion Unknown) or Repeated Supra-therapeutic Ingestion   The nomogram CANNOT be used to estimate the risk of hepatotoxicity. At presentation, check a serum APAP level and AST/ALT. If the APAP level is above 10 mcg/ml or the AST/ALT are elevated, start NAC treatment for 12 hours. If abnormalities persist, continue NAC treatment and call toxicology. If the APAP level is undetectable and AST and ALT are downtrending at the end of 12 hours, treatment may be stopped. Intravenous (Acetadote)   Loading dose- 150mg/kg infused over 15-60 minutes   Maintenance Infusion #1- 50mg/kg (12.5mg/kg/hr) over 4 hours   Maintenance Infusion #2 -100mg/kg (6.25 mg/kg/hr) until treatment endpoint   Treatment Endpoint: 20 hours or more   NAC should be continued for the full course. NAC can be stopped when APAP is undetectable, AST/ALT have peaked and are downtrending, and patient appears clinically well. Consultation with a medical  [facility] is recommended before changes in the duration of therapy are made. Acetaminophen Toxicity Do’s and Don’ts   Acute Ingestions   DO give charcoal for decontamination within 2 hours of ingestion if the patient can adequately protect their airway. DO start NAC empirically, i.e. without an APAP level, if the ingestion is likely a large overdose presenting at 8 hours or more after ingestion.    DO contact the Liver Transplant Team early if liver failure is developing. DO NOT get a level before 4hrs post-ingestion if the time of ingestion is certain in an acute overdose. DO NOT stop NAC therapy until full course is finished or truncated therapy is recommended by the [facility]. Repeated Supra-Therapeutic Ingestions (RSI)   DO ask patients with pain complaints (toothaches, back pain, cancer) about the amount of acetaminophen they use. DO NOT use the Rumack-Samuel nomogram to determine if the APAP level is toxic. DO NOT stop NAC therapy until full course is finished or truncated therapy is recommended by the [facility]. NAC Protocols   DO stop IV NAC if an anaphylactoid reaction occurs (rare). Treat the reaction appropriately and call the [facility] for recommendations on continued NAC therapy. DO give charcoal with oral NAC when charcoal is indicated. References   Mike MOSES Acetaminophen. In Marline Vo EM, [address] Rendon. Medical Toxicology 3rd edition. [facility]: [address] [place] [address], 2004: pp.[phone]. Devin BOYD Acetaminophen. In [employer]       Hx and PE limited by the dynamics of a phone consultation. I have not personally interviewed or evaluated the patient, but only advised based on the information provided to me. Primary provider is responsible for all clinical decisions. Pertinent history, physical exam and clinical findings and course discussed: Violeta Ramos is a 79y.o. year old female with PMHx of bipolar disorder and chronic pain who presents with weakness. Patient is normally on Vicodin for pain. Reports occasionally taking extra doses of Vicodin along with Tylenol. No SI or one large ingestion of Tylenol. Vitals were stable, slow to answer questions, but answers appropriately. Skin, pupils, GI/ were unremarkable.      Tylenol level >100,     EKG: NSR, rate 97, QRS 90 ms, QTc 405, no ST or T-wave abnormalities      Review of systems and physical exam not performed by me. Historical Information   Past Medical History:   Diagnosis Date   • Bipolar 1 disorder ([address] )    • Cancer ([address])     kidney   • Cardiac disease     fast heart beat   • Chronic pain    • Fractures    • GERD (gastroesophageal reflux disease)    • Hard of hearing    • Hypertension    • Renal cell carcinoma ([address])    • Stroke [facility])     2009 affected her speech, right sided weakness   • TIA (transient ischemic attack)      Past Surgical History:   Procedure Laterality Date   • APPENDECTOMY     • CHOLECYSTECTOMY     • FL GUIDED NEEDLE PLAC BX/ASP/INJ  5/30/2014   • FL GUIDED NEEDLE PLAC BX/ASP/INJ  12/19/2013   • FL GUIDED NEEDLE PLAC BX/ASP/INJ  6/10/2013   • JOINT REPLACEMENT Bilateral      bilateral knees   • NEPHRECTOMY Right    • OK COLONOSCOPY FLX DX W/COLLJ SPEC WHEN PFRMD N/A 8/7/2018    Procedure: COLONOSCOPY;  Surgeon: Lenore Mason MD;  Location: [facility] MAIN OR;  Service: Gastroenterology   • OK OPEN TREATMENT BIMALLEOLAR ANKLE FRACTURE Right 5/14/2019    Procedure: ANKLE - Bimalleolar OPEN REDUCTION W/ INTERNAL FIXATION (ORIF); Surgeon: Florence Bryan; Location: [facility] MAIN OR;  Service: Orthopedics   • OK REMOVAL IMPLANT DEEP Right 1/31/2023    Procedure: REMOVAL HARDWARE ANKLE;  Surgeon: Florence Bryan;   Location:  MAIN OR;  Service: Orthopedics   • OK Jairo Gums SHFT FX IMED IMPLT W/WO SCREWS&/CERCLA Left 7/27/2022    Procedure: INSERTION NAIL IM TIBIA;  Surgeon: Marilee Felix MD;  Location:  MAIN OR;  Service: Orthopedics     Social History   Social History     Substance and Sexual Activity   Alcohol Use Not Currently     Social History     Substance and Sexual Activity   Drug Use Never     Social History     Tobacco Use   Smoking Status Every Day   • Packs/day: 0.50   • Years: 7.00   • Total pack years: 3.50   • Types: Cigarettes   Smokeless Tobacco Never   Tobacco Comments    pt refused referral     Family History   Problem Relation Age of Onset   • Colon cancer Other    • Alcohol abuse Other    • Hypertension Other         Prior to Admission medications    Medication Sig Start Date End Date Taking? Authorizing Provider   acetaminophen (TYLENOL) 325 mg tablet Take 2 tablets (650 mg total) by mouth every 6 (six) hours as needed for mild pain, fever or headaches Do not exceed a total of 3 grams of tylenol/acetaminophen in a 24-hour period. Do not take additional tylenol if you are taking vicodin.  11/18/22   Arlon Flatness, DO   albuterol (PROVENTIL HFA,VENTOLIN HFA) 90 mcg/act inhaler Inhale 2 puffs every 4 (four) hours as needed for wheezing or shortness of breath 11/18/22   Sujatha Guzman DO   ALPRAZolam [place]) 1 mg tablet Take 1 tablet (1 mg total) by mouth 4 (four) times a day as needed for anxiety for up to 10 days 11/18/22 2/22/23  Sujatha Guzman, DO   amLODIPine (NORVASC) 5 mg tablet Take 1 tablet (5 mg total) by mouth daily 11/18/22   Sujatha Guzman DO   aspirin (ECOTRIN) 325 mg EC tablet Take 1 tablet (325 mg total) by mouth 2 (two) times a day 2/2/23   Geraldine Worthington PA-C   atorvastatin (LIPITOR) 40 mg tablet Take 1 tablet (40 mg total) by mouth every evening To prevent stroke and heart attack 11/18/22   Sujatha Guzman, DO   cholecalciferol (VITAMIN D3) 1,000 units tablet Take 1 tablet (1,000 Units total) by mouth daily Take this in place of ergocalciferol 11/18/22 2/15/23  Sujatha Guzman DO   docusate sodium (COLACE) 100 mg capsule Take by mouth 2 (two) times a day as needed 6/12/14   Historical Provider, MD   famotidine (PEPCID) 20 mg tablet Take 20 mg by mouth daily 11/23/22   Historical Provider, MD   ferrous sulfate 325 (65 Fe) mg tablet Take 1 tablet by mouth 2 (two) times a day with meals 10/28/22 10/28/23  Historical Provider, MD   HYDROcodone-acetaminophen (NORCO) 5-325 mg per tablet Take 1 tablet by mouth every 6 (six) hours as needed for pain    Historical Provider, MD   metoprolol succinate (TOPROL-XL) 100 mg 24 hr tablet Take 1 tablet (100 mg total) by mouth daily Do not start before November 19, 2022. 11/19/22   Tara Guzman DO   mupirocin (BACTROBAN) 2 % ointment Apply topically daily    Historical Provider, MD   OLANZapine (ZyPREXA) 20 MG tablet Take 20 mg by mouth daily at bedtime     Historical Provider, MD   pantoprazole (PROTONIX) 40 mg tablet Take 40 mg by mouth daily     Historical Provider, MD   QUEtiapine (SEROquel) 300 mg tablet Take 300 mg by mouth daily at bedtime    Historical Provider, MD   vitamin B-12 (VITAMIN B-12) 1,000 mcg tablet Take 0.5 tablets (500 mcg total) by mouth daily 11/18/22   Oswaldo Ayala DO       Current Facility-Administered Medications   Medication Dose Route Frequency   • acetylcysteine (ACETADOTE) 12,045 mg in dextrose 5 % 200 mL IVPB  150 mg/kg Intravenous Once   • acetylcysteine (ACETADOTE) 4,015 mg in dextrose 5 % 500 mL IVPB  50 mg/kg Intravenous Once   • acetylcysteine (ACETADOTE) 8,030 mg in dextrose 5 % 1,000 mL IVPB  100 mg/kg Intravenous Once       Allergies   Allergen Reactions   • Nsaids Other (See Comments)     Pt only has one kidney   • Celecoxib Rash and Other (See Comments)     CELEBREX   • Doxazosin Rash and Hives   • Metaxalone Rash and Hives       Objective     No intake or output data in the 24 hours ending 08/01/23 1501    Invasive Devices:   Peripheral IV 08/01/23 Left Antecubital (Active)   Site Assessment WDL; Clean;Dry; Intact 08/01/23 1211   Dressing Type Transparent 08/01/23 1211   Line Status Blood return noted; Flushed;Saline locked 08/01/23 1211   Dressing Status Clean;Dry; Intact 08/01/23 1211       Vitals   Vitals:    08/01/23 1144   BP: 118/89   TempSrc: Temporal   Pulse: 100   Resp: 18   Patient Position - Orthostatic VS: Lying   Temp: 97.9 °F (36.6 °C)         EKG, Pathology, and/or Other Studies: I have personally reviewed pertinent reports. Lab Results: I have personally reviewed pertinent reports.       Labs:    Results from last 7 days   Lab Units 08/01/23  1210   WBC Thousand/uL 6.16   HEMOGLOBIN g/dL 13.1   HEMATOCRIT % 38.8   PLATELETS Thousands/uL 278   NEUTROS PCT % 62   LYMPHS PCT % 21   MONOS PCT % 11   EOS PCT % 3      Results from last 7 days   Lab Units 08/01/23  1229   SODIUM mmol/L 142   POTASSIUM mmol/L 3.5   CHLORIDE mmol/L 112*   CO2 mmol/L 18*   BUN mg/dL 16   CREATININE mg/dL 0.84   CALCIUM mg/dL 8.8   ALK PHOS U/L 132*   ALT U/L 56*   AST U/L 222*      Results from last 7 days   Lab Units 08/01/23  1438   INR  0.96   PTT seconds 29         0   Lab Value Date/Time    TROPONINI <0.02 06/24/2021 1624    TROPONINI <0.02 06/24/2021 1313    TROPONINI <0.02 06/16/2021 1040    TROPONINI <0.02 05/29/2021 1815    TROPONINI <0.02 10/15/2020 1014     Results from last 7 days   Lab Units 08/01/23  1438   PH LONG  7.263*   PCO2 LONG mm Hg 38.0*   PO2 LONG mm Hg 27.6*   HCO3 LONG mmol/L 16.8*   O2 CONTENT LONG ml/dL 8.2   O2 HGB, VENOUS % 45.3*     Results from last 7 days   Lab Units 08/01/23  1258   ACETAMINOPHEN LVL ug/mL 100*   ETHANOL LVL mg/dL <07   SALICYLATE LVL mg/dL <5     Invalid input(s): "EXTPREGUR"      Imaging Studies: I have personally reviewed pertinent reports. Counseling / Coordination of Care  Total time spent today 30 minutes. This was a phone consultation.

## 2023-08-01 NOTE — ASSESSMENT & PLAN NOTE
Mood and affect stable in ED.   - continue home med Olanzapine and Quietiapine Pt tachypneic with labored breathing on cpap. Dr. Volodymyr Joe notified and orders received for changes to be made on cpap and to start precedex gtt if needed.

## 2023-08-01 NOTE — LETTER
August 3, 2023         Patient: Jame Parada   YOB: 1955   Date of Visit: 8/1/2023       Dear Dr. Wong Orf:    Thank you for referring Isauro Hill to me for evaluation. Below are my notes for her hospital course. If you have questions, please do not hesitate to call me. I look forward to following your patient along with you. Sincerely,    Rhine  (Zack Ryan)DO, Family Medicine PGY-1, Date and Time: 08/03/23 11:32 AM           Christine Herrera DO  8/3/2023  9:34 AM  In Progress  [address]  Progress Note  Name: Katharina Bishop  MRN: [id number]  Unit/Bed#: 866-33 I Date of Admission: 8/1/2023   Date of Service: 8/3/2023 I Hospital Day: 2    Assessment/Plan   * Tylenol toxicity  Assessment & Plan  Patient was admitted due to generalized weakness and altered mental status. She had recent EGD/Colonoscopy and had felt sick since, roughly past 9 days with abdominal pain and nausea, poor oral intake, and was taking her chronic Norco as well as additional Tylenol for the abdominal pain. - ED lab shows elevated acetaminophen level in 100 with elevated LFTs,  and ALT 56. VBG with metabolic acidosis  - Toxicology contacted  - NAC protocol finished  - serial monitoring of labs show AST trending down 165, INR normal, and acetaminophen level < 10.  - recent lab shows Creatinine borderline 0.57  - elevated CK but trending down 3961 then 2380, possible mild rhabdomyolysis related to tylenol toxicity, improving, will give IVF.  Recheck CK, in am lab    - hold Norco, continuing Oxycodone 5mg PRN q6h for chronic pain  - If sx resolved with normal lab values, consider discharge within one day    Bipolar 1 disorder [facility])  Assessment & Plan  Mood and affect stable in ED.   - continue home med Olanzapine and Quietiapine    Generalized anxiety disorder  Assessment & Plan  Patient takes XANAX 1mg 3-4 times every day  Will schedule for TID as patient reports she takes it that way at home    Constipation  Assessment & Plan  intermittent constipation at home  - docusate  - miralax PRN    Essential hypertension  Assessment & Plan  Blood pressure stable in ED.   - continue home med, Amlodipine  - monitor BP    GERD (gastroesophageal reflux disease)  Assessment & Plan  Hx of GERD on pepcid and protonix 40mg  - continue same home meds    Toxic encephalopathy  Assessment & Plan  Acute, patient presented with mild AMS, was concern for stroke but CT head negative  Suspect due to tylenol toxicity and her other home medications  Has resolved with treatment    VTE Pharmacologic Prophylaxis:   Pharmacologic: Enoxaparin (Lovenox)  Mechanical VTE Prophylaxis in Place: Yes      Discussions with Specialists or Other Care Team Provider: toxicology and case management    Education and Discussions with Family / Patient: patient    Time Spent for Care: 20 minutes. More than 50% of total time spent on counseling and coordination of care as described above. Current Length of Stay: 2 day(s)    Current Patient Status: Inpatient   Certification Statement: The patient will continue to require additional inpatient hospital stay due to IV fluids and acetominphen overdose management    Discharge Plan: if am lab returned with stable values, and patient's sx resolved, consider discharge within 24 hrs. Code Status: Level 1 - Full Code      Subjective:   Patient is stable, alert, and conversing well. Patient reported she has good appetite and has been eating well. She reported she still has constipation and hasn't passed bowel movement since admission. Patient denied dizziness, headache, CP, palpitation, SOB, abdominal pain, fever, or chills.     Objective:     Vitals:   Temp (24hrs), Av.8 °F (36.6 °C), Min:97.7 °F (36.5 °C), Max:97.8 °F (36.6 °C)    Temp:  [97.7 °F (36.5 °C)-97.8 °F (36.6 °C)] 97.8 °F (36.6 °C)  HR:  [101-111] 109  Resp:  [18-20] 18  BP: (117-128)/(76-80) 117/76  SpO2:  [93 %-98 %] 95 %  Body mass index is 26.34 kg/m². Input and Output Summary (last 24 hours): Intake/Output Summary (Last 24 hours) at 8/3/2023 0933  Last data filed at 8/3/2023 4507  Gross per 24 hour   Intake 240 ml   Output --   Net 240 ml       Physical Exam:     Physical Exam  Vitals reviewed. Constitutional:       Appearance: Normal appearance. She is not toxic-appearing. HENT:      Head: Normocephalic and atraumatic. Mouth/Throat:      Mouth: Mucous membranes are moist.      Pharynx: Oropharynx is clear. Eyes:      Pupils: Pupils are equal, round, and reactive to light. Cardiovascular:      Rate and Rhythm: Normal rate and regular rhythm. Pulses: Normal pulses. Heart sounds: Normal heart sounds. Pulmonary:      Effort: Pulmonary effort is normal.      Breath sounds: Normal breath sounds. No wheezing. Abdominal:      General: Abdomen is flat. Bowel sounds are normal.      Palpations: Abdomen is soft. Tenderness: There is abdominal tenderness. Comments: Tenderness with palpation of the RUQ abdomen   Musculoskeletal:         General: Normal range of motion. Cervical back: Normal range of motion. Skin:     General: Skin is warm. Capillary Refill: Capillary refill takes less than 2 seconds. Neurological:      General: No focal deficit present. Mental Status: She is alert. Mental status is at baseline.    Psychiatric:         Mood and Affect: Mood normal.         Additional Data:     Labs:    Results from last 7 days   Lab Units 08/02/23  0538 08/01/23  1210   WBC Thousand/uL 5.61 6.16   HEMOGLOBIN g/dL 12.6 13.1   HEMATOCRIT % 36.0 38.8   PLATELETS Thousands/uL 286 278   NEUTROS PCT %  --  62   LYMPHS PCT %  --  21   MONOS PCT %  --  11   EOS PCT %  --  3     Results from last 7 days   Lab Units 08/03/23  0634   SODIUM mmol/L 141   POTASSIUM mmol/L 3.7   CHLORIDE mmol/L 112*   CO2 mmol/L 22   BUN mg/dL 7   CREATININE mg/dL 0.57*   ANION GAP mmol/L 7   CALCIUM mg/dL 8.8   ALBUMIN g/dL 3.0*   TOTAL BILIRUBIN mg/dL 0.78   ALK PHOS U/L 93   ALT U/L 50   AST U/L 165*   GLUCOSE RANDOM mg/dL 101     Results from last 7 days   Lab Units 08/03/23  0634   INR  0.94     Results from last 7 days   Lab Units 08/01/23  1141   POC GLUCOSE mg/dl 109                   * I Have Reviewed All Lab Data Listed Above. * Additional Pertinent Lab Tests Reviewed: [facility] Admission Reviewed    Imaging:    CTA head and neck with and without contrast   Final Result      1. No acute intracranial abnormality. Stable old left caudate lacunar infarct. 2.  Patent major vessels of the [place] of more without high-grade stenosis. No aneurysm. 3.  No hemodynamically significant stenosis or dissection of cervical carotid and vertebral arteries. 4.  Left maxillary sinusitis with fluid level favoring acute/subacute nature. Workstation performed: [id number]         XR chest 1 view portable   Final Result      No acute cardiopulmonary disease.                   Workstation performed: [id number]                 Imaging Personally Reviewed by Myself     Recent Cultures (last 7 days):           Last 24 Hours Medication List:   Current Facility-Administered Medications   Medication Dose Route Frequency Provider Last Rate   • albuterol  2 puff Inhalation Q4H PRN Catrachito Lefty Counts, DO     • ALPRAZolam  1 mg Oral TID Catrachito Lefty Counts, DO     • amLODIPine  5 mg Oral Daily Catrachito Lefty Counts, DO     • aspirin  81 mg Oral Daily Catrachito Lefty Counts, DO     • docusate sodium  100 mg Oral BID Catrachito Lefty Counts, DO     • enoxaparin  40 mg Subcutaneous Daily Catrachito Lefty Counts, DO     • famotidine  20 mg Oral Daily Catrachito Lefty Counts, DO     • metoprolol succinate  100 mg Oral Daily Catrachito Lefty Counts, DO     • OLANZapine  20 mg Oral HS Catrachito Lefty Counts, DO     • ondansetron  4 mg Intravenous Q8H PRN Catrachito Lefty Counts, DO     • oxyCODONE  5 mg Oral Q6H PRN Catrachito Lefty Counts, DO • pantoprazole  40 mg Oral BID AC Catrachito Lefty Counts, DO     • polyethylene glycol  17 g Oral Daily Catrachito Lefty Counts, DO     • polyethylene glycol  17 g Oral Daily PRN Catrachito Lefty Counts, DO     • QUEtiapine  300 mg Oral HS Catrachito Lefty Counts, DO     • sodium chloride  75 mL/hr Intravenous Continuous Catrachito Lefty Counts, DO 75 mL/hr (08/02/23 1349)        Today, Patient Was Seen By: Jyoti Garza DO    ** Please Note: Dictation voice to text software may have been used in the creation of this document. Jyoti Garza DO  8/3/2023 11:16 AM  Incomplete Revision  [facility]  Progress Note  Name: Marcos Phelps  MRN: [id number]  Unit/Bed#: 763-87 I Date of Admission: 8/1/2023   Date of Service: 8/3/2023 I Hospital Day: 2    Assessment/Plan   * Tylenol toxicity  Assessment & Plan  Patient was admitted due to generalized weakness and altered mental status. She had recent EGD/Colonoscopy and had felt sick since, roughly past 9 days with abdominal pain and nausea, poor oral intake, and was taking her chronic Norco as well as additional Tylenol for the abdominal pain. - ED lab shows elevated acetaminophen level in 100 with elevated LFTs,  and ALT 56. VBG with metabolic acidosis  - Toxicology contacted  - NAC protocol finished  - serial monitoring of labs show AST trending down 165, INR normal, and acetaminophen level < 10.  - recent lab shows Creatinine borderline 0.57  - elevated CK but trending down 3961 then 2380, possible mild rhabdomyolysis related to tylenol toxicity, improving, will give IVF.  Recheck CK, in am lab    - hold Norco, continuing Oxycodone 5mg PRN q6h for chronic pain  - If sx resolved with normal lab values, consider discharge within one day    Bipolar 1 disorder [facility])  Assessment & Plan  Mood and affect stable in ED.   - continue home med Olanzapine and Quietiapine    Generalized anxiety disorder  Assessment & Plan  Patient takes XANAX 1mg 3-4 times every day  Will schedule for TID as patient reports she takes it that way at home    Constipation  Assessment & Plan  intermittent constipation at home  - docusate  - miralax PRN    Essential hypertension  Assessment & Plan  Blood pressure stable in ED.   - continue home med, Amlodipine  - monitor BP    GERD (gastroesophageal reflux disease)  Assessment & Plan  Hx of GERD on pepcid and protonix 40mg  - continue same home meds    Toxic encephalopathy  Assessment & Plan  Acute, patient presented with mild AMS, was concern for stroke but CT head negative  Suspect due to tylenol toxicity and her other home medications  Has resolved with treatment    VTE Pharmacologic Prophylaxis:   Pharmacologic: Enoxaparin (Lovenox)  Mechanical VTE Prophylaxis in Place: Yes      Discussions with Specialists or Other Care Team Provider: toxicology and case management    Education and Discussions with Family / Patient: patient    Time Spent for Care: 20 minutes. More than 50% of total time spent on counseling and coordination of care as described above. Current Length of Stay: 2 day(s)    Current Patient Status: Inpatient   Certification Statement: The patient will continue to require additional inpatient hospital stay due to IV fluids and acetominphen overdose management    Discharge Plan: if am lab returned with stable values, and patient's sx resolved, consider discharge within 24 hrs. Code Status: Level 1 - Full Code      Subjective:   Patient is stable, alert, and conversing well. Patient reported she has good appetite and has been eating well. She reported she still has constipation and hasn't passed bowel movement since admission. Patient denied dizziness, headache, CP, palpitation, SOB, abdominal pain, fever, or chills.     Objective:     Vitals:   Temp (24hrs), Av.8 °F (36.6 °C), Min:97.7 °F (36.5 °C), Max:97.8 °F (36.6 °C)    Temp:  [97.7 °F (36.5 °C)-97.8 °F (36.6 °C)] 97.8 °F (36.6 °C)  HR:  [101-111] 109  Resp:  [18-20] 18  BP: (117-128)/(76-80) 117/76  SpO2:  [93 %-98 %] 95 %  Body mass index is 26.34 kg/m². Input and Output Summary (last 24 hours): Intake/Output Summary (Last 24 hours) at 8/3/2023 0933  Last data filed at 8/3/2023 4981  Gross per 24 hour   Intake 240 ml   Output --   Net 240 ml       Physical Exam:     Physical Exam  Vitals reviewed. Constitutional:       Appearance: Normal appearance. She is not toxic-appearing. HENT:      Head: Normocephalic and atraumatic. Mouth/Throat:      Mouth: Mucous membranes are moist.      Pharynx: Oropharynx is clear. Eyes:      Pupils: Pupils are equal, round, and reactive to light. Cardiovascular:      Rate and Rhythm: Normal rate and regular rhythm. Pulses: Normal pulses. Heart sounds: Normal heart sounds. Pulmonary:      Effort: Pulmonary effort is normal.      Breath sounds: Normal breath sounds. No wheezing. Abdominal:      General: Abdomen is flat. Bowel sounds are normal.      Palpations: Abdomen is soft. Tenderness: There is abdominal tenderness. Comments: Tenderness with palpation of the RUQ abdomen   Musculoskeletal:         General: Normal range of motion. Cervical back: Normal range of motion. Skin:     General: Skin is warm. Capillary Refill: Capillary refill takes less than 2 seconds. Neurological:      General: No focal deficit present. Mental Status: She is alert. Mental status is at baseline.    Psychiatric:         Mood and Affect: Mood normal.         Additional Data:     Labs:    Results from last 7 days   Lab Units 08/02/23  0538 08/01/23  1210   WBC Thousand/uL 5.61 6.16   HEMOGLOBIN g/dL 12.6 13.1   HEMATOCRIT % 36.0 38.8   PLATELETS Thousands/uL 286 278   NEUTROS PCT %  --  62   LYMPHS PCT %  --  21   MONOS PCT %  --  11   EOS PCT %  --  3     Results from last 7 days   Lab Units 08/03/23  0634   SODIUM mmol/L 141   POTASSIUM mmol/L 3.7   CHLORIDE mmol/L 112*   CO2 mmol/L 22   BUN mg/dL 7 CREATININE mg/dL 0.57*   ANION GAP mmol/L 7   CALCIUM mg/dL 8.8   ALBUMIN g/dL 3.0*   TOTAL BILIRUBIN mg/dL 0.78   ALK PHOS U/L 93   ALT U/L 50   AST U/L 165*   GLUCOSE RANDOM mg/dL 101     Results from last 7 days   Lab Units 08/03/23  0634   INR  0.94     Results from last 7 days   Lab Units 08/01/23  1141   POC GLUCOSE mg/dl 109                   * I Have Reviewed All Lab Data Listed Above. * Additional Pertinent Lab Tests Reviewed: [address] [place] [address] Admission Reviewed    Imaging:    CTA head and neck with and without contrast   Final Result      1. No acute intracranial abnormality. Stable old left caudate lacunar infarct. 2.  Patent major vessels of the [place] of more without high-grade stenosis. No aneurysm. 3.  No hemodynamically significant stenosis or dissection of cervical carotid and vertebral arteries. 4.  Left maxillary sinusitis with fluid level favoring acute/subacute nature. Workstation performed: [id number]         XR chest 1 view portable   Final Result      No acute cardiopulmonary disease.                   Workstation performed: [id number]                 Imaging Personally Reviewed by Myself     Recent Cultures (last 7 days):           Last 24 Hours Medication List:   Current Facility-Administered Medications   Medication Dose Route Frequency Provider Last Rate   • albuterol  2 puff Inhalation Q4H PRN Catrachito Lefty Counts, DO     • ALPRAZolam  1 mg Oral TID Catrachito Lefty Counts, DO     • amLODIPine  5 mg Oral Daily Catrachito Lefty Counts, DO     • aspirin  81 mg Oral Daily Catrachito Lefty Counts, DO     • docusate sodium  100 mg Oral BID Catrachito Lefty Counts, DO     • enoxaparin  40 mg Subcutaneous Daily Catrachito Lefty Counts, DO     • famotidine  20 mg Oral Daily Catrachito Lefty Counts, DO     • metoprolol succinate  100 mg Oral Daily Catrachito Lefty Counts, DO     • OLANZapine  20 mg Oral HS Catrachito Lefty Counts, DO     • ondansetron  4 mg Intravenous Q8H PRN Catrachito Lefty Counts, DO     • oxyCODONE  5 mg Oral Q6H PRN Catrachito Lefty Counts, DO     • pantoprazole  40 mg Oral BID AC Catrachito Lefty Counts, DO     • polyethylene glycol  17 g Oral Daily Catrachito Lefty Counts, DO     • polyethylene glycol  17 g Oral Daily PRN Catrachito Lefty Counts, DO     • QUEtiapine  300 mg Oral HS Catrachito Lefty Counts, DO     • sodium chloride  75 mL/hr Intravenous Continuous Catrachito Lefty Counts, DO 75 mL/hr (08/02/23 1349)        Today, Patient Was Seen By: Ethan Zapata DO    ** Please Note: Dictation voice to text software may have been used in the creation of this document. **        Catrachito Lefty Counts, DO  8/2/2023  2:01 PM  Signed  [address]  Progress Note  Name: Mandi Moss  MRN: [id number]  Unit/Bed#: 545-75 I Date of Admission: 8/1/2023   Date of Service: 8/2/2023 I Hospital Day: 1    Assessment/Plan   * Tylenol toxicity  Assessment & Plan  Patient was admitted due to generalized weakness and altered mental status. She had recent EGD/Colonoscopy and had felt sick since, roughly past 9 days with abdominal pain and nausea, poor oral intake, and was taking her chronic Norco as well as additional Tylenol for the abdominal pain. - ED lab shows elevated acetaminophen level in 100 with elevated LFTs,  and ALT 56.  VBG with metabolic acidosis  - Toxicology contacted  - NAC protocol  - serial monitoring of labs  - patient has associated electrolyte derangements, repleting as needed  - elevated CK, possible mild rhabdomyolysis related to tylenol toxicity, improving, will give IVF    - hold Norco, continuing Oxycodone 5mg PRN q6h for chronic pain    Toxic encephalopathy  Assessment & Plan  Acute, patient presented with mild AMS, was concern for stroke but CT head negative  Suspect due to tylenol toxicity and her other home medications  Has resolved with treatment    Constipation  Assessment & Plan  intermittent constipation at home  - docusate  - miralax PRN    Generalized anxiety disorder  Assessment & Plan  Patient takes XANAX 1mg 3-4 times every day  Will schedule for TID as patient reports she takes it that way at home    Essential hypertension  Assessment & Plan  Blood pressure stable in ED.   - continue home med, Amlodipine  - monitor BP    GERD (gastroesophageal reflux disease)  Assessment & Plan  Hx of GERD on pepcid and protonix 40mg  - continue same home meds    Bipolar 1 disorder (HCC)  Assessment & Plan  Mood and affect stable in ED.   - continue home med Olanzapine and Quietiapine              VTE Pharmacologic Prophylaxis: VTE Score: 3 Moderate Risk (Score 3-4) - Pharmacological DVT Prophylaxis Ordered: enoxaparin (Lovenox). Patient Centered Rounds: I performed bedside rounds with nursing staff today. Discussions with Specialists or Other Care Team Provider: none    Education and Discussions with Family / Patient: Updated  (mother) via phone. Total Time Spent on Date of Encounter in care of patient: 45 minutes This time was spent on one or more of the following: performing physical exam; counseling and coordination of care; obtaining or reviewing history; documenting in the medical record; reviewing/ordering tests, medications or procedures; communicating with other healthcare professionals and discussing with patient's family/caregivers. Current Length of Stay: 1 day(s)  Current Patient Status: Inpatient   Certification Statement: The patient will continue to require additional inpatient hospital stay due to further management tylenol toxicity  Discharge Plan: Anticipate discharge in 24-48 hrs to home.     Code Status: Level 1 - Full Code    Subjective:   Patient has some nausea this morning with emesis, non-bloody  Overall feels better but still her her nausea she has had over past week or more    Objective:     Vitals:   Temp (24hrs), Av.5 °F (36.4 °C), Min:97.3 °F (36.3 °C), Max:97.6 °F (36.4 °C)    Temp:  [97.3 °F (36.3 °C)-97.6 °F (36.4 °C)] 97.6 °F (36.4 °C)  HR:  [106-129] 129  Resp:  [17-20] 20  BP: (118-122)/(76-78) 122/76  SpO2:  [95 %-97 %] 95 %  Body mass index is 26.34 kg/m². Input and Output Summary (last 24 hours): Intake/Output Summary (Last 24 hours) at 8/2/2023 1400  Last data filed at 8/2/2023 1300  Gross per 24 hour   Intake 1380.2 ml   Output --   Net 1380.2 ml       Physical Exam:   Physical Exam  Vitals and nursing note reviewed. Constitutional:       General: She is not in acute distress. Appearance: She is well-developed. HENT:      Head: Normocephalic and atraumatic. Eyes:      Conjunctiva/sclera: Conjunctivae normal.   Cardiovascular:      Rate and Rhythm: Normal rate and regular rhythm. Heart sounds: No murmur heard. Pulmonary:      Effort: Pulmonary effort is normal. No respiratory distress. Breath sounds: Normal breath sounds. Abdominal:      Palpations: Abdomen is soft. Tenderness: There is no abdominal tenderness. Musculoskeletal:         General: No swelling. Cervical back: Neck supple. Skin:     General: Skin is warm and dry. Capillary Refill: Capillary refill takes less than 2 seconds. Neurological:      Mental Status: She is alert.       Comments: Mild right sided facial droop with mildly dysarthric speech (chronic)   Psychiatric:         Mood and Affect: Mood normal.          Additional Data:     Labs:  Results from last 7 days   Lab Units 08/02/23  0538 08/01/23  1210   WBC Thousand/uL 5.61 6.16   HEMOGLOBIN g/dL 12.6 13.1   HEMATOCRIT % 36.0 38.8   PLATELETS Thousands/uL 286 278   NEUTROS PCT %  --  62   LYMPHS PCT %  --  21   MONOS PCT %  --  11   EOS PCT %  --  3     Results from last 7 days   Lab Units 08/02/23  0538   SODIUM mmol/L 140   POTASSIUM mmol/L 3.0*   CHLORIDE mmol/L 109*   CO2 mmol/L 17*   BUN mg/dL 8   CREATININE mg/dL 0.56*   ANION GAP mmol/L 14   CALCIUM mg/dL 8.7   ALBUMIN g/dL 3.2*   TOTAL BILIRUBIN mg/dL 0.62   ALK PHOS U/L 109*   ALT U/L 58*   AST U/L 195*   GLUCOSE RANDOM mg/dL 140     Results from last 7 days   Lab Units 08/02/23  0538   INR  1.08     Results from last 7 days   Lab Units 08/01/23  1141   POC GLUCOSE mg/dl 109               Lines/Drains:  Invasive Devices       Peripheral Intravenous Line  Duration             Peripheral IV 08/01/23 Dorsal (posterior); Left Hand <1 day    Peripheral IV 08/01/23 Dorsal (posterior); Right Forearm <1 day                          Imaging: No pertinent imaging reviewed.     Recent Cultures (last 7 days):         Last 24 Hours Medication List:   Current Facility-Administered Medications   Medication Dose Route Frequency Provider Last Rate   • acetylcysteine  100 mg/kg Intravenous Once Catrachito Lefty Counts, DO 8,030 mg (08/01/23 2254)   • albuterol  2 puff Inhalation Q4H PRN Catrachito Lefty Counts, DO     • ALPRAZolam  1 mg Oral TID Catrachito Lefty Counts, DO     • amLODIPine  5 mg Oral Daily Catrachito Lefty Counts, DO     • aspirin  81 mg Oral Daily Catrachito Lefty Counts, DO     • docusate sodium  100 mg Oral BID Catrachito Lefty Counts, DO     • enoxaparin  40 mg Subcutaneous Daily Catrachito Lefty Counts, DO     • famotidine  20 mg Oral Daily Catrachito Lefty Counts, DO     • metoprolol succinate  100 mg Oral Daily Catrachito Lefty Counts, DO     • OLANZapine  20 mg Oral HS Catrachito Lefty Counts, DO     • ondansetron  4 mg Intravenous Q8H PRN Catrachito Lefty Counts, DO     • oxyCODONE  5 mg Oral Q6H PRN Catrachito Lefty Counts, DO     • pantoprazole  40 mg Oral BID AC Catrachito Lefty Counts, DO     • polyethylene glycol  17 g Oral Daily Catrachito Lefty Counts, DO     • polyethylene glycol  17 g Oral Daily PRN Catrachito Lefty Counts, DO     • QUEtiapine  300 mg Oral HS Catrachito Lefty Counts, DO     • sodium chloride  75 mL/hr Intravenous Continuous Catrachito Lefty Counts, DO 75 mL/hr (08/02/23 1349)        Today, Patient Was Seen By: Orlin Humphreys DO    **Please Note: This note may have been constructed using a voice recognition system. **

## 2023-08-02 PROBLEM — G92.9 TOXIC ENCEPHALOPATHY: Status: ACTIVE | Noted: 2023-08-02

## 2023-08-02 LAB
ALBUMIN SERPL BCP-MCNC: 3.1 G/DL (ref 3.5–5)
ALBUMIN SERPL BCP-MCNC: 3.2 G/DL (ref 3.5–5)
ALBUMIN SERPL BCP-MCNC: 3.3 G/DL (ref 3.5–5)
ALP SERPL-CCNC: 109 U/L (ref 34–104)
ALP SERPL-CCNC: 109 U/L (ref 34–104)
ALP SERPL-CCNC: 113 U/L (ref 34–104)
ALT SERPL W P-5'-P-CCNC: 57 U/L (ref 7–52)
ALT SERPL W P-5'-P-CCNC: 58 U/L (ref 7–52)
ALT SERPL W P-5'-P-CCNC: 61 U/L (ref 7–52)
ANION GAP SERPL CALCULATED.3IONS-SCNC: 14 MMOL/L
ANION GAP SERPL CALCULATED.3IONS-SCNC: 15 MMOL/L
ANION GAP SERPL CALCULATED.3IONS-SCNC: 9 MMOL/L
APAP SERPL-MCNC: <10 UG/ML (ref 10–20)
AST SERPL W P-5'-P-CCNC: 182 U/L (ref 13–39)
AST SERPL W P-5'-P-CCNC: 195 U/L (ref 13–39)
AST SERPL W P-5'-P-CCNC: 219 U/L (ref 13–39)
BILIRUB SERPL-MCNC: 0.62 MG/DL (ref 0.2–1)
BILIRUB SERPL-MCNC: 0.65 MG/DL (ref 0.2–1)
BILIRUB SERPL-MCNC: 0.66 MG/DL (ref 0.2–1)
BUN SERPL-MCNC: 6 MG/DL (ref 5–25)
BUN SERPL-MCNC: 8 MG/DL (ref 5–25)
BUN SERPL-MCNC: 9 MG/DL (ref 5–25)
CALCIUM ALBUM COR SERPL-MCNC: 9.3 MG/DL (ref 8.3–10.1)
CALCIUM ALBUM COR SERPL-MCNC: 9.3 MG/DL (ref 8.3–10.1)
CALCIUM ALBUM COR SERPL-MCNC: 9.4 MG/DL (ref 8.3–10.1)
CALCIUM SERPL-MCNC: 8.7 MG/DL (ref 8.4–10.2)
CHLORIDE SERPL-SCNC: 109 MMOL/L (ref 96–108)
CK SERPL-CCNC: 2380 U/L (ref 26–192)
CO2 SERPL-SCNC: 17 MMOL/L (ref 21–32)
CO2 SERPL-SCNC: 17 MMOL/L (ref 21–32)
CO2 SERPL-SCNC: 19 MMOL/L (ref 21–32)
CREAT SERPL-MCNC: 0.56 MG/DL (ref 0.6–1.3)
CREAT SERPL-MCNC: 0.56 MG/DL (ref 0.6–1.3)
CREAT SERPL-MCNC: 0.59 MG/DL (ref 0.6–1.3)
ERYTHROCYTE [DISTWIDTH] IN BLOOD BY AUTOMATED COUNT: 14.8 % (ref 11.6–15.1)
GFR SERPL CREATININE-BSD FRML MDRD: 95 ML/MIN/1.73SQ M
GFR SERPL CREATININE-BSD FRML MDRD: 96 ML/MIN/1.73SQ M
GFR SERPL CREATININE-BSD FRML MDRD: 96 ML/MIN/1.73SQ M
GLUCOSE SERPL-MCNC: 109 MG/DL (ref 65–140)
GLUCOSE SERPL-MCNC: 140 MG/DL (ref 65–140)
GLUCOSE SERPL-MCNC: 142 MG/DL (ref 65–140)
HCT VFR BLD AUTO: 36 % (ref 34.8–46.1)
HGB BLD-MCNC: 12.6 G/DL (ref 11.5–15.4)
INR PPP: 1.04 (ref 0.84–1.19)
INR PPP: 1.08 (ref 0.84–1.19)
INR PPP: 1.08 (ref 0.84–1.19)
MCH RBC QN AUTO: 37.4 PG (ref 26.8–34.3)
MCHC RBC AUTO-ENTMCNC: 35 G/DL (ref 31.4–37.4)
MCV RBC AUTO: 107 FL (ref 82–98)
PLATELET # BLD AUTO: 286 THOUSANDS/UL (ref 149–390)
PMV BLD AUTO: 8.9 FL (ref 8.9–12.7)
POTASSIUM SERPL-SCNC: 2.7 MMOL/L (ref 3.5–5.3)
POTASSIUM SERPL-SCNC: 3 MMOL/L (ref 3.5–5.3)
POTASSIUM SERPL-SCNC: 3.5 MMOL/L (ref 3.5–5.3)
PROT SERPL-MCNC: 5.5 G/DL (ref 6.4–8.4)
PROT SERPL-MCNC: 5.6 G/DL (ref 6.4–8.4)
PROT SERPL-MCNC: 5.8 G/DL (ref 6.4–8.4)
PROTHROMBIN TIME: 13.9 SECONDS (ref 11.6–14.5)
PROTHROMBIN TIME: 14.2 SECONDS (ref 11.6–14.5)
PROTHROMBIN TIME: 14.3 SECONDS (ref 11.6–14.5)
RBC # BLD AUTO: 3.37 MILLION/UL (ref 3.81–5.12)
SODIUM SERPL-SCNC: 137 MMOL/L (ref 135–147)
SODIUM SERPL-SCNC: 140 MMOL/L (ref 135–147)
SODIUM SERPL-SCNC: 141 MMOL/L (ref 135–147)
WBC # BLD AUTO: 5.61 THOUSAND/UL (ref 4.31–10.16)

## 2023-08-02 PROCEDURE — 82550 ASSAY OF CK (CPK): CPT | Performed by: HOSPITALIST

## 2023-08-02 PROCEDURE — 80143 DRUG ASSAY ACETAMINOPHEN: CPT | Performed by: HOSPITALIST

## 2023-08-02 PROCEDURE — 85027 COMPLETE CBC AUTOMATED: CPT | Performed by: HOSPITALIST

## 2023-08-02 PROCEDURE — 80053 COMPREHEN METABOLIC PANEL: CPT | Performed by: HOSPITALIST

## 2023-08-02 PROCEDURE — 85610 PROTHROMBIN TIME: CPT | Performed by: HOSPITALIST

## 2023-08-02 PROCEDURE — 99233 SBSQ HOSP IP/OBS HIGH 50: CPT | Performed by: HOSPITALIST

## 2023-08-02 RX ORDER — ONDANSETRON 2 MG/ML
4 INJECTION INTRAMUSCULAR; INTRAVENOUS EVERY 8 HOURS PRN
Status: DISCONTINUED | OUTPATIENT
Start: 2023-08-02 | End: 2023-08-03 | Stop reason: HOSPADM

## 2023-08-02 RX ORDER — POLYETHYLENE GLYCOL 3350 17 G/17G
17 POWDER, FOR SOLUTION ORAL DAILY PRN
Status: DISCONTINUED | OUTPATIENT
Start: 2023-08-02 | End: 2023-08-03 | Stop reason: HOSPADM

## 2023-08-02 RX ORDER — POTASSIUM CHLORIDE 14.9 MG/ML
20 INJECTION INTRAVENOUS
Status: COMPLETED | OUTPATIENT
Start: 2023-08-02 | End: 2023-08-03

## 2023-08-02 RX ORDER — ALPRAZOLAM 0.5 MG/1
1 TABLET ORAL 3 TIMES DAILY
Status: DISCONTINUED | OUTPATIENT
Start: 2023-08-02 | End: 2023-08-03 | Stop reason: HOSPADM

## 2023-08-02 RX ORDER — SODIUM CHLORIDE 9 MG/ML
75 INJECTION, SOLUTION INTRAVENOUS CONTINUOUS
Status: DISCONTINUED | OUTPATIENT
Start: 2023-08-02 | End: 2023-08-03 | Stop reason: HOSPADM

## 2023-08-02 RX ADMIN — ALPRAZOLAM 1 MG: 0.5 TABLET ORAL at 16:55

## 2023-08-02 RX ADMIN — OXYCODONE HYDROCHLORIDE 5 MG: 5 TABLET ORAL at 17:00

## 2023-08-02 RX ADMIN — ONDANSETRON 4 MG: 2 INJECTION INTRAMUSCULAR; INTRAVENOUS at 10:00

## 2023-08-02 RX ADMIN — METOPROLOL SUCCINATE 100 MG: 100 TABLET, EXTENDED RELEASE ORAL at 08:35

## 2023-08-02 RX ADMIN — PANTOPRAZOLE SODIUM 40 MG: 40 TABLET, DELAYED RELEASE ORAL at 06:33

## 2023-08-02 RX ADMIN — OLANZAPINE 20 MG: 10 TABLET, FILM COATED ORAL at 21:15

## 2023-08-02 RX ADMIN — POLYETHYLENE GLYCOL 3350 17 G: 17 POWDER, FOR SOLUTION ORAL at 08:36

## 2023-08-02 RX ADMIN — POTASSIUM CHLORIDE 20 MEQ: 14.9 INJECTION, SOLUTION INTRAVENOUS at 02:31

## 2023-08-02 RX ADMIN — SODIUM CHLORIDE 75 ML/HR: 0.9 INJECTION, SOLUTION INTRAVENOUS at 13:49

## 2023-08-02 RX ADMIN — PANTOPRAZOLE SODIUM 40 MG: 40 TABLET, DELAYED RELEASE ORAL at 16:56

## 2023-08-02 RX ADMIN — FAMOTIDINE 20 MG: 20 TABLET, FILM COATED ORAL at 08:36

## 2023-08-02 RX ADMIN — DOCUSATE SODIUM 100 MG: 100 CAPSULE, LIQUID FILLED ORAL at 17:00

## 2023-08-02 RX ADMIN — ASPIRIN 81 MG: 81 TABLET, CHEWABLE ORAL at 08:35

## 2023-08-02 RX ADMIN — ALPRAZOLAM 1 MG: 0.5 TABLET ORAL at 21:15

## 2023-08-02 RX ADMIN — ALPRAZOLAM 1 MG: 0.5 TABLET ORAL at 09:59

## 2023-08-02 RX ADMIN — POTASSIUM CHLORIDE 20 MEQ: 14.9 INJECTION, SOLUTION INTRAVENOUS at 08:43

## 2023-08-02 RX ADMIN — AMLODIPINE BESYLATE 5 MG: 5 TABLET ORAL at 08:34

## 2023-08-02 RX ADMIN — POTASSIUM CHLORIDE 20 MEQ: 14.9 INJECTION, SOLUTION INTRAVENOUS at 11:34

## 2023-08-02 RX ADMIN — ENOXAPARIN SODIUM 40 MG: 40 INJECTION SUBCUTANEOUS at 08:36

## 2023-08-02 RX ADMIN — QUETIAPINE FUMARATE 300 MG: 100 TABLET ORAL at 21:15

## 2023-08-02 RX ADMIN — DOCUSATE SODIUM 100 MG: 100 CAPSULE, LIQUID FILLED ORAL at 08:34

## 2023-08-02 RX ADMIN — OXYCODONE HYDROCHLORIDE 5 MG: 5 TABLET ORAL at 10:04

## 2023-08-02 NOTE — QUICK NOTE
Patient on Q6 labs due to tylenol toxicity; currently on NAC protocol. Labs at [facility] show mild hypokalemia (2.8) and hypomagnesemia (1.8), ordered replacement of both. Tylenol level down from 100 to 14. INR (1) stable and normal.    Labs at 0026 pertinent for continued hypokalemia (2.7), but patient still receiving IV potassium from earlier order.   Defer additional supplementation and recheck in AM.

## 2023-08-02 NOTE — ASSESSMENT & PLAN NOTE
Acute, patient presented with mild AMS, was concern for stroke but CT head negative  Suspect due to tylenol toxicity and her other home medications  Has resolved with treatment

## 2023-08-02 NOTE — CASE MANAGEMENT
Case Management Assessment & Discharge Planning Note    Patient name Hazel Phan  Location [address] [facility] [id number] MRN [id number]  : 1955 Date 2023       Current Admission Date: 2023  Current Admission Diagnosis:Tylenol toxicity   Patient Active Problem List    Diagnosis Date Noted   • Tylenol toxicity 2023   • Constipation 2023   • Chronic pain of right ankle 2022   • Nocturnal hypoxia 2022   • Dysphagia 2022   • Vitamin D insufficiency 2022   • Low TSH level 2022   • Cerebrovascular disease    • Diastolic dysfunction 15/64/9349   • Tobacco use 2022   • Acute metabolic encephalopathy    • Chronic pain 2022   • Closed fracture of ankle, bimalleolar, right, sequela 2022   • CVA (cerebral vascular accident) ([address]) 2022   • Pure hypercholesterolemia 2021   • Chronic obstructive pulmonary disease ([address]) 2021   • Anemia 2021   • Bipolar disease, chronic ([address]) 2021   • Rhabdomyolysis 2021   • Neck pain 2021   • Poor venous access 2021   • Hypertension 2021   • History of CVA (cerebrovascular accident) 2021   • Acute encephalopathy 2021   • Hypothermia 2021   • Elevated d-dimer 2021   • Acute encephalopathy 2021   • Overdose of drug, undetermined intent, initial encounter 2021   • Slurred speech 10/15/2020   • Status post open reduction with internal fixation (ORIF) of fracture of ankle 2019   • Essential hypertension 2019   • History of tachycardia 2019   • Acute right ankle pain 2019   • Colon cancer screening 2018   • GERD (gastroesophageal reflux disease) 2018   • Elevated MCV 2017   • Bipolar 1 disorder ([address])    • Renal cell carcinoma ([address])    • Nausea and vomiting 2016   • Palpitations 2016   • Insomnia 2015   • Difficulty sleeping 2015   • Dental disorder 2015   • Allergic rhinitis 07/17/2015   • Nail fungal infection 07/16/2015   • Abnormal liver function test 03/10/2015   • Right hip pain 11/03/2014   • Herpes zoster 10/28/2014   • Shoulder pain, right 10/20/2014   • Abnormal gait 09/03/2014   • Vitamin D deficiency 02/19/2014   • Osteoporosis 02/19/2014   • Hip fracture, osteoporotic ([address]) 02/19/2014   • Anxiety 10/16/2013   • Yeast vaginitis 10/14/2013   • Posterior dislocation of hip, closed ([address]) 09/19/2013   • Chronic low back pain 07/26/2013   • Prosthetic hip infection ([address]) 06/16/2013   • Depression 06/09/2013   • Tobacco abuse 06/09/2013   • Generalized anxiety disorder 06/09/2013      LOS (days): 1  Geometric Mean LOS (GMLOS) (days): 2.40  Days to GMLOS:1.6     OBJECTIVE:    Risk of Unplanned Readmission Score: 17.3         Current admission status: Inpatient       Preferred Pharmacy:   [address] [place] [address] [address] [place] [address]  [address]  [place] [address]  Phone: [phone] Fax: [phone]    Primary Care Provider: Vance Barron DO    Primary Insurance: Gee THORNTON  Secondary Insurance: [facility]    ASSESSMENT:  [address] Orestes Bright Representative - Mother   Primary Phone: [phone] (Mobile)               Advance Directives  Does patient have a [address]?: No  Was patient offered paperwork?: Yes (declines)  Does patient currently have a Health Care decision maker?: Yes, please see Health Care Proxy section  Does patient have Advance Directives?: No  Was patient offered paperwork?: Yes (declines)  Primary Contact:  Johnathan Jeong (Mother)   [phone] (M)         Readmission Root Cause  30 Day Readmission: No    Patient Information  Admitted from[de-identified] Home  Mental Status: Alert  During Assessment patient was accompanied by: Not accompanied during assessment  Assessment information provided by[de-identified] Patient  Primary Caregiver: Self  Support Systems: Parent (mother)  [place] of Residence: [address] [facility] do you live in?: [address] [facility] entry access options.  Select all that apply.: Stairs  Number of steps to enter home.: 1  Type of Current Residence: 2 story home  Upon entering residence, is there a bedroom on the main floor (no further steps)?: No  A bedroom is located on the following floor levels of residence (select all that apply):: 2nd Floor  Upon entering residence, is there a bathroom on the main floor (no further steps)?: No  Indicate which floors of current residence have a bathroom (select all the apply):: 2nd Floor  Number of steps to 2nd floor from main floor: One Flight  In the last 12 months, was there a time when you were not able to pay the mortgage or rent on time?: No  In the last 12 months, how many places have you lived?: 1  In the last 12 months, was there a time when you did not have a steady place to sleep or slept in a shelter (including now)?: No  Homeless/housing insecurity resource given?: N/A  Living Arrangements: Lives Alone  Is patient a ?: No    Activities of Daily Living Prior to Admission  Functional Status: Independent  Completes ADLs independently?: Yes  Ambulates independently?: Yes  Does patient use assisted devices?: No  Does patient currently own DME?: Yes  What DME does the patient currently own?: Jerrell Chapa  Does patient have a history of Outpatient Therapy (PT/OT)?: No  Does the patient have a history of Short-Term Rehab?: No  Does patient have a history of HHC?: No  Does patient currently have [facility]?: No         Patient Information Continued  Income Source: SSI/SSD  Does patient have prescription coverage?: Yes  Within the past 12 months, you worried that your food would run out before you got the money to buy more.: Never true  Within the past 12 months, the food you bought just didn't last and you didn't have money to get more.: Never true  Food insecurity resource given?: N/A  Does patient receive dialysis treatments?: No  Does patient have a history of substance abuse?: No  Does patient have a history of Mental Health Diagnosis?: Yes (bipolar; anxiety)  Is patient receiving treatment for mental health?: No. Patient declined treatment information.   Has patient received inpatient treatment related to mental health in the last 2 years?: No         Means of Transportation  Means of Transport to Appts[de-identified] Family transport (mother)  In the past 12 months, has lack of transportation kept you from medical appointments or from getting medications?: No  In the past 12 months, has lack of transportation kept you from meetings, work, or from getting things needed for daily living?: No  Was application for public transport provided?: N/A        DISCHARGE DETAILS:    Discharge planning discussed with[de-identified] patient        CM contacted family/caregiver?: No- see comments (declines)  Were Treatment Team discharge recommendations reviewed with patient/caregiver?: Yes  Did patient/caregiver verbalize understanding of patient care needs?: Yes  Were patient/caregiver advised of the risks associated with not following Treatment Team discharge recommendations?: Yes         [address]         Is the patient interested in [facility] at discharge?: No    DME Referral Provided  Referral made for DME?: No         Would you like to participate in our [address] service program?  : No - Declined       Discharge Destination Plan[de-identified] Home (with OP follow up)  Transport at Discharge : Family (mother)

## 2023-08-02 NOTE — UTILIZATION REVIEW
NOTIFICATION OF INPATIENT ADMISSION   AUTHORIZATION REQUEST   SERVICING FACILITY:   [address] [place] [address] [address]  [address]  Tax ID:  [id number]  NPI: [id number] ATTENDING PROVIDER:  Attending Name and NPI#: Travis Humphreys Thompson Erickson [[id number]]  Address: [address] [place] [address]  Phone: [phone]     ADMISSION INFORMATION:  Place of Service: Inpatient [address] [facility]  Place of Service Code: 21  Inpatient Admission Date/Time: 8/1/23  3:40 PM  Discharge Date/Time: No discharge date for patient encounter. Admitting Diagnosis Code/Description:  Weakness generalized [R53.1]  Weakness [R53.1]  Tylenol overdose, accidental or unintentional, initial encounter [T39.1X1A]     UTILIZATION REVIEW CONTACT:  Ramon Belcher Utilization   Network Utilization Review Department  Phone: [phone]  Fax [phone]  Email: [email or web address]  Contact for approvals/pending authorizations, clinical reviews, and discharge. PHYSICIAN ADVISORY SERVICES:  Medical Necessity Denial & Fehm-fa-Nzzk Review  Phone: [phone]  Fax: [phone]  Email: [email or web address]

## 2023-08-02 NOTE — UTILIZATION REVIEW
Initial Clinical Review    Admission: Date/Time/Statement:   Admission Orders (From admission, onward)     Ordered        23 [address]  Once                      Orders Placed This Encounter   Procedures   • INPATIENT ADMISSION     Standing Status:   Standing     Number of Occurrences:   1     Order Specific Question:   Level of Care     Answer:   Med Surg [16]     Order Specific Question:   Estimated length of stay     Answer:   More than 2 Midnights     Order Specific Question:   Certification     Answer:   I certify that inpatient services are medically necessary for this patient for a duration of greater than two midnights. See H&P and MD Progress Notes for additional information about the patient's course of treatment. ED Arrival Information     Expected   -    Arrival   2023 11:33    Acuity   Urgent            Means of arrival   Wheelchair    Escorted by   Family Member    Service   Hospitalist    Admission type   Emergency            Arrival complaint   Diarrhea           Chief Complaint   Patient presents with   • Weakness - Generalized     Pt c/o generalized weakness and diarrhea for 9 days, called her PCP and was told to come get checked. Initial Presentation: 79 y.o. female presents to ed from home for evaluation and treatment of diarrhea and generalized weakness x 9 days. PMHX:  TIA, CHRONIC PAIN,CVA. Clinical assessment significant for BLLE1+ edema. NIH =0. VB.263 / 36 / 27.6 / 16.8, acetaminophen 100, LFTs elevated. Ekg  Nsr. Initially treated with iv .9% ns bolus, iv acetylcysteine, x 2. Admit to inpatient med surg for tylenol toxicity. Date: 23   Day 2: inpatient med surg    Monitor INR, CMP and acetaminophen level q6 hr as well as treatment of N acetylcysteine. Hold tylenol and Norco. Elevated CK caused by tylenol toxicity. Continue iv fluids. Medical toxicology recommends completing iv N acetylcysteine x 3 full bags.  Recommend aggressive fluid resuscitation. Date: 8-3-23     Day 3: Has surpassed a 2nd midnight with active treatments and services, which include iv fluids, serial labs. ED Triage Vitals   08/01/23 1144 08/01/23 1144 08/01/23 1144 08/01/23 1144 08/01/23 1144   97.9 °F (36.6 °C) 100 18 118/89 97 %      Temporal Monitor         No Pain          08/01/23 69.6 kg (153 lb 7 oz)     Additional Vital Signs:     Date/Time Temp Pulse Resp BP MAP (mmHg) SpO2   08/02/23 06:59:06 97.6 °F (36.4 °C) 129  Abnormal  20 122/76 91 95 %   08/01/23 22:21:24 97.3 °F (36.3 °C)   Abnormal  106   Abnormal  17 120/78 92 96 %   08/01/23 18:39:45 97.5 °F (36.4 °C) 108   Abnormal  19 118/76 90 97 %   08/01/23 1144 97.9 °F (36.6 °C) 100 18 118/89 100 97 %         Pertinent Labs/Diagnostic Test Results:     CTA head and neck with and without contrast   Final  (08/01 1458)      1. No acute intracranial abnormality. Stable old left caudate lacunar infarct. 2.  Patent major vessels of the [place] of more without high-grade stenosis. No aneurysm. 3.  No hemodynamically significant stenosis or dissection of cervical carotid and vertebral arteries. 4.  Left maxillary sinusitis with fluid level favoring acute/subacute nature. XR chest 1 view portable   Final  (08/01 1539)      No acute cardiopulmonary disease.             Results from last 7 days   Lab Units 08/02/23  0538 08/01/23  1210   WBC Thousand/uL 5.61 6.16   HEMOGLOBIN g/dL 12.6 13.1   HEMATOCRIT % 36.0 38.8   PLATELETS Thousands/uL 286 278   NEUTROS ABS Thousands/µL  --  3.85         Results from last 7 days   Lab Units 08/02/23  0538 08/02/23  0026 08/01/23  1945 08/01/23  1229   SODIUM mmol/L 140 141 141 142   POTASSIUM mmol/L 3.0* 2.7* 2.8* 3.5   CHLORIDE mmol/L 109* 109* 108 112*   CO2 mmol/L 17* 17* 17* 18*   ANION GAP mmol/L 14 15 16 12   BUN mg/dL 8 9 12 16   CREATININE mg/dL 0.56* 0.56* 0.72 0.84   EGFR ml/min/1.73sq m 96 96 86 72   CALCIUM mg/dL 8.7 8.7 8.4 8.8   MAGNESIUM mg/dL --   --  1.8*  --      Results from last 7 days   Lab Units 08/02/23 0538 08/02/23 0026 08/01/23 1945 08/01/23  1229   AST U/L 195* 219* 228* 222*   ALT U/L 58* 61* 64* 56*   ALK PHOS U/L 109* 113* 125* 132*   TOTAL PROTEIN g/dL 5.5* 5.8* 6.1* 5.9*   ALBUMIN g/dL 3.2* 3.3* 3.6 3.4*   TOTAL BILIRUBIN mg/dL 0.62 0.66 0.58 0.62     Results from last 7 days   Lab Units 08/01/23  1141   POC GLUCOSE mg/dl 109     Results from last 7 days   Lab Units 08/02/23 0538 08/02/23 0026 08/01/23 1945 08/01/23  1229   GLUCOSE RANDOM mg/dL 140 109 120 106           Results from last 7 days   Lab Units 08/01/23  1438   PH LONG  7.263*   PCO2 LONG mm Hg 38.0*   PO2 LONG mm Hg 27.6*   HCO3 LONG mmol/L 16.8*   BASE EXC LONG mmol/L -9.5   O2 CONTENT LONG ml/dL 8.2   O2 HGB, VENOUS % 45.3*         Results from last 7 days   Lab Units 08/02/23 0538 08/01/23  1258   CK TOTAL U/L 2,380* 3,961*     Results from last 7 days   Lab Units 08/01/23 1945 08/01/23 1438 08/01/23  1229   HS TNI 0HR ng/L  --   --  13   HS TNI 2HR ng/L  --  12  --    HSTNI D2 ng/L  --  -1  --    HS TNI 4HR ng/L 24  --   --    HSTNI D4 ng/L 11  --   --          Results from last 7 days   Lab Units 08/02/23 0538 08/02/23 0026 08/01/23 1945 08/01/23  1438   PROTIME seconds 14.3 13.9 13.4 13.0   INR  1.08 1.04 1.00 0.96   PTT seconds  --   --   --  29     Results from last 7 days   Lab Units 08/01/23  1229   TSH 3RD GENERATON uIU/mL 1.520       Results from last 7 days   Lab Units 08/02/23  0538 08/02/23  0026 08/01/23  1945 08/01/23  1258   ETHANOL LVL mg/dL  --   --   --  <10   ACETAMINOPHEN LVL ug/mL <10* <27* 14 692*   SALICYLATE LVL mg/dL  --   --   --  <5       ED Treatment:   Medication Administration from 08/01/2023 1133 to 08/01/2023 1830       Date/Time Order Dose Route Action     08/01/2023 1328 EDT sodium chloride 0.9 % bolus 1,000 mL 1,000 mL Intravenous New Bag     08/01/2023 1507 EDT acetylcysteine (ACETADOTE) 12,045 mg in dextrose 5 % 200 mL IVPB 12,045 mg Intravenous New Bag     08/01/2023 1722 EDT acetylcysteine (ACETADOTE) 4,015 mg in dextrose 5 % 500 mL IVPB 4,015 mg Intravenous New Bag        Past Medical History:   Diagnosis   • Bipolar 1 disorder (HCC)   • Cancer ([facility])    kidney   • Cardiac disease    fast heart beat   • Chronic pain   • Fractures   • GERD (gastroesophageal reflux disease)   • Hard of hearing   • Hypertension   • Renal cell carcinoma ([facility])   • Stroke ([facility])    2009 affected her speech, right sided weakness   • TIA (transient ischemic attack)     Present on Admission:  • Bipolar 1 disorder (HCC)  • GERD (gastroesophageal reflux disease)  • Essential hypertension  • Generalized anxiety disorder      Admitting Diagnosis:     Weakness generalized [R53.1]  Weakness [R53.1]  Tylenol overdose, accidental or unintentional, initial encounter [T39.1X1A]    Age/Sex: 79 y.o. female    Scheduled Medications:    acetylcysteine, 100 mg/kg, Intravenous, Once  ALPRAZolam, 1 mg, Oral, TID  amLODIPine, 5 mg, Oral, Daily  aspirin, 81 mg, Oral, Daily  docusate sodium, 100 mg, Oral, BID  enoxaparin, 40 mg, Subcutaneous, Daily  famotidine, 20 mg, Oral, Daily  metoprolol succinate, 100 mg, Oral, Daily  OLANZapine, 20 mg, Oral, HS  pantoprazole, 40 mg, Oral, BID AC  polyethylene glycol, 17 g, Oral, Daily  potassium chloride, 20 mEq, Intravenous, Q2H  QUEtiapine, 300 mg, Oral, HS      Continuous IV Infusions:  sodium chloride, 75 mL/hr, Intravenous, Continuous      PRN Meds:  albuterol, 2 puff, Inhalation, Q4H PRN  ondansetron, 4 mg, Intravenous, Q8H PRN  oxyCODONE, 5 mg, Oral, Q6H PRN        IP CONSULT TO TOXICOLOGY    Network Utilization Review Department  ATTENTION: Please call with any questions or concerns to [phone] and carefully listen to the prompts so that you are directed to the right person.  All voicemails are confidential.  [facility] all requests for admission clinical reviews, approved or denied determinations and any other requests to dedicated fax number below belonging to the campus where the patient is receiving treatment.  List of dedicated fax numbers for the Facilities:  [place] DENIALS (Administrative/Medical Necessity) [phone] [address] (Maternity/NICU/Pediatrics) [phone]   [address] [place] [address] [phone]   [facility] [address] [facility] [phone]   [address] [phone]   [id number] [place] [address] Nn [phone]

## 2023-08-02 NOTE — PROGRESS NOTES
[address]  Progress Note  Name: Ashleigh Boyd  MRN: [id number]  Unit/Bed#: 669-03 I Date of Admission: 8/1/2023   Date of Service: 8/2/2023 I Hospital Day: 1    Assessment/Plan   * Tylenol toxicity  Assessment & Plan  Patient was admitted due to generalized weakness and altered mental status. She had recent EGD/Colonoscopy and had felt sick since, roughly past 9 days with abdominal pain and nausea, poor oral intake, and was taking her chronic Norco as well as additional Tylenol for the abdominal pain. - ED lab shows elevated acetaminophen level in 100 with elevated LFTs,  and ALT 56.  VBG with metabolic acidosis  - Toxicology contacted  - NAC protocol  - serial monitoring of labs  - patient has associated electrolyte derangements, repleting as needed  - elevated CK, possible mild rhabdomyolysis related to tylenol toxicity, improving, will give IVF    - hold Norco, continuing Oxycodone 5mg PRN q6h for chronic pain    Toxic encephalopathy  Assessment & Plan  Acute, patient presented with mild AMS, was concern for stroke but CT head negative  Suspect due to tylenol toxicity and her other home medications  Has resolved with treatment    Constipation  Assessment & Plan  intermittent constipation at home  - docusate  - miralax PRN    Generalized anxiety disorder  Assessment & Plan  Patient takes XANAX 1mg 3-4 times every day  Will schedule for TID as patient reports she takes it that way at home    Essential hypertension  Assessment & Plan  Blood pressure stable in ED.   - continue home med, Amlodipine  - monitor BP    GERD (gastroesophageal reflux disease)  Assessment & Plan  Hx of GERD on pepcid and protonix 40mg  - continue same home meds    Bipolar 1 disorder ([address])  Assessment & Plan  Mood and affect stable in ED.   - continue home med Olanzapine and Quietiapine               VTE Pharmacologic Prophylaxis: VTE Score: 3 Moderate Risk (Score 3-4) - Pharmacological DVT Prophylaxis Ordered: enoxaparin (Lovenox). Patient Centered Rounds: I performed bedside rounds with nursing staff today. Discussions with Specialists or Other Care Team Provider: none    Education and Discussions with Family / Patient: Updated  (mother) via phone. Total Time Spent on Date of Encounter in care of patient: 45 minutes This time was spent on one or more of the following: performing physical exam; counseling and coordination of care; obtaining or reviewing history; documenting in the medical record; reviewing/ordering tests, medications or procedures; communicating with other healthcare professionals and discussing with patient's family/caregivers. Current Length of Stay: 1 day(s)  Current Patient Status: Inpatient   Certification Statement: The patient will continue to require additional inpatient hospital stay due to further management tylenol toxicity  Discharge Plan: Anticipate discharge in 24-48 hrs to home. Code Status: Level 1 - Full Code    Subjective:   Patient has some nausea this morning with emesis, non-bloody  Overall feels better but still her her nausea she has had over past week or more    Objective:     Vitals:   Temp (24hrs), Av.5 °F (36.4 °C), Min:97.3 °F (36.3 °C), Max:97.6 °F (36.4 °C)    Temp:  [97.3 °F (36.3 °C)-97.6 °F (36.4 °C)] 97.6 °F (36.4 °C)  HR:  [106-129] 129  Resp:  [17-20] 20  BP: (118-122)/(76-78) 122/76  SpO2:  [95 %-97 %] 95 %  Body mass index is 26.34 kg/m². Input and Output Summary (last 24 hours): Intake/Output Summary (Last 24 hours) at 2023 1400  Last data filed at 2023 1300  Gross per 24 hour   Intake 1380.2 ml   Output --   Net 1380.2 ml       Physical Exam:   Physical Exam  Vitals and nursing note reviewed. Constitutional:       General: She is not in acute distress. Appearance: She is well-developed. HENT:      Head: Normocephalic and atraumatic.    Eyes:      Conjunctiva/sclera: Conjunctivae normal.   Cardiovascular: Rate and Rhythm: Normal rate and regular rhythm. Heart sounds: No murmur heard. Pulmonary:      Effort: Pulmonary effort is normal. No respiratory distress. Breath sounds: Normal breath sounds. Abdominal:      Palpations: Abdomen is soft. Tenderness: There is no abdominal tenderness. Musculoskeletal:         General: No swelling. Cervical back: Neck supple. Skin:     General: Skin is warm and dry. Capillary Refill: Capillary refill takes less than 2 seconds. Neurological:      Mental Status: She is alert. Comments: Mild right sided facial droop with mildly dysarthric speech (chronic)   Psychiatric:         Mood and Affect: Mood normal.          Additional Data:     Labs:  Results from last 7 days   Lab Units 08/02/23  0538 08/01/23  1210   WBC Thousand/uL 5.61 6.16   HEMOGLOBIN g/dL 12.6 13.1   HEMATOCRIT % 36.0 38.8   PLATELETS Thousands/uL 286 278   NEUTROS PCT %  --  62   LYMPHS PCT %  --  21   MONOS PCT %  --  11   EOS PCT %  --  3     Results from last 7 days   Lab Units 08/02/23  0538   SODIUM mmol/L 140   POTASSIUM mmol/L 3.0*   CHLORIDE mmol/L 109*   CO2 mmol/L 17*   BUN mg/dL 8   CREATININE mg/dL 0.56*   ANION GAP mmol/L 14   CALCIUM mg/dL 8.7   ALBUMIN g/dL 3.2*   TOTAL BILIRUBIN mg/dL 0.62   ALK PHOS U/L 109*   ALT U/L 58*   AST U/L 195*   GLUCOSE RANDOM mg/dL 140     Results from last 7 days   Lab Units 08/02/23  0538   INR  1.08     Results from last 7 days   Lab Units 08/01/23  1141   POC GLUCOSE mg/dl 109               Lines/Drains:  Invasive Devices     Peripheral Intravenous Line  Duration           Peripheral IV 08/01/23 Dorsal (posterior); Left Hand <1 day    Peripheral IV 08/01/23 Dorsal (posterior); Right Forearm <1 day                      Imaging: No pertinent imaging reviewed.     Recent Cultures (last 7 days):         Last 24 Hours Medication List:   Current Facility-Administered Medications   Medication Dose Route Frequency Provider Last Rate   • acetylcysteine  100 mg/kg Intravenous Once Catrachito Lefty Counts, DO 8,030 mg (08/01/23 1844)   • albuterol  2 puff Inhalation Q4H PRN Catrachito Lefty Counts, DO     • ALPRAZolam  1 mg Oral TID Catrachito Lefty Counts, DO     • amLODIPine  5 mg Oral Daily Catrachito Lefty Counts, DO     • aspirin  81 mg Oral Daily Catrachito Lefty Counts, DO     • docusate sodium  100 mg Oral BID Catrachito Lefty Counts, DO     • enoxaparin  40 mg Subcutaneous Daily Catrachito Lefty Counts, DO     • famotidine  20 mg Oral Daily Catrachito Lefty Counts, DO     • metoprolol succinate  100 mg Oral Daily Catrachito Lefty Counts, DO     • OLANZapine  20 mg Oral HS Catrachito Lefty Counts, DO     • ondansetron  4 mg Intravenous Q8H PRN Catrachito Lefty Counts, DO     • oxyCODONE  5 mg Oral Q6H PRN Catrachito Lefty Counts, DO     • pantoprazole  40 mg Oral BID AC Catrachito Lefty Counts, DO     • polyethylene glycol  17 g Oral Daily Catrachito Lefty Counts, DO     • polyethylene glycol  17 g Oral Daily PRN Catrachito Lefty Counts, DO     • QUEtiapine  300 mg Oral HS Catrachito Lefty Counts, DO     • sodium chloride  75 mL/hr Intravenous Continuous Catrachito Lefty Counts, DO 75 mL/hr (08/02/23 1349)        Today, Patient Was Seen By: Adeline Gruber Counts, DO    **Please Note: This note may have been constructed using a voice recognition system. **

## 2023-08-02 NOTE — QUICK NOTE
Recommend finishing full 3 bag course of N-acetylcysteine. Acetaminophen level no longer needs to be trended. Electrolyte repletion per primary team.  Recommend administration of magnesium with potassium in regards to reading hypokalemia. Recommend aggressive fluid resuscitation for the dependent of anion gap metabolic acidosis.

## 2023-08-02 NOTE — PLAN OF CARE
Problem: PAIN - ADULT  Goal: Verbalizes/displays adequate comfort level or baseline comfort level  Description: Interventions:  - Encourage patient to monitor pain and request assistance  - Assess pain using appropriate pain scale  - Administer analgesics based on type and severity of pain and evaluate response  - Implement non-pharmacological measures as appropriate and evaluate response  - Consider cultural and social influences on pain and pain management  - Notify physician/advanced practitioner if interventions unsuccessful or patient reports new pain  8/2/2023 1240 by Maritza Caro  Outcome: Progressing  8/2/2023 1239 by Maritza Caro  Outcome: Not Progressing     Problem: INFECTION - ADULT  Goal: Absence or prevention of progression during hospitalization  Description: INTERVENTIONS:  - Assess and monitor for signs and symptoms of infection  - Monitor lab/diagnostic results  - Monitor all insertion sites, i.e. indwelling lines, tubes, and drains  - Monitor endotracheal if appropriate and nasal secretions for changes in amount and color  - [place] appropriate cooling/warming therapies per order  - Administer medications as ordered  - Instruct and encourage patient and family to use good hand hygiene technique  - Identify and instruct in appropriate isolation precautions for identified infection/condition  8/2/2023 1240 by Maritza Caro  Outcome: Progressing  8/2/2023 1239 by Maritza Caro  Outcome: Not Progressing     Problem: SAFETY ADULT  Goal: Patient will remain free of falls  Description: INTERVENTIONS:  - Educate patient/family on patient safety including physical limitations  - Instruct patient to call for assistance with activity   - Consult OT/PT to assist with strengthening/mobility   - Keep Call bell within reach  - Keep bed low and locked with side rails adjusted as appropriate  - Keep care items and personal belongings within reach  - Initiate and maintain comfort rounds  - Make Fall Risk Sign visible to staff  - Offer Toileting every 1-2 Hours, in advance of need  - Initiate/Maintain bed and chair alarm  - Obtain necessary fall risk management equipment: fall socks and call bell in reach  - Apply yellow socks and bracelet for high fall risk patients  - Consider moving patient to room near nurses station  8/2/2023 1240 by Minor Afshan  Outcome: Progressing  8/2/2023 1239 by Minor Navarro  Outcome: Not Progressing  Goal: Maintain or return to baseline ADL function  Description: INTERVENTIONS:  -  Assess patient's ability to carry out ADLs; assess patient's baseline for ADL function and identify physical deficits which impact ability to perform ADLs (bathing, care of mouth/teeth, toileting, grooming, dressing, etc.)  - Assess/evaluate cause of self-care deficits   - Assess range of motion  - Assess patient's mobility; develop plan if impaired  - Assess patient's need for assistive devices and provide as appropriate  - Encourage maximum independence but intervene and supervise when necessary  - Involve family in performance of ADLs  - Assess for home care needs following discharge   - Consider OT consult to assist with ADL evaluation and planning for discharge  - Provide patient education as appropriate  8/2/2023 1240 by Minor Navarro  Outcome: Progressing  8/2/2023 1239 by Minor Navarro  Outcome: Not Progressing  Goal: Maintains/Returns to pre admission functional level  Description: INTERVENTIONS:  - Perform BMAT or MOVE assessment daily.   - Set and communicate daily mobility goal to care team and patient/family/caregiver. - Collaborate with rehabilitation services on mobility goals if consulted  - Perform Range of Motion 3-4 times a day. - Reposition patient every 1-2 hours.   - Dangle patient 3-4 times a day  - Stand patient 3-4 times a day  - Ambulate patient 3-4 times a day  - Out of bed to chair 3-4 times a day   - Out of bed for meals 3-4 times a day  - Out of bed for toileting  - Record patient progress and toleration of activity level   8/2/2023 1240 by Ross Oden  Outcome: Progressing  8/2/2023 1239 by Ross Oden  Outcome: Not Progressing     Problem: DISCHARGE PLANNING  Goal: Discharge to home or other facility with appropriate resources  Description: INTERVENTIONS:  - Identify barriers to discharge w/patient and caregiver  - Arrange for needed discharge resources and transportation as appropriate  - Identify discharge learning needs (meds, wound care, etc.)  - Arrange for interpretive services to assist at discharge as needed  - Refer to Case Management Department for coordinating discharge planning if the patient needs post-hospital services based on physician/advanced practitioner order or complex needs related to functional status, cognitive ability, or social support system  8/2/2023 1240 by Ross Oden  Outcome: Progressing  8/2/2023 1239 by Ross Oden  Outcome: Not Progressing     Problem: Knowledge Deficit  Goal: Patient/family/caregiver demonstrates understanding of disease process, treatment plan, medications, and discharge instructions  Description: Complete learning assessment and assess knowledge base.   Interventions:  - Provide teaching at level of understanding  - Provide teaching via preferred learning methods  8/2/2023 1240 by Ross Oden  Outcome: Progressing  8/2/2023 1239 by Ross Oden  Outcome: Not Progressing     Problem: METABOLIC, FLUID AND ELECTROLYTES - ADULT  Goal: Electrolytes maintained within normal limits  Description: INTERVENTIONS:  - Monitor labs and assess patient for signs and symptoms of electrolyte imbalances  - Administer electrolyte replacement as ordered  - Monitor response to electrolyte replacements, including repeat lab results as appropriate  - Instruct patient on fluid and nutrition as appropriate  8/2/2023 1240 by Ross Oden  Outcome: Progressing  8/2/2023 1239 by Vamshi Nguyễn Senia Sae  Outcome: Not Progressing  Goal: Fluid balance maintained  Description: INTERVENTIONS:  - Monitor labs   - Monitor I/O and WT  - Instruct patient on fluid and nutrition as appropriate  - Assess for signs & symptoms of volume excess or deficit  8/2/2023 1240 by Kamala Denny  Outcome: Progressing  8/2/2023 1239 by Kamala Denny  Outcome: Not Progressing  Goal: Glucose maintained within target range  Description: INTERVENTIONS:  - Monitor Blood Glucose as ordered  - Assess for signs and symptoms of hyperglycemia and hypoglycemia  - Administer ordered medications to maintain glucose within target range  - Assess nutritional intake and initiate nutrition service referral as needed  8/2/2023 1240 by Kamala Denny  Outcome: Progressing  8/2/2023 1239 by Kamala Denny  Outcome: Not Progressing     Problem: MUSCULOSKELETAL - ADULT  Goal: Maintain or return mobility to safest level of function  Description: INTERVENTIONS:  - Assess patient's ability to carry out ADLs; assess patient's baseline for ADL function and identify physical deficits which impact ability to perform ADLs (bathing, care of mouth/teeth, toileting, grooming, dressing, etc.)  - Assess/evaluate cause of self-care deficits   - Assess range of motion  - Assess patient's mobility  - Assess patient's need for assistive devices and provide as appropriate  - Encourage maximum independence but intervene and supervise when necessary  - Involve family in performance of ADLs  - Assess for home care needs following discharge   - Consider OT consult to assist with ADL evaluation and planning for discharge  - Provide patient education as appropriate  8/2/2023 1240 by Kamala Denny  Outcome: Progressing  8/2/2023 1239 by Kamala Denny  Outcome: Not Progressing  Goal: Maintain proper alignment of affected body part  Description: INTERVENTIONS:  - Support, maintain and protect limb and body alignment  - Provide patient/ family with appropriate education  8/2/2023 1240 by Bg Needs  Outcome: Progressing  8/2/2023 1239 by Bg Needs  Outcome: Not Progressing

## 2023-08-02 NOTE — PLAN OF CARE
Problem: PAIN - ADULT  Goal: Verbalizes/displays adequate comfort level or baseline comfort level  Description: Interventions:  - Encourage patient to monitor pain and request assistance  - Assess pain using appropriate pain scale  - Administer analgesics based on type and severity of pain and evaluate response  - Implement non-pharmacological measures as appropriate and evaluate response  - Consider cultural and social influences on pain and pain management  - Notify physician/advanced practitioner if interventions unsuccessful or patient reports new pain  Outcome: Progressing     Problem: INFECTION - ADULT  Goal: Absence or prevention of progression during hospitalization  Description: INTERVENTIONS:  - Assess and monitor for signs and symptoms of infection  - Monitor lab/diagnostic results  - Monitor all insertion sites, i.e. indwelling lines, tubes, and drains  - Monitor endotracheal if appropriate and nasal secretions for changes in amount and color  - [place] appropriate cooling/warming therapies per order  - Administer medications as ordered  - Instruct and encourage patient and family to use good hand hygiene technique  - Identify and instruct in appropriate isolation precautions for identified infection/condition  Outcome: Progressing     Problem: SAFETY ADULT  Goal: Patient will remain free of falls  Description: INTERVENTIONS:  - Educate patient/family on patient safety including physical limitations  - Instruct patient to call for assistance with activity   - Consult OT/PT to assist with strengthening/mobility   - Keep Call bell within reach  - Keep bed low and locked with side rails adjusted as appropriate  - Keep care items and personal belongings within reach  - Initiate and maintain comfort rounds  - Make Fall Risk Sign visible to staff  - Offer Toileting every 1-2 Hours, in advance of need  - Initiate/Maintain bed and chair alarm  - Obtain necessary fall risk management equipment: fall socks and call bell in reach  - Apply yellow socks and bracelet for high fall risk patients  - Consider moving patient to room near nurses station  Outcome: Progressing  Goal: Maintain or return to baseline ADL function  Description: INTERVENTIONS:  -  Assess patient's ability to carry out ADLs; assess patient's baseline for ADL function and identify physical deficits which impact ability to perform ADLs (bathing, care of mouth/teeth, toileting, grooming, dressing, etc.)  - Assess/evaluate cause of self-care deficits   - Assess range of motion  - Assess patient's mobility; develop plan if impaired  - Assess patient's need for assistive devices and provide as appropriate  - Encourage maximum independence but intervene and supervise when necessary  - Involve family in performance of ADLs  - Assess for home care needs following discharge   - Consider OT consult to assist with ADL evaluation and planning for discharge  - Provide patient education as appropriate  Outcome: Progressing  Goal: Maintains/Returns to pre admission functional level  Description: INTERVENTIONS:  - Perform BMAT or MOVE assessment daily.   - Set and communicate daily mobility goal to care team and patient/family/caregiver. - Collaborate with rehabilitation services on mobility goals if consulted  - Perform Range of Motion 3-4 times a day. - Reposition patient every 1-2 hours.   - Dangle patient 3-4 times a day  - Stand patient 3-4 times a day  - Ambulate patient 3-4 times a day  - Out of bed to chair 3-4 times a day   - Out of bed for meals 3-4 times a day  - Out of bed for toileting  - Record patient progress and toleration of activity level   Outcome: Progressing     Problem: DISCHARGE PLANNING  Goal: Discharge to home or other facility with appropriate resources  Description: INTERVENTIONS:  - Identify barriers to discharge w/patient and caregiver  - Arrange for needed discharge resources and transportation as appropriate  - Identify discharge learning needs (meds, wound care, etc.)  - Arrange for interpretive services to assist at discharge as needed  - Refer to Case Management Department for coordinating discharge planning if the patient needs post-hospital services based on physician/advanced practitioner order or complex needs related to functional status, cognitive ability, or social support system  Outcome: Progressing     Problem: Knowledge Deficit  Goal: Patient/family/caregiver demonstrates understanding of disease process, treatment plan, medications, and discharge instructions  Description: Complete learning assessment and assess knowledge base.   Interventions:  - Provide teaching at level of understanding  - Provide teaching via preferred learning methods  Outcome: Progressing     Problem: METABOLIC, FLUID AND ELECTROLYTES - ADULT  Goal: Electrolytes maintained within normal limits  Description: INTERVENTIONS:  - Monitor labs and assess patient for signs and symptoms of electrolyte imbalances  - Administer electrolyte replacement as ordered  - Monitor response to electrolyte replacements, including repeat lab results as appropriate  - Instruct patient on fluid and nutrition as appropriate  Outcome: Progressing  Goal: Fluid balance maintained  Description: INTERVENTIONS:  - Monitor labs   - Monitor I/O and WT  - Instruct patient on fluid and nutrition as appropriate  - Assess for signs & symptoms of volume excess or deficit  Outcome: Progressing  Goal: Glucose maintained within target range  Description: INTERVENTIONS:  - Monitor Blood Glucose as ordered  - Assess for signs and symptoms of hyperglycemia and hypoglycemia  - Administer ordered medications to maintain glucose within target range  - Assess nutritional intake and initiate nutrition service referral as needed  Outcome: Progressing     Problem: MUSCULOSKELETAL - ADULT  Goal: Maintain or return mobility to safest level of function  Description: INTERVENTIONS:  - Assess patient's ability to carry out ADLs; assess patient's baseline for ADL function and identify physical deficits which impact ability to perform ADLs (bathing, care of mouth/teeth, toileting, grooming, dressing, etc.)  - Assess/evaluate cause of self-care deficits   - Assess range of motion  - Assess patient's mobility  - Assess patient's need for assistive devices and provide as appropriate  - Encourage maximum independence but intervene and supervise when necessary  - Involve family in performance of ADLs  - Assess for home care needs following discharge   - Consider OT consult to assist with ADL evaluation and planning for discharge  - Provide patient education as appropriate  Outcome: Progressing  Goal: Maintain proper alignment of affected body part  Description: INTERVENTIONS:  - Support, maintain and protect limb and body alignment  - Provide patient/ family with appropriate education  Outcome: Progressing

## 2023-08-03 VITALS
TEMPERATURE: 97.8 F | HEIGHT: 64 IN | SYSTOLIC BLOOD PRESSURE: 117 MMHG | BODY MASS INDEX: 26.2 KG/M2 | HEART RATE: 109 BPM | RESPIRATION RATE: 18 BRPM | OXYGEN SATURATION: 95 % | DIASTOLIC BLOOD PRESSURE: 76 MMHG | WEIGHT: 153.44 LBS

## 2023-08-03 PROBLEM — G92.9 TOXIC ENCEPHALOPATHY: Status: RESOLVED | Noted: 2023-08-02 | Resolved: 2023-08-03

## 2023-08-03 PROBLEM — T39.1X1A TYLENOL TOXICITY: Status: RESOLVED | Noted: 2023-08-01 | Resolved: 2023-08-03

## 2023-08-03 LAB
ALBUMIN SERPL BCP-MCNC: 3 G/DL (ref 3.5–5)
ALBUMIN SERPL BCP-MCNC: 3 G/DL (ref 3.5–5)
ALP SERPL-CCNC: 93 U/L (ref 34–104)
ALP SERPL-CCNC: 99 U/L (ref 34–104)
ALT SERPL W P-5'-P-CCNC: 50 U/L (ref 7–52)
ALT SERPL W P-5'-P-CCNC: 51 U/L (ref 7–52)
ANION GAP SERPL CALCULATED.3IONS-SCNC: 7 MMOL/L
ANION GAP SERPL CALCULATED.3IONS-SCNC: 8 MMOL/L
APAP SERPL-MCNC: <10 UG/ML (ref 10–20)
APAP SERPL-MCNC: <10 UG/ML (ref 10–20)
AST SERPL W P-5'-P-CCNC: 165 U/L (ref 13–39)
AST SERPL W P-5'-P-CCNC: 174 U/L (ref 13–39)
BILIRUB SERPL-MCNC: 0.65 MG/DL (ref 0.2–1)
BILIRUB SERPL-MCNC: 0.78 MG/DL (ref 0.2–1)
BUN SERPL-MCNC: 7 MG/DL (ref 5–25)
BUN SERPL-MCNC: 7 MG/DL (ref 5–25)
CALCIUM ALBUM COR SERPL-MCNC: 9.6 MG/DL (ref 8.3–10.1)
CALCIUM ALBUM COR SERPL-MCNC: 9.7 MG/DL (ref 8.3–10.1)
CALCIUM SERPL-MCNC: 8.8 MG/DL (ref 8.4–10.2)
CALCIUM SERPL-MCNC: 8.9 MG/DL (ref 8.4–10.2)
CHLORIDE SERPL-SCNC: 111 MMOL/L (ref 96–108)
CHLORIDE SERPL-SCNC: 112 MMOL/L (ref 96–108)
CO2 SERPL-SCNC: 21 MMOL/L (ref 21–32)
CO2 SERPL-SCNC: 22 MMOL/L (ref 21–32)
CREAT SERPL-MCNC: 0.57 MG/DL (ref 0.6–1.3)
CREAT SERPL-MCNC: 0.59 MG/DL (ref 0.6–1.3)
GFR SERPL CREATININE-BSD FRML MDRD: 95 ML/MIN/1.73SQ M
GFR SERPL CREATININE-BSD FRML MDRD: 96 ML/MIN/1.73SQ M
GLUCOSE SERPL-MCNC: 101 MG/DL (ref 65–140)
GLUCOSE SERPL-MCNC: 104 MG/DL (ref 65–140)
INR PPP: 0.94 (ref 0.84–1.19)
INR PPP: 0.95 (ref 0.84–1.19)
POTASSIUM SERPL-SCNC: 3.3 MMOL/L (ref 3.5–5.3)
POTASSIUM SERPL-SCNC: 3.7 MMOL/L (ref 3.5–5.3)
PROT SERPL-MCNC: 5.2 G/DL (ref 6.4–8.4)
PROT SERPL-MCNC: 5.3 G/DL (ref 6.4–8.4)
PROTHROMBIN TIME: 12.7 SECONDS (ref 11.6–14.5)
PROTHROMBIN TIME: 12.9 SECONDS (ref 11.6–14.5)
SODIUM SERPL-SCNC: 140 MMOL/L (ref 135–147)
SODIUM SERPL-SCNC: 141 MMOL/L (ref 135–147)

## 2023-08-03 PROCEDURE — 80143 DRUG ASSAY ACETAMINOPHEN: CPT | Performed by: HOSPITALIST

## 2023-08-03 PROCEDURE — 85610 PROTHROMBIN TIME: CPT | Performed by: HOSPITALIST

## 2023-08-03 PROCEDURE — 99239 HOSP IP/OBS DSCHRG MGMT >30: CPT | Performed by: HOSPITALIST

## 2023-08-03 PROCEDURE — 97166 OT EVAL MOD COMPLEX 45 MIN: CPT

## 2023-08-03 PROCEDURE — 80053 COMPREHEN METABOLIC PANEL: CPT | Performed by: HOSPITALIST

## 2023-08-03 RX ADMIN — OXYCODONE HYDROCHLORIDE 5 MG: 5 TABLET ORAL at 08:23

## 2023-08-03 RX ADMIN — PANTOPRAZOLE SODIUM 40 MG: 40 TABLET, DELAYED RELEASE ORAL at 06:28

## 2023-08-03 RX ADMIN — DOCUSATE SODIUM 100 MG: 100 CAPSULE, LIQUID FILLED ORAL at 08:20

## 2023-08-03 RX ADMIN — METOPROLOL SUCCINATE 100 MG: 100 TABLET, EXTENDED RELEASE ORAL at 08:20

## 2023-08-03 RX ADMIN — POLYETHYLENE GLYCOL 3350 17 G: 17 POWDER, FOR SOLUTION ORAL at 08:20

## 2023-08-03 RX ADMIN — AMLODIPINE BESYLATE 5 MG: 5 TABLET ORAL at 08:19

## 2023-08-03 RX ADMIN — ALPRAZOLAM 1 MG: 0.5 TABLET ORAL at 08:19

## 2023-08-03 RX ADMIN — ASPIRIN 81 MG: 81 TABLET, CHEWABLE ORAL at 08:20

## 2023-08-03 RX ADMIN — FAMOTIDINE 20 MG: 20 TABLET, FILM COATED ORAL at 08:19

## 2023-08-03 RX ADMIN — ONDANSETRON 4 MG: 2 INJECTION INTRAMUSCULAR; INTRAVENOUS at 08:24

## 2023-08-03 RX ADMIN — ENOXAPARIN SODIUM 40 MG: 40 INJECTION SUBCUTANEOUS at 08:20

## 2023-08-03 NOTE — PLAN OF CARE
Problem: PAIN - ADULT  Goal: Verbalizes/displays adequate comfort level or baseline comfort level  Description: Interventions:  - Encourage patient to monitor pain and request assistance  - Assess pain using appropriate pain scale  - Administer analgesics based on type and severity of pain and evaluate response  - Implement non-pharmacological measures as appropriate and evaluate response  - Consider cultural and social influences on pain and pain management  - Notify physician/advanced practitioner if interventions unsuccessful or patient reports new pain  Outcome: Progressing     Problem: INFECTION - ADULT  Goal: Absence or prevention of progression during hospitalization  Description: INTERVENTIONS:  - Assess and monitor for signs and symptoms of infection  - Monitor lab/diagnostic results  - Monitor all insertion sites, i.e. indwelling lines, tubes, and drains  - Monitor endotracheal if appropriate and nasal secretions for changes in amount and color  - [place] appropriate cooling/warming therapies per order  - Administer medications as ordered  - Instruct and encourage patient and family to use good hand hygiene technique  - Identify and instruct in appropriate isolation precautions for identified infection/condition  Outcome: Progressing     Problem: SAFETY ADULT  Goal: Patient will remain free of falls  Description: INTERVENTIONS:  - Educate patient/family on patient safety including physical limitations  - Instruct patient to call for assistance with activity   - Consult OT/PT to assist with strengthening/mobility   - Keep Call bell within reach  - Keep bed low and locked with side rails adjusted as appropriate  - Keep care items and personal belongings within reach  - Initiate and maintain comfort rounds  - Make Fall Risk Sign visible to staff  - Offer Toileting every 1-2 Hours, in advance of need  - Initiate/Maintain bed and chair alarm  - Obtain necessary fall risk management equipment: fall socks and call bell in reach  - Apply yellow socks and bracelet for high fall risk patients  - Consider moving patient to room near nurses station  Outcome: Progressing  Goal: Maintain or return to baseline ADL function  Description: INTERVENTIONS:  -  Assess patient's ability to carry out ADLs; assess patient's baseline for ADL function and identify physical deficits which impact ability to perform ADLs (bathing, care of mouth/teeth, toileting, grooming, dressing, etc.)  - Assess/evaluate cause of self-care deficits   - Assess range of motion  - Assess patient's mobility; develop plan if impaired  - Assess patient's need for assistive devices and provide as appropriate  - Encourage maximum independence but intervene and supervise when necessary  - Involve family in performance of ADLs  - Assess for home care needs following discharge   - Consider OT consult to assist with ADL evaluation and planning for discharge  - Provide patient education as appropriate  Outcome: Progressing  Goal: Maintains/Returns to pre admission functional level  Description: INTERVENTIONS:  - Perform BMAT or MOVE assessment daily.   - Set and communicate daily mobility goal to care team and patient/family/caregiver. - Collaborate with rehabilitation services on mobility goals if consulted  - Perform Range of Motion 3-4 times a day. - Reposition patient every 1-2 hours.   - Dangle patient 3-4 times a day  - Stand patient 3-4 times a day  - Ambulate patient 3-4 times a day  - Out of bed to chair 3-4 times a day   - Out of bed for meals 3-4 times a day  - Out of bed for toileting  - Record patient progress and toleration of activity level   Outcome: Progressing     Problem: DISCHARGE PLANNING  Goal: Discharge to home or other facility with appropriate resources  Description: INTERVENTIONS:  - Identify barriers to discharge w/patient and caregiver  - Arrange for needed discharge resources and transportation as appropriate  - Identify discharge learning needs (meds, wound care, etc.)  - Arrange for interpretive services to assist at discharge as needed  - Refer to Case Management Department for coordinating discharge planning if the patient needs post-hospital services based on physician/advanced practitioner order or complex needs related to functional status, cognitive ability, or social support system  Outcome: Progressing     Problem: Knowledge Deficit  Goal: Patient/family/caregiver demonstrates understanding of disease process, treatment plan, medications, and discharge instructions  Description: Complete learning assessment and assess knowledge base.   Interventions:  - Provide teaching at level of understanding  - Provide teaching via preferred learning methods  Outcome: Progressing     Problem: METABOLIC, FLUID AND ELECTROLYTES - ADULT  Goal: Electrolytes maintained within normal limits  Description: INTERVENTIONS:  - Monitor labs and assess patient for signs and symptoms of electrolyte imbalances  - Administer electrolyte replacement as ordered  - Monitor response to electrolyte replacements, including repeat lab results as appropriate  - Instruct patient on fluid and nutrition as appropriate  Outcome: Progressing  Goal: Fluid balance maintained  Description: INTERVENTIONS:  - Monitor labs   - Monitor I/O and WT  - Instruct patient on fluid and nutrition as appropriate  - Assess for signs & symptoms of volume excess or deficit  Outcome: Progressing  Goal: Glucose maintained within target range  Description: INTERVENTIONS:  - Monitor Blood Glucose as ordered  - Assess for signs and symptoms of hyperglycemia and hypoglycemia  - Administer ordered medications to maintain glucose within target range  - Assess nutritional intake and initiate nutrition service referral as needed  Outcome: Progressing     Problem: MUSCULOSKELETAL - ADULT  Goal: Maintain or return mobility to safest level of function  Description: INTERVENTIONS:  - Assess patient's ability to carry out ADLs; assess patient's baseline for ADL function and identify physical deficits which impact ability to perform ADLs (bathing, care of mouth/teeth, toileting, grooming, dressing, etc.)  - Assess/evaluate cause of self-care deficits   - Assess range of motion  - Assess patient's mobility  - Assess patient's need for assistive devices and provide as appropriate  - Encourage maximum independence but intervene and supervise when necessary  - Involve family in performance of ADLs  - Assess for home care needs following discharge   - Consider OT consult to assist with ADL evaluation and planning for discharge  - Provide patient education as appropriate  Outcome: Progressing  Goal: Maintain proper alignment of affected body part  Description: INTERVENTIONS:  - Support, maintain and protect limb and body alignment  - Provide patient/ family with appropriate education  Outcome: Progressing

## 2023-08-03 NOTE — ASSESSMENT & PLAN NOTE
Mood and affect stable in ED and during hospital stay.   - continue home med Olanzapine and Quietiapine

## 2023-08-03 NOTE — OCCUPATIONAL THERAPY NOTE
Occupational Therapy Evaluation     Patient Name: Melissa Stearns  KKEPW'D Date: 8/3/2023  Problem List  Active Problems:    Bipolar 1 disorder ([address])    GERD (gastroesophageal reflux disease)    Essential hypertension    Generalized anxiety disorder    Constipation    Past Medical History  Past Medical History:   Diagnosis Date    Bipolar 1 disorder ([address])     Cancer ([address])     kidney    Cardiac disease     fast heart beat    Chronic pain     Fractures     GERD (gastroesophageal reflux disease)     Hard of hearing     Hypertension     Renal cell carcinoma ([address])     Stroke ([address])     2009 affected her speech, right sided weakness    TIA (transient ischemic attack)      Past Surgical History  Past Surgical History:   Procedure Laterality Date    APPENDECTOMY      CHOLECYSTECTOMY      FL GUIDED NEEDLE PLAC BX/ASP/INJ  5/30/2014    FL GUIDED NEEDLE PLAC BX/ASP/INJ  12/19/2013    FL GUIDED NEEDLE PLAC BX/ASP/INJ  6/10/2013    JOINT REPLACEMENT Bilateral      bilateral knees    NEPHRECTOMY Right     MA COLONOSCOPY FLX DX W/COLLJ SPEC WHEN PFRMD N/A 8/7/2018    Procedure: COLONOSCOPY;  Surgeon: Yung Deleon MD;  Location: [facility];  Service: Gastroenterology    MA OPEN TREATMENT BIMALLEOLAR ANKLE FRACTURE Right 5/14/2019    Procedure: ANKLE - Bimalleolar OPEN REDUCTION W/ INTERNAL FIXATION (ORIF); Surgeon: Cheli Kennedy; Location: [facility] MAIN OR;  Service: Orthopedics    MA REMOVAL IMPLANT DEEP Right 1/31/2023    Procedure: REMOVAL HARDWARE ANKLE;  Surgeon: Cheli Kennedy;   Location:  MAIN OR;  Service: Orthopedics    MA Kush Rodriguez SHFT FX IMED IMPLT W/WO SCREWS&/CERCLA Left 7/27/2022    Procedure: INSERTION NAIL IM TIBIA;  Surgeon: Ramirez Brown MD;  Location:  MAIN OR;  Service: Orthopedics           08/03/23 0857   OT Last Visit   OT Visit Date 08/03/23   Note Type   Note type Evaluation   Pain Assessment   Pain Assessment Tool 0-10   Pain Score 7   Pain Location/Orientation Location: Back   Pain Onset/Description Onset: Ongoing;Frequency: Constant/Continuous   Patient's Stated Pain Goal No pain   Hospital Pain Intervention(s) Repositioned; Ambulation/increased activity   Multiple Pain Sites No   Restrictions/Precautions   Weight Bearing Precautions Per Order No   Other Precautions Chair Alarm;Multiple lines; Fall Risk;Pain;Hard of hearing   Home Living   Type of [address] [place] [address] Center  Two level;Stairs to enter without rails; Able to live on main level with bedroom/bathroom  (1 HECTOR (onto porch))   Bathroom Shower/Tub Tub/shower unit   Bathroom Toilet Standard   Bathroom Equipment Grab bars in shower   Bathroom Accessibility Accessible   Additional Comments PTA pt was living alone in a 2 story home with a FFSU available with a half bath and couch but main bedroom and bathroom are on the second floor. Prior Function   Level of [place] Independent with ADLs; Independent with functional mobility; Needs assistance with IADLS   Lives With (S)  Alone   Receives Help From Family   IADLs Family/Friend/Other provides transportation; Independent with meal prep; Independent with medication management   Falls in the last 6 months 0   Vocational On disability   Comments No falls in the past 6 months but in the past year pt reports "a few" falls   Lifestyle   Autonomy PTA pt reports independence with self care tasks, functional mobility no AD and in home IADLs. Mother assists with driving and community mobility. Reciprocal Relationships Supportive mother   Service to Others On disability   Intrinsic Gratification None stated   Subjective   Subjective "You gotta speak up"   ADL   Grooming Assistance 5  Supervision/Setup   Grooming Deficit Setup; Wash/dry hands; Wash/dry face   UB Bathing Assistance 5  Supervision/Setup   UB Bathing Deficit Setup; Chest;Right arm;Left arm; Abdomen   LB Bathing Assistance 5  Supervision/Setup   LB Bathing Deficit Setup;Steadying;Perineal area; Buttocks;Right upper leg;Left upper leg   UB Dressing Assistance 4 Minimal Assistance   UB Dressing Deficit Setup; Thread RUE; Thread LUE;Pull around back   LB Dressing Assistance 5  Supervision/Setup   LB Dressing Deficit Steadying; Thread RLE into underwear; Thread LLE into underwear;Pull up over hips   Bed Mobility   Additional Comments Unable to assess - seated OOB upon arrival   Transfers   Sit to Stand 5  Supervision   Additional items Assist x 1;Verbal cues   Stand to Sit 5  Supervision   Additional items Assist x 1;Verbal cues   Stand pivot 5  Supervision   Additional items Assist x 1;Verbal cues   Functional Mobility   Functional Mobility 5  Supervision   Additional Comments Increased time, cues for safety for functional mobility approx 75 feet in hallway   Additional items Rolling walker   Balance   Static Sitting Normal   Dynamic Sitting Good   Static Standing Fair +   Dynamic Standing Fair   Ambulatory Fair -   Activity Tolerance   Activity Tolerance Patient tolerated treatment well   RUE Assessment   RUE Assessment WFL  (MMT 4-/5)   LUE Assessment   LUE Assessment WFL  (MMT 4-/5)   Psychosocial   Psychosocial (WDL) WDL   Cognition   Overall Cognitive Status WFL   Arousal/Participation Alert; Responsive;Arousable; Cooperative   Attention Within functional limits   Orientation Level Oriented X4   Memory Within functional limits   Following Commands Follows one step commands without difficulty   Comments Pleasant and cooperative. Cognition appears to be functional but may need some assistance with higher level cognitive tasks (e.g. medication management, cooking, etc.) - speech is difficult to understand at times but this is baseline per pt   Assessment   Limitation Decreased ADL status; Decreased UE strength;Decreased Safe judgement during ADL;Decreased endurance;Decreased self-care trans;Decreased high-level ADLs; Decreased cognition   Prognosis Good   Assessment Pt is a 79 y.o. female who was admitted to [address] [place] [address] on 8/1/2023 w/ Tylenol toxicity.  Pt has a past medical history of HTN, bipolar disorder, anxiety, GERD and constipation. Pt lives alone in a 2 story home with 1 HECTOR and a FFSU available. At baseline, pt was completing ADLs independently and functional mobility and transfers independently no AD. Currently, pt requires min A to supervision overall for ADLs and supervision for mobility and transfers. Pt presents with deficits in the following categories: limited home support, difficulty performing ADLS and difficulty performing IADLS  activity tolerance, endurance, standing balance/tolerance, UE strength, safety  and judgement . These deficits, along with pt's  pain limit the pt's ability to safely engage in areas of occupation such as: bathing/shower, toilet hygiene, dressing, medication management, health maintenance, functional mobility, community mobility, cleaning, meal prep and household maintenance. Pt would benefit from continued skilled acute OT in order to maximize independence and safety in ADLs, mobility, and transfers. OT would recommend pt discharge to home with home health OT when medically cleared. Pt's raw score on the -PAC Daily Activity inpatient short form is 20, standardized score is 42.03, greater than 39.4. Patients at this level are likely to benefit from DC to home with home health however, please refer to therapist recommendation for safe DC planning. OT will work towards the established goals. Goals   Patient Goals "To go home today'   STG Time Frame 1-3   Short Term Goal #1 Participate in BUE exercises to increase activity tolerance for functional tasks of choice   Short Term Goal #2 UB bathing and dressing at a mod I level   LTG Time Frame 10-14   Long Term Goal #1 LB bathing and dressing completed at mod I level   Long Term Goal #2 Toileting tasks completed at mod I level   Plan   Treatment Interventions ADL retraining;Functional transfer training;UE strengthening/ROM; Endurance training;Cognitive reorientation;Equipment evaluation/education; Energy conservation; Activityengagement   Goal Expiration Date 08/17/23   OT Frequency 3-5x/wk   Recommendation   OT Discharge Recommendation Home with home health rehabilitation   Equipment Recommended Other (comment)  (RW)   AM-PAC Daily Activity Inpatient   Lower Body Dressing 3   Bathing 3   Toileting 3   Upper Body Dressing 3   Grooming 4   Eating 4   Daily Activity Raw Score 20   Daily Activity Standardized Score (Calc for Raw Score >=11) 42.03   AM-PAC Applied Cognition Inpatient   Following a Speech/Presentation 3   Understanding Ordinary Conversation 4   Taking Medications 3   Remembering Where Things Are Placed or Put Away 3   Remembering List of 4-5 Errands 2   Taking Care of Complicated Tasks 2   Applied Cognition Raw Score 17   Applied Cognition Standardized Score 36.52       Julien Carlos, OTR/L

## 2023-08-03 NOTE — DISCHARGE INSTR - AVS FIRST PAGE
Avoid taking additional Tylenol at home. Take Norco only for chronic pain.   Make appointment and follow up with primary care doctor after discharge

## 2023-08-03 NOTE — DISCHARGE SUMMARY
[address]  Discharge- Nicol Henley 1955, 79 y.o. female MRN: [id number]  Unit/Bed#: 410-01 Encounter: [id number]  Primary Care Provider: Ace Gillette DO   Date and time admitted to hospital: 8/1/2023 11:45 AM    * Tylenol toxicity-resolved as of 8/3/2023  Assessment & Plan  Patient was admitted due to generalized weakness and altered mental status. She had recent EGD/Colonoscopy and had felt sick since, roughly past 9 days with abdominal pain and nausea, poor oral intake, and was taking her chronic Norco as well as additional Tylenol for the abdominal pain. - ED lab shows elevated acetaminophen level in 100 with elevated LFTs,  and ALT 56. VBG with metabolic acidosis  - Toxicology contacted  - NAC protocol finished  - serial monitoring of labs show AST trending down 165, INR normal, and acetaminophen level < 10.  - recent lab shows Creatinine borderline 0.57  - elevated CK but trending down 3961 then 2380, possible mild rhabdomyolysis related to tylenol toxicity. Patient was put on IV, sx improved  - Norco was held and switched to Oxycodone 5mg PRN q6h for chronic pain during the stay  - Sx resolved now with above treatment    Plan:  - at home patient will take [address] for chronic pain, avoid additional Tylenol    Bipolar 1 disorder [facility])  Assessment & Plan  Mood and affect stable in ED and during hospital stay. - continue home med Olanzapine and Quietiapine    Generalized anxiety disorder  Assessment & Plan  Patient takes XANAX 1mg 3 times daily during hospital stay.   - Continue XANAX TID, same dosage    Constipation  Assessment & Plan  intermittent constipation at home  - docusate  - recommend OTC miralax as needed    Essential hypertension  Assessment & Plan  Blood pressure stable in ED.   - continue home med, Amlodipine  - monitor BP and follow up with PCP    GERD (gastroesophageal reflux disease)  Assessment & Plan  Hx of GERD on pepcid and protonix 40mg  - continue same home meds    Toxic encephalopathy-resolved as of 8/3/2023  Assessment & Plan  Acute, patient presented with mild AMS, was concern for stroke but CT head negative  Suspect due to tylenol toxicity and her other home medications  Has resolved with treatment      Admitting Provider:  Paula Humphreys DO  Discharging Physician / Practitioner: Reginald Lowry DO  PCP: Jabari Escalera DO  Admission Date:   Admission Orders (From admission, onward)     Ordered        08/01/23 1540  [address]  Once                      Discharge Date: 08/03/23  LOS: 2    Medical Problems     Resolved Problems  Date Reviewed: 3/8/2023          Resolved    * (Principal) Tylenol toxicity 8/3/2023     Resolved by  Reginald Lowry DO    Toxic encephalopathy 8/3/2023     Resolved by  Reginald Lowry DO          Consultations During Hospital Stay:  · Toxicology    Procedures Performed:   · None  XR chest 1 view portable    Result Date: 8/1/2023  Narrative: CHEST INDICATION:   weakness. COMPARISON: 11/17/2022 EXAM PERFORMED/VIEWS:  XR CHEST PORTABLE FINDINGS: Cardiomediastinal silhouette appears unremarkable. The lungs are clear. Right base slight atelectasis. No pneumothorax or pleural effusion. Osseous structures appear within normal limits for patient age. Impression: No acute cardiopulmonary disease. Workstation performed: [id number]     CTA head and neck with and without contrast    Result Date: 8/1/2023  Narrative: CTA NECK AND BRAIN WITH AND WITHOUT CONTRAST INDICATION: generalized weakness, slurred speech COMPARISON:   CTA head and neck 11/17/2022 TECHNIQUE:  Routine CT imaging of the Brain without contrast.  Post contrast imaging was performed after administration of iodinated contrast through the neck and brain. Post contrast axial 0.625 mm images timed to opacify the arterial system. 3D rendering was performed on an independent workstation. MIP reconstructions performed.  Coronal reconstructions were performed of the noncontrast portion of the brain. Radiation dose length product (DLP) for this visit:  1281 mGy-cm . This examination, like all CT scans performed in the [facility], was performed utilizing techniques to minimize radiation dose exposure, including the use of iterative reconstruction and automated exposure control. IV Contrast:  100 mL of iohexol (OMNIPAQUE) IMAGE QUALITY:   Diagnostic FINDINGS: NONCONTRAST BRAIN PARENCHYMA:  No intracranial mass, mass effect or midline shift. No CT signs of acute infarction. No acute parenchymal hemorrhage. Stable old left caudate lacunar infarct. VENTRICLES AND EXTRA-AXIAL SPACES:  Normal for the patient's age. VISUALIZED ORBITS: Normal visualized orbits. PARANASAL SINUSES: There is left maxillary sinus mucosal thickening with fluid level CERVICAL VASCULATURE AORTIC ARCH AND GREAT VESSELS: Aortic arch and great vessel origins are unremarkable. RIGHT VERTEBRAL ARTERY CERVICAL SEGMENT: The vessel is developmentally smaller than left. The vessel is patent throughout the neck without high-grade stenosis. LEFT VERTEBRAL ARTERY CERVICAL SEGMENT: The vessel origin is unremarkable. The vessel is patent throughout the neck without high-grade stenosis. RIGHT EXTRACRANIAL CAROTID SEGMENT: There is atherosclerotic plaque at the bifurcation. No stenosis of the internal carotid artery. Mild stenosis at the origin of the left external carotid artery. LEFT EXTRACRANIAL CAROTID SEGMENT: The carotid bifurcation and cervical internal carotid artery are unremarkable. No stenosis or dissection. INTRACRANIAL VASCULATURE INTERNAL CAROTID ARTERIES: Mild atherosclerotic disease of cavernous and supraclinoid segments of bilateral internal carotid arteries without critical stenosis. Normal ophthalmic artery origins. ANTERIOR CIRCULATION:  Normal A1 segments. Bilateral anterior cerebral arteries are unremarkable. Normal anterior comminuting artery.  MIDDLE CEREBRAL ARTERY CIRCULATION: Bilateral M1 segments and major M2 branches are patent without high-grade stenosis. DISTAL VERTEBRAL ARTERIES:  Distal vertebral arteries are patent without high-grade stenosis. Posterior inferior cerebellar artery origins are patent. BASILAR ARTERY:  Basilar artery is unremarkable. Normal superior cerebellar arteries. POSTERIOR CEREBRAL ARTERIES: . Bilateral posterior cerebral arteries are patent without high-grade stenosis. Absent/hypoplastic posterior communicating arteries. DURAL VENOUS SINUSES:  Unremarkable. NON VASCULAR ANATOMY BONY STRUCTURES: Degenerative changes of cervical spine. No acute or aggressive appearing osseous abnormality. SOFT TISSUES OF THE NECK:  Unremarkable. THORACIC INLET: There is 3 mm left upper lobe nodule, stable from CTA chest 5/29/2021 favoring benign etiology. Impression: 1. No acute intracranial abnormality. Stable old left caudate lacunar infarct. 2.  Patent major vessels of the [place] of more without high-grade stenosis. No aneurysm. 3.  No hemodynamically significant stenosis or dissection of cervical carotid and vertebral arteries. 4.  Left maxillary sinusitis with fluid level favoring acute/subacute nature. Workstation performed: [id number]       Significant Findings / Test Results:     Other Pertinent Test Results  Results from last 7 days   Lab Units 08/03/23  0634 08/03/23 0127 08/02/23 1411 08/02/23  0538 08/02/23  0026 08/01/23  1438 08/01/23  1210   WBC Thousand/uL  --   --   --  5.61  --   --  6.16   HEMOGLOBIN g/dL  --   --   --  12.6  --   --  13.1   HEMATOCRIT %  --   --   --  36.0  --   --  38.8   MCV fL  --   --   --  107*  --   --  108*   PLATELETS Thousands/uL  --   --   --  286  --   --  278   INR  0.94 0.95 1.08 1.08 1.04   < >  --     < > = values in this interval not displayed.      Results from last 7 days   Lab Units 08/03/23  0634 08/03/23  0127 08/02/23 1411 08/02/23  0538 08/02/23  0026 08/01/23  1945   POTASSIUM mmol/L 3.7 3.3* 3.5 3.0* 2.7* 2.8*   CHLORIDE mmol/L 112* 111* 109* 109* 109* 108   CO2 mmol/L 22 21 19* 17* 17* 17*   BUN mg/dL 7 7 6 8 9 12   CREATININE mg/dL 0.57* 0.59* 0.59* 0.56* 0.56* 0.72   CALCIUM mg/dL 8.8 8.9 8.7 8.7 8.7 8.4   ALK PHOS U/L 93 99 109* 109* 113* 125*   ALT U/L 50 51 57* 58* 61* 64*   AST U/L 165* 174* 182* 195* 219* 228*   EGFR ml/min/1.73sq m 96 95 95 96 96 86   MAGNESIUM mg/dL  --   --   --   --   --  1.8*     Results from last 7 days   Lab Units 08/02/23  0538 08/01/23  1258   CK TOTAL U/L 2,380* 3,961*          Results from last 7 days   Lab Units 08/01/23  1141   POC GLUCOSE mg/dl 109         Results from last 7 days   Lab Units 08/01/23  1229   TSH 3RD GENERATON uIU/mL 1.520                      Incidental Findings:   · None    Test Results Pending at Discharge (will require follow up): · None     Outpatient Tests Requested:  · None    Complications:  None    Reason for Admission: Acetaminophen / Tylenol Toxicity    HPI per Admission:    Margareth Mckeon is a 79y.o. year old female with a PMH of TIA, Stroke with chronic R sided facial weakness and eye droop, bipolar, anxiety, GERD, and HTN was admitted to the medicine floor on 08/01/2023 for progressively generalized weakness and altered mental status. Partial history was provided by her mother. Patient reports that she has felt generally ill since a EGD/colonoscopy roughly 2 weeks ago, she has had some abdominal pain as well as some nausea, and her oral intake has been less due to the symptoms. She also vomited a few times this past 8 days, with clear vomit not correlated with food intake, and she has become more progressively weak. The onset of the weakness and altered mental status came after the patient took the Tylenol at noon on 08/01/2023. Her mother stated she was taking too many pills and she took Tylenol pill along with Xanax 1mg and Norco 3-4 times daily.  She admitted due to her recent abdominal pain, she was taking additional Tylenol, on top of her Norco which she takes 4x a day for back pain. She did have some concern that her voice was slightly slurred so she presented to the emergency department for evaluation. Her neurological symptoms seem to resolved, and she feels her speech is back at her baseline, she had a CT head which showed a stable old left lacunar infarct but no acute stroke. Patient mentioned associated abdominal pain mainly around the RUQ. Patient denied fever, chills, SOB, CP, palpitation, urinary incontinence, or LOC at home. ED Course: Pt was brought in on 08/01/2023 with symptoms of progressive generalized weakness and altered mental status. IV fluids and IV N-acetylcysteine initial dose and second dose were given to patient. Lab shows elevated acetaminophen level in 100 with elevated LFTs,  and ALT 56. VBG with metabolic acidosis. CXR and CTA of the head were done, result negative. NIH screen and PE r/o stroke. Toxicology contacted and NAC protocol was started. Therefore, patient was [facility] surg floor for acetaminophen overdose management. Throughout the hospital stay, serial labs show AST trending down to 165, INR normal, and acetaminophen level < 10. Creatinine borderline 0.57, elevated CK, possible mild rhabdomyolysis related to tylenol toxicity, but trending down from 3961 to 2380 after on IV fluids. Azul Pardon was held during the stay and switched to Oxycodone 5mg PRN q6h for chronic pain.      The patient, initially admitted to the hospital as inpatient, was discharged earlier than expected given the following: acetominophen toxicity sx resolved with NAC treatment. Please see above list of diagnoses and related plan for additional information. Condition at Discharge: good     Discharge Day Visit / Exam: 08/03/2023    Subjective:  Patient is stable, alert, and conversing well. Patient reported she has good appetite and has been eating well.  She reported she still has constipation and hasn't passed bowel movement since admission. Patient denied dizziness, headache, CP, palpitation, SOB, abdominal pain, fever, or chills.     Vitals: Blood Pressure: 117/76 (08/03/23 0544)  Pulse: (!) 109 (08/03/23 0544)  Temperature: 97.8 °F (36.6 °C) (08/03/23 0544)  Temp Source: Oral (08/03/23 0544)  Respirations: 18 (08/03/23 0544)  Height: 5' 4" (162.6 cm) (08/01/23 1839)  Weight - Scale: 69.6 kg (153 lb 7 oz) (08/01/23 1839)  SpO2: 95 % (08/03/23 0800)  Exam:   Physical Exam  Constitutional:       General: She is not in acute distress. Appearance: Normal appearance. She is normal weight. She is not toxic-appearing. HENT:      Head: Normocephalic and atraumatic. Nose: Nose normal.      Mouth/Throat:      Mouth: Mucous membranes are moist.      Pharynx: Oropharynx is clear. Eyes:      Extraocular Movements: Extraocular movements intact. Pupils: Pupils are equal, round, and reactive to light. Cardiovascular:      Rate and Rhythm: Normal rate and regular rhythm. Pulses: Normal pulses. Heart sounds: Normal heart sounds. No murmur heard. Pulmonary:      Effort: Pulmonary effort is normal.      Breath sounds: Normal breath sounds. No wheezing. Chest:      Chest wall: No tenderness. Abdominal:      General: Abdomen is flat. Bowel sounds are normal. There is no distension. Palpations: Abdomen is soft. There is no mass. Tenderness: There is abdominal tenderness. There is no guarding or rebound. Comments: Tenderness on palpation of the RUQ abdomen   Musculoskeletal:         General: Normal range of motion. Cervical back: Normal range of motion. Skin:     General: Skin is warm. Capillary Refill: Capillary refill takes less than 2 seconds. Neurological:      General: No focal deficit present. Mental Status: She is alert. Mental status is at baseline.    Psychiatric:         Mood and Affect: Mood normal.       Discussion with Family: mother    MEDICATIONS  Please see After Visit Summary for reconciled discharge medications provided to patient and family. Discharge instructions/Information to patient and family:   See after visit summary for information provided to patient and family. Diet restrictions:        Diet Orders   (From admission, onward)             Start     Ordered    08/01/23 1826  Diet Regular; Regular House  Diet effective now        References:    Adult Nutrition Support Algorithm    RD Therapeutic Diet Order Protocol   Question Answer Comment   Diet Type Regular    Regular Regular House    RD to adjust diet per protocol? Yes        08/01/23 1825              Activity restrictions: No strenuous activity  Discharge Condition: good    Outpatient Follow-Up  1. DO Keri Goins [address] [facility] [phone] - follow-up within one week    Disposition:     Home    For Discharges to [facility] SNF:   · Not Applicable to this Patient - Not Applicable to this Patient    Planned Readmission: None     Discharge Statement:  I spent 60 minutes discharging the patient. This time was spent on the day of discharge. I had direct contact with the patient on the day of discharge. Greater than 50% of the total time was spent examining patient, answering all patient questions, arranging and discussing plan of care with patient as well as directly providing post-discharge instructions. Additional time then spent on discharge activities. Discharge Medications:  See after visit summary for reconciled discharge medications provided to patient and family.       ** Please Note: This note has been constructed using a voice recognition system **

## 2023-08-03 NOTE — ASSESSMENT & PLAN NOTE
Patient was admitted due to generalized weakness and altered mental status. She had recent EGD/Colonoscopy and had felt sick since, roughly past 9 days with abdominal pain and nausea, poor oral intake, and was taking her chronic Norco as well as additional Tylenol for the abdominal pain. - ED lab shows elevated acetaminophen level in 100 with elevated LFTs,  and ALT 56. VBG with metabolic acidosis  - Toxicology contacted  - NAC protocol finished  - serial monitoring of labs show AST trending down 165, INR normal, and acetaminophen level < 10.  - recent lab shows Creatinine borderline 0.57  - elevated CK but trending down 3961 then 2380, possible mild rhabdomyolysis related to tylenol toxicity, improving, will give IVF.  Recheck CK, in am lab    - hold Norco, continuing Oxycodone 5mg PRN q6h for chronic pain  - If sx resolved with normal lab values, consider discharge within one day

## 2023-08-03 NOTE — NURSING NOTE
Pt dc to home. Dc instructions given and reviewed with pt. Pt verbalizes understanding. Pt left via wheelchair with RN.

## 2023-08-03 NOTE — PROGRESS NOTES
[address]  Progress Note  Name: Anastasia Bryan  MRN: [id number]  Unit/Bed#: 664-92 I Date of Admission: 8/1/2023   Date of Service: 8/3/2023 I Hospital Day: 2    Assessment/Plan   * Tylenol toxicity  Assessment & Plan  Patient was admitted due to generalized weakness and altered mental status. She had recent EGD/Colonoscopy and had felt sick since, roughly past 9 days with abdominal pain and nausea, poor oral intake, and was taking her chronic Norco as well as additional Tylenol for the abdominal pain. - ED lab shows elevated acetaminophen level in 100 with elevated LFTs,  and ALT 56. VBG with metabolic acidosis  - Toxicology contacted  - NAC protocol finished  - serial monitoring of labs show AST trending down 165, INR normal, and acetaminophen level < 10.  - recent lab shows Creatinine borderline 0.57  - elevated CK but trending down 3961 then 2380, possible mild rhabdomyolysis related to tylenol toxicity, improving, will give IVF.  Recheck CK, in am lab    - hold Norco, continuing Oxycodone 5mg PRN q6h for chronic pain  - If sx resolved with normal lab values, consider discharge within one day    Bipolar 1 disorder [facility])  Assessment & Plan  Mood and affect stable in ED.   - continue home med Olanzapine and Quietiapine    Generalized anxiety disorder  Assessment & Plan  Patient takes XANAX 1mg 3-4 times every day  Will schedule for TID as patient reports she takes it that way at home    Constipation  Assessment & Plan  intermittent constipation at home  - docusate  - miralax PRN    Essential hypertension  Assessment & Plan  Blood pressure stable in ED.   - continue home med, Amlodipine  - monitor BP    GERD (gastroesophageal reflux disease)  Assessment & Plan  Hx of GERD on pepcid and protonix 40mg  - continue same home meds    Toxic encephalopathy  Assessment & Plan  Acute, patient presented with mild AMS, was concern for stroke but CT head negative  Suspect due to tylenol toxicity and her other home medications  Has resolved with treatment    VTE Pharmacologic Prophylaxis:   Pharmacologic: Enoxaparin (Lovenox)  Mechanical VTE Prophylaxis in Place: Yes      Discussions with Specialists or Other Care Team Provider: toxicology and case management    Education and Discussions with Family / Patient: patient    Time Spent for Care: 20 minutes. More than 50% of total time spent on counseling and coordination of care as described above. Current Length of Stay: 2 day(s)    Current Patient Status: Inpatient   Certification Statement: The patient will continue to require additional inpatient hospital stay due to IV fluids and acetominphen overdose management    Discharge Plan: if am lab returned with stable values, and patient's sx resolved, consider discharge within 24 hrs. Code Status: Level 1 - Full Code      Subjective:   Patient is stable, alert, and conversing well. Patient reported she has good appetite and has been eating well. She reported she still has constipation and hasn't passed bowel movement since admission. Patient denied dizziness, headache, CP, palpitation, SOB, abdominal pain, fever, or chills. Objective:     Vitals:   Temp (24hrs), Av.8 °F (36.6 °C), Min:97.7 °F (36.5 °C), Max:97.8 °F (36.6 °C)    Temp:  [97.7 °F (36.5 °C)-97.8 °F (36.6 °C)] 97.8 °F (36.6 °C)  HR:  [101-111] 109  Resp:  [18-20] 18  BP: (117-128)/(76-80) 117/76  SpO2:  [93 %-98 %] 95 %  Body mass index is 26.34 kg/m². Input and Output Summary (last 24 hours): Intake/Output Summary (Last 24 hours) at 8/3/2023 0933  Last data filed at 8/3/2023 2763  Gross per 24 hour   Intake 240 ml   Output --   Net 240 ml       Physical Exam:     Physical Exam  Vitals reviewed. Constitutional:       Appearance: Normal appearance. She is not toxic-appearing. HENT:      Head: Normocephalic and atraumatic. Mouth/Throat:      Mouth: Mucous membranes are moist.      Pharynx: Oropharynx is clear. Eyes:      Pupils: Pupils are equal, round, and reactive to light. Cardiovascular:      Rate and Rhythm: Normal rate and regular rhythm. Pulses: Normal pulses. Heart sounds: Normal heart sounds. Pulmonary:      Effort: Pulmonary effort is normal.      Breath sounds: Normal breath sounds. No wheezing. Abdominal:      General: Abdomen is flat. Bowel sounds are normal.      Palpations: Abdomen is soft. Tenderness: There is abdominal tenderness. Comments: Tenderness with palpation of the RUQ abdomen   Musculoskeletal:         General: Normal range of motion. Cervical back: Normal range of motion. Skin:     General: Skin is warm. Capillary Refill: Capillary refill takes less than 2 seconds. Neurological:      General: No focal deficit present. Mental Status: She is alert. Mental status is at baseline. Psychiatric:         Mood and Affect: Mood normal.         Additional Data:     Labs:    Results from last 7 days   Lab Units 08/02/23  0538 08/01/23  1210   WBC Thousand/uL 5.61 6.16   HEMOGLOBIN g/dL 12.6 13.1   HEMATOCRIT % 36.0 38.8   PLATELETS Thousands/uL 286 278   NEUTROS PCT %  --  62   LYMPHS PCT %  --  21   MONOS PCT %  --  11   EOS PCT %  --  3     Results from last 7 days   Lab Units 08/03/23  0634   SODIUM mmol/L 141   POTASSIUM mmol/L 3.7   CHLORIDE mmol/L 112*   CO2 mmol/L 22   BUN mg/dL 7   CREATININE mg/dL 0.57*   ANION GAP mmol/L 7   CALCIUM mg/dL 8.8   ALBUMIN g/dL 3.0*   TOTAL BILIRUBIN mg/dL 0.78   ALK PHOS U/L 93   ALT U/L 50   AST U/L 165*   GLUCOSE RANDOM mg/dL 101     Results from last 7 days   Lab Units 08/03/23  0634   INR  0.94     Results from last 7 days   Lab Units 08/01/23  1141   POC GLUCOSE mg/dl 109                   * I Have Reviewed All Lab Data Listed Above. * Additional Pertinent Lab Tests Reviewed: [address] [place] [address] Admission Reviewed    Imaging:    CTA head and neck with and without contrast   Final Result      1.   No acute intracranial abnormality. Stable old left caudate lacunar infarct. 2.  Patent major vessels of the [place] of more without high-grade stenosis. No aneurysm. 3.  No hemodynamically significant stenosis or dissection of cervical carotid and vertebral arteries. 4.  Left maxillary sinusitis with fluid level favoring acute/subacute nature. Workstation performed: [id number]         XR chest 1 view portable   Final Result      No acute cardiopulmonary disease.                   Workstation performed: [id number]                 Imaging Personally Reviewed by Myself     Recent Cultures (last 7 days):           Last 24 Hours Medication List:   Current Facility-Administered Medications   Medication Dose Route Frequency Provider Last Rate   • albuterol  2 puff Inhalation Q4H PRN Catrachito Lefty Counts, DO     • ALPRAZolam  1 mg Oral TID Catrachito Lefty Counts, DO     • amLODIPine  5 mg Oral Daily Catrachito Lefty Counts, DO     • aspirin  81 mg Oral Daily Catrachito Lefty Counts, DO     • docusate sodium  100 mg Oral BID Catrachito Lefty Counts, DO     • enoxaparin  40 mg Subcutaneous Daily Catrachito Lefty Counts, DO     • famotidine  20 mg Oral Daily Catrachito Lefty Counts, DO     • metoprolol succinate  100 mg Oral Daily Catrachito Lefty Counts, DO     • OLANZapine  20 mg Oral HS Catrachito Lefty Counts, DO     • ondansetron  4 mg Intravenous Q8H PRN Catrachito Lefty Counts, DO     • oxyCODONE  5 mg Oral Q6H PRN Catrachito Lefty Counts, DO     • pantoprazole  40 mg Oral BID AC Catrachito Lefty Counts, DO     • polyethylene glycol  17 g Oral Daily Catrachito Lefty Counts, DO     • polyethylene glycol  17 g Oral Daily PRN Catrachito Lefty Counts, DO     • QUEtiapine  300 mg Oral HS Catrachito Lefty Counts, DO     • sodium chloride  75 mL/hr Intravenous Continuous Catrachito Lefty Counts, DO 75 mL/hr (08/02/23 1349)        Today, Patient Was Seen By: William Elliott DO    ** Please Note: Dictation voice to text software may have been used in the creation of this document.  **

## 2023-08-03 NOTE — PLAN OF CARE
Problem: PAIN - ADULT  Goal: Verbalizes/displays adequate comfort level or baseline comfort level  Description: Interventions:  - Encourage patient to monitor pain and request assistance  - Assess pain using appropriate pain scale  - Administer analgesics based on type and severity of pain and evaluate response  - Implement non-pharmacological measures as appropriate and evaluate response  - Consider cultural and social influences on pain and pain management  - Notify physician/advanced practitioner if interventions unsuccessful or patient reports new pain  Outcome: Progressing     Problem: INFECTION - ADULT  Goal: Absence or prevention of progression during hospitalization  Description: INTERVENTIONS:  - Assess and monitor for signs and symptoms of infection  - Monitor lab/diagnostic results  - Monitor all insertion sites, i.e. indwelling lines, tubes, and drains  - Monitor endotracheal if appropriate and nasal secretions for changes in amount and color  - [place] appropriate cooling/warming therapies per order  - Administer medications as ordered  - Instruct and encourage patient and family to use good hand hygiene technique  - Identify and instruct in appropriate isolation precautions for identified infection/condition  Outcome: Progressing     Problem: SAFETY ADULT  Goal: Patient will remain free of falls  Description: INTERVENTIONS:  - Educate patient/family on patient safety including physical limitations  - Instruct patient to call for assistance with activity   - Consult OT/PT to assist with strengthening/mobility   - Keep Call bell within reach  - Keep bed low and locked with side rails adjusted as appropriate  - Keep care items and personal belongings within reach  - Initiate and maintain comfort rounds  - Make Fall Risk Sign visible to staff  - Offer Toileting every 1-2 Hours, in advance of need  - Initiate/Maintain bed and chair alarm  - Obtain necessary fall risk management equipment: fall socks and call bell in reach  - Apply yellow socks and bracelet for high fall risk patients  - Consider moving patient to room near nurses station  Outcome: Progressing  Goal: Maintain or return to baseline ADL function  Description: INTERVENTIONS:  -  Assess patient's ability to carry out ADLs; assess patient's baseline for ADL function and identify physical deficits which impact ability to perform ADLs (bathing, care of mouth/teeth, toileting, grooming, dressing, etc.)  - Assess/evaluate cause of self-care deficits   - Assess range of motion  - Assess patient's mobility; develop plan if impaired  - Assess patient's need for assistive devices and provide as appropriate  - Encourage maximum independence but intervene and supervise when necessary  - Involve family in performance of ADLs  - Assess for home care needs following discharge   - Consider OT consult to assist with ADL evaluation and planning for discharge  - Provide patient education as appropriate  Outcome: Progressing  Goal: Maintains/Returns to pre admission functional level  Description: INTERVENTIONS:  - Perform BMAT or MOVE assessment daily.   - Set and communicate daily mobility goal to care team and patient/family/caregiver. - Collaborate with rehabilitation services on mobility goals if consulted  - Perform Range of Motion 3-4 times a day. - Reposition patient every 1-2 hours.   - Dangle patient 3-4 times a day  - Stand patient 3-4 times a day  - Ambulate patient 3-4 times a day  - Out of bed to chair 3-4 times a day   - Out of bed for meals 3-4 times a day  - Out of bed for toileting  - Record patient progress and toleration of activity level   Outcome: Progressing     Problem: DISCHARGE PLANNING  Goal: Discharge to home or other facility with appropriate resources  Description: INTERVENTIONS:  - Identify barriers to discharge w/patient and caregiver  - Arrange for needed discharge resources and transportation as appropriate  - Identify discharge learning needs (meds, wound care, etc.)  - Arrange for interpretive services to assist at discharge as needed  - Refer to Case Management Department for coordinating discharge planning if the patient needs post-hospital services based on physician/advanced practitioner order or complex needs related to functional status, cognitive ability, or social support system  Outcome: Progressing     Problem: Knowledge Deficit  Goal: Patient/family/caregiver demonstrates understanding of disease process, treatment plan, medications, and discharge instructions  Description: Complete learning assessment and assess knowledge base.   Interventions:  - Provide teaching at level of understanding  - Provide teaching via preferred learning methods  Outcome: Progressing     Problem: METABOLIC, FLUID AND ELECTROLYTES - ADULT  Goal: Electrolytes maintained within normal limits  Description: INTERVENTIONS:  - Monitor labs and assess patient for signs and symptoms of electrolyte imbalances  - Administer electrolyte replacement as ordered  - Monitor response to electrolyte replacements, including repeat lab results as appropriate  - Instruct patient on fluid and nutrition as appropriate  Outcome: Progressing  Goal: Fluid balance maintained  Description: INTERVENTIONS:  - Monitor labs   - Monitor I/O and WT  - Instruct patient on fluid and nutrition as appropriate  - Assess for signs & symptoms of volume excess or deficit  Outcome: Progressing  Goal: Glucose maintained within target range  Description: INTERVENTIONS:  - Monitor Blood Glucose as ordered  - Assess for signs and symptoms of hyperglycemia and hypoglycemia  - Administer ordered medications to maintain glucose within target range  - Assess nutritional intake and initiate nutrition service referral as needed  Outcome: Progressing     Problem: MUSCULOSKELETAL - ADULT  Goal: Maintain or return mobility to safest level of function  Description: INTERVENTIONS:  - Assess patient's ability to carry out ADLs; assess patient's baseline for ADL function and identify physical deficits which impact ability to perform ADLs (bathing, care of mouth/teeth, toileting, grooming, dressing, etc.)  - Assess/evaluate cause of self-care deficits   - Assess range of motion  - Assess patient's mobility  - Assess patient's need for assistive devices and provide as appropriate  - Encourage maximum independence but intervene and supervise when necessary  - Involve family in performance of ADLs  - Assess for home care needs following discharge   - Consider OT consult to assist with ADL evaluation and planning for discharge  - Provide patient education as appropriate  Outcome: Progressing  Goal: Maintain proper alignment of affected body part  Description: INTERVENTIONS:  - Support, maintain and protect limb and body alignment  - Provide patient/ family with appropriate education  Outcome: Progressing

## 2023-08-03 NOTE — PLAN OF CARE
Problem: OCCUPATIONAL THERAPY ADULT  Goal: Performs self-care activities at highest level of function for planned discharge setting. See evaluation for individualized goals. Description: Treatment Interventions: ADL retraining, Functional transfer training, UE strengthening/ROM, Endurance training, Cognitive reorientation, Equipment evaluation/education, Energy conservation, Activityengagement  Equipment Recommended: Other (comment) (RW)       See flowsheet documentation for full assessment, interventions and recommendations. 8/3/2023 1155 by Clifford Arenas OT  Note: Limitation: Decreased ADL status, Decreased UE strength, Decreased Safe judgement during ADL, Decreased endurance, Decreased self-care trans, Decreased high-level ADLs, Decreased cognition  Prognosis: Good  Assessment: Pt is a 79 y.o. female who was admitted to [address] [place] [address] on 8/1/2023 w/ Tylenol toxicity. Pt has a past medical history of HTN, bipolar disorder, anxiety, GERD and constipation. Pt lives alone in a 2 story home with [facility] and a [facility] available. At baseline, pt was completing ADLs independently and functional mobility and transfers independently no AD. Currently, pt requires min A to supervision overall for ADLs and supervision for mobility and transfers. Pt presents with deficits in the following categories: limited home support, difficulty performing ADLS and difficulty performing IADLS  activity tolerance, endurance, standing balance/tolerance, UE strength, safety  and judgement . These deficits, along with pt's  pain limit the pt's ability to safely engage in areas of occupation such as: bathing/shower, toilet hygiene, dressing, medication management, health maintenance, functional mobility, community mobility, cleaning, meal prep and household maintenance. Pt would benefit from continued skilled acute OT in order to maximize independence and safety in ADLs, mobility, and transfers.  OT would recommend pt discharge to home with home health OT when medically cleared. Pt's raw score on the AM-PAC Daily Activity inpatient short form is 20, standardized score is 42.03, greater than 39.4. Patients at this level are likely to benefit from DC to home with home health however, please refer to therapist recommendation for safe DC planning. OT will work towards the established goals.      OT Discharge Recommendation: Home with home health rehabilitation

## 2023-08-03 NOTE — ASSESSMENT & PLAN NOTE
Patient was admitted due to generalized weakness and altered mental status. She had recent EGD/Colonoscopy and had felt sick since, roughly past 9 days with abdominal pain and nausea, poor oral intake, and was taking her chronic Norco as well as additional Tylenol for the abdominal pain. - ED lab shows elevated acetaminophen level in 100 with elevated LFTs,  and ALT 56. VBG with metabolic acidosis  - Toxicology contacted  - NAC protocol finished  - serial monitoring of labs show AST trending down 165, INR normal, and acetaminophen level < 10.  - recent lab shows Creatinine borderline 0.57  - elevated CK but trending down 3961 then 2380, possible mild rhabdomyolysis related to tylenol toxicity.  Patient was put on IV, sx improved  - Norco was held and switched to Oxycodone 5mg PRN q6h for chronic pain during the stay  - Sx resolved now with above treatment    Plan:  - at home patient will take [address] for chronic pain, avoid additional Tylenol 1 = Total assistance

## 2023-08-03 NOTE — CASE MANAGEMENT
Case Management Discharge Planning Note    Patient name Violeta Ramos  Location [address] [facility] [place] [id number] MRN [id number]  : 1955 Date 8/3/2023       Current Admission Date: 2023  Current Admission Diagnosis:Bipolar 1 disorder [facility])   Patient Active Problem List    Diagnosis Date Noted   • Constipation 2023   • Chronic pain of right ankle 2022   • Nocturnal hypoxia 2022   • Dysphagia 2022   • Vitamin D insufficiency 2022   • Low TSH level 2022   • Cerebrovascular disease    • Diastolic dysfunction    • Tobacco use 2022   • Acute metabolic encephalopathy 10/85/0787   • Chronic pain 2022   • Closed fracture of ankle, bimalleolar, right, sequela 2022   • CVA (cerebral vascular accident) ([address]) 2022   • Pure hypercholesterolemia 2021   • Chronic obstructive pulmonary disease ([address]) 2021   • Anemia 2021   • Bipolar disease, chronic ([address]) 2021   • Rhabdomyolysis 2021   • Neck pain 2021   • Poor venous access 2021   • Hypertension 2021   • History of CVA (cerebrovascular accident) 2021   • Acute encephalopathy 2021   • Hypothermia 2021   • Elevated d-dimer 2021   • Acute encephalopathy 2021   • Overdose of drug, undetermined intent, initial encounter 2021   • Slurred speech 10/15/2020   • Status post open reduction with internal fixation (ORIF) of fracture of ankle 2019   • Essential hypertension 2019   • History of tachycardia 2019   • Acute right ankle pain 2019   • Colon cancer screening 2018   • GERD (gastroesophageal reflux disease) 2018   • Elevated MCV 2017   • Bipolar 1 disorder ([address])    • Renal cell carcinoma (HCC)    • Nausea and vomiting 2016   • Palpitations 2016   • Insomnia 2015   • Difficulty sleeping 2015   • Dental disorder 2015   • Allergic rhinitis 2015   • Nail fungal infection 07/16/2015   • Abnormal liver function test 03/10/2015   • Right hip pain 11/03/2014   • Herpes zoster 10/28/2014   • Shoulder pain, right 10/20/2014   • Abnormal gait 09/03/2014   • Vitamin D deficiency 02/19/2014   • Osteoporosis 02/19/2014   • Hip fracture, osteoporotic ([address]) 02/19/2014   • Anxiety 10/16/2013   • Yeast vaginitis 10/14/2013   • Posterior dislocation of hip, closed ([address]) 09/19/2013   • Chronic low back pain 07/26/2013   • Prosthetic hip infection ([address]) 06/16/2013   • Depression 06/09/2013   • Tobacco abuse 06/09/2013   • Generalized anxiety disorder 06/09/2013      LOS (days): 2  Geometric Mean LOS (GMLOS) (days): 3.60  Days to GMLOS:1.8     OBJECTIVE:  Risk of Unplanned Readmission Score: 17.02         Current admission status: Inpatient   Preferred Pharmacy:   [address] [facility], [address]  [address]  [place] [address]  Phone: [phone] Fax: [phone]    Primary Care Provider: Marshell Seip, DO    Primary Insurance: Selin THORNTON  Secondary Insurance: [facility] DETAILS:     Pt is being dc'd home on this date with OP follow up.

## 2023-08-04 NOTE — UTILIZATION REVIEW
NOTIFICATION OF ADMISSION DISCHARGE   This is a Notification of Discharge from [facility]. Please be advised that this patient has been discharge from our facility. Below you will find the admission and discharge date and time including the patient’s disposition. UTILIZATION REVIEW CONTACT:  Nara Bird  Utilization   Network Utilization Review Department  Phone: [phone] x carefully listen to the prompts. All voicemails are confidential.  Email: [email or web address]     ADMISSION INFORMATION  PRESENTATION DATE: 8/1/2023 11:45 AM  OBERVATION ADMISSION DATE:   INPATIENT ADMISSION DATE: 8/1/23  3:40 PM   DISCHARGE DATE: 8/3/2023 12:30 PM   DISPOSITION:Home/Self Care    IMPORTANT INFORMATION:  Send all requests for admission clinical reviews, approved or denied determinations and any other requests to dedicated fax number below belonging to the campus where the patient is receiving treatment.  List of dedicated fax numbers:  [place] DENIALS (Administrative/Medical Necessity) [phone] [address] [facility] (Maternity/NICU/Pediatrics) [phone]   [facility] [phone]   [facility] [phone] [address] [phone]   [address] [place] [address] [phone]   [facility] [phone]   [address] [place] [address] [facility] [phone]   [address] [place] [address] [phone] [address] [facility] [phone] [address] [facility] [address] [facility] [phone]

## 2023-08-09 ENCOUNTER — APPOINTMENT (OUTPATIENT)
Dept: LAB | Facility: MEDICAL CENTER | Age: 68
End: 2023-08-09
Payer: MEDICARE

## 2023-08-09 DIAGNOSIS — R74.8 ELEVATED LIVER ENZYMES: ICD-10-CM

## 2023-08-09 LAB
ALBUMIN SERPL BCP-MCNC: 3.2 G/DL (ref 3.5–5)
ALP SERPL-CCNC: 127 U/L (ref 46–116)
ALT SERPL W P-5'-P-CCNC: 82 U/L (ref 12–78)
AST SERPL W P-5'-P-CCNC: 123 U/L (ref 5–45)
BILIRUB DIRECT SERPL-MCNC: 0.24 MG/DL (ref 0–0.2)
BILIRUB SERPL-MCNC: 0.51 MG/DL (ref 0.2–1)
CK SERPL-CCNC: 974 U/L (ref 26–192)
PROT SERPL-MCNC: 6.9 G/DL (ref 6.4–8.4)

## 2023-08-09 PROCEDURE — 80076 HEPATIC FUNCTION PANEL: CPT

## 2023-08-09 PROCEDURE — 36415 COLL VENOUS BLD VENIPUNCTURE: CPT

## 2023-08-09 PROCEDURE — 82550 ASSAY OF CK (CPK): CPT

## 2024-02-21 PROBLEM — Z12.11 COLON CANCER SCREENING: Status: RESOLVED | Noted: 2018-07-26 | Resolved: 2024-02-21

## 2024-05-28 ENCOUNTER — HOSPITAL ENCOUNTER (INPATIENT)
Facility: HOSPITAL | Age: 69
LOS: 1 days | Discharge: HOME | DRG: 056 | End: 2024-05-30
Attending: EMERGENCY MEDICINE | Admitting: FAMILY MEDICINE
Payer: MEDICARE

## 2024-05-28 ENCOUNTER — APPOINTMENT (EMERGENCY)
Dept: CT IMAGING | Facility: HOSPITAL | Age: 69
DRG: 056 | End: 2024-05-28
Payer: MEDICARE

## 2024-05-28 DIAGNOSIS — R47.81 SLURRED SPEECH: ICD-10-CM

## 2024-05-28 DIAGNOSIS — R29.90 STROKE-LIKE SYMPTOMS: Primary | ICD-10-CM

## 2024-05-28 LAB
2HR DELTA HS TROPONIN: >1 NG/L
ANION GAP SERPL CALCULATED.3IONS-SCNC: 8 MMOL/L (ref 4–13)
APAP SERPL-MCNC: 5 UG/ML (ref 10–20)
APTT PPP: 28 SECONDS (ref 23–37)
BUN SERPL-MCNC: 15 MG/DL (ref 5–25)
CALCIUM SERPL-MCNC: 9.1 MG/DL (ref 8.4–10.2)
CARDIAC TROPONIN I PNL SERPL HS: 3 NG/L
CARDIAC TROPONIN I PNL SERPL HS: <2 NG/L
CHLORIDE SERPL-SCNC: 112 MMOL/L (ref 96–108)
CO2 SERPL-SCNC: 23 MMOL/L (ref 21–32)
CREAT SERPL-MCNC: 1.05 MG/DL (ref 0.6–1.3)
ERYTHROCYTE [DISTWIDTH] IN BLOOD BY AUTOMATED COUNT: 14.3 % (ref 11.6–15.1)
ETHANOL SERPL-MCNC: <10 MG/DL
FLUAV RNA RESP QL NAA+PROBE: NEGATIVE
FLUBV RNA RESP QL NAA+PROBE: NEGATIVE
GFR SERPL CREATININE-BSD FRML MDRD: 54 ML/MIN/1.73SQ M
GLUCOSE SERPL-MCNC: 108 MG/DL (ref 65–140)
GLUCOSE SERPL-MCNC: 96 MG/DL (ref 65–140)
HCT VFR BLD AUTO: 36.5 % (ref 34.8–46.1)
HGB BLD-MCNC: 12.2 G/DL (ref 11.5–15.4)
INR PPP: 0.9 (ref 0.84–1.19)
MCH RBC QN AUTO: 36.2 PG (ref 26.8–34.3)
MCHC RBC AUTO-ENTMCNC: 33.4 G/DL (ref 31.4–37.4)
MCV RBC AUTO: 108 FL (ref 82–98)
PLATELET # BLD AUTO: 218 THOUSANDS/UL (ref 149–390)
PMV BLD AUTO: 8.3 FL (ref 8.9–12.7)
POTASSIUM SERPL-SCNC: 3.7 MMOL/L (ref 3.5–5.3)
PROTHROMBIN TIME: 12.1 SECONDS (ref 11.6–14.5)
RBC # BLD AUTO: 3.37 MILLION/UL (ref 3.81–5.12)
RSV RNA RESP QL NAA+PROBE: NEGATIVE
SALICYLATES SERPL-MCNC: <5 MG/DL (ref 3–20)
SARS-COV-2 RNA RESP QL NAA+PROBE: NEGATIVE
SODIUM SERPL-SCNC: 143 MMOL/L (ref 135–147)
WBC # BLD AUTO: 4.45 THOUSAND/UL (ref 4.31–10.16)

## 2024-05-28 PROCEDURE — 85610 PROTHROMBIN TIME: CPT

## 2024-05-28 PROCEDURE — 99285 EMERGENCY DEPT VISIT HI MDM: CPT | Performed by: EMERGENCY MEDICINE

## 2024-05-28 PROCEDURE — 84484 ASSAY OF TROPONIN QUANT: CPT

## 2024-05-28 PROCEDURE — 85027 COMPLETE CBC AUTOMATED: CPT

## 2024-05-28 PROCEDURE — 87505 NFCT AGENT DETECTION GI: CPT | Performed by: FAMILY MEDICINE

## 2024-05-28 PROCEDURE — 85730 THROMBOPLASTIN TIME PARTIAL: CPT

## 2024-05-28 PROCEDURE — 0241U HB NFCT DS VIR RESP RNA 4 TRGT: CPT

## 2024-05-28 PROCEDURE — 99245 OFF/OP CONSLTJ NEW/EST HI 55: CPT | Performed by: STUDENT IN AN ORGANIZED HEALTH CARE EDUCATION/TRAINING PROGRAM

## 2024-05-28 PROCEDURE — 87493 C DIFF AMPLIFIED PROBE: CPT | Performed by: FAMILY MEDICINE

## 2024-05-28 PROCEDURE — 82077 ASSAY SPEC XCP UR&BREATH IA: CPT

## 2024-05-28 PROCEDURE — 80048 BASIC METABOLIC PNL TOTAL CA: CPT

## 2024-05-28 PROCEDURE — 80143 DRUG ASSAY ACETAMINOPHEN: CPT

## 2024-05-28 PROCEDURE — 70496 CT ANGIOGRAPHY HEAD: CPT

## 2024-05-28 PROCEDURE — 80179 DRUG ASSAY SALICYLATE: CPT

## 2024-05-28 PROCEDURE — 36415 COLL VENOUS BLD VENIPUNCTURE: CPT

## 2024-05-28 PROCEDURE — 93005 ELECTROCARDIOGRAM TRACING: CPT

## 2024-05-28 PROCEDURE — 99223 1ST HOSP IP/OBS HIGH 75: CPT | Performed by: FAMILY MEDICINE

## 2024-05-28 PROCEDURE — 70498 CT ANGIOGRAPHY NECK: CPT

## 2024-05-28 PROCEDURE — 82948 REAGENT STRIP/BLOOD GLUCOSE: CPT

## 2024-05-28 PROCEDURE — 84484 ASSAY OF TROPONIN QUANT: CPT | Performed by: FAMILY MEDICINE

## 2024-05-28 PROCEDURE — NC001 PR NO CHARGE: Performed by: STUDENT IN AN ORGANIZED HEALTH CARE EDUCATION/TRAINING PROGRAM

## 2024-05-28 PROCEDURE — 99285 EMERGENCY DEPT VISIT HI MDM: CPT

## 2024-05-28 RX ORDER — ASPIRIN 81 MG/1
81 TABLET, CHEWABLE ORAL DAILY
Status: DISCONTINUED | OUTPATIENT
Start: 2024-05-28 | End: 2024-05-30 | Stop reason: HOSPADM

## 2024-05-28 RX ORDER — FAMOTIDINE 20 MG/1
20 TABLET, FILM COATED ORAL DAILY
Status: DISCONTINUED | OUTPATIENT
Start: 2024-05-28 | End: 2024-05-30 | Stop reason: HOSPADM

## 2024-05-28 RX ORDER — QUETIAPINE FUMARATE 100 MG/1
300 TABLET, FILM COATED ORAL
Status: DISCONTINUED | OUTPATIENT
Start: 2024-05-28 | End: 2024-05-30 | Stop reason: HOSPADM

## 2024-05-28 RX ORDER — CLOPIDOGREL BISULFATE 75 MG/1
300 TABLET ORAL ONCE
Status: COMPLETED | OUTPATIENT
Start: 2024-05-28 | End: 2024-05-28

## 2024-05-28 RX ORDER — ATORVASTATIN CALCIUM 40 MG/1
40 TABLET, FILM COATED ORAL EVERY EVENING
Status: DISCONTINUED | OUTPATIENT
Start: 2024-05-28 | End: 2024-05-30 | Stop reason: HOSPADM

## 2024-05-28 RX ORDER — OLANZAPINE 10 MG/1
20 TABLET ORAL
Status: DISCONTINUED | OUTPATIENT
Start: 2024-05-28 | End: 2024-05-30 | Stop reason: HOSPADM

## 2024-05-28 RX ORDER — ALPRAZOLAM 0.5 MG/1
1 TABLET ORAL 3 TIMES DAILY PRN
Status: DISCONTINUED | OUTPATIENT
Start: 2024-05-28 | End: 2024-05-30 | Stop reason: HOSPADM

## 2024-05-28 RX ORDER — PANTOPRAZOLE SODIUM 40 MG/1
40 TABLET, DELAYED RELEASE ORAL
Status: DISCONTINUED | OUTPATIENT
Start: 2024-05-28 | End: 2024-05-30 | Stop reason: HOSPADM

## 2024-05-28 RX ORDER — HEPARIN SODIUM 5000 [USP'U]/ML
5000 INJECTION, SOLUTION INTRAVENOUS; SUBCUTANEOUS EVERY 8 HOURS SCHEDULED
Status: DISCONTINUED | OUTPATIENT
Start: 2024-05-28 | End: 2024-05-30 | Stop reason: HOSPADM

## 2024-05-28 RX ORDER — ATORVASTATIN CALCIUM 40 MG/1
40 TABLET, FILM COATED ORAL EVERY EVENING
Status: DISCONTINUED | OUTPATIENT
Start: 2024-05-28 | End: 2024-05-28

## 2024-05-28 RX ORDER — METOPROLOL SUCCINATE 100 MG/1
100 TABLET, EXTENDED RELEASE ORAL DAILY
Status: DISCONTINUED | OUTPATIENT
Start: 2024-05-28 | End: 2024-05-30 | Stop reason: HOSPADM

## 2024-05-28 RX ORDER — NICOTINE 21 MG/24HR
1 PATCH, TRANSDERMAL 24 HOURS TRANSDERMAL DAILY
Status: DISCONTINUED | OUTPATIENT
Start: 2024-05-28 | End: 2024-05-30 | Stop reason: HOSPADM

## 2024-05-28 RX ORDER — HYDROCODONE BITARTRATE AND ACETAMINOPHEN 5; 325 MG/1; MG/1
1 TABLET ORAL EVERY 6 HOURS PRN
Status: DISCONTINUED | OUTPATIENT
Start: 2024-05-28 | End: 2024-05-30 | Stop reason: HOSPADM

## 2024-05-28 RX ADMIN — HEPARIN SODIUM 5000 UNITS: 5000 INJECTION, SOLUTION INTRAVENOUS; SUBCUTANEOUS at 21:24

## 2024-05-28 RX ADMIN — IOHEXOL 85 ML: 350 INJECTION, SOLUTION INTRAVENOUS at 11:30

## 2024-05-28 RX ADMIN — ALPRAZOLAM 1 MG: 0.5 TABLET ORAL at 14:58

## 2024-05-28 RX ADMIN — QUETIAPINE FUMARATE 300 MG: 100 TABLET ORAL at 21:24

## 2024-05-28 RX ADMIN — ATORVASTATIN CALCIUM 40 MG: 40 TABLET, FILM COATED ORAL at 19:28

## 2024-05-28 RX ADMIN — PANTOPRAZOLE SODIUM 40 MG: 40 TABLET, DELAYED RELEASE ORAL at 15:33

## 2024-05-28 RX ADMIN — FAMOTIDINE 20 MG: 20 TABLET, FILM COATED ORAL at 14:58

## 2024-05-28 RX ADMIN — CLOPIDOGREL 300 MG: 75 TABLET ORAL at 14:59

## 2024-05-28 RX ADMIN — ASPIRIN 81 MG 81 MG: 81 TABLET ORAL at 14:59

## 2024-05-28 RX ADMIN — METOPROLOL SUCCINATE 100 MG: 100 TABLET, EXTENDED RELEASE ORAL at 15:33

## 2024-05-28 RX ADMIN — HYDROCODONE BITARTRATE AND ACETAMINOPHEN 1 TABLET: 5; 325 TABLET ORAL at 19:34

## 2024-05-28 RX ADMIN — OLANZAPINE 20 MG: 10 TABLET, FILM COATED ORAL at 21:24

## 2024-05-28 RX ADMIN — HEPARIN SODIUM 5000 UNITS: 5000 INJECTION, SOLUTION INTRAVENOUS; SUBCUTANEOUS at 14:59

## 2024-05-28 NOTE — CONSULTS
"TeleConsultation - Stroke   Justine Odell 68 y.o. female MRN: [id number]  Unit/Bed#: TR05 Encounter: [id number]      VIRTUAL CARE DOCUMENTATION:     1. This service was provided via Telemedicine using [employer]     2. Parties in the room with patient during teleconsult Patient only    3. Confidentiality My office door was closed     4. Participants No one else was in the room    5. Patient acknowledged consent and understanding of privacy and security of the  Telemedicine consult. I informed the patient that I have reviewed their record in Epic and presented the opportunity for them to ask any questions regarding the visit today.  The patient agreed to participate.    6. Time spent 35 minutes       Assessment & Plan   Justine Odell is a 68 year old woman with PMHx including prior stroke with residual right-sided weakness (mild) and dysarthria who presented to the hospital for evaluation of generalized weakness/malaise and worsened dysarthria from baseline. NIHSS 1 for dysarthria. Pt reported that she felt dysarthria began to seem worse around 4 AM this morning. She was up for most of the night. Otherwise, she \"feels like crap\" without otherwise providing details. She additionally was noted to have a syncopal episode prior to arrival to the ED with BP 100s systolic on presentation. She denied taking any more medication than she typically does. Presentation appears more related to toxic-metabolic encephalopathy, but with stroke history and worsened dysarthria from baseline, will rule out new stroke. Takes aspirin 81 mg daily at home. CT head with no acute findings (noted to have chronic strokes), and CTA H/N with no LVO or high-grade stenosis, just focal vessel tortuosity more visible on imaging today compared to prior imaging but also present at that time.     TPA Decision: Patient not a candidate. Unclear time of onset outside appropriate time window.  Not an endovascular candidate since no LVO    Plan:   Acute " "ischemic stroke protocol  Aspirin 81 mg daily (states that this is her home dose, would verify)  Plavix load of 300 mg in the ER, then start Plavix 75 mg daily starting tomorrow. If MRI brain negative, can discontinue Plavix and keep pt on aspirin monotherapy. If MRI brain positive, will need to revisit antiplatelet plan  Atorvastatin 40 mg qHS  MRI brain without contrast   Echo   Telemetry  Lipid panel, HbA1C, TSH, UA, UDS   Secondary stroke risk factor modification  Permissive hypertension with max of systolic  for today, pending MRI brain  Goal of normothermia and euglycemia   Smoking cessation advised   PT/OT/ST  Stroke Education  Frequent neurological checks  Stat CT head with worsening in neuro exam  Notify neurology with any changes in exam  Metabolic/infectious workup per primary    Justine Odell will need follow up in in 6 weeks with neurovascular attending or advance practitioner. She will not require outpatient neurological testing.    History of Present Illness   Reason for Consult / Principal Problem: dysarthria  Patient last known well: 0400 this morning  Stroke alert called: 1043  Neurology time of arrival: immediate over phone, seen over teleconsult after return from CT scan  HPI: Justine Odell is a 68 y.o. woman with PMHx including prior stroke with residual right-sided weakness and dysarthria who presented to the hospital for evaluation of generalized weakness and worsened dysarthria from baseline.    Pt stated that she was having difficulty sleeping last night. She first noted worsening in her dysarthria around 0400 which persisted. She reportedly was \"feeling like crap\" this morning with generalized weakness/fatigue/malaise, so family brought her to an outpatient center for evaluation. She was then sent to the hospital for further evaluation. On the way to the hospital, she had a brief syncopal episode without any obvious seizure-like activity or post-ictal period, but was difficult to get " her out of the car as she was very fatigued. Stroke alert called due to dysarthria that pt reported was worse from her baseline. NIHSS 1 for dysarthria. She reports taking aspirin 81 mg daily (not 325 mg as noted in her chart), and is compliant. She appears tired. She feels her residual right-sided weakness is at baseline, and she did not otherwise report any other symptoms.     Consults    Review of Systems  See HPI    Historical Information   Past Medical History:   Diagnosis Date    Bipolar 1 disorder (HCC)     Cancer (HCC)     kidney    Cardiac disease     fast heart beat    Chronic pain     Fractures     GERD (gastroesophageal reflux disease)     Hard of hearing     Hypertension     Renal cell carcinoma (HCC)     Stroke (HCC)     2009 affected her speech, right sided weakness    TIA (transient ischemic attack)      Past Surgical History:   Procedure Laterality Date    APPENDECTOMY      CHOLECYSTECTOMY      FL GUIDED NEEDLE PLAC BX/ASP/INJ  5/30/2014    FL GUIDED NEEDLE PLAC BX/ASP/INJ  12/19/2013    FL GUIDED NEEDLE PLAC BX/ASP/INJ  6/10/2013    JOINT REPLACEMENT Bilateral      bilateral knees    NEPHRECTOMY Right     MT COLONOSCOPY FLX DX W/COLLJ SPEC WHEN PFRMD N/A 8/7/2018    Procedure: COLONOSCOPY;  Surgeon: Boy Guillermo MD;  Location: [facility];  Service: Gastroenterology    MT OPEN TREATMENT BIMALLEOLAR ANKLE FRACTURE Right 5/14/2019    Procedure: ANKLE - Bimalleolar OPEN REDUCTION W/ INTERNAL FIXATION (ORIF);  Surgeon: Harris Christianson;  Location:  MAIN OR;  Service: Orthopedics    MT REMOVAL IMPLANT DEEP Right 1/31/2023    Procedure: REMOVAL HARDWARE ANKLE;  Surgeon: Harris Christianson;  Location:  MAIN OR;  Service: Orthopedics    MT TX TIBL SHFT FX IMED IMPLT W/WO SCREWS&/CERCLA Left 7/27/2022    Procedure: INSERTION NAIL IM TIBIA;  Surgeon: Lefty Shea MD;  Location:  MAIN OR;  Service: Orthopedics     Social History   Social History     Substance and Sexual Activity   Alcohol Use Not Currently      Social History     Substance and Sexual Activity   Drug Use Never     E-Cigarette/Vaping    E-Cigarette Use Never User      E-Cigarette/Vaping Substances    Nicotine No     THC No     CBD No     Flavoring No     Other No     Unknown No      Social History     Tobacco Use   Smoking Status Every Day    Current packs/day: 0.50    Average packs/day: 0.5 packs/day for 7.0 years (3.5 ttl pk-yrs)    Types: Cigarettes   Smokeless Tobacco Never   Tobacco Comments    pt refused referral     Meds/Allergies   current meds:   Current Facility-Administered Medications   Medication Dose Route Frequency    ALPRAZolam (XANAX) tablet 1 mg  1 mg Oral TID PRN    aspirin chewable tablet 81 mg  81 mg Oral Daily    atorvastatin (LIPITOR) tablet 40 mg  40 mg Oral QPM    famotidine (PEPCID) tablet 20 mg  20 mg Oral Daily    heparin (porcine) subcutaneous injection 5,000 Units  5,000 Units Subcutaneous Q8H BRIDGER    HYDROcodone-acetaminophen (NORCO) 5-325 mg per tablet 1 tablet  1 tablet Oral Q6H PRN    metoprolol succinate (TOPROL-XL) 24 hr tablet 100 mg  100 mg Oral Daily    nicotine (NICODERM CQ) 14 mg/24hr TD 24 hr patch 1 patch  1 patch Transdermal Daily    OLANZapine (ZyPREXA) tablet 20 mg  20 mg Oral HS    pantoprazole (PROTONIX) EC tablet 40 mg  40 mg Oral BID AC    QUEtiapine (SEROquel) tablet 300 mg  300 mg Oral HS    and PTA meds:   Prior to Admission Medications   Prescriptions Last Dose Informant Patient Reported? Taking?   ALPRAZolam (XANAX) 1 mg tablet 5/27/2024  No Yes   Sig: Take 1 tablet (1 mg total) by mouth 4 (four) times a day as needed for anxiety for up to 10 days   Patient taking differently: Take 1 mg by mouth 3 (three) times a day as needed for anxiety   HYDROcodone-acetaminophen (NORCO) 5-325 mg per tablet   Yes No   Sig: Take 1 tablet by mouth every 6 (six) hours as needed for pain   OLANZapine (ZyPREXA) 20 MG tablet   Yes No   Sig: Take 20 mg by mouth daily at bedtime    QUEtiapine (SEROquel) 300 mg tablet   Yes  No   Sig: Take 300 mg by mouth daily at bedtime   atorvastatin (LIPITOR) 40 mg tablet 5/27/2024  No Yes   Sig: Take 1 tablet (40 mg total) by mouth every evening To prevent stroke and heart attack   cholecalciferol (VITAMIN D3) 1,000 units tablet 5/27/2024  No Yes   Sig: Take 1 tablet (1,000 Units total) by mouth daily Take this in place of ergocalciferol   famotidine (PEPCID) 20 mg tablet   Yes No   Sig: Take 20 mg by mouth daily   ferrous sulfate 325 (65 Fe) mg tablet   Yes No   Sig: Take 1 tablet by mouth 2 (two) times a day with meals   Patient not taking: Reported on 8/1/2023   metoprolol succinate (TOPROL-XL) 100 mg 24 hr tablet   No No   Sig: Take 1 tablet (100 mg total) by mouth daily Do not start before November 19, 2022.   pantoprazole (PROTONIX) 40 mg tablet  Mother Yes No   Sig: Take 40 mg by mouth 2 (two) times a day      Facility-Administered Medications: None     Allergies   Allergen Reactions    Nsaids Other (See Comments)     Pt only has one kidney    Celecoxib Rash and Other (See Comments)     CELEBREX    Doxazosin Rash and Hives    Metaxalone Rash and Hives     Objective   Vitals:Blood pressure 104/61, pulse 75, temperature (!) 97.3 °F (36.3 °C), temperature source Temporal, resp. rate 20, weight 73.1 kg (161 lb 2.5 oz), SpO2 91%.,Body mass index is 27.66 kg/m².  No intake or output data in the 24 hours ending 05/28/24 1121    Invasive Devices:   Invasive Devices       Peripheral Intravenous Line  Duration             Peripheral IV 05/28/24 Dorsal (posterior);Right Forearm <1 day                  Physical Exam  Modified physical examination as this is a video consultation:    Gen: NAD.  HEENT: NC/AT, no septal deviation, EOMI  Resp: Symmetric chest rise and patient in no obvious respiratory distress  MSK: ROM normal  Skin: No rash noted in visualized portion of this exam     Neurologic exam:  Mental status: awake, alert and oriented x3, no aphasia noted, mild to moderate dysarthria noted. Patient  is able to follow simple commands. She appeared tired and required repeated prompting at times to perform requested commands. Naming/repetition/comprehension intact.   Cranial nerves: pupil exam reported to be 3 mm and reactive b/l by nursing, EOMI, no reported differences in sensation to light touch in V1-V3 b/l, no facial droop or obvious facial asymmetry, tongue midline  Motor: intact antigravity x4 extremities, no pronator drift noted  Sensation: intact to light touch, instructed patient on how to do this in all extremities proximal and distal  Cerebellar: no dysmetria b/l with FNF testing. SHANE with no dysdiadochokinesia    NIHSS:  1a.Level of Consciousness: 0 = Alert   1b. LOC Questions: 0 = Answers both correctly   1c. LOC Commands: 0 = Obeys both correctly   2. Best Gaze: 0 = Normal   3. Visual: 0 = No visual field loss   4. Facial Palsy: 0=Normal symmetric movement   5a. Motor Right Arm: 0=No drift, limb holds 90 (or 45) degrees for full 10 seconds   5b. Motor Left Arm: 0=No drift, limb holds 90 (or 45) degrees for full 10 seconds   6a. Motor Right Le=No drift, limb holds 90 (or 45) degrees for full 10 seconds   6b. Motor Left Le=No drift, limb holds 90 (or 45) degrees for full 10 seconds   7. Limb Ataxia:  0=Absent   8. Sensory: 0=Normal; no sensory loss   9. Best Language:  0=No aphasia, normal   10. Dysarthria: 1=Mild to moderate, patient slurs at least some words and at worst, can be understood with some difficulty   11. Extinction and Inattention (formerly Neglect): 0=No abnormality   Total Score: 1   Time NIHSS was completed: 1124    Modified Haines Score:  1 (No significant disability. Able to carry out all usual activities, despite some symptoms)    Lab Results: CBC:   Results from last 7 days   Lab Units 24  1103   WBC Thousand/uL 4.45   RBC Million/uL 3.37*   HEMOGLOBIN g/dL 12.2   HEMATOCRIT % 36.5   MCV fL 108*   PLATELETS Thousands/uL 218   , BMP/CMP:   Results from last 7 days  "  Lab Units 05/28/24  1103   SODIUM mmol/L 143   POTASSIUM mmol/L 3.7   CHLORIDE mmol/L 112*   CO2 mmol/L 23   BUN mg/dL 15   CREATININE mg/dL 1.05   CALCIUM mg/dL 9.1   EGFR ml/min/1.73sq m 54   , TSH:   , Coagulation:   Results from last 7 days   Lab Units 05/28/24  1103   INR  0.90   , Urinalysis:       Invalid input(s): \"URIBILINOGEN\"  Imaging Studies: I have personally reviewed pertinent reports.   and I have personally reviewed pertinent films in PACS with a Radiologist.      "

## 2024-05-28 NOTE — ASSESSMENT & PLAN NOTE
With residual deficits of slurred speech per patient and mother at bedside  Continue Aspirin / Statin

## 2024-05-28 NOTE — H&P
"H&P Exam - Justine Odell 68 y.o. female MRN: [id number]    Unit/Bed#: RM03 Encounter: [id number]    Assessment:  Stroke-like symptom  Assessment & Plan  Patient with history of CVA with residual symptoms presented to ED for evaluation of lower extremity edema and mother states \"doesn't look like herself\"    Admit to tele and obtain Echo  MRI brain without contrast   Ischemic stroke pathway  Permissive hypertension  Neurochecks q4  DAPT and high intensity statin  Fasting lipid panel / A1c  PT/OT   Neuro Consult     History of CVA (cerebrovascular accident)  Assessment & Plan  With residual deficits of slurred speech per patient and mother at bedside    Continue Aspirin / Statin     Diastolic dysfunction  Assessment & Plan  Not on maintenance diuretics, has some lower extremity edema on exam     Check echo  Low salt diet. Will utilize lasix once able to obtain MRI     Chronic obstructive pulmonary disease (HCC)  Assessment & Plan  Without acute exacerbation  As needed nebulizer treatments  will provide nicotine patch     Primary hypertension  Assessment & Plan  Hold Norvasc, permissive HTN     GERD (gastroesophageal reflux disease)  Assessment & Plan  Pepcid & Protonix     Bipolar 1 disorder (HCC)  Assessment & Plan  Seroquel / Zyprexa / PRN ativan       History of Present Illness     The patient is a pleasant 68-year-old female who presents to the emergency room accompanied by her mother with a complaint of lower extremity edema and possible strokelike symptoms.  The patient does have a history of CVA with residual slurred speech, mother states that she has not looked like herself this morning.  Patient denies any focal weakness or worsening of her speech.  She does complain of some lower extremity edema and low blood pressure at home.  Denies any nausea vomiting chest pain or shortness of breath    Review of Systems   Cardiovascular:  Positive for leg swelling.   Neurological:  Positive for speech difficulty and " headaches. Negative for weakness, light-headedness and numbness.   All other systems reviewed and are negative.      Historical Information   Past Medical History:   Diagnosis Date    Bipolar 1 disorder (HCC)     Cancer (HCC)     kidney    Cardiac disease     fast heart beat    Chronic pain     Fractures     GERD (gastroesophageal reflux disease)     Hard of hearing     Hypertension     Renal cell carcinoma (HCC)     Stroke (HCC)     2009 affected her speech, right sided weakness    TIA (transient ischemic attack)      Past Surgical History:   Procedure Laterality Date    APPENDECTOMY      CHOLECYSTECTOMY      FL GUIDED NEEDLE PLAC BX/ASP/INJ  5/30/2014    FL GUIDED NEEDLE PLAC BX/ASP/INJ  12/19/2013    FL GUIDED NEEDLE PLAC BX/ASP/INJ  6/10/2013    JOINT REPLACEMENT Bilateral      bilateral knees    NEPHRECTOMY Right     GA COLONOSCOPY FLX DX W/COLLJ SPEC WHEN PFRMD N/A 8/7/2018    Procedure: COLONOSCOPY;  Surgeon: Boy Guillermo MD;  Location: [facility];  Service: Gastroenterology    GA OPEN TREATMENT BIMALLEOLAR ANKLE FRACTURE Right 5/14/2019    Procedure: ANKLE - Bimalleolar OPEN REDUCTION W/ INTERNAL FIXATION (ORIF);  Surgeon: Harris Christianson;  Location:  MAIN OR;  Service: Orthopedics    GA REMOVAL IMPLANT DEEP Right 1/31/2023    Procedure: REMOVAL HARDWARE ANKLE;  Surgeon: Harris Christianson;  Location:  MAIN OR;  Service: Orthopedics    GA TX TIBL SHFT FX IMED IMPLT W/WO SCREWS&/CERCLA Left 7/27/2022    Procedure: INSERTION NAIL IM TIBIA;  Surgeon: Lefty Shea MD;  Location:  MAIN OR;  Service: Orthopedics     Social History   Social History     Substance and Sexual Activity   Alcohol Use Not Currently     Social History     Substance and Sexual Activity   Drug Use Never     Social History     Tobacco Use   Smoking Status Every Day    Current packs/day: 0.50    Average packs/day: 0.5 packs/day for 7.0 years (3.5 ttl pk-yrs)    Types: Cigarettes   Smokeless Tobacco Never   Tobacco Comments    pt refused  referral     E-Cigarette Use: Never User     E-Cigarette/Vaping Substances    Nicotine No     THC No     CBD No     Flavoring No     Other No     Unknown No        Family History: non-contributory    Meds/Allergies   all medications and allergies reviewed  Allergies   Allergen Reactions    Nsaids Other (See Comments)     Pt only has one kidney    Celecoxib Rash and Other (See Comments)     CELEBREX    Doxazosin Rash and Hives    Metaxalone Rash and Hives       Objective   First Vitals:   Blood Pressure: 104/61 (05/28/24 1045)  Pulse: 75 (05/28/24 1045)  Temperature: (!) 97.3 °F (36.3 °C) (05/28/24 1045)  Temp Source: Temporal (05/28/24 1045)  Respirations: 20 (05/28/24 1045)  Weight - Scale: 73.1 kg (161 lb 2.5 oz) (05/28/24 1053)  SpO2: 92 % (05/28/24 1045)    Current Vitals:   Blood Pressure: 128/60 (05/28/24 1230)  Pulse: 79 (05/28/24 1230)  Temperature: (!) 97.3 °F (36.3 °C) (05/28/24 1053)  Temp Source: Temporal (05/28/24 1053)  Respirations: 16 (05/28/24 1230)  Weight - Scale: 73.1 kg (161 lb 2.5 oz) (05/28/24 1053)  SpO2: 95 % (05/28/24 1230)    No intake or output data in the 24 hours ending 05/28/24 1311    Invasive Devices       Peripheral Intravenous Line  Duration             Peripheral IV 05/28/24 Dorsal (posterior);Right Forearm <1 day                    Physical Exam  Vitals and nursing note reviewed.   Constitutional:       General: She is not in acute distress.     Appearance: She is not toxic-appearing.   HENT:      Head: Normocephalic and atraumatic.      Right Ear: External ear normal.      Left Ear: External ear normal.      Nose: Nose normal. No congestion.      Mouth/Throat:      Mouth: Mucous membranes are dry.      Pharynx: No oropharyngeal exudate.   Eyes:      Extraocular Movements: Extraocular movements intact.      Conjunctiva/sclera: Conjunctivae normal.      Pupils: Pupils are equal, round, and reactive to light.   Cardiovascular:      Rate and Rhythm: Normal rate and regular rhythm.       Pulses: Normal pulses.   Pulmonary:      Effort: Pulmonary effort is normal.      Breath sounds: Normal breath sounds.   Abdominal:      General: Abdomen is flat. Bowel sounds are normal. There is no distension.      Tenderness: There is no abdominal tenderness. There is no guarding.   Musculoskeletal:      Right lower leg: Edema present.      Left lower leg: Edema present.   Skin:     General: Skin is warm.      Capillary Refill: Capillary refill takes 2 to 3 seconds.      Coloration: Skin is not jaundiced.      Findings: No bruising.   Neurological:      Mental Status: She is alert.      Cranial Nerves: Cranial nerves 2-12 are intact. No facial asymmetry.      Motor: No weakness or tremor.      Comments: Slurred speech (? Chronic)         Lab Results:   Lab Results   Component Value Date    WBC 4.45 05/28/2024    HGB 12.2 05/28/2024    HCT 36.5 05/28/2024     (H) 05/28/2024     05/28/2024     Lab Results   Component Value Date     10/15/2015    SODIUM 143 05/28/2024    K 3.7 05/28/2024     (H) 05/28/2024    CO2 23 05/28/2024    ANIONGAP 11 10/15/2015    AGAP 8 05/28/2024    BUN 15 05/28/2024    CREATININE 1.05 05/28/2024    GLUC 96 05/28/2024    GLUF 110 (H) 07/28/2022    CALCIUM 9.1 05/28/2024    AST 14 10/02/2023    ALT 7 10/02/2023    ALKPHOS 89 10/02/2023    PROT 7.0 10/15/2015    TP 7.2 10/02/2023    BILITOT 0.20 10/15/2015    TBILI 0.4 10/02/2023    EGFR 54 05/28/2024       Imaging:   CT stroke alert brain   ED Interpretation   FINDINGS:     PARENCHYMA: Decreased attenuation is noted in periventricular and subcortical white matter demonstrating an appearance that is statistically most likely to represent mild microangiopathic change.  Chronic left caudate head and right posterior cerebellar   lacunar infarcts.     No CT signs of acute infarction.  No intracranial mass, mass effect or midline shift.  No acute parenchymal hemorrhage.     Normal intracranial vasculature.      VENTRICLES AND EXTRA-AXIAL SPACES:  Ventricles and extra-axial CSF spaces are prominent commensurate with the degree of volume loss.  No hydrocephalus.  No acute extra-axial hemorrhage.     VISUALIZED ORBITS:  Right-sided cataract surgery.     PARANASAL SINUSES:  Normal visualized paranasal sinuses.     CALVARIUM AND EXTRACRANIAL SOFT TISSUES:   Normal.     IMPRESSION:     No acute intracranial abnormality.     Chronic left caudate head and right posterior cerebellar lacunar infarcts. Mild chronic microangiopathic ischemic changes.     Find   ings were directly discussed with Dr. Sanket Gonzalez at 11:50 a.m.        Workstation performed: [id number]           Final Result      No acute intracranial abnormality.      Chronic left caudate head and right posterior cerebellar lacunar infarcts. Mild chronic microangiopathic ischemic changes.      Findings were directly discussed with Dr. Sanket Gonzalez at 11:50 a.m.         Workstation performed: [id number]         CTA stroke alert (head/neck)   ED Interpretation   FINDINGS:     CERVICAL VASCULATURE  AORTIC ARCH AND GREAT VESSELS:  Normal aortic arch and great vessel origins. Normal visualized subclavian vessels.     RIGHT VERTEBRAL ARTERY CERVICAL SEGMENT:  Normal origin. The vessel is normal in caliber throughout the neck.     LEFT VERTEBRAL ARTERY CERVICAL SEGMENT:  Normal origin. The vessel is normal in caliber throughout the neck.     RIGHT EXTRACRANIAL CAROTID SEGMENT:  Mild atherosclerotic disease of the distal common carotid artery and proximal cervical internal carotid artery without significant stenosis compared to the more distal ICA.     LEFT EXTRACRANIAL CAROTID SEGMENT:  Normal caliber common carotid artery.  Normal bifurcation and cervical internal carotid artery.  No stenosis or dissection.     NASCET criteria was used to determine the degree of internal carotid artery diameter stenosis.        INTRACRANIAL VASCULATURE  INTERNAL CAROTID ARTERIES:  Normal  enhancement of the intracranial portions of the internal carotid arteries. T   here is calcified atheromatous plaque along the bilateral cavernous and supraclinoid ICA segments with areas of mild luminal narrowing. Normal   ophthalmic artery origins.  Normal ICA terminus.     ANTERIOR CIRCULATION:  Symmetric A1 segments and anterior cerebral arteries with normal enhancement.  Normal anterior communicating artery.     MIDDLE CEREBRAL ARTERY CIRCULATION:  M1 segment and middle cerebral artery branches demonstrate normal enhancement bilaterally. Focal vessel tortuosity at the right MCA trifurcation (series 3: 183) with greater enhancement of the smaller branches   compared to the prior 2023 examination. An aneurysm at this location is felt to be less likely.     DISTAL VERTEBRAL ARTERIES: Patent distal vertebral arteries. The right vertebral artery predominantly terminates as a PICA with an attenuated right supra PICA V4 segment. Posterior inferior cerebellar artery origins are normal. Normal vertebral basilar   junction.     BASILAR ARTERY:  Basilar artery is norm   al in caliber.  Normal superior cerebellar arteries.     POSTERIOR CEREBRAL ARTERIES: Both posterior cerebral arteries arises from the basilar tip.  Both arteries demonstrate normal enhancement.   Attenuated posterior communicating arteries.     VENOUS STRUCTURES:  Normal.        NON VASCULAR ANATOMY  BONY STRUCTURES:  No acute osseous abnormality.     SOFT TISSUES OF THE NECK:  Normal.     THORACIC INLET: Focal scarring is noted within the anterior right upper lobe.        IMPRESSION:     1. CTA head: Negative for large vessel intracranial occlusion or hemodynamically significant stenosis. Focal vessel tortuosity at the right MCA trifurcation, more conspicuous on the current examination compared to 2023 due to better contrast   opacification of the smaller branches.     2. CTA neck:  No extracranial carotid stenosis.  The cervical vertebral arteries are  patent.        Findings were directly discussed with Dr. Sanket Gonzalez at 11:50 a.m.                          Workstation performed:    [id number]        Final Result      1. CTA head: Negative for large vessel intracranial occlusion or hemodynamically significant stenosis. Focal vessel tortuosity at the right MCA trifurcation, more conspicuous on the current examination compared to 2023 due to better contrast    opacification of the smaller branches.      2. CTA neck:  No extracranial carotid stenosis.  The cervical vertebral arteries are patent.         Findings were directly discussed with Dr. Sanket Gonzalez at 11:50 a.m.                           Workstation performed: [id number]             EKG, Pathology, and Other Studies: NSR    Code Status: Prior  Advance Directive and Living Will:      Power of :    POLST:      Counseling / Coordination of Care:

## 2024-05-28 NOTE — ED ATTENDING ATTESTATION
5/28/2024  I, Lee Valenzuela DO, saw and evaluated the patient. I have discussed the patient with the resident/non-physician practitioner and agree with the resident's/non-physician practitioner's findings, Plan of Care, and MDM as documented in the resident's/non-physician practitioner's note, except where noted. All available labs and Radiology studies were reviewed.  I was present for key portions of any procedure(s) performed by the resident/non-physician practitioner and I was immediately available to provide assistance.       At this point I agree with the current assessment done in the Emergency Department.  I have conducted an independent evaluation of this patient a history and physical is as follows:    ED Course       Emergency Department Note- Justine Odell 68 y.o. female MRN: [id number]    Unit/Bed#: RM03 Encounter: [id number]    Justine Odell is a 68 y.o. female who presents with   Chief Complaint   Patient presents with    CVA/TIA-like Symptoms     Patient presents with slurred speech, weakness and a headache. TIA in the past but reports speech is more slurred than normal.      Stroke alert called upon patient arrival and initial presentation of symptoms    History of Present Illness   HPI:  Justine Odell is a 68 y.o. female who presents with strokelike symptoms.  Patient had previous CVA in 2009 with reported right-sided deficits and occasional slurred speech.  Patient presents to the emergency room with family after they noted her to be confused and having difficulty focusing and her speech seemed more slurred than usual.  Family requested help from hospital staff extricated patient from vehicle.  Denies any recent trauma, change in medications, recent illness.  Previous records reviewed.    ROS is otherwise unremarkable.    Historical Information   Past Medical History:   Diagnosis Date    Bipolar 1 disorder (HCC)     Cancer (HCC)     kidney    Cardiac disease     fast heart beat    Chronic  pain     Fractures     GERD (gastroesophageal reflux disease)     Hard of hearing     Hypertension     Renal cell carcinoma (HCC)     Stroke (HCC)     2009 affected her speech, right sided weakness    TIA (transient ischemic attack)      Past Surgical History:   Procedure Laterality Date    APPENDECTOMY      CHOLECYSTECTOMY      FL GUIDED NEEDLE PLAC BX/ASP/INJ  5/30/2014    FL GUIDED NEEDLE PLAC BX/ASP/INJ  12/19/2013    FL GUIDED NEEDLE PLAC BX/ASP/INJ  6/10/2013    JOINT REPLACEMENT Bilateral      bilateral knees    NEPHRECTOMY Right     HI COLONOSCOPY FLX DX W/COLLJ SPEC WHEN PFRMD N/A 8/7/2018    Procedure: COLONOSCOPY;  Surgeon: Boy Guillermo MD;  Location: [facility];  Service: Gastroenterology    HI OPEN TREATMENT BIMALLEOLAR ANKLE FRACTURE Right 5/14/2019    Procedure: ANKLE - Bimalleolar OPEN REDUCTION W/ INTERNAL FIXATION (ORIF);  Surgeon: Harris Christianson;  Location:  MAIN OR;  Service: Orthopedics    HI REMOVAL IMPLANT DEEP Right 1/31/2023    Procedure: REMOVAL HARDWARE ANKLE;  Surgeon: Harris Christianson;  Location: [facility];  Service: Orthopedics    HI TX TIBL SHFT FX IMED IMPLT W/WO SCREWS&/CERCLA Left 7/27/2022    Procedure: INSERTION NAIL IM TIBIA;  Surgeon: Lefty Shea MD;  Location: [facility];  Service: Orthopedics     Social History   Social History     Substance and Sexual Activity   Alcohol Use Not Currently     Social History     Substance and Sexual Activity   Drug Use Never     Social History     Tobacco Use   Smoking Status Every Day    Current packs/day: 0.50    Average packs/day: 0.5 packs/day for 7.0 years (3.5 ttl pk-yrs)    Types: Cigarettes   Smokeless Tobacco Never   Tobacco Comments    pt refused referral       Family History:   Meds/Allergies     Allergies   Allergen Reactions    Nsaids Other (See Comments)     Pt only has one kidney    Celecoxib Rash and Other (See Comments)     CELEBREX    Doxazosin Rash and Hives    Metaxalone Rash and Hives       Objective   First Vitals:    Blood Pressure: 104/61 (24 1045)  Pulse: 75 (24 1045)  Temperature: (!) 97.3 °F (36.3 °C) (24 1045)  Temp Source: Temporal (24 1045)  Respirations: 20 (24 1045)  Weight - Scale: 73.1 kg (161 lb 2.5 oz) (24 1053)  SpO2: 92 % (24 1045)    Current Vitals:   Blood Pressure: 125/66 (24 1400)  Pulse: 78 (24 1400)  Temperature: (!) 97.3 °F (36.3 °C) (24 1053)  Temp Source: Temporal (24 1053)  Respirations: 15 (24 1400)  Weight - Scale: 73.1 kg (161 lb 2.5 oz) (24 1053)  SpO2: 92 % (24 1400)    No intake or output data in the 24 hours ending 24 1511    Invasive Devices       Peripheral Intravenous Line  Duration             Peripheral IV 24 Dorsal (posterior);Right Forearm <1 day                          Medical Decision Makin.  NIH stroke scale of 1 but difficult to determine if this is a chronic condition or an acute worsening of a chronic condition.  Reviewed CT and CT findings with neurology.  Neurology evaluated patient as well via telemedicine.  Patient is not a candidate for TNK/thrombolytic therapy given duration of symptoms and low NIH stroke score.  Labs reviewed.  Will admit on stroke pathway, aspirin, Plavix, admit.    Recent Results (from the past 36 hour(s))   Fingerstick Glucose (POCT)    Collection Time: 24 10:40 AM   Result Value Ref Range    POC Glucose 108 65 - 140 mg/dl   ECG 12 lead    Collection Time: 24 10:48 AM   Result Value Ref Range    Ventricular Rate 76 BPM    Atrial Rate 76 BPM    NH Interval 184 ms    QRSD Interval 90 ms    QT Interval 386 ms    QTC Interval 434 ms    P Cheney 42 degrees    QRS Axis 40 degrees    T Wave Axis 78 degrees   Basic metabolic panel    Collection Time: 24 11:03 AM   Result Value Ref Range    Sodium 143 135 - 147 mmol/L    Potassium 3.7 3.5 - 5.3 mmol/L    Chloride 112 (H) 96 - 108 mmol/L    CO2 23 21 - 32 mmol/L    ANION GAP 8 4 - 13 mmol/L    BUN  "15 5 - 25 mg/dL    Creatinine 1.05 0.60 - 1.30 mg/dL    Glucose 96 65 - 140 mg/dL    Calcium 9.1 8.4 - 10.2 mg/dL    eGFR 54 ml/min/1.73sq m   CBC and Platelet    Collection Time: 05/28/24 11:03 AM   Result Value Ref Range    WBC 4.45 4.31 - 10.16 Thousand/uL    RBC 3.37 (L) 3.81 - 5.12 Million/uL    Hemoglobin 12.2 11.5 - 15.4 g/dL    Hematocrit 36.5 34.8 - 46.1 %     (H) 82 - 98 fL    MCH 36.2 (H) 26.8 - 34.3 pg    MCHC 33.4 31.4 - 37.4 g/dL    RDW 14.3 11.6 - 15.1 %    Platelets 218 149 - 390 Thousands/uL    MPV 8.3 (L) 8.9 - 12.7 fL   Protime-INR    Collection Time: 05/28/24 11:03 AM   Result Value Ref Range    Protime 12.1 11.6 - 14.5 seconds    INR 0.90 0.84 - 1.19   APTT    Collection Time: 05/28/24 11:03 AM   Result Value Ref Range    PTT 28 23 - 37 seconds   HS Troponin 0hr (reflex protocol)    Collection Time: 05/28/24 11:03 AM   Result Value Ref Range    hs TnI 0hr <2 \"Refer to ACS Flowchart\"- see link ng/L   FLU/RSV/COVID - if FLU/RSV clinically relevant    Collection Time: 05/28/24 11:03 AM    Specimen: Nose; Nares   Result Value Ref Range    SARS-CoV-2 Negative Negative    INFLUENZA A PCR Negative Negative    INFLUENZA B PCR Negative Negative    RSV PCR Negative Negative   HS Troponin I 2hr    Collection Time: 05/28/24  2:43 PM   Result Value Ref Range    hs TnI 2hr 3 \"Refer to ACS Flowchart\"- see link ng/L    Delta 2hr hsTnI >1 <20 ng/L   Ethanol    Collection Time: 05/28/24  2:43 PM   Result Value Ref Range    Ethanol Lvl <10 <10 mg/dL   Salicylate level    Collection Time: 05/28/24  2:43 PM   Result Value Ref Range    Salicylate Lvl <5 3 - 20 mg/dL   Acetaminophen level-If concentration is detectable, please discuss with medical  on call.    Collection Time: 05/28/24  2:43 PM   Result Value Ref Range    Acetaminophen Level 5 (L) 10 - 20 ug/mL     CT stroke alert brain   ED Interpretation   FINDINGS:     PARENCHYMA: Decreased attenuation is noted in periventricular and subcortical " white matter demonstrating an appearance that is statistically most likely to represent mild microangiopathic change.  Chronic left caudate head and right posterior cerebellar   lacunar infarcts.     No CT signs of acute infarction.  No intracranial mass, mass effect or midline shift.  No acute parenchymal hemorrhage.     Normal intracranial vasculature.     VENTRICLES AND EXTRA-AXIAL SPACES:  Ventricles and extra-axial CSF spaces are prominent commensurate with the degree of volume loss.  No hydrocephalus.  No acute extra-axial hemorrhage.     VISUALIZED ORBITS:  Right-sided cataract surgery.     PARANASAL SINUSES:  Normal visualized paranasal sinuses.     CALVARIUM AND EXTRACRANIAL SOFT TISSUES:   Normal.     IMPRESSION:     No acute intracranial abnormality.     Chronic left caudate head and right posterior cerebellar lacunar infarcts. Mild chronic microangiopathic ischemic changes.     Find   ings were directly discussed with Dr. Sanket Gonzalez at 11:50 a.m.        Workstation performed: [id number]           Final Result      No acute intracranial abnormality.      Chronic left caudate head and right posterior cerebellar lacunar infarcts. Mild chronic microangiopathic ischemic changes.      Findings were directly discussed with Dr. Sanket Gonzalez at 11:50 a.m.         Workstation performed: [id number]         CTA stroke alert (head/neck)   ED Interpretation   FINDINGS:     CERVICAL VASCULATURE  AORTIC ARCH AND GREAT VESSELS:  Normal aortic arch and great vessel origins. Normal visualized subclavian vessels.     RIGHT VERTEBRAL ARTERY CERVICAL SEGMENT:  Normal origin. The vessel is normal in caliber throughout the neck.     LEFT VERTEBRAL ARTERY CERVICAL SEGMENT:  Normal origin. The vessel is normal in caliber throughout the neck.     RIGHT EXTRACRANIAL CAROTID SEGMENT:  Mild atherosclerotic disease of the distal common carotid artery and proximal cervical internal carotid artery without significant stenosis compared  to the more distal ICA.     LEFT EXTRACRANIAL CAROTID SEGMENT:  Normal caliber common carotid artery.  Normal bifurcation and cervical internal carotid artery.  No stenosis or dissection.     NASCET criteria was used to determine the degree of internal carotid artery diameter stenosis.        INTRACRANIAL VASCULATURE  INTERNAL CAROTID ARTERIES:  Normal enhancement of the intracranial portions of the internal carotid arteries. T   here is calcified atheromatous plaque along the bilateral cavernous and supraclinoid ICA segments with areas of mild luminal narrowing. Normal   ophthalmic artery origins.  Normal ICA terminus.     ANTERIOR CIRCULATION:  Symmetric A1 segments and anterior cerebral arteries with normal enhancement.  Normal anterior communicating artery.     MIDDLE CEREBRAL ARTERY CIRCULATION:  M1 segment and middle cerebral artery branches demonstrate normal enhancement bilaterally. Focal vessel tortuosity at the right MCA trifurcation (series 3: 183) with greater enhancement of the smaller branches   compared to the prior 2023 examination. An aneurysm at this location is felt to be less likely.     DISTAL VERTEBRAL ARTERIES: Patent distal vertebral arteries. The right vertebral artery predominantly terminates as a PICA with an attenuated right supra PICA V4 segment. Posterior inferior cerebellar artery origins are normal. Normal vertebral basilar   junction.     BASILAR ARTERY:  Basilar artery is norm   al in caliber.  Normal superior cerebellar arteries.     POSTERIOR CEREBRAL ARTERIES: Both posterior cerebral arteries arises from the basilar tip.  Both arteries demonstrate normal enhancement.   Attenuated posterior communicating arteries.     VENOUS STRUCTURES:  Normal.        NON VASCULAR ANATOMY  BONY STRUCTURES:  No acute osseous abnormality.     SOFT TISSUES OF THE NECK:  Normal.     THORACIC INLET: Focal scarring is noted within the anterior right upper lobe.        IMPRESSION:     1. CTA head:  "Negative for large vessel intracranial occlusion or hemodynamically significant stenosis. Focal vessel tortuosity at the right MCA trifurcation, more conspicuous on the current examination compared to 2023 due to better contrast   opacification of the smaller branches.     2. CTA neck:  No extracranial carotid stenosis.  The cervical vertebral arteries are patent.        Findings were directly discussed with Dr. Sanket Gonzalez at 11:50 a.m.                          Workstation performed:    [id number]        Final Result      1. CTA head: Negative for large vessel intracranial occlusion or hemodynamically significant stenosis. Focal vessel tortuosity at the right MCA trifurcation, more conspicuous on the current examination compared to 2023 due to better contrast    opacification of the smaller branches.      2. CTA neck:  No extracranial carotid stenosis.  The cervical vertebral arteries are patent.         Findings were directly discussed with Dr. Sanket Gonzalez at 11:50 a.m.                           Workstation performed: [id number]         MRI Inpatient Order    (Results Pending)         Portions of the record may have been created with voice recognition software. Occasional wrong word or \"sound a like\" substitutions may have occurred due to the inherent limitations of voice recognition software.      Critical Care Time  ECG 12 Lead Documentation Only    Date/Time: 5/28/2024 1:05 PM    Performed by: Lee Valenzuela DO  Authorized by: Lee Valenzuela DO    Indications / Diagnosis:  Stroke Alert  ECG reviewed by me, the ED Provider: yes    Patient location:  ED  Rate:     ECG rate:  76    ECG rate assessment: normal    Rhythm:     Rhythm: sinus rhythm    Ectopy:     Ectopy: none    QRS:     QRS axis:  Normal    QRS intervals:  Normal  Conduction:     Conduction: normal    ST segments:     ST segments:  Normal  T waves:     T waves: non-specific          "

## 2024-05-28 NOTE — ASSESSMENT & PLAN NOTE
Not on maintenance diuretics, has some lower extremity edema on exam   Repeat echo: LVEF 60%, normal diastolic fx, mild AR/TR  Low salt diet. Compression stockings.  Can try diuretic if BP trends allow

## 2024-05-28 NOTE — ASSESSMENT & PLAN NOTE
"Patient with history of CVA with residual symptoms presented to ED for evaluation of lower extremity edema and mother states \"doesn't look like herself.\" Reportedly with worsening dysarthria from baseline. Initially hypotensive which resolved  Question if symptoms in part 2/2 to cerebral hypoperfusion given initial hypotension    Continue stroke pathway with frequent VS, neuro checks, tele  MRI without contrast pending, unfortunately unable to obtain today and MRI machine currently requiring maintenance, expected to be remedied by tomorrow   Continue DAPT and high intensity statin  LDL 37 / A1c pending  PT/OT, recommending outpatient   Appreciate neurology consult  "

## 2024-05-28 NOTE — ASSESSMENT & PLAN NOTE
Norvasc initially held for permissive hypertension  However, BP trend at goal wo - will continue to hold  PTA, in PCP office BP noted to be 80/60

## 2024-05-28 NOTE — ED PROVIDER NOTES
History  Chief Complaint   Patient presents with    CVA/TIA-like Symptoms     Patient presents with slurred speech, weakness and a headache. TIA in the past but reports speech is more slurred than normal.      Justine is a 68-year-old female past medical history of CVA in 2009 with reported residual right-sided deficits who presents to the emergency department after an acute alteration in mental status, increase in difficulty with speaking and slurring of speech.  Patient had been required by staff to extricate her from the vehicle upon arrival to the emergency department.  Appears that the patient had been seen earlier in the day by providers outside of the hospital for fatigue, malaise, chest discomfort.  They had sent her to the emergency department for further evaluation.  During transportation to the emergency department, he was noted to have difficulty getting out of the vehicle.  Previous staff that had seen this patient before in the emergency department/previous evaluation, it was noted that she does have some baseline elements of slurring of words but this was more severe than had been experienced in the past.  Based off of initial deterioration/exacerbation of symptoms, stroke alert was initiated for further and rapid evaluation of this patient.      History provided by:  Patient   used: No        Prior to Admission Medications   Prescriptions Last Dose Informant Patient Reported? Taking?   ALPRAZolam (XANAX) 1 mg tablet   No No   Sig: Take 1 tablet (1 mg total) by mouth 4 (four) times a day as needed for anxiety for up to 10 days   Patient taking differently: Take 1 mg by mouth 3 (three) times a day as needed for anxiety   HYDROcodone-acetaminophen (NORCO) 5-325 mg per tablet   Yes No   Sig: Take 1 tablet by mouth every 6 (six) hours as needed for pain   OLANZapine (ZyPREXA) 20 MG tablet   Yes No   Sig: Take 20 mg by mouth daily at bedtime    QUEtiapine (SEROquel) 300 mg tablet   Yes No    Sig: Take 300 mg by mouth daily at bedtime   atorvastatin (LIPITOR) 40 mg tablet   No No   Sig: Take 1 tablet (40 mg total) by mouth every evening To prevent stroke and heart attack   cholecalciferol (VITAMIN D3) 1,000 units tablet   No No   Sig: Take 1 tablet (1,000 Units total) by mouth daily Take this in place of ergocalciferol   famotidine (PEPCID) 20 mg tablet   Yes No   Sig: Take 20 mg by mouth daily   ferrous sulfate 325 (65 Fe) mg tablet   Yes No   Sig: Take 1 tablet by mouth 2 (two) times a day with meals   Patient not taking: Reported on 8/1/2023   metoprolol succinate (TOPROL-XL) 100 mg 24 hr tablet   No No   Sig: Take 1 tablet (100 mg total) by mouth daily Do not start before November 19, 2022.   pantoprazole (PROTONIX) 40 mg tablet  Mother Yes No   Sig: Take 40 mg by mouth 2 (two) times a day      Facility-Administered Medications: None       Past Medical History:   Diagnosis Date    Bipolar 1 disorder (HCC)     Cancer (HCC)     kidney    Cardiac disease     fast heart beat    Chronic pain     Fractures     GERD (gastroesophageal reflux disease)     Hard of hearing     Hypertension     Renal cell carcinoma (HCC)     Stroke (HCC)     2009 affected her speech, right sided weakness    TIA (transient ischemic attack)        Past Surgical History:   Procedure Laterality Date    APPENDECTOMY      CHOLECYSTECTOMY      FL GUIDED NEEDLE PLAC BX/ASP/INJ  5/30/2014    FL GUIDED NEEDLE PLAC BX/ASP/INJ  12/19/2013    FL GUIDED NEEDLE PLAC BX/ASP/INJ  6/10/2013    JOINT REPLACEMENT Bilateral      bilateral knees    NEPHRECTOMY Right     AK COLONOSCOPY FLX DX W/COLLJ SPEC WHEN PFRMD N/A 8/7/2018    Procedure: COLONOSCOPY;  Surgeon: Boy Guillermo MD;  Location: [facility];  Service: Gastroenterology    AK OPEN TREATMENT BIMALLEOLAR ANKLE FRACTURE Right 5/14/2019    Procedure: ANKLE - Bimalleolar OPEN REDUCTION W/ INTERNAL FIXATION (ORIF);  Surgeon: Harris Christianson;  Location:  MAIN OR;  Service: Orthopedics    AK  REMOVAL IMPLANT DEEP Right 1/31/2023    Procedure: REMOVAL HARDWARE ANKLE;  Surgeon: Harris Christianson;  Location: [facility];  Service: Orthopedics    WA TX TIBL SHFT FX IMED IMPLT W/WO SCREWS&/CERCLA Left 7/27/2022    Procedure: INSERTION NAIL IM TIBIA;  Surgeon: Lefty Shea MD;  Location: [facility];  Service: Orthopedics       Family History   Problem Relation Age of Onset    Colon cancer Other     Alcohol abuse Other     Hypertension Other      I have reviewed and agree with the history as documented.    E-Cigarette/Vaping    E-Cigarette Use Never User      E-Cigarette/Vaping Substances    Nicotine No     THC No     CBD No     Flavoring No     Other No     Unknown No      Social History     Tobacco Use    Smoking status: Every Day     Current packs/day: 0.50     Average packs/day: 0.5 packs/day for 7.0 years (3.5 ttl pk-yrs)     Types: Cigarettes    Smokeless tobacco: Never    Tobacco comments:     pt refused referral   Vaping Use    Vaping status: Never Used   Substance Use Topics    Alcohol use: Not Currently    Drug use: Never        Review of Systems   Constitutional:  Negative for activity change, appetite change, chills and fever.   HENT:  Negative for ear pain and sore throat.    Eyes:  Negative for pain, discharge, itching and visual disturbance.   Respiratory:  Negative for cough and shortness of breath.    Cardiovascular:  Negative for chest pain and palpitations.   Gastrointestinal:  Negative for abdominal pain and vomiting.   Endocrine: Negative for cold intolerance and heat intolerance.   Genitourinary:  Negative for dysuria and hematuria.   Musculoskeletal:  Negative for arthralgias, back pain and joint swelling.   Skin:  Negative for color change and rash.   Neurological:  Positive for speech difficulty and headaches. Negative for dizziness, seizures, syncope and facial asymmetry.   Hematological:  Negative for adenopathy.   Psychiatric/Behavioral:  Negative for agitation.    All other systems  reviewed and are negative.      Physical Exam  ED Triage Vitals   Temperature Pulse Respirations Blood Pressure SpO2   05/28/24 1045 05/28/24 1045 05/28/24 1045 05/28/24 1045 05/28/24 1045   (!) 97.3 °F (36.3 °C) 75 20 104/61 92 %      Temp Source Heart Rate Source Patient Position - Orthostatic VS BP Location FiO2 (%)   05/28/24 1045 05/28/24 1045 05/28/24 1045 05/28/24 1053 --   Temporal Monitor Sitting Right arm       Pain Score       --                    Orthostatic Vital Signs  Vitals:    05/28/24 1115 05/28/24 1130 05/28/24 1200 05/28/24 1230   BP: 100/58 102/56 116/56 128/60   Pulse: 70 75 74 79   Patient Position - Orthostatic VS: Sitting Lying Lying Lying       Physical Exam  Vitals and nursing note reviewed.   Constitutional:       General: She is not in acute distress.     Appearance: Normal appearance. She is well-developed.   HENT:      Head: Normocephalic and atraumatic.      Right Ear: External ear normal.      Left Ear: External ear normal.      Nose: Nose normal. No congestion.      Mouth/Throat:      Mouth: Mucous membranes are moist.      Pharynx: No oropharyngeal exudate or posterior oropharyngeal erythema.   Eyes:      General:         Right eye: No discharge.         Left eye: No discharge.      Extraocular Movements: Extraocular movements intact.      Conjunctiva/sclera: Conjunctivae normal.      Pupils: Pupils are equal, round, and reactive to light.   Cardiovascular:      Rate and Rhythm: Normal rate and regular rhythm.      Heart sounds: No murmur heard.  Pulmonary:      Effort: Pulmonary effort is normal. No respiratory distress.      Breath sounds: Normal breath sounds.   Abdominal:      Palpations: Abdomen is soft.      Tenderness: There is no abdominal tenderness.   Musculoskeletal:         General: No swelling, deformity or signs of injury.      Cervical back: Normal range of motion and neck supple.      Right lower leg: No edema.      Left lower leg: No edema.   Skin:     General:  Skin is warm and dry.      Capillary Refill: Capillary refill takes less than 2 seconds.   Neurological:      Mental Status: She is alert and oriented to person, place, and time.      Motor: No weakness.      Coordination: Coordination normal.      Comments: Patient with significant slurring of speech.  Patient endorses right-sided weakness at baseline with regards to her examination in the past.  I is difficult for me to appreciate any focal weakness with regards to today's examination.  Since her recent symptoms preserved.  Neurovascularly intact.  Patient is alert and oriented x 3 and answering questions appropriately.  No visual field defects.   Psychiatric:         Mood and Affect: Mood normal.         ED Medications  Medications   clopidogrel (PLAVIX) tablet 300 mg (has no administration in time range)   iohexol (OMNIPAQUE) 350 MG/ML injection (MULTI-DOSE) 85 mL (85 mL Intravenous Given 5/28/24 1130)       Diagnostic Studies  Results Reviewed       Procedure Component Value Units Date/Time    Clostridium difficile toxin by PCR with EIA [[id number]] Collected: 05/28/24 1241    Lab Status: In process Specimen: Stool from Per Rectum Updated: 05/28/24 1258    Stool Enteric Bacterial Panel by PCR [[id number]] Collected: 05/28/24 1241    Lab Status: In process Specimen: Stool from Per Rectum Updated: 05/28/24 1258    HS Troponin I 4hr [[id number]]     Lab Status: No result Specimen: Blood     UA w Reflex to Microscopic w Reflex to Culture [[id number]]     Lab Status: No result Specimen: Urine     Rapid drug screen, urine [[id number]]     Lab Status: No result Specimen: Urine     Ethanol [[id number]]     Lab Status: No result Specimen: Blood     Salicylate level [[id number]]     Lab Status: No result Specimen: Blood     Acetaminophen level-If concentration is detectable, please discuss with medical  on call. [[id number]]     Lab Status: No result Specimen: Blood     FLU/RSV/COVID - if FLU/RSV clinically relevant  [[id number]]  (Normal) Collected: 05/28/24 1103    Lab Status: Final result Specimen: Nares from Nose Updated: 05/28/24 1153     SARS-CoV-2 Negative     INFLUENZA A PCR Negative     INFLUENZA B PCR Negative     RSV PCR Negative    Narrative:      FOR PEDIATRIC PATIENTS - copy/paste COVID Guidelines URL to browser: [email or web address]    SARS-CoV-2 assay is a Nucleic Acid Amplification assay intended for the  qualitative detection of nucleic acid from SARS-CoV-2 in nasopharyngeal  swabs. Results are for the presumptive identification of SARS-CoV-2 RNA.    Positive results are indicative of infection with SARS-CoV-2, the virus  causing COVID-19, but do not rule out bacterial infection or co-infection  with other viruses. Laboratories within the [place] and its  territories are required to report all positive results to the appropriate  public health authorities. Negative results do not preclude SARS-CoV-2  infection and should not be used as the sole basis for treatment or other  patient management decisions. Negative results must be combined with  clinical observations, patient history, and epidemiological information.  This test has not been FDA cleared or approved.    This test has been authorized by FDA under an Emergency Use Authorization  (EUA). This test is only authorized for the duration of time the  declaration that circumstances exist justifying the authorization of the  emergency use of an in vitro diagnostic tests for detection of SARS-CoV-2  virus and/or diagnosis of COVID-19 infection under section 564(b)(1) of  the Act, 21 U.S.C. 360bbb-3(b)(1), unless the authorization is terminated  or revoked sooner. The test has been validated but independent review by FDA  and CLIA is pending.    Test performed using [employer] GeneXpert: This RT-PCR assay targets N2,  a region unique to SARS-CoV-2. A conserved region in the E-gene was chosen  for  pan-Sarbecovirus detection which includes SARS-CoV-2.    According to CMS-2020-01-R, this platform meets the definition of high-throughput technology.    HS Troponin 0hr (reflex protocol) [[id number]]  (Normal) Collected: 05/28/24 1103    Lab Status: Final result Specimen: Blood from Arm, Right Updated: 05/28/24 1134     hs TnI 0hr <2 ng/L     HS Troponin I 2hr [[id number]]     Lab Status: No result Specimen: Blood     Basic metabolic panel [[id number]]  (Abnormal) Collected: 05/28/24 1103    Lab Status: Final result Specimen: Blood from Arm, Right Updated: 05/28/24 1126     Sodium 143 mmol/L      Potassium 3.7 mmol/L      Chloride 112 mmol/L      CO2 23 mmol/L      ANION GAP 8 mmol/L      BUN 15 mg/dL      Creatinine 1.05 mg/dL      Glucose 96 mg/dL      Calcium 9.1 mg/dL      eGFR 54 ml/min/1.73sq m     Narrative:      National Kidney Disease Foundation guidelines for Chronic Kidney Disease (CKD):     Stage 1 with normal or high GFR (GFR > 90 mL/min/1.73 square meters)    Stage 2 Mild CKD (GFR = 60-89 mL/min/1.73 square meters)    Stage 3A Moderate CKD (GFR = 45-59 mL/min/1.73 square meters)    Stage 3B Moderate CKD (GFR = 30-44 mL/min/1.73 square meters)    Stage 4 Severe CKD (GFR = 15-29 mL/min/1.73 square meters)    Stage 5 End Stage CKD (GFR <15 mL/min/1.73 square meters)  Note: GFR calculation is accurate only with a steady state creatinine    Protime-INR [[id number]]  (Normal) Collected: 05/28/24 1103    Lab Status: Final result Specimen: Blood from Arm, Right Updated: 05/28/24 1121     Protime 12.1 seconds      INR 0.90    APTT [[id number]]  (Normal) Collected: 05/28/24 1103    Lab Status: Final result Specimen: Blood from Arm, Right Updated: 05/28/24 1121     PTT 28 seconds     CBC and Platelet [[id number]]  (Abnormal) Collected: 05/28/24 1103    Lab Status: Final result Specimen: Blood from Arm, Right Updated: 05/28/24 1109     WBC 4.45 Thousand/uL      RBC 3.37 Million/uL      Hemoglobin 12.2 g/dL       Hematocrit 36.5 %       fL      MCH 36.2 pg      MCHC 33.4 g/dL      RDW 14.3 %      Platelets 218 Thousands/uL      MPV 8.3 fL     Fingerstick Glucose (POCT) [[id number]]  (Normal) Collected: 05/28/24 1040    Lab Status: Final result Specimen: Blood Updated: 05/28/24 1041     POC Glucose 108 mg/dl                    CT stroke alert brain   ED Interpretation by Latrell Villasenor MD (05/28 1212)   FINDINGS:     PARENCHYMA: Decreased attenuation is noted in periventricular and subcortical white matter demonstrating an appearance that is statistically most likely to represent mild microangiopathic change.  Chronic left caudate head and right posterior cerebellar   lacunar infarcts.     No CT signs of acute infarction.  No intracranial mass, mass effect or midline shift.  No acute parenchymal hemorrhage.     Normal intracranial vasculature.     VENTRICLES AND EXTRA-AXIAL SPACES:  Ventricles and extra-axial CSF spaces are prominent commensurate with the degree of volume loss.  No hydrocephalus.  No acute extra-axial hemorrhage.     VISUALIZED ORBITS:  Right-sided cataract surgery.     PARANASAL SINUSES:  Normal visualized paranasal sinuses.     CALVARIUM AND EXTRACRANIAL SOFT TISSUES:   Normal.     IMPRESSION:     No acute intracranial abnormality.     Chronic left caudate head and right posterior cerebellar lacunar infarcts. Mild chronic microangiopathic ischemic changes.     Find   ings were directly discussed with Dr. Sanket Gonzalez at 11:50 a.m.        Workstation performed: [id number]           Final Result by Gato Fitch MD (05/28 2721)      No acute intracranial abnormality.      Chronic left caudate head and right posterior cerebellar lacunar infarcts. Mild chronic microangiopathic ischemic changes.      Findings were directly discussed with Dr. Sanket Gonzalez at 11:50 a.m.         Workstation performed: [id number]         CTA stroke alert (head/neck)   ED Interpretation by Latrell  Emerson Villasenor MD (05/28 1212)   FINDINGS:     CERVICAL VASCULATURE  AORTIC ARCH AND GREAT VESSELS:  Normal aortic arch and great vessel origins. Normal visualized subclavian vessels.     RIGHT VERTEBRAL ARTERY CERVICAL SEGMENT:  Normal origin. The vessel is normal in caliber throughout the neck.     LEFT VERTEBRAL ARTERY CERVICAL SEGMENT:  Normal origin. The vessel is normal in caliber throughout the neck.     RIGHT EXTRACRANIAL CAROTID SEGMENT:  Mild atherosclerotic disease of the distal common carotid artery and proximal cervical internal carotid artery without significant stenosis compared to the more distal ICA.     LEFT EXTRACRANIAL CAROTID SEGMENT:  Normal caliber common carotid artery.  Normal bifurcation and cervical internal carotid artery.  No stenosis or dissection.     NASCET criteria was used to determine the degree of internal carotid artery diameter stenosis.        INTRACRANIAL VASCULATURE  INTERNAL CAROTID ARTERIES:  Normal enhancement of the intracranial portions of the internal carotid arteries. T   here is calcified atheromatous plaque along the bilateral cavernous and supraclinoid ICA segments with areas of mild luminal narrowing. Normal   ophthalmic artery origins.  Normal ICA terminus.     ANTERIOR CIRCULATION:  Symmetric A1 segments and anterior cerebral arteries with normal enhancement.  Normal anterior communicating artery.     MIDDLE CEREBRAL ARTERY CIRCULATION:  M1 segment and middle cerebral artery branches demonstrate normal enhancement bilaterally. Focal vessel tortuosity at the right MCA trifurcation (series 3: 183) with greater enhancement of the smaller branches   compared to the prior 2023 examination. An aneurysm at this location is felt to be less likely.     DISTAL VERTEBRAL ARTERIES: Patent distal vertebral arteries. The right vertebral artery predominantly terminates as a PICA with an attenuated right supra PICA V4 segment. Posterior inferior cerebellar artery origins are  normal. Normal vertebral basilar   junction.     BASILAR ARTERY:  Basilar artery is norm   al in caliber.  Normal superior cerebellar arteries.     POSTERIOR CEREBRAL ARTERIES: Both posterior cerebral arteries arises from the basilar tip.  Both arteries demonstrate normal enhancement.   Attenuated posterior communicating arteries.     VENOUS STRUCTURES:  Normal.        NON VASCULAR ANATOMY  BONY STRUCTURES:  No acute osseous abnormality.     SOFT TISSUES OF THE NECK:  Normal.     THORACIC INLET: Focal scarring is noted within the anterior right upper lobe.        IMPRESSION:     1. CTA head: Negative for large vessel intracranial occlusion or hemodynamically significant stenosis. Focal vessel tortuosity at the right MCA trifurcation, more conspicuous on the current examination compared to 2023 due to better contrast   opacification of the smaller branches.     2. CTA neck:  No extracranial carotid stenosis.  The cervical vertebral arteries are patent.        Findings were directly discussed with Dr. Sanket Gonzalez at 11:50 a.m.                          Workstation performed:    [id number]        Final Result by Gato Fitch MD (05/28 1156)      1. CTA head: Negative for large vessel intracranial occlusion or hemodynamically significant stenosis. Focal vessel tortuosity at the right MCA trifurcation, more conspicuous on the current examination compared to 2023 due to better contrast    opacification of the smaller branches.      2. CTA neck:  No extracranial carotid stenosis.  The cervical vertebral arteries are patent.         Findings were directly discussed with Dr. Sanket Gonzalez at 11:50 a.m.                           Workstation performed: [id number]               Procedures  ECG 12 Lead Documentation Only    Date/Time: 5/28/2024 1:10 PM    Performed by: Latrell Villasenor MD  Authorized by: Latrell Villasenor MD    Indications / Diagnosis:  76  ECG reviewed by me, the ED Provider: yes     Patient location:  ED  Previous ECG:     Previous ECG:  Compared to current    Similarity:  No change    Comparison to cardiac monitor: Yes    Interpretation:     Interpretation: abnormal    Quality:     Tracing quality:  Limited by artifact  Rate:     ECG rate:  76    ECG rate assessment: normal    Rhythm:     Rhythm: sinus rhythm    Ectopy:     Ectopy: none    QRS:     QRS axis:  Normal    QRS intervals:  Normal  Conduction:     Conduction: normal    ST segments:     ST segments:  Normal  T waves:     T waves: non-specific    Comments:      QTc 434.        ED Course  ED Course as of 05/28/24 1313   e May 28, 2024   1044 Patient presents to the emergency department with an acute alteration in mental status, worsening of slurring of speech.  Previous history of CVA in 2009 with right-sided residual deficits.  Medical list includes utilization of daily aspirin but no other blood thinners.   1100 Discussed case with neurology team, will evaluate imaging studies once available.   1126 APTT  Unremarkable   1126 Protime-INR  Unremarkable   1126 Basic metabolic panel(!)  Unremarkable             HEART Risk Score      Flowsheet Row Most Recent Value   Heart Score Risk Calculator    History 1 Filed at: 05/28/2024 1312   ECG 1 Filed at: 05/28/2024 1312   Age 2 Filed at: 05/28/2024 1312   Risk Factors 2 Filed at: 05/28/2024 1312   Troponin 0 Filed at: 05/28/2024 1312   HEART Score 6 Filed at: 05/28/2024 1312             Stroke Assessment       Row Name 05/28/24 1043             NIH Stroke Scale    Interval Baseline      Level of Consciousness (1a.) 0      LOC Questions (1b.) 0      LOC Commands (1c.) 0      Best Gaze (2.) 0      Visual (3.) 0      Facial Palsy (4.) 0      Motor Arm, Left (5a.) 0      Motor Arm, Right (5b.) 0      Motor Leg, Left (6a.) 0      Motor Leg, Right (6b.) 0      Limb Ataxia (7.) 0      Sensory (8.) 0      Best Language (9.) 0      Dysarthria (10.) 1      Extinction and Inattention (11.) (Formerly  Neglect) 0      Total 1                              SBIRT 20yo+      Flowsheet Row Most Recent Value   Initial Alcohol Screen: US AUDIT-C     1. How often do you have a drink containing alcohol? 0 Filed at: 05/28/2024 1054   2. How many drinks containing alcohol do you have on a typical day you are drinking?  0 Filed at: 05/28/2024 1054   3a. Male UNDER 65: How often do you have five or more drinks on one occasion? 0 Filed at: 05/28/2024 1054   3b. FEMALE Any Age, or MALE 65+: How often do you have 4 or more drinks on one occassion? 0 Filed at: 05/28/2024 1054   Audit-C Score 0 Filed at: 05/28/2024 1054   SHAYY: How many times in the past year have you...    Used an illegal drug or used a prescription medication for non-medical reasons? Never Filed at: 05/28/2024 1054                  Medical Decision Making  Justine is a 68-year-old female presents to the emergency department with signs symptoms consistent with potential CVA pathology versus other etiology of encephalopathy.    DDx including but not limited to:  CVA, TIA, ICH, hypertensive emergency/urgency, metabolic abnormality, cardiac etiology, atypical migraine, seizure, tumor.     Based of initial presenting complaints, stroke alert was initiated for rapid assessment evaluation of patient.  Case was discussed with on-call neurology provider (Dr. Gonzalez) who reviewed current imaging and virtually evaluated the patient.  Based of the assessment and evaluation from laboratory studies, recommended further workup in the hospital with inpatient admission, MRI, loading with Plavix as patient already takes aspirin on a daily basis and permissive hypertension to systolic of 200s pending read of MRI imaging.  Patient agreeable with this plan.  This was discussed with the inpatient medical team and patient was agreeable for an observation stay on telemetry for continued evaluation and workup for potential CVA pathology.  Patient clinically stable at time of admission to  the hospital.    Amount and/or Complexity of Data Reviewed  Labs: ordered. Decision-making details documented in ED Course.  Radiology: ordered.    Risk  Prescription drug management.  Decision regarding hospitalization.          Disposition  Final diagnoses:   Stroke-like symptoms   Slurred speech     Time reflects when diagnosis was documented in both MDM as applicable and the Disposition within this note       Time User Action Codes Description Comment    5/28/2024 10:41 AM Latrell Villasenor Add [R29.90] Stroke-like symptoms     5/28/2024 12:23 PM Latrell Villasenor Add [R47.81] Slurred speech     5/28/2024 12:40 PM Rommel Melara Add [M25.571,  G89.29] Chronic pain of right ankle     5/28/2024 12:40 PM Rommel Melara Remove [M25.571,  G89.29] Chronic pain of right ankle           ED Disposition       ED Disposition   Admit    Condition   Stable    Date/Time   Tue May 28, 2024 1224    Comment   Case was discussed with inpatient medical team and the patient's admission status was agreed to be Admission Status: inpatient status to the service of Dr. Melara.               Follow-up Information    None         Patient's Medications   Discharge Prescriptions    No medications on file     No discharge procedures on file.    PDMP Review         Value Time User    PDMP Reviewed  Yes 1/31/2023  8:30 PM YASMANI Galvan             ED Provider  Attending physically available and evaluated Justine Odell. I managed the patient along with the ED Attending.    Electronically Signed by           Latrell Villasenor MD  05/28/24 8412

## 2024-05-28 NOTE — PLAN OF CARE
Problem: PAIN - ADULT  Goal: Verbalizes/displays adequate comfort level or baseline comfort level  Description: Interventions:  - Encourage patient to monitor pain and request assistance  - Assess pain using appropriate pain scale  - Administer analgesics based on type and severity of pain and evaluate response  - Implement non-pharmacological measures as appropriate and evaluate response  - Consider cultural and social influences on pain and pain management  - Notify physician/advanced practitioner if interventions unsuccessful or patient reports new pain  5/28/2024 1702 by Agata Rudd RN  Outcome: Progressing  5/28/2024 1700 by Agata Rudd RN  Outcome: Progressing     Problem: SAFETY ADULT  Goal: Patient will remain free of falls  Description: INTERVENTIONS:  - Educate patient/family on patient safety including physical limitations  - Instruct patient to call for assistance with activity   - Consult OT/PT to assist with strengthening/mobility   - Keep Call bell within reach  - Keep bed low and locked with side rails adjusted as appropriate  - Keep care items and personal belongings within reach  - Initiate and maintain comfort rounds  - Make Fall Risk Sign visible to staff  - Offer Toileting every 2 Hours, in advance of need  - Initiate/Maintain bed/chair alarm  - Obtain necessary fall risk management equipment: alarms   - Apply yellow socks and bracelet for high fall risk patients  - Consider moving patient to room near nurses station  5/28/2024 1702 by Agata Rudd RN  Outcome: Progressing  5/28/2024 1700 by Agata Rudd RN  Outcome: Progressing     Problem: DISCHARGE PLANNING  Goal: Discharge to home or other facility with appropriate resources  Description: INTERVENTIONS:  - Identify barriers to discharge w/patient and caregiver  - Arrange for needed discharge resources and transportation as appropriate  - Identify discharge learning needs (meds, wound care, etc.)  - Arrange for interpretive  services to assist at discharge as needed  - Refer to Case Management Department for coordinating discharge planning if the patient needs post-hospital services based on physician/advanced practitioner order or complex needs related to functional status, cognitive ability, or social support system  5/28/2024 1702 by Agata Rudd RN  Outcome: Progressing  5/28/2024 1700 by Agata Rudd RN  Outcome: Progressing     Problem: Knowledge Deficit  Goal: Patient/family/caregiver demonstrates understanding of disease process, treatment plan, medications, and discharge instructions  Description: Complete learning assessment and assess knowledge base.  Interventions:  - Provide teaching at level of understanding  - Provide teaching via preferred learning methods  5/28/2024 1702 by Agata Rudd RN  Outcome: Progressing  5/28/2024 1700 by Agata Rudd RN  Outcome: Progressing     Problem: NEUROSENSORY - ADULT  Goal: Achieves stable or improved neurological status  Description: INTERVENTIONS  - Monitor and report changes in neurological status  - Monitor vital signs such as temperature, blood pressure, glucose, and any other labs ordered   - Initiate measures to prevent increased intracranial pressure  - Monitor for seizure activity and implement precautions if appropriate      5/28/2024 1702 by Agata Rudd RN  Outcome: Progressing  5/28/2024 1700 by Agata Rudd RN  Outcome: Progressing     Problem: CARDIOVASCULAR - ADULT  Goal: Maintains optimal cardiac output and hemodynamic stability  Description: INTERVENTIONS:  - Monitor I/O, vital signs and rhythm  - Monitor for S/S and trends of decreased cardiac output  - Administer and titrate ordered vasoactive medications to optimize hemodynamic stability  - Assess quality of pulses, skin color and temperature  - Assess for signs of decreased coronary artery perfusion  - Instruct patient to report change in severity of symptoms  5/28/2024 1702 by Agata Rudd  RN  Outcome: Progressing  5/28/2024 1700 by Agata Rudd RN  Outcome: Progressing     Problem: METABOLIC, FLUID AND ELECTROLYTES - ADULT  Goal: Electrolytes maintained within normal limits  Description: INTERVENTIONS:  - Monitor labs and assess patient for signs and symptoms of electrolyte imbalances  - Administer electrolyte replacement as ordered  - Monitor response to electrolyte replacements, including repeat lab results as appropriate  - Instruct patient on fluid and nutrition as appropriate  5/28/2024 1702 by Agata Rudd RN  Outcome: Progressing  5/28/2024 1700 by Agata Rudd RN  Outcome: Progressing     Problem: MUSCULOSKELETAL - ADULT  Goal: Maintain or return mobility to safest level of function  Description: INTERVENTIONS:  - Assess patient's ability to carry out ADLs; assess patient's baseline for ADL function and identify physical deficits which impact ability to perform ADLs (bathing, care of mouth/teeth, toileting, grooming, dressing, etc.)  - Assess/evaluate cause of self-care deficits   - Assess range of motion  - Assess patient's mobility  - Assess patient's need for assistive devices and provide as appropriate  - Encourage maximum independence but intervene and supervise when necessary  - Involve family in performance of ADLs  - Assess for home care needs following discharge   - Consider OT consult to assist with ADL evaluation and planning for discharge  - Provide patient education as appropriate  5/28/2024 1702 by Agata Rudd RN  Outcome: Progressing  5/28/2024 1700 by Agata Rudd RN  Outcome: Progressing

## 2024-05-29 ENCOUNTER — APPOINTMENT (OUTPATIENT)
Dept: MRI IMAGING | Facility: HOSPITAL | Age: 69
DRG: 056 | End: 2024-05-29
Payer: MEDICARE

## 2024-05-29 ENCOUNTER — APPOINTMENT (OUTPATIENT)
Dept: NON INVASIVE DIAGNOSTICS | Facility: HOSPITAL | Age: 69
DRG: 056 | End: 2024-05-29
Payer: MEDICARE

## 2024-05-29 PROBLEM — E87.6 HYPOKALEMIA: Status: ACTIVE | Noted: 2024-05-29

## 2024-05-29 LAB
AMPHETAMINES SERPL QL SCN: NEGATIVE
ANION GAP SERPL CALCULATED.3IONS-SCNC: 8 MMOL/L (ref 4–13)
AORTIC ROOT: 3.2 CM
AV REGURGITATION PRESSURE HALF TIME: 664 MS
BARBITURATES UR QL: NEGATIVE
BASOPHILS # BLD AUTO: 0.03 THOUSANDS/ÂΜL (ref 0–0.1)
BASOPHILS NFR BLD AUTO: 1 % (ref 0–1)
BENZODIAZ UR QL: POSITIVE
BILIRUB UR QL STRIP: NEGATIVE
BSA FOR ECHO PROCEDURE: 1.75 M2
BUN SERPL-MCNC: 9 MG/DL (ref 5–25)
C COLI+JEJUNI TUF STL QL NAA+PROBE: NEGATIVE
C DIFF TOX GENS STL QL NAA+PROBE: NEGATIVE
CALCIUM SERPL-MCNC: 9.1 MG/DL (ref 8.4–10.2)
CHLORIDE SERPL-SCNC: 109 MMOL/L (ref 96–108)
CHOLEST SERPL-MCNC: 111 MG/DL
CLARITY UR: CLEAR
CO2 SERPL-SCNC: 25 MMOL/L (ref 21–32)
COCAINE UR QL: NEGATIVE
COLOR UR: YELLOW
CREAT SERPL-MCNC: 0.8 MG/DL (ref 0.6–1.3)
E WAVE DECELERATION TIME: 223 MS
E/A RATIO: 0.73
EC STX1+STX2 GENES STL QL NAA+PROBE: NEGATIVE
EOSINOPHIL # BLD AUTO: 0.09 THOUSAND/ÂΜL (ref 0–0.61)
EOSINOPHIL NFR BLD AUTO: 3 % (ref 0–6)
ERYTHROCYTE [DISTWIDTH] IN BLOOD BY AUTOMATED COUNT: 13.8 % (ref 11.6–15.1)
EST. AVERAGE GLUCOSE BLD GHB EST-MCNC: 108 MG/DL
FENTANYL UR QL SCN: NEGATIVE
FRACTIONAL SHORTENING: 35 (ref 28–44)
GFR SERPL CREATININE-BSD FRML MDRD: 75 ML/MIN/1.73SQ M
GLUCOSE P FAST SERPL-MCNC: 87 MG/DL (ref 65–99)
GLUCOSE SERPL-MCNC: 87 MG/DL (ref 65–140)
GLUCOSE UR STRIP-MCNC: NEGATIVE MG/DL
HBA1C MFR BLD: 5.4 %
HCT VFR BLD AUTO: 34.5 % (ref 34.8–46.1)
HDLC SERPL-MCNC: 52 MG/DL
HGB BLD-MCNC: 11.6 G/DL (ref 11.5–15.4)
HGB UR QL STRIP.AUTO: NEGATIVE
HYDROCODONE UR QL SCN: POSITIVE
IMM GRANULOCYTES # BLD AUTO: 0.01 THOUSAND/UL (ref 0–0.2)
IMM GRANULOCYTES NFR BLD AUTO: 0 % (ref 0–2)
INTERVENTRICULAR SEPTUM IN DIASTOLE (PARASTERNAL SHORT AXIS VIEW): 0.7 CM
INTERVENTRICULAR SEPTUM: 0.7 CM (ref 0.6–1.1)
KETONES UR STRIP-MCNC: NEGATIVE MG/DL
LAAS-AP2: 19.5 CM2
LAAS-AP4: 20.8 CM2
LDLC SERPL CALC-MCNC: 37 MG/DL (ref 0–100)
LEFT ATRIUM SIZE: 3.9 CM
LEFT ATRIUM VOLUME (MOD BIPLANE): 62 ML
LEFT ATRIUM VOLUME INDEX (MOD BIPLANE): 35.4 ML/M2
LEFT INTERNAL DIMENSION IN SYSTOLE: 3.3 CM (ref 2.1–4)
LEFT VENTRICULAR INTERNAL DIMENSION IN DIASTOLE: 5.1 CM (ref 3.5–6)
LEFT VENTRICULAR POSTERIOR WALL IN END DIASTOLE: 0.7 CM
LEFT VENTRICULAR STROKE VOLUME: 77 ML
LEUKOCYTE ESTERASE UR QL STRIP: NEGATIVE
LVSV (TEICH): 77 ML
LYMPHOCYTES # BLD AUTO: 1.32 THOUSANDS/ÂΜL (ref 0.6–4.47)
LYMPHOCYTES NFR BLD AUTO: 41 % (ref 14–44)
MCH RBC QN AUTO: 35.2 PG (ref 26.8–34.3)
MCHC RBC AUTO-ENTMCNC: 33.6 G/DL (ref 31.4–37.4)
MCV RBC AUTO: 105 FL (ref 82–98)
METHADONE UR QL: NEGATIVE
MONOCYTES # BLD AUTO: 0.24 THOUSAND/ÂΜL (ref 0.17–1.22)
MONOCYTES NFR BLD AUTO: 8 % (ref 4–12)
MV E'TISSUE VEL-LAT: 10 CM/S
MV E'TISSUE VEL-SEP: 8 CM/S
MV PEAK A VEL: 0.85 M/S
MV PEAK E VEL: 62 CM/S
MV STENOSIS PRESSURE HALF TIME: 65 MS
MV VALVE AREA P 1/2 METHOD: 3.38
NEUTROPHILS # BLD AUTO: 1.5 THOUSANDS/ÂΜL (ref 1.85–7.62)
NEUTS SEG NFR BLD AUTO: 47 % (ref 43–75)
NITRITE UR QL STRIP: NEGATIVE
NRBC BLD AUTO-RTO: 0 /100 WBCS
OPIATES UR QL SCN: POSITIVE
OXYCODONE+OXYMORPHONE UR QL SCN: NEGATIVE
PCP UR QL: NEGATIVE
PH UR STRIP.AUTO: 6 [PH]
PLATELET # BLD AUTO: 209 THOUSANDS/UL (ref 149–390)
PMV BLD AUTO: 8.5 FL (ref 8.9–12.7)
POTASSIUM SERPL-SCNC: 3.2 MMOL/L (ref 3.5–5.3)
PROT UR STRIP-MCNC: NEGATIVE MG/DL
PULM VEIN S/D RATIO: 0.78
PV PEAK D VEL: 0.18 M/S
PV PEAK S VEL: 0.14 M/S
RBC # BLD AUTO: 3.3 MILLION/UL (ref 3.81–5.12)
RIGHT ATRIAL 2D VOLUME: 47 ML
RIGHT ATRIUM AREA SYSTOLE A4C: 17.3 CM2
RIGHT VENTRICLE ID DIMENSION: 2.7 CM
SALMONELLA SP SPAO STL QL NAA+PROBE: NEGATIVE
SHIGELLA SP+EIEC IPAH STL QL NAA+PROBE: NEGATIVE
SL CV AV DECELERATION TIME RETROGRADE: 2288 MS
SL CV AV PEAK GRADIENT RETROGRADE: 22 MMHG
SL CV LEFT ATRIUM LENGTH A2C: 5.3 CM
SL CV LV EF: 60
SL CV PED ECHO LEFT VENTRICLE DIASTOLIC VOLUME (MOD BIPLANE) 2D: 122 ML
SL CV PED ECHO LEFT VENTRICLE SYSTOLIC VOLUME (MOD BIPLANE) 2D: 45 ML
SODIUM SERPL-SCNC: 142 MMOL/L (ref 135–147)
SP GR UR STRIP.AUTO: 1.02 (ref 1–1.03)
THC UR QL: NEGATIVE
TRICUSPID ANNULAR PLANE SYSTOLIC EXCURSION: 2 CM
TRICUSPID VALVE PEAK E WAVE VELOCITY: 0.07 M/S
TRIGL SERPL-MCNC: 109 MG/DL
UROBILINOGEN UR STRIP-ACNC: <2 MG/DL
WBC # BLD AUTO: 3.19 THOUSAND/UL (ref 4.31–10.16)

## 2024-05-29 PROCEDURE — 93306 TTE W/DOPPLER COMPLETE: CPT

## 2024-05-29 PROCEDURE — 97167 OT EVAL HIGH COMPLEX 60 MIN: CPT

## 2024-05-29 PROCEDURE — 97163 PT EVAL HIGH COMPLEX 45 MIN: CPT

## 2024-05-29 PROCEDURE — 80048 BASIC METABOLIC PNL TOTAL CA: CPT | Performed by: FAMILY MEDICINE

## 2024-05-29 PROCEDURE — 80061 LIPID PANEL: CPT | Performed by: FAMILY MEDICINE

## 2024-05-29 PROCEDURE — 83036 HEMOGLOBIN GLYCOSYLATED A1C: CPT | Performed by: FAMILY MEDICINE

## 2024-05-29 PROCEDURE — 85025 COMPLETE CBC W/AUTO DIFF WBC: CPT | Performed by: FAMILY MEDICINE

## 2024-05-29 PROCEDURE — 93306 TTE W/DOPPLER COMPLETE: CPT | Performed by: INTERNAL MEDICINE

## 2024-05-29 PROCEDURE — 99232 SBSQ HOSP IP/OBS MODERATE 35: CPT

## 2024-05-29 PROCEDURE — 81003 URINALYSIS AUTO W/O SCOPE: CPT | Performed by: FAMILY MEDICINE

## 2024-05-29 PROCEDURE — 80307 DRUG TEST PRSMV CHEM ANLYZR: CPT | Performed by: FAMILY MEDICINE

## 2024-05-29 RX ORDER — CLOPIDOGREL BISULFATE 75 MG/1
75 TABLET ORAL DAILY
Status: DISCONTINUED | OUTPATIENT
Start: 2024-05-29 | End: 2024-05-30 | Stop reason: HOSPADM

## 2024-05-29 RX ORDER — POTASSIUM CHLORIDE 20 MEQ/1
40 TABLET, EXTENDED RELEASE ORAL ONCE
Status: COMPLETED | OUTPATIENT
Start: 2024-05-29 | End: 2024-05-29

## 2024-05-29 RX ADMIN — HYDROCODONE BITARTRATE AND ACETAMINOPHEN 1 TABLET: 5; 325 TABLET ORAL at 04:39

## 2024-05-29 RX ADMIN — HYDROCODONE BITARTRATE AND ACETAMINOPHEN 1 TABLET: 5; 325 TABLET ORAL at 11:13

## 2024-05-29 RX ADMIN — ALPRAZOLAM 1 MG: 0.5 TABLET ORAL at 13:04

## 2024-05-29 RX ADMIN — PANTOPRAZOLE SODIUM 40 MG: 40 TABLET, DELAYED RELEASE ORAL at 17:30

## 2024-05-29 RX ADMIN — ALPRAZOLAM 1 MG: 0.5 TABLET ORAL at 21:28

## 2024-05-29 RX ADMIN — ASPIRIN 81 MG 81 MG: 81 TABLET ORAL at 08:48

## 2024-05-29 RX ADMIN — NICOTINE 1 PATCH: 14 PATCH, EXTENDED RELEASE TRANSDERMAL at 17:31

## 2024-05-29 RX ADMIN — FAMOTIDINE 20 MG: 20 TABLET, FILM COATED ORAL at 08:48

## 2024-05-29 RX ADMIN — OLANZAPINE 20 MG: 10 TABLET, FILM COATED ORAL at 21:28

## 2024-05-29 RX ADMIN — POTASSIUM CHLORIDE 40 MEQ: 1500 TABLET, EXTENDED RELEASE ORAL at 08:48

## 2024-05-29 RX ADMIN — ATORVASTATIN CALCIUM 40 MG: 40 TABLET, FILM COATED ORAL at 17:30

## 2024-05-29 RX ADMIN — ALPRAZOLAM 1 MG: 0.5 TABLET ORAL at 04:45

## 2024-05-29 RX ADMIN — HEPARIN SODIUM 5000 UNITS: 5000 INJECTION, SOLUTION INTRAVENOUS; SUBCUTANEOUS at 05:59

## 2024-05-29 RX ADMIN — HEPARIN SODIUM 5000 UNITS: 5000 INJECTION, SOLUTION INTRAVENOUS; SUBCUTANEOUS at 14:34

## 2024-05-29 RX ADMIN — PANTOPRAZOLE SODIUM 40 MG: 40 TABLET, DELAYED RELEASE ORAL at 06:00

## 2024-05-29 RX ADMIN — METOPROLOL SUCCINATE 100 MG: 100 TABLET, EXTENDED RELEASE ORAL at 08:48

## 2024-05-29 RX ADMIN — HEPARIN SODIUM 5000 UNITS: 5000 INJECTION, SOLUTION INTRAVENOUS; SUBCUTANEOUS at 21:28

## 2024-05-29 RX ADMIN — CLOPIDOGREL 75 MG: 75 TABLET ORAL at 17:30

## 2024-05-29 RX ADMIN — QUETIAPINE FUMARATE 300 MG: 100 TABLET ORAL at 21:28

## 2024-05-29 NOTE — PLAN OF CARE
Problem: OCCUPATIONAL THERAPY ADULT  Goal: Performs self-care activities at highest level of function for planned discharge setting.  See evaluation for individualized goals.  Description: Treatment Interventions: ADL retraining, Functional transfer training, UE strengthening/ROM, Endurance training, Cognitive reorientation, Patient/family training, Equipment evaluation/education, Activityengagement, Energy conservation          See flowsheet documentation for full assessment, interventions and recommendations.   Note: Limitation: Decreased ADL status, Decreased UE strength, Decreased Safe judgement during ADL, Decreased cognition, Decreased endurance, Decreased self-care trans, Decreased high-level ADLs     Assessment: Pt is a 68 y.o. female seen for OT evaluation s/p admit to [facility] on 5/28/2024 w/ Stroke-like symptom.  Comorbidities affecting pt's functional performance at time of assessment include:  bipolar 1, GERD, HTN, CVA, COPD, tachycardia, hypothermia, rhabdomyolysis . Personal factors affecting pt at time of IE include:steps to enter environment, limited home support, behavioral pattern, difficulty performing ADLS, difficulty performing IADLS , limited insight into deficits, decreased initiation and engagement , and health management . Prior to admission, pt was (I) with ADLs and IADLs with use of RW at times during functional mobility. Upon evaluation: Pt requires (S) level with use of RW during mobility 2* the following deficits impacting occupational performance: weakness, decreased strength, decreased balance, decreased tolerance, impaired GMC, impaired FMC, impaired sensation, impaired attention, impaired initiation, impaired memory, impaired sequencing, impaired problem solving, decreased safety awareness, increased pain, and impaired interpersonal skills. Pt to benefit from continued skilled OT tx while in the hospital to address deficits as defined above and maximize level of functional  independence w ADL's and functional mobility. Occupational Performance areas to address include: grooming, bathing/shower, toilet hygiene, dressing, functional mobility, community mobility, and clothing management. The patient's raw score on the -PAC Daily Activity Inpatient Short Form is 21. A raw score of greater than or equal to 19 suggests the patient may benefit from discharge to home. Discharge recommendation at this time is level III minimum resource intensity.  Pt benefited from co-evaluation of skilled OT and PT therapists in order to most appropriately address functional deficits d/t extensive assistance required for safe functional mobility, decreased activity tolerance, and regression from functioning level prior to admission and/or onset of present illness. OT/PT objectives were addressed separately; please see PT note for specific goal areas targeted.     Rehab Resource Intensity Level, OT: III (Minimum Resource Intensity)

## 2024-05-29 NOTE — OCCUPATIONAL THERAPY NOTE
Occupational Therapy Evaluation     Patient Name: Justine Odell  Today's Date: 5/29/2024  Problem List  Principal Problem:    Stroke-like symptom  Active Problems:    Bipolar 1 disorder (HCC)    GERD (gastroesophageal reflux disease)    Primary hypertension    History of CVA (cerebrovascular accident)    Chronic obstructive pulmonary disease (HCC)    Diastolic dysfunction    Past Medical History  Past Medical History:   Diagnosis Date    Bipolar 1 disorder (HCC)     Cancer (HCC)     kidney    Cardiac disease     fast heart beat    Chronic pain     Fractures     GERD (gastroesophageal reflux disease)     Hard of hearing     Hypertension     Renal cell carcinoma (HCC)     Stroke (HCC)     2009 affected her speech, right sided weakness    TIA (transient ischemic attack)      Past Surgical History  Past Surgical History:   Procedure Laterality Date    APPENDECTOMY      CHOLECYSTECTOMY      FL GUIDED NEEDLE PLAC BX/ASP/INJ  5/30/2014    FL GUIDED NEEDLE PLAC BX/ASP/INJ  12/19/2013    FL GUIDED NEEDLE PLAC BX/ASP/INJ  6/10/2013    JOINT REPLACEMENT Bilateral      bilateral knees    NEPHRECTOMY Right     ME COLONOSCOPY FLX DX W/COLLJ SPEC WHEN PFRMD N/A 8/7/2018    Procedure: COLONOSCOPY;  Surgeon: Boy Guillermo MD;  Location: [facility];  Service: Gastroenterology    ME OPEN TREATMENT BIMALLEOLAR ANKLE FRACTURE Right 5/14/2019    Procedure: ANKLE - Bimalleolar OPEN REDUCTION W/ INTERNAL FIXATION (ORIF);  Surgeon: Harris Christianson;  Location:  MAIN OR;  Service: Orthopedics    ME REMOVAL IMPLANT DEEP Right 1/31/2023    Procedure: REMOVAL HARDWARE ANKLE;  Surgeon: Harris Christianson;  Location:  MAIN OR;  Service: Orthopedics    ME TX TIBL SHFT FX IMED IMPLT W/WO SCREWS&/CERCLA Left 7/27/2022    Procedure: INSERTION NAIL IM TIBIA;  Surgeon: Lefty Shea MD;  Location:  MAIN OR;  Service: Orthopedics           05/29/24 0909   OT Last Visit   OT Visit Date 05/29/24   Note Type   Note type Evaluation   Pain Assessment  "  Pain Assessment Tool 0-10   Pain Score 8   Pain Location/Orientation Orientation: Bilateral;Location: Foot;Location: Leg   Restrictions/Precautions   Weight Bearing Precautions Per Order No   Other Precautions Fall Risk;Pain;Chair Alarm;Cognitive;Multiple lines;Telemetry;Contact/isolation   Home Living   Type of Home House   Home Layout Two level;Able to live on main level with bedroom/bathroom;Performs ADLs on one level;Stairs to enter without rails;Other (Comment)  (1 HECTOR; 1st floor setup)   Bathroom Shower/Tub Tub/shower unit   Bathroom Toilet Standard   Bathroom Equipment Grab bars in shower   Bathroom Accessibility Accessible   Home Equipment Walker;Grab bars   Additional Comments pt reports use of RW at times during functional mobility   Prior Function   Level of [place] Independent with ADLs;Independent with functional mobility;Independent with IADLS   Lives With Alone   IADLs Family/Friend/Other provides transportation   Falls in the last 6 months 1 to 4   Vocational On disability   Subjective   Subjective \"is this a new profession for you?\"   ADL   Where Assessed Edge of bed   LB Dressing Assistance 5  Supervision/Setup   LB Dressing Deficit Don/doff R sock;Don/doff L sock   Additional Comments pt performs sock donning at EOB with (S) level   Bed Mobility   Supine to Sit 5  Supervision   Additional items Increased time required;Verbal cues   Sit to Supine   (seated in chair at end of session)   Additional Comments pt on RA during session; SpO2 WFL with no complaints of SOB   Transfers   Sit to Stand 5  Supervision   Additional items Increased time required;Verbal cues  (RW)   Stand to Sit 5  Supervision   Additional items Increased time required;Verbal cues  (RW)   Additional Comments pt performed functional transfers with (S) level; no significant LOB, however mild instability initially   Functional Mobility   Functional Mobility 5  Supervision   Additional Comments pt performs functional mobility " ~60ft with RW; limited due to elevated HR at 145   Additional items Rolling walker   Balance   Static Sitting Good   Dynamic Sitting Good   Static Standing Fair +   Dynamic Standing Fair   Ambulatory Fair   Activity Tolerance   Activity Tolerance Patient limited by fatigue;Other (Comment)  (cognition)-pt is wandering in thought process and at times not attending to directions   RUE Assessment   RUE Assessment 4-/5 grossly; WFL AROM   LUE Assessment   LUE Assessment WFL   Hand Function   Gross Motor Coordination Functional   Fine Motor Coordination Impaired with slowed opposition and coordination to R UE   Sensation   Light Touch Reports decreased sensation to R UE in comparison to L UE grossly; no reported numbness or tingling to B UE   Sharp/Dull No apparent deficits   Vision                                          pt with decreased peripheral vision to R eye with blurring reported during close reading of badge; appears to have mild L neglect while seated EOB, however able to attend to visual field with visual cues   Psychosocial   Psychosocial (WDL) X   Patient Behaviors/Mood Wandering   Cognition   Overall Cognitive Status Impaired   Arousal/Participation Alert;Cooperative   Attention Difficulty attending to directions   Orientation Level Oriented to person;Oriented to place;Oriented to time;Disoriented to situation   Memory Decreased long term memory;Decreased short term memory;Decreased recall of recent events   Following Commands Follows one step commands without difficulty   Assessment   Limitation Decreased ADL status;Decreased UE strength;Decreased Safe judgement during ADL;Decreased cognition;Decreased endurance;Decreased self-care trans;Decreased high-level ADLs   Assessment Pt is a 68 y.o. female seen for OT evaluation s/p admit to [facility] on 5/28/2024 w/ Stroke-like symptom.  Comorbidities affecting pt's functional performance at time of assessment include:  bipolar 1, GERD, HTN, CVA, COPD, tachycardia,  hypothermia, rhabdomyolysis . Personal factors affecting pt at time of IE include:steps to enter environment, limited home support, behavioral pattern, difficulty performing ADLS, difficulty performing IADLS , limited insight into deficits, decreased initiation and engagement , and health management . Prior to admission, pt was (I) with ADLs and IADLs with use of RW at times during functional mobility. Upon evaluation: Pt requires (S) level with use of RW during mobility 2* the following deficits impacting occupational performance: weakness, decreased strength, decreased balance, decreased tolerance, impaired GMC, impaired FMC, impaired sensation, impaired attention, impaired initiation, impaired memory, impaired sequencing, impaired problem solving, decreased safety awareness, increased pain, and impaired interpersonal skills. Pt to benefit from continued skilled OT tx while in the hospital to address deficits as defined above and maximize level of functional independence w ADL's and functional mobility. Occupational Performance areas to address include: grooming, bathing/shower, toilet hygiene, dressing, functional mobility, community mobility, and clothing management. The patient's raw score on the -PAC Daily Activity Inpatient Short Form is 21. A raw score of greater than or equal to 19 suggests the patient may benefit from discharge to home. Discharge recommendation at this time is level III minimum resource intensity.  Pt benefited from co-evaluation of skilled OT and PT therapists in order to most appropriately address functional deficits d/t extensive assistance required for safe functional mobility, decreased activity tolerance, and regression from functioning level prior to admission and/or onset of present illness. OT/PT objectives were addressed separately; please see PT note for specific goal areas targeted.   Goals   Patient Goals to go home   Short Term Goal  pt will perform UE strengthening  exercises   Long Term Goal #1 pt will demonstrate toilet transfers and hygiene at (I) level   Long Term Goal #2 pt will demonstrate functional mobility with RW at mod (I) level   Long Term Goal pt will demonstrate UB/LB bathing and grooming tasks at (I) level   Plan   Treatment Interventions ADL retraining;Functional transfer training;UE strengthening/ROM;Endurance training;Cognitive reorientation;Patient/family training;Equipment evaluation/education;Activityengagement;Energy conservation   Goal Expiration Date 06/12/24   OT Frequency 3-5x/wk   Discharge Recommendation   Rehab Resource Intensity Level, OT III (Minimum Resource Intensity)   AM-PAC Daily Activity Inpatient   Lower Body Dressing 3   Bathing 3   Toileting 3   Upper Body Dressing 4   Grooming 4   Eating 4   Daily Activity Raw Score 21   Daily Activity Standardized Score (Calc for Raw Score >=11) 44.27   AM-PAC Applied Cognition Inpatient   Following a Speech/Presentation 4   Understanding Ordinary Conversation 4   Taking Medications 2   Remembering Where Things Are Placed or Put Away 3   Remembering List of 4-5 Errands 2   Taking Care of Complicated Tasks 2   Applied Cognition Raw Score 17   Applied Cognition Standardized Score 36.52

## 2024-05-29 NOTE — UTILIZATION REVIEW
NOTIFICATION OF INPATIENT ADMISSION   AUTHORIZATION REQUEST   SERVICING FACILITY:   [place] [address]  Tax ID:  [id number]  NPI: [id number] ATTENDING PROVIDER:  Attending Name and NPI#: Rommel Melara Md [[id number]]  Address: [address] [place] [address]  Phone: [phone]     ADMISSION INFORMATION:  Place of Service: Inpatient [facility]  Place of Service Code: 21  Inpatient Admission Date/Time: 5/29/24  2:34 PM  Discharge Date/Time: No discharge date for patient encounter.  Admitting Diagnosis Code/Description:  Slurred speech [R47.81]  Weakness [R53.1]  Stroke-like symptoms [R29.90]     UTILIZATION REVIEW CONTACT:  Dejuan Hoyos Utilization   Network Utilization Review Department  Phone: [phone]  Fax [phone]  Email: [email or web address]  Contact for approvals/pending authorizations, clinical reviews, and discharge.     PHYSICIAN ADVISORY SERVICES:  Medical Necessity Denial & Uvhn-sq-Czkr Review  Phone: [phone]  Fax: [phone]  Email: [email or web address]     DISCHARGE SUPPORT TEAM:  For Patients Discharge Needs & Updates  Phone: [phone] opt. 2 Fax: [phone]  Email: [email or web address]

## 2024-05-29 NOTE — CASE MANAGEMENT
Case Management Assessment & Discharge Planning Note    Patient name Justine Odell  Location /400-01 MRN [id number]  : 1955 Date 2024       Current Admission Date: 2024  Current Admission Diagnosis:Stroke-like symptom   Patient Active Problem List    Diagnosis Date Noted Date Diagnosed    Stroke-like symptom 2024     Constipation 2023     Chronic pain of right ankle 2022     Nocturnal hypoxia 2022     Dysphagia 2022     Vitamin D insufficiency 2022     Low TSH level 2022     Cerebrovascular disease 2022     Diastolic dysfunction 2022     Tobacco use 2022     Acute metabolic encephalopathy 2022     Chronic pain 2022     Closed fracture of ankle, bimalleolar, right, sequela 2022     CVA (cerebral vascular accident) (HCC) 2022     Pure hypercholesterolemia 2021     Chronic obstructive pulmonary disease (HCC) 2021     Anemia 2021     Bipolar disease, chronic (HCC) 2021     Rhabdomyolysis 2021     Neck pain 2021     Poor venous access 2021     Hypertension 2021     History of CVA (cerebrovascular accident) 2021     Acute encephalopathy 2021     Hypothermia 2021     Elevated d-dimer 2021     Acute encephalopathy 2021     Overdose of drug, undetermined intent, initial encounter 2021     Slurred speech 10/15/2020     Status post open reduction with internal fixation (ORIF) of fracture of ankle 2019     Primary hypertension 2019     History of tachycardia 2019     Acute right ankle pain 2019     GERD (gastroesophageal reflux disease) 2018     Elevated MCV 2017     Bipolar 1 disorder (HCC)      Renal cell carcinoma (HCC)      Nausea and vomiting 2016     Palpitations 2016     Insomnia 2015     Difficulty sleeping 2015     Dental disorder 2015     Allergic rhinitis  07/17/2015     Nail fungal infection 07/16/2015     Abnormal liver function test 03/10/2015     Right hip pain 11/03/2014     Herpes zoster 10/28/2014     Shoulder pain, right 10/20/2014     Abnormal gait 09/03/2014     Vitamin D deficiency 02/19/2014     Osteoporosis 02/19/2014     Hip fracture, osteoporotic (HCC) 02/19/2014     Anxiety 10/16/2013     Yeast vaginitis 10/14/2013     Posterior dislocation of hip, closed (HCC) 09/19/2013     Chronic low back pain 07/26/2013     Prosthetic hip infection (HCC) 06/16/2013     Depression 06/09/2013     Tobacco abuse 06/09/2013     Generalized anxiety disorder 06/09/2013       LOS (days): 0  Geometric Mean LOS (GMLOS) (days):   Days to GMLOS:     OBJECTIVE:              Current admission status: Observation       Preferred Pharmacy:   [facility] - [place] - [address]  [address]  [place] [address]  Phone: [phone] Fax: [phone]    Primary Care Provider: Lilian Tang DO    Primary Insurance: [employer] REP  Secondary Insurance: [facility]    ASSESSMENT:  Active Health Care Proxies       Sienna Odell [facility] Care Representative - Mother   Primary Phone: [phone] (Mobile)                 Advance Directives  Does patient have a Health Care POA?: No  Was patient offered paperwork?: Yes  Does patient currently have a Health Care decision maker?: No  Does patient have Advance Directives?: No  Was patient offered paperwork?: Yes  Primary Contact: Sienna Odell Mother [phone]         Readmission Root Cause  30 Day Readmission: No    Patient Information  Admitted from:: Home  Mental Status: Alert  During Assessment patient was accompanied by: Not accompanied during assessment  Assessment information provided by:: Parent  Primary Caregiver: Self  Support Systems: Self, Family members (Mother)  [place] Residence[place] [facility]  What city do you live in?: [place]  Home entry access  options. Select all that apply.: Stairs  Number of steps to enter home.: 1  Do the steps have railings?: No  Type of Current Residence: 2 story home  Upon entering residence, is there a bedroom on the main floor (no further steps)?: No  A bedroom is located on the following floor levels of residence (select all that apply):: 2nd Floor  Upon entering residence, is there a bathroom on the main floor (no further steps)?: Yes (Pt has a bathroon in basement, 1rst and 2nd floor.)  Number of steps to 2nd floor from main floor: One Flight  Living Arrangements: Lives Alone  Is patient a ?: No    Activities of Daily Living Prior to Admission  Functional Status: Independent  Completes ADLs independently?: Yes  Ambulates independently?: Yes (Pt uses no device to ambulate)  Does patient use assisted devices?: No  Does patient currently own DME?: Yes  What DME does the patient currently own?: Walker, Bedside Commode  Does patient have a history of Outpatient Therapy (PT/OT)?: Yes  Does the patient have a history of Short-Term Rehab?: No  Does patient have a history of HHC?: Yes (Pt is not sure what agency it was.)  Does patient currently have HHC?: No         Patient Information Continued  Income Source: Pension/residential  Does patient have prescription coverage?: Yes  Does patient receive dialysis treatments?: No  Does patient have a history of substance abuse?: No  Does patient have a history of Mental Health Diagnosis?: Yes (Pt has a history of bipolar and anxiety)  Is patient receiving treatment for mental health?: Yes (Pt sees a psychiatris q 1 month)  Has patient received inpatient treatment related to mental health in the last 2 years?: No         Means of Transportation  Means of Transport to \[facility]\"":: Family transport (Pt does not drive . Pt's mother or SO drives patient to app.)  [place] Application:  (Pt refused)      Social Determinants of Health (SDOH)      Flowsheet Row Most Recent Value   Housing Stability     In the last 12 months, was there a time when you were not able to pay the mortgage or rent on time? N   In the past 12 months, how many times have you moved where you were living? 1   At any time in the past 12 months, were you homeless or living in a shelter (including now)? N   Food Insecurity    Within the past 12 months, you worried that your food would run out before you got the money to buy more. Never true   Within the past 12 months, the food you bought just didn't last and you didn't have money to get more. Never true   Utilities    In the past 12 months has the electric, gas, oil, or water company threatened to shut off services in your home? No            DISCHARGE DETAILS:    Discharge planning discussed with:: Pt  Freedom of Choice: Yes (Pt is alert and oriented x3.)                        Requested Home Health Care         Is the patient interested in HHC at discharge?: No    DME Referral Provided  Referral made for DME?: No          Pt lives alone. Pt is independent with ADL's and functional mobility with no device. I will await PT and Ot's recommendations. I will continue to follow for any Case Management needs.

## 2024-05-29 NOTE — PROGRESS NOTES
"[facility]  Progress Note  Name: Justine Odell I  MRN: [id number]  Unit/Bed#: 400-01 I Date of Admission: 5/28/2024   Date of Service: 5/29/2024 I Hospital Day: 0    Assessment & Plan   * Stroke-like symptom  Assessment & Plan  Patient with history of CVA with residual symptoms presented to ED for evaluation of lower extremity edema and mother states \"doesn't look like herself.\" Reportedly with worsening dysarthria from baseline. Initially hypotensive which resolved  Question if symptoms in part 2/2 to cerebral hypoperfusion given initial hypotension    Continue stroke pathway with frequent VS, neuro checks, tele  MRI without contrast pending, unfortunately unable to obtain today and MRI machine currently requiring maintenance, expected to be remedied by tomorrow   Continue DAPT and high intensity statin  LDL 37 / A1c pending  PT/OT, recommending outpatient   Appreciate neurology consult    Hypokalemia  Assessment & Plan  K 3.2, given 40mEq Kdur  Monitor & replete prn    Diastolic dysfunction  Assessment & Plan  Not on maintenance diuretics, has some lower extremity edema on exam   Repeat echo: LVEF 60%, normal diastolic fx, mild AR/TR  Low salt diet. Compression stockings.  Can try diuretic if BP trends allow    Chronic obstructive pulmonary disease (HCC)  Assessment & Plan  Without acute exacerbation  As needed nebulizer treatments  NRT    History of CVA (cerebrovascular accident)  Assessment & Plan  With residual deficits of slurred speech per patient and mother at bedside  Continue Aspirin / Statin     Primary hypertension  Assessment & Plan  Norvasc initially held for permissive hypertension  However, BP trend at goal wo - will continue to hold  PTA, in PCP office BP noted to be 80/60    GERD (gastroesophageal reflux disease)  Assessment & Plan  Continue Pepcid & Protonix     Bipolar 1 disorder (HCC)  Assessment & Plan  Continue Seroquel / Zyprexa / PRN ativan                VTE " Pharmacologic Prophylaxis:   heparin    Mobility:   Basic Mobility Inpatient Raw Score: 18  JH-HLM Goal: 6: Walk 10 steps or more  JH-HLM Achieved: 7: Walk 25 feet or more  JH-HLM Goal achieved. Continue to encourage appropriate mobility.    Patient Centered Rounds: I performed bedside rounds with nursing staff today.   Discussions with Specialists or Other Care Team Provider: case management    Education and Discussions with Family / Patient:  to update contact.     Total Time Spent on Date of Encounter in care of patient:  mins. This time was spent on one or more of the following: performing physical exam; counseling and coordination of care; obtaining or reviewing history; documenting in the medical record; reviewing/ordering tests, medications or procedures; communicating with other healthcare professionals and discussing with patient's family/caregivers.    Current Length of Stay: 0 day(s)  Current Patient Status: Inpatient   Certification Statement: The patient will continue to require additional inpatient hospital stay due to MRI brain  Discharge Plan: Anticipate discharge tomorrow to home.    Code Status: Level 1 - Full Code    Subjective:   Patient seen and examined. Complaining of lower extremity edema. No other acute concerns    Objective:     Vitals:   Temp (24hrs), Av.4 °F (36.3 °C), Min:97.1 °F (36.2 °C), Max:97.8 °F (36.6 °C)    Temp:  [97.1 °F (36.2 °C)-97.8 °F (36.6 °C)] 97.3 °F (36.3 °C)  HR:  [66-81] 81  Resp:  [16-20] 17  BP: (119-138)/(64-86) 122/75  SpO2:  [94 %-98 %] 94 %  Body mass index is 26.34 kg/m².     Input and Output Summary (last 24 hours):     Intake/Output Summary (Last 24 hours) at 2024 1449  Last data filed at 2024 1156  Gross per 24 hour   Intake 360 ml   Output 800 ml   Net -440 ml       Physical Exam:   Physical Exam  HENT:      Head: Normocephalic and atraumatic.   Cardiovascular:      Rate and Rhythm: Normal rate and regular rhythm.   Pulmonary:      Effort:  Pulmonary effort is normal. No respiratory distress.      Breath sounds: Normal breath sounds.   Abdominal:      General: Abdomen is flat. Bowel sounds are normal. There is no distension.      Palpations: Abdomen is soft.      Tenderness: There is no abdominal tenderness.   Musculoskeletal:      Right lower leg: Edema present.      Left lower leg: Edema present.   Skin:     General: Skin is warm and dry.   Neurological:      General: No focal deficit present.      Mental Status: She is alert and oriented to person, place, and time.      GCS: GCS eye subscore is 4. GCS verbal subscore is 5. GCS motor subscore is 6.      Cranial Nerves: Dysarthria present. No cranial nerve deficit or facial asymmetry.      Motor: No weakness.   Psychiatric:         Attention and Perception: She is inattentive.         Behavior: Behavior is hyperactive.          Additional Data:     Labs:  Results from last 7 days   Lab Units 05/29/24  0440   WBC Thousand/uL 3.19*   HEMOGLOBIN g/dL 11.6   HEMATOCRIT % 34.5*   PLATELETS Thousands/uL 209   SEGS PCT % 47   LYMPHO PCT % 41   MONO PCT % 8   EOS PCT % 3     Results from last 7 days   Lab Units 05/29/24  0440   SODIUM mmol/L 142   POTASSIUM mmol/L 3.2*   CHLORIDE mmol/L 109*   CO2 mmol/L 25   BUN mg/dL 9   CREATININE mg/dL 0.80   ANION GAP mmol/L 8   CALCIUM mg/dL 9.1   GLUCOSE RANDOM mg/dL 87     Results from last 7 days   Lab Units 05/28/24  1103   INR  0.90     Results from last 7 days   Lab Units 05/28/24  1040   POC GLUCOSE mg/dl 108     Results from last 7 days   Lab Units 05/29/24  0440   HEMOGLOBIN A1C % 5.4           Lines/Drains:  Invasive Devices       Peripheral Intravenous Line  Duration             Peripheral IV 05/28/24 Dorsal (posterior);Right Forearm 1 day                      Telemetry:  Telemetry Orders (From admission, onward)               24 Hour Telemetry Monitoring  Continuous x 24 Hours (Telem)        Question:  Reason for 24 Hour Telemetry  Answer:  TIA/Suspected  CVA/ Confirmed CVA                     Telemetry Reviewed: Normal Sinus Rhythm  Indication for Continued Telemetry Use: Acute CVA             Imaging: Reviewed radiology reports from this admission including: CT head and CTA H/N    Recent Cultures (last 7 days):   Results from last 7 days   Lab Units 05/28/24  1241   C DIFF TOXIN B BY PCR  Negative       Last 24 Hours Medication List:   Current Facility-Administered Medications   Medication Dose Route Frequency Provider Last Rate    ALPRAZolam  1 mg Oral TID PRN Rommel Melara MD      aspirin  81 mg Oral Daily Rommel Melara MD      atorvastatin  40 mg Oral QPM Rommel Melara MD      clopidogrel  75 mg Oral Daily Keyana Taylor PA-C      famotidine  20 mg Oral Daily Rommel Melara MD      heparin (porcine)  5,000 Units Subcutaneous Q8H BRIDGER Rommel Melara MD      HYDROcodone-acetaminophen  1 tablet Oral Q6H PRN Rommel Melara MD      metoprolol succinate  100 mg Oral Daily Rommel Melara MD      nicotine  1 patch Transdermal Daily Rommel Melara MD      OLANZapine  20 mg Oral HS Rommel Melara MD      pantoprazole  40 mg Oral BID AC Rommel Melara MD      QUEtiapine  300 mg Oral HS Rommel Melara MD          Today, Patient Was Seen By: Keyana Taylor PA-C    **Please Note: This note may have been constructed using a voice recognition system.**

## 2024-05-29 NOTE — PLAN OF CARE
Problem: PAIN - ADULT  Goal: Verbalizes/displays adequate comfort level or baseline comfort level  Description: Interventions:  - Encourage patient to monitor pain and request assistance  - Assess pain using appropriate pain scale  - Administer analgesics based on type and severity of pain and evaluate response  - Implement non-pharmacological measures as appropriate and evaluate response  - Consider cultural and social influences on pain and pain management  - Notify physician/advanced practitioner if interventions unsuccessful or patient reports new pain  Outcome: Progressing     Problem: SAFETY ADULT  Goal: Patient will remain free of falls  Description: INTERVENTIONS:  - Educate patient/family on patient safety including physical limitations  - Instruct patient to call for assistance with activity   - Consult OT/PT to assist with strengthening/mobility   - Keep Call bell within reach  - Keep bed low and locked with side rails adjusted as appropriate  - Keep care items and personal belongings within reach  - Initiate and maintain comfort rounds  - Make Fall Risk Sign visible to staff  - Offer Toileting every 2 Hours, in advance of need  - Initiate/Maintain bed/chair alarm  - Obtain necessary fall risk management equipment: alarms   - Apply yellow socks and bracelet for high fall risk patients  - Consider moving patient to room near nurses station  Outcome: Progressing     Problem: DISCHARGE PLANNING  Goal: Discharge to home or other facility with appropriate resources  Description: INTERVENTIONS:  - Identify barriers to discharge w/patient and caregiver  - Arrange for needed discharge resources and transportation as appropriate  - Identify discharge learning needs (meds, wound care, etc.)  - Arrange for interpretive services to assist at discharge as needed  - Refer to Case Management Department for coordinating discharge planning if the patient needs post-hospital services based on physician/advanced  practitioner order or complex needs related to functional status, cognitive ability, or social support system  Outcome: Progressing     Problem: Knowledge Deficit  Goal: Patient/family/caregiver demonstrates understanding of disease process, treatment plan, medications, and discharge instructions  Description: Complete learning assessment and assess knowledge base.  Interventions:  - Provide teaching at level of understanding  - Provide teaching via preferred learning methods  Outcome: Progressing     Problem: NEUROSENSORY - ADULT  Goal: Achieves stable or improved neurological status  Description: INTERVENTIONS  - Monitor and report changes in neurological status  - Monitor vital signs such as temperature, blood pressure, glucose, and any other labs ordered   - Initiate measures to prevent increased intracranial pressure  - Monitor for seizure activity and implement precautions if appropriate      Outcome: Progressing     Problem: CARDIOVASCULAR - ADULT  Goal: Maintains optimal cardiac output and hemodynamic stability  Description: INTERVENTIONS:  - Monitor I/O, vital signs and rhythm  - Monitor for S/S and trends of decreased cardiac output  - Administer and titrate ordered vasoactive medications to optimize hemodynamic stability  - Assess quality of pulses, skin color and temperature  - Assess for signs of decreased coronary artery perfusion  - Instruct patient to report change in severity of symptoms  Outcome: Progressing     Problem: METABOLIC, FLUID AND ELECTROLYTES - ADULT  Goal: Electrolytes maintained within normal limits  Description: INTERVENTIONS:  - Monitor labs and assess patient for signs and symptoms of electrolyte imbalances  - Administer electrolyte replacement as ordered  - Monitor response to electrolyte replacements, including repeat lab results as appropriate  - Instruct patient on fluid and nutrition as appropriate  Outcome: Progressing     Problem: MUSCULOSKELETAL - ADULT  Goal: Maintain or  return mobility to safest level of function  Description: INTERVENTIONS:  - Assess patient's ability to carry out ADLs; assess patient's baseline for ADL function and identify physical deficits which impact ability to perform ADLs (bathing, care of mouth/teeth, toileting, grooming, dressing, etc.)  - Assess/evaluate cause of self-care deficits   - Assess range of motion  - Assess patient's mobility  - Assess patient's need for assistive devices and provide as appropriate  - Encourage maximum independence but intervene and supervise when necessary  - Involve family in performance of ADLs  - Assess for home care needs following discharge   - Consider OT consult to assist with ADL evaluation and planning for discharge  - Provide patient education as appropriate  Outcome: Progressing

## 2024-05-29 NOTE — PLAN OF CARE
Problem: PHYSICAL THERAPY ADULT  Goal: Performs mobility at highest level of function for planned discharge setting.  See evaluation for individualized goals.  Description: Treatment/Interventions: Functional transfer training, LE strengthening/ROM, Elevations, Endurance training, Therapeutic exercise, Gait training, Bed mobility          See flowsheet documentation for full assessment, interventions and recommendations.  Note: Prognosis: Good  Problem List: Decreased strength, Decreased endurance, Impaired balance, Decreased mobility, Impaired judgement, Decreased safety awareness, Pain  Assessment: Patient is a 68 y.o. female evaluated by Physical Therapy s/p admit to [facility] on 5/28/2024 with admitting diagnosis of: Slurred speech, Weakness, Stroke-like symptoms, and principal problem of: Stroke-like symptom. PT was consulted to assess patient's functional mobility and discharge needs. Ordered are PT Evaluation and treatment with activity level of: up and OOB as tolerated. Comorbidities affecting patient's physical performance at time of assessment include:  bipolar 1, GERD, HTN, hx of CVA, COPD, renal cell carcinoma, hx of R ankle ORIF, chronic low back pain  . Personal factors affecting the patient at time of IE include: lives in 2 story home, ambulating with assistive device, step(s) to enter home, inability to navigate community distances, history of fall(s), impaired safety awareness, decreased initiation and engagement, inability/difficulty performing IADLs, and inability/difficulty performing ADLs. Please locate objective findings from PT assessment regarding body systems outlined above. Upon evaluation, pt able to perform all functional mobility with SUP-[place] x 1, RW, and increased time. Frequent verbal cuing provided for safety awareness and sequencing. Seated rest break taken following ~60' of ambulation d/t significantly increased HR with exertion. SpO2 remained WFL. Moderate  postural sway demonstrated with mobility but no true LOB experienced. The patient's AM-PAC Basic Mobility Inpatient Short Form Raw Score is 18. A Raw score of greater than 16 suggests the patient may benefit from discharge to home. Please also refer to the recommendation of the Physical Therapist for safe discharge planning. Co treatment with OT secondary to complex medical condition of pt, possible A of 2 required to achieve and maintain transitional movements, requiring the need of skilled therapeutic intervention of 2 therapists to achieve delivery of services. Pt will benefit from continued PT intervention during LOS to address current deficits, increase LOF, and facilitate safe d/c to next level of care when medically appropriate. D/c recommendation at this time is rehabilitation resource intensity level III.        Rehab Resource Intensity Level, PT: III (Minimum Resource Intensity)    See flowsheet documentation for full assessment.

## 2024-05-29 NOTE — PHYSICAL THERAPY NOTE
Physical Therapy Evaluation    Patient Name: Justine Odell    Today's Date: 5/29/2024     Problem List  Principal Problem:    Stroke-like symptom  Active Problems:    Bipolar 1 disorder (HCC)    GERD (gastroesophageal reflux disease)    Primary hypertension    History of CVA (cerebrovascular accident)    Chronic obstructive pulmonary disease (HCC)    Diastolic dysfunction       Past Medical History  Past Medical History:   Diagnosis Date    Bipolar 1 disorder (HCC)     Cancer (HCC)     kidney    Cardiac disease     fast heart beat    Chronic pain     Fractures     GERD (gastroesophageal reflux disease)     Hard of hearing     Hypertension     Renal cell carcinoma (HCC)     Stroke (HCC)     2009 affected her speech, right sided weakness    TIA (transient ischemic attack)         Past Surgical History  Past Surgical History:   Procedure Laterality Date    APPENDECTOMY      CHOLECYSTECTOMY      FL GUIDED NEEDLE PLAC BX/ASP/INJ  5/30/2014    FL GUIDED NEEDLE PLAC BX/ASP/INJ  12/19/2013    FL GUIDED NEEDLE PLAC BX/ASP/INJ  6/10/2013    JOINT REPLACEMENT Bilateral      bilateral knees    NEPHRECTOMY Right     TX COLONOSCOPY FLX DX W/COLLJ SPEC WHEN PFRMD N/A 8/7/2018    Procedure: COLONOSCOPY;  Surgeon: Boy Guillermo MD;  Location: [facility];  Service: Gastroenterology    TX OPEN TREATMENT BIMALLEOLAR ANKLE FRACTURE Right 5/14/2019    Procedure: ANKLE - Bimalleolar OPEN REDUCTION W/ INTERNAL FIXATION (ORIF);  Surgeon: Harris Christianson;  Location:  MAIN OR;  Service: Orthopedics    TX REMOVAL IMPLANT DEEP Right 1/31/2023    Procedure: REMOVAL HARDWARE ANKLE;  Surgeon: Harris Christianson;  Location: [facility];  Service: Orthopedics    TX TX TIBL SHFT FX IMED IMPLT W/WO SCREWS&/CERCLA Left 7/27/2022    Procedure: INSERTION NAIL IM TIBIA;  Surgeon: Lefty Shea MD;  Location: [facility];  Service: Orthopedics           05/29/24 0911   PT Last Visit   PT Visit Date 05/29/24  "  Note Type   Note type Evaluation   Pain Assessment   Pain Assessment Tool 0-10   Pain Score 8   Pain Location/Orientation Orientation: Bilateral;Location: Hip   Restrictions/Precautions   Weight Bearing Precautions Per Order No   Other Precautions Fall Risk;Telemetry;Chair Alarm;Bed Alarm;Contact/isolation   Home Living   Type of Home House   Home Layout Two level;Performs ADLs on one level;Able to live on main level with bedroom/bathroom;Stairs to enter without rails  (1 HECTOR)   Bathroom Shower/Tub Tub/shower unit   Bathroom Toilet Standard   Bathroom Equipment Grab bars in shower   Bathroom Accessibility Accessible   Home Equipment Walker   Additional Comments pt reports occasional use of RW   Prior Function   Level of [place] Independent with ADLs;Independent with functional mobility;Needs assistance with IADLS   Lives With Alone   Receives Help From Family   IADLs Family/Friend/Other provides transportation   Falls in the last 6 months 1 to 4   Vocational Retired   General   Family/Caregiver Present No   Cognition   Overall Cognitive Status Impaired   Arousal/Participation Alert   Orientation Level Oriented to person;Oriented to place;Oriented to time;Disoriented to situation   Following Commands Follows one step commands with increased time or repetition   Comments pt demonstrating poor command following and problem solving   Subjective   Subjective \"So this is a new profession for you?\"   RLE Assessment   RLE Assessment X  (quads and hip flexors 4-/5; hamstrings and plantarflexion 4+/5)   LLE Assessment   LLE Assessment X  (quads and hip flexors 4-/5; hamstrings and plantarflexion 4+/5)   Bed Mobility   Supine to Sit 5  Supervision   Additional items Increased time required;Verbal cues   Sit to Supine   (pt OOB at end of session)   Transfers   Sit to Stand 5  Supervision   Additional items Increased time required;Verbal cues   Stand to Sit 5  Supervision   Additional items Increased time required;Verbal " cues   Additional Comments RW used   Ambulation/Elevation   Gait pattern Excessively slow;Short stride;Foward flexed;Decreased foot clearance;Improper Weight shift  (pt veering to R side)   Gait Assistance 4  Minimal assist   Additional items Assist x 1;Verbal cues   Assistive Device Rolling walker   Distance 60'   Balance   Static Sitting Good   Dynamic Sitting Good   Static Standing Fair +   Dynamic Standing Fair   Ambulatory Fair -  (with RW)   Endurance Deficit   Endurance Deficit Yes   Endurance Deficit Description pt's HR increasing with minimal ambulation   Activity Tolerance   Activity Tolerance Patient limited by fatigue;Patient limited by pain   Assessment   Prognosis Good   Problem List Decreased strength;Decreased endurance;Impaired balance;Decreased mobility;Impaired judgement;Decreased safety awareness;Pain   Assessment Patient is a 68 y.o. female evaluated by Physical Therapy s/p admit to [facility] on 5/28/2024 with admitting diagnosis of: Slurred speech, Weakness, Stroke-like symptoms, and principal problem of: Stroke-like symptom. PT was consulted to assess patient's functional mobility and discharge needs. Ordered are PT Evaluation and treatment with activity level of: up and OOB as tolerated. Comorbidities affecting patient's physical performance at time of assessment include:  bipolar 1, GERD, HTN, hx of CVA, COPD, renal cell carcinoma, hx of R ankle ORIF, chronic low back pain  . Personal factors affecting the patient at time of IE include: lives in 2 story home, ambulating with assistive device, step(s) to enter home, inability to navigate community distances, history of fall(s), impaired safety awareness, decreased initiation and engagement, inability/difficulty performing IADLs, and inability/difficulty performing ADLs. Please locate objective findings from PT assessment regarding body systems outlined above. Upon evaluation, pt able to perform all functional mobility with  SUP-[place] x 1, RW, and increased time. Frequent verbal cuing provided for safety awareness and sequencing. Seated rest break taken following ~60' of ambulation d/t significantly increased HR with exertion. SpO2 remained WFL. Moderate postural sway demonstrated with mobility but no true LOB experienced. The patient's AM-[facility] Basic Mobility Inpatient Short Form Raw Score is 18. A Raw score of greater than 16 suggests the patient may benefit from discharge to home. Please also refer to the recommendation of the Physical Therapist for safe discharge planning. Co treatment with OT secondary to complex medical condition of pt, possible A of 2 required to achieve and maintain transitional movements, requiring the need of skilled therapeutic intervention of 2 therapists to achieve delivery of services. Pt will benefit from continued PT intervention during LOS to address current deficits, increase LOF, and facilitate safe d/c to next level of care when medically appropriate. D/c recommendation at this time is rehabilitation resource intensity level III.   Goals   Patient Goals to go home   LTG Expiration Date 06/12/24   Long Term Goal #1 Pt will participate in B LE strengthening exercises to facilitate improved functional activity tolerance. Pt will perform all functional transfers and bed mobility mod(I) with good safety awareness. Pt will ambulate 250' mod(I) with LRAD while maintaining good functional dynamic balance.   Plan   Treatment/Interventions Functional transfer training;LE strengthening/ROM;Elevations;Endurance training;Therapeutic exercise;Gait training;Bed mobility   PT Frequency 3-5x/wk   Discharge Recommendation   Rehab Resource Intensity Level, PT III (Minimum Resource Intensity)   AM-PAC Basic Mobility Inpatient   Turning in Flat Bed Without Bedrails 3   Lying on Back to Sitting on Edge of Flat Bed Without Bedrails 3   Moving Bed to Chair 3   Standing Up From Chair Using Arms 3   Walk in Room 3   Climb 3-5  Stairs With Railing 3   Basic Mobility Inpatient Raw Score 18   Basic Mobility Standardized Score 41.05   [facility] Highest Level Of Mobility   -[facility] Goal 6: Walk 10 steps or more   -HL Achieved 7: Walk 25 feet or more   End of Consult   Patient Position at End of Consult Bedside chair;Bed/Chair alarm activated;All needs within reach

## 2024-05-29 NOTE — QUICK NOTE
Attempted to update patient's mother, Sienna over the phone.  Went to voicemail, voicemail message box full, unable to leave message.

## 2024-05-29 NOTE — UTILIZATION REVIEW
Initial Clinical Review    OBSERVATION 5-28-24 CHANGED TO INPATIENT FOR CONTINUED EVALUATION OF STROKE LIKE SYMPTOM OF SLURRED SPEECH WITH MRI.    Admission: Date/Time/Statement:   Admission Orders (From admission, onward)       Ordered        05/29/24 1434  INPATIENT ADMISSION  Once            05/28/24 1240  Place in Observation  Once                          Orders Placed This Encounter    Place in Observation     Standing Status:   Standing     Number of Occurrences:   1     Order Specific Question:   Level of Care     Answer:   Med Surg [16]    INPATIENT ADMISSION     Standing Status:   Standing     Number of Occurrences:   1     Order Specific Question:   Level of Care     Answer:   Med Surg [16]     Order Specific Question:   Estimated length of stay     Answer:   More than 2 Midnights     Order Specific Question:   Certification     Answer:   I certify that inpatient services are medically necessary for this patient for a duration of greater than two midnights. See H&P and MD Progress Notes for additional information about the patient's course of treatment.     ED Arrival Information       Expected   -    Arrival   5/28/2024 10:37    Acuity   Emergent              Means of arrival   Wheelchair    Escorted by   Family Member    Service   Hospitalist    Admission type   Emergency              Arrival complaint   Weakness             Chief Complaint   Patient presents with    CVA/TIA-like Symptoms     Patient presents with slurred speech, weakness and a headache. TIA in the past but reports speech is more slurred than normal.        Initial Presentation: 68 y.o. female presents to ed from home for evaluation and treatment of stroke like symptoms including slurred speech, weakness and headache.  Patient needed staff to assist her from vehicle to ed on arrival.  Appears that the patient had been seen earlier in the day by providers outside of the hospital for fatigue, malaise, chest discomfort. They had sent  her to the emergency department for further evaluation. PMHX: TIA , CVA with R side deficits, renal  cancer, HTN.  Clinical assessment significant for slurring of speech beyond baseline. Right side weakness at baseline.   Imaging without new finding.  Ekg : non specific T wave.  Initially treated with oral plavix, xanax, pepcid, metoprolol, protonix, aspirin, sq heparin. Admit to observation for stroke like symptom.  Plan includes: neuro checks, telemetry, permissive HTN, lipid panel, , neuro consult, MRI, PT/OT/ST evaluations.       Consult neurology:Initially hypotensive which resolved  Question if symptoms in part 2/2 to cerebral hypoperfusion given initial hypotensionTPA Decision: Patient not a candidate. Unclear time of onset outside appropriate time window. Not an endovascular candidate since no LVO. NHISS =1 for dysarthria.      Date: 5-29-24   Day 2: inpatient med surg   Patient with residual speech deficit.  UDS + benzo + opiate + hydrocodone. MRI pending .  PT/OT recommending outpatient therapy. Continue DAPT and high intensity statin. Monitor blood pressure and hold norvasc.        ED Triage Vitals   05/28/24 1045 05/28/24 1045 05/28/24 1045 05/28/24 1045 05/28/24 1045   (!) 97.3 °F (36.3 °C) 75 20 104/61 92 %      No Pain          05/29/24 69.6 kg (153 lb 7 oz)     Additional Vital Signs:     Date/Time Temp Pulse Resp BP MAP (mmHg) SpO2 O2 Device   05/29/24 14:55:21 97.4 °F (36.3 °C) Abnormal  73 18 126/80 95 95 % --   05/29/24 12:01:47 -- 81 -- 122/75 91 94 % --   05/29/24 0900 -- -- -- -- -- -- None (Room air)   05/29/24 07:12:42 -- 66 -- 130/79 96 95 % --   05/29/24 07:05:37 97.3 °F (36.3 °C) Abnormal  71 17 129/79 96 96 % --   05/29/24 0430 97.2 °F (36.2 °C) Abnormal  -- 16 129/79 96 -- --   05/29/24 0230 97.6 °F (36.4 °C) 70 16 -- -- -- --   05/29/24 00:30:29 97.1 °F (36.2 °C) Abnormal  70 18 128/76 93 95 % --   05/28/24 2230 97.5 °F (36.4 °C) 77 16 -- -- -- --   05/28/24 2030 97.8 °F (36.6 °C) --  16 138/80 -- -- --   05/28/24 1930 97.1 °F (36.2 °C) Abnormal  -- 16 119/67 -- -- --   05/28/24 1830 97.8 °F (36.6 °C) -- 16 123/68 -- -- --   05/28/24 1053 97.3 °F (36.3 °C) Abnormal  75 20 104/61 74 91 % None (Room air)   05/28/24 1045 97.3 °F (36.3 °C) Abnormal  75 20 104/61 -- 92 % None (Room air)     Date and Time Eye Opening Best Verbal Response Best Motor Response [place] Coma Scale Score   05/28/24 1700 4 5 6 15   05/28/24 1600 4 5 6 15   05/28/24 1500 4 5 6 15   05/28/24 1400 4 5 6 15   05/28/24 1300 4 5 6 15   05/28/24 1230 4 5 6 15   05/28/24 1200 4 5 6 15   05/28/24 1130 4 5 6 15   05/28/24 1115 4 5 6 15   05/28/24 1100 4 5 6 15   05/28/24 1045 4 5 6 15         Pertinent Labs/Diagnostic Test Results:     CT stroke alert brain   Final  (05/28 1156)      1. CTA head: Negative for large vessel intracranial occlusion or hemodynamically significant stenosis. Focal vessel tortuosity at the right MCA trifurcation, more conspicuous on the current examination compared to 2023 due to better contrast    opacification of the smaller branches.      2. CTA neck:  No extracranial carotid stenosis.  The cervical vertebral arteries are patent.        Results from last 7 days   Lab Units 05/28/24  1103   SARS-COV-2  Negative     Results from last 7 days   Lab Units 05/29/24  0440 05/28/24  1103   WBC Thousand/uL 3.19* 4.45   HEMOGLOBIN g/dL 11.6 12.2   HEMATOCRIT % 34.5* 36.5   PLATELETS Thousands/uL 209 218   TOTAL NEUT ABS Thousands/µL 1.50*  --          Results from last 7 days   Lab Units 05/29/24  0440 05/28/24  1103   SODIUM mmol/L 142 143   POTASSIUM mmol/L 3.2* 3.7   CHLORIDE mmol/L 109* 112*   CO2 mmol/L 25 23   ANION GAP mmol/L 8 8   BUN mg/dL 9 15   CREATININE mg/dL 0.80 1.05   EGFR ml/min/1.73sq m 75 54   CALCIUM mg/dL 9.1 9.1         Results from last 7 days   Lab Units 05/28/24  1040   POC GLUCOSE mg/dl 108     Results from last 7 days   Lab Units 05/29/24  0440 05/28/24  1103   GLUCOSE RANDOM mg/dL 87 96          Results from last 7 days   Lab Units 05/29/24  0440   HEMOGLOBIN A1C % 5.4   EAG mg/dl 108       Results from last 7 days   Lab Units 05/28/24  1443 05/28/24  1103   HS TNI 0HR ng/L  --  <2   HS TNI 2HR ng/L 3  --    HSTNI D2 ng/L >1  --          Results from last 7 days   Lab Units 05/28/24  1103   PROTIME seconds 12.1   INR  0.90   PTT seconds 28     Results from last 7 days   Lab Units 05/29/24  0003   CLARITY UA  Clear   COLOR UA  Yellow   SPEC GRAV UA  1.025   PH UA  6.0   GLUCOSE UA mg/dl Negative   KETONES UA mg/dl Negative   BLOOD UA  Negative   PROTEIN UA mg/dl Negative   NITRITE UA  Negative   BILIRUBIN UA  Negative   UROBILINOGEN UA (BE) mg/dl <2.0   LEUKOCYTES UA  Negative     Results from last 7 days   Lab Units 05/28/24  1103   INFLUENZA A PCR  Negative   INFLUENZA B PCR  Negative   RSV PCR  Negative         Results from last 7 days   Lab Units 05/29/24  0005   AMPH/METH  Negative   BARBITURATE UR  Negative   BENZODIAZEPINE UR  Positive*   COCAINE UR  Negative   METHADONE URINE  Negative   OPIATE UR  Positive*   PCP UR  Negative   THC UR  Negative     Results from last 7 days   Lab Units 05/28/24  1443   ETHANOL LVL mg/dL <10   ACETAMINOPHEN LVL ug/mL 5*   SALICYLATE LVL mg/dL <5     Results from last 7 days   Lab Units 05/28/24  1241   C DIFF TOXIN B BY PCR  Negative     Results from last 7 days   Lab Units 05/28/24  1241   SALMONELLA SP PCR  Negative   SHIGELLA SP/ENTEROINVASIVE E. COLI (EIEC)  Negative   CAMPYLOBACTER SP (JEJUNI AND COLI)  Negative   SHIGA TOXIN 1/SHIGA TOXIN 2  Negative     ED Treatment:   Medication Administration from 05/28/2024 1036 to 05/28/2024 1724         Date/Time Order Dose Route Action     05/28/2024 1459 EDT clopidogrel (PLAVIX) tablet 300 mg 300 mg Oral Given     05/28/2024 1458 EDT ALPRAZolam (XANAX) tablet 1 mg 1 mg Oral Given     05/28/2024 1458 EDT famotidine (PEPCID) tablet 20 mg 20 mg Oral Given     05/28/2024 1533 EDT metoprolol succinate (TOPROL-XL) 24  hr tablet 100 mg 100 mg Oral Given     05/28/2024 1533 EDT pantoprazole (PROTONIX) EC tablet 40 mg 40 mg Oral Given     05/28/2024 1459 EDT aspirin chewable tablet 81 mg 81 mg Oral Given     05/28/2024 1459 EDT heparin (porcine) subcutaneous injection 5,000 Units 5,000 Units Subcutaneous Given          Past Medical History:   Diagnosis Date    Bipolar 1 disorder (HCC)     Cancer (HCC)     kidney    Cardiac disease     fast heart beat    Chronic pain     Fractures     GERD (gastroesophageal reflux disease)     Hard of hearing     Hypertension     Renal cell carcinoma (HCC)     Stroke (HCC)     2009 affected her speech, right sided weakness    TIA (transient ischemic attack)      Present on Admission:   Chronic obstructive pulmonary disease (HCC)   Primary hypertension   Bipolar 1 disorder (HCC)   Diastolic dysfunction   GERD (gastroesophageal reflux disease)      Admitting Diagnosis:     Slurred speech [R47.81]  Weakness [R53.1]  Stroke-like symptoms [R29.90]    Age/Sex: 68 y.o. female    Scheduled Medications:  aspirin, 81 mg, Oral, Daily  atorvastatin, 40 mg, Oral, QPM  clopidogrel, 75 mg, Oral, Daily  famotidine, 20 mg, Oral, Daily  heparin (porcine), 5,000 Units, Subcutaneous, Q8H BRIDGER  metoprolol succinate, 100 mg, Oral, Daily  nicotine, 1 patch, Transdermal, Daily  OLANZapine, 20 mg, Oral, HS  pantoprazole, 40 mg, Oral, BID AC  QUEtiapine, 300 mg, Oral, HS      Continuous IV Infusions:     PRN Meds:  ALPRAZolam, 1 mg, Oral, TID PRN  HYDROcodone-acetaminophen, 1 tablet, Oral, Q6H PRN        IP CONSULT TO NEUROLOGY  IP CONSULT TO CASE MANAGEMENT  IP CONSULT TO NUTRITION SERVICES    Network Utilization Review Department  ATTENTION: Please call with any questions or concerns to [phone] and carefully listen to the prompts so that you are directed to the right person. All voicemails are confidential.   For Discharge needs, contact Care Management DC Support Team at [phone] opt. 2  Send all requests for  admission clinical reviews, approved or denied determinations and any other requests to dedicated fax number below belonging to the campus where the patient is receiving treatment. List of dedicated fax numbers for the Facilities:  FACILITY NAME UR FAX NUMBER   ADMISSION DENIALS (Administrative/Medical Necessity) [phone]   DISCHARGE SUPPORT TEAM (NETWORK) [phone]   PARENT CHILD HEALTH (Maternity/NICU/Pediatrics) [phone]   [facility] [phone]   [facility] [phone]   [facility] [phone]   [facility] [phone]   [facility] [phone]   [facility] [phone]   [facility] [phone]   [facility] [phone]   [facility] [phone]   [facility] [phone]   [facility] [phone]   [facility] [phone]

## 2024-05-30 ENCOUNTER — APPOINTMENT (INPATIENT)
Dept: MRI IMAGING | Facility: HOSPITAL | Age: 69
DRG: 056 | End: 2024-05-30
Payer: MEDICARE

## 2024-05-30 VITALS
HEART RATE: 91 BPM | SYSTOLIC BLOOD PRESSURE: 135 MMHG | OXYGEN SATURATION: 96 % | HEIGHT: 64 IN | RESPIRATION RATE: 20 BRPM | TEMPERATURE: 97.1 F | BODY MASS INDEX: 26.2 KG/M2 | WEIGHT: 153.44 LBS | DIASTOLIC BLOOD PRESSURE: 81 MMHG

## 2024-05-30 LAB
ANION GAP SERPL CALCULATED.3IONS-SCNC: 9 MMOL/L (ref 4–13)
ATRIAL RATE: 76 BPM
BASOPHILS # BLD AUTO: 0.03 THOUSANDS/ÂΜL (ref 0–0.1)
BASOPHILS NFR BLD AUTO: 1 % (ref 0–1)
BUN SERPL-MCNC: 9 MG/DL (ref 5–25)
CALCIUM SERPL-MCNC: 9.4 MG/DL (ref 8.4–10.2)
CHLORIDE SERPL-SCNC: 105 MMOL/L (ref 96–108)
CO2 SERPL-SCNC: 25 MMOL/L (ref 21–32)
CREAT SERPL-MCNC: 0.76 MG/DL (ref 0.6–1.3)
EOSINOPHIL # BLD AUTO: 0.06 THOUSAND/ÂΜL (ref 0–0.61)
EOSINOPHIL NFR BLD AUTO: 1 % (ref 0–6)
ERYTHROCYTE [DISTWIDTH] IN BLOOD BY AUTOMATED COUNT: 14.1 % (ref 11.6–15.1)
GFR SERPL CREATININE-BSD FRML MDRD: 80 ML/MIN/1.73SQ M
GLUCOSE SERPL-MCNC: 104 MG/DL (ref 65–140)
HCT VFR BLD AUTO: 37 % (ref 34.8–46.1)
HGB BLD-MCNC: 12.4 G/DL (ref 11.5–15.4)
IMM GRANULOCYTES # BLD AUTO: 0.01 THOUSAND/UL (ref 0–0.2)
IMM GRANULOCYTES NFR BLD AUTO: 0 % (ref 0–2)
LYMPHOCYTES # BLD AUTO: 1.37 THOUSANDS/ÂΜL (ref 0.6–4.47)
LYMPHOCYTES NFR BLD AUTO: 30 % (ref 14–44)
MCH RBC QN AUTO: 35.8 PG (ref 26.8–34.3)
MCHC RBC AUTO-ENTMCNC: 33.5 G/DL (ref 31.4–37.4)
MCV RBC AUTO: 107 FL (ref 82–98)
MONOCYTES # BLD AUTO: 0.29 THOUSAND/ÂΜL (ref 0.17–1.22)
MONOCYTES NFR BLD AUTO: 6 % (ref 4–12)
NEUTROPHILS # BLD AUTO: 2.74 THOUSANDS/ÂΜL (ref 1.85–7.62)
NEUTS SEG NFR BLD AUTO: 62 % (ref 43–75)
NRBC BLD AUTO-RTO: 0 /100 WBCS
P AXIS: 42 DEGREES
PLATELET # BLD AUTO: 227 THOUSANDS/UL (ref 149–390)
PMV BLD AUTO: 8.4 FL (ref 8.9–12.7)
POTASSIUM SERPL-SCNC: 3.9 MMOL/L (ref 3.5–5.3)
PR INTERVAL: 184 MS
QRS AXIS: 40 DEGREES
QRSD INTERVAL: 90 MS
QT INTERVAL: 386 MS
QTC INTERVAL: 434 MS
RBC # BLD AUTO: 3.46 MILLION/UL (ref 3.81–5.12)
SODIUM SERPL-SCNC: 139 MMOL/L (ref 135–147)
T WAVE AXIS: 78 DEGREES
VENTRICULAR RATE: 76 BPM
WBC # BLD AUTO: 4.5 THOUSAND/UL (ref 4.31–10.16)

## 2024-05-30 PROCEDURE — 99239 HOSP IP/OBS DSCHRG MGMT >30: CPT | Performed by: PHYSICIAN ASSISTANT

## 2024-05-30 PROCEDURE — 85025 COMPLETE CBC W/AUTO DIFF WBC: CPT

## 2024-05-30 PROCEDURE — 97530 THERAPEUTIC ACTIVITIES: CPT

## 2024-05-30 PROCEDURE — 92610 EVALUATE SWALLOWING FUNCTION: CPT

## 2024-05-30 PROCEDURE — 97535 SELF CARE MNGMENT TRAINING: CPT

## 2024-05-30 PROCEDURE — 80048 BASIC METABOLIC PNL TOTAL CA: CPT

## 2024-05-30 PROCEDURE — 97116 GAIT TRAINING THERAPY: CPT

## 2024-05-30 PROCEDURE — 92522 EVALUATE SPEECH PRODUCTION: CPT

## 2024-05-30 PROCEDURE — 93010 ELECTROCARDIOGRAM REPORT: CPT | Performed by: INTERNAL MEDICINE

## 2024-05-30 PROCEDURE — 70551 MRI BRAIN STEM W/O DYE: CPT

## 2024-05-30 RX ORDER — ASPIRIN 81 MG/1
81 TABLET, CHEWABLE ORAL DAILY
Qty: 30 TABLET | Refills: 0 | Status: SHIPPED | OUTPATIENT
Start: 2024-05-31

## 2024-05-30 RX ORDER — CLOPIDOGREL BISULFATE 75 MG/1
75 TABLET ORAL DAILY
Qty: 30 TABLET | Refills: 0 | Status: SHIPPED | OUTPATIENT
Start: 2024-05-31

## 2024-05-30 RX ADMIN — METOPROLOL SUCCINATE 100 MG: 100 TABLET, EXTENDED RELEASE ORAL at 09:53

## 2024-05-30 RX ADMIN — ASPIRIN 81 MG 81 MG: 81 TABLET ORAL at 09:51

## 2024-05-30 RX ADMIN — HYDROCODONE BITARTRATE AND ACETAMINOPHEN 1 TABLET: 5; 325 TABLET ORAL at 11:27

## 2024-05-30 RX ADMIN — CLOPIDOGREL 75 MG: 75 TABLET ORAL at 09:51

## 2024-05-30 RX ADMIN — FAMOTIDINE 20 MG: 20 TABLET, FILM COATED ORAL at 09:51

## 2024-05-30 RX ADMIN — HEPARIN SODIUM 5000 UNITS: 5000 INJECTION, SOLUTION INTRAVENOUS; SUBCUTANEOUS at 06:25

## 2024-05-30 RX ADMIN — ALPRAZOLAM 1 MG: 0.5 TABLET ORAL at 09:51

## 2024-05-30 RX ADMIN — PANTOPRAZOLE SODIUM 40 MG: 40 TABLET, DELAYED RELEASE ORAL at 06:25

## 2024-05-30 NOTE — ASSESSMENT & PLAN NOTE
Not on maintenance diuretics, has some lower extremity edema on exam   Repeat echo: LVEF 60%, normal diastolic fx, mild AR/TR  Low salt diet. Compression stockings.

## 2024-05-30 NOTE — ASSESSMENT & PLAN NOTE
Norvasc initially held for permissive hypertension  However, BP trend at goal wo - will continue to hold  PTA, in PCP office BP noted to be 80/60, now normalized  Would DC norvasc indefinitely

## 2024-05-30 NOTE — ASSESSMENT & PLAN NOTE
"Patient with history of CVA with residual symptoms presented to ED for evaluation of lower extremity edema and mother states \"doesn't look like herself.\" Reportedly with worsening dysarthria from baseline. Initially hypotensive which resolved. Suspect hypoperfusion with recrudescence.     Continue stroke pathway with frequent VS, neuro checks, tele  MRI without contrast without acute stroke  Given cannot exclude TIA, would discharge on DAPT x 21 days then aspirin monotherapy  LDL 37 / A1c 5.4  PT/OT, recommending outpatient   Appreciate neurology consult  "

## 2024-05-30 NOTE — DISCHARGE SUMMARY
"[facility]  Discharge- Justine Odell 1955, 68 y.o. female MRN: [id number]  Unit/Bed#: 400-01 Encounter: [id number]  Primary Care Provider: Lilian Tang DO   Date and time admitted to hospital: 5/28/2024 10:37 AM    * Stroke-like symptom  Assessment & Plan  Patient with history of CVA with residual symptoms presented to ED for evaluation of lower extremity edema and mother states \"doesn't look like herself.\" Reportedly with worsening dysarthria from baseline. Initially hypotensive which resolved. Suspect hypoperfusion with recrudescence.     Continue stroke pathway with frequent VS, neuro checks, tele  MRI without contrast without acute stroke  Given cannot exclude TIA, would discharge on DAPT x 21 days then aspirin monotherapy  LDL 37 / A1c 5.4  PT/OT, recommending outpatient   Appreciate neurology consult    History of CVA (cerebrovascular accident)  Assessment & Plan  With residual deficits of slurred speech per patient and mother at bedside  Continue Aspirin / Statin     Hypokalemia  Assessment & Plan  K 3.2, given 40mEq Kdur  Monitor & replete prn  Currently resolved    Diastolic dysfunction  Assessment & Plan  Not on maintenance diuretics, has some lower extremity edema on exam   Repeat echo: LVEF 60%, normal diastolic fx, mild AR/TR  Low salt diet. Compression stockings.      Chronic obstructive pulmonary disease (HCC)  Assessment & Plan  Without acute exacerbation  As needed nebulizer treatments  NRT    Primary hypertension  Assessment & Plan  Norvasc initially held for permissive hypertension  However, BP trend at goal wo - will continue to hold  PTA, in PCP office BP noted to be 80/60, now normalized  Would DC norvasc indefinitely    GERD (gastroesophageal reflux disease)  Assessment & Plan  Continue Pepcid & Protonix     Bipolar 1 disorder (HCC)  Assessment & Plan  Continue Seroquel / Zyprexa / PRN ativan         Medical Problems       Resolved Problems  Date Reviewed: " 5/29/2024   None       Discharging Physician / Practitioner: Iris Dykes PA-C  PCP: Lilian Tnag DO  Admission Date:   Admission Orders (From admission, onward)       Ordered        05/29/24 1434  INPATIENT ADMISSION  Once            05/28/24 1240  Place in Observation  Once            05/28/24 1253  Place in Observation  Once                          Discharge Date: 05/30/24    Consultations During Hospital Stay:  neurology    Procedures Performed:   none    Significant Findings / Test Results:     MRI brain wo contrast   Final Result      White matter changes suggestive of chronic microangiopathy.  No acute intracranial pathology.      Workstation performed: [id number]         CT stroke alert brain   ED Interpretation   FINDINGS:     PARENCHYMA: Decreased attenuation is noted in periventricular and subcortical white matter demonstrating an appearance that is statistically most likely to represent mild microangiopathic change.  Chronic left caudate head and right posterior cerebellar   lacunar infarcts.     No CT signs of acute infarction.  No intracranial mass, mass effect or midline shift.  No acute parenchymal hemorrhage.     Normal intracranial vasculature.     VENTRICLES AND EXTRA-AXIAL SPACES:  Ventricles and extra-axial CSF spaces are prominent commensurate with the degree of volume loss.  No hydrocephalus.  No acute extra-axial hemorrhage.     VISUALIZED ORBITS:  Right-sided cataract surgery.     PARANASAL SINUSES:  Normal visualized paranasal sinuses.     CALVARIUM AND EXTRACRANIAL SOFT TISSUES:   Normal.     IMPRESSION:     No acute intracranial abnormality.     Chronic left caudate head and right posterior cerebellar lacunar infarcts. Mild chronic microangiopathic ischemic changes.     Find   ings were directly discussed with Dr. Sanket Gonzalez at 11:50 a.m.        Workstation performed: [id number]           Final Result      No acute intracranial abnormality.      Chronic left caudate head and  right posterior cerebellar lacunar infarcts. Mild chronic microangiopathic ischemic changes.      Findings were directly discussed with Dr. Sanket Gonzalez at 11:50 a.m.         Workstation performed: [id number]         CTA stroke alert (head/neck)   ED Interpretation   FINDINGS:     CERVICAL VASCULATURE  AORTIC ARCH AND GREAT VESSELS:  Normal aortic arch and great vessel origins. Normal visualized subclavian vessels.     RIGHT VERTEBRAL ARTERY CERVICAL SEGMENT:  Normal origin. The vessel is normal in caliber throughout the neck.     LEFT VERTEBRAL ARTERY CERVICAL SEGMENT:  Normal origin. The vessel is normal in caliber throughout the neck.     RIGHT EXTRACRANIAL CAROTID SEGMENT:  Mild atherosclerotic disease of the distal common carotid artery and proximal cervical internal carotid artery without significant stenosis compared to the more distal ICA.     LEFT EXTRACRANIAL CAROTID SEGMENT:  Normal caliber common carotid artery.  Normal bifurcation and cervical internal carotid artery.  No stenosis or dissection.     NASCET criteria was used to determine the degree of internal carotid artery diameter stenosis.        INTRACRANIAL VASCULATURE  INTERNAL CAROTID ARTERIES:  Normal enhancement of the intracranial portions of the internal carotid arteries. T   here is calcified atheromatous plaque along the bilateral cavernous and supraclinoid ICA segments with areas of mild luminal narrowing. Normal   ophthalmic artery origins.  Normal ICA terminus.     ANTERIOR CIRCULATION:  Symmetric A1 segments and anterior cerebral arteries with normal enhancement.  Normal anterior communicating artery.     MIDDLE CEREBRAL ARTERY CIRCULATION:  M1 segment and middle cerebral artery branches demonstrate normal enhancement bilaterally. Focal vessel tortuosity at the right MCA trifurcation (series 3: 183) with greater enhancement of the smaller branches   compared to the prior 2023 examination. An aneurysm at this location is felt to be less  likely.     DISTAL VERTEBRAL ARTERIES: Patent distal vertebral arteries. The right vertebral artery predominantly terminates as a PICA with an attenuated right supra PICA V4 segment. Posterior inferior cerebellar artery origins are normal. Normal vertebral basilar   junction.     BASILAR ARTERY:  Basilar artery is norm   al in caliber.  Normal superior cerebellar arteries.     POSTERIOR CEREBRAL ARTERIES: Both posterior cerebral arteries arises from the basilar tip.  Both arteries demonstrate normal enhancement.   Attenuated posterior communicating arteries.     VENOUS STRUCTURES:  Normal.        NON VASCULAR ANATOMY  BONY STRUCTURES:  No acute osseous abnormality.     SOFT TISSUES OF THE NECK:  Normal.     THORACIC INLET: Focal scarring is noted within the anterior right upper lobe.        IMPRESSION:     1. CTA head: Negative for large vessel intracranial occlusion or hemodynamically significant stenosis. Focal vessel tortuosity at the right MCA trifurcation, more conspicuous on the current examination compared to 2023 due to better contrast   opacification of the smaller branches.     2. CTA neck:  No extracranial carotid stenosis.  The cervical vertebral arteries are patent.        Findings were directly discussed with Dr. Sanket Gonzalez at 11:50 a.m.                          Workstation performed:    [id number]        Final Result      1. CTA head: Negative for large vessel intracranial occlusion or hemodynamically significant stenosis. Focal vessel tortuosity at the right MCA trifurcation, more conspicuous on the current examination compared to 2023 due to better contrast    opacification of the smaller branches.      2. CTA neck:  No extracranial carotid stenosis.  The cervical vertebral arteries are patent.         Findings were directly discussed with Dr. Sanket Gonzalez at 11:50 a.m.                           Workstation performed: [id number]               Incidental Findings:   none    Test Results Pending at  "Discharge (will require follow up):   none     Outpatient Tests Requested:  none    Complications:  none    Reason for Admission: stroke like symptoms    Hospital Course:   Justine Odell is a 68 y.o. female patient who originally presented to the hospital on 5/28/2024 due to lower extremity edema and possible strokelike symptoms. The patient does have a history of CVA with residual slurred speech, mother states that she has not looked like herself this morning. Patient denies any focal weakness or worsening of her speech. She does complain of some lower extremity edema and low blood pressure at home. Denies any nausea vomiting chest pain or shortness of breath.        Please see above list of diagnoses and related plan for additional information.     Condition at Discharge: good    Discharge Day Visit / Exam:   Subjective:  patient reports feeling well. No events overnight.   Vitals: Blood Pressure: 135/81 (05/30/24 0952)  Pulse: 91 (05/30/24 0952)  Temperature: (!) 97.1 °F (36.2 °C) (05/30/24 0712)  Temp Source: Tympanic (05/29/24 0430)  Respirations: 20 (05/30/24 0638)  Height: 5' 4\" (162.6 cm) (05/29/24 0712)  Weight - Scale: 69.6 kg (153 lb 7 oz) (05/29/24 0712)  SpO2: 96 % (05/30/24 0952)  Exam:   Physical Exam  Vitals and nursing note reviewed.   Constitutional:       General: She is not in acute distress.  Cardiovascular:      Rate and Rhythm: Normal rate and regular rhythm.      Pulses: Normal pulses.      Heart sounds: Normal heart sounds.   Pulmonary:      Effort: Pulmonary effort is normal.      Breath sounds: Normal breath sounds.   Abdominal:      General: Bowel sounds are normal.      Palpations: Abdomen is soft.   Skin:     General: Skin is warm and dry.   Neurological:      Mental Status: She is alert. Mental status is at baseline.      Comments: Mild slurred speech   Psychiatric:         Mood and Affect: Mood normal.         Behavior: Behavior normal.          Discussion with Family: Updated contact " person (mother) via phone.    Discharge instructions/Information to patient and family:   See after visit summary for information provided to patient and family.      Provisions for Follow-Up Care:  See after visit summary for information related to follow-up care and any pertinent home health orders.      Mobility at time of Discharge:   Basic Mobility Inpatient Raw Score: 24  JH-HLM Goal: 8: Walk 250 feet or more  JH-HLM Achieved: 7: Walk 25 feet or more  HLM Goal NOT achieved. Continue to encourage mobility in post discharge setting.     Disposition:   Home    Planned Readmission: none     Discharge Statement:  I spent 48 minutes discharging the patient. This time was spent on the day of discharge. I had direct contact with the patient on the day of discharge. Greater than 50% of the total time was spent examining patient, answering all patient questions, arranging and discussing plan of care with patient as well as directly providing post-discharge instructions.  Additional time then spent on discharge activities.    Discharge Medications:  See after visit summary for reconciled discharge medications provided to patient and/or family.      **Please Note: This note may have been constructed using a voice recognition system**

## 2024-05-30 NOTE — PHYSICAL THERAPY NOTE
"                                                                                  PHYSICAL THERAPY NOTE          Patient Name: Justine Odell  Today's Date: 5/30/2024 05/30/24 1109   PT Last Visit   PT Visit Date 05/30/24   Note Type   Note Type Treatment   Pain Assessment   Pain Assessment Tool FLACC   Pain Rating: FLACC (Rest) - Face 0   Pain Rating: FLACC (Rest) - Legs 0   Pain Rating: FLACC (Rest) - Activity 0   Pain Rating: FLACC (Rest) - Cry 0   Pain Rating: FLACC (Rest) - Consolability 0   Score: FLACC (Rest) 0   Pain Rating: FLACC (Activity) - Face 0   Pain Rating: FLACC (Activity) - Legs 0   Pain Rating: FLACC (Activity) - Activity 0   Pain Rating: FLACC (Activity) - Cry 0   Pain Rating: FLACC (Activity) - Consolability 0   Score: FLACC (Activity) 0   Restrictions/Precautions   Weight Bearing Precautions Per Order No   Other Precautions   (Fall Risk; Pain; Chair Alarm; 1:1; Cognitive; Telemetry; Multiple lines)   General   Chart Reviewed Yes   Family/Caregiver Present No   Cognition   Overall Cognitive Status Impaired   Arousal/Participation Alert;Cooperative   Attention Difficulty attending to directions   Orientation Level Oriented to person;Oriented to place;Disoriented to time;Disoriented to situation   Memory Decreased long term memory;Decreased short term memory;Decreased recall of recent events   Following Commands Follows one step commands without difficulty   Subjective   Subjective \" I didn't have another stroke and I'm going home\"   Bed Mobility   Supine to Sit 5  Supervision   Additional items Verbal cues;Bedrails   Additional Comments Sat EOB with Good sitting balance. No c/o dizziness with transitional movements.   Transfers   Sit to Stand 5  Supervision   Additional items Verbal cues   Stand to Sit 5  Supervision   Additional items Armrests;Verbal cues   Additional Comments RW used   Ambulation/Elevation   Gait pattern Short stride;Foward flexed;Improper Weight shift   Gait Assistance 5  " Supervision   Additional items Verbal cues   Assistive Device Rolling walker   Distance 250'   Balance   Static Sitting Good   Dynamic Sitting Good   Static Standing Fair +   Dynamic Standing Fair   Ambulatory Fair -  (RW)   Endurance Deficit   Endurance Deficit Yes   Activity Tolerance   Activity Tolerance Patient limited by fatigue   Assessment   Prognosis Good   Problem List   (Decreased strength; Decreased endurance; Impaired balance; Decreased mobility; Impaired judgement; Decreased safety awareness; Pain)   Assessment Pt. seen for PT treatment session this date with interventions consisting of bed mobility, transfers and  gait training w/ emphasis on improving pt's ability to ambulate. Pt. Requiring occasional cues for sequence and safety. In comparison to previous session, Pt. With improvements in activity tolerance.   Pt is in need of continued activity in PT to improve strength balance endurance mobility transfers and ambulation with return to maximize LOF. From PT/mobility standpoint, recommendation at time of d/c would be level III: min resource intensity  in order to promote return to PLOF and independence.   The patient's [facility] Basic Mobility Inpatient Short Form Raw Score is 21. A Raw score of greater than 16 suggests the patient may benefit from discharge to home.  Please also refer to physical therapy recommendation for safe DC planning.   Goals   Patient Goals to go home   LTG Expiration Date 06/12/24   PT Treatment Day 1   Plan   Treatment/Interventions Functional transfer training;LE strengthening/ROM;Elevations;Therapeutic exercise;Endurance training;Bed mobility;Gait training;Spoke to nursing   Progress Progressing toward goals   PT Frequency 3-5x/wk   Discharge Recommendation   Rehab Resource Intensity Level, PT III (Minimum Resource Intensity)   AM-PAC Basic Mobility Inpatient   Turning in Flat Bed Without Bedrails 4   Lying on Back to Sitting on Edge of Flat Bed Without Bedrails 4   Moving  Bed to Chair 3   Standing Up From Chair Using Arms 4   Walk in Room 3   Climb 3-5 Stairs With Railing 3   Basic Mobility Inpatient Raw Score 21   Basic Mobility Standardized Score 45.55   [facility] Highest Level Of Mobility   -HL Goal 6: Walk 10 steps or more   -HLM Achieved 7: Walk 25 feet or more   Education   Education Provided Mobility training   Patient Demonstrates verbal understanding;Reinforcement needed   End of Consult   Patient Position at End of Consult Bedside chair;Bed/Chair alarm activated;All needs within reach   End of Consult Comments discussed POC with PT

## 2024-05-30 NOTE — DISCHARGE INSTR - AVS FIRST PAGE
Take aspirin and plavix x 21 days then aspirin alone thereafter  Follow up with neurology in 6 weeks

## 2024-05-30 NOTE — SPEECH THERAPY NOTE
Speech Language/Pathology  Speech-Language Pathology Bedside Swallow Evaluation    Patient Name: Justine Odell  Today's Date: 5/30/2024     Problem List  Principal Problem:    Stroke-like symptom  Active Problems:    Bipolar 1 disorder (HCC)    GERD (gastroesophageal reflux disease)    Primary hypertension    History of CVA (cerebrovascular accident)    Chronic obstructive pulmonary disease (HCC)    Diastolic dysfunction    Hypokalemia    Past Medical History  Past Medical History:   Diagnosis Date    Bipolar 1 disorder (HCC)     Cancer (HCC)     kidney    Cardiac disease     fast heart beat    Chronic pain     Fractures     GERD (gastroesophageal reflux disease)     Hard of hearing     Hypertension     Renal cell carcinoma (HCC)     Stroke (HCC)     2009 affected her speech, right sided weakness    TIA (transient ischemic attack)      Past Surgical History  Past Surgical History:   Procedure Laterality Date    APPENDECTOMY      CHOLECYSTECTOMY      FL GUIDED NEEDLE PLAC BX/ASP/INJ  5/30/2014    FL GUIDED NEEDLE PLAC BX/ASP/INJ  12/19/2013    FL GUIDED NEEDLE PLAC BX/ASP/INJ  6/10/2013    JOINT REPLACEMENT Bilateral      bilateral knees    NEPHRECTOMY Right     OH COLONOSCOPY FLX DX W/COLLJ SPEC WHEN PFRMD N/A 8/7/2018    Procedure: COLONOSCOPY;  Surgeon: Boy Guillermo MD;  Location: [facility];  Service: Gastroenterology    OH OPEN TREATMENT BIMALLEOLAR ANKLE FRACTURE Right 5/14/2019    Procedure: ANKLE - Bimalleolar OPEN REDUCTION W/ INTERNAL FIXATION (ORIF);  Surgeon: Harris Christianson;  Location:  MAIN OR;  Service: Orthopedics    OH REMOVAL IMPLANT DEEP Right 1/31/2023    Procedure: REMOVAL HARDWARE ANKLE;  Surgeon: Harris Christianson;  Location:  MAIN OR;  Service: Orthopedics    OH TX TIBL SHFT FX IMED IMPLT W/WO SCREWS&/CERCLA Left 7/27/2022    Procedure: INSERTION NAIL IM TIBIA;  Surgeon: Lefty Shea MD;  Location: [facility];  Service: Orthopedics     Summary   Pt presented with functional appearing oral  "stage swallowing skills with materials administered today and s/s suggestive of suspected mild pharyngeal dysphagia.  Symptoms or concerns included suspected decreased hyolaryngeal elevation upon palpation, suspected pharyngeal residue, and audible swallows.  Patient reports increased sensation of portion of bolus feeling \"stuck\" while indicating to portion of pharynx below mandible. Patient acknowledges using alternation of solids and liquids to facilitate clearance at home during these instances and used with 75% effectiveness during evaluation with min verbal cues. Chin tuck positioning was also trialled during use of liquid wash, appearing effective to clear suspected pharyngeal retention. Patient reports occasional coughing with liquids at home, however no overt s/s asp/pen noted during this evaluation despite various methods of delivery (I.e., straw vs cup, single sip vs sequential sips) implemented. Recommendations discussed with patient for use upon d/c. Further ST services not warranted at this time, please reconsult with any changes/concerns.    Risk/s for Aspiration: low     Recommended Diet: regular diet and thin liquids   Recommended Form of Meds: whole with puree   Aspiration precautions and swallowing strategies: upright posture and alternating bites and sips  Other Recommendations: Continue frequent oral care, chin tuck with use of secondary swallow/liquid wash, added moisture to solids     Current Medical Status  Per 5/28/24 H&P - Pt is a 68 y.o. female who presents to the emergency room accompanied by her mother with a complaint of lower extremity edema and possible strokelike symptoms. The patient does have a history of CVA with residual slurred speech, mother states that she has not looked like herself this morning. Patient denies any focal weakness or worsening of her speech. She does complain of some lower extremity edema and low blood pressure at home. Denies any nausea vomiting chest pain or " shortness of breath     Current Precautions: Delirium    Allergies:  No known food allergies    Special Studies:  5/28/24 CT stroke alert brain impression - No acute intracranial abnormality.      Chronic left caudate head and right posterior cerebellar lacunar infarcts. Mild chronic microangiopathic ischemic changes.     5/28/24 CTA Stroke alert (head/neck) impression - 1. CTA head: Negative for large vessel intracranial occlusion or hemodynamically significant stenosis. Focal vessel tortuosity at the right MCA trifurcation, more conspicuous on the current examination compared to 2023 due to better contrast   opacification of the smaller branches.      2. CTA neck:  No extracranial carotid stenosis.  The cervical vertebral arteries are patent.     5/30/24 MRI brain impression - White matter changes suggestive of chronic microangiopathy. No acute intracranial pathology.     Social/Education/Vocational Hx:  Pt lives alone    Swallow Information   Current Risks for Dysphagia & Aspiration: CVA and known history of dysphagia and dysarthria  Current Symptoms/Concerns:  CVA pathway, hx of dysphagia  Current Diet: regular diet and thin liquids   Baseline Diet: regular diet and thin liquids    Baseline Assessment   Behavior/Cognition: alert and oriented to person and place  Speech/Language Status: able to participate in conversation and able to follow commands  Patient Positioning: upright in bed  Pain Status/Interventions/Response to Interventions:  No report of or nonverbal indications of pain.    Swallow Mechanism Exam  Facial: symmetrical  Labial: WFL  Lingual: WFL  Velum: symmetrical  Mandible: adequate ROM  Dentition: adequate  Vocal quality:clear/adequate   Volitional Cough: strong/productive   Respiratory Status: on RA    Tracheostomy: n/a    Consistencies Assessed and Performance   Consistencies Administered: thin liquids, puree, and hard solids  Materials administered included: water via cup and straw, applesauce,  and helen cracker    Oral Stage: WFL  Mastication was adequate with the materials administered today.  Bolus formation and transfer were functional with no significant oral residue noted.  No overt s/s reduced oral control.    Pharyngeal Stage: suspected and mild  Swallow Mechanics:  Swallowing initiation appeared prompt.  Laryngeal rise was palpated and judged to be mildly reduced.  No coughing, throat clearing, change in vocal quality or respiratory status noted today. Swallow audibly incoordinated and pt complaining of globus sensation at times.    Esophageal Concerns: globus sensation    Strategies and Efficacy: alternation of solids and liquids paired with chin tuck appeared effective in clearance of suspected pharyngeal retention    Summary and Recommendations (see above)    Results Reviewed with: patient and RN     Treatment Recommended: not at this time

## 2024-05-30 NOTE — OCCUPATIONAL THERAPY NOTE
Occupational Therapy Progress Note     Patient Name: Justine Odell  Today's Date: 5/30/2024  Problem List  Principal Problem:    Stroke-like symptom  Active Problems:    Bipolar 1 disorder (HCC)    GERD (gastroesophageal reflux disease)    Primary hypertension    History of CVA (cerebrovascular accident)    Chronic obstructive pulmonary disease (HCC)    Diastolic dysfunction    Hypokalemia            05/30/24 1131   OT Last Visit   OT Visit Date 05/30/24   Note Type   Note Type Treatment   Pain Assessment   Pain Assessment Tool FLACC   Pain Rating: FLACC (Rest) - Face 0   Pain Rating: FLACC (Rest) - Legs 0   Pain Rating: FLACC (Rest) - Activity 0   Pain Rating: FLACC (Rest) - Cry 0   Pain Rating: FLACC (Rest) - Consolability 0   Score: FLACC (Rest) 0   Pain Rating: FLACC (Activity) - Face 0   Pain Rating: FLACC (Activity) - Legs 0   Pain Rating: FLACC (Activity) - Activity 0   Pain Rating: FLACC (Activity) - Cry 0   Pain Rating: FLACC (Activity) - Consolability 0   Score: FLACC (Activity) 0   Restrictions/Precautions   Weight Bearing Precautions Per Order No   Other Precautions Fall Risk;Pain;Chair Alarm;1:1;Cognitive;Telemetry;Multiple lines   ADL   Where Assessed Chair   LB Dressing Assistance 5  Supervision/Setup   LB Dressing Deficit Setup;Don/doff L sock;Don/doff R sock;Verbal cueing;Supervision/safety   LB Dressing Comments Pt completed donning and doffing B  socks with S   Bed Mobility   Supine to Sit 5  Supervision   Additional items Verbal cues;Bedrails   Additional Comments Pt on RA throughout treatment WFL as well as HR WFL throughout 70s-90s   Transfers   Sit to Stand 5  Supervision   Additional items Verbal cues   Stand to Sit 5  Supervision   Additional items Verbal cues;Armrests   Additional Comments Use of RW   Functional Mobility   Functional Mobility 5  Supervision   Additional Comments Pt completed standing balance/functional mobility around room and hallway with use of RW and S with no LOB  "throughout. Pt reporting at start of treatment no steps at home. Later during treatment patient reporting \"I have 15 steps at home.\" Pt completed 10 steps up and down with use of B hand rails and S with cues for safety.   Additional items Rolling walker   Cognition   Overall Cognitive Status Impaired   Arousal/Participation Alert;Cooperative   Attention Difficulty attending to directions   Orientation Level Oriented to person;Oriented to place;Disoriented to situation;Disoriented to time   Memory Decreased long term memory;Decreased short term memory;Decreased recall of recent events   Following Commands Follows one step commands without difficulty   Activity Tolerance   Activity Tolerance Patient tolerated treatment well   Assessment   Assessment Patient participated in Skilled OT session this date with interventions consisting of ADL re training with the use of correct body mechnaics and  therapeutic activities to: increase activity tolerance . Patient agreeable to OT treatment session, upon arrival patient was found supine in bed. Supine to sit txfrs S, sit to stand txfrs from bed with S, standing balance/functional mobility with use of RW and S. To increase posture, dynamic standing balance, balance during self cares, use of BUE. Pt completed LB dressing S donning and doffing B  socks. Patient requiring verbal cues for safety and verbal cues for correct technique. Patient continues to be functioning below baseline level, occupational performance remains limited secondary to factors listed above and increased risk for falls and injury. The patient's raw score on the AM-PAC Daily Activity inpatient short form is 21, standardized score is 44.27, greater than 39.4. Patients at this level are likely to benefit from discharge to home. Please refer to the recommendation of the Occupational Therapist for safe discharge planning, which is Minimum Resource Level 3.   Plan   Goal Expiration Date 06/12/24   OT Treatment " Day 1   OT Frequency 3-5x/wk   Discharge Recommendation   Rehab Resource Intensity Level, OT III (Minimum Resource Intensity)   AM-PAC Daily Activity Inpatient   Lower Body Dressing 3   Bathing 3   Toileting 3   Upper Body Dressing 4   Grooming 4   Eating 4   Daily Activity Raw Score 21   Daily Activity Standardized Score (Calc for Raw Score >=11) 44.27   AM-PAC Applied Cognition Inpatient   Following a Speech/Presentation 4   Understanding Ordinary Conversation 4   Taking Medications 2   Remembering Where Things Are Placed or Put Away 3   Remembering List of 4-5 Errands 2   Taking Care of Complicated Tasks 2   Applied Cognition Raw Score 17   Applied Cognition Standardized Score 36.52     Pt left seated in fabiola chair with chair alarm on and virtual 1:1 with call bell in reach.

## 2024-05-30 NOTE — UTILIZATION REVIEW
SEE INITIAL REVIEW AT BOTTOM       Continued Stay Review    Date: 5/30                          Current Patient Class: IP   Current Level of Care: ms    HPI:68 y.o. female initially admitted on 5/28  (obs)   Changed to INPT status on  5/29.      Date: 5/30      Day 3: Has surpassed a 2nd midnight with active treatments and services.  Due to confusion overnight -  initiated one-to-one continuous observation  for pt safety.   This AM pt oriented to place and person but not  to time or situation:  GCS consistently 15.      Cont stroke r/o  including serial neuro cks, VS q4H, telemetry,  DAPT and statin,  ongoing PT/OT Therapies  (rec OP follow up )     Tele remains SR.      5/30  MRI BRAIN  White matter changes suggestive of chronic microangiopathy.  No acute intracranial pathology.     5/29  ECHO:       Left Ventricle: Left ventricular cavity size is normal. Wall thickness is normal. The left ventricular ejection fraction is 60%. Systolic function is normal. Wall motion is normal. Diastolic function is normal.    Right Ventricle: Systolic function is normal.    Atrial Septum: No patent foramen ovale detected, confirmed by provocation with cough, using agitated saline contrast.    Aortic Valve: There is mild regurgitation.    Tricuspid Valve: There is mild regurgitation.         Vital Signs:   05/30/24 09:52:17 -- 91 -- 135/81 99 96 % -- --   05/30/24 07:12:18 97.1 °F (36.2 °C) Abnormal  72 -- 137/84 102 96 % -- --   05/30/24 06:38:25 97.9 °F (36.6 °C) 83 20 134/82 99 93 % -- --   05/29/24 22:31:15 97.6 °F (36.4 °C) 72 18 133/79 97 94 %         Pertinent Labs/Diagnostic Results:   Results from last 7 days   Lab Units 05/28/24  1103   SARS-COV-2  Negative     Results from last 7 days   Lab Units 05/30/24  0656 05/29/24  0440 05/28/24  1103   WBC Thousand/uL 4.50 3.19* 4.45   HEMOGLOBIN g/dL 12.4 11.6 12.2   HEMATOCRIT % 37.0 34.5* 36.5   PLATELETS Thousands/uL 227 209 218   TOTAL NEUT ABS Thousands/µL 2.74 1.50*  --   "        Results from last 7 days   Lab Units 05/30/24  0656 05/29/24  0440 05/28/24  1103   SODIUM mmol/L 139 142 143   POTASSIUM mmol/L 3.9 3.2* 3.7   CHLORIDE mmol/L 105 109* 112*   CO2 mmol/L 25 25 23   ANION GAP mmol/L 9 8 8   BUN mg/dL 9 9 15   CREATININE mg/dL 0.76 0.80 1.05   EGFR ml/min/1.73sq m 80 75 54   CALCIUM mg/dL 9.4 9.1 9.1         Results from last 7 days   Lab Units 05/28/24  1040   POC GLUCOSE mg/dl 108     Results from last 7 days   Lab Units 05/30/24  0656 05/29/24  0440 05/28/24  1103   GLUCOSE RANDOM mg/dL 104 87 96         Results from last 7 days   Lab Units 05/29/24  0440   HEMOGLOBIN A1C % 5.4   EAG mg/dl 108     No results found for: \"BETA-HYDROXYBUTYRATE\"                   Results from last 7 days   Lab Units 05/28/24  1443 05/28/24  1103   HS TNI 0HR ng/L  --  <2   HS TNI 2HR ng/L 3  --    HSTNI D2 ng/L >1  --          Results from last 7 days   Lab Units 05/28/24  1103   PROTIME seconds 12.1   INR  0.90   PTT seconds 28                                                             Results from last 7 days   Lab Units 05/29/24  0003   CLARITY UA  Clear   COLOR UA  Yellow   SPEC GRAV UA  1.025   PH UA  6.0   GLUCOSE UA mg/dl Negative   KETONES UA mg/dl Negative   BLOOD UA  Negative   PROTEIN UA mg/dl Negative   NITRITE UA  Negative   BILIRUBIN UA  Negative   UROBILINOGEN UA (BE) mg/dl <2.0   LEUKOCYTES UA  Negative     Results from last 7 days   Lab Units 05/28/24  1103   INFLUENZA A PCR  Negative   INFLUENZA B PCR  Negative   RSV PCR  Negative         Results from last 7 days   Lab Units 05/29/24  0005   AMPH/METH  Negative   BARBITURATE UR  Negative   BENZODIAZEPINE UR  Positive*   COCAINE UR  Negative   METHADONE URINE  Negative   OPIATE UR  Positive*   PCP UR  Negative   THC UR  Negative     Results from last 7 days   Lab Units 05/28/24  1443   ETHANOL LVL mg/dL <10   ACETAMINOPHEN LVL ug/mL 5*   SALICYLATE LVL mg/dL <5     Results from last 7 days   Lab Units 05/28/24  1241   C DIFF " TOXIN B BY PCR  Negative     Results from last 7 days   Lab Units 05/28/24  1241   SALMONELLA SP PCR  Negative   SHIGELLA SP/ENTEROINVASIVE E. COLI (EIEC)  Negative   CAMPYLOBACTER SP (JEJUNI AND COLI)  Negative   SHIGA TOXIN 1/SHIGA TOXIN 2  Negative                           Medications:   Scheduled Medications:  aspirin, 81 mg, Oral, Daily  atorvastatin, 40 mg, Oral, QPM  clopidogrel, 75 mg, Oral, Daily  famotidine, 20 mg, Oral, Daily  heparin (porcine), 5,000 Units, Subcutaneous, Q8H BRIDGER  metoprolol succinate, 100 mg, Oral, Daily  nicotine, 1 patch, Transdermal, Daily  OLANZapine, 20 mg, Oral, HS  pantoprazole, 40 mg, Oral, BID AC  QUEtiapine, 300 mg, Oral, HS      Continuous IV Infusions:     PRN Meds:  ALPRAZolam, 1 mg, Oral, TID PRN  HYDROcodone-acetaminophen, 1 tablet, Oral, Q6H PRN        Discharge Plan: tbd    Network Utilization Review Department  ATTENTION: Please call with any questions or concerns to [phone] and carefully listen to the prompts so that you are directed to the right person. All voicemails are confidential.   For Discharge needs, contact Care Management DC Support Team at [phone] opt. 2  Send all requests for admission clinical reviews, approved or denied determinations and any other requests to dedicated fax number below belonging to the campus where the patient is receiving treatment. List of dedicated fax numbers for the Facilities:  FACILITY NAME UR FAX NUMBER   ADMISSION DENIALS (Administrative/Medical Necessity) [phone]   DISCHARGE SUPPORT TEAM (NETWORK) [phone]   PARENT CHILD HEALTH (Maternity/NICU/Pediatrics) [phone]   [facility] [phone]   [facility] [phone]   [facility] [phone]   [facility] [phone]   [facility] [phone]   [facility] [phone]   [facility]  [place] [phone]   [facility] [phone]   [facility] [phone]   [facility] [phone]   [facility] [phone]   [facility] [phone]       SEE INITIAL REVIEW AT BOTTOM    Date: 5/30   Day 3: Has surpassed a 2nd midnight with active treatments and services.      .

## 2024-05-30 NOTE — NURSING NOTE
Pt. Becoming increasingly confused at this time. Patient was ambulated to the bathroom by PCA and proceeded to go into the hallway of the hospital and look for her pet. Pt. Was not redirectable at this time. OMID Perez was walking down the henriquez and was able to redirect the patient to her room. The patient insists on leaving and is upset about her animal at home. Pt. Is oriented to place and self but not time or situation. At this time patient is refusing to wear masimo and telemetry unit. OMID Perez stated that she will be come an in person 1:1 continual observation. Nursing Supervisor Delphine Avitia RN notified.

## 2024-05-30 NOTE — SPEECH THERAPY NOTE
Speech Language/Pathology  SLP Motor Speech Evaluation    Patient Name: Justine Odell  Today's Date: 5/30/2024     Problem List  Principal Problem:    Stroke-like symptom  Active Problems:    Bipolar 1 disorder (HCC)    GERD (gastroesophageal reflux disease)    Primary hypertension    History of CVA (cerebrovascular accident)    Chronic obstructive pulmonary disease (HCC)    Diastolic dysfunction    Hypokalemia    Past Medical History  Past Medical History:   Diagnosis Date    Bipolar 1 disorder (HCC)     Cancer (HCC)     kidney    Cardiac disease     fast heart beat    Chronic pain     Fractures     GERD (gastroesophageal reflux disease)     Hard of hearing     Hypertension     Renal cell carcinoma (HCC)     Stroke (HCC)     2009 affected her speech, right sided weakness    TIA (transient ischemic attack)      Past Surgical History  Past Surgical History:   Procedure Laterality Date    APPENDECTOMY      CHOLECYSTECTOMY      FL GUIDED NEEDLE PLAC BX/ASP/INJ  5/30/2014    FL GUIDED NEEDLE PLAC BX/ASP/INJ  12/19/2013    FL GUIDED NEEDLE PLAC BX/ASP/INJ  6/10/2013    JOINT REPLACEMENT Bilateral      bilateral knees    NEPHRECTOMY Right     IA COLONOSCOPY FLX DX W/COLLJ SPEC WHEN PFRMD N/A 8/7/2018    Procedure: COLONOSCOPY;  Surgeon: Boy Guillermo MD;  Location: [facility];  Service: Gastroenterology    IA OPEN TREATMENT BIMALLEOLAR ANKLE FRACTURE Right 5/14/2019    Procedure: ANKLE - Bimalleolar OPEN REDUCTION W/ INTERNAL FIXATION (ORIF);  Surgeon: Harris Christianson;  Location:  MAIN OR;  Service: Orthopedics    IA REMOVAL IMPLANT DEEP Right 1/31/2023    Procedure: REMOVAL HARDWARE ANKLE;  Surgeon: Harris Christianson;  Location:  MAIN OR;  Service: Orthopedics    IA TX TIBL SHFT FX IMED IMPLT W/WO SCREWS&/CERCLA Left 7/27/2022    Procedure: INSERTION NAIL IM TIBIA;  Surgeon: Lefty Shea MD;  Location:  MAIN OR;  Service: Orthopedics     Impressions:  Patient presents with min-mild dysarthria c/b reduced articulatory  precision. Despite reduced precision across levels of communication, intelligibility of message never less than 90% effective. Patient intermittently implements use of slow rate and over emphasis to enhance clarity of speech intelligibility, however other instances at the conversation level were noted with increased rate and less emphasis of sounds. Recommendations provided for increased self monitoring of speech, use of slow rate and over emphasis of sounds, as well as practicing with structured reading tasks to increase these skills. Further ST services not warranted as an IP. Please reconsult with any changes/concerns.    Evaluation only;  no therapy indicated.     HISTORY AND PHYSICAL:     Per 5/28/24 H&P - a pleasant 68-year-old female who presents to the emergency room accompanied by her mother with a complaint of lower extremity edema and possible strokelike symptoms. The patient does have a history of CVA with residual slurred speech, mother states that she has not looked like herself this morning. Patient denies any focal weakness or worsening of her speech. She does complain of some lower extremity edema and low blood pressure at home. Denies any nausea vomiting chest pain or shortness of breath     Speech and Swallowing Mechanism Exam   Facial: symmetrical  Labial: WFL  Lingual: WFL  Velum: symmetrical  Mandible: adequate ROM  Dentition: adequate  Respiratory Support: on RA/no support    Respiration and Phonation  Maximum Phonation Time  (Normal 15-20 seconds for healthy older adult with standard deviation of 5.5-6):  17 seconds  Able to sustain phonation counting from 1 to 8  Sustains phonation across words, phrases, sentences and in normal conversational speech  S/Z Ratio (Ratio in excess of 1.4 concerning for laryngeal pathology):  .8   Vocal Quality:  clear/adequate     Articulation:  Single Syllable Words: slightly reduced precision of articulation  Multisyllabic Words: slightly reduced precision of  articulation  Phrase Level: slightly reduced precision of articulation  Sentence Level: slightly reduced precision of articulation and slightly increased rate of speech  Conversational Speech:  slightly reduced precision of articulation and slightly increased rate of speech    Diadochokinesis:   tuh tuh tuh (normal should exceed 25 reps in 10 seconds): 25/10 sec  kuh kuh kuh (normal should exceed 25 reps in 10 seconds): 26/10 sec  puh tuh kuh (pattycake or buttercup)- 7/10 sec (normal should exceed 8 reps in 10 seconds)    Resonation: Normal nasality    S/S Oral apraxia:  None    S/S Verbal Apraxia:  None      INTELLIGIBILITY at the WORD LEVEL:  100%      INTELLIGIBILITY at the PHRASE LEVEL:  100%    INTELLIGIBILITY at the SENTENCE LEVEL:  90%    INTELLIGIBILITY in CONVERSATIONAL SPEECH:  90%    Conversation:  Imprecise articulation  Rapid rate at times

## 2024-05-30 NOTE — CASE MANAGEMENT
Case Management Discharge Planning Note    Patient name Justine Odell  Location /400-01 MRN [id number]  : 1955 Date 2024       Current Admission Date: 2024  Current Admission Diagnosis:Stroke-like symptom   Patient Active Problem List    Diagnosis Date Noted Date Diagnosed    Hypokalemia 2024     Stroke-like symptom 2024     Constipation 2023     Chronic pain of right ankle 2022     Nocturnal hypoxia 2022     Dysphagia 2022     Vitamin D insufficiency 2022     Low TSH level 2022     Cerebrovascular disease 2022     Diastolic dysfunction 2022     Tobacco use 2022     Acute metabolic encephalopathy 2022     Chronic pain 2022     Closed fracture of ankle, bimalleolar, right, sequela 2022     CVA (cerebral vascular accident) (HCC) 2022     Pure hypercholesterolemia 2021     Chronic obstructive pulmonary disease (HCC) 2021     Anemia 2021     Bipolar disease, chronic (HCC) 2021     Rhabdomyolysis 2021     Neck pain 2021     Poor venous access 2021     Hypertension 2021     History of CVA (cerebrovascular accident) 2021     Acute encephalopathy 2021     Hypothermia 2021     Elevated d-dimer 2021     Acute encephalopathy 2021     Overdose of drug, undetermined intent, initial encounter 2021     Slurred speech 10/15/2020     Status post open reduction with internal fixation (ORIF) of fracture of ankle 2019     Primary hypertension 2019     History of tachycardia 2019     Acute right ankle pain 2019     GERD (gastroesophageal reflux disease) 2018     Elevated MCV 2017     Bipolar 1 disorder (HCC)      Renal cell carcinoma (HCC)      Nausea and vomiting 2016     Palpitations 2016     Insomnia 2015     Difficulty sleeping 2015     Dental disorder 2015     Allergic  rhinitis 07/17/2015     Nail fungal infection 07/16/2015     Abnormal liver function test 03/10/2015     Right hip pain 11/03/2014     Herpes zoster 10/28/2014     Shoulder pain, right 10/20/2014     Abnormal gait 09/03/2014     Vitamin D deficiency 02/19/2014     Osteoporosis 02/19/2014     Hip fracture, osteoporotic (HCC) 02/19/2014     Anxiety 10/16/2013     Yeast vaginitis 10/14/2013     Posterior dislocation of hip, closed (HCC) 09/19/2013     Chronic low back pain 07/26/2013     Prosthetic hip infection (HCC) 06/16/2013     Depression 06/09/2013     Tobacco abuse 06/09/2013     Generalized anxiety disorder 06/09/2013       LOS (days): 1  Geometric Mean LOS (GMLOS) (days):   Days to GMLOS:     OBJECTIVE:  Risk of Unplanned Readmission Score: 17.51         Current admission status: Inpatient   Preferred Pharmacy:   [facility] - [facility] - [address]  [address]  [place] [address]  Phone: [phone] Fax: [phone]    Primary Care Provider: Lilian Tang DO    Primary Insurance: [employer]  Secondary Insurance: [facility]    DISCHARGE DETAILS:patient discharging home. Nurse to review AVS, all follow up providers listed. Pt currently not interested in o/p therapy but did provide information on AVS if she changes mind. Pt states brother is transporting home.

## 2024-05-30 NOTE — QUICK NOTE
"Notified by primary RN that patient getting out of bed and is not redirectable. Patient was found wandering in the hallway \"looking for her dogs\". There is concern that patient will wander out of her room posing a safety risk for her self.    Will initiate one-to-one continuous observation  Continue to monitor closely   "

## 2024-05-31 NOTE — UTILIZATION REVIEW
NOTIFICATION OF ADMISSION DISCHARGE   This is a Notification of Discharge from [facility]. Please be advised that this patient has been discharge from our facility. Below you will find the admission and discharge date and time including the patient’s disposition.   UTILIZATION REVIEW CONTACT:  Dejuan Hoyos  Utilization   Network Utilization Review Department  Phone: [phone] x carefully listen to the prompts. All voicemails are confidential.  Email: [email or web address]     ADMISSION INFORMATION  PRESENTATION DATE: 5/28/2024 10:37 AM  OBERVATION ADMISSION DATE:   INPATIENT ADMISSION DATE: 5/29/24  2:34 PM   DISCHARGE DATE: 5/30/2024  1:35 PM   DISPOSITION:Home/Self Care    Network Utilization Review Department  ATTENTION: Please call with any questions or concerns to [phone] and carefully listen to the prompts so that you are directed to the right person. All voicemails are confidential.   For Discharge needs, contact Care Management DC Support Team at [phone] opt. 2  Send all requests for admission clinical reviews, approved or denied determinations and any other requests to dedicated fax number below belonging to the campus where the patient is receiving treatment. List of dedicated fax numbers for the Facilities:  FACILITY NAME UR FAX NUMBER   ADMISSION DENIALS (Administrative/Medical Necessity) [phone]   DISCHARGE SUPPORT TEAM ([facility]) [phone]   PARENT CHILD HEALTH (Maternity/NICU/Pediatrics) [phone]   [facility] [phone]   [facility] [phone]   [facility] [phone]   [facility] [phone]   [facility] [phone]   [facility] [phone]   [facility] [phone]   [facility] [phone]   [facility]  [facility] [phone]   [facility] [phone]   [facility] [phone]   [facility] [phone]

## 2024-06-12 NOTE — UTILIZATION REVIEW
.   Medication List      START taking these medications     aspirin 81 mg chewable tablet; Chew 1 tablet (81 mg total) daily   clopidogrel 75 mg tablet; Commonly known as: PLAVIX; Take 1 tablet (75   mg total) by mouth daily     CHANGE how you take these medications     ALPRAZolam 1 mg tablet; Commonly known as: XANAX; Take 1 tablet (1 mg   total) by mouth 4 (four) times a day as needed for anxiety for up to 10   days; What changed: when to take this     CONTINUE taking these medications     atorvastatin 40 mg tablet; Commonly known as: LIPITOR; Take 1 tablet (40   mg total) by mouth every evening To prevent stroke and heart attack   cholecalciferol 1,000 units tablet; Commonly known as: VITAMIN D3; Take   1 tablet (1,000 Units total) by mouth daily Take this in place of   ergocalciferol   famotidine 20 mg tablet; Commonly known as: PEPCID   HYDROcodone-acetaminophen 5-325 mg per tablet; Commonly known as: NORCO   metoprolol succinate 100 mg 24 hr tablet; Commonly known as: TOPROL-XL;   Take 1 tablet (100 mg total) by mouth daily Do not start before November 19, 2022.   pantoprazole 40 mg tablet; Commonly known as: PROTONIX   QUEtiapine 300 mg tablet; Commonly known as: SEROquel     STOP taking these medications     ferrous sulfate 325 (65 Fe) mg tablet   OLANZapine 20 MG tablet; Commonly known as: ZyPREXA

## 2024-07-03 ENCOUNTER — TELEPHONE (OUTPATIENT)
Age: 69
End: 2024-07-03

## 2024-07-03 NOTE — TELEPHONE ENCOUNTER
Hello good afternoon,     Can you please mail an appt card to the patient ?  Patient has asked to please send an appointment card with address.     PATIENT HAS ACCEPTED 7/24/2024, MARY ANN, 12:30 PM,U DEANNE , PT BROTHER WILL PROVIDE TRANSPORTATION.    Patient has asked to please send an appointment card with address.     HFU/ SL MINERS/ Dysarthria     DC- HOME- 5/30/2024    Justine LAURYN Jose R will need follow up in in 6 weeks with neurovascular attending or advance practitioner. She will not require outpatient neurological testing.

## 2024-07-24 ENCOUNTER — OFFICE VISIT (OUTPATIENT)
Age: 69
End: 2024-07-24
Payer: MEDICARE

## 2024-07-24 VITALS
HEIGHT: 64 IN | WEIGHT: 159.6 LBS | BODY MASS INDEX: 27.25 KG/M2 | DIASTOLIC BLOOD PRESSURE: 70 MMHG | SYSTOLIC BLOOD PRESSURE: 106 MMHG | HEART RATE: 71 BPM | OXYGEN SATURATION: 99 %

## 2024-07-24 DIAGNOSIS — Z86.73 HISTORY OF STROKE: Primary | ICD-10-CM

## 2024-07-24 PROCEDURE — 99215 OFFICE O/P EST HI 40 MIN: CPT

## 2024-07-24 RX ORDER — UREA 10 %
500 LOTION (ML) TOPICAL DAILY
COMMUNITY

## 2024-07-24 RX ORDER — ESOMEPRAZOLE MAGNESIUM 20 MG/1
20 GRANULE, DELAYED RELEASE ORAL 2 TIMES DAILY
COMMUNITY

## 2024-07-24 NOTE — PROGRESS NOTES
Patient ID: Justine Odell is a 68 y.o. female.    Assessment/Plan:    History of stroke  Jutsine Odell is a 68 year old woman with PMHx including prior stroke (2009) with residual right-sided weakness (mild) and dysarthria who presented to the hospital for evaluation of generalized weakness/malaise and worsened dysarthria from baseline. Acute stroke work up was negative. Initially she was hypotensive therefore etiology of symptoms was suspected hypoperfusion with recrudescence but TIA could not be excluded. She is now back to baseline (chronically is dysarthric since stroke in 2009) and is working with speech therapy. She denies any new or acute worsening of any focal neurologic symptoms concerning for TIA or Stroke. We reviewed her stroke risk factors and management of the same. She was provided stroke education and we reviewed stroke warning signs/symptoms and reasons to return by ambulance to the ER. Plan as outlined below.      Plan:  - Continue with good blood pressure control; I would recommend monitoring at home at least 3 times per week; Goal of <130/80; at goal   - Continue with good cholesterol control; Goal LDL <70; at goal   - Continue with good blood sugar control; Goal HgbA1c <7.0; at goal   - Will defer monitoring of cholesterol and blood sugar and management of hypertensive medications to the primary care provider  - Stay well hydrated by drinking enough water   - Continue with no smoking  - Eat a healthy diet, high in lean meats fish, turkey, chicken. Low in fats, cholesterol, sugars and sodium. Avoid canned foods,  get lets of fresh/frozen vegetables/fruits.  - Get routine exercise/physical activity as much as able to tolerate  -  Continue with speech therapy  - Keep follow ups with your other health care providers  - Continue ASA 81 mg daily and Lipitor 40 mg daily.  - Fall precautions    I will plan for her to return to the office in 8 months time but would be happy to see her sooner if the need  "should arise.  If she has any symptoms concerning for TIA or stroke including sudden painless loss of vision or double vision, difficulty speaking or swallowing, vertigo/room spinning that does not quickly resolve, or weakness/numbness affecting 1 side of the face or body she should proceed by ambulance to the nearest emergency room immediately.       Diagnoses and all orders for this visit:    History of stroke  -     Ambulatory referral to Neurology       I have spent a total time of 40 minutes in caring for this patient on the day of the visit/encounter including Diagnostic results, Prognosis, Risks and benefits of tx options, Instructions for management, Patient and family education, Importance of tx compliance, Risk factor reductions, Impressions, Documenting in the medical record, Reviewing / ordering tests, medicine, procedures  , and Obtaining or reviewing history  .      Subjective:    DARIANA Odell is a 68 year old woman with PMHx including prior stroke (2009) with residual right-sided weakness (mild) and dysarthria who presented to the hospital for evaluation of generalized weakness/malaise and worsened dysarthria from baseline. NIHSS 1 for dysarthria. Pt reported that she felt dysarthria began to seem worse around 4 AM this morning. She was up for most of the night. Otherwise, she \"feels like crap\" without otherwise providing details. She additionally was noted to have a syncopal episode prior to arrival to the ED with BP 100s systolic on presentation. She denied taking any more medication than she typically does. Presentation appears more related to toxic-metabolic encephalopathy, but with stroke history and worsened dysarthria from baseline, will rule out new stroke. Takes aspirin 81 mg daily at home. CT head with no acute findings (noted to have chronic strokes), and CTA H/N with no LVO or high-grade stenosis, just focal vessel tortuosity more visible on imaging today compared to prior imaging but " also present at that time.      TPA Decision: Patient not a candidate. Unclear time of onset outside appropriate time window.  Not an endovascular candidate since no LVO     Plan:   Acute ischemic stroke protocol  Aspirin 81 mg daily (states that this is her home dose, would verify)  Plavix load of 300 mg in the ER, then start Plavix 75 mg daily starting tomorrow. If MRI brain negative, can discontinue Plavix and keep pt on aspirin monotherapy. If MRI brain positive, will need to revisit antiplatelet plan  Atorvastatin 40 mg qHS  MRI brain without contrast   Echo   Telemetry  Lipid panel, HbA1C, TSH, UA, UDS   Secondary stroke risk factor modification  Permissive hypertension with max of systolic  for today, pending MRI brain  Goal of normothermia and euglycemia   Smoking cessation advised   PT/OT/ST  Stroke Education  Frequent neurological checks  Stat CT head with worsening in neuro exam  Notify neurology with any changes in exam  Metabolic/infectious workup per primary      Initially hypotensive which resolved. Suspect hypoperfusion with recrudescence.   MRI without contrast without acute stroke  Given cannot exclude TIA, would discharge on DAPT x 21 days then aspirin monotherapy  LDL 37 / A1c 5.4  PT/OT, recommending outpatient     CTH  No acute intracranial abnormality.  Chronic left caudate head and right posterior cerebellar lacunar infarcts. Mild chronic microangiopathic ischemic changes.    MRI Brain wo  White matter changes suggestive of chronic microangiopathy. No acute intracranial pathology.     CTA H/N  1. CTA head: Negative for large vessel intracranial occlusion or hemodynamically significant stenosis. Focal vessel tortuosity at the right MCA trifurcation, more conspicuous on the current examination compared to 2023 due to better contrast   opacification of the smaller branches.  2. CTA neck:  No extracranial carotid stenosis.  The cervical vertebral arteries are  patent.    -----------------------------------------------------------------------------------    Patient is a poor historian and tells me she believes her first stroke was in 2021, then again more recently in 2024. It appears from review of her chart, her stroke was in 2009. Recent episode negative for stroke. She is not accompanied by anyone today.    Secondary Stroke Prevention  Completed 21 days of DAPT, now continues on ASA 81 mg daily and Atorvastatin 40 mg daily   Compliant with her medications, no issues affording or obtaining medications.   Denies excessive bruising or bleeding     Deficits  Speech is back to baseline; at baseline is dysarthric since her stroke in 2009  Denies numbness, tingling, weakness, dizziness, headaches, vision changes or any new or worsening stroke like symptoms    Monitoring  BP today 106/70  5/29/24 HgbA1c 5.4  LDL 37 5/29/24  CTA 5/28/24: RIGHT EXTRACRANIAL CAROTID SEGMENT:  Mild atherosclerotic disease of the distal common carotid artery and proximal cervical internal carotid artery without significant stenosis compared to the more distal ICA. LEFT EXTRACRANIAL CAROTID SEGMENT:  Normal caliber common carotid artery.  Normal bifurcation and cervical internal carotid artery.  No stenosis or dissection.      Safety  Lives alone, has a dog   Independent of all ADLs; does not have an aide   No falls at home  Medications- managed by herself and her mom helps   Finances- managed by her herself and her mom helps  assistive devices- has a walker; does not always use it  Driving- does not drive currently; was revoked for driving on a suspended license   Follows with psychiatry     Substance use    Smoking- smoked for 6 years. Reports quitting 1 year ago.  Etoh- none  Drugs- denies  Caffeine- coffee, iced tea 2 a day     Sleep  She reports poor sleep  Insomnia  She takes trazodone and Seroquel      Diet  BMI 27.40  Was getting meals on wheels; for some reason this stopped a few weeks  "ago  She cooks for herself now- meatloaf, lasgna     Exercise  She walks her dog a lot      PT/OT/ST  Speech therapy 2 times a week in home     Mood  She sees psychiatry     Follow ups  Thinks she was recently scoped[facility]  Scheduled for colonoscopy   Seeing a neurologist for 20 years (Dr. Reyes) retired 6 months ago    The following portions of the patient's history were reviewed and updated as appropriate: allergies, current medications, past family history, past medical history, past social history, past surgical history, and problem list.    Objective:    Blood pressure 106/70, pulse 71, height 5' 4\" (1.626 m), weight 72.4 kg (159 lb 9.6 oz), SpO2 99%.    Neurological Exam    On neurological examination patient is alert, awake, oriented and in no distress. Speech is fluent but is dysarthric but without aphasia. Cranial nerves 2-12 were symmetrically intact bilaterally.No evidence of any focal weakness or sensory loss in the upper or lower extremities. Motor testing reveals 5/5 strength of the bilateral upper and lower extremities.There was no pronator drift.  No fasciculations present. No abnormal involuntary movements. Finger- to-nose reveals no tremor or ataxia and intact proprioceptive function, no dysmetria was noted. Rapid alternating movement normal. Sensation was intact to vibration, light touch, and temperature in bilateral upper and lower extremities. Deep tendon reflexes were 2+ and symmetric in the bilateral upper and lower extremities. She is able to rise easily without assistance from a seated position. Casual gait is normal including stance, stride, and arm swing. Romberg is absent.      ROS:    Review of Systems   Constitutional:  Negative for appetite change, fatigue and fever.   HENT: Negative.  Negative for hearing loss, tinnitus, trouble swallowing and voice change.    Eyes: Negative.  Negative for photophobia, pain and visual disturbance.   Respiratory: Negative.  Negative " for shortness of breath.    Cardiovascular: Negative.  Negative for palpitations.   Gastrointestinal: Negative.  Negative for nausea and vomiting.   Endocrine: Negative.  Negative for cold intolerance.   Genitourinary: Negative.  Negative for dysuria, frequency and urgency.   Musculoskeletal:  Negative for back pain, gait problem, myalgias, neck pain and neck stiffness.   Skin: Negative.  Negative for rash.   Allergic/Immunologic: Negative.    Neurological:  Positive for speech difficulty (daily). Negative for dizziness, tremors, seizures, syncope, facial asymmetry, weakness, light-headedness, numbness and headaches.   Hematological: Negative.  Does not bruise/bleed easily.   Psychiatric/Behavioral: Negative.  Negative for confusion, hallucinations and sleep disturbance.    All other systems reviewed and are negative.    Reviewed ROS as entered by medical assistant.

## 2024-07-24 NOTE — PATIENT INSTRUCTIONS
- Continue with good blood pressure control; I would recommend monitoring at home at least 3 times per week; Goal of <130/80; at goal   - Continue with good cholesterol control; Goal LDL <70; at goal   - Continue with good blood sugar control; Goal HgbA1c <7.0; at goal   - Will defer monitoring of cholesterol and blood sugar and management of hypertensive medications to the primary care provider  - Stay well hydrated by drinking enough water   - Continue with no smoking  - Eat a healthy diet, high in lean meats fish, turkey, chicken. Low in fats, cholesterol, sugars and sodium. Avoid canned foods,  get lets of fresh/frozen vegetables/fruits.  - Get routine exercise/physical activity as much as able to tolerate  -  Continue with speech therapy  - Keep follow ups with your other health care providers  - Continue ASA 81 mg daily and Lipitor 40 mg daily.  - Fall precautions    I will plan for her to return to the office in 8 months time but would be happy to see her sooner if the need should arise.  If she has any symptoms concerning for TIA or stroke including sudden painless loss of vision or double vision, difficulty speaking or swallowing, vertigo/room spinning that does not quickly resolve, or weakness/numbness affecting 1 side of the face or body she should proceed by ambulance to the nearest emergency room immediately.

## 2024-07-25 NOTE — ASSESSMENT & PLAN NOTE
Justine Odell is a 68 year old woman with PMHx including prior stroke (2009) with residual right-sided weakness (mild) and dysarthria who presented to the hospital for evaluation of generalized weakness/malaise and worsened dysarthria from baseline. Acute stroke work up was negative. Initially she was hypotensive therefore etiology of symptoms was suspected hypoperfusion with recrudescence but TIA could not be excluded. She is now back to baseline (chronically is dysarthric since stroke in 2009) and is working with speech therapy. She denies any new or acute worsening of any focal neurologic symptoms concerning for TIA or Stroke. We reviewed her stroke risk factors and management of the same. She was provided stroke education and we reviewed stroke warning signs/symptoms and reasons to return by ambulance to the ER. Plan as outlined below.      Plan:  - Continue with good blood pressure control; I would recommend monitoring at home at least 3 times per week; Goal of <130/80; at goal   - Continue with good cholesterol control; Goal LDL <70; at goal   - Continue with good blood sugar control; Goal HgbA1c <7.0; at goal   - Will defer monitoring of cholesterol and blood sugar and management of hypertensive medications to the primary care provider  - Stay well hydrated by drinking enough water   - Continue with no smoking  - Eat a healthy diet, high in lean meats fish, turkey, chicken. Low in fats, cholesterol, sugars and sodium. Avoid canned foods,  get lets of fresh/frozen vegetables/fruits.  - Get routine exercise/physical activity as much as able to tolerate  -  Continue with speech therapy  - Keep follow ups with your other health care providers  - Continue ASA 81 mg daily and Lipitor 40 mg daily.  - Fall precautions    I will plan for her to return to the office in 8 months time but would be happy to see her sooner if the need should arise.  If she has any symptoms concerning for TIA or stroke including sudden  painless loss of vision or double vision, difficulty speaking or swallowing, vertigo/room spinning that does not quickly resolve, or weakness/numbness affecting 1 side of the face or body she should proceed by ambulance to the nearest emergency room immediately.

## 2024-07-30 ENCOUNTER — TELEPHONE (OUTPATIENT)
Dept: OBGYN CLINIC | Facility: CLINIC | Age: 69
End: 2024-07-30

## 2024-07-30 NOTE — TELEPHONE ENCOUNTER
Spoke to patient to verify insurance she has [employer]  I told her we do not participate with this insurance at [facility] I gave her phone number for [facility] orthopedics central scheduling to be seen at another location.  I did let her know appointment is canceled for 8/1/24

## 2024-08-05 ENCOUNTER — APPOINTMENT (EMERGENCY)
Dept: NON INVASIVE DIAGNOSTICS | Facility: HOSPITAL | Age: 69
End: 2024-08-05
Payer: MEDICARE

## 2024-08-05 ENCOUNTER — APPOINTMENT (EMERGENCY)
Dept: RADIOLOGY | Facility: HOSPITAL | Age: 69
End: 2024-08-05
Payer: MEDICARE

## 2024-08-05 ENCOUNTER — HOSPITAL ENCOUNTER (EMERGENCY)
Facility: HOSPITAL | Age: 69
Discharge: HOME | End: 2024-08-05
Attending: EMERGENCY MEDICINE | Admitting: EMERGENCY MEDICINE
Payer: MEDICARE

## 2024-08-05 VITALS
RESPIRATION RATE: 18 BRPM | DIASTOLIC BLOOD PRESSURE: 68 MMHG | SYSTOLIC BLOOD PRESSURE: 143 MMHG | OXYGEN SATURATION: 94 % | TEMPERATURE: 97.6 F | HEART RATE: 66 BPM

## 2024-08-05 DIAGNOSIS — L03.90 CELLULITIS: Primary | ICD-10-CM

## 2024-08-05 DIAGNOSIS — R60.0 PERIPHERAL EDEMA: ICD-10-CM

## 2024-08-05 LAB
ALBUMIN SERPL BCG-MCNC: 3.9 G/DL (ref 3.5–5)
ALP SERPL-CCNC: 94 U/L (ref 34–104)
ALT SERPL W P-5'-P-CCNC: 8 U/L (ref 7–52)
ANION GAP SERPL CALCULATED.3IONS-SCNC: 12 MMOL/L (ref 4–13)
AST SERPL W P-5'-P-CCNC: 18 U/L (ref 13–39)
BASOPHILS # BLD AUTO: 0.05 THOUSANDS/ÂΜL (ref 0–0.1)
BASOPHILS NFR BLD AUTO: 1 % (ref 0–1)
BILIRUB SERPL-MCNC: 0.33 MG/DL (ref 0.2–1)
BNP SERPL-MCNC: 12 PG/ML (ref 0–100)
BUN SERPL-MCNC: 31 MG/DL (ref 5–25)
CALCIUM SERPL-MCNC: 9 MG/DL (ref 8.4–10.2)
CARDIAC TROPONIN I PNL SERPL HS: 3 NG/L
CHLORIDE SERPL-SCNC: 101 MMOL/L (ref 96–108)
CO2 SERPL-SCNC: 27 MMOL/L (ref 21–32)
CREAT SERPL-MCNC: 1.36 MG/DL (ref 0.6–1.3)
EOSINOPHIL # BLD AUTO: 0.24 THOUSAND/ÂΜL (ref 0–0.61)
EOSINOPHIL NFR BLD AUTO: 5 % (ref 0–6)
ERYTHROCYTE [DISTWIDTH] IN BLOOD BY AUTOMATED COUNT: 13.8 % (ref 11.6–15.1)
GFR SERPL CREATININE-BSD FRML MDRD: 40 ML/MIN/1.73SQ M
GLUCOSE SERPL-MCNC: 103 MG/DL (ref 65–140)
HCT VFR BLD AUTO: 34.2 % (ref 34.8–46.1)
HGB BLD-MCNC: 11 G/DL (ref 11.5–15.4)
IMM GRANULOCYTES # BLD AUTO: 0.01 THOUSAND/UL (ref 0–0.2)
IMM GRANULOCYTES NFR BLD AUTO: 0 % (ref 0–2)
LACTATE SERPL-SCNC: 1 MMOL/L (ref 0.5–2)
LYMPHOCYTES # BLD AUTO: 1.28 THOUSANDS/ÂΜL (ref 0.6–4.47)
LYMPHOCYTES NFR BLD AUTO: 25 % (ref 14–44)
MAGNESIUM SERPL-MCNC: 2.1 MG/DL (ref 1.9–2.7)
MCH RBC QN AUTO: 35.7 PG (ref 26.8–34.3)
MCHC RBC AUTO-ENTMCNC: 32.2 G/DL (ref 31.4–37.4)
MCV RBC AUTO: 111 FL (ref 82–98)
MONOCYTES # BLD AUTO: 0.39 THOUSAND/ÂΜL (ref 0.17–1.22)
MONOCYTES NFR BLD AUTO: 8 % (ref 4–12)
NEUTROPHILS # BLD AUTO: 3.08 THOUSANDS/ÂΜL (ref 1.85–7.62)
NEUTS SEG NFR BLD AUTO: 61 % (ref 43–75)
NRBC BLD AUTO-RTO: 0 /100 WBCS
PLATELET # BLD AUTO: 263 THOUSANDS/UL (ref 149–390)
PMV BLD AUTO: 8.6 FL (ref 8.9–12.7)
POTASSIUM SERPL-SCNC: 4.3 MMOL/L (ref 3.5–5.3)
PROCALCITONIN SERPL-MCNC: 0.06 NG/ML
PROT SERPL-MCNC: 6.8 G/DL (ref 6.4–8.4)
RBC # BLD AUTO: 3.08 MILLION/UL (ref 3.81–5.12)
SODIUM SERPL-SCNC: 140 MMOL/L (ref 135–147)
WBC # BLD AUTO: 5.05 THOUSAND/UL (ref 4.31–10.16)

## 2024-08-05 PROCEDURE — 93970 EXTREMITY STUDY: CPT | Performed by: SURGERY

## 2024-08-05 PROCEDURE — 84484 ASSAY OF TROPONIN QUANT: CPT | Performed by: PHYSICIAN ASSISTANT

## 2024-08-05 PROCEDURE — 36415 COLL VENOUS BLD VENIPUNCTURE: CPT | Performed by: PHYSICIAN ASSISTANT

## 2024-08-05 PROCEDURE — 99285 EMERGENCY DEPT VISIT HI MDM: CPT

## 2024-08-05 PROCEDURE — 83880 ASSAY OF NATRIURETIC PEPTIDE: CPT | Performed by: PHYSICIAN ASSISTANT

## 2024-08-05 PROCEDURE — 93005 ELECTROCARDIOGRAM TRACING: CPT

## 2024-08-05 PROCEDURE — 93970 EXTREMITY STUDY: CPT

## 2024-08-05 PROCEDURE — 99285 EMERGENCY DEPT VISIT HI MDM: CPT | Performed by: PHYSICIAN ASSISTANT

## 2024-08-05 PROCEDURE — 85025 COMPLETE CBC W/AUTO DIFF WBC: CPT | Performed by: PHYSICIAN ASSISTANT

## 2024-08-05 PROCEDURE — 83735 ASSAY OF MAGNESIUM: CPT | Performed by: PHYSICIAN ASSISTANT

## 2024-08-05 PROCEDURE — 84145 PROCALCITONIN (PCT): CPT | Performed by: PHYSICIAN ASSISTANT

## 2024-08-05 PROCEDURE — 80053 COMPREHEN METABOLIC PANEL: CPT | Performed by: PHYSICIAN ASSISTANT

## 2024-08-05 PROCEDURE — 71045 X-RAY EXAM CHEST 1 VIEW: CPT

## 2024-08-05 PROCEDURE — 83605 ASSAY OF LACTIC ACID: CPT | Performed by: PHYSICIAN ASSISTANT

## 2024-08-05 PROCEDURE — 96374 THER/PROPH/DIAG INJ IV PUSH: CPT

## 2024-08-05 RX ORDER — CEPHALEXIN 500 MG/1
500 CAPSULE ORAL EVERY 6 HOURS SCHEDULED
Qty: 28 CAPSULE | Refills: 0 | Status: SHIPPED | OUTPATIENT
Start: 2024-08-05 | End: 2024-08-12

## 2024-08-05 RX ORDER — FUROSEMIDE 10 MG/ML
20 INJECTION INTRAMUSCULAR; INTRAVENOUS ONCE
Status: COMPLETED | OUTPATIENT
Start: 2024-08-05 | End: 2024-08-05

## 2024-08-05 RX ORDER — ACETAMINOPHEN 325 MG/1
650 TABLET ORAL ONCE
Status: COMPLETED | OUTPATIENT
Start: 2024-08-05 | End: 2024-08-05

## 2024-08-05 RX ADMIN — FUROSEMIDE 20 MG: 10 INJECTION, SOLUTION INTRAMUSCULAR; INTRAVENOUS at 12:38

## 2024-08-05 RX ADMIN — ACETAMINOPHEN 650 MG: 325 TABLET, FILM COATED ORAL at 12:36

## 2024-08-05 NOTE — DISCHARGE INSTRUCTIONS
Take keflex for the redness of the right leg.  AVOID SODIUM in your diet, use compression socks, keep legs elevated.  Double your lasix for 3 days only then go back to standard dosing and follow up with PCP for further treatment.

## 2024-08-05 NOTE — ED PROVIDER NOTES
History  Chief Complaint   Patient presents with    Leg Swelling     Pt c/o bilateral leg and foot swelling for 3 weeks. Sent by PCP     This is a pleasant 68-year-old female today presents via private vehicle ambulatory here for evaluation of leg edema.  Patient states she was sent here by primary care for leg edema.  Upon review of her [facility] outpatient notation she has had leg swelling for at least 3 months.  She was referred to cardiology she has some diastolic heart dysfunction she is on 20 mg of Lasix daily that she believes she is taking.  She stated she phoned primary care who wanted her seen in the ER.  There is a very minimal amount of redness that is noted of the right lower extremity it appears that she has had some prior orthopedic hardware is fairly new over the last 24 to 48 hours.  She denies any proximal streaking she denies any systemic complaints such as fevers chills or sweats sweats she denies chest pain or shortness of breath she is well-appearing vital signs reviewed within reasonable limits.  She does appear to have a weight gain based upon today's weight compared to prior.        Prior to Admission Medications   Prescriptions Last Dose Informant Patient Reported? Taking?   ALPRAZolam (XANAX) 1 mg tablet  Self No No   Sig: Take 1 tablet (1 mg total) by mouth 4 (four) times a day as needed for anxiety for up to 10 days   Patient taking differently: Take 1 mg by mouth 3 (three) times a day as needed for anxiety   HYDROcodone-acetaminophen (NORCO) 5-325 mg per tablet  Self Yes No   Sig: Take 1 tablet by mouth every 6 (six) hours as needed for pain   HYDROcodone-guaiFENesin (NARCOF PO)  Self Yes No   Sig: Take 325 each by mouth 4 (four) times a day   QUEtiapine (SEROquel) 300 mg tablet 8/4/2024 Self Yes Yes   Sig: Take 300 mg by mouth daily at bedtime   aspirin 81 mg chewable tablet 8/5/2024 Self No Yes   Sig: Chew 1 tablet (81 mg total) daily   atorvastatin (LIPITOR) 40 mg tablet 8/4/2024  Self No Yes   Sig: Take 1 tablet (40 mg total) by mouth every evening To prevent stroke and heart attack   cariprazine (Vraylar) 1.5 MG capsule 8/5/2024 Self Yes Yes   Sig: Take 1.5 mg by mouth daily   cholecalciferol (VITAMIN D3) 1,000 units tablet  Self No No   Sig: Take 1 tablet (1,000 Units total) by mouth daily Take this in place of ergocalciferol   Patient taking differently: Take 1,000 Units by mouth once a week Take this in place of ergocalciferol   esomeprazole (NexIUM) 20 mg packet 8/5/2024 Self Yes Yes   Sig: Take 20 mg by mouth 2 (two) times a day   famotidine (PEPCID) 20 mg tablet 8/5/2024 Self Yes Yes   Sig: Take 20 mg by mouth daily   metoprolol succinate (TOPROL-XL) 100 mg 24 hr tablet 8/5/2024 Self No Yes   Sig: Take 1 tablet (100 mg total) by mouth daily Do not start before November 19, 2022.   pantoprazole (PROTONIX) 40 mg tablet  Mother, Self Yes No   Sig: Take 40 mg by mouth 2 (two) times a day   Patient not taking: Reported on 7/24/2024   vitamin B-12 (VITAMIN B-12) 500 mcg tablet 8/5/2024 Self Yes Yes   Sig: Take 500 mcg by mouth daily      Facility-Administered Medications: None       Past Medical History:   Diagnosis Date    Bipolar 1 disorder (HCC)     Cancer (HCC)     kidney    Cardiac disease     fast heart beat    Chronic pain     Fractures     GERD (gastroesophageal reflux disease)     Hard of hearing     Hypertension     Renal cell carcinoma (HCC)     Stroke (HCC)     2009 affected her speech, right sided weakness    TIA (transient ischemic attack)        Past Surgical History:   Procedure Laterality Date    APPENDECTOMY      CHOLECYSTECTOMY      FL GUIDED NEEDLE PLAC BX/ASP/INJ  5/30/2014    FL GUIDED NEEDLE PLAC BX/ASP/INJ  12/19/2013    FL GUIDED NEEDLE PLAC BX/ASP/INJ  6/10/2013    JOINT REPLACEMENT Bilateral      bilateral knees    NEPHRECTOMY Right     NJ COLONOSCOPY FLX DX W/COLLJ SPEC WHEN PFRMD N/A 8/7/2018    Procedure: COLONOSCOPY;  Surgeon: Boy Guillermo MD;  Location: [facility]  MAIN OR;  Service: Gastroenterology    MD OPEN TREATMENT BIMALLEOLAR ANKLE FRACTURE Right 5/14/2019    Procedure: ANKLE - Bimalleolar OPEN REDUCTION W/ INTERNAL FIXATION (ORIF);  Surgeon: Harris Christianson;  Location:  MAIN OR;  Service: Orthopedics    MD REMOVAL IMPLANT DEEP Right 1/31/2023    Procedure: REMOVAL HARDWARE ANKLE;  Surgeon: Harris Christianson;  Location:  MAIN OR;  Service: Orthopedics    MD TX TIBL SHFT FX IMED IMPLT W/WO SCREWS&/CERCLA Left 7/27/2022    Procedure: INSERTION NAIL IM TIBIA;  Surgeon: Lefty Shea MD;  Location:  MAIN OR;  Service: Orthopedics       Family History   Problem Relation Age of Onset    Colon cancer Other     Alcohol abuse Other     Hypertension Other      I have reviewed and agree with the history as documented.    E-Cigarette/Vaping    E-Cigarette Use Never User      E-Cigarette/Vaping Substances    Nicotine No     THC No     CBD No     Flavoring No     Other No     Unknown No      Social History     Tobacco Use    Smoking status: Former     Current packs/day: 0.50     Average packs/day: 0.5 packs/day for 7.0 years (3.5 ttl pk-yrs)     Types: Cigarettes    Smokeless tobacco: Never    Tobacco comments:     pt refused referral   Vaping Use    Vaping status: Never Used   Substance Use Topics    Alcohol use: Not Currently    Drug use: Never       Review of Systems   Constitutional:  Negative for chills and fever.   HENT:  Negative for ear pain and sore throat.    Eyes:  Negative for pain and visual disturbance.   Respiratory:  Negative for cough and shortness of breath.    Cardiovascular:  Positive for leg swelling. Negative for chest pain and palpitations.   Gastrointestinal:  Negative for abdominal pain and vomiting.   Genitourinary:  Negative for dysuria and hematuria.   Musculoskeletal:  Negative for arthralgias and back pain.   Skin:  Negative for color change and rash.        Right leg redness   Neurological:  Negative for seizures and syncope.   All other systems  reviewed and are negative.      Physical Exam  Physical Exam  Constitutional:       General: She is not in acute distress.     Appearance: She is well-developed. She is not ill-appearing, toxic-appearing or diaphoretic.   HENT:      Right Ear: External ear normal. No swelling. Tympanic membrane is not bulging.      Left Ear: External ear normal. No swelling. Tympanic membrane is not bulging.      Nose: Nose normal.      Mouth/Throat:      Pharynx: No oropharyngeal exudate.   Eyes:      General: Lids are normal.      Conjunctiva/sclera: Conjunctivae normal.      Pupils: Pupils are equal, round, and reactive to light.   Neck:      Thyroid: No thyromegaly.      Vascular: No JVD.      Trachea: No tracheal deviation.   Cardiovascular:      Rate and Rhythm: Normal rate and regular rhythm.      Pulses: Normal pulses.      Heart sounds: Normal heart sounds. No murmur heard.     No friction rub. No gallop.   Pulmonary:      Effort: Pulmonary effort is normal. No respiratory distress.      Breath sounds: Normal breath sounds. No stridor. No wheezing or rales.   Chest:      Chest wall: No tenderness.   Abdominal:      General: Bowel sounds are normal. There is no distension.      Palpations: Abdomen is soft. There is no mass.      Tenderness: There is no abdominal tenderness. There is no guarding or rebound.      Hernia: No hernia is present.   Musculoskeletal:         General: Normal range of motion.      Cervical back: Normal range of motion and neck supple. No edema. Normal range of motion.      Right lower leg: Edema present.      Left lower leg: Edema present.   Lymphadenopathy:      Cervical: No cervical adenopathy.   Skin:     General: Skin is warm and dry.      Coloration: Skin is not pale.      Findings: Erythema present. No rash.      Comments: Mild right lower leg redness, warmth   Neurological:      Mental Status: She is alert and oriented to person, place, and time.      GCS: GCS eye subscore is 4. GCS verbal  subscore is 5. GCS motor subscore is 6.      Cranial Nerves: No cranial nerve deficit.      Sensory: No sensory deficit.      Deep Tendon Reflexes: Reflexes are normal and symmetric.   Psychiatric:         Mood and Affect: Mood normal.         Speech: Speech normal.         Behavior: Behavior normal.         Thought Content: Thought content normal.         Judgment: Judgment normal.         Vital Signs  ED Triage Vitals [08/05/24 1114]   Temperature Pulse Respirations Blood Pressure SpO2   97.6 °F (36.4 °C) 72 18 140/63 93 %      Temp Source Heart Rate Source Patient Position - Orthostatic VS BP Location FiO2 (%)   Temporal Monitor Sitting Right arm --      Pain Score       8           Vitals:    08/05/24 1114   BP: 140/63   Pulse: 72   Patient Position - Orthostatic VS: Sitting         Visual Acuity      ED Medications  Medications   furosemide (LASIX) injection 20 mg (20 mg Intravenous Given 8/5/24 1238)   acetaminophen (TYLENOL) tablet 650 mg (650 mg Oral Given 8/5/24 1236)       Diagnostic Studies  Results Reviewed       Procedure Component Value Units Date/Time    Procalcitonin [[id number]]  (Normal) Collected: 08/05/24 1130    Lab Status: Final result Specimen: Blood from Arm, Left Updated: 08/05/24 1205     Procalcitonin 0.06 ng/ml     HS Troponin 0hr (reflex protocol) [[id number]]  (Normal) Collected: 08/05/24 1130    Lab Status: Final result Specimen: Blood from Arm, Left Updated: 08/05/24 1203     hs TnI 0hr 3 ng/L     B-Type Natriuretic Peptide(BNP) [[id number]]  (Normal) Collected: 08/05/24 1130    Lab Status: Final result Specimen: Blood from Arm, Left Updated: 08/05/24 1202     BNP 12 pg/mL     Lactic acid, plasma (w/reflex if result > 2.0) [[id number]]  (Normal) Collected: 08/05/24 1130    Lab Status: Final result Specimen: Blood from Arm, Left Updated: 08/05/24 1156     LACTIC ACID 1.0 mmol/L     Narrative:      Result may be elevated if tourniquet was used during collection.    Comprehensive metabolic  panel [[id number]]  (Abnormal) Collected: 08/05/24 1130    Lab Status: Final result Specimen: Blood from Arm, Left Updated: 08/05/24 1155     Sodium 140 mmol/L      Potassium 4.3 mmol/L      Chloride 101 mmol/L      CO2 27 mmol/L      ANION GAP 12 mmol/L      BUN 31 mg/dL      Creatinine 1.36 mg/dL      Glucose 103 mg/dL      Calcium 9.0 mg/dL      AST 18 U/L      ALT 8 U/L      Alkaline Phosphatase 94 U/L      Total Protein 6.8 g/dL      Albumin 3.9 g/dL      Total Bilirubin 0.33 mg/dL      eGFR 40 ml/min/1.73sq m     Narrative:      National Kidney Disease Foundation guidelines for Chronic Kidney Disease (CKD):     Stage 1 with normal or high GFR (GFR > 90 mL/min/1.73 square meters)    Stage 2 Mild CKD (GFR = 60-89 mL/min/1.73 square meters)    Stage 3A Moderate CKD (GFR = 45-59 mL/min/1.73 square meters)    Stage 3B Moderate CKD (GFR = 30-44 mL/min/1.73 square meters)    Stage 4 Severe CKD (GFR = 15-29 mL/min/1.73 square meters)    Stage 5 End Stage CKD (GFR <15 mL/min/1.73 square meters)  Note: GFR calculation is accurate only with a steady state creatinine    Magnesium [[id number]]  (Normal) Collected: 08/05/24 1130    Lab Status: Final result Specimen: Blood from Arm, Left Updated: 08/05/24 1155     Magnesium 2.1 mg/dL     CBC and differential [[id number]]  (Abnormal) Collected: 08/05/24 1130    Lab Status: Final result Specimen: Blood from Arm, Left Updated: 08/05/24 1139     WBC 5.05 Thousand/uL      RBC 3.08 Million/uL      Hemoglobin 11.0 g/dL      Hematocrit 34.2 %       fL      MCH 35.7 pg      MCHC 32.2 g/dL      RDW 13.8 %      MPV 8.6 fL      Platelets 263 Thousands/uL      nRBC 0 /100 WBCs      Segmented % 61 %      Immature Grans % 0 %      Lymphocytes % 25 %      Monocytes % 8 %      Eosinophils Relative 5 %      Basophils Relative 1 %      Absolute Neutrophils 3.08 Thousands/µL      Absolute Immature Grans 0.01 Thousand/uL      Absolute Lymphocytes 1.28 Thousands/µL      Absolute Monocytes  0.39 Thousand/µL      Eosinophils Absolute 0.24 Thousand/µL      Basophils Absolute 0.05 Thousands/µL                    XR chest 1 view portable   ED Interpretation by Edin Barragan PA-C (08/05 1221)   No significant effusions noted no pneumothorax.       VAS VENOUS DUPLEX - LOWER LIMB BILATERAL    (Results Pending)              Procedures  ECG 12 Lead Documentation Only    Date/Time: 8/5/2024 11:32 AM    Performed by: Edin Barragan PA-C  Authorized by: Edin Barragan PA-C    Indications / Diagnosis:  Leg edema  ECG reviewed by me, the ED Provider: yes    Patient location:  ED  Previous ECG:     Comparison to cardiac monitor: Yes    Interpretation:     Interpretation: normal    Rate:     ECG rate:  69    ECG rate assessment: normal    Rhythm:     Rhythm: sinus rhythm    Ectopy:     Ectopy: none    QRS:     QRS axis:  Normal    QRS intervals:  Normal  Conduction:     Conduction: normal    ST segments:     ST segments:  Normal  T waves:     T waves: normal             ED Course  ED Course as of 08/05/24 1318   Mon Aug 05, 2024   1134 SpO2: 93 %   1134 Respirations: 18   1134 Pulse: 72   1134 Temperature: 97.6 °F (36.4 °C)   1134 Blood Pressure: 140/63  Vital signs reviewed within reasonable limits.   1221 Awaiting venous duplex interpretation   1228 Per ultrasound technologist there is no evidence of DVT.               HEART Risk Score      Flowsheet Row Most Recent Value   Heart Score Risk Calculator    History 0 Filed at: 08/05/2024 1133   ECG 0 Filed at: 08/05/2024 1133   Age 2 Filed at: 08/05/2024 1133   Risk Factors 1 Filed at: 08/05/2024 1133   Troponin 0 Filed at: 08/05/2024 1133   HEART Score 3 Filed at: 08/05/2024 1133                          SBIRT 22yo+      Flowsheet Row Most Recent Value   Initial Alcohol Screen: US AUDIT-C     1. How often do you have a drink containing alcohol? 0 Filed at: 08/05/2024 1113   2. How many drinks containing alcohol do you have on a typical day you  are drinking?  0 Filed at: 08/05/2024 1113   3a. Male UNDER 65: How often do you have five or more drinks on one occasion? 0 Filed at: 08/05/2024 1113   3b. FEMALE Any Age, or MALE 65+: How often do you have 4 or more drinks on one occassion? 0 Filed at: 08/05/2024 1113   Audit-C Score 0 Filed at: 08/05/2024 1113   SHAYY: How many times in the past year have you...    Used an illegal drug or used a prescription medication for non-medical reasons? Never Filed at: 08/05/2024 1113                      Medical Decision Making  68-year-old female here for evaluation of acute on chronic leg edema known diastolic heart dysfunction according to [facility] cardiology notes on 20 mg of Lasix daily she states PCP wanted her evaluated for leg edema.  There is some redness noted today of the right lower extremity as well she had prior orthopedic hardware in this region.  Differential diagnoses include cellulitis, CHF exacerbation, electrolyte disturbance, acute coronary syndrome.    Workup is negative for ACS, cxr stable without effusions, dvt study negative for emboli, mild cellulitis noted on exam, will tx with keflex. No chest pain, will recommend double lasix x 3 days then go back to prior dosage, follow up closely with pcp.     Amount and/or Complexity of Data Reviewed  Labs: ordered.  Radiology: ordered and independent interpretation performed.    Risk  OTC drugs.  Prescription drug management.                 Disposition  Final diagnoses:   Cellulitis   Peripheral edema     Time reflects when diagnosis was documented in both MDM as applicable and the Disposition within this note       Time User Action Codes Description Comment    8/5/2024  1:13 PM Edin Barragan Add [L03.90] Cellulitis     8/5/2024  1:13 PM Edin Barragan Add [R60.0] Peripheral edema           ED Disposition       ED Disposition   Discharge    Condition   Stable    Date/Time   Mon Aug 5, 2024 1313    Comment   Justine Odell discharge to home/self  care.                   Follow-up Information       Follow up With Specialties Details Why Contact Info    Lilian Tang, DO Family Medicine Go in 1 week  [address] [place] [address] [phone]              Patient's Medications   Discharge Prescriptions    CEPHALEXIN (KEFLEX) 500 MG CAPSULE    Take 1 capsule (500 mg total) by mouth every 6 (six) hours for 7 days       Start Date: 8/5/2024  End Date: 8/12/2024       Order Dose: 500 mg       Quantity: 28 capsule    Refills: 0       No discharge procedures on file.    PDMP Review         Value Time User    PDMP Reviewed  Yes 5/29/2024  8:13 AM Keyana Taylor PA-C            ED Provider  Electronically Signed by             Edin Barragan PA-C  08/05/24 1659

## 2024-08-06 LAB
ATRIAL RATE: 69 BPM
P AXIS: 59 DEGREES
PR INTERVAL: 192 MS
QRS AXIS: 71 DEGREES
QRSD INTERVAL: 82 MS
QT INTERVAL: 430 MS
QTC INTERVAL: 460 MS
T WAVE AXIS: 91 DEGREES
VENTRICULAR RATE: 69 BPM

## 2024-08-06 PROCEDURE — 93010 ELECTROCARDIOGRAM REPORT: CPT | Performed by: INTERNAL MEDICINE

## 2024-10-21 ENCOUNTER — TRANSCRIBE ORDERS (OUTPATIENT)
Dept: ADMINISTRATIVE | Facility: HOSPITAL | Age: 69
End: 2024-10-21

## 2024-10-21 ENCOUNTER — APPOINTMENT (OUTPATIENT)
Dept: RADIOLOGY | Facility: MEDICAL CENTER | Age: 69
End: 2024-10-21
Payer: COMMERCIAL

## 2024-10-21 DIAGNOSIS — M25.532 LEFT WRIST PAIN: Primary | ICD-10-CM

## 2024-10-21 DIAGNOSIS — M25.532 LEFT WRIST PAIN: ICD-10-CM

## 2024-10-21 PROCEDURE — 73110 X-RAY EXAM OF WRIST: CPT

## 2024-12-02 ENCOUNTER — HOSPITAL ENCOUNTER (EMERGENCY)
Facility: HOSPITAL | Age: 69
Discharge: HOME | End: 2024-12-02
Attending: EMERGENCY MEDICINE
Payer: COMMERCIAL

## 2024-12-02 ENCOUNTER — APPOINTMENT (EMERGENCY)
Dept: CT IMAGING | Facility: HOSPITAL | Age: 69
End: 2024-12-02
Payer: COMMERCIAL

## 2024-12-02 VITALS
DIASTOLIC BLOOD PRESSURE: 80 MMHG | BODY MASS INDEX: 28.31 KG/M2 | OXYGEN SATURATION: 97 % | TEMPERATURE: 97 F | RESPIRATION RATE: 18 BRPM | WEIGHT: 164.9 LBS | SYSTOLIC BLOOD PRESSURE: 141 MMHG | HEART RATE: 81 BPM

## 2024-12-02 DIAGNOSIS — R11.2 NAUSEA VOMITING AND DIARRHEA: Primary | ICD-10-CM

## 2024-12-02 DIAGNOSIS — R19.7 NAUSEA VOMITING AND DIARRHEA: Primary | ICD-10-CM

## 2024-12-02 DIAGNOSIS — R74.01 TRANSAMINITIS: ICD-10-CM

## 2024-12-02 LAB
2HR DELTA HS TROPONIN: <-1 NG/L
ALBUMIN SERPL BCG-MCNC: 4.2 G/DL (ref 3.5–5)
ALP SERPL-CCNC: 162 U/L (ref 34–104)
ALT SERPL W P-5'-P-CCNC: 75 U/L (ref 7–52)
ANION GAP SERPL CALCULATED.3IONS-SCNC: 7 MMOL/L (ref 4–13)
AST SERPL W P-5'-P-CCNC: 90 U/L (ref 13–39)
BACTERIA UR QL AUTO: ABNORMAL /HPF
BASOPHILS # BLD AUTO: 0.02 THOUSANDS/ΜL (ref 0–0.1)
BASOPHILS NFR BLD AUTO: 1 % (ref 0–1)
BILIRUB SERPL-MCNC: 0.51 MG/DL (ref 0.2–1)
BILIRUB UR QL STRIP: NEGATIVE
BUN SERPL-MCNC: 26 MG/DL (ref 5–25)
CALCIUM SERPL-MCNC: 9.2 MG/DL (ref 8.4–10.2)
CARDIAC TROPONIN I PNL SERPL HS: 3 NG/L (ref ?–50)
CARDIAC TROPONIN I PNL SERPL HS: <2 NG/L (ref ?–50)
CHLORIDE SERPL-SCNC: 112 MMOL/L (ref 96–108)
CLARITY UR: CLEAR
CO2 SERPL-SCNC: 19 MMOL/L (ref 21–32)
COLOR UR: YELLOW
CREAT SERPL-MCNC: 1.18 MG/DL (ref 0.6–1.3)
EOSINOPHIL # BLD AUTO: 0.04 THOUSAND/ΜL (ref 0–0.61)
EOSINOPHIL NFR BLD AUTO: 2 % (ref 0–6)
ERYTHROCYTE [DISTWIDTH] IN BLOOD BY AUTOMATED COUNT: 13.8 % (ref 11.6–15.1)
GFR SERPL CREATININE-BSD FRML MDRD: 47 ML/MIN/1.73SQ M
GLUCOSE SERPL-MCNC: 102 MG/DL (ref 65–140)
GLUCOSE UR STRIP-MCNC: NEGATIVE MG/DL
HCT VFR BLD AUTO: 37.4 % (ref 34.8–46.1)
HGB BLD-MCNC: 12.2 G/DL (ref 11.5–15.4)
HGB UR QL STRIP.AUTO: NEGATIVE
IMM GRANULOCYTES # BLD AUTO: 0.01 THOUSAND/UL (ref 0–0.2)
IMM GRANULOCYTES NFR BLD AUTO: 0 % (ref 0–2)
KETONES UR STRIP-MCNC: NEGATIVE MG/DL
LACTATE SERPL-SCNC: 1 MMOL/L (ref 0.5–2)
LEUKOCYTE ESTERASE UR QL STRIP: ABNORMAL
LIPASE SERPL-CCNC: 40 U/L (ref 11–82)
LYMPHOCYTES # BLD AUTO: 0.98 THOUSANDS/ΜL (ref 0.6–4.47)
LYMPHOCYTES NFR BLD AUTO: 36 % (ref 14–44)
MAGNESIUM SERPL-MCNC: 1.8 MG/DL (ref 1.9–2.7)
MCH RBC QN AUTO: 35.9 PG (ref 26.8–34.3)
MCHC RBC AUTO-ENTMCNC: 32.6 G/DL (ref 31.4–37.4)
MCV RBC AUTO: 110 FL (ref 82–98)
MONOCYTES # BLD AUTO: 0.2 THOUSAND/ΜL (ref 0.17–1.22)
MONOCYTES NFR BLD AUTO: 7 % (ref 4–12)
NEUTROPHILS # BLD AUTO: 1.47 THOUSANDS/ΜL (ref 1.85–7.62)
NEUTS SEG NFR BLD AUTO: 54 % (ref 43–75)
NITRITE UR QL STRIP: NEGATIVE
NON-SQ EPI CELLS URNS QL MICRO: ABNORMAL /HPF
NRBC BLD AUTO-RTO: 0 /100 WBCS
PH UR STRIP.AUTO: 6 [PH]
PLATELET # BLD AUTO: 184 THOUSANDS/UL (ref 149–390)
PMV BLD AUTO: 8.3 FL (ref 8.9–12.7)
POTASSIUM SERPL-SCNC: 4.5 MMOL/L (ref 3.5–5.3)
PROT SERPL-MCNC: 6.9 G/DL (ref 6.4–8.4)
PROT UR STRIP-MCNC: ABNORMAL MG/DL
RBC # BLD AUTO: 3.4 MILLION/UL (ref 3.81–5.12)
RBC #/AREA URNS AUTO: ABNORMAL /HPF
SODIUM SERPL-SCNC: 138 MMOL/L (ref 135–147)
SP GR UR STRIP.AUTO: 1.02 (ref 1–1.03)
UROBILINOGEN UR STRIP-ACNC: <2 MG/DL
WBC # BLD AUTO: 2.72 THOUSAND/UL (ref 4.31–10.16)
WBC #/AREA URNS AUTO: ABNORMAL /HPF

## 2024-12-02 PROCEDURE — 81001 URINALYSIS AUTO W/SCOPE: CPT | Performed by: EMERGENCY MEDICINE

## 2024-12-02 PROCEDURE — 74177 CT ABD & PELVIS W/CONTRAST: CPT

## 2024-12-02 PROCEDURE — 83605 ASSAY OF LACTIC ACID: CPT | Performed by: EMERGENCY MEDICINE

## 2024-12-02 PROCEDURE — 93005 ELECTROCARDIOGRAM TRACING: CPT

## 2024-12-02 PROCEDURE — 80053 COMPREHEN METABOLIC PANEL: CPT | Performed by: EMERGENCY MEDICINE

## 2024-12-02 PROCEDURE — 84484 ASSAY OF TROPONIN QUANT: CPT | Performed by: EMERGENCY MEDICINE

## 2024-12-02 PROCEDURE — 99285 EMERGENCY DEPT VISIT HI MDM: CPT

## 2024-12-02 PROCEDURE — 83690 ASSAY OF LIPASE: CPT | Performed by: EMERGENCY MEDICINE

## 2024-12-02 PROCEDURE — 83735 ASSAY OF MAGNESIUM: CPT | Performed by: EMERGENCY MEDICINE

## 2024-12-02 PROCEDURE — 36415 COLL VENOUS BLD VENIPUNCTURE: CPT | Performed by: EMERGENCY MEDICINE

## 2024-12-02 PROCEDURE — 85025 COMPLETE CBC W/AUTO DIFF WBC: CPT | Performed by: EMERGENCY MEDICINE

## 2024-12-02 PROCEDURE — 99285 EMERGENCY DEPT VISIT HI MDM: CPT | Performed by: EMERGENCY MEDICINE

## 2024-12-02 RX ADMIN — IOHEXOL 100 ML: 350 INJECTION, SOLUTION INTRAVENOUS at 10:55

## 2024-12-02 NOTE — DISCHARGE INSTRUCTIONS
Thank you for visiting the Emergency Department today.    No acute findings on CT scan.  Blood work was largely normal but your liver enzymes were mildly elevated.  This sometimes goes along with a viral syndrome or inflammation of the liver or dehydration.  This appears to be a new problem.  This often gets better on its own but we do recommend that you follow-up with your PCP or gastroenterologist for follow-up labs in the near future to assess for resolution and to make sure the problem is not worsening.  Treatment for your symptoms to supportive care rest drink plenty of fluids avoid spicy acidic fatty foods.  Return if symptoms worsen for reevaluation.

## 2024-12-02 NOTE — ED NOTES
Spoke to patients mother on phone, she will be over as soon as possible to pick patient up.      Pippa Mahan RN  12/02/24 7120

## 2024-12-02 NOTE — ED ATTENDING ATTESTATION
12/2/2024  I, Vamshi Ely DO, saw and evaluated the patient. I have discussed the patient with the resident/non-physician practitioner and agree with the resident's/non-physician practitioner's findings, Plan of Care, and MDM as documented in the resident's/non-physician practitioner's note, except where noted. All available labs and Radiology studies were reviewed.  I was present for key portions of any procedure(s) performed by the resident/non-physician practitioner and I was immediately available to provide assistance.       At this point I agree with the current assessment done in the Emergency Department.  I have conducted an independent evaluation of this patient a history and physical is as follows:    Briefly, this is a 69-year-old female her past medical history includes bipolar disorder, history of renal cell carcinoma not on current chemotherapy, GERD, HTN, HLD, COPD history of CVA presented to the ER for evaluation of abdominal symptoms including 1 week history of intermittent nausea vomiting and loose stools and generalized abdominal pain.  Denies history of similar symptoms in the past.  Symptoms are not severe.  Reports an episode of vomiting today.  No blood in stools.  No falls or injuries.  No recent change in medications.  Was recommended to seek evaluation by her doctor for the symptoms.    ED Course  ED Course as of 12/02/24 1440   Mon Dec 02, 2024   1136 EKG interpreted by myself.  EKG dated 12/2/2024 at 1122 demonstrates normal sinus rhythm at 74 bpm, normal NJ, QRS, QTc durations, nonspecific T wave abnormality, no STEMI.   1301 IMPRESSION:     No acute intra-abdominal pathology.        1301 Blood Pressure: 126/86   1301 Temperature(!): 97 °F (36.1 °C)   1301 Pulse: 92   1301 Respirations: 18   1301 SpO2: 97 %   1301 hs TnI 0hr: 3   1301 LIPASE: 40   1301 AST(!): 90   1301 ALT(!): 75   1301 ALK PHOS(!): 162   1301 ANION GAP: 7   1301 WBC(!): 2.72   1301 Hemoglobin: 12.2   1301 RBC  Urine: 0-1   1301 WBC, UA: 1-2   1301 Nitrite, UA: Negative   1301 Leukocytes, UA(!): Trace   1301 LACTIC ACID: 1.0     Review of Systems   Constitutional:  Negative for activity change, appetite change, chills, fatigue and fever.   Respiratory:  Negative for shortness of breath.    Cardiovascular:  Negative for chest pain, palpitations and leg swelling.   Gastrointestinal:  Positive for abdominal pain, diarrhea, nausea and vomiting.   Genitourinary:  Negative for flank pain.   Musculoskeletal:  Negative for back pain and neck pain.     Physical Exam  Vitals and nursing note reviewed.   Constitutional:       General: She is not in acute distress.     Appearance: Normal appearance. She is well-developed. She is not ill-appearing.   HENT:      Head: Normocephalic and atraumatic.      Nose: Nose normal.   Eyes:      General: No scleral icterus.     Conjunctiva/sclera: Conjunctivae normal.   Cardiovascular:      Rate and Rhythm: Normal rate and regular rhythm.      Heart sounds: No murmur heard.  Pulmonary:      Effort: Pulmonary effort is normal. No respiratory distress.      Breath sounds: Normal breath sounds.   Abdominal:      General: There is no distension.      Palpations: Abdomen is soft.      Tenderness: There is no abdominal tenderness. There is no guarding or rebound.   Musculoskeletal:         General: No swelling.      Cervical back: Neck supple.   Skin:     General: Skin is warm and dry.      Capillary Refill: Capillary refill takes less than 2 seconds.   Neurological:      Mental Status: She is alert.   Psychiatric:         Mood and Affect: Mood normal.       Medical Decision Making  69-year-old female presenting for nonspecific GI symptoms including nausea vomiting diarrhea.  Differential diagnosis includes infectious etiology such as gastroenteritis foodborne illness colitis enteritis.  May also include inflammatory etiologies including nonspecific colitis or enteritis.  Additionally includes diagnosis  such as diverticulitis.  Doubt bowel obstruction.  No significant abdominal pain doubt peritonitis doubt surgical findings.  Will obtain CT scan and labs.  Reviewed findings no significant findings of concern.  Mild transaminitis discussed with patient plan for GI follow-up to reassess labs return if worse otherwise supportive care.  No suggestion of C. difficile.    Problems Addressed:  Nausea vomiting and diarrhea: acute illness or injury  Transaminitis: acute illness or injury    Amount and/or Complexity of Data Reviewed  Labs: ordered. Decision-making details documented in ED Course.  Radiology: ordered.  ECG/medicine tests: ordered and independent interpretation performed. Decision-making details documented in ED Course.    Risk  Prescription drug management.          Critical Care Time  Procedures

## 2024-12-02 NOTE — ED PROVIDER NOTES
Time reflects when diagnosis was documented in both MDM as applicable and the Disposition within this note       Time User Action Codes Description Comment    12/2/2024  1:01 PM Vamshi Ely [R11.2,  R19.7] Nausea vomiting and diarrhea     12/2/2024  1:01 PM Vamshi Ely [R74.01] Transaminitis           ED Disposition       ED Disposition   Discharge    Condition   Stable    Date/Time   Mon Dec 2, 2024  1:02 PM    Comment   Justine Odell discharge to home/self care.                   Assessment & Plan       Medical Decision Making    Patient is a 69-year-old female with a history of bipolar disorder, anxiety, depression, back pain, CVA , and TIA, presenting to the ED this morning with epigastric pain, 1 incident nausea and vomiting this morning, and diarrhea since Sunday.  Patient has history of GERD and diverticulitis on medication, PPI and famotidine.  Denies blood or mucus in the stool.   On examination vitals normal, patient is not in acute distress, tired or lethargic and does not seem to be dehydrated, patient does not have epigastric tenderness or any tenderness throughout her abdomen, with normal bowel sounds.  Other examination is within normal limits.  Differential diagnoses include viral gastritis, pancreatitis, exacerbation of GERD, cannot rule out also cardiac etiologies.  Orders include CBC, CMP, lipase lactic acid Troponin and CT abdomen with contrast.   ED Course as of 12/02/24 1306   Mon Dec 02, 2024   1213 Lactic acid, plasma (w/reflex if result > 2.0)       Medications   iohexol (OMNIPAQUE) 350 MG/ML injection (MULTI-DOSE) 100 mL (100 mL Intravenous Given 12/2/24 1055)       ED Risk Strat Scores           SBIRT 20yo+      Flowsheet Row Most Recent Value   Initial Alcohol Screen: US AUDIT-C     3b. FEMALE Any Age, or MALE 65+: How often do you have 4 or more drinks on one occassion? 0 Filed at: 12/02/2024 0859   Audit-C Score 0 Filed at: 12/02/2024 0859                             History of Present Illness       Chief Complaint   Patient presents with    Vomiting     Vomiting diarrhea for 1 week with lower back pain       Past Medical History:   Diagnosis Date    Bipolar 1 disorder (HCC)     Cancer (HCC)     kidney    Cardiac disease     fast heart beat    Chronic pain     Fractures     GERD (gastroesophageal reflux disease)     Hard of hearing     Hypertension     Renal cell carcinoma (HCC)     Stroke (HCC)     2009 affected her speech, right sided weakness    TIA (transient ischemic attack)       Past Surgical History:   Procedure Laterality Date    APPENDECTOMY      CHOLECYSTECTOMY      FL GUIDED NEEDLE PLAC BX/ASP/INJ  5/30/2014    FL GUIDED NEEDLE PLAC BX/ASP/INJ  12/19/2013    FL GUIDED NEEDLE PLAC BX/ASP/INJ  6/10/2013    JOINT REPLACEMENT Bilateral      bilateral knees    NEPHRECTOMY Right     LA COLONOSCOPY FLX DX W/COLLJ SPEC WHEN PFRMD N/A 8/7/2018    Procedure: COLONOSCOPY;  Surgeon: Boy Guillermo MD;  Location: [facility];  Service: Gastroenterology    LA OPEN TREATMENT BIMALLEOLAR ANKLE FRACTURE Right 5/14/2019    Procedure: ANKLE - Bimalleolar OPEN REDUCTION W/ INTERNAL FIXATION (ORIF);  Surgeon: Harris Christianson;  Location: [facility];  Service: Orthopedics    LA REMOVAL IMPLANT DEEP Right 1/31/2023    Procedure: REMOVAL HARDWARE ANKLE;  Surgeon: Harris Christianson;  Location: [facility];  Service: Orthopedics    LA TX TIBL SHFT FX IMED IMPLT W/WO SCREWS&/CERCLA Left 7/27/2022    Procedure: INSERTION NAIL IM TIBIA;  Surgeon: Lefty Shea MD;  Location: [facility];  Service: Orthopedics      Family History   Problem Relation Age of Onset    Colon cancer Other     Alcohol abuse Other     Hypertension Other       Social History     Tobacco Use    Smoking status: Former     Current packs/day: 0.50     Average packs/day: 0.5 packs/day for 7.0 years (3.5 ttl pk-yrs)     Types: Cigarettes    Smokeless tobacco: Never    Tobacco comments:     pt refused referral   Vaping Use     Vaping status: Never Used   Substance Use Topics    Alcohol use: Not Currently    Drug use: Never      E-Cigarette/Vaping    E-Cigarette Use Never User       E-Cigarette/Vaping Substances    Nicotine No     THC No     CBD No     Flavoring No     Other No     Unknown No       I have reviewed and agree with the history as documented.       Vomiting  Associated symptoms: no abdominal pain, no arthralgias, no chills, no cough, no fever and no sore throat        Review of Systems   Constitutional:  Negative for chills and fever.   HENT:  Negative for ear pain and sore throat.    Eyes:  Negative for pain and visual disturbance.   Respiratory:  Negative for cough and shortness of breath.    Cardiovascular:  Negative for chest pain and palpitations.   Gastrointestinal:  Positive for vomiting. Negative for abdominal pain.   Genitourinary:  Negative for dysuria and hematuria.   Musculoskeletal:  Negative for arthralgias and back pain.   Skin:  Negative for color change and rash.   Neurological:  Negative for seizures and syncope.   All other systems reviewed and are negative.          Objective       ED Triage Vitals [12/02/24 0857]   Temperature Pulse Blood Pressure Respirations SpO2 Patient Position - Orthostatic VS   (!) 97 °F (36.1 °C) 92 126/86 18 97 % Sitting      Temp Source Heart Rate Source BP Location FiO2 (%) Pain Score    Temporal Monitor Left arm -- 10 - Worst Possible Pain      Vitals      Date and Time Temp Pulse SpO2 Resp BP Pain Score FACES Pain Rating User   12/02/24 1300 -- 81 97 % 18 141/80 -- -- MB   12/02/24 0857 97 °F (36.1 °C) 92 97 % 18 126/86 10 - Worst Possible Pain -- KTR            Physical Exam  Vitals and nursing note reviewed.   Constitutional:       General: She is not in acute distress.     Appearance: She is well-developed.   HENT:      Head: Normocephalic and atraumatic.   Eyes:      Conjunctiva/sclera: Conjunctivae normal.   Cardiovascular:      Rate and Rhythm: Normal rate and regular  rhythm.      Heart sounds: No murmur heard.  Pulmonary:      Effort: Pulmonary effort is normal. No respiratory distress.      Breath sounds: Normal breath sounds.   Abdominal:      Palpations: Abdomen is soft.      Tenderness: There is no abdominal tenderness.   Musculoskeletal:         General: No swelling.      Cervical back: Neck supple.   Skin:     General: Skin is warm and dry.      Capillary Refill: Capillary refill takes less than 2 seconds.   Neurological:      Mental Status: She is alert.   Psychiatric:         Mood and Affect: Mood normal.         Results Reviewed       Procedure Component Value Units Date/Time    Urine Microscopic [[id number]]  (Abnormal) Collected: 12/02/24 1221    Lab Status: Final result Specimen: Urine, Clean Catch Updated: 12/02/24 1253     RBC, UA 0-1 /hpf      WBC, UA 1-2 /hpf      Epithelial Cells Occasional /hpf      Bacteria, UA Moderate /hpf     UA w Reflex to Microscopic w Reflex to Culture [[id number]]  (Abnormal) Collected: 12/02/24 1221    Lab Status: Final result Specimen: Urine, Clean Catch Updated: 12/02/24 1246     Color, UA Yellow     Clarity, UA Clear     Specific Gravity, UA 1.025     pH, UA 6.0     Leukocytes, UA Trace     Nitrite, UA Negative     Protein, UA Trace mg/dl      Glucose, UA Negative mg/dl      Ketones, UA Negative mg/dl      Urobilinogen, UA <2.0 mg/dl      Bilirubin, UA Negative     Occult Blood, UA Negative    HS Troponin I 2hr [[id number]] Collected: 12/02/24 1214    Lab Status: In process Specimen: Blood from Arm, Left Updated: 12/02/24 1242    HS Troponin I 4hr [[id number]]     Lab Status: No result Specimen: Blood     HS Troponin 0hr (reflex protocol) [[id number]]  (Normal) Collected: 12/02/24 0952    Lab Status: Final result Specimen: Blood from Arm, Left Updated: 12/02/24 1024     hs TnI 0hr 3 ng/L     Comprehensive metabolic panel [[id number]]  (Abnormal) Collected: 12/02/24 0952    Lab Status: Final result Specimen: Blood from Arm, Left Updated:  12/02/24 1018     Sodium 138 mmol/L      Potassium 4.5 mmol/L      Chloride 112 mmol/L      CO2 19 mmol/L      ANION GAP 7 mmol/L      BUN 26 mg/dL      Creatinine 1.18 mg/dL      Glucose 102 mg/dL      Calcium 9.2 mg/dL      AST 90 U/L      ALT 75 U/L      Alkaline Phosphatase 162 U/L      Total Protein 6.9 g/dL      Albumin 4.2 g/dL      Total Bilirubin 0.51 mg/dL      eGFR 47 ml/min/1.73sq m     Narrative:      National Kid[employer] guidelines for Chronic Kidney Disease (CKD):     Stage 1 with normal or high GFR (GFR > 90 mL/min/1.73 square meters)    Stage 2 Mild CKD (GFR = 60-89 mL/min/1.73 square meters)    Stage 3A Moderate CKD (GFR = 45-59 mL/min/1.73 square meters)    Stage 3B Moderate CKD (GFR = 30-44 mL/min/1.73 square meters)    Stage 4 Severe CKD (GFR = 15-29 mL/min/1.73 square meters)    Stage 5 End Stage CKD (GFR <15 mL/min/1.73 square meters)  Note: GFR calculation is accurate only with a steady state creatinine    Lipase [[id number]]  (Normal) Collected: 12/02/24 0952    Lab Status: Final result Specimen: Blood from Arm, Left Updated: 12/02/24 1018     Lipase 40 u/L     Magnesium [[id number]]  (Abnormal) Collected: 12/02/24 0952    Lab Status: Final result Specimen: Blood from Arm, Left Updated: 12/02/24 1018     Magnesium 1.8 mg/dL     Lactic acid, plasma (w/reflex if result > 2.0) [[id number]]  (Normal) Collected: 12/02/24 0952    Lab Status: Final result Specimen: Blood from Arm, Left Updated: 12/02/24 1016     LACTIC ACID 1.0 mmol/L     Narrative:      Result may be elevated if tourniquet was used during collection.    CBC and differential [[id number]]  (Abnormal) Collected: 12/02/24 0952    Lab Status: Final result Specimen: Blood from Arm, Left Updated: 12/02/24 1003     WBC 2.72 Thousand/uL      RBC 3.40 Million/uL      Hemoglobin 12.2 g/dL      Hematocrit 37.4 %       fL      MCH 35.9 pg      MCHC 32.6 g/dL      RDW 13.8 %      MPV 8.3 fL      Platelets 184 Thousands/uL       nRBC 0 /100 WBCs      Segmented % 54 %      Immature Grans % 0 %      Lymphocytes % 36 %      Monocytes % 7 %      Eosinophils Relative 2 %      Basophils Relative 1 %      Absolute Neutrophils 1.47 Thousands/µL      Absolute Immature Grans 0.01 Thousand/uL      Absolute Lymphocytes 0.98 Thousands/µL      Absolute Monocytes 0.20 Thousand/µL      Eosinophils Absolute 0.04 Thousand/µL      Basophils Absolute 0.02 Thousands/µL             CT abdomen pelvis with contrast   Final Interpretation by Shannan Bond MD (12/02 1108)      No acute intra-abdominal pathology.         Workstation performed: [id number]             Procedures    ED Medication and Procedure Management   Prior to Admission Medications   Prescriptions Last Dose Informant Patient Reported? Taking?   ALPRAZolam (XANAX) 1 mg tablet  Self No No   Sig: Take 1 tablet (1 mg total) by mouth 4 (four) times a day as needed for anxiety for up to 10 days   Patient taking differently: Take 1 mg by mouth 3 (three) times a day as needed for anxiety   HYDROcodone-acetaminophen (NORCO) 5-325 mg per tablet  Self Yes No   Sig: Take 1 tablet by mouth every 6 (six) hours as needed for pain   HYDROcodone-guaiFENesin (NARCOF PO)  Self Yes No   Sig: Take 325 each by mouth 4 (four) times a day   QUEtiapine (SEROquel) 300 mg tablet  Self Yes No   Sig: Take 300 mg by mouth daily at bedtime   aspirin 81 mg chewable tablet  Self No No   Sig: Chew 1 tablet (81 mg total) daily   atorvastatin (LIPITOR) 40 mg tablet  Self No No   Sig: Take 1 tablet (40 mg total) by mouth every evening To prevent stroke and heart attack   cariprazine (Vraylar) 1.5 MG capsule  Self Yes No   Sig: Take 1.5 mg by mouth daily   cholecalciferol (VITAMIN D3) 1,000 units tablet  Self No No   Sig: Take 1 tablet (1,000 Units total) by mouth daily Take this in place of ergocalciferol   Patient taking differently: Take 1,000 Units by mouth once a week Take this in place of ergocalciferol   esomeprazole (NexIUM)  20 mg packet  Self Yes No   Sig: Take 20 mg by mouth 2 (two) times a day   famotidine (PEPCID) 20 mg tablet  Self Yes No   Sig: Take 20 mg by mouth daily   metoprolol succinate (TOPROL-XL) 100 mg 24 hr tablet  Self No No   Sig: Take 1 tablet (100 mg total) by mouth daily Do not start before November 19, 2022.   pantoprazole (PROTONIX) 40 mg tablet  Mother, Self Yes No   Sig: Take 40 mg by mouth 2 (two) times a day   Patient not taking: Reported on 7/24/2024   vitamin B-12 (VITAMIN B-12) 500 mcg tablet  Self Yes No   Sig: Take 500 mcg by mouth daily      Facility-Administered Medications: None     Patient's Medications   Discharge Prescriptions    No medications on file       ED SEPSIS DOCUMENTATION   Time reflects when diagnosis was documented in both MDM as applicable and the Disposition within this note       Time User Action Codes Description Comment    12/2/2024  1:01 PM Vamshi Ely Add [R11.2,  R19.7] Nausea vomiting and diarrhea     12/2/2024  1:01 PM Vamshi Ely [R74.01] Transaminitis                  John Trent MD  12/02/24 7426

## 2024-12-04 LAB
ATRIAL RATE: 74 BPM
P AXIS: 76 DEGREES
PR INTERVAL: 192 MS
QRS AXIS: 80 DEGREES
QRSD INTERVAL: 84 MS
QT INTERVAL: 380 MS
QTC INTERVAL: 421 MS
T WAVE AXIS: 82 DEGREES
VENTRICULAR RATE: 74 BPM

## 2024-12-04 PROCEDURE — 93010 ELECTROCARDIOGRAM REPORT: CPT | Performed by: INTERNAL MEDICINE

## 2024-12-23 NOTE — PROGRESS NOTES
Orthopedics   Deangelo Todd 77 y o  female MRN: [id number]  Unit/Bed#: -01    Subjective:  77 y  o female post operative day 2 IMN left tibia  Pt doing well  Pain controlled      Labs:  0   Lab Value Date/Time    HCT 26 3 (L) 07/29/2022 0732    HCT 23 8 (L) 07/28/2022 0504    HCT 30 4 (L) 05/16/2022 1711    HCT 34 0 (L) 10/15/2015 0738    HCT 34 1 (L) 07/08/2015 0915    HCT 35 5 06/15/2015 1037    HGB 8 0 (L) 07/29/2022 0732    HGB 7 0 (L) 07/28/2022 0504    HGB 9 3 (L) 05/16/2022 1711    HGB 11 3 (L) 10/15/2015 0738    HGB 11 6 07/08/2015 0915    HGB 12 0 06/15/2015 1037    INR 0 98 05/16/2022 1711    INR 0 99 07/08/2015 0945    WBC 5 04 07/29/2022 0732    WBC 6 43 07/28/2022 0504    WBC 5 83 05/16/2022 1711    WBC 6 52 10/15/2015 0738    WBC 6 00 07/08/2015 0915    WBC 6 56 06/15/2015 1037    ESR 17 03/13/2018 1329    CRP <3 0 03/13/2018 1329       Meds:    Current Facility-Administered Medications:     albuterol (PROVENTIL HFA,VENTOLIN HFA) inhaler 2 puff, 2 puff, Inhalation, Q6H PRN, Fito Garza MD    ALPRAZolam Cornelia Galeas) tablet 1 mg, 1 mg, Oral, 4x Daily, Fito Garza MD, 1 mg at 07/28/22 2155    amLODIPine (NORVASC) tablet 5 mg, 5 mg, Oral, Daily, Fito Garza MD, 5 mg at 07/28/22 7947    apixaban (ELIQUIS) tablet 5 mg, 5 mg, Oral, BID, Tan Spindle, CRNP    aspirin chewable tablet 81 mg, 81 mg, Oral, Daily, Fito Garza MD, 81 mg at 07/28/22 0845    atorvastatin (LIPITOR) tablet 10 mg, 10 mg, Oral, QPM, Fito Garza MD    Brexpiprazole (REXULTI) tablet 2 mg, 2 mg, Oral, Daily, Fito Garza MD    chlorhexidine (PERIDEX) 0 12 % oral rinse 15 mL, 15 mL, Swish & Spit, Once, Fito Garza MD    docusate sodium (COLACE) capsule 50 mg, 50 mg, Oral, BID PRN, Fito Garza MD    famotidine (PEPCID) tablet 20 mg, 20 mg, Oral, Daily, Fito Garza MD, 20 mg at 07/28/22 0845    gabapentin (NEURONTIN) capsule 400 mg, 400 mg, Oral, 4x Daily, Fito Garza MD, 400 mg at 07/28/22 2155    lactated ringers infusion, 100 mL/hr, Intravenous, Continuous, Fito Garza MD, Stopped at 07/27/22 2051    lactated ringers infusion, 100 mL/hr, Intravenous, Continuous, Kala Carbajal PA-C, Stopped at 07/28/22 7607    metoprolol succinate (TOPROL-XL) 24 hr tablet 200 mg, 200 mg, Oral, QAM, Fito Garza MD, 200 mg at 07/28/22 0851    nicotine (NICODERM CQ) 7 mg/24hr TD 24 hr patch 1 patch, 1 patch, Transdermal, Daily, Fito Garza MD    OLANZapine (ZyPREXA) tablet 20 mg, 20 mg, Oral, HS, Fito Garza MD, 20 mg at 07/28/22 2158    oxyCODONE (ROXICODONE) immediate release tablet 10 mg, 10 mg, Oral, Q4H PRN, Fito Garza MD, 10 mg at 07/28/22 1806    oxyCODONE (ROXICODONE) IR tablet 5 mg, 5 mg, Oral, Q4H PRN, Fito Garza MD, 5 mg at 07/28/22 0845    pantoprazole (PROTONIX) EC tablet 40 mg, 40 mg, Oral, Daily, Fito Garza MD, 40 mg at 07/28/22 0845    QUEtiapine (SEROquel) tablet 200 mg, 200 mg, Oral, HS, Fito Garza MD, 200 mg at 07/28/22 2155    Blood Culture:   Lab Results   Component Value Date    BLOODCX No Growth After 5 Days  06/16/2021    BLOODCX No Growth After 5 Days  06/16/2021       Wound Culture:   No results found for: WOUNDCULT    Ins and Outs:  I/O last 24 hours: In: -   Out: 1200 [Urine:1200]          Physical:  Vitals:    07/29/22 0730   BP: 106/69   Pulse: 84   Resp: 16   Temp: 97 6 °F (36 4 °C)   SpO2: 100%     left lower extremity  · Dressings clean Dry Intact  · + EHL/FHL, + knee flexion/extension  · Sensation intact L2-S1  · 2+ DP Pulse    Assessment:   77 y  o female post operative day 2 IMN left tibia      Plan:  · WBAT left lower extremity  · Up and out of bed  · Ice, Elevation to affected extremity  · Analgesics  · DVT prophylaxis  · PT/OT  · Dispo: [id number] [address] [facility] for discharge from ortho perspective  · Will continue to assess for acute blood loss anemia    Drea Route Jose Mccabe PA-C numerical 0-10

## 2025-03-19 ENCOUNTER — TELEPHONE (OUTPATIENT)
Dept: NEUROLOGY | Facility: CLINIC | Age: 70
End: 2025-03-19

## 2025-03-19 NOTE — TELEPHONE ENCOUNTER
Unable to leave message for Pt to confirm their appointment on  3/25  with Madhu   .  Voicemail box not set up

## 2025-04-09 ENCOUNTER — TELEPHONE (OUTPATIENT)
Dept: NEUROLOGY | Facility: CLINIC | Age: 70
End: 2025-04-09

## 2025-04-09 NOTE — TELEPHONE ENCOUNTER
Attempted to call pt to confirm 4/16 appointment, pt couldn't hear over the phone to confirm appointment, phone call was then disconnected

## 2025-05-28 ENCOUNTER — APPOINTMENT (EMERGENCY)
Dept: CT IMAGING | Facility: HOSPITAL | Age: 70
End: 2025-05-28
Payer: COMMERCIAL

## 2025-05-28 ENCOUNTER — HOSPITAL ENCOUNTER (EMERGENCY)
Facility: HOSPITAL | Age: 70
Discharge: HOME | End: 2025-05-29
Attending: EMERGENCY MEDICINE | Admitting: EMERGENCY MEDICINE
Payer: COMMERCIAL

## 2025-05-28 ENCOUNTER — APPOINTMENT (EMERGENCY)
Dept: RADIOLOGY | Facility: HOSPITAL | Age: 70
End: 2025-05-28
Payer: COMMERCIAL

## 2025-05-28 DIAGNOSIS — M25.561 RIGHT KNEE PAIN: ICD-10-CM

## 2025-05-28 DIAGNOSIS — M79.10 MYALGIA: Primary | ICD-10-CM

## 2025-05-28 LAB
ALBUMIN SERPL BCG-MCNC: 4.1 G/DL (ref 3.5–5)
ALP SERPL-CCNC: 108 U/L (ref 34–104)
ALT SERPL W P-5'-P-CCNC: 13 U/L (ref 7–52)
ANION GAP SERPL CALCULATED.3IONS-SCNC: 10 MMOL/L (ref 4–13)
APTT PPP: 28 SECONDS (ref 23–34)
AST SERPL W P-5'-P-CCNC: 26 U/L (ref 13–39)
BACTERIA UR QL AUTO: NORMAL /HPF
BASOPHILS # BLD AUTO: 0.04 THOUSANDS/ÂΜL (ref 0–0.1)
BASOPHILS NFR BLD AUTO: 1 % (ref 0–1)
BILIRUB SERPL-MCNC: 0.46 MG/DL (ref 0.2–1)
BILIRUB UR QL STRIP: NEGATIVE
BNP SERPL-MCNC: 51 PG/ML (ref 0–100)
BUN SERPL-MCNC: 24 MG/DL (ref 5–25)
CALCIUM SERPL-MCNC: 9.4 MG/DL (ref 8.4–10.2)
CHLORIDE SERPL-SCNC: 102 MMOL/L (ref 96–108)
CK SERPL-CCNC: 78 U/L (ref 26–192)
CLARITY UR: CLEAR
CO2 SERPL-SCNC: 27 MMOL/L (ref 21–32)
COLOR UR: ABNORMAL
CREAT SERPL-MCNC: 1.2 MG/DL (ref 0.6–1.3)
EOSINOPHIL # BLD AUTO: 0.14 THOUSAND/ÂΜL (ref 0–0.61)
EOSINOPHIL NFR BLD AUTO: 3 % (ref 0–6)
ERYTHROCYTE [DISTWIDTH] IN BLOOD BY AUTOMATED COUNT: 13 % (ref 11.6–15.1)
FLUAV RNA RESP QL NAA+PROBE: NEGATIVE
FLUBV RNA RESP QL NAA+PROBE: NEGATIVE
GFR SERPL CREATININE-BSD FRML MDRD: 46 ML/MIN/1.73SQ M
GLUCOSE SERPL-MCNC: 93 MG/DL (ref 65–140)
GLUCOSE UR STRIP-MCNC: NEGATIVE MG/DL
HCT VFR BLD AUTO: 36.9 % (ref 34.8–46.1)
HGB BLD-MCNC: 12 G/DL (ref 11.5–15.4)
HGB UR QL STRIP.AUTO: NEGATIVE
IMM GRANULOCYTES # BLD AUTO: 0.01 THOUSAND/UL (ref 0–0.2)
IMM GRANULOCYTES NFR BLD AUTO: 0 % (ref 0–2)
INR PPP: 0.95 (ref 0.85–1.19)
KETONES UR STRIP-MCNC: NEGATIVE MG/DL
LACTATE SERPL-SCNC: 0.6 MMOL/L (ref 0.5–2)
LEUKOCYTE ESTERASE UR QL STRIP: ABNORMAL
LIPASE SERPL-CCNC: 16 U/L (ref 11–82)
LYMPHOCYTES # BLD AUTO: 1.34 THOUSANDS/ÂΜL (ref 0.6–4.47)
LYMPHOCYTES NFR BLD AUTO: 31 % (ref 14–44)
MAGNESIUM SERPL-MCNC: 2.1 MG/DL (ref 1.9–2.7)
MCH RBC QN AUTO: 35.2 PG (ref 26.8–34.3)
MCHC RBC AUTO-ENTMCNC: 32.5 G/DL (ref 31.4–37.4)
MCV RBC AUTO: 108 FL (ref 82–98)
MONOCYTES # BLD AUTO: 0.29 THOUSAND/ÂΜL (ref 0.17–1.22)
MONOCYTES NFR BLD AUTO: 7 % (ref 4–12)
NEUTROPHILS # BLD AUTO: 2.53 THOUSANDS/ÂΜL (ref 1.85–7.62)
NEUTS SEG NFR BLD AUTO: 58 % (ref 43–75)
NITRITE UR QL STRIP: NEGATIVE
NON-SQ EPI CELLS URNS QL MICRO: NORMAL /HPF
NRBC BLD AUTO-RTO: 0 /100 WBCS
PH UR STRIP.AUTO: 6 [PH]
PLATELET # BLD AUTO: 222 THOUSANDS/UL (ref 149–390)
PMV BLD AUTO: 8.5 FL (ref 8.9–12.7)
POTASSIUM SERPL-SCNC: 3.8 MMOL/L (ref 3.5–5.3)
PROCALCITONIN SERPL-MCNC: 0.22 NG/ML
PROT SERPL-MCNC: 6.7 G/DL (ref 6.4–8.4)
PROT UR STRIP-MCNC: NEGATIVE MG/DL
PROTHROMBIN TIME: 13.2 SECONDS (ref 12.3–15)
RBC # BLD AUTO: 3.41 MILLION/UL (ref 3.81–5.12)
RBC #/AREA URNS AUTO: NORMAL /HPF
RSV RNA RESP QL NAA+PROBE: NEGATIVE
SARS-COV-2 RNA RESP QL NAA+PROBE: NEGATIVE
SODIUM SERPL-SCNC: 139 MMOL/L (ref 135–147)
SP GR UR STRIP.AUTO: 1.01 (ref 1–1.03)
TSH SERPL DL<=0.05 MIU/L-ACNC: 0.8 UIU/ML (ref 0.45–4.5)
UROBILINOGEN UR STRIP-ACNC: <2 MG/DL
WBC # BLD AUTO: 4.35 THOUSAND/UL (ref 4.31–10.16)
WBC #/AREA URNS AUTO: NORMAL /HPF

## 2025-05-28 PROCEDURE — 71045 X-RAY EXAM CHEST 1 VIEW: CPT

## 2025-05-28 PROCEDURE — 84443 ASSAY THYROID STIM HORMONE: CPT | Performed by: EMERGENCY MEDICINE

## 2025-05-28 PROCEDURE — 96367 TX/PROPH/DG ADDL SEQ IV INF: CPT

## 2025-05-28 PROCEDURE — 96366 THER/PROPH/DIAG IV INF ADDON: CPT

## 2025-05-28 PROCEDURE — 80053 COMPREHEN METABOLIC PANEL: CPT | Performed by: EMERGENCY MEDICINE

## 2025-05-28 PROCEDURE — 99284 EMERGENCY DEPT VISIT MOD MDM: CPT

## 2025-05-28 PROCEDURE — 82550 ASSAY OF CK (CPK): CPT | Performed by: EMERGENCY MEDICINE

## 2025-05-28 PROCEDURE — 85025 COMPLETE CBC W/AUTO DIFF WBC: CPT | Performed by: EMERGENCY MEDICINE

## 2025-05-28 PROCEDURE — 93005 ELECTROCARDIOGRAM TRACING: CPT

## 2025-05-28 PROCEDURE — 99284 EMERGENCY DEPT VISIT MOD MDM: CPT | Performed by: EMERGENCY MEDICINE

## 2025-05-28 PROCEDURE — 0241U HB NFCT DS VIR RESP RNA 4 TRGT: CPT | Performed by: EMERGENCY MEDICINE

## 2025-05-28 PROCEDURE — 85730 THROMBOPLASTIN TIME PARTIAL: CPT | Performed by: EMERGENCY MEDICINE

## 2025-05-28 PROCEDURE — 85610 PROTHROMBIN TIME: CPT | Performed by: EMERGENCY MEDICINE

## 2025-05-28 PROCEDURE — 73564 X-RAY EXAM KNEE 4 OR MORE: CPT

## 2025-05-28 PROCEDURE — 83690 ASSAY OF LIPASE: CPT | Performed by: EMERGENCY MEDICINE

## 2025-05-28 PROCEDURE — 83735 ASSAY OF MAGNESIUM: CPT | Performed by: EMERGENCY MEDICINE

## 2025-05-28 PROCEDURE — 83605 ASSAY OF LACTIC ACID: CPT | Performed by: EMERGENCY MEDICINE

## 2025-05-28 PROCEDURE — 36415 COLL VENOUS BLD VENIPUNCTURE: CPT | Performed by: EMERGENCY MEDICINE

## 2025-05-28 PROCEDURE — 70450 CT HEAD/BRAIN W/O DYE: CPT

## 2025-05-28 PROCEDURE — 83880 ASSAY OF NATRIURETIC PEPTIDE: CPT | Performed by: EMERGENCY MEDICINE

## 2025-05-28 PROCEDURE — 84145 PROCALCITONIN (PCT): CPT | Performed by: EMERGENCY MEDICINE

## 2025-05-28 PROCEDURE — 96365 THER/PROPH/DIAG IV INF INIT: CPT

## 2025-05-28 PROCEDURE — 81001 URINALYSIS AUTO W/SCOPE: CPT | Performed by: EMERGENCY MEDICINE

## 2025-05-28 RX ORDER — SODIUM CHLORIDE, SODIUM GLUCONATE, SODIUM ACETATE, POTASSIUM CHLORIDE, MAGNESIUM CHLORIDE, SODIUM PHOSPHATE, DIBASIC, AND POTASSIUM PHOSPHATE .53; .5; .37; .037; .03; .012; .00082 G/100ML; G/100ML; G/100ML; G/100ML; G/100ML; G/100ML; G/100ML
1000 INJECTION, SOLUTION INTRAVENOUS ONCE
Status: COMPLETED | OUTPATIENT
Start: 2025-05-28 | End: 2025-05-28

## 2025-05-28 RX ORDER — OXYCODONE HYDROCHLORIDE 5 MG/1
5 TABLET ORAL ONCE
Refills: 0 | Status: COMPLETED | OUTPATIENT
Start: 2025-05-28 | End: 2025-05-28

## 2025-05-28 RX ORDER — ACETAMINOPHEN 10 MG/ML
1000 INJECTION, SOLUTION INTRAVENOUS ONCE
Status: COMPLETED | OUTPATIENT
Start: 2025-05-28 | End: 2025-05-28

## 2025-05-28 RX ADMIN — OXYCODONE HYDROCHLORIDE 5 MG: 5 TABLET ORAL at 21:02

## 2025-05-28 RX ADMIN — SODIUM CHLORIDE, SODIUM GLUCONATE, SODIUM ACETATE, POTASSIUM CHLORIDE, MAGNESIUM CHLORIDE, SODIUM PHOSPHATE, DIBASIC, AND POTASSIUM PHOSPHATE 1000 ML: .53; .5; .37; .037; .03; .012; .00082 INJECTION, SOLUTION INTRAVENOUS at 20:37

## 2025-05-28 RX ADMIN — ACETAMINOPHEN 1000 MG: 10 INJECTION INTRAVENOUS at 19:35

## 2025-05-28 NOTE — ED PROVIDER NOTES
Time reflects when diagnosis was documented in both MDM as applicable and the Disposition within this note       Time User Action Codes Description Comment    5/28/2025 11:01 PM Nini Hazel [M79.10] Myalgia     5/28/2025 11:01 PM Nnii Hazel [M25.561] Right knee pain           ED Disposition       ED Disposition   Discharge    Condition   Stable    Date/Time   Wed May 28, 2025 11:02 PM    Comment   Justine Odell discharge to home/self care.                   Assessment & Plan       Medical Decision Making  69-year-old female presents with not just lower extremity pain but pain all over.  This been ongoing for several days she is a nonspecific historian physical exam is overall unremarkable vital signs are stable will be evaluated for rhabdo as she has had a prior history of this electrolyte abnormality will check EKG for arrythmia/starin  ; will assess urine for UTI.  Check chest x-ray due to history of cough or lower respiratory tract infection neuro exam is overall nonfocal. With an NIH zero   Patient ambulates with a walker.      Amount and/or Complexity of Data Reviewed  Labs: ordered.  Radiology: ordered and independent interpretation performed.  ECG/medicine tests:  Decision-making details documented in ED Course.    Risk  Prescription drug management.        ED Course as of 05/29/25 0038   Wed May 28, 2025   2058 Updated patient with lab findings and chest x-ray as well as knee films acute abnormalities.  Complaining of total body pain takes Norco 10/325 is already received Tylenol here will administer oxycodone 5 mg orally patient states her mom can pick her up for check CT of the head;  gait check as well as u/a   2222 Ambulated with steady gait to bathroom tolerating PO without difficulty   2253 No evidence of UTI; recommend patient follow up with PMD for recheck of symptoms       Medications   acetaminophen (Ofirmev) injection 1,000 mg (0 mg Intravenous Stopped 5/28/25 1956)    multi-electrolyte (Plasmalyte-A/Isolyte-S PH 7.4/Normosol-R) IV bolus 1,000 mL (0 mL Intravenous Stopped 5/28/25 2220)   oxyCODONE (ROXICODONE) IR tablet 5 mg (5 mg Oral Given 5/28/25 2102)       ED Risk Strat Scores                      (ISAR) Identification of Seniors at Risk  Before the illness or injury that brought you to the Emergency, did you need someone to help you on a regular basis?: 0  In the last 24 hours, have you needed more help than usual?: 0  Have you been hospitalized for one or more nights during the past 6 months?: 0  In general, do you see well?: 0  In general, do you have serious problems with your memory?: 0  Do you take more than three different medications every day?: 1  ISAR Score: 1            SBIRT 20yo+      Flowsheet Row Most Recent Value   Initial Alcohol Screen: US AUDIT-C     3b. FEMALE Any Age, or MALE 65+: How often do you have 4 or more drinks on one occassion? 0 Filed at: 05/28/2025 1734   Audit-C Score 0 Filed at: 05/28/2025 1734   SHAYY: How many times in the past year have you...    Used an illegal drug or used a prescription medication for non-medical reasons? Never Filed at: 05/28/2025 1734                            History of Present Illness       Chief Complaint   Patient presents with    Leg Pain     Complaining of bilateral leg pain       Past Medical History[1]   Past Surgical History[2]   Family History[3]   Social History[4]   E-Cigarette/Vaping    E-Cigarette Use Never User       E-Cigarette/Vaping Substances    Nicotine No     THC No     CBD No     Flavoring No     Other No     Unknown No       I have reviewed and agree with the history as documented.     69-year-old female presents for not just lower extremity pain but pain all over this has been ongoing for several days patient describes falling today she uses a walker to ambulate stood up from the couch got lightheaded and landed on her right knee.  She is able to ambulate after this her knee is not giving out  she has chronic hip pain there is no new back pain.  Patient denies any headache she has had some tearing from her eyes she complains that her mouth is occasionally sore he has no chest pain she feels slightly short of breath and has had a cough but no pleuritic pain no chest pain she has stomach issues feels abdominal pain is at baseline there is been intermittent nausea but no vomiting no diarrhea no dysuria or increased urinary frequency fever or chills no numbness or tingling no unilateral weakness. No recent changes to medications  Past medical history bipolar disorder renal cell carcinoma hypertension COPD prior history of stroke TIA and hyperlipidemia  Past surgical history includes right nephrectomy secondary to the renal cell cancer appendectomy cholecystectomy bilateral total knee arthroplasties right ORIF of the ankle        Review of Systems   Constitutional:  Positive for activity change. Negative for appetite change, chills and fever.   HENT:  Negative for congestion, ear pain, rhinorrhea, sneezing and sore throat.    Eyes:  Negative for photophobia, pain, discharge, redness, itching and visual disturbance.   Respiratory:  Positive for cough and shortness of breath. Negative for wheezing.    Cardiovascular:  Negative for chest pain and leg swelling.   Gastrointestinal:  Positive for abdominal pain (at baseline) and nausea. Negative for blood in stool, diarrhea and vomiting.   Endocrine: Negative for polyuria.   Genitourinary:  Negative for difficulty urinating, dysuria, frequency and urgency.   Musculoskeletal:  Positive for myalgias. Negative for arthralgias, back pain, neck pain and neck stiffness.   Skin:  Negative for rash.   Neurological:  Positive for weakness and light-headedness. Negative for dizziness, speech difficulty, numbness and headaches.   Hematological:  Negative for adenopathy.   Psychiatric/Behavioral:  Negative for confusion.    All other systems reviewed and are  negative.          Objective       ED Triage Vitals [05/28/25 1732]   Temperature Pulse Blood Pressure Respirations SpO2 Patient Position - Orthostatic VS   97.8 °F (36.6 °C) 77 (!) 180/78 20 99 % Sitting      Temp Source Heart Rate Source BP Location FiO2 (%) Pain Score    Temporal Monitor Left arm -- 10 - Worst Possible Pain      Vitals      Date and Time Temp Pulse SpO2 Resp BP Pain Score FACES Pain Rating User   05/28/25 2358 98 °F (36.7 °C) 70 97 % 20 132/85 5 --    05/28/25 2200 -- 69 96 % -- 138/91 -- --    05/28/25 2130 -- 68 96 % -- 157/69 8 --    05/28/25 2102 -- -- -- -- -- 8 --    05/28/25 2100 -- 64 96 % -- 143/66 -- --    05/28/25 2030 -- 69 97 % -- 118/64 -- --    05/28/25 2000 -- 65 98 % -- 167/72 -- --    05/28/25 1956 -- -- -- -- -- 10 - Worst Possible Pain --    05/28/25 1945 -- 69 97 % -- 144/63 -- --    05/28/25 1900 -- 71 94 % -- 131/57 -- --    05/28/25 1732 97.8 °F (36.6 °C) 77 99 % 20 180/78 10 - Worst Possible Pain -- KTR            Physical Exam  Vitals reviewed.   Constitutional:       General: She is not in acute distress.     Appearance: She is not ill-appearing, toxic-appearing or diaphoretic.   HENT:      Head:      Comments: Wears bilateral hearing aids     Right Ear: Tympanic membrane, ear canal and external ear normal.      Left Ear: Tympanic membrane, ear canal and external ear normal.      Nose: Nose normal. No congestion or rhinorrhea.      Mouth/Throat:      Mouth: Mucous membranes are moist.      Pharynx: No oropharyngeal exudate or posterior oropharyngeal erythema.     Eyes:      General:         Right eye: No discharge.         Left eye: No discharge.      Extraocular Movements: Extraocular movements intact.      Conjunctiva/sclera: Conjunctivae normal.      Pupils: Pupils are equal, round, and reactive to light.       Cardiovascular:      Rate and Rhythm: Normal rate and regular rhythm.      Pulses: Normal pulses.   Pulmonary:      Effort: Pulmonary  effort is normal. No respiratory distress.      Breath sounds: Normal breath sounds. No stridor. No wheezing, rhonchi or rales.   Abdominal:      General: There is no distension.      Palpations: Abdomen is soft.      Tenderness: There is no abdominal tenderness. There is no right CVA tenderness, left CVA tenderness, guarding or rebound.      Comments: Back no midline T or L spine tenderness     Musculoskeletal:         General: Normal range of motion.      Cervical back: Normal range of motion and neck supple.      Right lower leg: No edema.      Left lower leg: No edema.      Comments: Is a stable right knee is able to flex and extend without difficulty patellar apprehension test is negative Lachman's is negative there is no ligamentous laxity with varus and valgus stress.     Skin:     General: Skin is warm and dry.     Neurological:      General: No focal deficit present.      Mental Status: She is alert and oriented to person, place, and time.      Cranial Nerves: No cranial nerve deficit.      Sensory: No sensory deficit.      Motor: No weakness.      Coordination: Coordination normal.      Deep Tendon Reflexes: Reflexes normal.     Psychiatric:         Mood and Affect: Mood normal.         Results Reviewed       Procedure Component Value Units Date/Time    Urine Microscopic [[id number]]  (Normal) Collected: 05/28/25 2223    Lab Status: Final result Specimen: Urine, Clean Catch Updated: 05/28/25 2249     RBC, UA None Seen /hpf      WBC, UA 1-2 /hpf      Epithelial Cells Occasional /hpf      Bacteria, UA Occasional /hpf     UA w Reflex to Microscopic w Reflex to Culture [[id number]]  (Abnormal) Collected: 05/28/25 2223    Lab Status: Final result Specimen: Urine, Clean Catch Updated: 05/28/25 2228     Color, UA Light Yellow     Clarity, UA Clear     Specific Gravity, UA 1.015     pH, UA 6.0     Leukocytes, UA Trace     Nitrite, UA Negative     Protein, UA Negative mg/dl      Glucose, UA Negative mg/dl       Ketones, UA Negative mg/dl      Urobilinogen, UA <2.0 mg/dl      Bilirubin, UA Negative     Occult Blood, UA Negative    Protime-INR [[id number]]  (Normal) Collected: 05/28/25 1926    Lab Status: Final result Specimen: Blood from Arm, Right Updated: 05/28/25 2016     Protime 13.2 seconds      INR 0.95    Narrative:      INR Therapeutic Range    Indication                                             INR Range      Atrial Fibrillation                                               2.0-3.0  Hypercoagulable State                                    2.0.2.3  Left Ventricular Asist Device                            2.0-3.0  Mechanical Heart Valve                                  -    Aortic(with afib, MI, embolism, HF, LA enlargement,    and/or coagulopathy)                                     2.0-3.0 (2.5-3.5)     Mitral                                                             2.5-3.5  Prosthetic/Bioprosthetic Heart Valve               2.0-3.0  Venous thromboembolism (VTE: VT, PE        2.0-3.0    APTT [[id number]]  (Normal) Collected: 05/28/25 1926    Lab Status: Final result Specimen: Blood from Arm, Right Updated: 05/28/25 2016     PTT 28 seconds     FLU/RSV/COVID - if FLU/RSV clinically relevant (2hr TAT) [[id number]]  (Normal) Collected: 05/28/25 1926    Lab Status: Final result Specimen: Nares from Nose Updated: 05/28/25 2016     SARS-CoV-2 Negative     INFLUENZA A PCR Negative     INFLUENZA B PCR Negative     RSV PCR Negative    Narrative:      This test has been performed using the CoV-2/Flu/RSV plus assay on the [employer] GeneXpert platform. This test has been validated by the  and verified by the performing laboratory.     This test is designed to amplify and detect the following: nucleocapsid (N), envelope (E), and RNA-dependent RNA polymerase (RdRP) genes of the SARS-CoV-2 genome; matrix (M), basic polymerase (PB2), and acidic protein (PA) segments of the influenza A genome; matrix (M) and  non-structural protein (NS) segments of the influenza B genome, and the nucleocapsid genes of RSV A and RSV B.     Positive results are indicative of the presence of Flu A, Flu B, RSV, and/or SARS-CoV-2 RNA. Positive results for SARS-CoV-2 or suspected novel influenza should be reported to state, local, or federal health departments according to local reporting requirements.      All results should be assessed in conjunction with clinical presentation and other laboratory markers for clinical management.     FOR PEDIATRIC PATIENTS - copy/paste COVID Guidelines URL to browser: [email or web address]       TSH, 3rd generation with Free T4 reflex [[id number]]  (Normal) Collected: 05/28/25 1926    Lab Status: Final result Specimen: Blood from Arm, Right Updated: 05/28/25 2008     TSH 3RD GENERATON 0.803 uIU/mL     Procalcitonin [[id number]]  (Normal) Collected: 05/28/25 1926    Lab Status: Final result Specimen: Blood from Arm, Right Updated: 05/28/25 2001     Procalcitonin 0.22 ng/ml     B-Type Natriuretic Peptide(BNP) [[id number]]  (Normal) Collected: 05/28/25 1926    Lab Status: Final result Specimen: Blood from Arm, Right Updated: 05/28/25 1957     BNP 51 pg/mL     Comprehensive metabolic panel [[id number]]  (Abnormal) Collected: 05/28/25 1926    Lab Status: Final result Specimen: Blood from Arm, Right Updated: 05/28/25 1953     Sodium 139 mmol/L      Potassium 3.8 mmol/L      Chloride 102 mmol/L      CO2 27 mmol/L      ANION GAP 10 mmol/L      BUN 24 mg/dL      Creatinine 1.20 mg/dL      Glucose 93 mg/dL      Calcium 9.4 mg/dL      AST 26 U/L      ALT 13 U/L      Alkaline Phosphatase 108 U/L      Total Protein 6.7 g/dL      Albumin 4.1 g/dL      Total Bilirubin 0.46 mg/dL      eGFR 46 ml/min/1.73sq m     Narrative:      National Kidney Disease Foundation guidelines for Chronic Kidney Disease (CKD):     Stage 1 with normal or high GFR (GFR > 90 mL/min/1.73 square  meters)    Stage 2 Mild CKD (GFR = 60-89 mL/min/1.73 square meters)    Stage 3A Moderate CKD (GFR = 45-59 mL/min/1.73 square meters)    Stage 3B Moderate CKD (GFR = 30-44 mL/min/1.73 square meters)    Stage 4 Severe CKD (GFR = 15-29 mL/min/1.73 square meters)    Stage 5 End Stage CKD (GFR <15 mL/min/1.73 square meters)  Note: GFR calculation is accurate only with a steady state creatinine    Lipase [[id number]]  (Normal) Collected: 05/28/25 1926    Lab Status: Final result Specimen: Blood from Arm, Right Updated: 05/28/25 1953     Lipase 16 u/L     CK [[id number]]  (Normal) Collected: 05/28/25 1926    Lab Status: Final result Specimen: Blood from Arm, Right Updated: 05/28/25 1953     Total CK 78 U/L     Magnesium [[id number]]  (Normal) Collected: 05/28/25 1926    Lab Status: Final result Specimen: Blood from Arm, Right Updated: 05/28/25 1953     Magnesium 2.1 mg/dL     Lactic acid, plasma (w/reflex if result > 2.0) [[id number]]  (Normal) Collected: 05/28/25 1926    Lab Status: Final result Specimen: Blood from Arm, Right Updated: 05/28/25 1950     LACTIC ACID 0.6 mmol/L     Narrative:      Result may be elevated if tourniquet was used during collection.    CBC and differential [[id number]]  (Abnormal) Collected: 05/28/25 1926    Lab Status: Final result Specimen: Blood from Arm, Right Updated: 05/28/25 1937     WBC 4.35 Thousand/uL      RBC 3.41 Million/uL      Hemoglobin 12.0 g/dL      Hematocrit 36.9 %       fL      MCH 35.2 pg      MCHC 32.5 g/dL      RDW 13.0 %      MPV 8.5 fL      Platelets 222 Thousands/uL      nRBC 0 /100 WBCs      Segmented % 58 %      Immature Grans % 0 %      Lymphocytes % 31 %      Monocytes % 7 %      Eosinophils Relative 3 %      Basophils Relative 1 %      Absolute Neutrophils 2.53 Thousands/µL      Absolute Immature Grans 0.01 Thousand/uL      Absolute Lymphocytes 1.34 Thousands/µL      Absolute Monocytes 0.29 Thousand/µL      Eosinophils Absolute 0.14 Thousand/µL      Basophils  Absolute 0.04 Thousands/µL             CT head without contrast   Final Interpretation by Terry Gamez DO (05/28 2210)      No acute intracranial abnormality.                  Workstation performed: [id number]         XR chest 1 view portable   ED Interpretation by Nini Hazel MD (05/28 1959)   Per my independent interpretation. Radiologist to provide formal read. No acute process no significant change vs. 8/5/24      XR knee 4+ views Right injury   ED Interpretation by Nini Hazel MD (05/28 2000)   Per my independent interpretation. Radiologist to provide formal read. No acute fracture          ECG 12 Lead Documentation Only    Date/Time: 5/28/2025 7:50 PM    Performed by: Nini Hazel MD  Authorized by: Nini Hazel MD    Indications / Diagnosis:  Weakness  ECG reviewed by me, the ED Provider: yes    Patient location:  ED  Previous ECG:     Previous ECG:  Compared to current    Comparison ECG info:  12/2/24 1121    Similarity:  No change  Rate:     ECG rate:  70    ECG rate assessment: normal    Rhythm:     Rhythm: sinus rhythm    QRS:     QRS axis:  Normal  Comments:      ; nonspecific twave changes      ED Medication and Procedure Management   Prior to Admission Medications   Prescriptions Last Dose Informant Patient Reported? Taking?   ALPRAZolam (XANAX) 1 mg tablet  Self No No   Sig: Take 1 tablet (1 mg total) by mouth 4 (four) times a day as needed for anxiety for up to 10 days   Patient taking differently: Take 1 mg by mouth 3 (three) times a day as needed for anxiety   HYDROcodone-acetaminophen (NORCO) 5-325 mg per tablet 5/28/2025 Noon Self Yes Yes   Sig: Take 1 tablet by mouth every 6 (six) hours as needed for pain   HYDROcodone-guaiFENesin (NARCOF PO) 5/27/2025 Self Yes Yes   Sig: Take 325 each by mouth in the morning and 325 each at noon and 325 each in the evening and 325 each before bedtime.   QUEtiapine (SEROquel) 300 mg tablet 5/27/2025 Bedtime  Self Yes Yes   Sig: Take 300 mg by mouth daily at bedtime   aspirin 81 mg chewable tablet 5/27/2025 Self No Yes   Sig: Chew 1 tablet (81 mg total) daily   atorvastatin (LIPITOR) 40 mg tablet 5/27/2025 Morning Self No Yes   Sig: Take 1 tablet (40 mg total) by mouth every evening To prevent stroke and heart attack   cariprazine (Vraylar) 1.5 MG capsule 5/27/2025 Morning Self Yes Yes   Sig: Take 1.5 mg by mouth in the morning.   cholecalciferol (VITAMIN D3) 1,000 units tablet  Self No No   Sig: Take 1 tablet (1,000 Units total) by mouth daily Take this in place of ergocalciferol   Patient taking differently: Take 1,000 Units by mouth once a week Take this in place of ergocalciferol   esomeprazole (NexIUM) 20 mg packet 5/27/2025 Morning Self Yes Yes   Sig: Take 20 mg by mouth in the morning and 20 mg before bedtime.   famotidine (PEPCID) 20 mg tablet 5/27/2025 Morning Self Yes Yes   Sig: Take 20 mg by mouth in the morning.   metoprolol succinate (TOPROL-XL) 100 mg 24 hr tablet 5/27/2025 Morning Self No Yes   Sig: Take 1 tablet (100 mg total) by mouth daily Do not start before November 19, 2022.   pantoprazole (PROTONIX) 40 mg tablet 5/27/2025 Morning Mother, Self Yes Yes   Sig: Take 40 mg by mouth in the morning and 40 mg before bedtime.   vitamin B-12 (VITAMIN B-12) 500 mcg tablet 5/27/2025 Morning Self Yes Yes   Sig: Take 500 mcg by mouth in the morning.      Facility-Administered Medications: None     Discharge Medication List as of 5/28/2025 11:11 PM        CONTINUE these medications which have NOT CHANGED    Details   aspirin 81 mg chewable tablet Chew 1 tablet (81 mg total) daily, Starting Fri 5/31/2024, Print      atorvastatin (LIPITOR) 40 mg tablet Take 1 tablet (40 mg total) by mouth every evening To prevent stroke and heart attack, Starting Fri 11/18/2022, Normal      cariprazine (Vraylar) 1.5 MG capsule Take 1.5 mg by mouth in the morning., Historical Med      esomeprazole (NexIUM) 20 mg packet Take 20 mg by  mouth in the morning and 20 mg before bedtime., Historical Med      famotidine (PEPCID) 20 mg tablet Take 20 mg by mouth in the morning., Starting Wed 11/23/2022, Historical Med      HYDROcodone-acetaminophen (NORCO) 5-325 mg per tablet Take 1 tablet by mouth every 6 (six) hours as needed for pain, Historical Med      HYDROcodone-guaiFENesin (NARCOF PO) Take 325 each by mouth in the morning and 325 each at noon and 325 each in the evening and 325 each before bedtime., Historical Med      metoprolol succinate (TOPROL-XL) 100 mg 24 hr tablet Take 1 tablet (100 mg total) by mouth daily Do not start before November 19, 2022., Starting Sat 11/19/2022, Normal      pantoprazole (PROTONIX) 40 mg tablet Take 40 mg by mouth in the morning and 40 mg before bedtime., Historical Med      QUEtiapine (SEROquel) 300 mg tablet Take 300 mg by mouth daily at bedtime, Historical Med      vitamin B-12 (VITAMIN B-12) 500 mcg tablet Take 500 mcg by mouth in the morning., Historical Med      ALPRAZolam (XANAX) 1 mg tablet Take 1 tablet (1 mg total) by mouth 4 (four) times a day as needed for anxiety for up to 10 days, Starting Fri 11/18/2022, Until Wed 7/24/2024 at 2359, No Print      cholecalciferol (VITAMIN D3) 1,000 units tablet Take 1 tablet (1,000 Units total) by mouth daily Take this in place of ergocalciferol, Starting Fri 11/18/2022, Until Wed 7/24/2024, Normal           No discharge procedures on file.  ED SEPSIS DOCUMENTATION   Time reflects when diagnosis was documented in both MDM as applicable and the Disposition within this note       Time User Action Codes Description Comment    5/28/2025 11:01 PM Nini Hazel [M79.10] Myalgia     5/28/2025 11:01 PM Nini Hazel [M25.561] Right knee pain                    [1]   Past Medical History:  Diagnosis Date    Bipolar 1 disorder (HCC)     Cancer (HCC)     kidney    Cardiac disease     fast heart beat    Chronic pain     Fractures     GERD (gastroesophageal reflux  disease)     Hard of hearing     Hypertension     Renal cell carcinoma (HCC)     Stroke (HCC)     2009 affected her speech, right sided weakness    TIA (transient ischemic attack)    [2]   Past Surgical History:  Procedure Laterality Date    APPENDECTOMY      CHOLECYSTECTOMY      FL GUIDED NEEDLE PLAC BX/ASP/INJ  5/30/2014    FL GUIDED NEEDLE PLAC BX/ASP/INJ  12/19/2013    FL GUIDED NEEDLE PLAC BX/ASP/INJ  6/10/2013    JOINT REPLACEMENT Bilateral      bilateral knees    NEPHRECTOMY Right     MT COLONOSCOPY FLX DX W/COLLJ SPEC WHEN PFRMD N/A 8/7/2018    Procedure: COLONOSCOPY;  Surgeon: Boy Guillermo MD;  Location: [facility];  Service: Gastroenterology    MT OPEN TREATMENT BIMALLEOLAR ANKLE FRACTURE Right 5/14/2019    Procedure: ANKLE - Bimalleolar OPEN REDUCTION W/ INTERNAL FIXATION (ORIF);  Surgeon: Harris Christianson;  Location:  MAIN OR;  Service: Orthopedics    MT REMOVAL IMPLANT DEEP Right 1/31/2023    Procedure: REMOVAL HARDWARE ANKLE;  Surgeon: Harris Christianson;  Location: [facility];  Service: Orthopedics    MT TX TIBL SHFT FX IMED IMPLT W/WO SCREWS&/CERCLA Left 7/27/2022    Procedure: INSERTION NAIL IM TIBIA;  Surgeon: Lefty Shea MD;  Location: [facility];  Service: Orthopedics   [3]   Family History  Problem Relation Name Age of Onset    Colon cancer Other      Alcohol abuse Other      Hypertension Other     [4]   Social History  Tobacco Use    Smoking status: Former     Current packs/day: 0.50     Average packs/day: 0.5 packs/day for 7.0 years (3.5 ttl pk-yrs)     Types: Cigarettes    Smokeless tobacco: Never    Tobacco comments:     pt refused referral   Vaping Use    Vaping status: Never Used   Substance Use Topics    Alcohol use: Not Currently    Drug use: Never        Nini Hzael MD  05/29/25 0038

## 2025-05-29 VITALS
WEIGHT: 179.01 LBS | BODY MASS INDEX: 30.73 KG/M2 | TEMPERATURE: 98 F | RESPIRATION RATE: 20 BRPM | HEART RATE: 70 BPM | DIASTOLIC BLOOD PRESSURE: 85 MMHG | SYSTOLIC BLOOD PRESSURE: 132 MMHG | OXYGEN SATURATION: 97 %

## 2025-05-29 NOTE — DISCHARGE INSTRUCTIONS
Continue current medications  Use your walker at home activity as tolerated  Be careful with position changes  Recommend  a recheck with your family doctor  See orthopedics   Return with fever trouble breathing lightheaded leg swelling or any new or worsening symptoms

## 2025-05-29 NOTE — ED NOTES
Pt. Ambulated from room to bathroom w/ assistance, denies dizziness, steady gait.      Tino Gomez RN  05/28/25 4662

## 2025-05-30 LAB
ATRIAL RATE: 70 BPM
P AXIS: 62 DEGREES
PR INTERVAL: 200 MS
QRS AXIS: 57 DEGREES
QRSD INTERVAL: 88 MS
QT INTERVAL: 420 MS
QTC INTERVAL: 453 MS
T WAVE AXIS: 65 DEGREES
VENTRICULAR RATE: 70 BPM

## 2025-05-30 PROCEDURE — 93010 ELECTROCARDIOGRAM REPORT: CPT | Performed by: INTERNAL MEDICINE

## 2025-06-12 ENCOUNTER — HOSPITAL ENCOUNTER (EMERGENCY)
Facility: HOSPITAL | Age: 70
Discharge: HOME | End: 2025-06-12
Attending: EMERGENCY MEDICINE
Payer: COMMERCIAL

## 2025-06-12 ENCOUNTER — APPOINTMENT (EMERGENCY)
Dept: RADIOLOGY | Facility: HOSPITAL | Age: 70
End: 2025-06-12
Payer: COMMERCIAL

## 2025-06-12 ENCOUNTER — APPOINTMENT (EMERGENCY)
Dept: CT IMAGING | Facility: HOSPITAL | Age: 70
End: 2025-06-12
Payer: COMMERCIAL

## 2025-06-12 VITALS
HEART RATE: 63 BPM | SYSTOLIC BLOOD PRESSURE: 121 MMHG | BODY MASS INDEX: 30.73 KG/M2 | DIASTOLIC BLOOD PRESSURE: 76 MMHG | TEMPERATURE: 97.9 F | RESPIRATION RATE: 18 BRPM | OXYGEN SATURATION: 94 % | WEIGHT: 179.01 LBS

## 2025-06-12 DIAGNOSIS — M54.50 CHRONIC BILATERAL LOW BACK PAIN: ICD-10-CM

## 2025-06-12 DIAGNOSIS — G89.29 CHRONIC BILATERAL LOW BACK PAIN: ICD-10-CM

## 2025-06-12 DIAGNOSIS — M25.50 ARTHRALGIA OF MULTIPLE SITES: Primary | ICD-10-CM

## 2025-06-12 PROCEDURE — 99284 EMERGENCY DEPT VISIT MOD MDM: CPT | Performed by: EMERGENCY MEDICINE

## 2025-06-12 PROCEDURE — 99284 EMERGENCY DEPT VISIT MOD MDM: CPT

## 2025-06-12 PROCEDURE — 72131 CT LUMBAR SPINE W/O DYE: CPT

## 2025-06-12 PROCEDURE — 73502 X-RAY EXAM HIP UNI 2-3 VIEWS: CPT

## 2025-06-12 RX ORDER — METHOCARBAMOL 500 MG/1
500 TABLET, FILM COATED ORAL EVERY 8 HOURS PRN
Qty: 20 TABLET | Refills: 0 | Status: SHIPPED | OUTPATIENT
Start: 2025-06-12

## 2025-06-12 NOTE — ED PROVIDER NOTES
"Time reflects when diagnosis was documented in both MDM as applicable and the Disposition within this note       Time User Action Codes Description Comment    6/12/2025  2:38 PM Eve Duggan Add [M25.50] Arthralgia of multiple sites     6/12/2025  2:38 PM Eve Duggan Add [M54.50,  G89.29] Chronic bilateral low back pain           ED Disposition       ED Disposition   Discharge    Condition   Stable    Date/Time   u Jun 12, 2025  2:37 PM    Comment   Justine LAURYN Odell discharge to home/self care.                   Assessment & Plan       Medical Decision Making  69-year-old female presents for evaluation of continued pain, gait disturbance.  She was evaluated at the end of May with labs, additionally had imaging including CT head and few x-rays.  She did not have evaluation of her low back however, will obtain a CT of the lumbar spine to assess for occult fracture, degenerative disease, stenosis.  Doubt cauda equina or spinal infection at this time.  She complains of diffuse joint aches and pain, is possible something like fibromyalgia or other arthropathy may be the source of her symptoms.  She does have follow-up with orthopedics upcoming, she may follow-up with her primary care who can consider a rheumatological evaluation or potentially rheumatology consultation.    Amount and/or Complexity of Data Reviewed  Radiology: ordered. Decision-making details documented in ED Course.    Risk  Prescription drug management.        ED Course as of 06/12/25 2205   Thu Jun 12, 2025   1235 XR R knee interpretation from May 2025: \"Mild to moderate osteoarthritis, evidenced by articular cartilage loss and marginal osteophytes.\"   1436 CT lumbar spine without contrast  DEGENERATIVE CHANGES: Multilevel endplate osteophytes, degenerative endplate changes, vacuum phenomenon, subchondral sclerosis, disc height loss, facet arthropathy. Moderate-to-severe disc height loss at L2-L3 and L3-L4.     Lower Thoracic spine:  Mild lower " thoracic degenerative disc disease with mild annular bulging.     Lumbar Spine:  L1-2: No significant canal stenosis or foraminal narrowing.     L2-3: Disc bulge, narrowed bilateral subarticular zones. Facet arthropathy, ligamentum flavum thickening. Moderate canal stenosis. Mild bilateral foraminal narrowing (left worse than right).     L3-4: Disc bulge, narrowed bilateral subarticular zones. Facet arthropathy, ligamentum flavum thickening. Moderate-to-severe canal stenosis. Mild-to-moderate right and mild left foraminal narrowing.     L4-5: Disc bulge, narrowed bilateral subarticular zones. Facet arthropathy, ligamentum flavum thickening. Moderate canal stenosis. Mild-to-moderate right and mild left foraminal narrowing.     L5-S1: Disc bulge. Hypertrophic facet arthropathy, ligamentum flavum thickening. Mild canal stenosis. Severe left foraminal narrowing.     PARASPINAL SOFT TISSUES:  Normal.     OTHER: Partially imaged unchanged small amount of nodular soft tissue in right nephrectomy surgical bed, rectosigmoid colectomy, moderate calcified atherosclerosis, bilateral total hip arthroplasty.     IMPRESSION:     No acute osseous abnormality of osteopenic lumbar spine.     Multilevel degenerative changes of lumbar spine with varying degrees of canal stenosis (moderate-to-severe L3-4) and foraminal narrowing (severe left L5-S1), as detailed above.     Partially imaged unchanged small amount of nodular soft tissue in right nephrectomy surgical bed, indeterminate. Consider follow-up nonemergent CT abdomen pelvis with contrast for further evaluation.     Additional chronic/incidental findings as detailed above.     The study was marked in EPIC for immediate notification.     1445 Ambulatory with walker, steady; no assistance to get out of bed       Medications - No data to display    ED Risk Strat Scores                    No data recorded        SBIRT 20yo+      Flowsheet Row Most Recent Value   Initial Alcohol  Screen: US AUDIT-C     1. How often do you have a drink containing alcohol? 0 Filed at: 06/12/2025 1213   Audit-C Score 0 Filed at: 06/12/2025 1213                            History of Present Illness       Chief Complaint   Patient presents with    Leg Pain     Pt reports lower back pain that radiates down both legs that is causing weakness and frequent falls. Pt denies incontinence but reports numbness in groin for about a week       Past Medical History[1]   Past Surgical History[2]   Family History[3]   Social History[4]   E-Cigarette/Vaping    E-Cigarette Use Never User       E-Cigarette/Vaping Substances    Nicotine No     THC No     CBD No     Flavoring No     Other No     Unknown No       I have reviewed and agree with the history as documented.     69-year-old female presents for evaluation.  Patient states about a month ago she fell, since then she feels her diffuse joint pain as well as gait stability has worsened.  Patient states she is walking with a walker now, she states she feels her knees give out causing her to fall.  She has had no recent falls.  She does have an appointment with orthopedics however this is not until the end of July, she has not previously had a rheumatological workup.  She denies recent fevers or illnesses.  She does report lower extremity paresthesias that have been ongoing for some time now, possibly since previous orthopedic surgeries.  She states a tingling sensation in her bilateral hands earlier today however this is now resolved.  She also notes some paresthesias to her anterior thighs, is unclear how long this has been ongoing for.  She denies changes to her urinary or bowel habits, saddle anesthesia.        Review of Systems   Constitutional:  Negative for activity change, appetite change and fever.   Respiratory:  Negative for cough and shortness of breath.    Cardiovascular:  Negative for chest pain.   Gastrointestinal:  Negative for abdominal pain, constipation and  diarrhea.   Genitourinary:  Negative for difficulty urinating and dysuria.   Musculoskeletal:  Positive for arthralgias and myalgias.   Neurological:  Negative for weakness.   All other systems reviewed and are negative.          Objective       ED Triage Vitals [06/12/25 1207]   Temperature Pulse Blood Pressure Respirations SpO2 Patient Position - Orthostatic VS   97.9 °F (36.6 °C) 74 140/74 18 96 % Sitting      Temp Source Heart Rate Source BP Location FiO2 (%) Pain Score    Temporal Monitor Left arm -- 8      Vitals      Date and Time Temp Pulse SpO2 Resp BP Pain Score FACES Pain Rating User   06/12/25 1400 -- 63 94 % 18 121/76 -- -- OP   06/12/25 1331 -- 64 93 % -- 118/60 -- -- OP   06/12/25 1316 -- 64 94 % -- 113/59 -- -- OP   06/12/25 1230 -- 67 94 % 18 108/64 -- -- OP   06/12/25 1215 -- 71 95 % 18 140/74 -- -- OP   06/12/25 1207 97.9 °F (36.6 °C) 74 96 % 18 140/74 8 -- DC            Physical Exam  Vitals reviewed.   Constitutional:       General: She is not in acute distress.     Appearance: Normal appearance. She is not ill-appearing, toxic-appearing or diaphoretic.   HENT:      Head: Normocephalic and atraumatic.      Right Ear: External ear normal.      Left Ear: External ear normal.      Nose: Nose normal.      Mouth/Throat:      Mouth: Mucous membranes are moist.     Eyes:      General: No scleral icterus.        Right eye: No discharge.         Left eye: No discharge.       Cardiovascular:      Rate and Rhythm: Normal rate.   Pulmonary:      Effort: Pulmonary effort is normal. No respiratory distress.     Musculoskeletal:         General: No deformity or signs of injury.      Right lower leg: No edema.      Left lower leg: No edema.      Comments: No midline c/t/l spine pain, pain across lumber back area     Skin:     General: Skin is warm.      Coloration: Skin is not jaundiced or pale.     Neurological:      General: No focal deficit present.      Mental Status: She is alert. Mental status is at  baseline.      Sensory: No sensory deficit.      Motor: No weakness.      Comments: 5/5 strength in LE with hip flexion/extension, knee flexion/extension, ankle flexion/extension; reports sensation intact to b/l LE; b/l UE appear to be intact with arm flexion/extension, shoulder shrug       Results Reviewed       None            CT lumbar spine without contrast   Final Interpretation by Anthony Ridley MD (06/12 8728)      No acute osseous abnormality of osteopenic lumbar spine.      Multilevel degenerative changes of lumbar spine with varying degrees of canal stenosis (moderate-to-severe L3-4) and foraminal narrowing (severe left L5-S1), as detailed above.      Partially imaged unchanged small amount of nodular soft tissue in right nephrectomy surgical bed, indeterminate. Consider follow-up nonemergent CT abdomen pelvis with contrast for further evaluation.      Additional chronic/incidental findings as detailed above.      The study was marked in EPIC for immediate notification.         Workstation performed: [id number]         XR hip/pelv 2-3 vws right   Final Interpretation by Sonny Draper MD (06/12 9590)      No acute osseous abnormality.         Computerized Assisted Algorithm (CAA) may have been used to analyze all applicable images.            Workstation performed: [id number]             Procedures    ED Medication and Procedure Management   Prior to Admission Medications   Prescriptions Last Dose Informant Patient Reported? Taking?   ALPRAZolam (XANAX) 1 mg tablet  Self No No   Sig: Take 1 tablet (1 mg total) by mouth 4 (four) times a day as needed for anxiety for up to 10 days   Patient taking differently: Take 1 mg by mouth 3 (three) times a day as needed for anxiety   HYDROcodone-acetaminophen (NORCO) 5-325 mg per tablet  Self Yes No   Sig: Take 1 tablet by mouth every 6 (six) hours as needed for pain   HYDROcodone-guaiFENesin (NARCOF PO)  Self Yes No   Sig: Take 325 each by mouth in the  morning and 325 each at noon and 325 each in the evening and 325 each before bedtime.   QUEtiapine (SEROquel) 300 mg tablet  Self Yes No   Sig: Take 300 mg by mouth daily at bedtime   aspirin 81 mg chewable tablet  Self No No   Sig: Chew 1 tablet (81 mg total) daily   atorvastatin (LIPITOR) 40 mg tablet  Self No No   Sig: Take 1 tablet (40 mg total) by mouth every evening To prevent stroke and heart attack   cariprazine (Vraylar) 1.5 MG capsule  Self Yes No   Sig: Take 1.5 mg by mouth in the morning.   cholecalciferol (VITAMIN D3) 1,000 units tablet  Self No No   Sig: Take 1 tablet (1,000 Units total) by mouth daily Take this in place of ergocalciferol   Patient taking differently: Take 1,000 Units by mouth once a week Take this in place of ergocalciferol   esomeprazole (NexIUM) 20 mg packet  Self Yes No   Sig: Take 20 mg by mouth in the morning and 20 mg before bedtime.   famotidine (PEPCID) 20 mg tablet  Self Yes No   Sig: Take 20 mg by mouth in the morning.   metoprolol succinate (TOPROL-XL) 100 mg 24 hr tablet  Self No No   Sig: Take 1 tablet (100 mg total) by mouth daily Do not start before November 19, 2022.   pantoprazole (PROTONIX) 40 mg tablet  Mother, Self Yes No   Sig: Take 40 mg by mouth in the morning and 40 mg before bedtime.   vitamin B-12 (VITAMIN B-12) 500 mcg tablet  Self Yes No   Sig: Take 500 mcg by mouth in the morning.      Facility-Administered Medications: None     Discharge Medication List as of 6/12/2025  2:39 PM        START taking these medications    Details   methocarbamol (ROBAXIN) 500 mg tablet Take 1 tablet (500 mg total) by mouth every 8 (eight) hours as needed for muscle spasms (Body aches, pain), Starting u 6/12/2025, Normal           CONTINUE these medications which have NOT CHANGED    Details   ALPRAZolam (XANAX) 1 mg tablet Take 1 tablet (1 mg total) by mouth 4 (four) times a day as needed for anxiety for up to 10 days, Starting Fri 11/18/2022, Until Wed 7/24/2024 at 2359, No  Print      aspirin 81 mg chewable tablet Chew 1 tablet (81 mg total) daily, Starting Fri 5/31/2024, Print      atorvastatin (LIPITOR) 40 mg tablet Take 1 tablet (40 mg total) by mouth every evening To prevent stroke and heart attack, Starting Fri 11/18/2022, Normal      cariprazine (Vraylar) 1.5 MG capsule Take 1.5 mg by mouth in the morning., Historical Med      cholecalciferol (VITAMIN D3) 1,000 units tablet Take 1 tablet (1,000 Units total) by mouth daily Take this in place of ergocalciferol, Starting Fri 11/18/2022, Until Wed 7/24/2024, Normal      esomeprazole (NexIUM) 20 mg packet Take 20 mg by mouth in the morning and 20 mg before bedtime., Historical Med      famotidine (PEPCID) 20 mg tablet Take 20 mg by mouth in the morning., Starting Wed 11/23/2022, Historical Med      HYDROcodone-acetaminophen (NORCO) 5-325 mg per tablet Take 1 tablet by mouth every 6 (six) hours as needed for pain, Historical Med      HYDROcodone-guaiFENesin (NARCOF PO) Take 325 each by mouth in the morning and 325 each at noon and 325 each in the evening and 325 each before bedtime., Historical Med      metoprolol succinate (TOPROL-XL) 100 mg 24 hr tablet Take 1 tablet (100 mg total) by mouth daily Do not start before November 19, 2022., Starting Sat 11/19/2022, Normal      pantoprazole (PROTONIX) 40 mg tablet Take 40 mg by mouth in the morning and 40 mg before bedtime., Historical Med      QUEtiapine (SEROquel) 300 mg tablet Take 300 mg by mouth daily at bedtime, Historical Med      vitamin B-12 (VITAMIN B-12) 500 mcg tablet Take 500 mcg by mouth in the morning., Historical Med           No discharge procedures on file.  ED SEPSIS DOCUMENTATION   Time reflects when diagnosis was documented in both MDM as applicable and the Disposition within this note       Time User Action Codes Description Comment    6/12/2025  2:38 PM Eve Duggan Add [M25.50] Arthralgia of multiple sites     6/12/2025  2:38 PM Eve Duggan Add [M54.50,   G89.29] Chronic bilateral low back pain                    [1]   Past Medical History:  Diagnosis Date    Bipolar 1 disorder (HCC)     Cancer (HCC)     kidney    Cardiac disease     fast heart beat    Chronic pain     Fractures     GERD (gastroesophageal reflux disease)     Hard of hearing     Hypertension     Renal cell carcinoma (HCC)     Stroke (HCC)     2009 affected her speech, right sided weakness    TIA (transient ischemic attack)    [2]   Past Surgical History:  Procedure Laterality Date    APPENDECTOMY      CHOLECYSTECTOMY      FL GUIDED NEEDLE PLAC BX/ASP/INJ  5/30/2014    FL GUIDED NEEDLE PLAC BX/ASP/INJ  12/19/2013    FL GUIDED NEEDLE PLAC BX/ASP/INJ  6/10/2013    JOINT REPLACEMENT Bilateral      bilateral knees    NEPHRECTOMY Right     NC COLONOSCOPY FLX DX W/COLLJ SPEC WHEN PFRMD N/A 8/7/2018    Procedure: COLONOSCOPY;  Surgeon: Boy Guillermo MD;  Location: [facility];  Service: Gastroenterology    NC OPEN TREATMENT BIMALLEOLAR ANKLE FRACTURE Right 5/14/2019    Procedure: ANKLE - Bimalleolar OPEN REDUCTION W/ INTERNAL FIXATION (ORIF);  Surgeon: Harris Christianson;  Location:  MAIN OR;  Service: Orthopedics    NC REMOVAL IMPLANT DEEP Right 1/31/2023    Procedure: REMOVAL HARDWARE ANKLE;  Surgeon: Harris Christianson;  Location:  MAIN OR;  Service: Orthopedics    NC TX TIBL SHFT FX IMED IMPLT W/WO SCREWS&/CERCLA Left 7/27/2022    Procedure: INSERTION NAIL IM TIBIA;  Surgeon: Lefty Shea MD;  Location: [facility];  Service: Orthopedics   [3]   Family History  Problem Relation Name Age of Onset    Colon cancer Other      Alcohol abuse Other      Hypertension Other     [4]   Social History  Tobacco Use    Smoking status: Former     Current packs/day: 0.50     Average packs/day: 0.5 packs/day for 7.0 years (3.5 ttl pk-yrs)     Types: Cigarettes    Smokeless tobacco: Never    Tobacco comments:     pt refused referral   Vaping Use    Vaping status: Never Used   Substance Use Topics    Alcohol use: Not Currently     Drug use: Never        Eve Duggan,   06/12/25 2601

## 2025-06-12 NOTE — DISCHARGE INSTRUCTIONS
Cat scan of lumbar spine from today:    DEGENERATIVE CHANGES: Multilevel endplate osteophytes, degenerative endplate changes, vacuum phenomenon, subchondral sclerosis, disc height loss, facet arthropathy. Moderate-to-severe disc height loss at L2-L3 and L3-L4.     Lower Thoracic spine:  Mild lower thoracic degenerative disc disease with mild annular bulging.     Lumbar Spine:  L1-2: No significant canal stenosis or foraminal narrowing.     L2-3: Disc bulge, narrowed bilateral subarticular zones. Facet arthropathy, ligamentum flavum thickening. Moderate canal stenosis. Mild bilateral foraminal narrowing (left worse than right).     L3-4: Disc bulge, narrowed bilateral subarticular zones. Facet arthropathy, ligamentum flavum thickening. Moderate-to-severe canal stenosis. Mild-to-moderate right and mild left foraminal narrowing.     L4-5: Disc bulge, narrowed bilateral subarticular zones. Facet arthropathy, ligamentum flavum thickening. Moderate canal stenosis. Mild-to-moderate right and mild left foraminal narrowing.     L5-S1: Disc bulge. Hypertrophic facet arthropathy, ligamentum flavum thickening. Mild canal stenosis. Severe left foraminal narrowing.     PARASPINAL SOFT TISSUES:  Normal.     OTHER: Partially imaged unchanged small amount of nodular soft tissue in right nephrectomy surgical bed, rectosigmoid colectomy, moderate calcified atherosclerosis, bilateral total hip arthroplasty.     IMPRESSION:     No acute osseous abnormality of osteopenic lumbar spine.     Multilevel degenerative changes of lumbar spine with varying degrees of canal stenosis (moderate-to-severe L3-4) and foraminal narrowing (severe left L5-S1), as detailed above.     Partially imaged unchanged small amount of nodular soft tissue in right nephrectomy surgical bed, indeterminate. Consider follow-up nonemergent CT abdomen pelvis with contrast for further evaluation.     Additional chronic/incidental findings as detailed above.     The study  was marked in EPIC for immediate notification.

## 2025-07-12 ENCOUNTER — HOSPITAL ENCOUNTER (EMERGENCY)
Facility: HOSPITAL | Age: 70
Discharge: HOME | End: 2025-07-12
Attending: EMERGENCY MEDICINE | Admitting: EMERGENCY MEDICINE
Payer: COMMERCIAL

## 2025-07-12 ENCOUNTER — APPOINTMENT (EMERGENCY)
Dept: CT IMAGING | Facility: HOSPITAL | Age: 70
End: 2025-07-12
Payer: COMMERCIAL

## 2025-07-12 VITALS
TEMPERATURE: 96.5 F | BODY MASS INDEX: 31.14 KG/M2 | RESPIRATION RATE: 18 BRPM | OXYGEN SATURATION: 98 % | HEART RATE: 70 BPM | WEIGHT: 181.44 LBS | SYSTOLIC BLOOD PRESSURE: 114 MMHG | DIASTOLIC BLOOD PRESSURE: 90 MMHG

## 2025-07-12 DIAGNOSIS — R10.9 RIGHT SIDED ABDOMINAL PAIN: Primary | ICD-10-CM

## 2025-07-12 LAB
ALBUMIN SERPL BCG-MCNC: 4 G/DL (ref 3.5–5)
ALP SERPL-CCNC: 125 U/L (ref 34–104)
ALT SERPL W P-5'-P-CCNC: 13 U/L (ref 7–52)
ANION GAP SERPL CALCULATED.3IONS-SCNC: 10 MMOL/L (ref 4–13)
AST SERPL W P-5'-P-CCNC: 13 U/L (ref 13–39)
BASOPHILS # BLD AUTO: 0.03 THOUSANDS/ÂΜL (ref 0–0.1)
BASOPHILS NFR BLD AUTO: 1 % (ref 0–1)
BILIRUB SERPL-MCNC: 0.25 MG/DL (ref 0.2–1)
BUN SERPL-MCNC: 28 MG/DL (ref 5–25)
CALCIUM SERPL-MCNC: 9 MG/DL (ref 8.4–10.2)
CHLORIDE SERPL-SCNC: 109 MMOL/L (ref 96–108)
CO2 SERPL-SCNC: 21 MMOL/L (ref 21–32)
CREAT SERPL-MCNC: 1.02 MG/DL (ref 0.6–1.3)
EOSINOPHIL # BLD AUTO: 0.01 THOUSAND/ÂΜL (ref 0–0.61)
EOSINOPHIL NFR BLD AUTO: 0 % (ref 0–6)
ERYTHROCYTE [DISTWIDTH] IN BLOOD BY AUTOMATED COUNT: 15.2 % (ref 11.6–15.1)
GFR SERPL CREATININE-BSD FRML MDRD: 56 ML/MIN/1.73SQ M
GLUCOSE SERPL-MCNC: 100 MG/DL (ref 65–140)
HCT VFR BLD AUTO: 33.3 % (ref 34.8–46.1)
HGB BLD-MCNC: 11 G/DL (ref 11.5–15.4)
IMM GRANULOCYTES # BLD AUTO: 0.02 THOUSAND/UL (ref 0–0.2)
IMM GRANULOCYTES NFR BLD AUTO: 0 % (ref 0–2)
LIPASE SERPL-CCNC: 30 U/L (ref 11–82)
LYMPHOCYTES # BLD AUTO: 1.14 THOUSANDS/ÂΜL (ref 0.6–4.47)
LYMPHOCYTES NFR BLD AUTO: 21 % (ref 14–44)
MCH RBC QN AUTO: 35.7 PG (ref 26.8–34.3)
MCHC RBC AUTO-ENTMCNC: 33 G/DL (ref 31.4–37.4)
MCV RBC AUTO: 108 FL (ref 82–98)
MONOCYTES # BLD AUTO: 0.41 THOUSAND/ÂΜL (ref 0.17–1.22)
MONOCYTES NFR BLD AUTO: 8 % (ref 4–12)
NEUTROPHILS # BLD AUTO: 3.77 THOUSANDS/ÂΜL (ref 1.85–7.62)
NEUTS SEG NFR BLD AUTO: 70 % (ref 43–75)
NRBC BLD AUTO-RTO: 0 /100 WBCS
PLATELET # BLD AUTO: 263 THOUSANDS/UL (ref 149–390)
PMV BLD AUTO: 8.4 FL (ref 8.9–12.7)
POTASSIUM SERPL-SCNC: 3.8 MMOL/L (ref 3.5–5.3)
PROT SERPL-MCNC: 6.7 G/DL (ref 6.4–8.4)
RBC # BLD AUTO: 3.08 MILLION/UL (ref 3.81–5.12)
SODIUM SERPL-SCNC: 140 MMOL/L (ref 135–147)
WBC # BLD AUTO: 5.38 THOUSAND/UL (ref 4.31–10.16)

## 2025-07-12 PROCEDURE — 74176 CT ABD & PELVIS W/O CONTRAST: CPT

## 2025-07-12 PROCEDURE — 83690 ASSAY OF LIPASE: CPT

## 2025-07-12 PROCEDURE — 99285 EMERGENCY DEPT VISIT HI MDM: CPT | Performed by: EMERGENCY MEDICINE

## 2025-07-12 PROCEDURE — 96372 THER/PROPH/DIAG INJ SC/IM: CPT

## 2025-07-12 PROCEDURE — 36415 COLL VENOUS BLD VENIPUNCTURE: CPT

## 2025-07-12 PROCEDURE — 80053 COMPREHEN METABOLIC PANEL: CPT

## 2025-07-12 PROCEDURE — 85025 COMPLETE CBC W/AUTO DIFF WBC: CPT

## 2025-07-12 PROCEDURE — 99284 EMERGENCY DEPT VISIT MOD MDM: CPT

## 2025-07-12 RX ORDER — HYDROMORPHONE HCL/PF 1 MG/ML
1 SYRINGE (ML) INJECTION ONCE
Status: COMPLETED | OUTPATIENT
Start: 2025-07-12 | End: 2025-07-12

## 2025-07-12 RX ADMIN — HYDROMORPHONE HYDROCHLORIDE 1 MG: 1 INJECTION, SOLUTION INTRAMUSCULAR; INTRAVENOUS; SUBCUTANEOUS at 10:53

## 2025-07-12 NOTE — DISCHARGE INSTRUCTIONS
Blood work and CT scan today were normal  -may continue to use Tylenol and apply pain patches for pain relief  -continue to monitor your symptoms and follow-up with your PCP  -return to the ED for any increased pain, nausea, vomiting, and inability to keep food/fluids down

## 2025-07-12 NOTE — ED PROVIDER NOTES
"Time reflects when diagnosis was documented in both MDM as applicable and the Disposition within this note       Time User Action Codes Description Comment    7/12/2025 11:59 AM Maraym Mota Add [R10.9] Right sided abdominal pain           ED Disposition       ED Disposition   Discharge    Condition   Stable    Date/Time   Sat Jul 12, 2025 11:58 AM    Comment   Justine Odell discharge to home/self care.                   Assessment & Plan       Medical Decision Making  Patient is a 69-year-old female presenting for evaluation of abdominal pain.  Upon examination, patient is nontoxic-appearing does not appear in acute distress.  Vital signs are stable and patient is afebrile.  Normal heart and lung sounds noted.  There is tenderness upon palpation of the RUQ and RLQ.    Differentials include but are not limited to: Diverticulitis, pancreatitis, muscle strain, and kidney injury.  Previous surgical history includes appendectomy and cholecystectomy.    Blood work was obtained which does not show any evidence of leukocytosis and CBC appears near baseline for patient.  CMP is normal without any kidney injury.  Lipase is normal.  CT abdomen/pelvis showing: \"No acute abnormality in the abdomen or pelvis. Focal sigmoid colonic underdistention with top normal air-filled immediately upstream colon, similar in appearance to the prior study. No evidence of bowel obstruction. Outpatient colonoscopy is recommended to exclude neoplasm.\" Per chart review, patient did have a colonoscopy performed last year.     Results were discussed with patient. Discussed that she should continue to take her pain medications as prescribed but should follow-up with her PCP and GI providers for continued monitoring. Strict ED return precautions were reviewed and patient verbalizes understanding of discharge instructions and follow-up care at this time.     Amount and/or Complexity of Data Reviewed  Labs: ordered. Decision-making details documented " "in ED Course.     Details: Reviewed   Radiology: ordered. Decision-making details documented in ED Course.     Details: Reviewed     Risk  Prescription drug management.        ED Course as of 07/13/25 2304   Sat Jul 12, 2025   1042 CT abdomen pelvis wo contrast  CT showing: \"No acute abnormality in the abdomen or pelvis. Focal sigmoid colonic underdistention with top normal air-filled immediately upstream colon, similar in appearance to the prior study. No evidence of bowel obstruction. Outpatient colonoscopy is recommended to exclude neoplasm.\"   1103 CBC and differential(!)  No leukocytosis. Values appear near baseline for patient.    1108 Lipase  Normal    1108 Comprehensive metabolic panel(!)  No electrolyte imbalance. No kidney injury.       Medications   HYDROmorphone (DILAUDID) injection 1 mg (1 mg Intramuscular Given 7/12/25 1053)       ED Risk Strat Scores                    (ISAR) Identification of Seniors at Risk  Before the illness or injury that brought you to the Emergency, did you need someone to help you on a regular basis?: 0  In the last 24 hours, have you needed more help than usual?: 0  Have you been hospitalized for one or more nights during the past 6 months?: 0  In general, do you see well?: 0  In general, do you have serious problems with your memory?: 0  Do you take more than three different medications every day?: 0  ISAR Score: 0            SBIRT 22yo+      Flowsheet Row Most Recent Value   Initial Alcohol Screen: US AUDIT-C     1. How often do you have a drink containing alcohol? 0 Filed at: 07/12/2025 0907   2. How many drinks containing alcohol do you have on a typical day you are drinking?  0 Filed at: 07/12/2025 0907   3b. FEMALE Any Age, or MALE 65+: How often do you have 4 or more drinks on one occassion? 0 Filed at: 07/12/2025 0907   Audit-C Score 0 Filed at: 07/12/2025 0907   SHAYY: How many times in the past year have you...    Used an illegal drug or used a prescription " "medication for non-medical reasons? Never Filed at: 07/12/2025 0907                            History of Present Illness       Chief Complaint   Patient presents with    Abdominal Pain     Right lower abdominal pain for the past 3 days, complains of diarrhea.        Past Medical History[1]   Past Surgical History[2]   Family History[3]   Social History[4]   E-Cigarette/Vaping    E-Cigarette Use Never User       E-Cigarette/Vaping Substances    Nicotine No     THC No     CBD No     Flavoring No     Other No     Unknown No       I have reviewed and agree with the history as documented.     Patient is a 69-year-old female with a past medical history including hypertension, renal cell carcinoma, CVA, and bipolar 1 disorder. Presents today for evaluation of abdominal pain. Notes that she has been experiencing a constant right-sided abdominal pain over the last 3 days. Pain does not radiate anywhere else in the abdomen. Also notes \"rebound tenderness\" after she gently pushes on it. She has also been experiencing non-bloody diarrhea over the last 3 days as well. She has also had a decreased appetite but denies any nausea or vomiting. No urinary symptoms. Typically takes Norco for pain but still notes that her pain is an 8/10 despite medication use.       Abdominal Pain  Associated symptoms: diarrhea    Associated symptoms: no chest pain, no chills, no dysuria, no fever, no nausea, no shortness of breath and no vomiting        Review of Systems   Constitutional:  Negative for chills and fever.   Respiratory:  Negative for shortness of breath.    Cardiovascular:  Negative for chest pain.   Gastrointestinal:  Positive for abdominal pain and diarrhea. Negative for nausea and vomiting.   Genitourinary:  Negative for dysuria, frequency and urgency.   All other systems reviewed and are negative.          Objective       ED Triage Vitals [07/12/25 0905]   Temperature Pulse Blood Pressure Respirations SpO2 Patient Position - " Orthostatic VS   (!) 96.5 °F (35.8 °C) 70 114/90 18 98 % Sitting      Temp Source Heart Rate Source BP Location FiO2 (%) Pain Score    Temporal Monitor Right arm -- 8      Vitals      Date and Time Temp Pulse SpO2 Resp BP Pain Score FACES Pain Rating User   07/12/25 1056 -- -- -- -- -- 8 -- CB   07/12/25 1053 -- -- -- -- -- 8 --    07/12/25 0905 96.5 °F (35.8 °C) 70 98 % 18 114/90 8 -- [place]            Physical Exam  Vitals and nursing note reviewed.   Constitutional:       Appearance: Normal appearance.     Cardiovascular:      Rate and Rhythm: Normal rate.      Heart sounds: Normal heart sounds.   Pulmonary:      Effort: Pulmonary effort is normal.      Breath sounds: Normal breath sounds.   Abdominal:      Palpations: Abdomen is soft.      Tenderness: There is abdominal tenderness in the right upper quadrant and right lower quadrant. There is no guarding.     Musculoskeletal:         General: Normal range of motion.     Skin:     General: Skin is warm and dry.      Capillary Refill: Capillary refill takes less than 2 seconds.     Neurological:      General: No focal deficit present.      Mental Status: She is alert and oriented to person, place, and time.     Psychiatric:         Mood and Affect: Mood normal.         Behavior: Behavior normal.         Results Reviewed       Procedure Component Value Units Date/Time    Comprehensive metabolic panel [[id number]]  (Abnormal) Collected: 07/12/25 1040    Lab Status: Final result Specimen: Blood from Arm, Left Updated: 07/12/25 1104     Sodium 140 mmol/L      Potassium 3.8 mmol/L      Chloride 109 mmol/L      CO2 21 mmol/L      ANION GAP 10 mmol/L      BUN 28 mg/dL      Creatinine 1.02 mg/dL      Glucose 100 mg/dL      Calcium 9.0 mg/dL      AST 13 U/L      ALT 13 U/L      Alkaline Phosphatase 125 U/L      Total Protein 6.7 g/dL      Albumin 4.0 g/dL      Total Bilirubin 0.25 mg/dL      eGFR 56 ml/min/1.73sq m     Narrative:      [employer]  guidelines for Chronic Kidney Disease (CKD):     Stage 1 with normal or high GFR (GFR > 90 mL/min/1.73 square meters)    Stage 2 Mild CKD (GFR = 60-89 mL/min/1.73 square meters)    Stage 3A Moderate CKD (GFR = 45-59 mL/min/1.73 square meters)    Stage 3B Moderate CKD (GFR = 30-44 mL/min/1.73 square meters)    Stage 4 Severe CKD (GFR = 15-29 mL/min/1.73 square meters)    Stage 5 End Stage CKD (GFR <15 mL/min/1.73 square meters)  Note: GFR calculation is accurate only with a steady state creatinine    Lipase [[id number]]  (Normal) Collected: 07/12/25 1040    Lab Status: Final result Specimen: Blood from Arm, Left Updated: 07/12/25 1104     Lipase 30 u/L     CBC and differential [[id number]]  (Abnormal) Collected: 07/12/25 1040    Lab Status: Final result Specimen: Blood from Arm, Left Updated: 07/12/25 1046     WBC 5.38 Thousand/uL      RBC 3.08 Million/uL      Hemoglobin 11.0 g/dL      Hematocrit 33.3 %       fL      MCH 35.7 pg      MCHC 33.0 g/dL      RDW 15.2 %      MPV 8.4 fL      Platelets 263 Thousands/uL      nRBC 0 /100 WBCs      Segmented % 70 %      Immature Grans % 0 %      Lymphocytes % 21 %      Monocytes % 8 %      Eosinophils Relative 0 %      Basophils Relative 1 %      Absolute Neutrophils 3.77 Thousands/µL      Absolute Immature Grans 0.02 Thousand/uL      Absolute Lymphocytes 1.14 Thousands/µL      Absolute Monocytes 0.41 Thousand/µL      Eosinophils Absolute 0.01 Thousand/µL      Basophils Absolute 0.03 Thousands/µL             CT abdomen pelvis wo contrast   ED Interpretation by YASMANI Pace (07/12 1042)   CT negative for appendicitis, diverticulitis      Final Interpretation by Edin Lujan MD (07/12 1038)      1.  No acute abnormality in the abdomen or pelvis.   2.  Focal sigmoid colonic underdistention with top normal air-filled immediately upstream colon, similar in appearance to the prior study. No evidence of bowel obstruction. Outpatient colonoscopy is recommended to  exclude neoplasm.            Workstation performed: [id number]             Procedures    ED Medication and Procedure Management   Prior to Admission Medications   Prescriptions Last Dose Informant Patient Reported? Taking?   ALPRAZolam (XANAX) 1 mg tablet 7/12/2025 Self No Yes   Sig: Take 1 tablet (1 mg total) by mouth 4 (four) times a day as needed for anxiety for up to 10 days   Patient taking differently: Take 1 mg by mouth as needed in the morning and 1 mg as needed at noon and 1 mg as needed in the evening for anxiety.   HYDROcodone-acetaminophen (NORCO) 5-325 mg per tablet 7/12/2025 Morning Self Yes Yes   Sig: Take 1 tablet by mouth every 6 (six) hours as needed for pain   HYDROcodone-guaiFENesin (NARCOF PO)  Self Yes No   Sig: Take 325 each by mouth in the morning and 325 each at noon and 325 each in the evening and 325 each before bedtime.   QUEtiapine (SEROquel) 300 mg tablet 7/11/2025 Evening Self Yes Yes   Sig: Take 300 mg by mouth daily at bedtime   aspirin 81 mg chewable tablet 7/12/2025 Morning Self No Yes   Sig: Chew 1 tablet (81 mg total) daily   atorvastatin (LIPITOR) 40 mg tablet 7/11/2025 Self No Yes   Sig: Take 1 tablet (40 mg total) by mouth every evening To prevent stroke and heart attack   cariprazine (Vraylar) 1.5 MG capsule 7/12/2025 Morning Self Yes Yes   Sig: Take 1.5 mg by mouth in the morning.   cholecalciferol (VITAMIN D3) 1,000 units tablet  Self No No   Sig: Take 1 tablet (1,000 Units total) by mouth daily Take this in place of ergocalciferol   Patient taking differently: Take 1,000 Units by mouth once a week Take this in place of ergocalciferol   esomeprazole (NexIUM) 20 mg packet 7/12/2025 Morning Self Yes Yes   Sig: Take 20 mg by mouth in the morning and 20 mg before bedtime.   famotidine (PEPCID) 20 mg tablet 7/12/2025 Morning Self Yes Yes   Sig: Take 20 mg by mouth in the morning.   methocarbamol (ROBAXIN) 500 mg tablet 7/12/2025 Morning  No Yes   Sig: Take 1 tablet (500 mg total)  by mouth every 8 (eight) hours as needed for muscle spasms (Body aches, pain)   metoprolol succinate (TOPROL-XL) 100 mg 24 hr tablet 7/12/2025 Morning Self No Yes   Sig: Take 1 tablet (100 mg total) by mouth daily Do not start before November 19, 2022.   pantoprazole (PROTONIX) 40 mg tablet Not Taking Mother, Self Yes No   Sig: Take 40 mg by mouth in the morning and 40 mg before bedtime.   Patient not taking: Reported on 7/12/2025   vitamin B-12 (VITAMIN B-12) 500 mcg tablet 7/11/2025 Self Yes Yes   Sig: Take 500 mcg by mouth in the morning.      Facility-Administered Medications: None     Discharge Medication List as of 7/12/2025 12:02 PM        CONTINUE these medications which have NOT CHANGED    Details   ALPRAZolam (XANAX) 1 mg tablet Take 1 tablet (1 mg total) by mouth 4 (four) times a day as needed for anxiety for up to 10 days, Starting Fri 11/18/2022, Until Sat 7/12/2025 at 2359, No Print      aspirin 81 mg chewable tablet Chew 1 tablet (81 mg total) daily, Starting Fri 5/31/2024, Print      atorvastatin (LIPITOR) 40 mg tablet Take 1 tablet (40 mg total) by mouth every evening To prevent stroke and heart attack, Starting Fri 11/18/2022, Normal      cariprazine (Vraylar) 1.5 MG capsule Take 1.5 mg by mouth in the morning., Historical Med      esomeprazole (NexIUM) 20 mg packet Take 20 mg by mouth in the morning and 20 mg before bedtime., Historical Med      famotidine (PEPCID) 20 mg tablet Take 20 mg by mouth in the morning., Starting Wed 11/23/2022, Historical Med      HYDROcodone-acetaminophen (NORCO) 5-325 mg per tablet Take 1 tablet by mouth every 6 (six) hours as needed for pain, Historical Med      methocarbamol (ROBAXIN) 500 mg tablet Take 1 tablet (500 mg total) by mouth every 8 (eight) hours as needed for muscle spasms (Body aches, pain), Starting Thu 6/12/2025, Normal      metoprolol succinate (TOPROL-XL) 100 mg 24 hr tablet Take 1 tablet (100 mg total) by mouth daily Do not start before November  19, 2022., Starting Sat 11/19/2022, Normal      QUEtiapine (SEROquel) 300 mg tablet Take 300 mg by mouth daily at bedtime, Historical Med      vitamin B-12 (VITAMIN B-12) 500 mcg tablet Take 500 mcg by mouth in the morning., Historical Med      cholecalciferol (VITAMIN D3) 1,000 units tablet Take 1 tablet (1,000 Units total) by mouth daily Take this in place of ergocalciferol, Starting Fri 11/18/2022, Until Wed 7/24/2024, Normal      HYDROcodone-guaiFENesin (NARCOF PO) Take 325 each by mouth in the morning and 325 each at noon and 325 each in the evening and 325 each before bedtime., Historical Med      pantoprazole (PROTONIX) 40 mg tablet Take 40 mg by mouth in the morning and 40 mg before bedtime., Historical Med           No discharge procedures on file.  ED SEPSIS DOCUMENTATION   Time reflects when diagnosis was documented in both MDM as applicable and the Disposition within this note       Time User Action Codes Description Comment    7/12/2025 11:59 AM Maryam Mota Add [R10.9] Right sided abdominal pain                    [1]   Past Medical History:  Diagnosis Date    Bipolar 1 disorder (HCC)     Cancer (HCC)     kidney    Cardiac disease     fast heart beat    Chronic pain     Fractures     GERD (gastroesophageal reflux disease)     Hard of hearing     Hypertension     Renal cell carcinoma (HCC)     Stroke (HCC)     2009 affected her speech, right sided weakness    TIA (transient ischemic attack)    [2]   Past Surgical History:  Procedure Laterality Date    APPENDECTOMY      CHOLECYSTECTOMY      FL GUIDED NEEDLE PLAC BX/ASP/INJ  5/30/2014    FL GUIDED NEEDLE PLAC BX/ASP/INJ  12/19/2013    FL GUIDED NEEDLE PLAC BX/ASP/INJ  6/10/2013    JOINT REPLACEMENT Bilateral      bilateral knees    NEPHRECTOMY Right     LA COLONOSCOPY FLX DX W/COLLJ SPEC WHEN PFRMD N/A 8/7/2018    Procedure: COLONOSCOPY;  Surgeon: Boy Guillermo MD;  Location: [facility];  Service: Gastroenterology    LA OPEN TREATMENT BIMALLEOLAR  ANKLE FRACTURE Right 5/14/2019    Procedure: ANKLE - Bimalleolar OPEN REDUCTION W/ INTERNAL FIXATION (ORIF);  Surgeon: Harris Christianson;  Location:  MAIN OR;  Service: Orthopedics    WI REMOVAL IMPLANT DEEP Right 1/31/2023    Procedure: REMOVAL HARDWARE ANKLE;  Surgeon: Harris Christianson;  Location:  MAIN OR;  Service: Orthopedics    WI TX TIBL SHFT FX IMED IMPLT W/WO SCREWS&/CERCLA Left 7/27/2022    Procedure: INSERTION NAIL IM TIBIA;  Surgeon: Lefty Shea MD;  Location:  MAIN OR;  Service: Orthopedics   [3]   Family History  Problem Relation Name Age of Onset    Colon cancer Other      Alcohol abuse Other      Hypertension Other     [4]   Social History  Tobacco Use    Smoking status: Former     Current packs/day: 0.50     Average packs/day: 0.5 packs/day for 7.0 years (3.5 ttl pk-yrs)     Types: Cigarettes    Smokeless tobacco: Never    Tobacco comments:     pt refused referral   Vaping Use    Vaping status: Never Used   Substance Use Topics    Alcohol use: Not Currently    Drug use: Never        YASMANI Pace  07/13/25 2515

## (undated) DEVICE — ADHESIVE SKIN HIGH VISCOSITY EXOFIN 1ML

## (undated) DEVICE — GLOVE INDICATOR PI UNDERGLOVE SZ 9 BLUE

## (undated) DEVICE — DRAPE C-ARM X-RAY

## (undated) DEVICE — 10FR FRAZIER SUCTION HANDLE: Brand: CARDINAL HEALTH

## (undated) DEVICE — SPONGE LAP 18 X 18 IN STRL RFD

## (undated) DEVICE — 2.5MM DRILL BIT/QC/GOLD/110MM

## (undated) DEVICE — INTENDED FOR TISSUE SEPARATION, AND OTHER PROCEDURES THAT REQUIRE A SHARP SURGICAL BLADE TO PUNCTURE OR CUT.: Brand: BARD-PARKER SAFETY BLADES SIZE 15, STERILE

## (undated) DEVICE — 2.0MM DRILL BIT WITH DEPTH MARK/QC/140MM

## (undated) DEVICE — ACE WRAP 6 IN UNSTERILE

## (undated) DEVICE — CHLORAPREP HI-LITE 26ML ORANGE

## (undated) DEVICE — SPLINT COMFORT 4 X 30

## (undated) DEVICE — 3.2MM GUIDE WIRE 400MM

## (undated) DEVICE — DRAPE STERI 1010 18IN X 12IN

## (undated) DEVICE — ALL PURPOSE SPONGES,NON-WOVEN, 4 PLY: Brand: CURITY

## (undated) DEVICE — ACE WRAP 6 IN STERILE

## (undated) DEVICE — SUT VICRYL 2-0 CT-1 27 IN J259H

## (undated) DEVICE — UNIVERSAL MAJOR EXTREMITY,KIT: Brand: CARDINAL HEALTH

## (undated) DEVICE — SINGLE-USE POLYPECTOMY SNARE: Brand: SENSATION SHORT THROW

## (undated) DEVICE — 3.5MM DRILL BIT/QC/110MM

## (undated) DEVICE — PLUMEPEN PRO 10FT

## (undated) DEVICE — POOLE SUCTION HANDLE W/TUBING: Brand: CARDINAL HEALTH

## (undated) DEVICE — CONMED SCOPE SAVER BITE BLOCK, 20X27 MM: Brand: SCOPE SAVER

## (undated) DEVICE — DRAPE EQUIPMENT RF WAND

## (undated) DEVICE — ABDOMINAL PAD: Brand: DERMACEA

## (undated) DEVICE — DISPOSABLE OR TOWEL: Brand: CARDINAL HEALTH

## (undated) DEVICE — 2000CC GUARDIAN II: Brand: GUARDIAN

## (undated) DEVICE — GARMENT,MEDLINE,DVT,INT,CALF,FOAM,MED: Brand: MEDLINE

## (undated) DEVICE — 1.25MM KIRSCHNER WIRE, TROCAR POINTS ON BOTH ENDS 150MM
Type: IMPLANTABLE DEVICE | Site: ANKLE | Status: NON-FUNCTIONAL
Removed: 2019-05-14

## (undated) DEVICE — SILVER-COATED ANTIMICROBIAL BARRIER DRESSING: Brand: ACTICOAT   4" X 8"

## (undated) DEVICE — CAST PADDING 4 IN SYNTHETIC STRL

## (undated) DEVICE — PADDING CAST 4 IN  COTTON STRL

## (undated) DEVICE — LUBRICANT SURGILUBE TUBE 4 OZ  FLIP TOP

## (undated) DEVICE — PENCIL ROCKER SWITCH CAUTERY HAND CONTROL

## (undated) DEVICE — GLOVE SRG BIOGEL 7.5

## (undated) DEVICE — GLOVE SRG BIOGEL ECLIPSE 8

## (undated) DEVICE — GLOVE INDICATOR UNDERGLOVE SZ 7.5 GREEN

## (undated) DEVICE — SUT VICRYL 0 CP-2 R/C 27 IN J870H

## (undated) DEVICE — GAUZE SPONGES,16 PLY: Brand: CURITY

## (undated) DEVICE — PAD GROUNDING ADULT

## (undated) DEVICE — SPONGE STICK WITH PVP-I: Brand: KENDALL

## (undated) DEVICE — GLOVE SRG BIOGEL 9

## (undated) DEVICE — 4.2MM THREE-FLUTED DRILL BIT QC/NEEDLE POINT/145MM-STERILE

## (undated) DEVICE — STOCKINETTE,IMPERVIOUS,12X48,STERILE: Brand: MEDLINE

## (undated) DEVICE — TELFA NON-ADHERENT ABSORBENT DRESSING: Brand: TELFA

## (undated) DEVICE — POV-IOD SOLUTION 4OZ BT

## (undated) DEVICE — TUBING SUCTION 5MM X 12 FT

## (undated) DEVICE — BETHLEHEM UNIVERSAL  MIONR EXT: Brand: CARDINAL HEALTH

## (undated) DEVICE — CUFF TOURNIQUET 30 X 4 IN QUICK CONNECT DISP 1BLA

## (undated) DEVICE — GLOVE INDICATOR PI UNDERGLOVE SZ 8 BLUE

## (undated) DEVICE — KERLIX BANDAGE ROLL: Brand: KERLIX

## (undated) DEVICE — 2.5MM REAMING ROD WITH BALL TIP/950MM-STERILE

## (undated) DEVICE — MEDI-VAC YANK SUCT HNDL W/TPRD BULBOUS TIP: Brand: CARDINAL HEALTH

## (undated) DEVICE — NEEDLE 18 G X 1 1/2 SAFETY

## (undated) DEVICE — 2.7MM CANNULATED DRILL BIT/QC 160MM

## (undated) DEVICE — ACE WRAP 4 IN STERILE

## (undated) DEVICE — BULB SYRINGE,IRRIGATION WITH PROTECTIVE CAP: Brand: DOVER

## (undated) DEVICE — DRILL BIT/ CALIBRATED/ Ø4.2MM EXTRA-LONG

## (undated) DEVICE — CAST PADDING 6 IN SYNTHETIC STRL

## (undated) DEVICE — DRAPE SHEET THREE QUARTER

## (undated) DEVICE — SUT VICRYL 2-0 CP-1 27 IN J266H

## (undated) DEVICE — FORCEPS BIOPSY ALLIGATOR JAW W/NEEDLE 2.8MM

## (undated) DEVICE — GLOVE SRG BIOGEL 8

## (undated) DEVICE — INTENDED FOR TISSUE SEPARATION, AND OTHER PROCEDURES THAT REQUIRE A SHARP SURGICAL BLADE TO PUNCTURE OR CUT.: Brand: BARD-PARKER ® CARBON RIB-BACK BLADES

## (undated) DEVICE — SUT ETHILON 3-0 INFS-1 30 IN 669H